# Patient Record
Sex: FEMALE | Race: WHITE | NOT HISPANIC OR LATINO | Employment: OTHER | URBAN - METROPOLITAN AREA
[De-identification: names, ages, dates, MRNs, and addresses within clinical notes are randomized per-mention and may not be internally consistent; named-entity substitution may affect disease eponyms.]

---

## 2017-01-02 DIAGNOSIS — N18.30 CKD (CHRONIC KIDNEY DISEASE) STAGE 3, GFR 30-59 ML/MIN (H): ICD-10-CM

## 2017-01-02 LAB
ANION GAP SERPL CALCULATED.3IONS-SCNC: 6 MMOL/L (ref 3–14)
BUN SERPL-MCNC: 28 MG/DL (ref 7–30)
CALCIUM SERPL-MCNC: 9 MG/DL (ref 8.5–10.1)
CHLORIDE SERPL-SCNC: 103 MMOL/L (ref 94–109)
CO2 SERPL-SCNC: 31 MMOL/L (ref 20–32)
CREAT SERPL-MCNC: 1.74 MG/DL (ref 0.52–1.04)
GFR SERPL CREATININE-BSD FRML MDRD: 29 ML/MIN/1.7M2
GLUCOSE SERPL-MCNC: 112 MG/DL (ref 70–99)
POTASSIUM SERPL-SCNC: 3.7 MMOL/L (ref 3.4–5.3)
SODIUM SERPL-SCNC: 140 MMOL/L (ref 133–144)

## 2017-01-02 PROCEDURE — 36415 COLL VENOUS BLD VENIPUNCTURE: CPT | Performed by: PREVENTIVE MEDICINE

## 2017-01-02 PROCEDURE — 80048 BASIC METABOLIC PNL TOTAL CA: CPT | Performed by: PREVENTIVE MEDICINE

## 2017-01-05 ENCOUNTER — TELEPHONE (OUTPATIENT)
Dept: FAMILY MEDICINE | Facility: CLINIC | Age: 67
End: 2017-01-05

## 2017-01-05 ENCOUNTER — OFFICE VISIT (OUTPATIENT)
Dept: FAMILY MEDICINE | Facility: CLINIC | Age: 67
End: 2017-01-05
Payer: COMMERCIAL

## 2017-01-05 VITALS
DIASTOLIC BLOOD PRESSURE: 67 MMHG | HEART RATE: 98 BPM | OXYGEN SATURATION: 99 % | WEIGHT: 170 LBS | SYSTOLIC BLOOD PRESSURE: 108 MMHG | HEIGHT: 65 IN | TEMPERATURE: 98.5 F | BODY MASS INDEX: 28.32 KG/M2

## 2017-01-05 DIAGNOSIS — I70.1 RENAL ARTERY STENOSIS (H): ICD-10-CM

## 2017-01-05 DIAGNOSIS — G56.03 BILATERAL CARPAL TUNNEL SYNDROME: ICD-10-CM

## 2017-01-05 DIAGNOSIS — I10 BENIGN ESSENTIAL HYPERTENSION: Primary | ICD-10-CM

## 2017-01-05 DIAGNOSIS — N18.30 CKD (CHRONIC KIDNEY DISEASE) STAGE 3, GFR 30-59 ML/MIN (H): Primary | ICD-10-CM

## 2017-01-05 DIAGNOSIS — I10 ESSENTIAL HYPERTENSION: ICD-10-CM

## 2017-01-05 DIAGNOSIS — I10 ESSENTIAL HYPERTENSION: Primary | ICD-10-CM

## 2017-01-05 PROBLEM — H35.30 MACULAR DEGENERATION, BILATERAL: Status: ACTIVE | Noted: 2017-01-05

## 2017-01-05 LAB
ALBUMIN UR-MCNC: NEGATIVE MG/DL
APPEARANCE UR: CLEAR
BILIRUB UR QL STRIP: NEGATIVE
COLOR UR AUTO: YELLOW
GLUCOSE UR STRIP-MCNC: NEGATIVE MG/DL
HGB UR QL STRIP: NEGATIVE
KETONES UR STRIP-MCNC: NEGATIVE MG/DL
LEUKOCYTE ESTERASE UR QL STRIP: NEGATIVE
NITRATE UR QL: NEGATIVE
PH UR STRIP: 7 PH (ref 5–7)
SP GR UR STRIP: 1.01 (ref 1–1.03)
URN SPEC COLLECT METH UR: NORMAL
UROBILINOGEN UR STRIP-ACNC: 0.2 EU/DL (ref 0.2–1)

## 2017-01-05 PROCEDURE — 80048 BASIC METABOLIC PNL TOTAL CA: CPT | Performed by: PREVENTIVE MEDICINE

## 2017-01-05 PROCEDURE — 81003 URINALYSIS AUTO W/O SCOPE: CPT | Performed by: PREVENTIVE MEDICINE

## 2017-01-05 PROCEDURE — 36415 COLL VENOUS BLD VENIPUNCTURE: CPT | Performed by: PREVENTIVE MEDICINE

## 2017-01-05 PROCEDURE — 82043 UR ALBUMIN QUANTITATIVE: CPT | Performed by: PREVENTIVE MEDICINE

## 2017-01-05 PROCEDURE — 99214 OFFICE O/P EST MOD 30 MIN: CPT | Performed by: PREVENTIVE MEDICINE

## 2017-01-05 NOTE — NURSING NOTE
"Chief Complaint   Patient presents with     RECHECK     lab results       Initial /67 mmHg  Pulse 98  Temp(Src) 98.5  F (36.9  C) (Tympanic)  Ht 5' 4.5\" (1.638 m)  Wt 170 lb (77.111 kg)  BMI 28.74 kg/m2  SpO2 99%  Breastfeeding? No Estimated body mass index is 28.74 kg/(m^2) as calculated from the following:    Height as of this encounter: 5' 4.5\" (1.638 m).    Weight as of this encounter: 170 lb (77.111 kg).  BP completed using cuff size: regular right arm  Tori Stokes- CMA      "

## 2017-01-05 NOTE — TELEPHONE ENCOUNTER
Name of caller: Rosa  Relationship to Patient: Self    Reason for Call: Patient was referred to do a 24 hour BP Monitor some time this month. Patient states she called to make the appointment and there wasn't anything open at all. Patient states she will be leaving town for 5 weeks and would like to know if she should complete this when she is back in town or if there was somewhere else she could be referred to. Please advise.     Best phone number to reach patient at is: [phone]  Ok to leave a message with medical info: Yes    Pharmacy preferred (if calling for a refill): NA

## 2017-01-05 NOTE — TELEPHONE ENCOUNTER
Please call [facility] to see if another location - [place] or [place] could do this test for her

## 2017-01-05 NOTE — PROGRESS NOTES
"SUBJECTIVE:  Rosa Sotomayor, a 66 year old female scheduled an appointment to discuss the following issues:     CKD (chronic kidney disease) stage 3, GFR 30-59 ml/min  Essential hypertension  Bilateral carpal tunnel syndrome  B hand numbness at night  Also bp high at home 140s systolic  Creatinine bumped to 1.7 when losartan increased to 100 mg daily    Medical, social, surgical, and family histories reviewed.    ROS:  C: NEGATIVE for fever, chills  E: NEGATIVE for vision changes   R: NEGATIVE for significant cough or SOB  CV: NEGATIVE for chest pain, palpitations   GI: NEGATIVE for nausea, abdominal pain, heartburn, or change in bowel habits  : NEGATIVE for frequency, dysuria, or hematuria  M: NEGATIVE for significant arthralgias or myalgia  N: NEGATIVE for weakness, dizziness or paresthesias or headache    OBJECTIVE:  /67 mmHg  Pulse 98  Temp(Src) 98.5  F (36.9  C) (Tympanic)  Ht 5' 4.5\" (1.638 m)  Wt 170 lb (77.111 kg)  BMI 28.74 kg/m2  SpO2 99%  Breastfeeding? No  EXAM:  GENERAL APPEARANCE: healthy, alert and no distress  EYES: EOMI,  PERRL  HENT: ear canals and TM's normal and nose and mouth without ulcers or lesions  RESP: lungs clear to auscultation - no rales, rhonchi or wheezes  CV: regular rates and rhythm, normal S1 S2, no S3 or S4 and no murmur, click or rub -  ABDOMEN:  soft, nontender, no HSM or masses and bowel sounds normal  HANDS - +tinels and plalens bilaterally    ASSESSMENT/PLAN:  (N18.3) CKD (chronic kidney disease) stage 3, GFR 30-59 ml/min  (primary encounter diagnosis)  Plan: Basic metabolic panel, UA reflex to Microscopic        and Culture, Albumin Random Urine Quantitative  Recheck    (I10) Essential hypertension  Plan: CARDIOLOGY PROCEDURE ADULT REFERRAL  Will do 24 hour bp monitor  ? If well controlled  Also advise pt calibrate bp monitor in clinic    (G56.03) Bilateral carpal tunnel syndrome  Plan: order for DME  Will try wrist braces  Contact me if not improving    25 " minutes spent with patient, over 50% time counseling, coordinating care and explaining about nature of the patient's conditions.    All risks, benefits of treatment and further evaluation was reviewed with patient.  Pt expressed understanding.  Pt was in agreement with this plan.  Angel Benjamin MD

## 2017-01-05 NOTE — TELEPHONE ENCOUNTER
Called over to both [facility] and [facility] to assess availability for 24 hour BP check.      [facility] has open availability all day tomorrow and pretty wide open availability for the remainder of the week.   [facility] does not perform 24 hour BP monitor tests.     Called Pt and left detailed message advising she call [facility] [place] and schedule test.     [facility] [place]: [phone].     Citlaly Mehta RN

## 2017-01-05 NOTE — MR AVS SNAPSHOT
"              After Visit Summary   1/5/2017    Rosa Sotomayor    MRN: [id number]           Patient Information     Date Of Birth          1950        Visit Information        Provider Department      1/5/2017 9:00 AM Angel Benjamin MD [facility]         Follow-ups after your visit        Who to contact     If you have questions or need follow up information about today's clinic visit or your schedule please contact [facility] directly at [phone].  Normal or non-critical lab and imaging results will be communicated to you by MyChart, letter or phone within 4 business days after the clinic has received the results. If you do not hear from us within 7 days, please contact the clinic through [place] or phone. If you have a critical or abnormal lab result, we will notify you by phone as soon as possible.  Submit refill requests through [facility] or call your pharmacy and they will forward the refill request to us. Please allow 3 business days for your refill to be completed.          Additional Information About Your Visit        MyChart Information     [facility] gives you secure access to your electronic health record. If you see a primary care provider, you can also send messages to your care team and make appointments. If you have questions, please call your primary care clinic.  If you do not have a primary care provider, please call [phone] and they will assist you.        Care EveryWhere ID     This is your Care EveryWhere ID. This could be used by other organizations to access your [facility] medical records  [id number]        Your Vitals Were     Pulse Temperature Height BMI (Body Mass Index) Pulse Oximetry Breastfeeding?    98 98.5  F (36.9  C) (Tympanic) 5' 4.5\" (1.638 m) 28.74 kg/m2 99% No       Blood Pressure from Last 3 Encounters:   01/05/17 108/67   12/27/16 105/65   12/19/16 135/84    Weight from Last 3 Encounters:   01/05/17 170 lb (77.111 kg) "   12/19/16 170 lb (77.111 kg)   10/03/16 168 lb 6.4 oz (76.386 kg)              Today, you had the following     No orders found for display       Primary Care Provider Office Phone # Fax #    Angel Nick Benjamin [phone]       [facility] [address]  [place] [address]        Thank you!     Thank you for choosing [facility]  for your care. Our goal is always to provide you with excellent care. Hearing back from our patients is one way we can continue to improve our services. Please take a few minutes to complete the written survey that you may receive in the mail after your visit with us. Thank you!             Your Updated Medication List - Protect others around you: Learn how to safely use, store and throw away your medicines at www.disposemymeds.org.          This list is accurate as of: 1/5/17  9:21 AM.  Always use your most recent med list.                   Brand Name Dispense Instructions for use    amitriptyline 25 MG tablet    ELAVIL     Take 25 mg by mouth At Bedtime       atorvastatin 20 MG tablet    LIPITOR    90 tablet    Take 1 tablet (20 mg) by mouth daily       calcium acetate (Phos Binder) 667 MG Tabs     180 tablet    TAKE 1 TABLET BY MOUTH THREE TIMES DAILY       carvedilol 6.25 MG tablet    COREG     Take 1 tablet (6.25 mg) by mouth 2 times daily (with meals)       diazepam 5 MG tablet    VALIUM     prn       EPINEPHrine 0.3 MG/0.3ML injection    EPIPEN 2-FRAN    0.6 mL    Inject 0.3 mLs (0.3 mg) into the muscle once as needed for anaphylaxis       estradiol 0.025 MG/24HR BIW patch    VIVELLE-DOT     Place 1 patch onto the skin twice a week       hydrOXYzine 25 MG tablet    ATARAX     Take 25 mg by mouth 3 times daily as needed for itching       losartan 100 MG tablet    COZAAR    90 tablet    Take 1 tablet (100 mg) by mouth At Bedtime       magnesium 250 MG tablet      None Entered       PROMETRIUM 100 MG capsule   Generic  drug:  progesterone      Take 100 mg by mouth daily       torsemide 10 MG tablet    DEMADEX     Take 10 mg by mouth daily       vitamin D 2000 UNITS tablet      Take 1 tablet by mouth daily       ZOFRAN 4 MG tablet   Generic drug:  ondansetron      Take by mouth every 8 hours as needed for nausea       ZOMIG 5 MG Soln   Generic drug:  ZOLMitriptan          ZYRTEC ALLERGY 10 MG Caps   Generic drug:  cetirizine HCl

## 2017-01-06 ENCOUNTER — HOSPITAL ENCOUNTER (EMERGENCY)
Facility: CLINIC | Age: 67
Discharge: HOME | End: 2017-01-06
Attending: EMERGENCY MEDICINE | Admitting: EMERGENCY MEDICINE
Payer: COMMERCIAL

## 2017-01-06 ENCOUNTER — APPOINTMENT (OUTPATIENT)
Dept: CT IMAGING | Facility: CLINIC | Age: 67
End: 2017-01-06
Attending: EMERGENCY MEDICINE
Payer: COMMERCIAL

## 2017-01-06 VITALS
TEMPERATURE: 97.7 F | BODY MASS INDEX: 29.02 KG/M2 | RESPIRATION RATE: 16 BRPM | OXYGEN SATURATION: 97 % | WEIGHT: 170 LBS | HEIGHT: 64 IN | SYSTOLIC BLOOD PRESSURE: 147 MMHG | HEART RATE: 85 BPM | DIASTOLIC BLOOD PRESSURE: 70 MMHG

## 2017-01-06 DIAGNOSIS — M54.6 ACUTE MIDLINE THORACIC BACK PAIN: ICD-10-CM

## 2017-01-06 LAB
ALBUMIN SERPL-MCNC: 4 G/DL (ref 3.4–5)
ALBUMIN UR-MCNC: NEGATIVE MG/DL
ALP SERPL-CCNC: 130 U/L (ref 40–150)
ALT SERPL W P-5'-P-CCNC: 32 U/L (ref 0–50)
ANION GAP SERPL CALCULATED.3IONS-SCNC: 10 MMOL/L (ref 3–14)
ANION GAP SERPL CALCULATED.3IONS-SCNC: 7 MMOL/L (ref 3–14)
APPEARANCE UR: CLEAR
AST SERPL W P-5'-P-CCNC: 26 U/L (ref 0–45)
BACTERIA #/AREA URNS HPF: ABNORMAL /HPF
BASOPHILS # BLD AUTO: 0 10E9/L (ref 0–0.2)
BASOPHILS NFR BLD AUTO: 0.4 %
BILIRUB SERPL-MCNC: 0.5 MG/DL (ref 0.2–1.3)
BILIRUB UR QL STRIP: NEGATIVE
BUN SERPL-MCNC: 25 MG/DL (ref 7–30)
BUN SERPL-MCNC: 27 MG/DL (ref 7–30)
CALCIUM SERPL-MCNC: 9.1 MG/DL (ref 8.5–10.1)
CALCIUM SERPL-MCNC: 9.3 MG/DL (ref 8.5–10.1)
CHLORIDE SERPL-SCNC: 102 MMOL/L (ref 94–109)
CHLORIDE SERPL-SCNC: 103 MMOL/L (ref 94–109)
CO2 SERPL-SCNC: 28 MMOL/L (ref 20–32)
CO2 SERPL-SCNC: 29 MMOL/L (ref 20–32)
COLOR UR AUTO: ABNORMAL
CREAT SERPL-MCNC: 1.41 MG/DL (ref 0.52–1.04)
CREAT SERPL-MCNC: 1.54 MG/DL (ref 0.52–1.04)
CREAT UR-MCNC: 46 MG/DL
DIFFERENTIAL METHOD BLD: NORMAL
EOSINOPHIL # BLD AUTO: 0.3 10E9/L (ref 0–0.7)
EOSINOPHIL NFR BLD AUTO: 5.7 %
ERYTHROCYTE [DISTWIDTH] IN BLOOD BY AUTOMATED COUNT: 14.8 % (ref 10–15)
GFR SERPL CREATININE-BSD FRML MDRD: 34 ML/MIN/1.7M2
GFR SERPL CREATININE-BSD FRML MDRD: 38 ML/MIN/1.7M2
GLUCOSE SERPL-MCNC: 103 MG/DL (ref 70–99)
GLUCOSE SERPL-MCNC: 116 MG/DL (ref 70–99)
GLUCOSE UR STRIP-MCNC: NEGATIVE MG/DL
HCT VFR BLD AUTO: 37.2 % (ref 35–47)
HGB BLD-MCNC: 12.5 G/DL (ref 11.7–15.7)
HGB UR QL STRIP: NEGATIVE
IMM GRANULOCYTES # BLD: 0 10E9/L (ref 0–0.4)
IMM GRANULOCYTES NFR BLD: 0 %
KETONES UR STRIP-MCNC: NEGATIVE MG/DL
LEUKOCYTE ESTERASE UR QL STRIP: ABNORMAL
LIPASE SERPL-CCNC: 303 U/L (ref 73–393)
LYMPHOCYTES # BLD AUTO: 1.8 10E9/L (ref 0.8–5.3)
LYMPHOCYTES NFR BLD AUTO: 33.1 %
MCH RBC QN AUTO: 30.6 PG (ref 26.5–33)
MCHC RBC AUTO-ENTMCNC: 33.6 G/DL (ref 31.5–36.5)
MCV RBC AUTO: 91 FL (ref 78–100)
MICROALBUMIN UR-MCNC: 30 MG/L
MICROALBUMIN/CREAT UR: 66.01 MG/G CR (ref 0–25)
MONOCYTES # BLD AUTO: 0.6 10E9/L (ref 0–1.3)
MONOCYTES NFR BLD AUTO: 10.9 %
NEUTROPHILS # BLD AUTO: 2.7 10E9/L (ref 1.6–8.3)
NEUTROPHILS NFR BLD AUTO: 49.9 %
NITRATE UR QL: NEGATIVE
NRBC # BLD AUTO: 0 10*3/UL
NRBC BLD AUTO-RTO: 0 /100
PH UR STRIP: 6 PH (ref 5–7)
PLATELET # BLD AUTO: 263 10E9/L (ref 150–450)
POTASSIUM SERPL-SCNC: 3.6 MMOL/L (ref 3.4–5.3)
POTASSIUM SERPL-SCNC: 4.5 MMOL/L (ref 3.4–5.3)
PROT SERPL-MCNC: 7.9 G/DL (ref 6.8–8.8)
RBC # BLD AUTO: 4.08 10E12/L (ref 3.8–5.2)
RBC #/AREA URNS AUTO: <1 /HPF (ref 0–2)
SODIUM SERPL-SCNC: 139 MMOL/L (ref 133–144)
SODIUM SERPL-SCNC: 140 MMOL/L (ref 133–144)
SP GR UR STRIP: 1.01 (ref 1–1.03)
SQUAMOUS #/AREA URNS AUTO: 1 /HPF (ref 0–1)
URN SPEC COLLECT METH UR: ABNORMAL
UROBILINOGEN UR STRIP-MCNC: NORMAL MG/DL (ref 0–2)
WBC # BLD AUTO: 5.4 10E9/L (ref 4–11)
WBC #/AREA URNS AUTO: 7 /HPF (ref 0–2)

## 2017-01-06 PROCEDURE — 96376 TX/PRO/DX INJ SAME DRUG ADON: CPT

## 2017-01-06 PROCEDURE — 74176 CT ABD & PELVIS W/O CONTRAST: CPT

## 2017-01-06 PROCEDURE — 80053 COMPREHEN METABOLIC PANEL: CPT | Performed by: EMERGENCY MEDICINE

## 2017-01-06 PROCEDURE — 25000125 ZZHC RX 250: Performed by: EMERGENCY MEDICINE

## 2017-01-06 PROCEDURE — 96374 THER/PROPH/DIAG INJ IV PUSH: CPT

## 2017-01-06 PROCEDURE — 83690 ASSAY OF LIPASE: CPT | Performed by: EMERGENCY MEDICINE

## 2017-01-06 PROCEDURE — 25000128 H RX IP 250 OP 636: Performed by: EMERGENCY MEDICINE

## 2017-01-06 PROCEDURE — 81001 URINALYSIS AUTO W/SCOPE: CPT | Performed by: EMERGENCY MEDICINE

## 2017-01-06 PROCEDURE — 99285 EMERGENCY DEPT VISIT HI MDM: CPT | Mod: 25

## 2017-01-06 PROCEDURE — 96361 HYDRATE IV INFUSION ADD-ON: CPT

## 2017-01-06 PROCEDURE — 85025 COMPLETE CBC W/AUTO DIFF WBC: CPT | Performed by: EMERGENCY MEDICINE

## 2017-01-06 PROCEDURE — 87086 URINE CULTURE/COLONY COUNT: CPT | Performed by: EMERGENCY MEDICINE

## 2017-01-06 RX ORDER — CYCLOBENZAPRINE HCL 5 MG
5 TABLET ORAL 3 TIMES DAILY PRN
Qty: 15 TABLET | Refills: 0 | Status: SHIPPED | OUTPATIENT
Start: 2017-01-06 | End: 2017-12-22

## 2017-01-06 RX ORDER — MORPHINE SULFATE 2 MG/ML
1-2 INJECTION, SOLUTION INTRAMUSCULAR; INTRAVENOUS
Status: COMPLETED | OUTPATIENT
Start: 2017-01-06 | End: 2017-01-06

## 2017-01-06 RX ORDER — MORPHINE SULFATE 2 MG/ML
2 INJECTION, SOLUTION INTRAMUSCULAR; INTRAVENOUS EVERY 10 MIN PRN
Status: DISCONTINUED | OUTPATIENT
Start: 2017-01-06 | End: 2017-01-06 | Stop reason: HOSPADM

## 2017-01-06 RX ORDER — MORPHINE SULFATE 10 MG/5ML
2.5 SOLUTION ORAL EVERY 4 HOURS PRN
Qty: 45 ML | Refills: 0 | Status: SHIPPED | OUTPATIENT
Start: 2017-01-06 | End: 2017-03-02

## 2017-01-06 RX ORDER — METHYLPREDNISOLONE 4 MG
TABLET, DOSE PACK ORAL
Qty: 21 TABLET | Refills: 0 | Status: SHIPPED | OUTPATIENT
Start: 2017-01-06 | End: 2017-03-02

## 2017-01-06 RX ORDER — SODIUM CHLORIDE 9 MG/ML
1000 INJECTION, SOLUTION INTRAVENOUS CONTINUOUS
Status: DISCONTINUED | OUTPATIENT
Start: 2017-01-06 | End: 2017-01-06 | Stop reason: HOSPADM

## 2017-01-06 RX ADMIN — MORPHINE SULFATE 2 MG: 2 INJECTION, SOLUTION INTRAMUSCULAR; INTRAVENOUS at 05:10

## 2017-01-06 RX ADMIN — SODIUM CHLORIDE 1000 ML: 9 INJECTION, SOLUTION INTRAVENOUS at 03:53

## 2017-01-06 RX ADMIN — MORPHINE SULFATE 1 MG: 2 INJECTION, SOLUTION INTRAMUSCULAR; INTRAVENOUS at 03:58

## 2017-01-06 ASSESSMENT — ENCOUNTER SYMPTOMS: BACK PAIN: 1

## 2017-01-06 NOTE — ED PROVIDER NOTES
"  History   Chief Complaint:  Back Pain      HPI   Rosa Sotomayor is a 66 year old female with history of stage three chronic kidney disease who presents to the ED for evaluation of back pain. The patient indicates that she started experiencing mid back pain yesterday when she leaned forward to put on her socks. Patient went to a chiropractor and had manipulations. When she came home from the chiropractor she noticed her pain was worsening and radiating to her right upper quadrant. She reports that her pain is exacerbated by movement but no changes with eating. She denies any incontinence, trauma to the back or other associated symptoms.     Allergies:  Fentanyl    Midazolam   Propofol   Codeine   Erythromycin  Gabapentin   Hydromorphone   Lorazepam   Penicillins   Nitrofuran  Sulfa drugs      Medications:    Calcium acetate   Coreg   Losartan   Epipen   Prometrium   Demadex   Vitamin D  Atarax   Elavil   Zomig  Zofran  Cetirizine   Estradiol   Lipitor   Magnesium   Diazepam      Past Medical History:    Chronic kidney disease   Hypertension  Hyperlipidemia     Past Surgical History:    Dental surgery   Gyn surgery   Orthopedic surgery      Family History:    Diabetes   Hypertension   Heart disease   CAD    Social History:  Negative for tobacco use.  Negative for alcohol use.  Marital Status:      Review of Systems   Musculoskeletal: Positive for back pain.   All other systems reviewed and are negative.    Physical Exam   First Vitals:  BP: 148/62 mmHg  Pulse: 87  Temp: 97.7  F (36.5  C)  Resp: 17  Height: 162.6 cm (5' 4\")  Weight: 77.111 kg (170 lb)  SpO2: 100 %    Physical Exam  General: No distress.   Head: No signs of trauma.   Mouth/Throat: Oropharynx moist.   Eyes: Conjunctivae are normal. Pupils are equal..   Neck: Normal range of motion.    Resp:No respiratory distress.   MSK: Normal range of motion. No obvious deformity.  Neuro: The patient is alert and interactive. YANEZ. Speech normal. GCS 15  Skin: " No lesion or sign of trauma noted.   Psych: normal mood and affect. behavior is normal.     Emergency Department Course   Imaging:  Radiographic findings were communicated with the patient who voiced understanding of the findings.  Abd/Pel CT no contrast   1. No urinary stone or hydronephrosis. No acute abnormality.  2. Sigmoid diverticula without acute diverticulitis. As per radiology.     Laboratory:  UA with micro: small Leukocyte Esterase, WBC/HPF 7(H), few bacteria o/w negative  CBC: WBC: 5.4, HGB: 12.5, PLT: 263  CMP:Creatinine: 1.41(H), Glucose 116(H), GFR 38(L), GFR 45(L), o/w WNL     Lipase:303    Interventions:  0353 0.9% sodium chloride bolus IV  0358 Morphine 1mg IV  0510 Morphine 2mg IV    Emergency Department Course:  Nursing notes and vitals reviewed. I performed an exam of the patient as documented above.    Blood drawn. This was sent to the lab for further testing, results above.    The patient provided a urine sample here in the emergency department. This was sent for laboratory testing, findings above.     The patient was sent for a CT while in the emergency department, findings above.     0518 I reevaluated the patient and provided an update in regards to her ED course.      I personally reviewed the laboratory and imaging results with the Patient and answered all related questions prior to discharge.     Findings and plan explained to the Patient. Patient discharged home with instructions regarding supportive care, medications, and reasons to return. The importance of close follow-up was reviewed. The patient was prescribed Medrol Dosepak, Flexeril and Morphine.     Impression & Plan    Medical Decision Making:  Rosa Sotomayor is a 66 year old female who presents for evaluation of back pain and radicular symptoms. Her pain has improved with interventions in the emergency department. She did not sustain any trauma, therefore x-rays are not necessary due to the low likelihood of fracture or  subluxation. CT of patient's back was obtained and was negative.  No indication for consultation with neurosurgery or orthopedic spinal surgeon. There is no clinical evidence of cauda equina syndrome, discitis, spinal/epidural space hematoma or epidural abscess or other emergently worrisome etiology. Patient had 7 white cells in her urine but no dysuria or other urinary symptoms. Urine culture was sent and if it comes back positive patient will be called with antibiotics. The neurological exam is normal. The patient was advised that radiculopathy often takes significant time to resolve, and that follow up with primary care, neurology and/or neurosurgery will be indicated if symptoms do not improve. She will be discharged with pain medications to use as directed.  No heavy lifting, bending or twisting. Return if increasing pain, muscular weakness, or bowel or bladder dysfunction.     Diagnosis:    ICD-10-CM    1. Acute midline thoracic back pain M54.6      Discharge Medications:  New Prescriptions    CYCLOBENZAPRINE (FLEXERIL) 5 MG TABLET    Take 1 tablet (5 mg) by mouth 3 times daily as needed for muscle spasms    METHYLPREDNISOLONE (MEDROL DOSEPAK) 4 MG TABLET    Follow package instructions    MORPHINE 10 MG/5ML SOLUTION    Take 1.25 mLs (2.5 mg) by mouth every 4 hours as needed for moderate to severe pain maximum 8 mL per day     Payam Said  1/6/2017    EMERGENCY DEPARTMENT    Payam MANCERA Said, am serving as a scribe on 1/6/2017 at 3:33 AM to personally document services performed by Pablo Lucas MD based on my observations and the provider's statements to me.       Pablo Lucas MD  01/06/17 5542

## 2017-01-06 NOTE — ED AVS SNAPSHOT
Emergency Department    [address] [place] [address]    Phone:  [phone]    Fax:  [phone]                                       Rosa Sotomayor   MRN: [id number]    Department:   Emergency Department   Date of Visit:  1/6/2017           After Visit Summary Signature Page     I have received my discharge instructions, and my questions have been answered. I have discussed any challenges I see with this plan with the nurse or doctor.    ..........................................................................................................................................  Patient/Patient Representative Signature      ..........................................................................................................................................  Patient Representative Print Name and Relationship to Patient    ..................................................               ................................................  Date                                            Time    ..........................................................................................................................................  Reviewed by Signature/Title    ...................................................              ..............................................  Date                                                            Time

## 2017-01-06 NOTE — ED AVS SNAPSHOT
Emergency Department    [address] [facility] [address]    Phone:  [phone]    Fax:  [phone]                                       Rosa Sotomayor   MRN: [id number]    Department:   Emergency Department   Date of Visit:  1/6/2017           Patient Information     Date Of Birth          1950        Your diagnoses for this visit were:     Acute midline thoracic back pain        You were seen by Pablo Lucas MD.      Follow-up Information     Follow up with Humza Boles MD. Call today.    Specialty:  Physical Medicine and Rehab    Contact information:    HUMZA BOLES  [address] [facility] [address]  [facility] [address]  [phone]          Discharge Instructions         Neck/Back Pain: General    Both neck and back pain are usually caused by injury to the muscles or ligaments of the spine. Sometimes the disks that separate each bone of the spine may cause pain by pressing on a nearby nerve. Back and neck pain may appear after a sudden twisting/bending force (such as in a car accident), or sometimes after a simple awkward movement. In either case, muscle spasm is often present and adds to the pain.  Acute neck and back pain usually gets better in 1 to 2 weeks. Pain related to disk disease, arthritis in the spinal joints or spinal stenosis (narrowing of the spinal canal) can become chronic and last for months or years.  Back and neck pain are common problems. Most people feel better in 1 or 2 weeks, and most of the rest in 1 to 2 months. Most people can remain active.  Symptoms  People experience and describe pain differently.    Pain can be sharp, stabbing, shooting, aching, cramping, or burning    Movement, standing, bending, lifting, sitting, or walking may worsen the pain    Pain can be localized to one spot or area, or it can be more generalized    Pain can spread or radiate upwards, downwards, to the front, or go down your arms    Muscle spasm may occur.  Cause  Most of the time  "\"mechanical problems\" with the muscles or spine cause the pain. it is usually caused by an injury, whether known or not, to the muscles or ligaments. While illnesses can cause back pain, it is usually not caused by a serious illness. Pain is usually related to physical activity, whether sports, exercise, work, or normal activity. Sometimes it can occur without an identifiable cause. This can happen simply by stretching or moving wrong, without noting pain at the time. Other causes include:    Overexertion, lifting, pushing, pulling incorrectly or too aggressively.    Sudden twisting, bending or stretching from an accident (car or fall), or accidental movement.    Poor posture    Poor conditioning, lack of regular exercise    Spinal disc disease or arthritis    Stress    Pregnancy, or illness like appendicitis, bladder or kidney infection, pelvic infections   Home care    For neck pain: Use a comfortable pillow that supports the head and keeps the spine in a neutral position. The position of the head should not be tilted forward or backward.    When in bed, try to find a position of comfort. A firm mattress is best. Try lying flat on your back with pillows under your knees. You can also try lying on your side with your knees bent up towards your chest and a pillow between your knees.    At first, do not try to stretch out the sore spots. If there is a strain, it is not like the good soreness you get after exercising without an injury. In this case, stretching may make it worse.    Avoid prolonged sitting, long car rides or travel. This puts more stress on the lower back than standing or walking.    During the first 24 to 72 hours after an injury, apply an ice pack to the painful area for 20 minutes and then remove it for 20 minutes over a period of 60 to 90 minutes or several times a day. As a safety precaution, do not use a heating pad at bedtime. Sleeping with a heating pad can lead to skin burns or tissue " damage.    Ice and heat therapies can be alternated. Talk with your health care provider about the best treatment for your back or neck pain.    Therapeutic massage can help relax the back and neck muscles without stretching them.    Be aware of safe lifting methods and do not lift anything over 15 pounds until all the pain is gone.  Medications  Talk to your health care provider before using medications, especially if you have other medical problems or are taking other medicines.    You may use acetaminophen (such as Tylenol) or ibuprofen (such as Advil or Motrin) to control pain, unless another pain medicine was prescribed. If you have chronic conditions like diabetes, liver or kidney disease, stomach ulcers,  gastrointestinal bleeding, or are taking blood thinner medications.    Be careful if you are given pain medicines, narcotics, or medication for muscle spasm. They can cause drowsiness, and can affect your coordination, reflexes, and judgment. Do not drive or operate heavy machinery.  Follow-up care  Follow up with your health care provider if your symptoms do not start to improve after one week. Physical therapy or further tests may be needed.  If X-rays were taken, they will be reviewed by a radiologist. You will be notified of any new findings that may affect your care.  Call 911  Seek emergency medical care if any of the following occur:    Trouble breathing    Confusion    Very drowsy or trouble awakening    Fainting or loss of consciousness    Rapid or very slow heart rate    Loss of bowel or bladder control  When to seek medical care  Get prompt medical attention if any of the following occur:    Pain becomes worse or spreads into your arms or legs    Weakness, numbness or pain in one or both arms or legs    Numbness in the groin area    Difficulty walking    Fever of 100.4 F (38 C) or higher, or as directed by your healthcare provider    6057-1222 [employer]. [address] [facility],  KAR[place] Velazquez [address]. All rights reserved. This information is not intended as a substitute for professional medical care. Always follow your healthcare professional's instructions.          Future Appointments        Provider Department Dept Phone Center    1/6/2017 9:30 AM [facility] EKG MONITORING ROOM U ELECTROCARDIOLOGY  [facility]    1/6/2017 1:15 PM [facility] Nurse [facility] [phone]       24 Hour Appointment Hotline       To make an appointment at any [facility], call [phone] ([phone]). If you don't have a family doctor or clinic, we will help you find one. [facility] are conveniently located to serve the needs of you and your family.             Review of your medicines      START taking        Dose / Directions Last dose taken    cyclobenzaprine 5 MG tablet   Commonly known as:  FLEXERIL   Dose:  5 mg   Quantity:  15 tablet        Take 1 tablet (5 mg) by mouth 3 times daily as needed for muscle spasms   Refills:  0        methylPREDNISolone 4 MG tablet   Commonly known as:  MEDROL DOSEPAK   Quantity:  21 tablet        Follow package instructions   Refills:  0        morphine 10 MG/5ML solution   Dose:  2.5 mg   Quantity:  45 mL        Take 1.25 mLs (2.5 mg) by mouth every 4 hours as needed for moderate to severe pain maximum 8 mL per day   Refills:  0          Our records show that you are taking the medicines listed below. If these are incorrect, please call your family doctor or clinic.        Dose / Directions Last dose taken    amitriptyline 25 MG tablet   Commonly known as:  ELAVIL   Dose:  25 mg        Take 25 mg by mouth At Bedtime   Refills:  0        atorvastatin 20 MG tablet   Commonly known as:  LIPITOR   Dose:  20 mg   Quantity:  90 tablet        Take 1 tablet (20 mg) by mouth daily   Refills:  1        calcium acetate (Phos Binder) 667 MG Tabs   Quantity:  180 tablet        TAKE 1 TABLET BY MOUTH THREE TIMES DAILY   Refills:   0        carvedilol 6.25 MG tablet   Commonly known as:  COREG   Dose:  6.25 mg        Take 1 tablet (6.25 mg) by mouth 2 times daily (with meals)   Refills:  0        diazepam 5 MG tablet   Commonly known as:  VALIUM        prn   Refills:  0        EPINEPHrine 0.3 MG/0.3ML injection   Commonly known as:  EPIPEN 2-FRAN   Dose:  0.3 mg   Quantity:  0.6 mL        Inject 0.3 mLs (0.3 mg) into the muscle once as needed for anaphylaxis   Refills:  1        estradiol 0.025 MG/24HR BIW patch   Commonly known as:  VIVELLE-DOT   Dose:  1 patch        Place 1 patch onto the skin twice a week   Refills:  0        hydrOXYzine 25 MG tablet   Commonly known as:  ATARAX   Dose:  25 mg        Take 25 mg by mouth 3 times daily as needed for itching   Refills:  0        losartan 100 MG tablet   Commonly known as:  COZAAR   Dose:  100 mg   Quantity:  90 tablet        Take 1 tablet (100 mg) by mouth At Bedtime   Refills:  0        magnesium 250 MG tablet        None Entered   Refills:  0        order for DME   Quantity:  2 Device        Equipment being ordered: b carpal tunnel wrist braces   Refills:  0        PROMETRIUM 100 MG capsule   Dose:  100 mg   Generic drug:  progesterone        Take 100 mg by mouth daily   Refills:  0        torsemide 10 MG tablet   Commonly known as:  DEMADEX   Dose:  10 mg        Take 10 mg by mouth daily   Refills:  0        vitamin D 2000 UNITS tablet   Dose:  1 tablet        Take 1 tablet by mouth daily   Refills:  0        ZOFRAN 4 MG tablet   Generic drug:  ondansetron        Take by mouth every 8 hours as needed for nausea   Refills:  0        ZOMIG 5 MG Soln   Generic drug:  ZOLMitriptan        Refills:  0        ZYRTEC ALLERGY 10 MG Caps   Generic drug:  cetirizine HCl        Refills:  0                Prescriptions were sent or printed at these locations (3 Prescriptions)                   Other Prescriptions                Printed at Department/Unit printer (3 of 3)         morphine 10 MG/5ML  solution               cyclobenzaprine (FLEXERIL) 5 MG tablet               methylPREDNISolone (MEDROL DOSEPAK) 4 MG tablet                Procedures and tests performed during your visit     Abd/pelvis CT no contrast - Stone Protocol    CBC with platelets differential    Comprehensive metabolic panel    Lipase    UA with Microscopic    Urine Culture Aerobic Bacterial      Orders Needing Specimen Collection     None      Pending Results     Date and Time Order Name Status Description    1/6/2017 0524 Urine Culture Aerobic Bacterial In process     1/6/2017 0429 Abd/pelvis CT no contrast - Stone Protocol Preliminary     1/5/2017 0936 ALBUMIN RANDOM URINE QUANTITATIVE In process     1/5/2017 0936 BASIC METABOLIC PANEL In process             Pending Culture Results     Date and Time Order Name Status Description    1/6/2017 0524 Urine Culture Aerobic Bacterial In process     1/5/2017 0936 ALBUMIN RANDOM URINE QUANTITATIVE In process        Test Results from your hospital stay           1/6/2017  3:51 AM - Interface, Flexilab Results      Component Results     Component Value Ref Range & Units Status    Color Urine Light Yellow  Final    Appearance Urine Clear  Final    Glucose Urine Negative NEG mg/dL Final    Bilirubin Urine Negative NEG Final    Ketones Urine Negative NEG mg/dL Final    Specific Gravity Urine 1.010 1.003 - 1.035 Final    Blood Urine Negative NEG Final    pH Urine 6.0 5.0 - 7.0 pH Final    Protein Albumin Urine Negative NEG mg/dL Final    Urobilinogen mg/dL Normal 0.0 - 2.0 mg/dL Final    Nitrite Urine Negative NEG Final    Leukocyte Esterase Urine Small (A) NEG Final    Source Midstream Urine  Final    WBC Urine 7 (H) 0 - 2 /HPF Final    RBC Urine <1 0 - 2 /HPF Final    Bacteria Urine Few (A) NEG /HPF Final    Squamous Epithelial /HPF Urine 1 0 - 1 /HPF Final         1/6/2017  4:08 AM - Interface, Flexilab Results      Component Results     Component Value Ref Range & Units Status    WBC 5.4 4.0 -  11.0 10e9/L Final    RBC Count 4.08 3.8 - 5.2 10e12/L Final    Hemoglobin 12.5 11.7 - 15.7 g/dL Final    Hematocrit 37.2 35.0 - 47.0 % Final    MCV 91 78 - 100 fl Final    MCH 30.6 26.5 - 33.0 pg Final    MCHC 33.6 31.5 - 36.5 g/dL Final    RDW 14.8 10.0 - 15.0 % Final    Platelet Count 263 150 - 450 10e9/L Final    Diff Method Automated Method  Final    % Neutrophils 49.9 % Final    % Lymphocytes 33.1 % Final    % Monocytes 10.9 % Final    % Eosinophils 5.7 % Final    % Basophils 0.4 % Final    % Immature Granulocytes 0.0 % Final    Nucleated RBCs 0 0 /100 Final    Absolute Neutrophil 2.7 1.6 - 8.3 10e9/L Final    Absolute Lymphocytes 1.8 0.8 - 5.3 10e9/L Final    Absolute Monocytes 0.6 0.0 - 1.3 10e9/L Final    Absolute Eosinophils 0.3 0.0 - 0.7 10e9/L Final    Absolute Basophils 0.0 0.0 - 0.2 10e9/L Final    Abs Immature Granulocytes 0.0 0 - 0.4 10e9/L Final    Absolute Nucleated RBC 0.0  Final         1/6/2017  4:24 AM - Interface, Flexilab Results      Component Results     Component Value Ref Range & Units Status    Sodium 139 133 - 144 mmol/L Final    Potassium 3.6 3.4 - 5.3 mmol/L Final    Chloride 103 94 - 109 mmol/L Final    Carbon Dioxide 29 20 - 32 mmol/L Final    Anion Gap 7 3 - 14 mmol/L Final    Glucose 116 (H) 70 - 99 mg/dL Final    Urea Nitrogen 27 7 - 30 mg/dL Final    Creatinine 1.41 (H) 0.52 - 1.04 mg/dL Final    GFR Estimate 38 (L) >60 mL/min/1.7m2 Final    Non  GFR Calc    GFR Estimate If Black 45 (L) >60 mL/min/1.7m2 Final    African American GFR Calc    Calcium 9.1 8.5 - 10.1 mg/dL Final    Bilirubin Total 0.5 0.2 - 1.3 mg/dL Final    Albumin 4.0 3.4 - 5.0 g/dL Final    Protein Total 7.9 6.8 - 8.8 g/dL Final    Alkaline Phosphatase 130 40 - 150 U/L Final    ALT 32 0 - 50 U/L Final    AST 26 0 - 45 U/L Final         1/6/2017  4:22 AM - Interface, Flexilab Results      Component Results     Component Value Ref Range & Units Status    Lipase 303 73 - 393 U/L Final          1/6/2017  5:18 AM - Interface, Radiant Ib      Narrative     CT ABDOMEN PELVIS W/O CONTRAST  1/6/2017 4:54 AM      HISTORY: Flank pain.    TECHNIQUE: Imaging performed from the diaphragm to the base of the  bladder using the renal stone protocol.  No oral or intravenous  contrast. Radiation dose for this scan was reduced using automated  exposure control, adjustment of the mA and/or kV according to patient  size, or iterative reconstruction technique.     COMPARISON: None.    FINDINGS:    Abdomen: There is no renal or ureteral stone on either side. No  hydronephrosis. The kidneys appear normal on this noncontrast exam.    There is mild atelectasis and scarring at the lung bases. The heart  size is normal. Evaluation of the solid abdominal organs is limited by  the lack of intravenous contrast. The liver, spleen, gallbladder,  pancreas, and adrenal glands are normal in appearance. There is no  abdominal or pelvic lymph node enlargement.    Pelvis: A left hip arthroplasty causes streak artifact through the  pelvis. The uterus and adnexa appear grossly normal. There are sigmoid  diverticula without acute diverticulitis. No bowel obstruction or  inflammation. No free intraperitoneal gas or fluid. A few pelvic  phleboliths.        Impression     IMPRESSION:  1. No urinary stone or hydronephrosis. No acute abnormality.  2. Sigmoid diverticula without acute diverticulitis.         1/6/2017  5:30 AM - Abdiel Flexilab Results                Clinical Quality Measure: Blood Pressure Screening     Your blood pressure was checked while you were in the emergency department today. The last reading we obtained was  BP: 152/69 mmHg . Please read the guidelines below about what these numbers mean and what you should do about them.  If your systolic blood pressure (the top number) is less than 120 and your diastolic blood pressure (the bottom number) is less than 80, then your blood pressure is normal. There is nothing more that  you need to do about it.  If your systolic blood pressure (the top number) is 120-139 or your diastolic blood pressure (the bottom number) is 80-89, your blood pressure may be higher than it should be. You should have your blood pressure rechecked within a year by a primary care provider.  If your systolic blood pressure (the top number) is 140 or greater or your diastolic blood pressure (the bottom number) is 90 or greater, you may have high blood pressure. High blood pressure is treatable, but if left untreated over time it can put you at risk for heart attack, stroke, or kidney failure. You should have your blood pressure rechecked by a primary care provider within the next 4 weeks.  If your provider in the emergency department today gave you specific instructions to follow-up with your doctor or provider even sooner than that, you should follow that instruction and not wait for up to 4 weeks for your follow-up visit.        Thank you for choosing [facility]       Thank you for choosing [facility] for your care. Our goal is always to provide you with excellent care. Hearing back from our patients is one way we can continue to improve our services. Please take a few minutes to complete the written survey that you may receive in the mail after you visit with us. Thank you!        [employer] Information     [employer] gives you secure access to your electronic health record. If you see a primary care provider, you can also send messages to your care team and make appointments. If you have questions, please call your primary care clinic.  If you do not have a primary care provider, please call [phone] and they will assist you.        Care EveryWhere ID     This is your Care EveryWhere ID. This could be used by other organizations to access your [facility] medical records  [id number]        After Visit Summary       This is your record. Keep this with you and show to your community pharmacist(s) and doctor(s) at your next  visit.

## 2017-01-06 NOTE — DISCHARGE INSTRUCTIONS
"  Neck/Back Pain: General    Both neck and back pain are usually caused by injury to the muscles or ligaments of the spine. Sometimes the disks that separate each bone of the spine may cause pain by pressing on a nearby nerve. Back and neck pain may appear after a sudden twisting/bending force (such as in a car accident), or sometimes after a simple awkward movement. In either case, muscle spasm is often present and adds to the pain.  Acute neck and back pain usually gets better in 1 to 2 weeks. Pain related to disk disease, arthritis in the spinal joints or spinal stenosis (narrowing of the spinal canal) can become chronic and last for months or years.  Back and neck pain are common problems. Most people feel better in 1 or 2 weeks, and most of the rest in 1 to 2 months. Most people can remain active.  Symptoms  People experience and describe pain differently.    Pain can be sharp, stabbing, shooting, aching, cramping, or burning    Movement, standing, bending, lifting, sitting, or walking may worsen the pain    Pain can be localized to one spot or area, or it can be more generalized    Pain can spread or radiate upwards, downwards, to the front, or go down your arms    Muscle spasm may occur.  Cause  Most of the time \"mechanical problems\" with the muscles or spine cause the pain. it is usually caused by an injury, whether known or not, to the muscles or ligaments. While illnesses can cause back pain, it is usually not caused by a serious illness. Pain is usually related to physical activity, whether sports, exercise, work, or normal activity. Sometimes it can occur without an identifiable cause. This can happen simply by stretching or moving wrong, without noting pain at the time. Other causes include:    Overexertion, lifting, pushing, pulling incorrectly or too aggressively.    Sudden twisting, bending or stretching from an accident (car or fall), or accidental movement.    Poor posture    Poor conditioning, " lack of regular exercise    Spinal disc disease or arthritis    Stress    Pregnancy, or illness like appendicitis, bladder or kidney infection, pelvic infections   Home care    For neck pain: Use a comfortable pillow that supports the head and keeps the spine in a neutral position. The position of the head should not be tilted forward or backward.    When in bed, try to find a position of comfort. A firm mattress is best. Try lying flat on your back with pillows under your knees. You can also try lying on your side with your knees bent up towards your chest and a pillow between your knees.    At first, do not try to stretch out the sore spots. If there is a strain, it is not like the good soreness you get after exercising without an injury. In this case, stretching may make it worse.    Avoid prolonged sitting, long car rides or travel. This puts more stress on the lower back than standing or walking.    During the first 24 to 72 hours after an injury, apply an ice pack to the painful area for 20 minutes and then remove it for 20 minutes over a period of 60 to 90 minutes or several times a day. As a safety precaution, do not use a heating pad at bedtime. Sleeping with a heating pad can lead to skin burns or tissue damage.    Ice and heat therapies can be alternated. Talk with your health care provider about the best treatment for your back or neck pain.    Therapeutic massage can help relax the back and neck muscles without stretching them.    Be aware of safe lifting methods and do not lift anything over 15 pounds until all the pain is gone.  Medications  Talk to your health care provider before using medications, especially if you have other medical problems or are taking other medicines.    You may use acetaminophen (such as Tylenol) or ibuprofen (such as Advil or Motrin) to control pain, unless another pain medicine was prescribed. If you have chronic conditions like diabetes, liver or kidney disease, stomach  ulcers,  gastrointestinal bleeding, or are taking blood thinner medications.    Be careful if you are given pain medicines, narcotics, or medication for muscle spasm. They can cause drowsiness, and can affect your coordination, reflexes, and judgment. Do not drive or operate heavy machinery.  Follow-up care  Follow up with your health care provider if your symptoms do not start to improve after one week. Physical therapy or further tests may be needed.  If X-rays were taken, they will be reviewed by a radiologist. You will be notified of any new findings that may affect your care.  Call 911  Seek emergency medical care if any of the following occur:    Trouble breathing    Confusion    Very drowsy or trouble awakening    Fainting or loss of consciousness    Rapid or very slow heart rate    Loss of bowel or bladder control  When to seek medical care  Get prompt medical attention if any of the following occur:    Pain becomes worse or spreads into your arms or legs    Weakness, numbness or pain in one or both arms or legs    Numbness in the groin area    Difficulty walking    Fever of 100.4 F (38 C) or higher, or as directed by your healthcare provider    1567-0997 [employer]. [address] [place] [address]. All rights reserved. This information is not intended as a substitute for professional medical care. Always follow your healthcare professional's instructions.

## 2017-01-07 LAB
BACTERIA SPEC CULT: NORMAL
MICRO REPORT STATUS: NORMAL
SPECIMEN SOURCE: NORMAL

## 2017-01-10 DIAGNOSIS — E78.1 HYPERTRIGLYCERIDEMIA: Primary | ICD-10-CM

## 2017-01-10 NOTE — TELEPHONE ENCOUNTER
atorvastatin (LIPITOR) 20 MG tablet 90 tablet 1 7/22/2016            Last Written Prescription Date: 07/22/16  Last Fill Quantity: 90, # refills: 1    Last Office Visit with [facility], [facility] or [facility] prescribing provider:  01/05/17   Future Office Visit:    Next 5 appointments (look out 90 days)     Jan 13, 2017  2:30 PM   Nurse Only with  NURSE   [facility]    [address]  [place] [address]   [phone]                  BP Readings from Last 3 Encounters:   01/06/17 147/70   01/05/17 108/67   12/27/16 105/65     ALT       32   1/6/2017  CHOL      189   7/22/2016  HDL       44   7/22/2016  LDL       76   7/22/2016  TRIG      345   7/22/2016  CHOLHDLRATIO      3.8   3/1/2006

## 2017-01-11 RX ORDER — ATORVASTATIN CALCIUM 20 MG/1
TABLET, FILM COATED ORAL
Qty: 90 TABLET | Refills: 1 | Status: SHIPPED | OUTPATIENT
Start: 2017-01-11 | End: 2017-07-10

## 2017-01-11 NOTE — TELEPHONE ENCOUNTER
Prescription approved per [facility] Refill Protocol.  Madia Epstein RN

## 2017-01-12 ENCOUNTER — HOSPITAL ENCOUNTER (OUTPATIENT)
Dept: CARDIOLOGY | Facility: CLINIC | Age: 67
Discharge: HOME | End: 2017-01-12
Attending: PREVENTIVE MEDICINE | Admitting: PREVENTIVE MEDICINE
Payer: COMMERCIAL

## 2017-01-12 ENCOUNTER — TRANSFERRED RECORDS (OUTPATIENT)
Dept: HEALTH INFORMATION MANAGEMENT | Facility: CLINIC | Age: 67
End: 2017-01-12

## 2017-01-12 DIAGNOSIS — I10 BENIGN ESSENTIAL HYPERTENSION: ICD-10-CM

## 2017-01-12 DIAGNOSIS — I10 ESSENTIAL HYPERTENSION: ICD-10-CM

## 2017-01-12 PROCEDURE — 93788 AMBL BP MNTR W/SW A/R: CPT | Performed by: PREVENTIVE MEDICINE

## 2017-01-12 PROCEDURE — 93790 AMBL BP MNTR W/SW I&R: CPT | Performed by: INTERNAL MEDICINE

## 2017-01-13 ENCOUNTER — ALLIED HEALTH/NURSE VISIT (OUTPATIENT)
Dept: NURSING | Facility: CLINIC | Age: 67
End: 2017-01-13
Payer: COMMERCIAL

## 2017-01-13 VITALS
OXYGEN SATURATION: 100 % | BODY MASS INDEX: 29.02 KG/M2 | SYSTOLIC BLOOD PRESSURE: 141 MMHG | WEIGHT: 170 LBS | HEIGHT: 64 IN | DIASTOLIC BLOOD PRESSURE: 78 MMHG | HEART RATE: 82 BPM

## 2017-01-13 DIAGNOSIS — I10 BENIGN ESSENTIAL HYPERTENSION: Primary | ICD-10-CM

## 2017-01-13 PROCEDURE — 99207 ZZC NO CHARGE NURSE ONLY: CPT

## 2017-01-13 NOTE — NURSING NOTE
"Chief Complaint   Patient presents with     Hypertension       Initial /78 mmHg  Pulse 82  Ht 5' 4\" (1.626 m)  Wt 170 lb (77.111 kg)  BMI 29.17 kg/m2  SpO2 100%  Breastfeeding? No Estimated body mass index is 29.17 kg/(m^2) as calculated from the following:    Height as of this encounter: 5' 4\" (1.626 m).    Weight as of this encounter: 170 lb (77.111 kg).  BP completed using cuff size: regular, L arm    MARIO George      "

## 2017-01-13 NOTE — PROGRESS NOTES
Rosa Sotomayor is a 66 year old female who comes in today for a Blood Pressure check because of check blood pressure and calibration of machines.    *Document pulse and BP  *Use new set of vitals button for multiple readings.  *Use extended vitals for orthostatic    Vitals as recorded, a regular cuff was used.    Patient is taking medication as prescribed  Patient is tolerating medications well.  Patient is monitoring Blood Pressure at home.  Average readings if yes are 150'/80's    Current complaints: none    Disposition: MD/AP notified while patient in the clinic will contact patient if any changes made       Patient advised that had Steroid Injection today and she has a herniated sick between T8-T9 and would like  to be aware

## 2017-01-29 DIAGNOSIS — K21.9 GASTROESOPHAGEAL REFLUX DISEASE WITHOUT ESOPHAGITIS: Primary | ICD-10-CM

## 2017-01-30 NOTE — TELEPHONE ENCOUNTER
ranitidine (ZANTAC) 300 MG tablet      Last Written Prescription Date:  ?  Last Fill Quantity: ?,   # refills: ?  Last Office Visit with [facility], [facility] or [facility] prescribing provider: 1/5/2017  Future Office visit:       Routing refill request to provider for review/approval because:  Drug not active on patient's medication list

## 2017-01-31 NOTE — TELEPHONE ENCOUNTER
Please let pt know I have decreased her zantac dose to 150 mg daily as this is the appropriate dose for her kidney function.

## 2017-02-02 ENCOUNTER — TELEPHONE (OUTPATIENT)
Dept: FAMILY MEDICINE | Facility: CLINIC | Age: 67
End: 2017-02-02

## 2017-02-02 NOTE — TELEPHONE ENCOUNTER
Reason for call: Other   Patient called regarding (reason for call):Medication question  Additional comments: patient has herniated disc, Orthopedic surgeon Dr Mina wants to know if its ok for patient to take Tramadol with her Kidney condition, stage 3 kidney disease     Phone Number Pt can be reached at: Cell number on file:    Telephone Information:   Mobile [phone]     Best Time: any  Can we leave a detailed message on this number? YES

## 2017-03-02 ENCOUNTER — OFFICE VISIT (OUTPATIENT)
Dept: FAMILY MEDICINE | Facility: CLINIC | Age: 67
End: 2017-03-02
Payer: COMMERCIAL

## 2017-03-02 ENCOUNTER — RADIANT APPOINTMENT (OUTPATIENT)
Dept: GENERAL RADIOLOGY | Facility: CLINIC | Age: 67
End: 2017-03-02
Attending: PREVENTIVE MEDICINE
Payer: COMMERCIAL

## 2017-03-02 VITALS
DIASTOLIC BLOOD PRESSURE: 73 MMHG | SYSTOLIC BLOOD PRESSURE: 118 MMHG | HEART RATE: 95 BPM | TEMPERATURE: 98.5 F | HEIGHT: 64 IN | OXYGEN SATURATION: 99 % | WEIGHT: 171 LBS | BODY MASS INDEX: 29.19 KG/M2

## 2017-03-02 DIAGNOSIS — G57.62 MORTON'S NEUROMA OF LEFT FOOT: ICD-10-CM

## 2017-03-02 DIAGNOSIS — N18.30 CKD (CHRONIC KIDNEY DISEASE) STAGE 3, GFR 30-59 ML/MIN (H): Primary | ICD-10-CM

## 2017-03-02 DIAGNOSIS — I10 BENIGN ESSENTIAL HYPERTENSION: ICD-10-CM

## 2017-03-02 PROCEDURE — 36415 COLL VENOUS BLD VENIPUNCTURE: CPT | Performed by: PREVENTIVE MEDICINE

## 2017-03-02 PROCEDURE — 82043 UR ALBUMIN QUANTITATIVE: CPT | Performed by: PREVENTIVE MEDICINE

## 2017-03-02 PROCEDURE — 99213 OFFICE O/P EST LOW 20 MIN: CPT | Mod: 25 | Performed by: PREVENTIVE MEDICINE

## 2017-03-02 PROCEDURE — 81003 URINALYSIS AUTO W/O SCOPE: CPT | Performed by: PREVENTIVE MEDICINE

## 2017-03-02 PROCEDURE — 80048 BASIC METABOLIC PNL TOTAL CA: CPT | Performed by: PREVENTIVE MEDICINE

## 2017-03-02 PROCEDURE — 20605 DRAIN/INJ JOINT/BURSA W/O US: CPT | Performed by: PREVENTIVE MEDICINE

## 2017-03-02 PROCEDURE — 73630 X-RAY EXAM OF FOOT: CPT | Mod: LT

## 2017-03-02 RX ORDER — LOSARTAN POTASSIUM 100 MG/1
100 TABLET ORAL AT BEDTIME
Qty: 90 TABLET | Refills: 1 | Status: SHIPPED | OUTPATIENT
Start: 2017-03-02 | End: 2017-08-31

## 2017-03-02 RX ORDER — CARVEDILOL 6.25 MG/1
6.25 TABLET ORAL 2 TIMES DAILY WITH MEALS
Qty: 180 TABLET | Refills: 3 | Status: SHIPPED | OUTPATIENT
Start: 2017-03-02 | End: 2017-08-10

## 2017-03-02 NOTE — MR AVS SNAPSHOT
"              After Visit Summary   3/2/2017    Rosa Sotomayor    MRN: [id number]           Patient Information     Date Of Birth          1950        Visit Information        Provider Department      3/2/2017 12:30 PM Angel Benjamin MD [facility]        Today's Diagnoses     Benign essential hypertension           Follow-ups after your visit        Who to contact     If you have questions or need follow up information about today's clinic visit or your schedule please contact [facility] directly at [phone].  Normal or non-critical lab and imaging results will be communicated to you by MyChart, letter or phone within 4 business days after the clinic has received the results. If you do not hear from us within 7 days, please contact the clinic through [employer] or phone. If you have a critical or abnormal lab result, we will notify you by phone as soon as possible.  Submit refill requests through [employer] or call your pharmacy and they will forward the refill request to us. Please allow 3 business days for your refill to be completed.          Additional Information About Your Visit        MyChart Information     [employer] gives you secure access to your electronic health record. If you see a primary care provider, you can also send messages to your care team and make appointments. If you have questions, please call your primary care clinic.  If you do not have a primary care provider, please call [phone] and they will assist you.        Care EveryWhere ID     This is your Care EveryWhere ID. This could be used by other organizations to access your [facility] medical records  [id number]        Your Vitals Were     Pulse Temperature Height Pulse Oximetry Breastfeeding? BMI (Body Mass Index)    95 98.5  F (36.9  C) (Tympanic) 5' 4\" (1.626 m) 99% No 29.35 kg/m2       Blood Pressure from Last 3 Encounters:   03/02/17 118/73   01/13/17 141/78   01/06/17 147/70    Weight " from Last 3 Encounters:   03/02/17 171 lb (77.6 kg)   01/13/17 170 lb (77.1 kg)   01/06/17 170 lb (77.1 kg)              Today, you had the following     No orders found for display       Primary Care Provider Office Phone # Fax #    Angel Rmoero Bakari Benjamin [phone]       [facility] [address] [facility] [address]  [facility] [address]        Thank you!     Thank you for choosing [facility]  for your care. Our goal is always to provide you with excellent care. Hearing back from our patients is one way we can continue to improve our services. Please take a few minutes to complete the written survey that you may receive in the mail after your visit with us. Thank you!             Your Updated Medication List - Protect others around you: Learn how to safely use, store and throw away your medicines at www.disposemymeds.org.          This list is accurate as of: 3/2/17 12:34 PM.  Always use your most recent med list.                   Brand Name Dispense Instructions for use    amitriptyline 25 MG tablet    ELAVIL     Take 25 mg by mouth At Bedtime       atorvastatin 20 MG tablet    LIPITOR    90 tablet    TAKE 1 TABLET(20 MG) BY MOUTH DAILY       calcium acetate (Phos Binder) 667 MG Tabs     180 tablet    TAKE 1 TABLET BY MOUTH THREE TIMES DAILY       carvedilol 6.25 MG tablet    COREG     Take 1 tablet (6.25 mg) by mouth 2 times daily (with meals)       cyclobenzaprine 5 MG tablet    FLEXERIL    15 tablet    Take 1 tablet (5 mg) by mouth 3 times daily as needed for muscle spasms       diazepam 5 MG tablet    VALIUM     prn       EPINEPHrine 0.3 MG/0.3ML injection    EPIPEN 2-FRAN    0.6 mL    Inject 0.3 mLs (0.3 mg) into the muscle once as needed for anaphylaxis       estradiol 0.025 MG/24HR BIW patch    VIVELLE-DOT     Place 1 patch onto the skin twice a week       hydrOXYzine 25 MG tablet    ATARAX     Take 25 mg by mouth 3 times daily as needed for itching       losartan  100 MG tablet    COZAAR    90 tablet    Take 1 tablet (100 mg) by mouth At Bedtime       magnesium 250 MG tablet      None Entered       PROMETRIUM 100 MG capsule   Generic drug:  progesterone      Take 100 mg by mouth daily       torsemide 10 MG tablet    DEMADEX     Take 10 mg by mouth daily       vitamin D 2000 UNITS tablet      Take 1 tablet by mouth daily       ZOFRAN 4 MG tablet   Generic drug:  ondansetron      Take by mouth every 8 hours as needed for nausea       ZOMIG 5 MG Soln   Generic drug:  ZOLMitriptan          ZYRTEC ALLERGY 10 MG Caps   Generic drug:  cetirizine HCl

## 2017-03-02 NOTE — NURSING NOTE
"Chief Complaint   Patient presents with     RECHECK       Initial /73 (BP Location: Left arm, Patient Position: Chair, Cuff Size: Adult Large)  Pulse 95  Temp 98.5  F (36.9  C) (Tympanic)  Ht 5' 4\" (1.626 m)  Wt 171 lb (77.6 kg)  SpO2 99%  Breastfeeding? No  BMI 29.35 kg/m2 Estimated body mass index is 29.35 kg/(m^2) as calculated from the following:    Height as of this encounter: 5' 4\" (1.626 m).    Weight as of this encounter: 171 lb (77.6 kg).  Medication Reconciliation: complete   Tori Ricketts- CMA      "

## 2017-03-02 NOTE — PROGRESS NOTES
"SUBJECTIVE:  Rosa Sotomayor, a 66 year old female scheduled an appointment to discuss the following issues:     Benign essential hypertension  CKD (chronic kidney disease) stage 3, GFR 30-59 ml/min  Dorsey's neuroma of left foot  Pt here for routine follow up  Has pain in distal lateral left foot  No trauma  Medical, social, surgical, and family histories reviewed.    ROS:  C: NEGATIVE for fever, chills  E: NEGATIVE for vision changes   R: NEGATIVE for significant cough or SOB  CV: NEGATIVE for chest pain, palpitations   GI: NEGATIVE for nausea, abdominal pain, heartburn, or change in bowel habits  : NEGATIVE for frequency, dysuria, or hematuria  M: NEGATIVE for significant arthralgias or myalgia  N: NEGATIVE for weakness, dizziness or paresthesias or headache    OBJECTIVE:  /73 (BP Location: Left arm, Patient Position: Chair, Cuff Size: Adult Large)  Pulse 95  Temp 98.5  F (36.9  C) (Tympanic)  Ht 5' 4\" (1.626 m)  Wt 171 lb (77.6 kg)  SpO2 99%  Breastfeeding? No  BMI 29.35 kg/m2  EXAM:  GENERAL APPEARANCE: healthy, alert and no distress  EYES: EOMI,  PERRL  HENT: ear canals and TM's normal and nose and mouth without ulcers or lesions  RESP: lungs clear to auscultation - no rales, rhonchi or wheezes  CV: regular rates and rhythm, normal S1 S2, no S3 or S4 and no murmur, click or rub -  ABDOMEN:  soft, nontender, no HSM or masses and bowel sounds normal  FEET - normal dp and pt pulses, normal sensation on monofilament testing, no sores noted  L FOOT with ttp between 4th and 5th metatarsals distally    ASSESSMENT/PLAN:  (N18.3) CKD (chronic kidney disease) stage 3, GFR 30-59 ml/min  (primary encounter diagnosis)  Plan: Basic metabolic panel, Albumin Random Urine         Quantitative, UA reflex to Microscopic and         Culture  Repeat labs    (I10) Benign essential hypertension  Plan: losartan (COZAAR) 100 MG tablet, carvedilol         (COREG) 6.25 MG tablet  bp stable, continue current meds    (G57.62) " Adler's neuroma of left foot  Plan: XR Foot Left G/E 3 Views, DRAIN/INJECT MEDIUM         JOINT/BURSA, CANCELED: HC DRAIN/INJECT MEDIUM         JOINT/BURSA W/O US ADDTL  Injected adler's neuroma with 20 mg kenalog after consenting pt and using sterile technique, tolerated procedure well.    25 minutes spent with patient, over 50% time counseling, coordinating care and explaining about nature of the patient's conditions.    All risks, benefits of treatment and further evaluation was reviewed with patient.  Pt expressed understanding.  Pt was in agreement with this plan.  Angel Benjamin MD

## 2017-03-03 LAB
ANION GAP SERPL CALCULATED.3IONS-SCNC: 8 MMOL/L (ref 3–14)
BUN SERPL-MCNC: 24 MG/DL (ref 7–30)
CALCIUM SERPL-MCNC: 9.5 MG/DL (ref 8.5–10.1)
CHLORIDE SERPL-SCNC: 101 MMOL/L (ref 94–109)
CO2 SERPL-SCNC: 29 MMOL/L (ref 20–32)
CREAT SERPL-MCNC: 1.46 MG/DL (ref 0.52–1.04)
CREAT UR-MCNC: 28 MG/DL
GFR SERPL CREATININE-BSD FRML MDRD: 36 ML/MIN/1.7M2
GLUCOSE SERPL-MCNC: 97 MG/DL (ref 70–99)
MICROALBUMIN UR-MCNC: 16 MG/L
MICROALBUMIN/CREAT UR: 59.06 MG/G CR (ref 0–25)
POTASSIUM SERPL-SCNC: 4.2 MMOL/L (ref 3.4–5.3)
SODIUM SERPL-SCNC: 138 MMOL/L (ref 133–144)

## 2017-03-05 DIAGNOSIS — N25.81 SECONDARY RENAL HYPERPARATHYROIDISM (H): ICD-10-CM

## 2017-03-06 RX ORDER — CALCIUM ACETATE 667 MG/1
TABLET ORAL
Qty: 180 TABLET | Refills: 3 | Status: SHIPPED | OUTPATIENT
Start: 2017-03-06 | End: 2018-07-18

## 2017-03-06 NOTE — TELEPHONE ENCOUNTER
calcium acetate, Phos Binder, 667 MG TABS 180 tablet 0 1/2/2017          Last Written Prescription Date: 01/02/2017  Last Fill Quantity: 180,  # refills: 0   Last Office Visit with [facility], [facility] or [facility] prescribing provider: 03/02/2017

## 2017-03-08 ENCOUNTER — TELEPHONE (OUTPATIENT)
Dept: FAMILY MEDICINE | Facility: CLINIC | Age: 67
End: 2017-03-08

## 2017-03-08 NOTE — TELEPHONE ENCOUNTER
I called patient and gave Dr. Benjamin's ok for Pt should be taking 100 mg daily losartan, patient should be on Losartan 100 mg Evelin IZQUIERDO CMA

## 2017-03-08 NOTE — TELEPHONE ENCOUNTER
Reason for Call:  Other prescription    Detailed comments: pt picked up lorsartin 100 mg and there was also one for 50 mg pt is confused on what she should be taking.please call    Phone Number Patient can be reached at: Cell number on file:    Telephone Information:   Mobile [phone]       Best Time: any    Can we leave a detailed message on this number? YES    Call taken on 3/8/2017 at 9:25 AM by Chloe Mahajan

## 2017-03-08 NOTE — TELEPHONE ENCOUNTER
See message below-Just wanting to verify after I rechecked patient's last OV note it states 100mg losartan to be prescribed. Please review and confirm so I can relay to patient. Thanks  Tori Ricketts- CMA

## 2017-03-13 ENCOUNTER — TRANSFERRED RECORDS (OUTPATIENT)
Dept: HEALTH INFORMATION MANAGEMENT | Facility: CLINIC | Age: 67
End: 2017-03-13

## 2017-03-30 ENCOUNTER — TELEPHONE (OUTPATIENT)
Dept: FAMILY MEDICINE | Facility: CLINIC | Age: 67
End: 2017-03-30

## 2017-03-30 DIAGNOSIS — N18.30 CKD (CHRONIC KIDNEY DISEASE) STAGE 3, GFR 30-59 ML/MIN (H): Primary | ICD-10-CM

## 2017-03-30 RX ORDER — ASPIRIN 81 MG/1
81 TABLET, CHEWABLE ORAL DAILY
Qty: 108 TABLET | Refills: 3
Start: 2017-03-30 | End: 2017-05-30

## 2017-05-03 ENCOUNTER — OFFICE VISIT (OUTPATIENT)
Dept: FAMILY MEDICINE | Facility: CLINIC | Age: 67
End: 2017-05-03
Payer: COMMERCIAL

## 2017-05-03 VITALS
HEART RATE: 89 BPM | WEIGHT: 177 LBS | TEMPERATURE: 97.4 F | BODY MASS INDEX: 30.22 KG/M2 | OXYGEN SATURATION: 100 % | SYSTOLIC BLOOD PRESSURE: 139 MMHG | HEIGHT: 64 IN | DIASTOLIC BLOOD PRESSURE: 76 MMHG

## 2017-05-03 DIAGNOSIS — R94.31: ICD-10-CM

## 2017-05-03 DIAGNOSIS — L21.9 SEBORRHEIC DERMATITIS: ICD-10-CM

## 2017-05-03 DIAGNOSIS — M17.10 PRIMARY OSTEOARTHRITIS OF ONE KNEE, UNSPECIFIED LATERALITY: ICD-10-CM

## 2017-05-03 DIAGNOSIS — Z01.818 PREOP GENERAL PHYSICAL EXAM: Primary | ICD-10-CM

## 2017-05-03 LAB
BASOPHILS # BLD AUTO: 0 10E9/L (ref 0–0.2)
BASOPHILS NFR BLD AUTO: 0.5 %
DIFFERENTIAL METHOD BLD: ABNORMAL
EOSINOPHIL # BLD AUTO: 0.2 10E9/L (ref 0–0.7)
EOSINOPHIL NFR BLD AUTO: 4.1 %
ERYTHROCYTE [DISTWIDTH] IN BLOOD BY AUTOMATED COUNT: 14.3 % (ref 10–15)
HCT VFR BLD AUTO: 35.3 % (ref 35–47)
HGB BLD-MCNC: 11.6 G/DL (ref 11.7–15.7)
LYMPHOCYTES # BLD AUTO: 1.5 10E9/L (ref 0.8–5.3)
LYMPHOCYTES NFR BLD AUTO: 27.7 %
MCH RBC QN AUTO: 30.2 PG (ref 26.5–33)
MCHC RBC AUTO-ENTMCNC: 32.9 G/DL (ref 31.5–36.5)
MCV RBC AUTO: 92 FL (ref 78–100)
MONOCYTES # BLD AUTO: 0.6 10E9/L (ref 0–1.3)
MONOCYTES NFR BLD AUTO: 10.6 %
NEUTROPHILS # BLD AUTO: 3.2 10E9/L (ref 1.6–8.3)
NEUTROPHILS NFR BLD AUTO: 57.1 %
PLATELET # BLD AUTO: 275 10E9/L (ref 150–450)
RBC # BLD AUTO: 3.84 10E12/L (ref 3.8–5.2)
WBC # BLD AUTO: 5.6 10E9/L (ref 4–11)

## 2017-05-03 PROCEDURE — 85025 COMPLETE CBC W/AUTO DIFF WBC: CPT | Performed by: PREVENTIVE MEDICINE

## 2017-05-03 PROCEDURE — 99215 OFFICE O/P EST HI 40 MIN: CPT | Performed by: PREVENTIVE MEDICINE

## 2017-05-03 PROCEDURE — 93000 ELECTROCARDIOGRAM COMPLETE: CPT | Performed by: PREVENTIVE MEDICINE

## 2017-05-03 PROCEDURE — 36415 COLL VENOUS BLD VENIPUNCTURE: CPT | Performed by: PREVENTIVE MEDICINE

## 2017-05-03 PROCEDURE — 80053 COMPREHEN METABOLIC PANEL: CPT | Performed by: PREVENTIVE MEDICINE

## 2017-05-03 RX ORDER — KETOCONAZOLE 20 MG/ML
SHAMPOO TOPICAL
Qty: 120 ML | Refills: 1 | Status: SHIPPED | OUTPATIENT
Start: 2017-05-03 | End: 2019-02-26

## 2017-05-03 ASSESSMENT — ANXIETY QUESTIONNAIRES
2. NOT BEING ABLE TO STOP OR CONTROL WORRYING: NOT AT ALL
7. FEELING AFRAID AS IF SOMETHING AWFUL MIGHT HAPPEN: NOT AT ALL
1. FEELING NERVOUS, ANXIOUS, OR ON EDGE: NOT AT ALL
IF YOU CHECKED OFF ANY PROBLEMS ON THIS QUESTIONNAIRE, HOW DIFFICULT HAVE THESE PROBLEMS MADE IT FOR YOU TO DO YOUR WORK, TAKE CARE OF THINGS AT HOME, OR GET ALONG WITH OTHER PEOPLE: NOT DIFFICULT AT ALL
5. BEING SO RESTLESS THAT IT IS HARD TO SIT STILL: NOT AT ALL
6. BECOMING EASILY ANNOYED OR IRRITABLE: NOT AT ALL
3. WORRYING TOO MUCH ABOUT DIFFERENT THINGS: NOT AT ALL
GAD7 TOTAL SCORE: 0

## 2017-05-03 ASSESSMENT — PATIENT HEALTH QUESTIONNAIRE - PHQ9: 5. POOR APPETITE OR OVEREATING: NOT AT ALL

## 2017-05-03 NOTE — PROGRESS NOTES
[facility]  [address] [facility] [address]  [phone]  Dept: [phone]    PRE-OP EVALUATION:  Today's date: 5/3/2017    Rosa Sotomayor (: 1950) presents for pre-operative evaluation assessment as requested by Dr. Llanos.  She requires evaluation and anesthesia risk assessment prior to undergoing surgery/procedure for treatment of left knee .  Proposed procedure: Total Knee Replacement Left    Date of Surgery/ Procedure: 17  Time of Surgery/ Procedure: 7:00 a.m.  Hospital/Surgical Facility: [facility]  Fax number for surgical facility:   Primary Physician: Angel Benjamin  Type of Anesthesia Anticipated: General    Patient has a Health Care Directive or Living Will:  NO    1. NO - Do you have a history of heart attack, stroke, stent, bypass or surgery on an artery in the head, neck, heart or legs?  2. NO - Do you ever have any pain or discomfort in your chest?  3. NO - Do you have a history of  Heart Failure?  4. NO - Are you troubled by shortness of breath when: walking on the level, up a slight hill or at night?  5. NO - Do you currently have a cold, bronchitis or other respiratory infection?  6. NO - Do you have a cough, shortness of breath or wheezing?  7. NO - Do you sometimes get pains in the calves of your legs when you walk?  8. NO - Do you or anyone in your family have previous history of blood clots?  9. NO - Do you or does anyone in your family have a serious bleeding problem such as prolonged bleeding following surgeries or cuts?  10. YES - HAVE YOU EVER HAD PROBLEMS WITH ANEMIA OR BEEN TOLD TO TAKE IRON PILLS?   11. NO - Have you had any abnormal blood loss such as black, tarry or bloody stools, or abnormal vaginal bleeding?  12. NO - Have you ever had a blood transfusion?  13. YES - HAVE YOU OR ANY OF YOUR RELATIVES EVER HAD PROBLEMS WITH ANESTHESIA?   14. NO - Do you have sleep apnea, excessive snoring or daytime  drowsiness?  15. NO - Do you have any prosthetic heart valves?  16. YES - DO YOU HAVE PROSTHETIC JOINTS?   17. NO - Is there any chance that you may be pregnant?      HPI:                                                      Brief HPI related to upcoming procedure: knee OA      See problem list for active medical problems.  Problems all longstanding and stable, except as noted/documented.  See ROS for pertinent symptoms related to these conditions.                                                                                                  .    MEDICAL HISTORY:                                                      Patient Active Problem List    Diagnosis Date Noted     Renal artery stenosis (H) 01/05/2017     Priority: Medium     Macular degeneration, bilateral 01/05/2017     Priority: Medium     Benign essential hypertension 12/28/2016     Priority: Medium     Lumbago 11/29/2016     Priority: Medium     Chronic bilateral low back pain without sciatica 11/29/2016     Priority: Medium     Secondary renal hyperparathyroidism (H) 07/31/2016     Priority: Medium     Arnold-Chiari malformation (H) 07/23/2016     Priority: Medium     Hyperlipidemia LDL goal <130 07/23/2016     Priority: Medium     Periodic headache syndrome, not intractable 07/23/2016     Priority: Medium     Anxiety 07/23/2016     Priority: Medium     CKD (chronic kidney disease) stage 3, GFR 30-59 ml/min 07/22/2016     Priority: Medium     Hypertriglyceridemia 07/22/2016     Priority: Medium     Personal history of allergy to anesthetic agent 03/10/2007     Priority: Medium     Postmenopausal bleeding 02/28/2007     Priority: Medium      Past Medical History:   Diagnosis Date     Dizziness and giddiness      Past Surgical History:   Procedure Laterality Date     C NONSPECIFIC PROCEDURE      wisdom teeth     C NONSPECIFIC PROCEDURE  2005    Varicose Veins     C TOTAL HIP ARTHROPLASTY      left     C TOTAL KNEE ARTHROPLASTY      right     TUBAL  LIGATION 1987     Current Outpatient Prescriptions   Medication Sig Dispense Refill     ketoconazole (NIZORAL) 2 % shampoo Apply to the affected area and wash off after 5 minutes. 120 mL 1     aspirin 81 MG chewable tablet Take 1 tablet (81 mg) by mouth daily 108 tablet 3     calcium acetate, Phos Binder, 667 MG TABS TAKE 1 TABLET BY MOUTH THREE TIMES DAILY 180 tablet 3     losartan (COZAAR) 100 MG tablet Take 1 tablet (100 mg) by mouth At Bedtime 90 tablet 1     carvedilol (COREG) 6.25 MG tablet Take 1 tablet (6.25 mg) by mouth 2 times daily (with meals) 180 tablet 3     atorvastatin (LIPITOR) 20 MG tablet TAKE 1 TABLET(20 MG) BY MOUTH DAILY 90 tablet 1     cyclobenzaprine (FLEXERIL) 5 MG tablet Take 1 tablet (5 mg) by mouth 3 times daily as needed for muscle spasms 15 tablet 0     EPINEPHrine (EPIPEN 2-FRAN) 0.3 MG/0.3ML injection 2-pack Inject 0.3 mLs (0.3 mg) into the muscle once as needed for anaphylaxis 0.6 mL 1     progesterone (PROMETRIUM) 100 MG capsule Take 100 mg by mouth daily       torsemide (DEMADEX) 10 MG tablet Take 10 mg by mouth daily       Cholecalciferol (VITAMIN D) 2000 UNITS tablet Take 1 tablet by mouth daily       hydrOXYzine (ATARAX) 25 MG tablet Take 25 mg by mouth 3 times daily as needed for itching       amitriptyline (ELAVIL) 25 MG tablet Take 25 mg by mouth At Bedtime       ZOLMitriptan (ZOMIG) 5 MG SOLN        ondansetron (ZOFRAN) 4 MG tablet Take by mouth every 8 hours as needed for nausea       cetirizine HCl (ZYRTEC ALLERGY) 10 MG CAPS        estradiol (VIVELLE-DOT) 0.025 MG/24HR Place 1 patch onto the skin twice a week       MAGNESIUM 250 MG OR TABS None Entered       DIAZEPAM 5 MG OR TABS prn       OTC products: None, except as noted above    Allergies   Allergen Reactions     Fentanyl Anaphylaxis     Midazolam Anaphylaxis     Propofol Anaphylaxis     Anesthetic [Amides]      Anaphalactic shock     Codeine      Erythromycin      ointment     Gabapentin      chest heaviness with  "breathing     Hydromorphone Itching     Tolerates morphine     Lorazepam Itching     Other [Seasonal Allergies] Anaphylaxis     GENERAL ANETHESIA       Penicillins      rash     Eucalyptus Oil Rash     hives     Nitrofuran Derivatives Rash     Sulfa Drugs Itching and Rash     rash      Latex Allergy: NO    Social History   Substance Use Topics     Smoking status: Never Smoker     Smokeless tobacco: Never Used     Alcohol use No     History   Drug Use No       REVIEW OF SYSTEMS:                                                    C: NEGATIVE for fever, chills, change in weight  I: NEGATIVE for worrisome rashes, moles or lesions  E: NEGATIVE for vision changes or irritation  E/M: NEGATIVE for ear, mouth and throat problems  R: NEGATIVE for significant cough or SOB  B: NEGATIVE for masses, tenderness or discharge  CV: NEGATIVE for chest pain, palpitations or peripheral edema  GI: NEGATIVE for nausea, abdominal pain, heartburn, or change in bowel habits  : NEGATIVE for frequency, dysuria, or hematuria  M: NEGATIVE for significant arthralgias or myalgia  N: NEGATIVE for weakness, dizziness or paresthesias  E: NEGATIVE for temperature intolerance, skin/hair changes  H: NEGATIVE for bleeding problems  P: NEGATIVE for changes in mood or affect    EXAM:                                                    /76 (BP Location: Left arm, Cuff Size: Adult Large)  Pulse 89  Temp 97.4  F (36.3  C) (Tympanic)  Ht 5' 4\" (1.626 m)  Wt 177 lb (80.3 kg)  SpO2 100%  BMI 30.38 kg/m2    GENERAL APPEARANCE: healthy, alert and no distress     EYES: EOMI, PERRL     HENT: ear canals and TM's normal and nose and mouth without ulcers or lesions     NECK: no adenopathy, no asymmetry, masses, or scars and thyroid normal to palpation     RESP: lungs clear to auscultation - no rales, rhonchi or wheezes     CV: regular rates and rhythm, normal S1 S2, no S3 or S4 and no murmur, click or rub     ABDOMEN:  soft, nontender, no HSM or masses and " bowel sounds normal     MS: extremities normal- no gross deformities noted, no evidence of inflammation in joints, FROM in all extremities.     SKIN: no suspicious lesions or rashes     NEURO: Normal strength and tone, sensory exam grossly normal, mentation intact and speech normal     PSYCH: mentation appears normal. and affect normal/bright     LYMPHATICS: No axillary, cervical, or supraclavicular nodes    DIAGNOSTICS:                                                    CBC, BMP, EKG pending    Recent Labs   Lab Test  03/02/17   1335  01/06/17   0350   10/03/16   1044  07/12/16   HGB   --   12.5   --   12.1   < >   --    PLT   --   263   --   299   < >   --    NA  138  139   < >  137   < >  138   POTASSIUM  4.2  3.6   < >  3.9   < >  4.2   CR  1.46*  1.41*   < >  1.28*   < >  1.51   A1C   --    --    --    --    --   5.4    < > = values in this interval not displayed.        IMPRESSION:                                                    Diagnosis/reason for consult: Medical Preop    The proposed surgical procedure is considered INTERMEDIATE risk.    REVISED CARDIAC RISK INDEX  The patient has the following serious cardiovascular risks for perioperative complications such as (MI, PE, VFib and 3  AV Block):  No serious cardiac risks  INTERPRETATION: 0 risks: Class I (very low risk - 0.4% complication rate)    The patient has the following additional risks for perioperative complications:  No identified additional risks      ICD-10-CM    1. Preop general physical exam Z01.818 CBC with platelets and differential     Comprehensive metabolic panel     EKG 12-lead complete w/read - Clinics   2. Primary osteoarthritis of one knee, unspecified laterality M17.10 CBC with platelets and differential     Comprehensive metabolic panel     EKG 12-lead complete w/read - Clinics   3. Seborrheic dermatitis L21.9 ketoconazole (NIZORAL) 2 % shampoo       RECOMMENDATIONS:                                                           --Patient is to take all scheduled medications on the day of surgery EXCEPT for modifications listed below.    Anticoagulant or Antiplatelet Medication Use  ASPIRIN: Discontinue ASA 7-10 days prior to procedure to reduce bleeding risk.  It should be resumed post-operatively.        ACE Inhibitor or Angiotensin Receptor Blocker (ARB) Use  Ace inhibitor or Angiotensin Receptor Blocker (ARB) and should HOLD this medication for the 24 hours prior to surgery.    Pt also advised to hold torsemide day before procedure    Also, pt has ckd and is advised to avoid nsaids including celebrex.    APPROVAL GIVEN to proceed with proposed procedure, without further diagnostic evaluation       Signed Electronically by: Angel Benjamin MD, MD    Copy of this evaluation report is provided to requesting physician.    [facility] Preop Guidelines

## 2017-05-03 NOTE — MR AVS SNAPSHOT
After Visit Summary   5/3/2017    Rosa Sotomayor    MRN: [id number]           Patient Information     Date Of Birth          1950        Visit Information        Provider Department      5/3/2017 1:30 PM Angel Benjamin MD [facility]        Today's Diagnoses     Preop general physical exam    -  1      Care Instructions      Before Your Surgery      Call your surgeon if there is any change in your health. This includes signs of a cold or flu (such as a sore throat, runny nose, cough, rash or fever).    Do not smoke, drink alcohol or take over the counter medicine (unless your surgeon or primary care doctor tells you to) for the 24 hours before and after surgery.    If you take prescribed drugs: Follow your doctor s orders about which medicines to take and which to stop until after surgery.    Eating and drinking prior to surgery: follow the instructions from your surgeon    Take a shower or bath the night before surgery. Use the soap your surgeon gave you to gently clean your skin. If you do not have soap from your surgeon, use your regular soap. Do not shave or scrub the surgery site.  Wear clean pajamas and have clean sheets on your bed.         Follow-ups after your visit        Your next 10 appointments already scheduled     Jun 28, 2017 12:00 PM CDT   Office Visit with Angel Benjamin MD   [facility]    [address] [id number] [phone]           Bring a current list of meds and any records pertaining to this visit.  For Physicals, please bring immunization records and any forms needing to be filled out.  Please arrive 10 minutes early to complete paperwork.              Who to contact     If you have questions or need follow up information about today's clinic visit or your schedule please contact [facility] directly at [phone].  Normal or non-critical lab and imaging  "results will be communicated to you by MyChart, letter or phone within 4 business days after the clinic has received the results. If you do not hear from us within 7 days, please contact the clinic through [employer] or phone. If you have a critical or abnormal lab result, we will notify you by phone as soon as possible.  Submit refill requests through [employer] or call your pharmacy and they will forward the refill request to us. Please allow 3 business days for your refill to be completed.          Additional Information About Your Visit        CYPH[employer]Med[employer] Information     Space[employer] gives you secure access to your electronic health record. If you see a primary care provider, you can also send messages to your care team and make appointments. If you have questions, please call your primary care clinic.  If you do not have a primary care provider, please call [phone] and they will assist you.        Care EveryWhere ID     This is your Care EveryWhere ID. This could be used by other organizations to access your [place] medical records  [id number]        Your Vitals Were     Pulse Temperature Height Pulse Oximetry BMI (Body Mass Index)       89 97.4  F (36.3  C) (Tympanic) 5' 4\" (1.626 m) 100% 30.38 kg/m2        Blood Pressure from Last 3 Encounters:   05/03/17 149/76   03/02/17 118/73   01/13/17 141/78    Weight from Last 3 Encounters:   05/03/17 177 lb (80.3 kg)   03/02/17 171 lb (77.6 kg)   01/13/17 170 lb (77.1 kg)              Today, you had the following     No orders found for display       Primary Care Provider Office Phone # Fax #    Ortiz Nick Benjamin [phone]       [facility] [address]  [place] [address]        Thank you!     Thank you for choosing [facility]  for your care. Our goal is always to provide you with excellent care. Hearing back from our patients is one way we can continue to improve our services. Please take a few minutes to " complete the written survey that you may receive in the mail after your visit with us. Thank you!             Your Updated Medication List - Protect others around you: Learn how to safely use, store and throw away your medicines at www.disposemymeds.org.          This list is accurate as of: 5/3/17  1:32 PM.  Always use your most recent med list.                   Brand Name Dispense Instructions for use    amitriptyline 25 MG tablet    ELAVIL     Take 25 mg by mouth At Bedtime       aspirin 81 MG chewable tablet     108 tablet    Take 1 tablet (81 mg) by mouth daily       atorvastatin 20 MG tablet    LIPITOR    90 tablet    TAKE 1 TABLET(20 MG) BY MOUTH DAILY       calcium acetate (Phos Binder) 667 MG Tabs     180 tablet    TAKE 1 TABLET BY MOUTH THREE TIMES DAILY       carvedilol 6.25 MG tablet    COREG    180 tablet    Take 1 tablet (6.25 mg) by mouth 2 times daily (with meals)       cyclobenzaprine 5 MG tablet    FLEXERIL    15 tablet    Take 1 tablet (5 mg) by mouth 3 times daily as needed for muscle spasms       diazepam 5 MG tablet    VALIUM     prn       EPINEPHrine 0.3 MG/0.3ML injection    EPIPEN 2-FRAN    0.6 mL    Inject 0.3 mLs (0.3 mg) into the muscle once as needed for anaphylaxis       estradiol 0.025 MG/24HR BIW patch    VIVELLE-DOT     Place 1 patch onto the skin twice a week       hydrOXYzine 25 MG tablet    ATARAX     Take 25 mg by mouth 3 times daily as needed for itching       losartan 100 MG tablet    COZAAR    90 tablet    Take 1 tablet (100 mg) by mouth At Bedtime       magnesium 250 MG tablet      None Entered       PROMETRIUM 100 MG capsule   Generic drug:  progesterone      Take 100 mg by mouth daily       torsemide 10 MG tablet    DEMADEX     Take 10 mg by mouth daily       vitamin D 2000 UNITS tablet      Take 1 tablet by mouth daily       ZOFRAN 4 MG tablet   Generic drug:  ondansetron      Take by mouth every 8 hours as needed for nausea       ZOMIG 5 MG Soln   Generic drug:   ZOLMitriptan          ZYRTEC ALLERGY 10 MG Caps   Generic drug:  cetirizine HCl

## 2017-05-03 NOTE — NURSING NOTE
"Chief Complaint   Patient presents with     Pre-Op Exam       Initial /76 (BP Location: Left arm, Cuff Size: Adult Large)  Pulse 89  Temp 97.4  F (36.3  C) (Tympanic)  Ht 5' 4\" (1.626 m)  Wt 177 lb (80.3 kg)  SpO2 100%  BMI 30.38 kg/m2 Estimated body mass index is 30.38 kg/(m^2) as calculated from the following:    Height as of this encounter: 5' 4\" (1.626 m).    Weight as of this encounter: 177 lb (80.3 kg).  Medication Reconciliation:   Padmini Parham MA    "

## 2017-05-03 NOTE — LETTER
[place] [address] [facility]  [address]  [place]  [address]  Tel: [phone]    May 5, 2017    Rosa Sotomayor  [address] [facility] [address]        Dear Ms. Sotomayor,    Most of your labs look fine.     Your hemoglobin is slightly lower than previous.  I advise rechecking this in the next week or so.     It was a pleasure seeing you in clinic recently.  Please call with any questions you may have.     Angel Benjamin MD /sander              Enclosure: Lab Results

## 2017-05-04 LAB
ALBUMIN SERPL-MCNC: 4.2 G/DL (ref 3.4–5)
ALP SERPL-CCNC: 119 U/L (ref 40–150)
ALT SERPL W P-5'-P-CCNC: 27 U/L (ref 0–50)
ANION GAP SERPL CALCULATED.3IONS-SCNC: 10 MMOL/L (ref 3–14)
AST SERPL W P-5'-P-CCNC: 22 U/L (ref 0–45)
BILIRUB SERPL-MCNC: 0.7 MG/DL (ref 0.2–1.3)
BUN SERPL-MCNC: 19 MG/DL (ref 7–30)
CALCIUM SERPL-MCNC: 9.1 MG/DL (ref 8.5–10.1)
CHLORIDE SERPL-SCNC: 96 MMOL/L (ref 94–109)
CO2 SERPL-SCNC: 26 MMOL/L (ref 20–32)
CREAT SERPL-MCNC: 1.35 MG/DL (ref 0.52–1.04)
GFR SERPL CREATININE-BSD FRML MDRD: 39 ML/MIN/1.7M2
GLUCOSE SERPL-MCNC: 98 MG/DL (ref 70–99)
POTASSIUM SERPL-SCNC: 3.9 MMOL/L (ref 3.4–5.3)
PROT SERPL-MCNC: 7.6 G/DL (ref 6.8–8.8)
SODIUM SERPL-SCNC: 132 MMOL/L (ref 133–144)

## 2017-05-04 ASSESSMENT — PATIENT HEALTH QUESTIONNAIRE - PHQ9: SUM OF ALL RESPONSES TO PHQ QUESTIONS 1-9: 0

## 2017-05-04 ASSESSMENT — ANXIETY QUESTIONNAIRES: GAD7 TOTAL SCORE: 0

## 2017-05-08 ENCOUNTER — DOCUMENTATION ONLY (OUTPATIENT)
Dept: LAB | Facility: CLINIC | Age: 67
End: 2017-05-08

## 2017-05-08 ENCOUNTER — HOSPITAL ENCOUNTER (OUTPATIENT)
Dept: CARDIOLOGY | Facility: CLINIC | Age: 67
Discharge: HOME | End: 2017-05-08
Attending: PREVENTIVE MEDICINE | Admitting: PREVENTIVE MEDICINE
Payer: COMMERCIAL

## 2017-05-08 DIAGNOSIS — D64.9 ANEMIA, UNSPECIFIED TYPE: Primary | ICD-10-CM

## 2017-05-08 DIAGNOSIS — R94.31: ICD-10-CM

## 2017-05-08 PROCEDURE — 93306 TTE W/DOPPLER COMPLETE: CPT | Mod: 26 | Performed by: INTERNAL MEDICINE

## 2017-05-08 PROCEDURE — 93306 TTE W/DOPPLER COMPLETE: CPT

## 2017-05-08 NOTE — PROGRESS NOTES
Patient will be coming to lab on 5/9/17 for lab order. Please review and future order if needed.    Thanks, Lab

## 2017-05-09 DIAGNOSIS — D64.9 ANEMIA, UNSPECIFIED TYPE: ICD-10-CM

## 2017-05-09 LAB
BASOPHILS # BLD AUTO: 0 10E9/L (ref 0–0.2)
BASOPHILS NFR BLD AUTO: 0.5 %
DIFFERENTIAL METHOD BLD: ABNORMAL
EOSINOPHIL # BLD AUTO: 0.2 10E9/L (ref 0–0.7)
EOSINOPHIL NFR BLD AUTO: 4.7 %
ERYTHROCYTE [DISTWIDTH] IN BLOOD BY AUTOMATED COUNT: 14.4 % (ref 10–15)
HCT VFR BLD AUTO: 33.1 % (ref 35–47)
HGB BLD-MCNC: 10.8 G/DL (ref 11.7–15.7)
LYMPHOCYTES # BLD AUTO: 1.7 10E9/L (ref 0.8–5.3)
LYMPHOCYTES NFR BLD AUTO: 40.2 %
MCH RBC QN AUTO: 30.5 PG (ref 26.5–33)
MCHC RBC AUTO-ENTMCNC: 32.6 G/DL (ref 31.5–36.5)
MCV RBC AUTO: 94 FL (ref 78–100)
MONOCYTES # BLD AUTO: 0.4 10E9/L (ref 0–1.3)
MONOCYTES NFR BLD AUTO: 9.9 %
NEUTROPHILS # BLD AUTO: 1.9 10E9/L (ref 1.6–8.3)
NEUTROPHILS NFR BLD AUTO: 44.7 %
PLATELET # BLD AUTO: 252 10E9/L (ref 150–450)
RBC # BLD AUTO: 3.54 10E12/L (ref 3.8–5.2)
WBC # BLD AUTO: 4.2 10E9/L (ref 4–11)

## 2017-05-09 PROCEDURE — 82784 ASSAY IGA/IGD/IGG/IGM EACH: CPT | Mod: 91 | Performed by: PREVENTIVE MEDICINE

## 2017-05-09 PROCEDURE — 85025 COMPLETE CBC W/AUTO DIFF WBC: CPT | Performed by: PREVENTIVE MEDICINE

## 2017-05-09 PROCEDURE — 36415 COLL VENOUS BLD VENIPUNCTURE: CPT | Performed by: PREVENTIVE MEDICINE

## 2017-05-09 PROCEDURE — 83540 ASSAY OF IRON: CPT | Performed by: PREVENTIVE MEDICINE

## 2017-05-09 PROCEDURE — 00000402 ZZHCL STATISTIC TOTAL PROTEIN: Performed by: PREVENTIVE MEDICINE

## 2017-05-09 PROCEDURE — 82784 ASSAY IGA/IGD/IGG/IGM EACH: CPT | Performed by: PREVENTIVE MEDICINE

## 2017-05-09 PROCEDURE — 83010 ASSAY OF HAPTOGLOBIN QUANT: CPT | Performed by: PREVENTIVE MEDICINE

## 2017-05-09 PROCEDURE — 84238 ASSAY NONENDOCRINE RECEPTOR: CPT | Mod: 90 | Performed by: PREVENTIVE MEDICINE

## 2017-05-09 PROCEDURE — 82746 ASSAY OF FOLIC ACID SERUM: CPT | Performed by: PREVENTIVE MEDICINE

## 2017-05-09 PROCEDURE — 82728 ASSAY OF FERRITIN: CPT | Performed by: PREVENTIVE MEDICINE

## 2017-05-09 PROCEDURE — 82668 ASSAY OF ERYTHROPOIETIN: CPT | Mod: 90 | Performed by: PREVENTIVE MEDICINE

## 2017-05-09 PROCEDURE — 84165 PROTEIN E-PHORESIS SERUM: CPT | Performed by: PREVENTIVE MEDICINE

## 2017-05-09 PROCEDURE — 83921 ORGANIC ACID SINGLE QUANT: CPT | Mod: 90 | Performed by: PREVENTIVE MEDICINE

## 2017-05-09 PROCEDURE — 83550 IRON BINDING TEST: CPT | Performed by: PREVENTIVE MEDICINE

## 2017-05-09 PROCEDURE — 85045 AUTOMATED RETICULOCYTE COUNT: CPT | Performed by: PREVENTIVE MEDICINE

## 2017-05-09 PROCEDURE — 86334 IMMUNOFIX E-PHORESIS SERUM: CPT | Performed by: PREVENTIVE MEDICINE

## 2017-05-09 PROCEDURE — 99000 SPECIMEN HANDLING OFFICE-LAB: CPT | Performed by: PREVENTIVE MEDICINE

## 2017-05-11 LAB
FERRITIN SERPL-MCNC: 47 NG/ML (ref 8–252)
FOLATE SERPL-MCNC: 29 NG/ML
IRON SATN MFR SERPL: 20 % (ref 15–46)
IRON SERPL-MCNC: 64 UG/DL (ref 35–180)
RETICS # AUTO: 93.7 10E9/L (ref 25–95)
RETICS/RBC NFR AUTO: 2.5 % (ref 0.5–2)
TIBC SERPL-MCNC: 320 UG/DL (ref 240–430)
VIT B12 SERPL-MCNC: 312 PG/ML (ref 193–986)

## 2017-05-11 PROCEDURE — 36415 COLL VENOUS BLD VENIPUNCTURE: CPT | Performed by: PREVENTIVE MEDICINE

## 2017-05-11 PROCEDURE — 82607 VITAMIN B-12: CPT | Performed by: PREVENTIVE MEDICINE

## 2017-05-12 LAB
EPO SERPL-ACNC: 72
HAPTOGLOB SERPL-MCNC: 119 MG/DL (ref 35–175)
STFR SERPL-SCNC: 4.7 NMOL/L

## 2017-05-13 LAB — METHYLMALONATE SERPL-SCNC: 0.46

## 2017-05-15 ENCOUNTER — TELEPHONE (OUTPATIENT)
Dept: FAMILY MEDICINE | Facility: CLINIC | Age: 67
End: 2017-05-15

## 2017-05-15 DIAGNOSIS — E53.8 VITAMIN B12 DEFICIENCY (NON ANEMIC): ICD-10-CM

## 2017-05-15 DIAGNOSIS — E61.1 IRON DEFICIENCY: Primary | ICD-10-CM

## 2017-05-15 LAB
ALBUMIN SERPL ELPH-MCNC: 4.2 G/DL (ref 3.7–5.1)
ALPHA1 GLOB SERPL ELPH-MCNC: 0.3 G/DL (ref 0.2–0.4)
ALPHA2 GLOB SERPL ELPH-MCNC: 0.8 G/DL (ref 0.5–0.9)
B-GLOBULIN SERPL ELPH-MCNC: 0.8 G/DL (ref 0.6–1)
GAMMA GLOB SERPL ELPH-MCNC: 1 G/DL (ref 0.7–1.6)
IGA SERPL-MCNC: 155 MG/DL (ref 70–380)
IGG SERPL-MCNC: 969 MG/DL (ref 695–1620)
IGM SERPL-MCNC: 89 MG/DL (ref 60–265)
M PROTEIN SERPL ELPH-MCNC: 0 G/DL
PROT PATTERN SERPL ELPH-IMP: NORMAL
PROT PATTERN SERPL IFE-IMP: NORMAL

## 2017-05-15 NOTE — TELEPHONE ENCOUNTER
Reason for Call:  Request for results:    Name of test or procedure: lab and Echo    Date of test of procedure: lab work done on 5/9  Echo done on 5/8    Location of the test or procedure: [facility] and [facility]    OK to leave the result message on voice mail or with a family member? YES    Phone number Patient can be reached at:  Home number on file [phone] (home)    Additional comments: anytime    Call taken on 5/15/2017 at 10:09 AM by Padmini Parish

## 2017-05-16 RX ORDER — FERROUS SULFATE 325(65) MG
325 TABLET ORAL
Qty: 270 TABLET | Refills: 2 | Status: SHIPPED | OUTPATIENT
Start: 2017-05-16 | End: 2017-08-10

## 2017-05-17 ENCOUNTER — TELEPHONE (OUTPATIENT)
Dept: FAMILY MEDICINE | Facility: CLINIC | Age: 67
End: 2017-05-17

## 2017-05-17 DIAGNOSIS — Z12.11 COLON CANCER SCREENING: Primary | ICD-10-CM

## 2017-05-17 DIAGNOSIS — Z01.818 PRE-OP EXAM: Primary | ICD-10-CM

## 2017-05-17 PROCEDURE — 82274 ASSAY TEST FOR BLOOD FECAL: CPT | Performed by: PREVENTIVE MEDICINE

## 2017-05-17 NOTE — TELEPHONE ENCOUNTER
Reason for Call:  Orders    Detailed comments: Patient needs another Hemoglobin done and a UA  Done before her Surgery next week, can  please order these  And can we please call her ASAP to schedule a Lab Only  Thank You    Phone Number Patient can be reached at: Home number on file [phone] (home)    Best Time: anytime    Can we leave a detailed message on this number? YES    Call taken on 5/17/2017 at 11:42 AM by Miller Hernandez

## 2017-05-18 DIAGNOSIS — Z01.818 PRE-OP EXAM: ICD-10-CM

## 2017-05-18 LAB
ALBUMIN UR-MCNC: ABNORMAL MG/DL
APPEARANCE UR: CLEAR
BACTERIA #/AREA URNS HPF: ABNORMAL /HPF
BILIRUB UR QL STRIP: NEGATIVE
COLOR UR AUTO: YELLOW
GLUCOSE UR STRIP-MCNC: NEGATIVE MG/DL
HGB BLD-MCNC: 11.1 G/DL (ref 11.7–15.7)
HGB UR QL STRIP: NEGATIVE
HYALINE CASTS #/AREA URNS LPF: ABNORMAL /LPF (ref 0–2)
KETONES UR STRIP-MCNC: NEGATIVE MG/DL
LEUKOCYTE ESTERASE UR QL STRIP: ABNORMAL
NITRATE UR QL: NEGATIVE
NON-SQ EPI CELLS #/AREA URNS LPF: ABNORMAL /LPF
PH UR STRIP: 6.5 PH (ref 5–7)
RBC #/AREA URNS AUTO: ABNORMAL /HPF (ref 0–2)
SP GR UR STRIP: 1.01 (ref 1–1.03)
URN SPEC COLLECT METH UR: ABNORMAL
UROBILINOGEN UR STRIP-ACNC: 0.2 EU/DL (ref 0.2–1)
WBC #/AREA URNS AUTO: ABNORMAL /HPF (ref 0–2)

## 2017-05-18 PROCEDURE — 36415 COLL VENOUS BLD VENIPUNCTURE: CPT | Performed by: PREVENTIVE MEDICINE

## 2017-05-18 PROCEDURE — 85018 HEMOGLOBIN: CPT | Performed by: PREVENTIVE MEDICINE

## 2017-05-18 PROCEDURE — 81001 URINALYSIS AUTO W/SCOPE: CPT | Performed by: PREVENTIVE MEDICINE

## 2017-05-19 DIAGNOSIS — Z12.11 COLON CANCER SCREENING: ICD-10-CM

## 2017-05-19 LAB — HEMOCCULT STL QL IA: NEGATIVE

## 2017-05-19 ASSESSMENT — MIFFLIN-ST. JEOR: SCORE: 1272.51

## 2017-05-22 ENCOUNTER — ANESTHESIA - HEALTHEAST (OUTPATIENT)
Dept: SURGERY | Facility: CLINIC | Age: 67
End: 2017-05-22

## 2017-05-23 ENCOUNTER — SURGERY - HEALTHEAST (OUTPATIENT)
Dept: SURGERY | Facility: CLINIC | Age: 67
End: 2017-05-23

## 2017-05-23 ENCOUNTER — HOME CARE/HOSPICE - HEALTHEAST (OUTPATIENT)
Dept: HOME HEALTH SERVICES | Facility: HOME HEALTH | Age: 67
End: 2017-05-23

## 2017-05-30 ENCOUNTER — OFFICE VISIT (OUTPATIENT)
Dept: FAMILY MEDICINE | Facility: CLINIC | Age: 67
End: 2017-05-30
Payer: COMMERCIAL

## 2017-05-30 ENCOUNTER — TELEPHONE (OUTPATIENT)
Dept: FAMILY MEDICINE | Facility: CLINIC | Age: 67
End: 2017-05-30

## 2017-05-30 VITALS
BODY MASS INDEX: 28.85 KG/M2 | HEIGHT: 64 IN | OXYGEN SATURATION: 100 % | DIASTOLIC BLOOD PRESSURE: 65 MMHG | SYSTOLIC BLOOD PRESSURE: 120 MMHG | HEART RATE: 93 BPM | WEIGHT: 169 LBS | TEMPERATURE: 97.4 F

## 2017-05-30 DIAGNOSIS — Z96.652 STATUS POST TOTAL LEFT KNEE REPLACEMENT: Primary | ICD-10-CM

## 2017-05-30 DIAGNOSIS — R39.9 URINARY SYMPTOM OR SIGN: Primary | ICD-10-CM

## 2017-05-30 DIAGNOSIS — Z96.652 STATUS POST TOTAL LEFT KNEE REPLACEMENT: ICD-10-CM

## 2017-05-30 LAB
ALBUMIN UR-MCNC: NEGATIVE MG/DL
APPEARANCE UR: CLEAR
BILIRUB UR QL STRIP: NEGATIVE
COLOR UR AUTO: YELLOW
GLUCOSE UR STRIP-MCNC: NEGATIVE MG/DL
HGB UR QL STRIP: NEGATIVE
INR PPP: 1.99 (ref 0.86–1.14)
KETONES UR STRIP-MCNC: NEGATIVE MG/DL
LEUKOCYTE ESTERASE UR QL STRIP: NEGATIVE
NITRATE UR QL: NEGATIVE
PH UR STRIP: 7 PH (ref 5–7)
SP GR UR STRIP: 1.01 (ref 1–1.03)
URN SPEC COLLECT METH UR: NORMAL
UROBILINOGEN UR STRIP-ACNC: 0.2 EU/DL (ref 0.2–1)

## 2017-05-30 PROCEDURE — 36415 COLL VENOUS BLD VENIPUNCTURE: CPT | Performed by: PREVENTIVE MEDICINE

## 2017-05-30 PROCEDURE — 99213 OFFICE O/P EST LOW 20 MIN: CPT | Performed by: NURSE PRACTITIONER

## 2017-05-30 PROCEDURE — 85610 PROTHROMBIN TIME: CPT | Performed by: PREVENTIVE MEDICINE

## 2017-05-30 PROCEDURE — 81003 URINALYSIS AUTO W/O SCOPE: CPT | Performed by: NURSE PRACTITIONER

## 2017-05-30 RX ORDER — MORPHINE SULFATE 10 MG/5ML
SOLUTION ORAL EVERY 4 HOURS PRN
COMMUNITY
End: 2017-08-10

## 2017-05-30 NOTE — MR AVS SNAPSHOT
After Visit Summary   5/30/2017    Rosa Sotomayor    MRN: [id number]           Patient Information     Date Of Birth          1950        Visit Information        Provider Department      5/30/2017 1:00 PM Long Martinez APRN CNP [facility]        Today's Diagnoses     Urinary symptom or sign    -  1       Follow-ups after your visit        Your next 10 appointments already scheduled     Jun 14, 2017  1:00 PM CDT   Office Visit with Angel Benjamin MD   [facility] ([facility])    [address] [facility] [address] [phone]           Bring a current list of meds and any records pertaining to this visit.  For Physicals, please bring immunization records and any forms needing to be filled out.  Please arrive 10 minutes early to complete paperwork.              Future tests that were ordered for you today     Open Standing Orders        Priority Remaining Interval Expires Ordered    INR Routine 5/6 5/30/2018 5/30/2017            Who to contact     If you have questions or need follow up information about today's clinic visit or your schedule please contact [facility] directly at [phone].  Normal or non-critical lab and imaging results will be communicated to you by MyChart, letter or phone within 4 business days after the clinic has received the results. If you do not hear from us within 7 days, please contact the clinic through DigitalTown[employer] or phone. If you have a critical or abnormal lab result, we will notify you by phone as soon as possible.  Submit refill requests through [employer] or call your pharmacy and they will forward the refill request to us. Please allow 3 business days for your refill to be completed.          Additional Information About Your Visit        MyChart Information     [employer] gives you secure access to your electronic health record. If you see a primary care provider, you can also send  "messages to your care team and make appointments. If you have questions, please call your primary care clinic.  If you do not have a primary care provider, please call [phone] and they will assist you.        Care EveryWhere ID     This is your Care EveryWhere ID. This could be used by other organizations to access your [place] medical records  [id number]        Your Vitals Were     Pulse Temperature Height Pulse Oximetry Breastfeeding? BMI (Body Mass Index)    93 97.4  F (36.3  C) (Oral) 5' 4\" (1.626 m) 100% No 29.01 kg/m2       Blood Pressure from Last 3 Encounters:   05/30/17 120/65   05/03/17 139/76   03/02/17 118/73    Weight from Last 3 Encounters:   05/30/17 169 lb (76.7 kg)   05/03/17 177 lb (80.3 kg)   03/02/17 171 lb (77.6 kg)              We Performed the Following     UA reflex to Microscopic and Culture          Today's Medication Changes          These changes are accurate as of: 5/30/17  2:03 PM.  If you have any questions, ask your nurse or doctor.               Stop taking these medicines if you haven't already. Please contact your care team if you have questions.     aspirin 81 MG chewable tablet   Stopped by:  Long Martinez APRN CNP                    Primary Care Provider Office Phone # Fax #    Iwbeboed Nick Benjamin [phone]       [facility] [address]  [facility] [address]        Thank you!     Thank you for choosing [facility]  for your care. Our goal is always to provide you with excellent care. Hearing back from our patients is one way we can continue to improve our services. Please take a few minutes to complete the written survey that you may receive in the mail after your visit with us. Thank you!             Your Updated Medication List - Protect others around you: Learn how to safely use, store and throw away your medicines at www.disposemymeds.org.          This list is accurate as of: 5/30/17  2:03 PM.  " Always use your most recent med list.                   Brand Name Dispense Instructions for use    amitriptyline 25 MG tablet    ELAVIL     Take 25 mg by mouth At Bedtime       ASPIRIN NOT PRESCRIBED    INTENTIONAL     Please choose reason not prescribed, below       atorvastatin 20 MG tablet    LIPITOR    90 tablet    TAKE 1 TABLET(20 MG) BY MOUTH DAILY       B-12 1000 MCG Tbcr     100 tablet    Take 1,000 mcg by mouth daily       calcium acetate (Phos Binder) 667 MG Tabs     180 tablet    TAKE 1 TABLET BY MOUTH THREE TIMES DAILY       carvedilol 6.25 MG tablet    COREG    180 tablet    Take 1 tablet (6.25 mg) by mouth 2 times daily (with meals)       cyclobenzaprine 5 MG tablet    FLEXERIL    15 tablet    Take 1 tablet (5 mg) by mouth 3 times daily as needed for muscle spasms       diazepam 5 MG tablet    VALIUM     prn       EPINEPHrine 0.3 MG/0.3ML injection    EPIPEN 2-FRAN    0.6 mL    Inject 0.3 mLs (0.3 mg) into the muscle once as needed for anaphylaxis       estradiol 0.025 MG/24HR BIW patch    VIVELLE-DOT     Place 1 patch onto the skin twice a week       ferrous sulfate 325 (65 FE) MG tablet    IRON    270 tablet    Take 1 tablet (325 mg) by mouth 3 times daily (with meals)       hydrOXYzine 25 MG tablet    ATARAX     Take 25 mg by mouth 3 times daily as needed for itching       ketoconazole 2 % shampoo    NIZORAL    120 mL    Apply to the affected area and wash off after 5 minutes.       losartan 100 MG tablet    COZAAR    90 tablet    Take 1 tablet (100 mg) by mouth At Bedtime       magnesium 250 MG tablet      None Entered       morphine 10 MG/5ML solution      Take by mouth every 4 hours as needed for moderate to severe pain       PROMETRIUM 100 MG capsule   Generic drug:  progesterone      Take 100 mg by mouth daily       torsemide 10 MG tablet    DEMADEX     Take 10 mg by mouth daily       vitamin D 2000 UNITS tablet      Take 1 tablet by mouth daily       ZOFRAN 4 MG tablet   Generic drug:   ondansetron      Take by mouth every 8 hours as needed for nausea       ZOMIG 5 MG Soln   Generic drug:  ZOLMitriptan          ZYRTEC ALLERGY 10 MG Caps   Generic drug:  cetirizine HCl

## 2017-05-30 NOTE — TELEPHONE ENCOUNTER
Patient was on INR schedule today.   After looking further into her chart, she is not manage by INR clinic.   Patient reports that she is currently on coumadin following s/p knee replacement.   Patient said that she is to have INR drawn per Ortho.   INR Clinic does not manage coumadin for knee replacements.   Patient was in and saw Long today for other symptoms.     Patient is very confused by who is suppose to be managing her INR at this point.   Patient was under impression that Dr. Benjamin will be managing.   I placed lab order today so that patient could have INR drawn    Dr. Benjamin, will you be managing INR? Please advise so that patient can be called and advised. INR was completed in lab today.     Porsche Nichols RN

## 2017-05-30 NOTE — PROGRESS NOTES
"HPI      SUBJECTIVE:                                                    Rosa Sotomayor is a 66 year old female who presents to clinic today for the following health issues:      URINARY TRACT SYMPTOMS      Duration: couple days    Description  frequency    Intensity:  mild    Accompanying signs and symptoms:  Fever/chills: no   Flank pain no   Nausea and vomiting: no   Vaginal symptoms: none, feeling off  Abdominal/Pelvic Pain: no     History  History of frequent UTI's: YES- in the past  History of kidney stones: no   Sexually Active: YES  Possibility of pregnancy: No    Precipitating or alleviating factors: hip replacement 1 week ago with straight cath    Therapies tried and outcome: increased fluid intake       feeling like she still has to go after urinating   Something \"just doesn't feel right\" in the periarea  No vaginal discharge      Past Medical History:   Diagnosis Date     Dizziness and giddiness      Past Surgical History:   Procedure Laterality Date     C NONSPECIFIC PROCEDURE      wisdom teeth     C NONSPECIFIC PROCEDURE  2005    Varicose Veins     C TOTAL HIP ARTHROPLASTY      left     C TOTAL KNEE ARTHROPLASTY      right     TUBAL LIGATION  1987     Social History   Substance Use Topics     Smoking status: Never Smoker     Smokeless tobacco: Never Used     Alcohol use No     Current Outpatient Prescriptions   Medication Sig Dispense Refill     morphine 10 MG/5ML solution Take by mouth every 4 hours as needed for moderate to severe pain       ASPIRIN NOT PRESCRIBED (INTENTIONAL) Please choose reason not prescribed, below       Cyanocobalamin (B-12) 1000 MCG TBCR Take 1,000 mcg by mouth daily 100 tablet 1     ferrous sulfate (IRON) 325 (65 FE) MG tablet Take 1 tablet (325 mg) by mouth 3 times daily (with meals) 270 tablet 2     ketoconazole (NIZORAL) 2 % shampoo Apply to the affected area and wash off after 5 minutes. 120 mL 1     calcium acetate, Phos Binder, 667 MG TABS TAKE 1 TABLET BY MOUTH THREE " TIMES DAILY 180 tablet 3     losartan (COZAAR) 100 MG tablet Take 1 tablet (100 mg) by mouth At Bedtime 90 tablet 1     carvedilol (COREG) 6.25 MG tablet Take 1 tablet (6.25 mg) by mouth 2 times daily (with meals) 180 tablet 3     atorvastatin (LIPITOR) 20 MG tablet TAKE 1 TABLET(20 MG) BY MOUTH DAILY 90 tablet 1     cyclobenzaprine (FLEXERIL) 5 MG tablet Take 1 tablet (5 mg) by mouth 3 times daily as needed for muscle spasms 15 tablet 0     EPINEPHrine (EPIPEN 2-FRAN) 0.3 MG/0.3ML injection 2-pack Inject 0.3 mLs (0.3 mg) into the muscle once as needed for anaphylaxis 0.6 mL 1     progesterone (PROMETRIUM) 100 MG capsule Take 100 mg by mouth daily       torsemide (DEMADEX) 10 MG tablet Take 10 mg by mouth daily       Cholecalciferol (VITAMIN D) 2000 UNITS tablet Take 1 tablet by mouth daily       hydrOXYzine (ATARAX) 25 MG tablet Take 25 mg by mouth 3 times daily as needed for itching       amitriptyline (ELAVIL) 25 MG tablet Take 25 mg by mouth At Bedtime       ZOLMitriptan (ZOMIG) 5 MG SOLN        ondansetron (ZOFRAN) 4 MG tablet Take by mouth every 8 hours as needed for nausea       cetirizine HCl (ZYRTEC ALLERGY) 10 MG CAPS        estradiol (VIVELLE-DOT) 0.025 MG/24HR Place 1 patch onto the skin twice a week       MAGNESIUM 250 MG OR TABS None Entered       DIAZEPAM 5 MG OR TABS prn       Allergies   Allergen Reactions     Fentanyl Anaphylaxis     Midazolam Anaphylaxis     Propofol Anaphylaxis     Anesthetic [Amides]      Anaphalactic shock     Codeine      Erythromycin      ointment     Gabapentin      chest heaviness with breathing     Hydromorphone Itching     Tolerates morphine     Lorazepam Itching     Other [Seasonal Allergies] Anaphylaxis     GENERAL ANETHESIA       Penicillins      rash     Eucalyptus Oil Rash     hives     Nitrofuran Derivatives Rash     Sulfa Drugs Itching and Rash     rash       Reviewed PMH, med list and allergies.      ROS  Detailed as above       /65 (BP Location: Right arm,  "Patient Position: Chair, Cuff Size: Adult Large)  Pulse 93  Temp 97.4  F (36.3  C) (Oral)  Ht 5' 4\" (1.626 m)  Wt 169 lb (76.7 kg)  SpO2 100%  Breastfeeding? No  BMI 29.01 kg/m2      Physical Exam   Constitutional: She is well-developed, well-nourished, and in no distress.   HENT:   Head: Normocephalic.   Eyes: Conjunctivae are normal.   Pulmonary/Chest: Effort normal.   Neurological: She is alert.   Psychiatric: Mood and affect normal.   Vitals reviewed.      Assessment and Plan:       ICD-10-CM    1. Urinary symptom or sign R39.9 UA reflex to Microscopic and Culture       Neg UA   Suspect this is irritation from the straight cath and recent hip replacement   Will continue to watch and wait   Call or rtc prn       Long Martinez APRN, CNP  [facility]    "

## 2017-05-30 NOTE — NURSING NOTE
"Chief Complaint   Patient presents with     Urinary Problem     pt wonders if bladder and or yeast inf        Initial /65 (BP Location: Right arm, Patient Position: Chair, Cuff Size: Adult Large)  Pulse 93  Temp 97.4  F (36.3  C) (Oral)  Ht 5' 4\" (1.626 m)  Wt 169 lb (76.7 kg)  SpO2 100%  Breastfeeding? No  BMI 29.01 kg/m2 Estimated body mass index is 29.01 kg/(m^2) as calculated from the following:    Height as of this encounter: 5' 4\" (1.626 m).    Weight as of this encounter: 169 lb (76.7 kg).  Medication Reconciliation: complete  "

## 2017-05-30 NOTE — TELEPHONE ENCOUNTER
Left detailed VM for patient explaining that INR should be managed by Ortho for post-op knee replacement.   Advised that she call orthopedic surgeon and ask for Lab order for INR to be drawn here at [facility] if she desires to have INR drawn at [facility]  Told her to call back with questions.     Porsche Nichols RN

## 2017-06-01 ENCOUNTER — DOCUMENTATION ONLY (OUTPATIENT)
Dept: LAB | Facility: CLINIC | Age: 67
End: 2017-06-01

## 2017-06-01 ENCOUNTER — TELEPHONE (OUTPATIENT)
Dept: FAMILY MEDICINE | Facility: CLINIC | Age: 67
End: 2017-06-01

## 2017-06-01 DIAGNOSIS — Z47.1 ORTHOPEDIC AFTERCARE FOR JOINT REPLACEMENT: Primary | ICD-10-CM

## 2017-06-01 NOTE — TELEPHONE ENCOUNTER
Reason for Call:  Other INR    Detailed comments: Per patient, [facility] wants [facility] to monitor INRs and medication.  And that [facility] faxed all information to [facility] today, she talked to Rena there around 1130 today.    Patient needs to know who is to monitor INR and meds.     Phone Number Patient can be reached at: Home number on file [phone] (home)    Best Time: asap    Can we leave a detailed message on this number? YES    Call taken on 6/1/2017 at 3:34 PM by Johanna Dent

## 2017-06-01 NOTE — TELEPHONE ENCOUNTER
Dr. Benjamin,     There is a future lab ordered by Herman Llanos MD [facility] With the below notes attached to lab order:    Fax results to:  [facility]  Herman Llanos MD  [phone]  Also call [facility] at     I spoke with patient and relayed this to her. I explained that she certainly can come here to our clinic to have this drawn but results will go back to [facility] and that she will be called by [facility] with dosing instruction and when she should have INR rechecked.     Patient wanted to have INR drawn tomorrow so I scheduled patient for INR Lab Only Appointment.     FYI PCP    Porsche Nichols RN

## 2017-06-02 DIAGNOSIS — Z47.1 ORTHOPEDIC AFTERCARE FOR JOINT REPLACEMENT: ICD-10-CM

## 2017-06-02 LAB — INR PPP: 1.8 (ref 0.86–1.14)

## 2017-06-02 PROCEDURE — 36416 COLLJ CAPILLARY BLOOD SPEC: CPT | Performed by: ORTHOPAEDIC SURGERY

## 2017-06-02 PROCEDURE — 85610 PROTHROMBIN TIME: CPT | Performed by: ORTHOPAEDIC SURGERY

## 2017-06-06 DIAGNOSIS — Z47.1 AFTERCARE FOLLOWING JOINT REPLACEMENT: Primary | ICD-10-CM

## 2017-06-06 LAB — INR PPP: 1.3 (ref 0.86–1.14)

## 2017-06-06 PROCEDURE — 36416 COLLJ CAPILLARY BLOOD SPEC: CPT

## 2017-06-06 PROCEDURE — 85610 PROTHROMBIN TIME: CPT

## 2017-06-09 DIAGNOSIS — Z47.1 AFTERCARE FOLLOWING JOINT REPLACEMENT: ICD-10-CM

## 2017-06-09 LAB — INR PPP: 1.8 (ref 0.86–1.14)

## 2017-06-09 PROCEDURE — 85610 PROTHROMBIN TIME: CPT

## 2017-06-09 PROCEDURE — 36416 COLLJ CAPILLARY BLOOD SPEC: CPT

## 2017-06-12 DIAGNOSIS — Z47.1 AFTERCARE FOLLOWING JOINT REPLACEMENT: ICD-10-CM

## 2017-06-12 LAB — INR PPP: 1.6 (ref 0.86–1.14)

## 2017-06-12 PROCEDURE — 36416 COLLJ CAPILLARY BLOOD SPEC: CPT

## 2017-06-12 PROCEDURE — 85610 PROTHROMBIN TIME: CPT

## 2017-06-14 ENCOUNTER — OFFICE VISIT (OUTPATIENT)
Dept: FAMILY MEDICINE | Facility: CLINIC | Age: 67
End: 2017-06-14
Payer: COMMERCIAL

## 2017-06-14 VITALS
HEART RATE: 87 BPM | TEMPERATURE: 97.9 F | HEIGHT: 64 IN | OXYGEN SATURATION: 96 % | BODY MASS INDEX: 30.22 KG/M2 | WEIGHT: 177 LBS | SYSTOLIC BLOOD PRESSURE: 106 MMHG | DIASTOLIC BLOOD PRESSURE: 66 MMHG

## 2017-06-14 DIAGNOSIS — K59.09 OTHER CONSTIPATION: ICD-10-CM

## 2017-06-14 DIAGNOSIS — E53.8 VITAMIN B12 DEFICIENCY (NON ANEMIC): Primary | ICD-10-CM

## 2017-06-14 DIAGNOSIS — I10 BENIGN ESSENTIAL HYPERTENSION: ICD-10-CM

## 2017-06-14 DIAGNOSIS — N18.30 CKD (CHRONIC KIDNEY DISEASE) STAGE 3, GFR 30-59 ML/MIN (H): ICD-10-CM

## 2017-06-14 DIAGNOSIS — D50.8 OTHER IRON DEFICIENCY ANEMIA: ICD-10-CM

## 2017-06-14 LAB
BASOPHILS # BLD AUTO: 0 10E9/L (ref 0–0.2)
BASOPHILS NFR BLD AUTO: 0.5 %
DIFFERENTIAL METHOD BLD: ABNORMAL
EOSINOPHIL # BLD AUTO: 0.9 10E9/L (ref 0–0.7)
EOSINOPHIL NFR BLD AUTO: 15.8 %
ERYTHROCYTE [DISTWIDTH] IN BLOOD BY AUTOMATED COUNT: 14.6 % (ref 10–15)
HCT VFR BLD AUTO: 31.9 % (ref 35–47)
HGB BLD-MCNC: 10.3 G/DL (ref 11.7–15.7)
LYMPHOCYTES # BLD AUTO: 1.7 10E9/L (ref 0.8–5.3)
LYMPHOCYTES NFR BLD AUTO: 29.5 %
MCH RBC QN AUTO: 29.8 PG (ref 26.5–33)
MCHC RBC AUTO-ENTMCNC: 32.3 G/DL (ref 31.5–36.5)
MCV RBC AUTO: 92 FL (ref 78–100)
MONOCYTES # BLD AUTO: 0.7 10E9/L (ref 0–1.3)
MONOCYTES NFR BLD AUTO: 11.8 %
NEUTROPHILS # BLD AUTO: 2.4 10E9/L (ref 1.6–8.3)
NEUTROPHILS NFR BLD AUTO: 42.4 %
PLATELET # BLD AUTO: 405 10E9/L (ref 150–450)
RBC # BLD AUTO: 3.46 10E12/L (ref 3.8–5.2)
VIT B12 SERPL-MCNC: 647 PG/ML (ref 193–986)
WBC # BLD AUTO: 5.7 10E9/L (ref 4–11)

## 2017-06-14 PROCEDURE — 84238 ASSAY NONENDOCRINE RECEPTOR: CPT | Mod: 90 | Performed by: PREVENTIVE MEDICINE

## 2017-06-14 PROCEDURE — 83540 ASSAY OF IRON: CPT | Performed by: PREVENTIVE MEDICINE

## 2017-06-14 PROCEDURE — 83550 IRON BINDING TEST: CPT | Performed by: PREVENTIVE MEDICINE

## 2017-06-14 PROCEDURE — 99000 SPECIMEN HANDLING OFFICE-LAB: CPT | Performed by: PREVENTIVE MEDICINE

## 2017-06-14 PROCEDURE — 80048 BASIC METABOLIC PNL TOTAL CA: CPT | Performed by: PREVENTIVE MEDICINE

## 2017-06-14 PROCEDURE — 82728 ASSAY OF FERRITIN: CPT | Performed by: PREVENTIVE MEDICINE

## 2017-06-14 PROCEDURE — 85025 COMPLETE CBC W/AUTO DIFF WBC: CPT | Performed by: PREVENTIVE MEDICINE

## 2017-06-14 PROCEDURE — 99215 OFFICE O/P EST HI 40 MIN: CPT | Performed by: PREVENTIVE MEDICINE

## 2017-06-14 PROCEDURE — 36415 COLL VENOUS BLD VENIPUNCTURE: CPT | Performed by: PREVENTIVE MEDICINE

## 2017-06-14 PROCEDURE — 82607 VITAMIN B-12: CPT | Performed by: PREVENTIVE MEDICINE

## 2017-06-14 RX ORDER — CARVEDILOL 6.25 MG/1
TABLET ORAL
Qty: 270 TABLET | Refills: 0 | Status: SHIPPED | OUTPATIENT
Start: 2017-06-14 | End: 2018-12-18

## 2017-06-14 RX ORDER — POLYETHYLENE GLYCOL 3350 17 G/17G
1 POWDER, FOR SOLUTION ORAL DAILY
Qty: 510 G | Refills: 1 | Status: SHIPPED | OUTPATIENT
Start: 2017-06-14 | End: 2017-08-10

## 2017-06-14 NOTE — PROGRESS NOTES
"  SUBJECTIVE:  Rosa Sotomayor, a 66 year old female scheduled an appointment to discuss the following issues:     Vitamin B12 deficiency (non anemic)  Other iron deficiency anemia  CKD (chronic kidney disease) stage 3, GFR 30-59 ml/min  Benign essential hypertension  Other constipation  Pt doing well after hip surgery  Gets stomach ache with oral iron  So not taking  Also some constipation  Takes docusate - senna  Currently weaning off morphine  BP still high at night    Medical, social, surgical, and family histories reviewed.    ROS:  C: NEGATIVE for fever, chills  E: NEGATIVE for vision changes   R: NEGATIVE for significant cough or SOB  CV: NEGATIVE for chest pain, palpitations   GI: NEGATIVE for nausea, abdominal pain, heartburn, or change in bowel habits  : NEGATIVE for frequency, dysuria, or hematuria  M: NEGATIVE for significant arthralgias or myalgia  N: NEGATIVE for weakness, dizziness or paresthesias or headache    OBJECTIVE:  /66 (BP Location: Right arm, Cuff Size: Adult Large)  Pulse 87  Temp 97.9  F (36.6  C) (Oral)  Ht 5' 4\" (1.626 m)  Wt 177 lb (80.3 kg)  SpO2 96%  BMI 30.38 kg/m2  EXAM:  GENERAL APPEARANCE: healthy, alert and no distress  EYES: EOMI,  PERRL  HENT: ear canals and TM's normal and nose and mouth without ulcers or lesions  RESP: lungs clear to auscultation - no rales, rhonchi or wheezes  CV: regular rates and rhythm, normal S1 S2, no S3 or S4 and no murmur, click or rub -  ABDOMEN:  soft, nontender, no HSM or masses and bowel sounds normal    ASSESSMENT/PLAN:  (E53.8) Vitamin B12 deficiency (non anemic)  (primary encounter diagnosis)  Plan: Vitamin B12    (D50.8) Other iron deficiency anemia  Plan: Iron and iron binding capacity, Ferritin,         Soluble transferrin receptor, CBC with         platelets and differential, GASTROENTEROLOGY         ADULT REF PROCEDURE ONLY  Needs egd  Will plan to do iron infusion as pt intolerant of oral iron    (N18.3) CKD (chronic kidney " disease) stage 3, GFR 30-59 ml/min  Plan: Basic metabolic panel    (I10) Benign essential hypertension  Plan: carvedilol (COREG) 6.25 MG tablet  Pm bp still not optimized  Increase coreg to 12.5 mg in am and 6.25 mg in pm    (K59.09) Other constipation  Plan: polyethylene glycol (MIRALAX) powder  Add miralax until off morphine    40 minutes spent with patient, over 50% time counseling, coordinating care and explaining about nature of the patient's conditions.    All risks, benefits of treatment and further evaluation was reviewed with patient.  Pt expressed understanding.  Pt was in agreement with this plan.  Angel Benjamin MD

## 2017-06-14 NOTE — NURSING NOTE
"Chief Complaint   Patient presents with     RECHECK       Initial /66 (BP Location: Right arm, Cuff Size: Adult Large)  Pulse 87  Temp 97.9  F (36.6  C) (Oral)  Ht 5' 4\" (1.626 m)  Wt 177 lb (80.3 kg)  SpO2 96%  BMI 30.38 kg/m2 Estimated body mass index is 30.38 kg/(m^2) as calculated from the following:    Height as of this encounter: 5' 4\" (1.626 m).    Weight as of this encounter: 177 lb (80.3 kg).  Medication Reconciliation: complete     Padmini Parham MA    "

## 2017-06-14 NOTE — MR AVS SNAPSHOT
After Visit Summary   6/14/2017    Rosa Sotomayor    MRN: [id number]           Patient Information     Date Of Birth          1950        Visit Information        Provider Department      6/14/2017 1:00 PM Angel Benjamin MD [facility]         Follow-ups after your visit        Your next 10 appointments already scheduled     Caden 15, 2017  2:00 PM CDT   Anticoagulation Visit with [facility]   [facility]    [address]  [place] [address] [phone]              Who to contact     If you have questions or need follow up information about today's clinic visit or your schedule please contact [facility] directly at [phone].  Normal or non-critical lab and imaging results will be communicated to you by MyChart, letter or phone within 4 business days after the clinic has received the results. If you do not hear from us within 7 days, please contact the clinic through MyChart or phone. If you have a critical or abnormal lab result, we will notify you by phone as soon as possible.  Submit refill requests through Funidelia or call your pharmacy and they will forward the refill request to us. Please allow 3 business days for your refill to be completed.          Additional Information About Your Visit        MyChart Information     Funidelia gives you secure access to your electronic health record. If you see a primary care provider, you can also send messages to your care team and make appointments. If you have questions, please call your primary care clinic.  If you do not have a primary care provider, please call [phone] and they will assist you.        Care EveryWhere ID     This is your Care EveryWhere ID. This could be used by other organizations to access your [place] medical records  [id number]        Your Vitals Were     Pulse Temperature Height Pulse Oximetry BMI (Body Mass Index)        "87 97.9  F (36.6  C) (Oral) 5' 4\" (1.626 m) 96% 30.38 kg/m2        Blood Pressure from Last 3 Encounters:   06/14/17 106/66   05/30/17 120/65   05/03/17 139/76    Weight from Last 3 Encounters:   06/14/17 177 lb (80.3 kg)   05/30/17 169 lb (76.7 kg)   05/03/17 177 lb (80.3 kg)              Today, you had the following     No orders found for display       Primary Care Provider Office Phone # Fax #    Angel Romero Bakari Benjamin [phone]       [facility] [address] [facility] [address]  [facility] [address]        Thank you!     Thank you for choosing [facility]  for your care. Our goal is always to provide you with excellent care. Hearing back from our patients is one way we can continue to improve our services. Please take a few minutes to complete the written survey that you may receive in the mail after your visit with us. Thank you!             Your Updated Medication List - Protect others around you: Learn how to safely use, store and throw away your medicines at www.disposemymeds.org.          This list is accurate as of: 6/14/17  1:13 PM.  Always use your most recent med list.                   Brand Name Dispense Instructions for use    amitriptyline 25 MG tablet    ELAVIL     Take 25 mg by mouth At Bedtime       ASPIRIN NOT PRESCRIBED    INTENTIONAL     Please choose reason not prescribed, below       atorvastatin 20 MG tablet    LIPITOR    90 tablet    TAKE 1 TABLET(20 MG) BY MOUTH DAILY       B-12 1000 MCG Tbcr     100 tablet    Take 1,000 mcg by mouth daily       calcium acetate (Phos Binder) 667 MG Tabs     180 tablet    TAKE 1 TABLET BY MOUTH THREE TIMES DAILY       carvedilol 6.25 MG tablet    COREG    180 tablet    Take 1 tablet (6.25 mg) by mouth 2 times daily (with meals)       cyclobenzaprine 5 MG tablet    FLEXERIL    15 tablet    Take 1 tablet (5 mg) by mouth 3 times daily as needed for muscle spasms       diazepam 5 MG tablet    VALIUM     prn       " EPINEPHrine 0.3 MG/0.3ML injection    EPIPEN 2-FRAN    0.6 mL    Inject 0.3 mLs (0.3 mg) into the muscle once as needed for anaphylaxis       estradiol 0.025 MG/24HR BIW patch    VIVELLE-DOT     Place 1 patch onto the skin twice a week       ferrous sulfate 325 (65 FE) MG tablet    IRON    270 tablet    Take 1 tablet (325 mg) by mouth 3 times daily (with meals)       hydrOXYzine 25 MG tablet    ATARAX     Take 25 mg by mouth 3 times daily as needed for itching       ketoconazole 2 % shampoo    NIZORAL    120 mL    Apply to the affected area and wash off after 5 minutes.       losartan 100 MG tablet    COZAAR    90 tablet    Take 1 tablet (100 mg) by mouth At Bedtime       magnesium 250 MG tablet      None Entered       morphine 10 MG/5ML solution      Take by mouth every 4 hours as needed for moderate to severe pain       PROMETRIUM 100 MG capsule   Generic drug:  progesterone      Take 100 mg by mouth daily       torsemide 10 MG tablet    DEMADEX     Take 10 mg by mouth daily       vitamin D 2000 UNITS tablet      Take 1 tablet by mouth daily       ZOFRAN 4 MG tablet   Generic drug:  ondansetron      Take by mouth every 8 hours as needed for nausea       ZOMIG 5 MG Soln   Generic drug:  ZOLMitriptan          ZYRTEC ALLERGY 10 MG Caps   Generic drug:  cetirizine HCl

## 2017-06-15 DIAGNOSIS — Z47.1 AFTERCARE FOLLOWING JOINT REPLACEMENT: ICD-10-CM

## 2017-06-15 LAB
ANION GAP SERPL CALCULATED.3IONS-SCNC: 12 MMOL/L (ref 3–14)
BUN SERPL-MCNC: 28 MG/DL (ref 7–30)
CALCIUM SERPL-MCNC: 9.1 MG/DL (ref 8.5–10.1)
CHLORIDE SERPL-SCNC: 100 MMOL/L (ref 94–109)
CO2 SERPL-SCNC: 23 MMOL/L (ref 20–32)
CREAT SERPL-MCNC: 1.49 MG/DL (ref 0.52–1.04)
FERRITIN SERPL-MCNC: 103 NG/ML (ref 8–252)
GFR SERPL CREATININE-BSD FRML MDRD: 35 ML/MIN/1.7M2
GLUCOSE SERPL-MCNC: 102 MG/DL (ref 70–99)
INR PPP: 2.3 (ref 0.86–1.14)
IRON SATN MFR SERPL: 14 % (ref 15–46)
IRON SERPL-MCNC: 42 UG/DL (ref 35–180)
POTASSIUM SERPL-SCNC: 4.3 MMOL/L (ref 3.4–5.3)
SODIUM SERPL-SCNC: 135 MMOL/L (ref 133–144)
STFR SERPL-SCNC: 4.8 NMOL/L
TIBC SERPL-MCNC: 304 UG/DL (ref 240–430)

## 2017-06-15 PROCEDURE — 85610 PROTHROMBIN TIME: CPT

## 2017-06-15 PROCEDURE — 36416 COLLJ CAPILLARY BLOOD SPEC: CPT

## 2017-06-15 NOTE — TELEPHONE ENCOUNTER
"Called patient.  Patient confirms the appointment was supposed to be a \"lab only\" for INR---with results begin sent to [facility].   Cancelled INR appointment and scheduled lab-only appointment.     Radha Santos RN, BSN    "

## 2017-06-15 NOTE — TELEPHONE ENCOUNTER
Dr Benjamin---    In reviewing notes below, is patient on Warfarin temporarily for ortho reasons---or long term?   If long term--please order INR referral.  She is on our INR schedule today as a new INR patient.     Thank you,  Radha Santos RN, BSN

## 2017-06-19 DIAGNOSIS — Z47.1 AFTERCARE FOLLOWING JOINT REPLACEMENT: ICD-10-CM

## 2017-06-19 LAB — INR PPP: 3 (ref 0.86–1.14)

## 2017-06-19 PROCEDURE — 36416 COLLJ CAPILLARY BLOOD SPEC: CPT

## 2017-06-19 PROCEDURE — 85610 PROTHROMBIN TIME: CPT

## 2017-06-23 ENCOUNTER — TELEPHONE (OUTPATIENT)
Dept: FAMILY MEDICINE | Facility: CLINIC | Age: 67
End: 2017-06-23

## 2017-06-23 PROBLEM — D50.9 ANEMIA, IRON DEFICIENCY: Status: ACTIVE | Noted: 2017-06-23

## 2017-06-23 NOTE — NURSING NOTE
"I left voice message for patient to return call \" Your labs suggest that you have iron deficiency. I advise getting iron infusions and have ordered them for you.\" Evelin IZQUIERDO CMA    "

## 2017-06-26 ENCOUNTER — INFUSION THERAPY VISIT (OUTPATIENT)
Dept: INFUSION THERAPY | Facility: CLINIC | Age: 67
End: 2017-06-26
Attending: PREVENTIVE MEDICINE
Payer: COMMERCIAL

## 2017-06-26 DIAGNOSIS — D50.9 IRON DEFICIENCY ANEMIA, UNSPECIFIED IRON DEFICIENCY ANEMIA TYPE: Primary | ICD-10-CM

## 2017-06-26 PROCEDURE — 25000128 H RX IP 250 OP 636: Performed by: PREVENTIVE MEDICINE

## 2017-06-26 PROCEDURE — 96374 THER/PROPH/DIAG INJ IV PUSH: CPT

## 2017-06-26 RX ADMIN — SODIUM CHLORIDE 250 ML: 9 INJECTION, SOLUTION INTRAVENOUS at 14:00

## 2017-06-26 RX ADMIN — FERRIC CARBOXYMALTOSE INJECTION 750 MG: 50 INJECTION, SOLUTION INTRAVENOUS at 14:00

## 2017-06-26 NOTE — PROGRESS NOTES
Infusion Nursing Note:  Rosa Beniteze presents today for Injectafer.    Patient seen by provider today: No   present during visit today: Not Applicable.    Note: na    Intravenous Access:  Peripheral IV placed.    Treatment Conditions:  Not Applicable.      Post Infusion Assessment:  Patient tolerated infusion without incident.  Patient observed for 30 minutes post injectafer per protocol.  Blood return noted pre and post infusion.  Site patent and intact, free from redness, edema or discomfort.  No evidence of extravasations.  Access discontinued per protocol.    Discharge Plan:   Discharge instructions reviewed with: Patient.  Patient and/or family verbalized understanding of discharge instructions and all questions answered.  Copy of AVS reviewed with patient and/or family.  Patient will return in 1 week  for next appointment.  Patient discharged in stable condition accompanied by: self.  Departure Mode: Ambulatory.    Coco Johnson RN

## 2017-06-26 NOTE — MR AVS SNAPSHOT
After Visit Summary   6/26/2017    Rosa Sotomayor    MRN: [id number]           Patient Information     Date Of Birth          1950        Visit Information        Provider Department      6/26/2017 2:00 PM  INFUSION CHAIR 5 [facility]        Today's Diagnoses     Iron deficiency anemia, unspecified iron deficiency anemia type    -  1       Follow-ups after your visit        Your next 10 appointments already scheduled     Jul 03, 2017 10:30 AM CDT   MA SCREENING BILATERAL W/ SELINA with SHBCMA6   [facility] ([facility])    [address]  [facility] [id number] [phone]           Do not use any powder, lotion or deodorant under your arms or on your breast. If you do, we will ask you to remove it before your exam.  Wear comfortable, two-piece clothing.  If you have any allergies, tell your care team.  Bring any previous mammograms from other facilities or have them mailed to the breast center.            Jul 05, 2017  2:00 PM CDT   Level 1 with  INFUSION CHAIR 17   [facility] ([facility])    [facility]  [address]  [facility] [place] [id number] [phone]            Aug 10, 2017 10:30 AM CDT   Office Visit with Josh Hollingsworth MD   [facility]    [address] [id number] [phone]           Bring a current list of meds and any records pertaining to this visit.  For Physicals, please bring immunization records and any forms needing to be filled out.  Please arrive 10 minutes early to complete paperwork.              Who to contact     If you have questions or need follow up information about today's clinic visit or your schedule please contact [facility] directly at [phone].  Normal or non-critical lab and imaging results will be  communicated to you by Courseloadhart, letter or phone within 4 business days after the clinic has received the results. If you do not hear from us within 7 days, please contact the clinic through Panorama9 or phone. If you have a critical or abnormal lab result, we will notify you by phone as soon as possible.  Submit refill requests through Panorama9 or call your pharmacy and they will forward the refill request to us. Please allow 3 business days for your refill to be completed.          Additional Information About Your Visit        CourseloadharClickBus Information     Panorama9 gives you secure access to your electronic health record. If you see a primary care provider, you can also send messages to your care team and make appointments. If you have questions, please call your primary care clinic.  If you do not have a primary care provider, please call [phone] and they will assist you.        Care EveryWhere ID     This is your Care EveryWhere ID. This could be used by other organizations to access your [place] medical records  [id number]         Blood Pressure from Last 3 Encounters:   06/14/17 106/66   05/30/17 120/65   05/03/17 139/76    Weight from Last 3 Encounters:   06/14/17 80.3 kg (177 lb)   05/30/17 76.7 kg (169 lb)   05/03/17 80.3 kg (177 lb)              Today, you had the following     No orders found for display       Primary Care Provider Office Phone # Fax #    Tennille Lisa oWrley [phone]       [facility] [address] [address]        Equal Access to Services     [facility] AH: Hadii aad ku hadasho Soomaali, waaxda luqadaha, qaybta kaalmada adeegyada, marlene luna . So [facility] [phone].    ATENCIÓN: Si habla español, tiene a armendariz disposición servicios gratuitos de asistencia lingüística. Llame al [phone].    We comply with applicable federal civil rights laws and [place] laws. We do not discriminate on the basis of race,  color, national origin, age, disability sex, sexual orientation or gender identity.            Thank you!     Thank you for choosing [facility] AND [facility]  for your care. Our goal is always to provide you with excellent care. Hearing back from our patients is one way we can continue to improve our services. Please take a few minutes to complete the written survey that you may receive in the mail after your visit with us. Thank you!             Your Updated Medication List - Protect others around you: Learn how to safely use, store and throw away your medicines at www.disposemymeds.org.          This list is accurate as of: 6/26/17  3:10 PM.  Always use your most recent med list.                   Brand Name Dispense Instructions for use Diagnosis    amitriptyline 25 MG tablet    ELAVIL     Take 25 mg by mouth At Bedtime        ASPIRIN NOT PRESCRIBED    INTENTIONAL     Please choose reason not prescribed, below        atorvastatin 20 MG tablet    LIPITOR    90 tablet    TAKE 1 TABLET(20 MG) BY MOUTH DAILY    Hypertriglyceridemia       B-12 1000 MCG Tbcr     100 tablet    Take 1,000 mcg by mouth daily    Vitamin B12 deficiency (non anemic)       calcium acetate (Phos Binder) 667 MG Tabs     180 tablet    TAKE 1 TABLET BY MOUTH THREE TIMES DAILY    Secondary renal hyperparathyroidism (H)       * carvedilol 6.25 MG tablet    COREG    180 tablet    Take 1 tablet (6.25 mg) by mouth 2 times daily (with meals)    Benign essential hypertension       * carvedilol 6.25 MG tablet    COREG    270 tablet    12.5 mg in am and 6.25 mg in pm    Benign essential hypertension       cyclobenzaprine 5 MG tablet    FLEXERIL    15 tablet    Take 1 tablet (5 mg) by mouth 3 times daily as needed for muscle spasms        diazepam 5 MG tablet    VALIUM     prn        EPINEPHrine 0.3 MG/0.3ML injection    EPIPEN 2-FRAN    0.6 mL    Inject 0.3 mLs (0.3 mg) into the muscle once as needed for anaphylaxis    Allergic reaction,  subsequent encounter       estradiol 0.025 MG/24HR BIW patch    VIVELLE-DOT     Place 1 patch onto the skin twice a week        ferrous sulfate 325 (65 FE) MG tablet    IRON    270 tablet    Take 1 tablet (325 mg) by mouth 3 times daily (with meals)    Iron deficiency       hydrOXYzine 25 MG tablet    ATARAX     Take 25 mg by mouth 3 times daily as needed for itching        ketoconazole 2 % shampoo    NIZORAL    120 mL    Apply to the affected area and wash off after 5 minutes.    Seborrheic dermatitis       losartan 100 MG tablet    COZAAR    90 tablet    Take 1 tablet (100 mg) by mouth At Bedtime    Benign essential hypertension       magnesium 250 MG tablet      None Entered        morphine 10 MG/5ML solution      Take by mouth every 4 hours as needed for moderate to severe pain        polyethylene glycol powder    MIRALAX    510 g    Take 17 g (1 capful) by mouth daily    Other constipation       PROMETRIUM 100 MG capsule   Generic drug:  progesterone      Take 100 mg by mouth daily        torsemide 10 MG tablet    DEMADEX     Take 10 mg by mouth daily        vitamin D 2000 UNITS tablet      Take 1 tablet by mouth daily        ZOFRAN 4 MG tablet   Generic drug:  ondansetron      Take by mouth every 8 hours as needed for nausea        ZOMIG 5 MG Soln   Generic drug:  ZOLMitriptan           ZYRTEC ALLERGY 10 MG Caps   Generic drug:  cetirizine HCl           * Notice:  This list has 2 medication(s) that are the same as other medications prescribed for you. Read the directions carefully, and ask your doctor or other care provider to review them with you.

## 2017-07-03 ENCOUNTER — HOSPITAL ENCOUNTER (OUTPATIENT)
Dept: MAMMOGRAPHY | Facility: CLINIC | Age: 67
Discharge: HOME | End: 2017-07-03
Attending: OBSTETRICS & GYNECOLOGY | Admitting: OBSTETRICS & GYNECOLOGY
Payer: COMMERCIAL

## 2017-07-03 DIAGNOSIS — Z12.31 VISIT FOR SCREENING MAMMOGRAM: ICD-10-CM

## 2017-07-03 PROCEDURE — G0202 SCR MAMMO BI INCL CAD: HCPCS

## 2017-07-03 PROCEDURE — 77063 BREAST TOMOSYNTHESIS BI: CPT

## 2017-07-05 ENCOUNTER — INFUSION THERAPY VISIT (OUTPATIENT)
Dept: INFUSION THERAPY | Facility: CLINIC | Age: 67
End: 2017-07-05
Attending: PREVENTIVE MEDICINE
Payer: COMMERCIAL

## 2017-07-05 VITALS
OXYGEN SATURATION: 98 % | TEMPERATURE: 98.2 F | DIASTOLIC BLOOD PRESSURE: 60 MMHG | SYSTOLIC BLOOD PRESSURE: 112 MMHG | RESPIRATION RATE: 16 BRPM | HEART RATE: 90 BPM

## 2017-07-05 DIAGNOSIS — D50.9 IRON DEFICIENCY ANEMIA, UNSPECIFIED IRON DEFICIENCY ANEMIA TYPE: Primary | ICD-10-CM

## 2017-07-05 PROCEDURE — 96365 THER/PROPH/DIAG IV INF INIT: CPT

## 2017-07-05 PROCEDURE — 25000128 H RX IP 250 OP 636: Performed by: PREVENTIVE MEDICINE

## 2017-07-05 RX ADMIN — SODIUM CHLORIDE 250 ML: 9 INJECTION, SOLUTION INTRAVENOUS at 14:19

## 2017-07-05 RX ADMIN — FERRIC CARBOXYMALTOSE INJECTION 750 MG: 50 INJECTION, SOLUTION INTRAVENOUS at 14:19

## 2017-07-05 ASSESSMENT — PAIN SCALES - GENERAL: PAINLEVEL: NO PAIN (0)

## 2017-07-05 NOTE — PROGRESS NOTES
Infusion Nursing Note:  Rosa Sotomayor presents today for injectafer.    Patient seen by provider today: No   present during visit today: Not Applicable.    Note: N/A.    Intravenous Access:  Peripheral IV placed.    Treatment Conditions:  Not Applicable.      Post Infusion Assessment:  Patient tolerated infusion without incident.  Patient observed for 30 minutes post injectafer per protocol.  Blood return noted pre and post infusion.  Site patent and intact, free from redness, edema or discomfort.  No evidence of extravasations.  Access discontinued per protocol.    Discharge Plan:   AVS to patient via MYCHART. Patient discharged in stable condition accompanied by: self.  Departure Mode: Ambulatory.    Ling Horner RN

## 2017-07-05 NOTE — MR AVS SNAPSHOT
After Visit Summary   7/5/2017    Rosa Sotomayor    MRN: [id number]           Patient Information     Date Of Birth          1950        Visit Information        Provider Department      7/5/2017 2:00 PM  INFUSION CHAIR [facility]        Today's Diagnoses     Iron deficiency anemia, unspecified iron deficiency anemia type    -  1       Follow-ups after your visit        Your next 10 appointments already scheduled     Aug 10, 2017 10:30 AM CDT   [facility]    [address] [facility] [address] [phone]           Bring a current list of meds and any records pertaining to this visit.  For Physicals, please bring immunization records and any forms needing to be filled out.  Please arrive 10 minutes early to complete paperwork.              Who to contact     If you have questions or need follow up information about today's clinic visit or your schedule please contact [facility] directly at [phone].  Normal or non-critical lab and imaging results will be communicated to you by [employer], letter or phone within 4 business days after the clinic has received the results. If you do not hear from us within 7 days, please contact the clinic through [employer] or phone. If you have a critical or abnormal lab result, we will notify you by phone as soon as possible.  Submit refill requests through [employer] or call your pharmacy and they will forward the refill request to us. Please allow 3 business days for your refill to be completed.          Additional Information About Your Visit        [employer] Information     [employer] gives you secure access to your electronic health record. If you see a primary care provider, you can also send messages to your care team and make appointments. If you have questions, please call your primary care clinic.  If you do  not have a primary care provider, please call [phone] and they will assist you.        Care EveryWhere ID     This is your Care EveryWhere ID. This could be used by other organizations to access your [place] medical records  [id number]        Your Vitals Were     Pulse Temperature Respirations Pulse Oximetry          90 98.2  F (36.8  C) (Oral) 16 98%         Blood Pressure from Last 3 Encounters:   07/05/17 112/60   06/14/17 106/66   05/30/17 120/65    Weight from Last 3 Encounters:   06/14/17 80.3 kg (177 lb)   05/30/17 76.7 kg (169 lb)   05/03/17 80.3 kg (177 lb)              Today, you had the following     No orders found for display       Primary Care Provider Office Phone # Fax #    [place] Lisa Worley [phone]       [facility] [address]  [place] [address]        Equal Access to Services     [facility]: Hadii aad ku hadasho Soomaali, waaxda luqadaha, qaybta kaalmada adeegyada, waxay jenain hayyulianan mee luna . So [facility] [phone].    ATENCIÓN: Si habla español, tiene a armendariz disposición servicios gratuitos de asistencia lingüística. Llame al [phone].    We comply with applicable federal civil rights laws and [place] laws. We do not discriminate on the basis of race, color, national origin, age, disability sex, sexual orientation or gender identity.            Thank you!     Thank you for choosing [facility]  for your care. Our goal is always to provide you with excellent care. Hearing back from our patients is one way we can continue to improve our services. Please take a few minutes to complete the written survey that you may receive in the mail after your visit with us. Thank you!             Your Updated Medication List - Protect others around you: Learn how to safely use, store and throw away your medicines at www.disposemymeds.org.          This list is accurate as of: 7/5/17  3:27 PM.  Always use  your most recent med list.                   Brand Name Dispense Instructions for use Diagnosis    amitriptyline 25 MG tablet    ELAVIL     Take 25 mg by mouth At Bedtime        ASPIRIN NOT PRESCRIBED    INTENTIONAL     Please choose reason not prescribed, below        atorvastatin 20 MG tablet    LIPITOR    90 tablet    TAKE 1 TABLET(20 MG) BY MOUTH DAILY    Hypertriglyceridemia       B-12 1000 MCG Tbcr     100 tablet    Take 1,000 mcg by mouth daily    Vitamin B12 deficiency (non anemic)       calcium acetate (Phos Binder) 667 MG Tabs     180 tablet    TAKE 1 TABLET BY MOUTH THREE TIMES DAILY    Secondary renal hyperparathyroidism (H)       * carvedilol 6.25 MG tablet    COREG    180 tablet    Take 1 tablet (6.25 mg) by mouth 2 times daily (with meals)    Benign essential hypertension       * carvedilol 6.25 MG tablet    COREG    270 tablet    12.5 mg in am and 6.25 mg in pm    Benign essential hypertension       cyclobenzaprine 5 MG tablet    FLEXERIL    15 tablet    Take 1 tablet (5 mg) by mouth 3 times daily as needed for muscle spasms        diazepam 5 MG tablet    VALIUM     prn        EPINEPHrine 0.3 MG/0.3ML injection    EPIPEN 2-FRAN    0.6 mL    Inject 0.3 mLs (0.3 mg) into the muscle once as needed for anaphylaxis    Allergic reaction, subsequent encounter       estradiol 0.025 MG/24HR BIW patch    VIVELLE-DOT     Place 1 patch onto the skin twice a week        ferrous sulfate 325 (65 FE) MG tablet    IRON    270 tablet    Take 1 tablet (325 mg) by mouth 3 times daily (with meals)    Iron deficiency       hydrOXYzine 25 MG tablet    ATARAX     Take 25 mg by mouth 3 times daily as needed for itching        ketoconazole 2 % shampoo    NIZORAL    120 mL    Apply to the affected area and wash off after 5 minutes.    Seborrheic dermatitis       losartan 100 MG tablet    COZAAR    90 tablet    Take 1 tablet (100 mg) by mouth At Bedtime    Benign essential hypertension       magnesium 250 MG tablet      None  Entered        morphine 10 MG/5ML solution      Take by mouth every 4 hours as needed for moderate to severe pain        polyethylene glycol powder    MIRALAX    510 g    Take 17 g (1 capful) by mouth daily    Other constipation       PROMETRIUM 100 MG capsule   Generic drug:  progesterone      Take 100 mg by mouth daily        torsemide 10 MG tablet    DEMADEX     Take 10 mg by mouth daily        vitamin D 2000 UNITS tablet      Take 1 tablet by mouth daily        ZOFRAN 4 MG tablet   Generic drug:  ondansetron      Take by mouth every 8 hours as needed for nausea        ZOMIG 5 MG Soln   Generic drug:  ZOLMitriptan           ZYRTEC ALLERGY 10 MG Caps   Generic drug:  cetirizine HCl           * Notice:  This list has 2 medication(s) that are the same as other medications prescribed for you. Read the directions carefully, and ask your doctor or other care provider to review them with you.

## 2017-07-10 DIAGNOSIS — E78.1 HYPERTRIGLYCERIDEMIA: ICD-10-CM

## 2017-07-10 NOTE — TELEPHONE ENCOUNTER
Pending Prescriptions:                       Disp   Refills    atorvastatin (LIPITOR) 20 MG tablet       90 tab*1            Sig: TAKE 1 TABLET(20 MG) BY MOUTH DAILY         Last Written Prescription Date: 1-11-17  Last Fill Quantity: 90, # refills: 1  Last Office Visit with FMJANNA, YAMILEX or [facility] prescribing provider: 6-14-17 Sourav  Next 5 appointments (look out 90 days)     Aug 10, 2017 10:30 AM CDT   [facility] Holl[facility]   [facility]    [address] [address]   [phone]                   Lab Results   Component Value Date    CHOL 189 07/22/2016     Lab Results   Component Value Date    HDL 44 07/22/2016     Lab Results   Component Value Date    LDL 76 07/22/2016     Lab Results   Component Value Date    TRIG 345 07/22/2016     Lab Results   Component Value Date    CHOLHDLRATIO 3.8 03/01/2006     Geovanna Tao RT (R)

## 2017-07-11 ENCOUNTER — TRANSFERRED RECORDS (OUTPATIENT)
Dept: HEALTH INFORMATION MANAGEMENT | Facility: CLINIC | Age: 67
End: 2017-07-11

## 2017-07-11 RX ORDER — ATORVASTATIN CALCIUM 20 MG/1
TABLET, FILM COATED ORAL
Qty: 30 TABLET | Refills: 0 | Status: SHIPPED | OUTPATIENT
Start: 2017-07-11 | End: 2017-08-10

## 2017-07-31 ENCOUNTER — TRANSFERRED RECORDS (OUTPATIENT)
Dept: HEALTH INFORMATION MANAGEMENT | Facility: CLINIC | Age: 67
End: 2017-07-31

## 2017-08-10 ENCOUNTER — TELEPHONE (OUTPATIENT)
Dept: FAMILY MEDICINE | Facility: CLINIC | Age: 67
End: 2017-08-10

## 2017-08-10 ENCOUNTER — OFFICE VISIT (OUTPATIENT)
Dept: FAMILY MEDICINE | Facility: CLINIC | Age: 67
End: 2017-08-10
Payer: COMMERCIAL

## 2017-08-10 VITALS
HEART RATE: 81 BPM | HEIGHT: 64 IN | DIASTOLIC BLOOD PRESSURE: 71 MMHG | SYSTOLIC BLOOD PRESSURE: 124 MMHG | TEMPERATURE: 98.4 F | RESPIRATION RATE: 18 BRPM | OXYGEN SATURATION: 97 % | WEIGHT: 175 LBS | BODY MASS INDEX: 29.88 KG/M2

## 2017-08-10 DIAGNOSIS — Q07.00 ARNOLD-CHIARI MALFORMATION (H): ICD-10-CM

## 2017-08-10 DIAGNOSIS — E78.1 HYPERTRIGLYCERIDEMIA: ICD-10-CM

## 2017-08-10 DIAGNOSIS — I70.1 RENAL ARTERY STENOSIS (H): ICD-10-CM

## 2017-08-10 DIAGNOSIS — J31.0 CHRONIC RHINITIS, UNSPECIFIED TYPE: ICD-10-CM

## 2017-08-10 DIAGNOSIS — N25.81 SECONDARY RENAL HYPERPARATHYROIDISM (H): ICD-10-CM

## 2017-08-10 DIAGNOSIS — D50.9 IRON DEFICIENCY ANEMIA, UNSPECIFIED IRON DEFICIENCY ANEMIA TYPE: Primary | ICD-10-CM

## 2017-08-10 DIAGNOSIS — I10 BENIGN ESSENTIAL HYPERTENSION: ICD-10-CM

## 2017-08-10 DIAGNOSIS — G47.9 SLEEP DISTURBANCE: ICD-10-CM

## 2017-08-10 DIAGNOSIS — N18.30 CKD (CHRONIC KIDNEY DISEASE) STAGE 3, GFR 30-59 ML/MIN (H): ICD-10-CM

## 2017-08-10 LAB
ERYTHROCYTE [DISTWIDTH] IN BLOOD BY AUTOMATED COUNT: 16.3 % (ref 10–15)
HCT VFR BLD AUTO: 35.1 % (ref 35–47)
HGB BLD-MCNC: 11.5 G/DL (ref 11.7–15.7)
MCH RBC QN AUTO: 31.8 PG (ref 26.5–33)
MCHC RBC AUTO-ENTMCNC: 32.8 G/DL (ref 31.5–36.5)
MCV RBC AUTO: 97 FL (ref 78–100)
PLATELET # BLD AUTO: 250 10E9/L (ref 150–450)
RBC # BLD AUTO: 3.62 10E12/L (ref 3.8–5.2)
WBC # BLD AUTO: 4.9 10E9/L (ref 4–11)

## 2017-08-10 PROCEDURE — 99214 OFFICE O/P EST MOD 30 MIN: CPT | Performed by: INTERNAL MEDICINE

## 2017-08-10 PROCEDURE — 83550 IRON BINDING TEST: CPT | Performed by: INTERNAL MEDICINE

## 2017-08-10 PROCEDURE — 36415 COLL VENOUS BLD VENIPUNCTURE: CPT | Performed by: INTERNAL MEDICINE

## 2017-08-10 PROCEDURE — 83540 ASSAY OF IRON: CPT | Performed by: INTERNAL MEDICINE

## 2017-08-10 PROCEDURE — 85027 COMPLETE CBC AUTOMATED: CPT | Performed by: INTERNAL MEDICINE

## 2017-08-10 RX ORDER — DIAZEPAM 5 MG
TABLET ORAL
Qty: 60 TABLET | Status: CANCELLED | OUTPATIENT
Start: 2017-08-10

## 2017-08-10 RX ORDER — IPRATROPIUM BROMIDE 42 UG/1
2 SPRAY, METERED NASAL 4 TIMES DAILY PRN
Qty: 3 BOX | Refills: 3 | Status: SHIPPED | OUTPATIENT
Start: 2017-08-10 | End: 2018-07-18

## 2017-08-10 RX ORDER — ATORVASTATIN CALCIUM 20 MG/1
TABLET, FILM COATED ORAL
Qty: 90 TABLET | Refills: 3 | Status: SHIPPED | OUTPATIENT
Start: 2017-08-10 | End: 2017-10-05

## 2017-08-10 NOTE — LETTER
[place] [address] [facility]  [address]  [place]  [address]  Tel: [phone]    August 11, 2017    Rosasienna Sotomayor  [address] [facility] [address]        Dear Ms. Sotomayor,    The following letter pertains to your most recent diagnostic tests:    Your iron stores are normal.  Your hemoglobin is still mildly low.  It can take 90 days after iron stores are replete to normalize hemoglobin.      Sincerely,    Josh Hollingsworth MD/NORMA      Enclosure: Lab Results  Results for orders placed or performed in visit on 08/10/17   Iron and iron binding capacity   Result Value Ref Range    Iron 80 35 - 180 ug/dL    Iron Binding Cap 249 240 - 430 ug/dL    Iron Saturation Index 32 15 - 46 %   CBC with platelets   Result Value Ref Range    WBC 4.9 4.0 - 11.0 10e9/L    RBC Count 3.62 (L) 3.8 - 5.2 10e12/L    Hemoglobin 11.5 (L) 11.7 - 15.7 g/dL    Hematocrit 35.1 35.0 - 47.0 %    MCV 97 78 - 100 fl    MCH 31.8 26.5 - 33.0 pg    MCHC 32.8 31.5 - 36.5 g/dL    RDW 16.3 (H) 10.0 - 15.0 %    Platelet Count 250 150 - 450 10e9/L

## 2017-08-10 NOTE — MR AVS SNAPSHOT
After Visit Summary   8/10/2017    Rosa Sotomayor    MRN: [id number]           Patient Information     Date Of Birth          1950        Visit Information        Provider Department      8/10/2017 10:30 AM Josh Hollingsworth MD [facility]        Today's Diagnoses     Iron deficiency anemia, unspecified iron deficiency anemia type    -  1    Renal artery stenosis (H)        Arnold-Chiari malformation (H)        Secondary renal hyperparathyroidism (H)        Benign essential hypertension        CKD (chronic kidney disease) stage 3, GFR 30-59 ml/min        Chronic rhinitis, unspecified type        Sleep disturbance        Hypertriglyceridemia           Follow-ups after your visit        Additional Services     MENTAL HEALTH REFERRAL       Your provider has referred you to: FMG: [facility] - Counseling (Individual/Couples/Family) - [facility] [phone]   [email or web address]/   *Patient will be contacted by [place]'s scheduling partner, [facility]), to schedule an appointment.  Patients may also call [facility] to schedule.        All scheduling is subject to the client's specific insurance plan & benefits, provider/location availability, and provider clinical specialities.  Please arrive 15 minutes early for your first appointment and bring your completed paperwork.    Please be aware that coverage of these services is subject to the terms and limitations of your health insurance plan.  Call member services at your health plan with any benefit or coverage questions.            NUTRITION REFERRAL       Your provider has referred you to: FM[facility]: [facility] [phone]   [email or web address]    Please be aware that coverage of these services is subject to the terms and limitations of your health insurance plan.  Call member services at your health plan with  any benefit or coverage questions.      Please bring the following with you to your appointment:    (1) This referral request  (2) Any documents given to you regarding this referral  (3) Any specific questions you have about diet and/or food choices                  Follow-up notes from your care team     Return if symptoms worsen or fail to improve.      Future tests that were ordered for you today     Open Future Orders        Priority Expected Expires Ordered    XR Upper GI w Small Bowel Follow Through Routine 8/10/2017 8/10/2018 8/10/2017            Who to contact     If you have questions or need follow up information about today's clinic visit or your schedule please contact [facility] directly at [phone].  Normal or non-critical lab and imaging results will be communicated to you by MyChart, letter or phone within 4 business days after the clinic has received the results. If you do not hear from us within 7 days, please contact the clinic through [employer] or phone. If you have a critical or abnormal lab result, we will notify you by phone as soon as possible.  Submit refill requests through [employer] or call your pharmacy and they will forward the refill request to us. Please allow 3 business days for your refill to be completed.          Additional Information About Your Visit        MyChart Information     [employer] gives you secure access to your electronic health record. If you see a primary care provider, you can also send messages to your care team and make appointments. If you have questions, please call your primary care clinic.  If you do not have a primary care provider, please call [phone] and they will assist you.        Care EveryWhere ID     This is your Care EveryWhere ID. This could be used by other organizations to access your [place] medical records  [id number]        Your Vitals Were     Pulse Temperature Respirations Height Pulse Oximetry BMI (Body Mass Index)    81  "98.4  F (36.9  C) (Tympanic) 18 5' 4\" (1.626 m) 97% 30.04 kg/m2       Blood Pressure from Last 3 Encounters:   08/10/17 124/71   07/05/17 112/60   06/14/17 106/66    Weight from Last 3 Encounters:   08/10/17 175 lb (79.4 kg)   06/14/17 177 lb (80.3 kg)   05/30/17 169 lb (76.7 kg)              We Performed the Following     CBC with platelets     Iron and iron binding capacity     MENTAL HEALTH REFERRAL     MIGRAINE ACTION PLAN     NUTRITION REFERRAL          Today's Medication Changes          These changes are accurate as of: 8/10/17 11:00 AM.  If you have any questions, ask your nurse or doctor.               Start taking these medicines.        Dose/Directions    ipratropium 0.06 % spray   Commonly known as:  ATROVENT   Used for:  Chronic rhinitis, unspecified type   Started by:  Josh Hollingsworth MD        Dose:  2 spray   Spray 2 sprays into both nostrils 4 times daily as needed for rhinitis   Quantity:  3 Box   Refills:  3         These medicines have changed or have updated prescriptions.        Dose/Directions    carvedilol 6.25 MG tablet   Commonly known as:  COREG   This may have changed:  Another medication with the same name was removed. Continue taking this medication, and follow the directions you see here.   Used for:  Benign essential hypertension   Changed by:  Angel Benjamin MD        12.5 mg in am and 6.25 mg in pm   Quantity:  270 tablet   Refills:  0         Stop taking these medicines if you haven't already. Please contact your care team if you have questions.     ferrous sulfate 325 (65 FE) MG tablet   Commonly known as:  IRON   Stopped by:  Josh Hollingsworth MD           morphine 10 MG/5ML solution   Stopped by:  Josh Hollingsworth MD           polyethylene glycol powder   Commonly known as:  MIRALAX   Stopped by:  Josh Hollingsworth MD                Where to get your medicines      These [facility] [address]  [facility] [place] [facility] [address] " AT [facility] [address] [facility] [address]  [address], [place] [address]    Hours:  24-hours Phone:  [phone]     atorvastatin 20 MG tablet    ipratropium 0.06 % spray                Primary Care Provider Office Phone # Fax #    Josh Hollingsworth [phone] [address] [place] [address]        Equal Access to Services     [facility]: Hadii aad ku hadasho Soomaali, waaxda luqadaha, qaybta kaalmada adeegyada, waxay idiin hayaan adeeg kharash la'aan . So [facility] [phone].    ATENCIÓN: Si habla español, tiene a armendariz disposición servicios gratuitos de asistencia lingüística. [facility] [phone].    We comply with applicable federal civil rights laws and [place] laws. We do not discriminate on the basis of race, color, national origin, age, disability sex, sexual orientation or gender identity.            Thank you!     Thank you for choosing [facility]  for your care. Our goal is always to provide you with excellent care. Hearing back from our patients is one way we can continue to improve our services. Please take a few minutes to complete the written survey that you may receive in the mail after your visit with us. Thank you!             Your Updated Medication List - Protect others around you: Learn how to safely use, store and throw away your medicines at www.disposemymeds.org.          This list is accurate as of: 8/10/17 11:00 AM.  Always use your most recent med list.                   Brand Name Dispense Instructions for use Diagnosis    amitriptyline 25 MG tablet    ELAVIL     Take 25 mg by mouth At Bedtime        ASPIRIN NOT PRESCRIBED    INTENTIONAL     Please choose reason not prescribed, below        atorvastatin 20 MG tablet    LIPITOR    90 tablet    TAKE 1 TABLET(20 MG) BY MOUTH DAILY    Hypertriglyceridemia       B-12 1000 MCG Tbcr     100 tablet    Take 1,000 mcg by mouth daily    Vitamin B12 deficiency (non anemic)       calcium acetate (Phos Binder)  667 MG Tabs     180 tablet    TAKE 1 TABLET BY MOUTH THREE TIMES DAILY    Secondary renal hyperparathyroidism (H)       carvedilol 6.25 MG tablet    COREG    270 tablet    12.5 mg in am and 6.25 mg in pm    Benign essential hypertension       cyclobenzaprine 5 MG tablet    FLEXERIL    15 tablet    Take 1 tablet (5 mg) by mouth 3 times daily as needed for muscle spasms        diazepam 5 MG tablet    VALIUM     prn        EPINEPHrine 0.3 MG/0.3ML injection 2-pack    EPIPEN 2-FRAN    0.6 mL    Inject 0.3 mLs (0.3 mg) into the muscle once as needed for anaphylaxis    Allergic reaction, subsequent encounter       estradiol 0.025 MG/24HR BIW patch    VIVELLE-DOT     Place 1 patch onto the skin twice a week        hydrOXYzine 25 MG tablet    ATARAX     Take 25 mg by mouth 3 times daily as needed for itching        ipratropium 0.06 % spray    ATROVENT    3 Box    Spray 2 sprays into both nostrils 4 times daily as needed for rhinitis    Chronic rhinitis, unspecified type       ketoconazole 2 % shampoo    NIZORAL    120 mL    Apply to the affected area and wash off after 5 minutes.    Seborrheic dermatitis       losartan 100 MG tablet    COZAAR    90 tablet    Take 1 tablet (100 mg) by mouth At Bedtime    Benign essential hypertension       magnesium 250 MG tablet      None Entered        PROMETRIUM 100 MG capsule   Generic drug:  progesterone      Take 100 mg by mouth daily        torsemide 10 MG tablet    DEMADEX     Take 10 mg by mouth daily        vitamin D 2000 UNITS tablet      Take 1 tablet by mouth daily        ZOFRAN 4 MG tablet   Generic drug:  ondansetron      Take by mouth every 8 hours as needed for nausea        ZOMIG 5 MG Soln   Generic drug:  ZOLMitriptan           ZYRTEC ALLERGY 10 MG Caps   Generic drug:  cetirizine HCl

## 2017-08-10 NOTE — TELEPHONE ENCOUNTER
What is being asked of me?  She needs an upper GI series with small bowel follow through.  This is performed by radiology.  She is not having an EGD.

## 2017-08-10 NOTE — NURSING NOTE
"Chief Complaint   Patient presents with     Establish Care     Kidney Disease       Initial /71 (BP Location: Right arm, Patient Position: Chair, Cuff Size: Adult Regular)  Pulse 81  Temp 98.4  F (36.9  C) (Tympanic)  Resp 18  Ht 5' 4\" (1.626 m)  Wt 175 lb (79.4 kg)  SpO2 97%  BMI 30.04 kg/m2 Estimated body mass index is 30.04 kg/(m^2) as calculated from the following:    Height as of this encounter: 5' 4\" (1.626 m).    Weight as of this encounter: 175 lb (79.4 kg).  Medication Reconciliation: complete   Helen Cao CMA (AAMA)      "

## 2017-08-10 NOTE — TELEPHONE ENCOUNTER
Spoke with [facility] radiology.   Per scheduling rep, nothing further is needed on the order.   The Pt reports that someone who reached out to her to schedule informed her that she was having a EGD.   This is not correct. Per scheduling, Order is complete and Pt can schedule anytime.     Advised Pt to call back in if any issues with scheduling arise.    Citlaly Mehta RN

## 2017-08-10 NOTE — TELEPHONE ENCOUNTER
Reason for Call:  Other call back    Detailed comments: Pt called this afternoon and was told by Dr. Hollingsworth to schedule an endoscopy however, [facility] called her and they said that the referral needs to say with Barium. There was mild confusion on the pt's end and just would like the clarification as to what to do. Please give pt a call back with the referral as well as to answer any questions she has. Thank you.    Phone Number Patient can be reached at: Home number on file [phone] (home)    Best Time:     Can we leave a detailed message on this number? YES    Call taken on 8/10/2017 at 2:21 PM by Harini Gant

## 2017-08-10 NOTE — PROGRESS NOTES
SUBJECTIVE:                                                    Rosa Sotomayor is a 66 year old female who presents to clinic today for the following health issues:      New Patient/Transfer of Care    Former patient of Dr. Benjamin  Multiple complaints  JEAN-PAUL noted prior to TKA  She had colon in 10/2016  She has not scheduled EGD, she is concerned about risks of sedation due to allergies  She has trouble sleeping, moderate present for months; she denies depressed mood or anxiety  She has moderate to severe nasal drainage and phlegm in throat that has been present for weeks without fevers or new cough  She needs help with low sodium diet and renal diet and wants referral to nutritionist       Problem list and histories reviewed & adjusted, as indicated.  Additional history: as documented    Patient Active Problem List   Diagnosis     Postmenopausal bleeding     Personal history of allergy to anesthetic agent     CKD (chronic kidney disease) stage 3, GFR 30-59 ml/min     Hypertriglyceridemia     Arnold-Chiari malformation (H)     Hyperlipidemia LDL goal <130     Periodic headache syndrome, not intractable     Anxiety     Secondary renal hyperparathyroidism (H)     Lumbago     Chronic bilateral low back pain without sciatica     Benign essential hypertension     Renal artery stenosis (H)     Macular degeneration, bilateral     Anemia, iron deficiency     Past Surgical History:   Procedure Laterality Date     C NONSPECIFIC PROCEDURE      wisdom teeth     C NONSPECIFIC PROCEDURE  2005    Varicose Veins     C TOTAL HIP ARTHROPLASTY      left     C TOTAL KNEE ARTHROPLASTY      right     TUBAL LIGATION  1987       Social History   Substance Use Topics     Smoking status: Never Smoker     Smokeless tobacco: Never Used     Alcohol use No     Family History   Problem Relation Age of Onset     DIABETES Mother      INSULIN     Hypertension Mother      Eye Disorder Mother      CATERACTS     HEART DISEASE Mother      CHF- OPEN  HEART SURG X'S 2     Lipids Mother      Obesity Mother      Coronary Artery Disease Mother      DIABETES Father      ORAL     Hypertension Father      Arthritis Father      Eye Disorder Father      MAC DEGENERATION     HEART DISEASE Father      CHF     Lipids Father      Obesity Father      DIABETES Maternal Grandfather      INSULIN     DIABETES Brother      INSULIN     GASTROINTESTINAL DISEASE Brother      CHOLITISI POSSIBLE CHRONES     Obesity Brother      Colon Cancer No family hx of      Breast Cancer No family hx of          Current Outpatient Prescriptions   Medication Sig Dispense Refill     atorvastatin (LIPITOR) 20 MG tablet TAKE 1 TABLET(20 MG) BY MOUTH DAILY 30 tablet 0     carvedilol (COREG) 6.25 MG tablet 12.5 mg in am and 6.25 mg in pm 270 tablet 0     ASPIRIN NOT PRESCRIBED (INTENTIONAL) Please choose reason not prescribed, below       Cyanocobalamin (B-12) 1000 MCG TBCR Take 1,000 mcg by mouth daily 100 tablet 1     ketoconazole (NIZORAL) 2 % shampoo Apply to the affected area and wash off after 5 minutes. 120 mL 1     calcium acetate, Phos Binder, 667 MG TABS TAKE 1 TABLET BY MOUTH THREE TIMES DAILY 180 tablet 3     losartan (COZAAR) 100 MG tablet Take 1 tablet (100 mg) by mouth At Bedtime 90 tablet 1     cyclobenzaprine (FLEXERIL) 5 MG tablet Take 1 tablet (5 mg) by mouth 3 times daily as needed for muscle spasms 15 tablet 0     EPINEPHrine (EPIPEN 2-FRAN) 0.3 MG/0.3ML injection 2-pack Inject 0.3 mLs (0.3 mg) into the muscle once as needed for anaphylaxis 0.6 mL 1     progesterone (PROMETRIUM) 100 MG capsule Take 100 mg by mouth daily       torsemide (DEMADEX) 10 MG tablet Take 10 mg by mouth daily       Cholecalciferol (VITAMIN D) 2000 UNITS tablet Take 1 tablet by mouth daily       hydrOXYzine (ATARAX) 25 MG tablet Take 25 mg by mouth 3 times daily as needed for itching       amitriptyline (ELAVIL) 25 MG tablet Take 25 mg by mouth At Bedtime       ZOLMitriptan (ZOMIG) 5 MG SOLN        ondansetron  "(ZOFRAN) 4 MG tablet Take by mouth every 8 hours as needed for nausea       cetirizine HCl (ZYRTEC ALLERGY) 10 MG CAPS        estradiol (VIVELLE-DOT) 0.025 MG/24HR Place 1 patch onto the skin twice a week       MAGNESIUM 250 MG OR TABS None Entered       [DISCONTINUED] carvedilol (COREG) 6.25 MG tablet Take 1 tablet (6.25 mg) by mouth 2 times daily (with meals) 180 tablet 3     DIAZEPAM 5 MG OR TABS prn (Patient not taking: Reported on 8/10/2017)       Allergies   Allergen Reactions     Fentanyl Anaphylaxis     Midazolam Anaphylaxis     Propofol Anaphylaxis     Anesthetic [Amides]      Anaphalactic shock     Codeine      Erythromycin      ointment     Gabapentin      chest heaviness with breathing     Hydromorphone Itching     Tolerates morphine     Lorazepam Itching     Other [Seasonal Allergies] Anaphylaxis     GENERAL ANETHESIA       Penicillins      rash     Eucalyptus Oil Rash     hives     Nitrofuran Derivatives Rash     Sulfa Drugs Itching and Rash     rash         ROS:  Constitutional, HEENT, cardiovascular, pulmonary, gi and gu systems are negative, except as otherwise noted.      OBJECTIVE:   /71 (BP Location: Right arm, Patient Position: Chair, Cuff Size: Adult Regular)  Pulse 81  Temp 98.4  F (36.9  C) (Tympanic)  Resp 18  Ht 5' 4\" (1.626 m)  Wt 175 lb (79.4 kg)  SpO2 97%  BMI 30.04 kg/m2  Body mass index is 30.04 kg/(m^2).  GENERAL: healthy, alert and no distress  NECK: no adenopathy, no asymmetry, masses, or scars and thyroid normal to palpation  RESP: lungs clear to auscultation - no rales, rhonchi or wheezes  CV: regular rate and rhythm, normal S1 S2, no S3 or S4, no murmur, click or rub, no peripheral edema and peripheral pulses strong  ABDOMEN: soft, nontender, no hepatosplenomegaly, no masses and bowel sounds normal  MS: no gross musculoskeletal defects noted, no edema    Diagnostic Test Results:  Recent calcium at GYN office 10.1  Cr 1.71  Hgb 11.9    ASSESSMENT/PLAN:     1. Iron " deficiency anemia, unspecified iron deficiency anemia type  Need to do something to make sure there is not a dangerous upper GI source for blood loss  Since she cannot have EGD, schedule upper GI; discussed with patient   - XR Upper GI w Small Bowel Follow Through; Future  - Iron and iron binding capacity    2. Renal artery stenosis (H)  Blood pressure is OK today     3. Arnold-Chiari malformation (H)  She has chosen not to have surgery for this     4. Secondary renal hyperparathyroidism (H)  Recent calcium normal   Continue calcium and D supplements     5. Benign essential hypertension  Now well controlled     6. CKD (chronic kidney disease) stage 3, GFR 30-59 ml/min    - CBC with platelets  - NUTRITION REFERRAL    7. Chronic rhinitis, unspecified type  Try this for nasal and phlegm symptoms   - ipratropium (ATROVENT) 0.06 % spray; Spray 2 sprays into both nostrils 4 times daily as needed for rhinitis  Dispense: 3 Box; Refill: 3    8. Sleep disturbance  Cog behav therapy to help with sleep complaints   - MENTAL HEALTH REFERRAL    9. Hypertriglyceridemia  Refill lipitor   - atorvastatin (LIPITOR) 20 MG tablet; TAKE 1 TABLET(20 MG) BY MOUTH DAILY  Dispense: 90 tablet; Refill: 3    FUTURE APPOINTMENTS:       - Follow-up visit pending symptoms and labs     Josh Hollingsworth MD  [facility]

## 2017-08-11 LAB
IRON SATN MFR SERPL: 32 % (ref 15–46)
IRON SERPL-MCNC: 80 UG/DL (ref 35–180)
TIBC SERPL-MCNC: 249 UG/DL (ref 240–430)

## 2017-08-11 NOTE — PROGRESS NOTES
The following letter pertains to your most recent diagnostic tests:    Your iron stores are normal.  Your hemoglobin is still mildly low.  It can take 90 days after iron stores are replete to normalize hemoglobin.        Sincerely,    Dr. Hollingsworth

## 2017-08-15 ENCOUNTER — OFFICE VISIT (OUTPATIENT)
Dept: NUTRITION | Facility: CLINIC | Age: 67
End: 2017-08-15
Payer: COMMERCIAL

## 2017-08-15 DIAGNOSIS — N18.30 CKD (CHRONIC KIDNEY DISEASE) STAGE 3, GFR 30-59 ML/MIN (H): Primary | ICD-10-CM

## 2017-08-15 PROCEDURE — 97802 MEDICAL NUTRITION INDIV IN: CPT

## 2017-08-15 NOTE — PATIENT INSTRUCTIONS
Follow up:  Call ([phone]), e-mail ([email or web address]), or send AtHochart message with questions, concerns or if follow-up is needed.     Bring blood glucose meter and logbook with you to all doctor and follow-up appointments.     [place] [facility] for the [facility]:  For Your Diabetes Education and Nutrition Appointments Call:  [phone]   For Diabetes Education or Nutrition Related Questions:   Phone: [phone]  E-mail: [email or web address]  Fax: [phone]   If you need a medication refill please contact your pharmacy. Please allow 3 business days for your refills to be completed.    Instructions for emailing the Diabetes Educators    If you need to communicate a non-urgent message to a Diabetes Educator via email, please send to [email or web address].    Please follow the following email guidelines:    Subject line: Secure: your clinic name (example: Secure: [facility])  In the email please include: First name, middle initial, last name and date of birth.    We will be in touch with you within one (1) business day.

## 2017-08-15 NOTE — PROGRESS NOTES
"Medical Nutrition Therapy  Visit Type:Initial assessment and intervention    Rosa Sotomayor presents today for MNT and education related to chronic kidney disease.   She is accompanied by self.     ASSESSMENT:   Patient comments/concerns relating to nutrition: \"I have stage 3 kidney disease\". Has been trying to lose weight for about a year and has gained 5# which has been very frustrating for her.  Has had 2 surgeries this year which has, thus, limited her physical activity while she recovers. Has difficulty sleeping so is seeing a sleep specialist soon.     NUTRITION HISTORY:  Does not add salt to foods and tracks food in My Fitness Pal. Aims for 1200 calories per day.   Breakfast: oatmeal (maple brown sugar)  Lunch: natural peanut butter on potato bread with blueberry fruit spread  Dinner: no name steak with 1/2 baked potato and green beans  OR grilled pork chop with 1/2 baked potato (with butter) and green beans OR 2 eggs with morelos and tomato and toast  Snacks: peach, banana, matcha tea, tapioca pudding cup, kind bar  Beverages: water, tea (unsweetened)    Misses meals? none  Eats out:  Couple times per week (culvers - chicken usually, [place] - chicken or burger usually with tator tots where she has 6 of them, pizza sometimes with friends)    Previous diet education:  Yes at a [facility] dialysis center for a free class    Food allergies/intolerances: none    EXERCISE: recently had hip surgery and is still building back her walking (is at about 6k steps/d now).     SOCIO/ECONOMIC:   Lives with: spouse    MEDICATIONS:  Current Outpatient Prescriptions   Medication     ipratropium (ATROVENT) 0.06 % spray     atorvastatin (LIPITOR) 20 MG tablet     carvedilol (COREG) 6.25 MG tablet     ASPIRIN NOT PRESCRIBED (INTENTIONAL)     Cyanocobalamin (B-12) 1000 MCG TBCR     ketoconazole (NIZORAL) 2 % shampoo     calcium acetate, Phos Binder, 667 MG TABS     losartan (COZAAR) 100 MG tablet     cyclobenzaprine (FLEXERIL) 5 MG " tablet     EPINEPHrine (EPIPEN 2-FRAN) 0.3 MG/0.3ML injection 2-pack     progesterone (PROMETRIUM) 100 MG capsule     torsemide (DEMADEX) 10 MG tablet     Cholecalciferol (VITAMIN D) 2000 UNITS tablet     hydrOXYzine (ATARAX) 25 MG tablet     amitriptyline (ELAVIL) 25 MG tablet     ZOLMitriptan (ZOMIG) 5 MG SOLN     ondansetron (ZOFRAN) 4 MG tablet     cetirizine HCl (ZYRTEC ALLERGY) 10 MG CAPS     estradiol (VIVELLE-DOT) 0.025 MG/24HR     MAGNESIUM 250 MG OR TABS     DIAZEPAM 5 MG OR TABS     No current facility-administered medications for this visit.        LABS:  Last Basic Metabolic Panel:  Lab Results   Component Value Date     06/14/2017      Lab Results   Component Value Date    POTASSIUM 4.3 06/14/2017     Lab Results   Component Value Date    CHLORIDE 100 06/14/2017     Lab Results   Component Value Date    OSIEL 9.1 06/14/2017     Lab Results   Component Value Date    CO2 23 06/14/2017     Lab Results   Component Value Date    BUN 28 06/14/2017     Lab Results   Component Value Date    CR 1.49 06/14/2017     Lab Results   Component Value Date     06/14/2017       ANTHROPOMETRICS:  Vitals: There were no vitals taken for this visit.  There is no height or weight on file to calculate BMI.      Wt Readings from Last 5 Encounters:   08/10/17 79.4 kg (175 lb)   06/14/17 80.3 kg (177 lb)   05/30/17 76.7 kg (169 lb)   05/03/17 80.3 kg (177 lb)   03/02/17 77.6 kg (171 lb)       Weight Change: gain of 4# in the past 5 months    ESTIMATED KCAL REQUIREMENTS:  1200 kcal per day recommended for weight loss    NUTRITION DIAGNOSIS: Food- and nutrition-related knowledge deficit related to limited prior education on renal diet as evidenced by pt report and with questions on how to help preserve her kidney function as much as possible with her diet.    NUTRITION INTERVENTION:  Education given to support: weight reduction, exercise, portion control and kidney recommendations for stage 3 kidney disease. Listened to  concerns about weight loss and provided encouragement. Educated that she should not go lower than 1200 kcal/day for weight loss but that 1200 kcal/day should be ideal for her. Encouraged more variety with her diet as she tends to eat the same few things at each meal.  Briefly educated on anti-inflammatory diet to include rich colors with fruits and vegetables, beans, fish, olive oil, nuts, etc and reducing prepared foods. Suggested she try weighing herself less frequently as this seems to add stress to her day and frustrate her which will not aid in her weight loss. Educated her on limiting protein to about 63 grams per day (0.8 gm/kg) for stage 3 kidney disease and to also limit sodium.  Discussed that her K+ and Calcium levels are wnl so she does not need to limit these at this time. No phos to assess. Answered her questions.  Education Materials Provided: Kidney Disease Nutrition Therapy from [facility]  Motivational Interviewing    PATIENT'S BEHAVIOR CHANGE GOALS:   1. Aim for ~63 grams of protein per day.   2. Weight self once/week instead of daily.   3. Continue activity as able.   4. Continue to limit sodium and do not add salt to foods. Consider reducing frequency of eating out.    MONITOR / EVALUATE:  RD will monitor/evaluate:  Progress toward meeting stated nutrition-related goals  Weight change.    FOLLOW-UP:  Follow up with RD as needed.  Call RD with questions/concerns.     SEA Holland CDE    Time spent in minutes: 45  Encounter: Individual

## 2017-08-15 NOTE — MR AVS SNAPSHOT
After Visit Summary   8/15/2017    Rosa Sotomayor    MRN: [id number]           Patient Information     Date Of Birth          1950        Visit Information        Provider Department      8/15/2017 2:30 PM  [facility]        Care Instructions      Follow up:  Call ([phone]), e-mail ([email or web address]), or send MyChart message with questions, concerns or if follow-up is needed.     Bring blood glucose meter and logbook with you to all doctor and follow-up appointments.     [place] [facility] for the [facility] Area:  For Your Diabetes Education and Nutrition Appointments Call:  [phone]   For Diabetes Education or Nutrition Related Questions:   Phone: [phone]  E-mail: [email or web address]  Fax: [phone]   If you need a medication refill please contact your pharmacy. Please allow 3 business days for your refills to be completed.    Instructions for emailing the Diabetes Educators    If you need to communicate a non-urgent message to a Diabetes Educator via email, please send to [email or web address].    Please follow the following email guidelines:    Subject line: Secure: your clinic name (example: Secure: [facility])  In the email please include: First name, middle initial, last name and date of birth.    We will be in touch with you within one (1) business day.             Follow-ups after your visit        Your next 10 appointments already scheduled     Aug 21, 2017  9:30 AM CDT   XR UPPER GI W SMALL BOWEL FOLLOW THROUGH with SHXR5   [facility] ([facility])    [address] [place] [id number] [phone]           Please bring a list of your current medicines to your exam. (Include vitamins, minerals and over-the-counter medicines.) Leave your valuables at home.  Tell the doctor if there is a chance you could be pregnant.  If you had a barium enema  within two days of the exam, you will need to take 3 bisacodyl (Dulcolax) tablets the day before your exam.  Do not eat, chew gum, or smoke for 8 hours before your exam. Keep drinking clear liquids until 2 hours before the exam. Clear liquids include water, clear juice, black coffee or clear tea without milk, Gatorade, or clear soda.  Take your usual medicines unless your doctor tells you not to. Take them with small sips of water.  Please call the Imaging Department at your exam site with any questions.            Aug 31, 2017 11:00 AM CDT   New Visit with Citlaly Hayes   [facility] ([facility])    [address]  [address]  [facility] [id number] [phone]            Nov 20, 2017 10:30 AM CST   Office Visit with Josh Hollingsworth MD   [facility] ([facility])    [address] [facility] [id number] [phone]           Bring a current list of meds and any records pertaining to this visit. For Physicals, please bring immunization records and any forms needing to be filled out. Please arrive 10 minutes early to complete paperwork.              Who to contact     If you have questions or need follow up information about today's clinic visit or your schedule please contact [facility] directly at [phone].  Normal or non-critical lab and imaging results will be communicated to you by PushPagehart, letter or phone within 4 business days after the clinic has received the results. If you do not hear from us within 7 days, please contact the clinic through Magiqt or phone. If you have a critical or abnormal lab result, we will notify you by phone as soon as possible.  Submit refill requests through LifePics or call your pharmacy and they will forward the refill request to us. Please allow 3 business days for your refill to be completed.          Additional Information About Your Visit        LifePics Information     LifePics gives you secure access to your  electronic health record. If you see a primary care provider, you can also send messages to your care team and make appointments. If you have questions, please call your primary care clinic.  If you do not have a primary care provider, please call [phone] and they will assist you.        Care EveryWhere ID     This is your Care EveryWhere ID. This could be used by other organizations to access your [facility] medical records  [id number]         Blood Pressure from Last 3 Encounters:   08/10/17 124/71   07/05/17 112/60   06/14/17 106/66    Weight from Last 3 Encounters:   08/10/17 79.4 kg (175 lb)   06/14/17 80.3 kg (177 lb)   05/30/17 76.7 kg (169 lb)              Today, you had the following     No orders found for display       Primary Care Provider Office Phone # Fax #    Josh Hollingsworth [phone] [address] [place] [address]        Equal Access to Services     [facility] : Hadii aad ku hadasho Soomaali, waaxda luqadaha, qaybta kaalmada adeegyada, waxay idiin hayaan mee luna . So [facility] [phone].    ATENCIÓN: Si habla español, tiene a armendariz disposición servicios gratuitos de asistencia lingüística. Llame al [phone].    We comply with applicable federal civil rights laws and [place] laws. We do not discriminate on the basis of race, color, national origin, age, disability sex, sexual orientation or gender identity.            Thank you!     Thank you for choosing [facility]  for your care. Our goal is always to provide you with excellent care. Hearing back from our patients is one way we can continue to improve our services. Please take a few minutes to complete the written survey that you may receive in the mail after your visit with us. Thank you!             Your Updated Medication List - Protect others around you: Learn how to safely use, store and throw away your medicines at www.disposemymeds.org.          This list is accurate as of:  8/15/17  3:28 PM.  Always use your most recent med list.                   Brand Name Dispense Instructions for use Diagnosis    amitriptyline 25 MG tablet    ELAVIL     Take 25 mg by mouth At Bedtime        ASPIRIN NOT PRESCRIBED    INTENTIONAL     Please choose reason not prescribed, below        atorvastatin 20 MG tablet    LIPITOR    90 tablet    TAKE 1 TABLET(20 MG) BY MOUTH DAILY    Hypertriglyceridemia       B-12 1000 MCG Tbcr     100 tablet    Take 1,000 mcg by mouth daily    Vitamin B12 deficiency (non anemic)       calcium acetate (Phos Binder) 667 MG Tabs     180 tablet    TAKE 1 TABLET BY MOUTH THREE TIMES DAILY    Secondary renal hyperparathyroidism (H)       carvedilol 6.25 MG tablet    COREG    270 tablet    12.5 mg in am and 6.25 mg in pm    Benign essential hypertension       cyclobenzaprine 5 MG tablet    FLEXERIL    15 tablet    Take 1 tablet (5 mg) by mouth 3 times daily as needed for muscle spasms        diazepam 5 MG tablet    VALIUM     prn        EPINEPHrine 0.3 MG/0.3ML injection 2-pack    EPIPEN 2-FRAN    0.6 mL    Inject 0.3 mLs (0.3 mg) into the muscle once as needed for anaphylaxis    Allergic reaction, subsequent encounter       estradiol 0.025 MG/24HR BIW patch    VIVELLE-DOT     Place 1 patch onto the skin twice a week        hydrOXYzine 25 MG tablet    ATARAX     Take 25 mg by mouth 3 times daily as needed for itching        ipratropium 0.06 % spray    ATROVENT    3 Box    Spray 2 sprays into both nostrils 4 times daily as needed for rhinitis    Chronic rhinitis, unspecified type       ketoconazole 2 % shampoo    NIZORAL    120 mL    Apply to the affected area and wash off after 5 minutes.    Seborrheic dermatitis       losartan 100 MG tablet    COZAAR    90 tablet    Take 1 tablet (100 mg) by mouth At Bedtime    Benign essential hypertension       magnesium 250 MG tablet      None Entered        PROMETRIUM 100 MG capsule   Generic drug:  progesterone      Take 100 mg by mouth daily         torsemide 10 MG tablet    DEMADEX     Take 10 mg by mouth daily        vitamin D 2000 UNITS tablet      Take 1 tablet by mouth daily        ZOFRAN 4 MG tablet   Generic drug:  ondansetron      Take by mouth every 8 hours as needed for nausea        ZOMIG 5 MG Soln   Generic drug:  ZOLMitriptan           ZYRTEC ALLERGY 10 MG Caps   Generic drug:  cetirizine HCl

## 2017-08-21 ENCOUNTER — HOSPITAL ENCOUNTER (OUTPATIENT)
Dept: GENERAL RADIOLOGY | Facility: CLINIC | Age: 67
Discharge: HOME | End: 2017-08-21
Attending: INTERNAL MEDICINE | Admitting: INTERNAL MEDICINE
Payer: COMMERCIAL

## 2017-08-21 DIAGNOSIS — D50.9 IRON DEFICIENCY ANEMIA, UNSPECIFIED IRON DEFICIENCY ANEMIA TYPE: ICD-10-CM

## 2017-08-21 PROCEDURE — 25500045 ZZH RX 255: Performed by: INTERNAL MEDICINE

## 2017-08-21 PROCEDURE — 74245 XR UPPER GI W SMALL BOWEL FOLLOW THROUGH: CPT

## 2017-08-21 RX ADMIN — ANTACID/ANTIFLATULENT 2 G: 380; 550; 10; 10 GRANULE, EFFERVESCENT ORAL at 10:05

## 2017-08-21 NOTE — LETTER
[facility]  [address] [facility]  [address]  [place]  [address]  Tel: [phone]    August 22, 2017    Rosa Sotomayor  [address] [address]        Dear Ms. Sotomayor,    The following letter pertains to your most recent diagnostic tests:    Good news! Your upper GI series was normal.  There were no ulcers or masses found in the upper GI tract to explain your anemia.    If you have any further questions or problems, please contact our office.      Sincerely,    Josh Hollingsworth MD/[facility]    Enclosure: Upper GI  Results for orders placed or performed during the hospital encounter of 08/21/17   XR Upper GI w Small Bowel Follow Through    Narrative    UPPER GI W SMALL BOWEL FOLLOW THROUGH August 21, 2017 2:39 PM     HISTORY: Iron deficiency anemia, unspecified.    COMPARISON: None.    TECHNIQUE: Double contrast upper GI and small bowel follow through.    FLUOROSCOPY TIME: 1 minutes.    SPOT FILMS: 15.    FINDINGS: On the  film the bowel gas pattern is unremarkable.  Esophageal motility is intact. The esophagus, stomach, and small bowel  are unremarkable. Transit time to the colon was 3 hours 55 minutes.      Impression    IMPRESSION: Normal upper GI with small bowel follow-through.    MIRIAM CONTI MD

## 2017-08-22 NOTE — PROGRESS NOTES
The following letter pertains to your most recent diagnostic tests:    Good news! Your upper GI series was normal.  There were no ulcers or masses found in the upper GI tract to explain your anemia.          Sincerely,    Dr. Hollingsworth

## 2017-08-31 DIAGNOSIS — I10 BENIGN ESSENTIAL HYPERTENSION: ICD-10-CM

## 2017-08-31 NOTE — TELEPHONE ENCOUNTER
losartan (COZAAR) 100 MG tablet      Last Written Prescription Date: 3/2/17  Last Fill Quantity: 90, # refills: 1  Last Office Visit with G, P or [facility] prescribing provider: 8/10/17  Next 5 appointments (look out 90 days)     Nov 20, 2017 10:30 AM CST   [facility]worth [facility]   [facility]    [address] [facility] [id number] [phone]                   Potassium   Date Value Ref Range Status   06/14/2017 4.3 3.4 - 5.3 mmol/L Final     Creatinine   Date Value Ref Range Status   06/14/2017 1.49 (H) 0.52 - 1.04 mg/dL Final     BP Readings from Last 3 Encounters:   08/10/17 124/71   07/05/17 112/60   06/14/17 106/66           Светлана Mendoza RT(R)

## 2017-09-01 RX ORDER — LOSARTAN POTASSIUM 100 MG/1
100 TABLET ORAL AT BEDTIME
Qty: 90 TABLET | Refills: 1 | Status: SHIPPED | OUTPATIENT
Start: 2017-09-01 | End: 2019-07-08

## 2017-09-01 NOTE — TELEPHONE ENCOUNTER
Routing refill request to provider for review/approval because:  Labs out of range:  CREAT elevated from last check on 6/14/17  Please see pended medication for review.   Patient is scheduled to see you on 11/20/17    Porsche Nichols RN

## 2017-09-11 ENCOUNTER — TELEPHONE (OUTPATIENT)
Dept: FAMILY MEDICINE | Facility: CLINIC | Age: 67
End: 2017-09-11

## 2017-09-11 DIAGNOSIS — D50.9 IRON DEFICIENCY ANEMIA, UNSPECIFIED IRON DEFICIENCY ANEMIA TYPE: Primary | ICD-10-CM

## 2017-09-11 NOTE — TELEPHONE ENCOUNTER
Reason for Call: Request for an order or referral: Hemoglobin     Order or referral being requested: hemoglobin labs    Date needed: as soon as possible    Has the patient been seen by the PCP for this problem? YES    Additional comments: Pt states she is feeling very tried and wants labs to determine her levels, may need another iron infusion     Phone number Patient can be reached at:  Home number on file [phone]    Best Time:  anytime    Can we leave a detailed message on this number?  YES    Call taken on 9/11/2017 at 12:40 PM by Aliza Fink

## 2017-09-19 DIAGNOSIS — D50.9 IRON DEFICIENCY ANEMIA, UNSPECIFIED IRON DEFICIENCY ANEMIA TYPE: ICD-10-CM

## 2017-09-19 LAB — HGB BLD-MCNC: 11.7 G/DL (ref 11.7–15.7)

## 2017-09-19 PROCEDURE — 85018 HEMOGLOBIN: CPT | Performed by: INTERNAL MEDICINE

## 2017-09-19 PROCEDURE — 36415 COLL VENOUS BLD VENIPUNCTURE: CPT | Performed by: INTERNAL MEDICINE

## 2017-09-19 PROCEDURE — 83540 ASSAY OF IRON: CPT | Performed by: INTERNAL MEDICINE

## 2017-09-19 PROCEDURE — 83550 IRON BINDING TEST: CPT | Performed by: INTERNAL MEDICINE

## 2017-09-19 NOTE — LETTER
[place] [address] [facility]  [address]  [place]  [address]  Tel: [phone]    September 20, 2017    Rosa FADY Bran  [address] [facility] [address]        Dear MsAsia Bran,    The following letter pertains to your most recent diagnostic tests:    Your iron studies are normal.    Your hemoglobin is now normal.     If you have any further questions or problems, please contact our office.      Sincerely,    Josh Hollingsworth MD/[place],[facility]        Enclosure: Lab Results    Results for orders placed or performed in visit on 09/19/17   Hemoglobin   Result Value Ref Range    Hemoglobin 11.7 11.7 - 15.7 g/dL   Iron and iron binding capacity   Result Value Ref Range    Iron 81 35 - 180 ug/dL    Iron Binding Cap 253 240 - 430 ug/dL    Iron Saturation Index 32 15 - 46 %

## 2017-09-20 LAB
IRON SATN MFR SERPL: 32 % (ref 15–46)
IRON SERPL-MCNC: 81 UG/DL (ref 35–180)
TIBC SERPL-MCNC: 253 UG/DL (ref 240–430)

## 2017-09-20 NOTE — PROGRESS NOTES
The following letter pertains to your most recent diagnostic tests:    Your iron studies are normal.    Your hemoglobin is now normal.           Sincerely,    Dr. Hollingsworth

## 2017-09-27 ENCOUNTER — OFFICE VISIT (OUTPATIENT)
Dept: FAMILY MEDICINE | Facility: CLINIC | Age: 67
End: 2017-09-27
Payer: COMMERCIAL

## 2017-09-27 VITALS
OXYGEN SATURATION: 97 % | WEIGHT: 177.9 LBS | SYSTOLIC BLOOD PRESSURE: 106 MMHG | TEMPERATURE: 98.7 F | BODY MASS INDEX: 30.37 KG/M2 | HEIGHT: 64 IN | DIASTOLIC BLOOD PRESSURE: 63 MMHG | HEART RATE: 86 BPM

## 2017-09-27 DIAGNOSIS — L60.3 BRITTLE NAILS: ICD-10-CM

## 2017-09-27 DIAGNOSIS — R60.0 EDEMA LEG: ICD-10-CM

## 2017-09-27 DIAGNOSIS — L29.9 PRURITIC DISORDER: ICD-10-CM

## 2017-09-27 DIAGNOSIS — E53.8 B12 DEFICIENCY: ICD-10-CM

## 2017-09-27 DIAGNOSIS — E78.1 HYPERTRIGLYCERIDEMIA: ICD-10-CM

## 2017-09-27 DIAGNOSIS — E78.5 HYPERLIPIDEMIA LDL GOAL <100: Primary | ICD-10-CM

## 2017-09-27 DIAGNOSIS — L67.8 BRITTLE HAIR: ICD-10-CM

## 2017-09-27 PROCEDURE — 99214 OFFICE O/P EST MOD 30 MIN: CPT | Performed by: INTERNAL MEDICINE

## 2017-09-27 RX ORDER — HYDROXYZINE HYDROCHLORIDE 25 MG/1
25 TABLET, FILM COATED ORAL 3 TIMES DAILY PRN
Qty: 120 TABLET | Refills: 11 | Status: SHIPPED | OUTPATIENT
Start: 2017-09-27 | End: 2018-09-18

## 2017-09-27 RX ORDER — TORSEMIDE 10 MG/1
10 TABLET ORAL DAILY
Qty: 90 TABLET | Refills: 3 | Status: SHIPPED | OUTPATIENT
Start: 2017-09-27 | End: 2019-07-08

## 2017-09-27 NOTE — PROGRESS NOTES
Chief Complaint:     Multiple concerns including Derm problem with brittle hair,nails x 2 months    HPI:   Patient Rosa Sotomayor is a very pleasant 66 year old female with history of chronic bilateral leg edema, pruritus of the skin who presents to Internal Medicine clinic today for evaluation of multiple concerns including new Derm problem with brittle hair,nails x 2 months. Regarding the patient's brittle nails and hair, the patient is interested in an evaluation by outpatient Dermatology specialist clinic going forward. Regarding the patient's chronic leg edema, the patient is due for a refill of her diuretic medication with Torsemide at this time. The patient is also due for a refill her Hydroxyzine medication for treatment of chronic pruritus of the skin. The patient is also due for a repeat Lipid panel lab for monitoring of hyperlipidemia control. The patient is also due for a repeat Vitamin B12/Folate level labs at this time for monitoring of Vitamin B12 deficiency control.       Current Medications:     Current Outpatient Prescriptions   Medication Sig Dispense Refill     torsemide (DEMADEX) 10 MG tablet Take 1 tablet (10 mg) by mouth daily 90 tablet 3     hydrOXYzine (ATARAX) 25 MG tablet Take 1 tablet (25 mg) by mouth 3 times daily as needed for itching 120 tablet 11     losartan (COZAAR) 100 MG tablet Take 1 tablet (100 mg) by mouth At Bedtime 90 tablet 1     ipratropium (ATROVENT) 0.06 % spray Spray 2 sprays into both nostrils 4 times daily as needed for rhinitis 3 Box 3     atorvastatin (LIPITOR) 20 MG tablet TAKE 1 TABLET(20 MG) BY MOUTH DAILY 90 tablet 3     carvedilol (COREG) 6.25 MG tablet 12.5 mg in am and 6.25 mg in pm 270 tablet 0     ASPIRIN NOT PRESCRIBED (INTENTIONAL) Please choose reason not prescribed, below       Cyanocobalamin (B-12) 1000 MCG TBCR Take 1,000 mcg by mouth daily 100 tablet 1     ketoconazole (NIZORAL) 2 % shampoo Apply to the affected area and wash off after 5 minutes.  120 mL 1     calcium acetate, Phos Binder, 667 MG TABS TAKE 1 TABLET BY MOUTH THREE TIMES DAILY 180 tablet 3     cyclobenzaprine (FLEXERIL) 5 MG tablet Take 1 tablet (5 mg) by mouth 3 times daily as needed for muscle spasms 15 tablet 0     EPINEPHrine (EPIPEN 2-FRAN) 0.3 MG/0.3ML injection 2-pack Inject 0.3 mLs (0.3 mg) into the muscle once as needed for anaphylaxis 0.6 mL 1     progesterone (PROMETRIUM) 100 MG capsule Take 100 mg by mouth daily       Cholecalciferol (VITAMIN D) 2000 UNITS tablet Take 1 tablet by mouth daily       amitriptyline (ELAVIL) 25 MG tablet Take 25 mg by mouth At Bedtime       ZOLMitriptan (ZOMIG) 5 MG SOLN        ondansetron (ZOFRAN) 4 MG tablet Take by mouth every 8 hours as needed for nausea       estradiol (VIVELLE-DOT) 0.025 MG/24HR Place 1 patch onto the skin twice a week       MAGNESIUM 250 MG OR TABS None Entered       [DISCONTINUED] torsemide (DEMADEX) 10 MG tablet Take 10 mg by mouth daily           Allergies:      Allergies   Allergen Reactions     Fentanyl Anaphylaxis     Midazolam Anaphylaxis     Propofol Anaphylaxis     Anesthetic [Amides]      Anaphalactic shock     Codeine      Erythromycin      ointment     Gabapentin      chest heaviness with breathing     Hydromorphone Itching     Tolerates morphine     Lorazepam Itching     Other [Seasonal Allergies] Anaphylaxis     GENERAL ANETHESIA       Penicillins      rash     Eucalyptus Oil Rash     hives     Nitrofuran Derivatives Rash     Sulfa Drugs Itching and Rash     rash            Past Medical History:     Past Medical History:   Diagnosis Date     Dizziness and giddiness          Past Surgical History:     Past Surgical History:   Procedure Laterality Date     C NONSPECIFIC PROCEDURE      wisdom teeth     C NONSPECIFIC PROCEDURE  2005    Varicose Veins     C TOTAL HIP ARTHROPLASTY      left     C TOTAL KNEE ARTHROPLASTY      right     TUBAL LIGATION  1987         Family Medical History:     Family History   Problem Relation  "Age of Onset     DIABETES Mother      INSULIN     Hypertension Mother      Eye Disorder Mother      CATERACTS     HEART DISEASE Mother      CHF- OPEN HEART SURG X'S 2     Lipids Mother      Obesity Mother      Coronary Artery Disease Mother      DIABETES Father      ORAL     Hypertension Father      Arthritis Father      Eye Disorder Father      MAC DEGENERATION     HEART DISEASE Father      CHF     Lipids Father      Obesity Father      DIABETES Maternal Grandfather      INSULIN     DIABETES Brother      INSULIN     GASTROINTESTINAL DISEASE Brother      CHOLITISI POSSIBLE CHRONES     Obesity Brother      Colon Cancer No family hx of      Breast Cancer No family hx of          Social History:     Social History     Social History     Marital status:      Spouse name: N/A     Number of children: N/A     Years of education: N/A     Occupational History     Not on file.     Social History Main Topics     Smoking status: Never Smoker     Smokeless tobacco: Never Used     Alcohol use No     Drug use: No     Sexual activity: Yes     Partners: Male      Comment: postmeno     Other Topics Concern     Not on file     Social History Narrative           Review of System:     Constitutional: Negative for fever or chills  Skin: Positive for brittle nails, hair.  Ears/Nose/Throat: Negative for nasal congestion, sore throat  Respiratory: No shortness of breath, dyspnea on exertion, cough, or hemoptysis  Cardiovascular: Negative for chest pain  Gastrointestinal: Negative for nausea, vomiting  Genitourinary: Negative for dysuria, hematuria  Musculoskeletal: Negative for myalgias  Neurologic: Negative for headaches  Psychiatric: Negative for depression, anxiety  Hematologic/Lymphatic/Immunologic: Negative  Endocrine: Negative  Behavioral: Negative for tobacco use       Physical Exam:   /63 (BP Location: Left arm, Patient Position: Chair, Cuff Size: Adult Large)  Pulse 86  Temp 98.7  F (37.1  C) (Oral)  Ht 5' 4\" (1.626 " m)  Wt 177 lb 14.4 oz (80.7 kg)  SpO2 97%  BMI 30.54 kg/m2    GENERAL: healthy, alert and no distress  EYES: eyes grossly normal to inspection, and conjunctivae and sclerae normal  HENT: Normocephalic atraumatic. Nose and mouth without ulcers or lesions  NECK: supple  RESP: lungs clear to auscultation   CV: regular rate and rhythm, normal S1 S2  LYMPH: no peripheral edema   ABDOMEN: nondistended  MS: no gross musculoskeletal defects noted  SKIN: no suspicious lesions or rashes, brittle nails present.  NEURO: Alert & Oriented x 3.   PSYCH: mentation appears normal, affect normal        Diagnostic Test Results:     Diagnostic Test Results:  Results for orders placed or performed in visit on 09/19/17   Hemoglobin   Result Value Ref Range    Hemoglobin 11.7 11.7 - 15.7 g/dL   Iron and iron binding capacity   Result Value Ref Range    Iron 81 35 - 180 ug/dL    Iron Binding Cap 253 240 - 430 ug/dL    Iron Saturation Index 32 15 - 46 %       ASSESSMENT/PLAN:       (E78.5) Hyperlipidemia LDL goal <100  (primary encounter diagnosis)  Comment: Patient is due for a repeat Lipid panel lab at this time.  Plan: I have ordered lab for Lipid panel reflex to direct LDL today.      (L29.9) Pruritic disorder  Comment: Chronic pruritus of the skin currently on Hydroxyzine medication.  Plan: I have refilled the patient's hydrOXYzine (ATARAX) 25 MG tablet medication today.            (R60.0) Edema leg  Comment: Patient is due for a refill of chronic Torsemide medication.  Plan: I have refilled torsemide (DEMADEX) 10 MG tablet medication today.      (E53.8) B12 deficiency  Comment: Chronic vitamin b12 deficiency currently on daily b12 supplementation.  Plan: I have ordered labs for Vitamin B12, Folate to evaluate Vitamin B12 deficiency control.      (L67.8) Brittle hair  (L60.3) Brittle nails  Comment: patient is concerned about recent brittle nails and hair symptoms of unclear etiology.  Plan: I have ordered DERMATOLOGY REFERRAL for  further evaluation going forward.          Follow Up Plan:     Patient is instructed to return to Internal Medicine clinic for follow-up visit on an as-needed basis going forward.        Koki Meadows MD  Internal Medicine  [facility]

## 2017-09-27 NOTE — NURSING NOTE
"Chief Complaint   Patient presents with     Derm Problem       Initial /63 (BP Location: Left arm, Patient Position: Chair, Cuff Size: Adult Large)  Pulse 86  Temp 98.7  F (37.1  C) (Oral)  Ht 5' 4\" (1.626 m)  Wt 177 lb 14.4 oz (80.7 kg)  SpO2 97%  BMI 30.54 kg/m2 Estimated body mass index is 30.54 kg/(m^2) as calculated from the following:    Height as of this encounter: 5' 4\" (1.626 m).    Weight as of this encounter: 177 lb 14.4 oz (80.7 kg).  Medication Reconciliation: complete   Fina Wolfe MA  "

## 2017-09-28 DIAGNOSIS — E78.5 HYPERLIPIDEMIA LDL GOAL <100: ICD-10-CM

## 2017-09-28 DIAGNOSIS — E53.8 B12 DEFICIENCY: ICD-10-CM

## 2017-09-28 LAB
CHOLEST SERPL-MCNC: 170 MG/DL
FOLATE SERPL-MCNC: 19.5 NG/ML
HDLC SERPL-MCNC: 44 MG/DL
LDLC SERPL CALC-MCNC: ABNORMAL MG/DL
LDLC SERPL DIRECT ASSAY-MCNC: 86 MG/DL
NONHDLC SERPL-MCNC: 126 MG/DL
TRIGL SERPL-MCNC: 483 MG/DL
VIT B12 SERPL-MCNC: 764 PG/ML (ref 193–986)

## 2017-09-28 PROCEDURE — 82746 ASSAY OF FOLIC ACID SERUM: CPT | Performed by: INTERNAL MEDICINE

## 2017-09-28 PROCEDURE — 36415 COLL VENOUS BLD VENIPUNCTURE: CPT | Performed by: INTERNAL MEDICINE

## 2017-09-28 PROCEDURE — 82607 VITAMIN B-12: CPT | Performed by: INTERNAL MEDICINE

## 2017-09-28 PROCEDURE — 83721 ASSAY OF BLOOD LIPOPROTEIN: CPT | Performed by: INTERNAL MEDICINE

## 2017-09-28 PROCEDURE — 80061 LIPID PANEL: CPT | Performed by: INTERNAL MEDICINE

## 2017-10-05 ENCOUNTER — TELEPHONE (OUTPATIENT)
Dept: FAMILY MEDICINE | Facility: CLINIC | Age: 67
End: 2017-10-05

## 2017-10-05 RX ORDER — ATORVASTATIN CALCIUM 40 MG/1
TABLET, FILM COATED ORAL
Qty: 90 TABLET | Refills: 3 | Status: SHIPPED | OUTPATIENT
Start: 2017-10-05 | End: 2017-10-05

## 2017-10-05 RX ORDER — ATORVASTATIN CALCIUM 40 MG/1
TABLET, FILM COATED ORAL
Qty: 90 TABLET | Refills: 3 | Status: SHIPPED | OUTPATIENT
Start: 2017-10-05 | End: 2019-07-08

## 2017-10-05 NOTE — TELEPHONE ENCOUNTER
Reason for Call:  Request for results:    Name of test or procedure: lab    Date of test of procedure: 9/28    Location of the test or procedure: [facility]    OK to leave the result message on voice mail or with a family member? YES    Phone number Patient can be reached at:  Home number on file [phone] (home)    Additional comments: pt wants to know treatment options after labs. Please advise    Call taken on 10/5/2017 at 12:24 PM by Chad Borges

## 2017-10-05 NOTE — TELEPHONE ENCOUNTER
If she is referring to her most recent labs with ME, her hemoglobin and iron studies are normal    She did have more recent labs with Dr. Meadows, he recommend that she see a dermatologist, her vitamin levels were normal, her triglycerides were high but she may not have been fasting, if she wants to schedule a follow up appointment with me to discuss her triglycerides elevation, would be happy to see her

## 2017-10-05 NOTE — TELEPHONE ENCOUNTER
I called patient and spoke with her.   Reviewed message below with Dr. Hollingsworth  Patient states that she was fasting for the lab.   She would like to be seen to discuss Triglycerides with Dr. Hollingsworth   Patient states she is going out of town and would like to see him as soon as possible to discuss   Patient is very concerned with Triglyceride levels.     PCP: When would you be able to see patient?    Porsche Nichols RN

## 2017-10-06 NOTE — TELEPHONE ENCOUNTER
Spoke with pt - States Dr. Meadows called her yesterday and increased her atorvastatin. She is scheduled for a follow up as per his recommendation from yesterday.     Urmila IZQUIERDO RN

## 2017-11-20 ENCOUNTER — OFFICE VISIT (OUTPATIENT)
Dept: FAMILY MEDICINE | Facility: CLINIC | Age: 67
End: 2017-11-20
Payer: COMMERCIAL

## 2017-11-20 VITALS
SYSTOLIC BLOOD PRESSURE: 131 MMHG | OXYGEN SATURATION: 100 % | BODY MASS INDEX: 29.88 KG/M2 | HEIGHT: 64 IN | HEART RATE: 75 BPM | WEIGHT: 175 LBS | DIASTOLIC BLOOD PRESSURE: 76 MMHG

## 2017-11-20 DIAGNOSIS — N18.30 CKD (CHRONIC KIDNEY DISEASE) STAGE 3, GFR 30-59 ML/MIN (H): ICD-10-CM

## 2017-11-20 DIAGNOSIS — G43.019 INTRACTABLE MIGRAINE WITHOUT AURA AND WITHOUT STATUS MIGRAINOSUS: ICD-10-CM

## 2017-11-20 DIAGNOSIS — E78.1 HYPERTRIGLYCERIDEMIA: Primary | ICD-10-CM

## 2017-11-20 DIAGNOSIS — E78.5 HYPERLIPIDEMIA LDL GOAL <130: ICD-10-CM

## 2017-11-20 DIAGNOSIS — N95.9 POST MENOPAUSAL PROBLEMS: ICD-10-CM

## 2017-11-20 PROCEDURE — 99214 OFFICE O/P EST MOD 30 MIN: CPT | Performed by: INTERNAL MEDICINE

## 2017-11-20 PROCEDURE — 80061 LIPID PANEL: CPT | Performed by: INTERNAL MEDICINE

## 2017-11-20 PROCEDURE — 36415 COLL VENOUS BLD VENIPUNCTURE: CPT | Performed by: INTERNAL MEDICINE

## 2017-11-20 RX ORDER — AMITRIPTYLINE HYDROCHLORIDE 50 MG/1
50 TABLET ORAL AT BEDTIME
Qty: 90 TABLET | Refills: 3 | Status: SHIPPED | OUTPATIENT
Start: 2017-11-20 | End: 2018-04-19

## 2017-11-20 NOTE — PROGRESS NOTES
"  SUBJECTIVE:   Rosa Sotomayor is a 66 year old female who presents to clinic today for the following health issues:      Discuss Elevated Lipids (Triglycerides)   Dr. Meadows increased atorvastatin from 20 g to 40 mg daily for high triglycerides  She had been on gemfibrozil for this in the past, but it was stopped because it was \"hard on the liver\"  She sees a neurologist for chronic migraine, but due to insurance reasons, she may not be able to keep seeing that specialist  She takes zolmitripatan between 3-4 times per month and sometimes up to 9 times per month for abortive therapy  She does take Elavil 25 mg at night to prevent migraines  Her migraines involve nausea, vomiting and photophobia, and relieved by zolmitriptan       Problem list and histories reviewed & adjusted, as indicated.  Additional history: as documented    Patient Active Problem List   Diagnosis     Postmenopausal bleeding     Personal history of allergy to anesthetic agent     CKD (chronic kidney disease) stage 3, GFR 30-59 ml/min     Hypertriglyceridemia     Arnold-Chiari malformation (H)     Hyperlipidemia LDL goal <130     Periodic headache syndrome, not intractable     Anxiety     Secondary renal hyperparathyroidism (H)     Lumbago     Chronic bilateral low back pain without sciatica     Benign essential hypertension     Renal artery stenosis (H)     Macular degeneration, bilateral     Anemia, iron deficiency     Past Surgical History:   Procedure Laterality Date     C NONSPECIFIC PROCEDURE      wisdom teeth     C NONSPECIFIC PROCEDURE  2005    Varicose Veins     C TOTAL HIP ARTHROPLASTY      left     C TOTAL KNEE ARTHROPLASTY      right     TUBAL LIGATION  1987       Social History   Substance Use Topics     Smoking status: Never Smoker     Smokeless tobacco: Never Used     Alcohol use No     Family History   Problem Relation Age of Onset     DIABETES Mother      INSULIN     Hypertension Mother      Eye Disorder Mother      CATERACTS     " HEART DISEASE Mother      CHF- OPEN HEART SURG X'S 2     Lipids Mother      Obesity Mother      Coronary Artery Disease Mother      DIABETES Father      ORAL     Hypertension Father      Arthritis Father      Eye Disorder Father      MAC DEGENERATION     HEART DISEASE Father      CHF     Lipids Father      Obesity Father      DIABETES Maternal Grandfather      INSULIN     DIABETES Brother      INSULIN     GASTROINTESTINAL DISEASE Brother      CHOLITISI POSSIBLE CHRONES     Obesity Brother      Colon Cancer No family hx of      Breast Cancer No family hx of          Current Outpatient Prescriptions   Medication Sig Dispense Refill     atorvastatin (LIPITOR) 40 MG tablet TAKE 1 TABLET(40 MG) BY MOUTH DAILY 90 tablet 3     torsemide (DEMADEX) 10 MG tablet Take 1 tablet (10 mg) by mouth daily 90 tablet 3     hydrOXYzine (ATARAX) 25 MG tablet Take 1 tablet (25 mg) by mouth 3 times daily as needed for itching 120 tablet 11     losartan (COZAAR) 100 MG tablet Take 1 tablet (100 mg) by mouth At Bedtime 90 tablet 1     ipratropium (ATROVENT) 0.06 % spray Spray 2 sprays into both nostrils 4 times daily as needed for rhinitis 3 Box 3     carvedilol (COREG) 6.25 MG tablet 12.5 mg in am and 6.25 mg in pm 270 tablet 0     ASPIRIN NOT PRESCRIBED (INTENTIONAL) Please choose reason not prescribed, below       Cyanocobalamin (B-12) 1000 MCG TBCR Take 1,000 mcg by mouth daily 100 tablet 1     ketoconazole (NIZORAL) 2 % shampoo Apply to the affected area and wash off after 5 minutes. 120 mL 1     calcium acetate, Phos Binder, 667 MG TABS TAKE 1 TABLET BY MOUTH THREE TIMES DAILY 180 tablet 3     progesterone (PROMETRIUM) 100 MG capsule Take 100 mg by mouth daily       Cholecalciferol (VITAMIN D) 2000 UNITS tablet Take 1 tablet by mouth daily       amitriptyline (ELAVIL) 25 MG tablet Take 25 mg by mouth At Bedtime       ZOLMitriptan (ZOMIG) 5 MG SOLN        ondansetron (ZOFRAN) 4 MG tablet Take by mouth every 8 hours as needed for nausea  "      estradiol (VIVELLE-DOT) 0.025 MG/24HR Place 1 patch onto the skin twice a week       MAGNESIUM 250 MG OR TABS None Entered       cyclobenzaprine (FLEXERIL) 5 MG tablet Take 1 tablet (5 mg) by mouth 3 times daily as needed for muscle spasms (Patient not taking: Reported on 11/20/2017) 15 tablet 0     EPINEPHrine (EPIPEN 2-FRAN) 0.3 MG/0.3ML injection 2-pack Inject 0.3 mLs (0.3 mg) into the muscle once as needed for anaphylaxis (Patient not taking: Reported on 11/20/2017) 0.6 mL 1     Allergies   Allergen Reactions     Fentanyl Anaphylaxis     Midazolam Anaphylaxis     Propofol Anaphylaxis     Anesthetic [Amides]      Anaphalactic shock     Codeine      Erythromycin      ointment     Gabapentin      chest heaviness with breathing     Hydromorphone Itching     Tolerates morphine     Lorazepam Itching     Other [Seasonal Allergies] Anaphylaxis     GENERAL ANETHESIA       Penicillins      rash     Eucalyptus Oil Rash     hives     Nitrofuran Derivatives Rash     Sulfa Drugs Itching and Rash     rash         Reviewed and updated as needed this visit by clinical staff       Reviewed and updated as needed this visit by Provider         ROS:  Constitutional, HEENT, cardiovascular, pulmonary, gi and gu systems are negative, except as otherwise noted.      OBJECTIVE:   /76 (BP Location: Right arm, Cuff Size: Adult Large)  Pulse 75  Ht 5' 4\" (1.626 m)  Wt 175 lb (79.4 kg)  SpO2 100%  Breastfeeding? No  BMI 30.04 kg/m2  Body mass index is 30.04 kg/(m^2).  GENERAL: healthy, alert and no distress  MS: no gross musculoskeletal defects noted, no edema  NEURO: Normal strength and tone, mentation intact and speech normal  PSYCH: mentation appears normal, affect normal/bright    Lipid panel pending     ASSESSMENT/PLAN:       1. Hypertriglyceridemia  Recheck lipids after statin dose change  Goal is to at leas have them less close to 500  Diet considerations discussed     2. Hyperlipidemia LDL goal <130  On statin " therapy     3. Intractable migraine without aura and without status migrainosus  She is probably using abortive therapy too frequently   Discussed options of adding topamax or increasing elavil  We decided to increase elavil  Side effects and risks discussed  Return in 4 weeks to assess therapy  - amitriptyline (ELAVIL) 50 MG tablet; Take 1 tablet (50 mg) by mouth At Bedtime  Dispense: 90 tablet; Refill: 3    4. Post menopausal problems  Discussed risks of hormone therapy in presence of migraine disease  She has had trouble tapering vivelle and progesterone in the past due to increased headaches   If her headaches are improved on the higher dose of elavil when she returns in 4 week, then she will coordinate HRT taper with her GYN     5. CKD (chronic kidney disease) stage 3, GFR 30-59 ml/min        FUTURE APPOINTMENTS:       - Follow-up visit in 4 weeks     Josh Hollingsworth MD  [facility]    More than 26 minutes face to face mostly counseling and coordinating care relating to above issues

## 2017-11-20 NOTE — MR AVS SNAPSHOT
After Visit Summary   11/20/2017    Rosa Sotomayor    MRN: [id number]           Patient Information     Date Of Birth          1950        Visit Information        Provider Department      11/20/2017 10:30 AM Josh Hollingsworth MD [facility]        Today's Diagnoses     Hypertriglyceridemia    -  1    Hyperlipidemia LDL goal <130        Intractable migraine without aura and without status migrainosus        Post menopausal problems        CKD (chronic kidney disease) stage 3, GFR 30-59 ml/min           Follow-ups after your visit        Who to contact     If you have questions or need follow up information about today's clinic visit or your schedule please contact [facility] directly at [phone].  Normal or non-critical lab and imaging results will be communicated to you by MyChart, letter or phone within 4 business days after the clinic has received the results. If you do not hear from us within 7 days, please contact the clinic through [email or web address] or phone. If you have a critical or abnormal lab result, we will notify you by phone as soon as possible.  Submit refill requests through [email or web address] or call your pharmacy and they will forward the refill request to us. Please allow 3 business days for your refill to be completed.          Additional Information About Your Visit        MyChart Information     [email or web address] gives you secure access to your electronic health record. If you see a primary care provider, you can also send messages to your care team and make appointments. If you have questions, please call your primary care clinic.  If you do not have a primary care provider, please call [phone] and they will assist you.        Care EveryWhere ID     This is your Care EveryWhere ID. This could be used by other organizations to access your [facility] medical records  [id number]        Your Vitals Were     Pulse Height Pulse Oximetry Breastfeeding? BMI (Body Mass Index)        "75 5' 4\" (1.626 m) 100% No 30.04 kg/m2        Blood Pressure from Last 3 Encounters:   11/20/17 131/76   09/27/17 106/63   08/10/17 124/71    Weight from Last 3 Encounters:   11/20/17 175 lb (79.4 kg)   09/27/17 177 lb 14.4 oz (80.7 kg)   08/10/17 175 lb (79.4 kg)              We Performed the Following     Lipid panel reflex to direct LDL Fasting          Today's Medication Changes          These changes are accurate as of: 11/20/17 11:09 AM.  If you have any questions, ask your nurse or doctor.               These medicines have changed or have updated prescriptions.        Dose/Directions    amitriptyline 50 MG tablet   Commonly known as:  ELAVIL   This may have changed:    - medication strength  - how much to take   Used for:  Intractable migraine without aura and without status migrainosus   Changed by:  Josh Hollingsworth MD        Dose:  50 mg   Take 1 tablet (50 mg) by mouth At Bedtime   Quantity:  90 tablet   Refills:  3            Where to get your medicines      These medications were sent to [id number] - [place], [place] - [address] AT [facility] [place] [facility] [address]  [address], [place] [address]    Hours:  24-hours Phone:  [phone]     amitriptyline 50 MG tablet                Primary Care Provider Office Phone # Fax #    Josh Hollingsworth [phone]       [place] [address] [address]        Equal Access to Services     [facility] AH: Hadii aad ku hadasho Soomaali, waaxda luqadaha, qaybta kaalmada adeegyada, waxay idiin hayaan adeeg kharash la'aan . So [facility] [phone].    ATENCIÓN: Si habla español, tiene a armendariz disposición servicios gratuitos de asistencia lingüística. Llame al [phone].    We comply with applicable federal civil rights laws and [place] laws. We do not discriminate on the basis of race, color, national origin, age, disability, sex, sexual orientation, or gender identity.            Thank " you!     Thank you for choosing [facility]  for your care. Our goal is always to provide you with excellent care. Hearing back from our patients is one way we can continue to improve our services. Please take a few minutes to complete the written survey that you may receive in the mail after your visit with us. Thank you!             Your Updated Medication List - Protect others around you: Learn how to safely use, store and throw away your medicines at www.disposemymeds.org.          This list is accurate as of: 11/20/17 11:09 AM.  Always use your most recent med list.                   Brand Name Dispense Instructions for use Diagnosis    amitriptyline 50 MG tablet    ELAVIL    90 tablet    Take 1 tablet (50 mg) by mouth At Bedtime    Intractable migraine without aura and without status migrainosus       ASPIRIN NOT PRESCRIBED    INTENTIONAL     Please choose reason not prescribed, below        atorvastatin 40 MG tablet    LIPITOR    90 tablet    TAKE 1 TABLET(40 MG) BY MOUTH DAILY    Hypertriglyceridemia       B-12 1000 MCG Tbcr     100 tablet    Take 1,000 mcg by mouth daily    Vitamin B12 deficiency (non anemic)       calcium acetate (Phos Binder) 667 MG Tabs     180 tablet    TAKE 1 TABLET BY MOUTH THREE TIMES DAILY    Secondary renal hyperparathyroidism (H)       carvedilol 6.25 MG tablet    COREG    270 tablet    12.5 mg in am and 6.25 mg in pm    Benign essential hypertension       cyclobenzaprine 5 MG tablet    FLEXERIL    15 tablet    Take 1 tablet (5 mg) by mouth 3 times daily as needed for muscle spasms        EPINEPHrine 0.3 MG/0.3ML injection 2-pack    EPIPEN 2-FRAN    0.6 mL    Inject 0.3 mLs (0.3 mg) into the muscle once as needed for anaphylaxis    Allergic reaction, subsequent encounter       estradiol 0.025 MG/24HR BIW patch    VIVELLE-DOT     Place 1 patch onto the skin twice a week        hydrOXYzine 25 MG tablet    ATARAX    120 tablet    Take 1 tablet (25 mg) by mouth 3 times daily  as needed for itching    Pruritic disorder       ipratropium 0.06 % spray    ATROVENT    3 Box    Spray 2 sprays into both nostrils 4 times daily as needed for rhinitis    Chronic rhinitis, unspecified type       ketoconazole 2 % shampoo    NIZORAL    120 mL    Apply to the affected area and wash off after 5 minutes.    Seborrheic dermatitis       losartan 100 MG tablet    COZAAR    90 tablet    Take 1 tablet (100 mg) by mouth At Bedtime    Benign essential hypertension       magnesium 250 MG tablet      None Entered        PROMETRIUM 100 MG capsule   Generic drug:  progesterone      Take 100 mg by mouth daily        torsemide 10 MG tablet    DEMADEX    90 tablet    Take 1 tablet (10 mg) by mouth daily    Edema leg       vitamin D 2000 UNITS tablet      Take 1 tablet by mouth daily        ZOFRAN 4 MG tablet   Generic drug:  ondansetron      Take by mouth every 8 hours as needed for nausea        ZOMIG 5 MG Soln   Generic drug:  ZOLMitriptan

## 2017-11-20 NOTE — NURSING NOTE
"Chief Complaint   Patient presents with     RECHECK       Initial /76 (BP Location: Right arm, Cuff Size: Adult Large)  Pulse 75  Ht 5' 4\" (1.626 m)  Wt 175 lb (79.4 kg)  SpO2 100%  Breastfeeding? No  BMI 30.04 kg/m2 Estimated body mass index is 30.04 kg/(m^2) as calculated from the following:    Height as of this encounter: 5' 4\" (1.626 m).    Weight as of this encounter: 175 lb (79.4 kg).  Medication Reconciliation: complete  Christal Fang MA      "

## 2017-11-20 NOTE — LETTER
[place] [address] [facility]  [address]  [place]  [address]  Tel: [phone]    November 21, 2017    Rosa Sotomayor  [address] [facility] [address]        Dear Ms. Sotomayor,    The following letter pertains to your most recent diagnostic tests:    Good news! Your triglycerides look much better.    If you have any further questions or problems, please contact our office.      Sincerely,    Josh Hollingsworth MD/[facility]      Enclosure: Lab Results  Results for orders placed or performed in visit on 11/20/17   Lipid panel reflex to direct LDL Fasting   Result Value Ref Range    Cholesterol 183 <200 mg/dL    Triglycerides 352 (H) <150 mg/dL    HDL Cholesterol 52 >49 mg/dL    LDL Cholesterol Calculated 61 <100 mg/dL    Non HDL Cholesterol 131 (H) <130 mg/dL

## 2017-11-21 LAB
CHOLEST SERPL-MCNC: 183 MG/DL
HDLC SERPL-MCNC: 52 MG/DL
LDLC SERPL CALC-MCNC: 61 MG/DL
NONHDLC SERPL-MCNC: 131 MG/DL
TRIGL SERPL-MCNC: 352 MG/DL

## 2017-11-21 NOTE — PROGRESS NOTES
The following letter pertains to your most recent diagnostic tests:    Good news! Your triglycerides look much better.        Sincerely,    Dr. Hollingsworth

## 2017-12-13 DIAGNOSIS — J31.0 CHRONIC RHINITIS, UNSPECIFIED TYPE: ICD-10-CM

## 2017-12-13 NOTE — TELEPHONE ENCOUNTER
ipratropium (ATROVENT) 0.06 % spray 3 Box 3 8/10/2017           Last Written Prescription Date:  08/10/2017  Last Fill Quantity: 3,   # refills: 3  Last Office Visit: 11/20/2017  Future Office visit: 12/22/2017   Next 5 appointments (look out 90 days)     Dec 22, 2017  9:00 AM CST   [facility] with [facility]worth MD   [facility]    [address] [facility] [place] [address]   [phone]                   Routing refill request to provider for review/approval because:  Drug not on the [facility], [facility] or  Health refill protocol or controlled substance

## 2017-12-15 RX ORDER — IPRATROPIUM BROMIDE 42 UG/1
SPRAY, METERED NASAL
Refills: 0 | OUTPATIENT
Start: 2017-12-15

## 2017-12-15 NOTE — TELEPHONE ENCOUNTER
Called patient.  Refills available.   Patient states she did not request the med.   RX refused. Pharmacy notified.     Radha OSPINA RN,BSN

## 2017-12-22 ENCOUNTER — OFFICE VISIT (OUTPATIENT)
Dept: FAMILY MEDICINE | Facility: CLINIC | Age: 67
End: 2017-12-22
Payer: MEDICARE

## 2017-12-22 VITALS
HEART RATE: 90 BPM | TEMPERATURE: 98.5 F | DIASTOLIC BLOOD PRESSURE: 63 MMHG | HEIGHT: 64 IN | WEIGHT: 175.4 LBS | OXYGEN SATURATION: 95 % | BODY MASS INDEX: 29.94 KG/M2 | SYSTOLIC BLOOD PRESSURE: 115 MMHG

## 2017-12-22 DIAGNOSIS — E78.1 HYPERTRIGLYCERIDEMIA: ICD-10-CM

## 2017-12-22 DIAGNOSIS — T78.40XD ALLERGIC REACTION, SUBSEQUENT ENCOUNTER: ICD-10-CM

## 2017-12-22 DIAGNOSIS — R53.83 OTHER FATIGUE: ICD-10-CM

## 2017-12-22 DIAGNOSIS — N18.30 CKD (CHRONIC KIDNEY DISEASE) STAGE 3, GFR 30-59 ML/MIN (H): ICD-10-CM

## 2017-12-22 DIAGNOSIS — D64.9 ANEMIA, UNSPECIFIED TYPE: ICD-10-CM

## 2017-12-22 DIAGNOSIS — G43.019 INTRACTABLE MIGRAINE WITHOUT AURA AND WITHOUT STATUS MIGRAINOSUS: Primary | ICD-10-CM

## 2017-12-22 LAB
ANION GAP SERPL CALCULATED.3IONS-SCNC: 9 MMOL/L (ref 3–14)
BUN SERPL-MCNC: 27 MG/DL (ref 7–30)
CALCIUM SERPL-MCNC: 9.2 MG/DL (ref 8.5–10.1)
CHLORIDE SERPL-SCNC: 101 MMOL/L (ref 94–109)
CO2 SERPL-SCNC: 27 MMOL/L (ref 20–32)
CREAT SERPL-MCNC: 1.35 MG/DL (ref 0.52–1.04)
ERYTHROCYTE [DISTWIDTH] IN BLOOD BY AUTOMATED COUNT: 14 % (ref 10–15)
GFR SERPL CREATININE-BSD FRML MDRD: 39 ML/MIN/1.7M2
GLUCOSE SERPL-MCNC: 104 MG/DL (ref 70–99)
HCT VFR BLD AUTO: 35.7 % (ref 35–47)
HGB BLD-MCNC: 12.1 G/DL (ref 11.7–15.7)
IRON SATN MFR SERPL: 23 % (ref 15–46)
IRON SERPL-MCNC: 62 UG/DL (ref 35–180)
MCH RBC QN AUTO: 32.4 PG (ref 26.5–33)
MCHC RBC AUTO-ENTMCNC: 33.9 G/DL (ref 31.5–36.5)
MCV RBC AUTO: 96 FL (ref 78–100)
PLATELET # BLD AUTO: 249 10E9/L (ref 150–450)
POTASSIUM SERPL-SCNC: 3.9 MMOL/L (ref 3.4–5.3)
RBC # BLD AUTO: 3.73 10E12/L (ref 3.8–5.2)
SODIUM SERPL-SCNC: 137 MMOL/L (ref 133–144)
TIBC SERPL-MCNC: 265 UG/DL (ref 240–430)
TRIGL SERPL-MCNC: 318 MG/DL
TSH SERPL DL<=0.005 MIU/L-ACNC: 1.78 MU/L (ref 0.4–4)
WBC # BLD AUTO: 5.2 10E9/L (ref 4–11)

## 2017-12-22 PROCEDURE — 83550 IRON BINDING TEST: CPT | Performed by: INTERNAL MEDICINE

## 2017-12-22 PROCEDURE — 99213 OFFICE O/P EST LOW 20 MIN: CPT | Performed by: INTERNAL MEDICINE

## 2017-12-22 PROCEDURE — 83540 ASSAY OF IRON: CPT | Performed by: INTERNAL MEDICINE

## 2017-12-22 PROCEDURE — 36415 COLL VENOUS BLD VENIPUNCTURE: CPT | Performed by: INTERNAL MEDICINE

## 2017-12-22 PROCEDURE — 84478 ASSAY OF TRIGLYCERIDES: CPT | Performed by: INTERNAL MEDICINE

## 2017-12-22 PROCEDURE — 80048 BASIC METABOLIC PNL TOTAL CA: CPT | Performed by: INTERNAL MEDICINE

## 2017-12-22 PROCEDURE — 84443 ASSAY THYROID STIM HORMONE: CPT | Performed by: INTERNAL MEDICINE

## 2017-12-22 PROCEDURE — 85027 COMPLETE CBC AUTOMATED: CPT | Performed by: INTERNAL MEDICINE

## 2017-12-22 RX ORDER — EPINEPHRINE 0.3 MG/.3ML
0.3 INJECTION SUBCUTANEOUS
Qty: 0.6 ML | Refills: 1 | Status: SHIPPED | OUTPATIENT
Start: 2017-12-22 | End: 2020-07-29

## 2017-12-22 RX ORDER — SUMATRIPTAN 20 MG/1
1 SPRAY NASAL PRN
Qty: 12 EACH | Refills: 11 | Status: SHIPPED | OUTPATIENT
Start: 2017-12-22 | End: 2024-07-03

## 2017-12-22 NOTE — NURSING NOTE
"No chief complaint on file.      Initial /63 (BP Location: Right arm, Patient Position: Chair, Cuff Size: Adult Large)  Pulse 90  Temp 98.5  F (36.9  C) (Oral)  Ht 5' 4\" (1.626 m)  Wt 175 lb 6.4 oz (79.6 kg)  SpO2 95%  BMI 30.11 kg/m2 Estimated body mass index is 30.11 kg/(m^2) as calculated from the following:    Height as of this encounter: 5' 4\" (1.626 m).    Weight as of this encounter: 175 lb 6.4 oz (79.6 kg).  Medication Reconciliation: complete   Fina Wolfe MA  "

## 2017-12-22 NOTE — PROGRESS NOTES
SUBJECTIVE:   Rosa Sotomayor is a 66 year old female who presents to clinic today for the following health issues:      Medication Followup of Elavil    Taking Medication as prescribed: yes    Side Effects:  Little groggy in am    Medication Helping Symptoms:  Yes, needs to get sleep, if not, triggers migraine         Here to follow-up migraine headaches after dose increase of amitriptyline.  Increased dose of amitriptyline is helping prevent migraine headaches, but causes mild grogginess in the mornings.  She also feels generalized fatigue.  She requests that laboratory tests be performed to evaluate her fatigue symptoms.  She has a history of chronic kidney disease and hypertriglyceridemia.  She is trying to avoid fats and carbohydrates in her diet to address her hypertriglyceridemia.  She denies blood in her stools or bleeding.  She does have a history of anemia and had mild iron deficiency several months ago.  Her most recent hemoglobins were improving.  Finally, she needs a refill of her EpiPen.  Her insurance company will no longer cover zolmitriptan and she needs a scription for nasal sumatriptan.    Problem list and histories reviewed & adjusted, as indicated.  Additional history: as documented    Patient Active Problem List   Diagnosis     Postmenopausal bleeding     Personal history of allergy to anesthetic agent     CKD (chronic kidney disease) stage 3, GFR 30-59 ml/min     Hypertriglyceridemia     Arnold-Chiari malformation (H)     Hyperlipidemia LDL goal <130     Periodic headache syndrome, not intractable     Anxiety     Secondary renal hyperparathyroidism (H)     Lumbago     Chronic bilateral low back pain without sciatica     Benign essential hypertension     Renal artery stenosis (H)     Macular degeneration, bilateral     Anemia, iron deficiency     Intractable migraine without aura and without status migrainosus     Past Surgical History:   Procedure Laterality Date     C NONSPECIFIC PROCEDURE       wisdom teeth     C NONSPECIFIC PROCEDURE  2005    Varicose Veins     C TOTAL HIP ARTHROPLASTY      left     C TOTAL KNEE ARTHROPLASTY      right     TUBAL LIGATION  1987       Social History   Substance Use Topics     Smoking status: Never Smoker     Smokeless tobacco: Never Used     Alcohol use No     Family History   Problem Relation Age of Onset     DIABETES Mother      INSULIN     Hypertension Mother      Eye Disorder Mother      CATERACTS     HEART DISEASE Mother      CHF- OPEN HEART SURG X'S 2     Lipids Mother      Obesity Mother      Coronary Artery Disease Mother      DIABETES Father      ORAL     Hypertension Father      Arthritis Father      Eye Disorder Father      MAC DEGENERATION     HEART DISEASE Father      CHF     Lipids Father      Obesity Father      DIABETES Maternal Grandfather      INSULIN     DIABETES Brother      INSULIN     GASTROINTESTINAL DISEASE Brother      CHOLITISI POSSIBLE CHRONES     Obesity Brother      Colon Cancer No family hx of      Breast Cancer No family hx of          Current Outpatient Prescriptions   Medication Sig Dispense Refill     EPINEPHrine (EPIPEN 2-FRAN) 0.3 MG/0.3ML injection 2-pack Inject 0.3 mLs (0.3 mg) into the muscle once as needed for anaphylaxis 0.6 mL 1     SUMAtriptan (IMITREX) 20 MG/ACT nasal spray Spray 1 spray in nostril as needed for migraine May repeat in 2 hours. Max 2 sprays/24 hours. 12 each 11     amitriptyline (ELAVIL) 50 MG tablet Take 1 tablet (50 mg) by mouth At Bedtime 90 tablet 3     atorvastatin (LIPITOR) 40 MG tablet TAKE 1 TABLET(40 MG) BY MOUTH DAILY 90 tablet 3     torsemide (DEMADEX) 10 MG tablet Take 1 tablet (10 mg) by mouth daily 90 tablet 3     hydrOXYzine (ATARAX) 25 MG tablet Take 1 tablet (25 mg) by mouth 3 times daily as needed for itching 120 tablet 11     losartan (COZAAR) 100 MG tablet Take 1 tablet (100 mg) by mouth At Bedtime (Patient taking differently: Take 50 mg by mouth 2 times daily ) 90 tablet 1     ipratropium  "(ATROVENT) 0.06 % spray Spray 2 sprays into both nostrils 4 times daily as needed for rhinitis 3 Box 3     carvedilol (COREG) 6.25 MG tablet 12.5 mg in am and 6.25 mg in pm 270 tablet 0     ASPIRIN NOT PRESCRIBED (INTENTIONAL) Please choose reason not prescribed, below       Cyanocobalamin (B-12) 1000 MCG TBCR Take 1,000 mcg by mouth daily 100 tablet 1     ketoconazole (NIZORAL) 2 % shampoo Apply to the affected area and wash off after 5 minutes. 120 mL 1     calcium acetate, Phos Binder, 667 MG TABS TAKE 1 TABLET BY MOUTH THREE TIMES DAILY 180 tablet 3     progesterone (PROMETRIUM) 100 MG capsule Take 100 mg by mouth daily       Cholecalciferol (VITAMIN D) 2000 UNITS tablet Take 1 tablet by mouth daily       ondansetron (ZOFRAN) 4 MG tablet Take by mouth every 8 hours as needed for nausea       estradiol (VIVELLE-DOT) 0.025 MG/24HR Place 1 patch onto the skin twice a week       MAGNESIUM 250 MG OR TABS None Entered       Allergies   Allergen Reactions     Fentanyl Anaphylaxis     Midazolam Anaphylaxis     Propofol Anaphylaxis     Anesthetic [Amides]      Anaphalactic shock     Codeine      Erythromycin      ointment     Gabapentin      chest heaviness with breathing     Hydromorphone Itching     Tolerates morphine     Lorazepam Itching     Other [Seasonal Allergies] Anaphylaxis     GENERAL ANETHESIA       Penicillins      rash     Eucalyptus Oil Rash     hives     Nitrofuran Derivatives Rash     Sulfa Drugs Itching and Rash     rash         Reviewed and updated as needed this visit by clinical staff     Reviewed and updated as needed this visit by Provider         ROS:  Constitutional, HEENT, cardiovascular, pulmonary, gi and gu systems are negative, except as otherwise noted.      OBJECTIVE:   /63 (BP Location: Right arm, Patient Position: Chair, Cuff Size: Adult Large)  Pulse 90  Temp 98.5  F (36.9  C) (Oral)  Ht 5' 4\" (1.626 m)  Wt 175 lb 6.4 oz (79.6 kg)  SpO2 95%  BMI 30.11 kg/m2  Body mass index " is 30.11 kg/(m^2).  GENERAL: healthy, alert and no distress    Diagnostic Test Results:  Labs pending    ASSESSMENT/PLAN:       1. Intractable migraine without aura and without status migrainosus  Continue current dose of amitriptyline, she tells me that she might try reducing her dose to 40 mg daily and see if this helps keep the migraines away, if that is the case, she will contact me for a refill of 10 mg tablets  - SUMAtriptan (IMITREX) 20 MG/ACT nasal spray; Spray 1 spray in nostril as needed for migraine May repeat in 2 hours. Max 2 sprays/24 hours.  Dispense: 12 each; Refill: 11    2. CKD (chronic kidney disease) stage 3, GFR 30-59 ml/min    - Basic metabolic panel    3. Hypertriglyceridemia    - Triglycerides    4. Allergic reaction, subsequent encounter    - EPINEPHrine (EPIPEN 2-FRAN) 0.3 MG/0.3ML injection 2-pack; Inject 0.3 mLs (0.3 mg) into the muscle once as needed for anaphylaxis  Dispense: 0.6 mL; Refill: 1    5. Anemia, unspecified type    - Iron and iron binding capacity  - CBC with platelets    6. Other fatigue    - TSH    FUTURE APPOINTMENTS:       -In spring for preop for a left total knee arthroplasty or sooner if needed or pending labs    Josh Hollingsworth MD  [facility]

## 2017-12-22 NOTE — MR AVS SNAPSHOT
After Visit Summary   12/22/2017    Rosa Sotomayor    MRN: [id number]           Patient Information     Date Of Birth          1950        Visit Information        Provider Department      12/22/2017 9:00 AM Josh Hollingsworth MD [facility]        Today's Diagnoses     Intractable migraine without aura and without status migrainosus    -  1    CKD (chronic kidney disease) stage 3, GFR 30-59 ml/min        Hypertriglyceridemia        Allergic reaction, subsequent encounter        Anemia, unspecified type        Other fatigue           Follow-ups after your visit        Who to contact     If you have questions or need follow up information about today's clinic visit or your schedule please contact [facility] directly at [phone].  Normal or non-critical lab and imaging results will be communicated to you by MyChart, letter or phone within 4 business days after the clinic has received the results. If you do not hear from us within 7 days, please contact the clinic through [employer] or phone. If you have a critical or abnormal lab result, we will notify you by phone as soon as possible.  Submit refill requests through [employer] or call your pharmacy and they will forward the refill request to us. Please allow 3 business days for your refill to be completed.          Additional Information About Your Visit        MyChart Information     [employer] gives you secure access to your electronic health record. If you see a primary care provider, you can also send messages to your care team and make appointments. If you have questions, please call your primary care clinic.  If you do not have a primary care provider, please call [phone] and they will assist you.        Care EveryWhere ID     This is your Care EveryWhere ID. This could be used by other organizations to access your [place] medical records  [id number]        Your Vitals Were     Pulse Temperature Height Pulse Oximetry  "BMI (Body Mass Index)       90 98.5  F (36.9  C) (Oral) 5' 4\" (1.626 m) 95% 30.11 kg/m2        Blood Pressure from Last 3 Encounters:   12/22/17 115/63   11/20/17 131/76   09/27/17 106/63    Weight from Last 3 Encounters:   12/22/17 175 lb 6.4 oz (79.6 kg)   11/20/17 175 lb (79.4 kg)   09/27/17 177 lb 14.4 oz (80.7 kg)              We Performed the Following     Basic metabolic panel     CBC with platelets     Iron and iron binding capacity     Triglycerides     TSH          Today's Medication Changes          These changes are accurate as of: 12/22/17 10:24 AM.  If you have any questions, ask your nurse or doctor.               Start taking these medicines.        Dose/Directions    SUMAtriptan 20 MG/ACT nasal spray   Commonly known as:  IMITREX   Used for:  Intractable migraine without aura and without status migrainosus   Started by:  Josh Hollingsworth MD        Dose:  1 spray   Spray 1 spray in nostril as needed for migraine May repeat in 2 hours. Max 2 sprays/24 hours.   Quantity:  12 each   Refills:  11         These medicines have changed or have updated prescriptions.        Dose/Directions    losartan 100 MG tablet   Commonly known as:  COZAAR   This may have changed:    - how much to take  - when to take this   Used for:  Benign essential hypertension        Dose:  100 mg   Take 1 tablet (100 mg) by mouth At Bedtime   Quantity:  90 tablet   Refills:  1         Stop taking these medicines if you haven't already. Please contact your care team if you have questions.     ZOMIG 5 MG Soln   Generic drug:  ZOLMitriptan   Stopped by:  Josh Hollingsworth MD                Where to get your medicines      These medications were sent to [facility] - [place], [place] - [address] AT [facility] OF [place] & [facility] [address]  [address], [place] [address]    Hours:  24-hours Phone:  [phone]     EPINEPHrine 0.3 MG/0.3ML injection 2-pack    SUMAtriptan 20 MG/ACT nasal spray                Primary " Care Provider Office Phone # Fax #    Josh Hollingsworth [phone]       [facility] [address] [facility] [address]  [facility] [address]        Equal Access to Services     L[employer] : Hadii aad ku hadmauricioo Socharisali, waaxda luqadaha, qaybta kaalmada adeegyada, waxay idiin charlin mee garay laZenobiatrent vasquez. So [facility] [phone].    ATENCIÓN: Si habla español, tiene a armendariz disposición servicios gratuitos de asistencia lingüística. Llame al [phone].    We comply with applicable federal civil rights laws and [place] laws. We do not discriminate on the basis of race, color, national origin, age, disability, sex, sexual orientation, or gender identity.            Thank you!     Thank you for choosing [facility]  for your care. Our goal is always to provide you with excellent care. Hearing back from our patients is one way we can continue to improve our services. Please take a few minutes to complete the written survey that you may receive in the mail after your visit with us. Thank you!             Your Updated Medication List - Protect others around you: Learn how to safely use, store and throw away your medicines at www.disposemymeds.org.          This list is accurate as of: 12/22/17 10:24 AM.  Always use your most recent med list.                   Brand Name Dispense Instructions for use Diagnosis    amitriptyline 50 MG tablet    ELAVIL    90 tablet    Take 1 tablet (50 mg) by mouth At Bedtime    Intractable migraine without aura and without status migrainosus       ASPIRIN NOT PRESCRIBED    INTENTIONAL     Please choose reason not prescribed, below        atorvastatin 40 MG tablet    LIPITOR    90 tablet    TAKE 1 TABLET(40 MG) BY MOUTH DAILY    Hypertriglyceridemia       B-12 1000 MCG Tbcr     100 tablet    Take 1,000 mcg by mouth daily    Vitamin B12 deficiency (non anemic)       calcium acetate (Phos Binder) 667 MG Tabs     180 tablet    TAKE 1 TABLET BY MOUTH THREE TIMES DAILY     Secondary renal hyperparathyroidism (H)       carvedilol 6.25 MG tablet    COREG    270 tablet    12.5 mg in am and 6.25 mg in pm    Benign essential hypertension       EPINEPHrine 0.3 MG/0.3ML injection 2-pack    EPIPEN 2-FRAN    0.6 mL    Inject 0.3 mLs (0.3 mg) into the muscle once as needed for anaphylaxis    Allergic reaction, subsequent encounter       estradiol 0.025 MG/24HR BIW patch    VIVELLE-DOT     Place 1 patch onto the skin twice a week        hydrOXYzine 25 MG tablet    ATARAX    120 tablet    Take 1 tablet (25 mg) by mouth 3 times daily as needed for itching    Pruritic disorder       ipratropium 0.06 % spray    ATROVENT    3 Box    Spray 2 sprays into both nostrils 4 times daily as needed for rhinitis    Chronic rhinitis, unspecified type       ketoconazole 2 % shampoo    NIZORAL    120 mL    Apply to the affected area and wash off after 5 minutes.    Seborrheic dermatitis       losartan 100 MG tablet    COZAAR    90 tablet    Take 1 tablet (100 mg) by mouth At Bedtime    Benign essential hypertension       magnesium 250 MG tablet      None Entered        PROMETRIUM 100 MG capsule   Generic drug:  progesterone      Take 100 mg by mouth daily        SUMAtriptan 20 MG/ACT nasal spray    IMITREX    12 each    Spray 1 spray in nostril as needed for migraine May repeat in 2 hours. Max 2 sprays/24 hours.    Intractable migraine without aura and without status migrainosus       torsemide 10 MG tablet    DEMADEX    90 tablet    Take 1 tablet (10 mg) by mouth daily    Edema leg       vitamin D 2000 UNITS tablet      Take 1 tablet by mouth daily        ZOFRAN 4 MG tablet   Generic drug:  ondansetron      Take by mouth every 8 hours as needed for nausea

## 2017-12-25 NOTE — PROGRESS NOTES
The following letter pertains to your most recent diagnostic tests:    -TSH (thyroid stimulating hormone) level is normal which indicates normal circulating thyroid hormone levels.      -Kidney function is STABLE for you (Creatinine, GFR), Sodium is normal for you, Potassium is normal for you, Calcium is normal for you, Glucose (blood sugar) is essentially normal for you.     -Your triglycerides are a little better yet since last check.      -Your iron tests and hemoglobin are normal.        Sincerely,    Dr. Hollingsworth

## 2018-01-04 ENCOUNTER — TELEPHONE (OUTPATIENT)
Dept: FAMILY MEDICINE | Facility: CLINIC | Age: 68
End: 2018-01-04

## 2018-01-04 NOTE — TELEPHONE ENCOUNTER
Reason for Call:  Other bills    Detailed comments: patient said her insurance is not covering her last two office visits and she is asking for help in finding out why, she said they told her it was coding,  I did inform patient they were not preventative visits. She said insurance has covered other appointments this year.     Phone Number Patient can be reached at: Home number on file [phone] (home)    Best Time: any    Can we leave a detailed message on this number? YES    Call taken on 1/4/2018 at 9:57 AM by Johanna Dent

## 2018-03-10 ENCOUNTER — OFFICE VISIT (OUTPATIENT)
Dept: URGENT CARE | Facility: URGENT CARE | Age: 68
End: 2018-03-10
Payer: COMMERCIAL

## 2018-03-10 VITALS
TEMPERATURE: 97.4 F | OXYGEN SATURATION: 100 % | SYSTOLIC BLOOD PRESSURE: 152 MMHG | HEART RATE: 92 BPM | DIASTOLIC BLOOD PRESSURE: 88 MMHG

## 2018-03-10 DIAGNOSIS — R30.0 DYSURIA: Primary | ICD-10-CM

## 2018-03-10 LAB
ALBUMIN UR-MCNC: NEGATIVE MG/DL
APPEARANCE UR: CLEAR
BACTERIA #/AREA URNS HPF: ABNORMAL /HPF
BILIRUB UR QL STRIP: NEGATIVE
COLOR UR AUTO: YELLOW
GLUCOSE UR STRIP-MCNC: NEGATIVE MG/DL
HGB UR QL STRIP: ABNORMAL
KETONES UR STRIP-MCNC: NEGATIVE MG/DL
LEUKOCYTE ESTERASE UR QL STRIP: ABNORMAL
NITRATE UR QL: NEGATIVE
PH UR STRIP: 7 PH (ref 5–7)
RBC #/AREA URNS AUTO: ABNORMAL /HPF
SOURCE: ABNORMAL
SP GR UR STRIP: 1.01 (ref 1–1.03)
UROBILINOGEN UR STRIP-ACNC: 0.2 EU/DL (ref 0.2–1)
WBC #/AREA URNS AUTO: ABNORMAL /HPF

## 2018-03-10 PROCEDURE — 87088 URINE BACTERIA CULTURE: CPT | Performed by: FAMILY MEDICINE

## 2018-03-10 PROCEDURE — 81001 URINALYSIS AUTO W/SCOPE: CPT | Performed by: FAMILY MEDICINE

## 2018-03-10 PROCEDURE — 99213 OFFICE O/P EST LOW 20 MIN: CPT

## 2018-03-10 PROCEDURE — 87086 URINE CULTURE/COLONY COUNT: CPT | Performed by: FAMILY MEDICINE

## 2018-03-10 PROCEDURE — 87186 SC STD MICRODIL/AGAR DIL: CPT | Performed by: FAMILY MEDICINE

## 2018-03-10 RX ORDER — CIPROFLOXACIN 250 MG/1
250 TABLET, FILM COATED ORAL 2 TIMES DAILY
Qty: 14 TABLET | Refills: 0 | Status: SHIPPED | OUTPATIENT
Start: 2018-03-10 | End: 2018-04-19

## 2018-03-10 NOTE — NURSING NOTE
"Chief Complaint   Patient presents with     Urgent Care     UTI     urgency and painful to urinate       Initial /88  Pulse 92  Temp 97.4  F (36.3  C) (Oral)  SpO2 100% Estimated body mass index is 30.11 kg/(m^2) as calculated from the following:    Height as of 12/22/17: 5' 4\" (1.626 m).    Weight as of 12/22/17: 175 lb 6.4 oz (79.6 kg).  Medication Reconciliation: complete   Sarah KAMINSKI MA       "

## 2018-03-10 NOTE — MR AVS SNAPSHOT
After Visit Summary   3/10/2018    Rosa Sotomayor    MRN: [id number]           Patient Information     Date Of Birth          1950        Visit Information        Provider Department      3/10/2018 1:15 PM [facility] URGENT CARE [facility]        Today's Diagnoses     Dysuria    -  1       Follow-ups after your visit        Follow-up notes from your care team     Return if symptoms worsen or fail to improve.      Who to contact     If you have questions or need follow up information about today's clinic visit or your schedule please contact [facility]RG[facility] directly at [phone].  Normal or non-critical lab and imaging results will be communicated to you by [employer], letter or phone within 4 business days after the clinic has received the results. If you do not hear from us within 7 days, please contact the clinic through [employer] or phone. If you have a critical or abnormal lab result, we will notify you by phone as soon as possible.  Submit refill requests through [employer] or call your pharmacy and they will forward the refill request to us. Please allow 3 business days for your refill to be completed.          Additional Information About Your Visit        MyChart Information     [employer] gives you secure access to your electronic health record. If you see a primary care provider, you can also send messages to your care team and make appointments. If you have questions, please call your primary care clinic.  If you do not have a primary care provider, please call [phone] and they will assist you.        Care EveryWhere ID     This is your Care EveryWhere ID. This could be used by other organizations to access your [place] medical records  [id number]        Your Vitals Were     Pulse Temperature Pulse Oximetry             92 97.4  F (36.3  C) (Oral) 100%          Blood Pressure from Last 3 Encounters:   03/10/18 152/88   12/22/17 115/63   11/20/17 131/76     Weight from Last 3 Encounters:   12/22/17 175 lb 6.4 oz (79.6 kg)   11/20/17 175 lb (79.4 kg)   09/27/17 177 lb 14.4 oz (80.7 kg)              We Performed the Following     *UA reflex to Microscopic and Culture ([place] and [facility] (except [place] and [place])     Urine Culture Aerobic Bacterial     Urine Microscopic          Today's Medication Changes          These changes are accurate as of 3/10/18  2:21 PM.  If you have any questions, ask your nurse or doctor.               Start taking these medicines.        Dose/Directions    ciprofloxacin 250 MG tablet   Commonly known as:  CIPRO   Used for:  Dysuria        Dose:  250 mg   Take 1 tablet (250 mg) by mouth 2 times daily   Quantity:  14 tablet   Refills:  0         These medicines have changed or have updated prescriptions.        Dose/Directions    losartan 100 MG tablet   Commonly known as:  COZAAR   This may have changed:    - how much to take  - when to take this   Used for:  Benign essential hypertension        Dose:  100 mg   Take 1 tablet (100 mg) by mouth At Bedtime   Quantity:  90 tablet   Refills:  1            Where to get your medicines      These medications were sent to [facility] - [place], [place] - [address] AT [facility] OF [place] [facility] [address]  [address], [place] [address]     Phone:  [phone]     ciprofloxacin 250 MG tablet                Primary Care Provider Office Phone # Fax #    Josh YANG Hollingsworth [phone]       [facility] [address] [facility] [address]  [facility] [address]        Equal Access to Services     [facility] AH: Hadii aad ku hadasho Soomaali, waaxda luqadaha, qaybta kaalmada adeegyada, waxay jenain hayyulianan mee vasquez. So [facility] [phone].    ATENCIÓN: Si habla español, tiene a armendariz disposición servicios gratuitos de asistencia lingüística. Llame al [phone].    We comply with applicable federal civil rights laws and [place] laws. We do not  discriminate on the basis of race, color, national origin, age, disability, sex, sexual orientation, or gender identity.            Thank you!     Thank you for choosing [facility] URGENT CARE  for your care. Our goal is always to provide you with excellent care. Hearing back from our patients is one way we can continue to improve our services. Please take a few minutes to complete the written survey that you may receive in the mail after your visit with us. Thank you!             Your Updated Medication List - Protect others around you: Learn how to safely use, store and throw away your medicines at www.disposemymeds.org.          This list is accurate as of 3/10/18  2:21 PM.  Always use your most recent med list.                   Brand Name Dispense Instructions for use Diagnosis    amitriptyline 50 MG tablet    ELAVIL    90 tablet    Take 1 tablet (50 mg) by mouth At Bedtime    Intractable migraine without aura and without status migrainosus       ASPIRIN NOT PRESCRIBED    INTENTIONAL     Please choose reason not prescribed, below        atorvastatin 40 MG tablet    LIPITOR    90 tablet    TAKE 1 TABLET(40 MG) BY MOUTH DAILY    Hypertriglyceridemia       B-12 1000 MCG Tbcr     100 tablet    Take 1,000 mcg by mouth daily    Vitamin B12 deficiency (non anemic)       calcium acetate (Phos Binder) 667 MG Tabs     180 tablet    TAKE 1 TABLET BY MOUTH THREE TIMES DAILY    Secondary renal hyperparathyroidism (H)       carvedilol 6.25 MG tablet    COREG    270 tablet    12.5 mg in am and 6.25 mg in pm    Benign essential hypertension       ciprofloxacin 250 MG tablet    CIPRO    14 tablet    Take 1 tablet (250 mg) by mouth 2 times daily    Dysuria       EPINEPHrine 0.3 MG/0.3ML injection 2-pack    EPIPEN 2-FRAN    0.6 mL    Inject 0.3 mLs (0.3 mg) into the muscle once as needed for anaphylaxis    Allergic reaction, subsequent encounter       estradiol 0.025 MG/24HR BIW patch    VIVELLE-DOT     Place 1 patch  onto the skin twice a week        hydrOXYzine 25 MG tablet    ATARAX    120 tablet    Take 1 tablet (25 mg) by mouth 3 times daily as needed for itching    Pruritic disorder       ipratropium 0.06 % spray    ATROVENT    3 Box    Spray 2 sprays into both nostrils 4 times daily as needed for rhinitis    Chronic rhinitis, unspecified type       ketoconazole 2 % shampoo    NIZORAL    120 mL    Apply to the affected area and wash off after 5 minutes.    Seborrheic dermatitis       losartan 100 MG tablet    COZAAR    90 tablet    Take 1 tablet (100 mg) by mouth At Bedtime    Benign essential hypertension       magnesium 250 MG tablet      None Entered        PROMETRIUM 100 MG capsule   Generic drug:  progesterone      Take 100 mg by mouth daily        SUMAtriptan 20 MG/ACT nasal spray    IMITREX    12 each    Spray 1 spray in nostril as needed for migraine May repeat in 2 hours. Max 2 sprays/24 hours.    Intractable migraine without aura and without status migrainosus       torsemide 10 MG tablet    DEMADEX    90 tablet    Take 1 tablet (10 mg) by mouth daily    Edema leg       vitamin D 2000 UNITS tablet      Take 1 tablet by mouth daily        ZOFRAN 4 MG tablet   Generic drug:  ondansetron      Take by mouth every 8 hours as needed for nausea

## 2018-03-10 NOTE — PROGRESS NOTES
SUBJECTIVE:   Rosa Sotomayor is a 67 year old female who presents to clinic today for the following health issues:    HPI     History of simple UTIs- normally is very good about remaining well-hydrated for prevention.   has been in hospital x 8 days for PE and DVT-- just got home last night.  Notes increased dysuria and urgency in past 24 hours.  No flank pain.  No fever.    Problem list and histories reviewed & adjusted, as indicated.  Additional history: as documented        Patient Active Problem List   Diagnosis     Postmenopausal bleeding     Personal history of allergy to anesthetic agent     CKD (chronic kidney disease) stage 3, GFR 30-59 ml/min     Hypertriglyceridemia     Arnold-Chiari malformation (H)     Hyperlipidemia LDL goal <130     Periodic headache syndrome, not intractable     Anxiety     Secondary renal hyperparathyroidism (H)     Lumbago     Chronic bilateral low back pain without sciatica     Benign essential hypertension     Renal artery stenosis (H)     Macular degeneration, bilateral     Anemia, iron deficiency     Intractable migraine without aura and without status migrainosus     Past Surgical History:   Procedure Laterality Date     C NONSPECIFIC PROCEDURE      wisdom teeth     C NONSPECIFIC PROCEDURE  2005    Varicose Veins     C TOTAL HIP ARTHROPLASTY      left     C TOTAL KNEE ARTHROPLASTY      right     TUBAL LIGATION  1987       Social History   Substance Use Topics     Smoking status: Never Smoker     Smokeless tobacco: Never Used     Alcohol use No     Family History   Problem Relation Age of Onset     DIABETES Mother      INSULIN     Hypertension Mother      Eye Disorder Mother      CATERACTS     HEART DISEASE Mother      CHF- OPEN HEART SURG X'S 2     Lipids Mother      Obesity Mother      Coronary Artery Disease Mother      DIABETES Father      ORAL     Hypertension Father      Arthritis Father      Eye Disorder Father      MAC DEGENERATION     HEART DISEASE Father       CHF     Lipids Father      Obesity Father      DIABETES Maternal Grandfather      INSULIN     DIABETES Brother      INSULIN     GASTROINTESTINAL DISEASE Brother      CHOLITISI POSSIBLE CHRONES     Obesity Brother      Colon Cancer No family hx of      Breast Cancer No family hx of          Current Outpatient Prescriptions   Medication Sig Dispense Refill     ciprofloxacin (CIPRO) 250 MG tablet Take 1 tablet (250 mg) by mouth 2 times daily 14 tablet 0     EPINEPHrine (EPIPEN 2-FRAN) 0.3 MG/0.3ML injection 2-pack Inject 0.3 mLs (0.3 mg) into the muscle once as needed for anaphylaxis 0.6 mL 1     SUMAtriptan (IMITREX) 20 MG/ACT nasal spray Spray 1 spray in nostril as needed for migraine May repeat in 2 hours. Max 2 sprays/24 hours. 12 each 11     amitriptyline (ELAVIL) 50 MG tablet Take 1 tablet (50 mg) by mouth At Bedtime 90 tablet 3     atorvastatin (LIPITOR) 40 MG tablet TAKE 1 TABLET(40 MG) BY MOUTH DAILY 90 tablet 3     torsemide (DEMADEX) 10 MG tablet Take 1 tablet (10 mg) by mouth daily 90 tablet 3     hydrOXYzine (ATARAX) 25 MG tablet Take 1 tablet (25 mg) by mouth 3 times daily as needed for itching 120 tablet 11     losartan (COZAAR) 100 MG tablet Take 1 tablet (100 mg) by mouth At Bedtime (Patient taking differently: Take 50 mg by mouth 2 times daily ) 90 tablet 1     ipratropium (ATROVENT) 0.06 % spray Spray 2 sprays into both nostrils 4 times daily as needed for rhinitis 3 Box 3     carvedilol (COREG) 6.25 MG tablet 12.5 mg in am and 6.25 mg in pm 270 tablet 0     ASPIRIN NOT PRESCRIBED (INTENTIONAL) Please choose reason not prescribed, below       Cyanocobalamin (B-12) 1000 MCG TBCR Take 1,000 mcg by mouth daily 100 tablet 1     ketoconazole (NIZORAL) 2 % shampoo Apply to the affected area and wash off after 5 minutes. 120 mL 1     calcium acetate, Phos Binder, 667 MG TABS TAKE 1 TABLET BY MOUTH THREE TIMES DAILY 180 tablet 3     progesterone (PROMETRIUM) 100 MG capsule Take 100 mg by mouth daily        Cholecalciferol (VITAMIN D) 2000 UNITS tablet Take 1 tablet by mouth daily       ondansetron (ZOFRAN) 4 MG tablet Take by mouth every 8 hours as needed for nausea       estradiol (VIVELLE-DOT) 0.025 MG/24HR Place 1 patch onto the skin twice a week       MAGNESIUM 250 MG OR TABS None Entered       Allergies   Allergen Reactions     Fentanyl Anaphylaxis     Midazolam Anaphylaxis     Propofol Anaphylaxis     Anesthetic [Amides]      Anaphalactic shock     Codeine      Erythromycin      ointment     Gabapentin      chest heaviness with breathing     Hydromorphone Itching     Tolerates morphine     Lorazepam Itching     Other [Seasonal Allergies] Anaphylaxis     GENERAL ANETHESIA       Penicillins      rash     Eucalyptus Oil Rash     hives     Nitrofuran Derivatives Rash     Sulfa Drugs Itching and Rash     rash     BP Readings from Last 3 Encounters:   03/10/18 152/88   12/22/17 115/63   11/20/17 131/76    Wt Readings from Last 3 Encounters:   12/22/17 175 lb 6.4 oz (79.6 kg)   11/20/17 175 lb (79.4 kg)   09/27/17 177 lb 14.4 oz (80.7 kg)                    ROS:  Constitutional, HEENT, cardiovascular, pulmonary, gi and gu systems are negative, except as otherwise noted.    OBJECTIVE:     /88  Pulse 92  Temp 97.4  F (36.3  C) (Oral)  SpO2 100%  There is no height or weight on file to calculate BMI.  GENERAL: healthy, alert and no distress  EYES: Eyes grossly normal to inspection, PERRL and conjunctivae and sclerae normal  NECK: no adenopathy, no asymmetry, masses, or scars and thyroid normal to palpation  MS: no gross musculoskeletal defects noted, no edema  SKIN: no suspicious lesions or rashes  PSYCH: mentation appears normal, affect normal/bright    Diagnostic Test Results:  Results for orders placed or performed in visit on 03/10/18 (from the past 24 hour(s))   *UA reflex to Microscopic and Culture ([place] and [facility] (except [place] and [place])   Result Value Ref Range    Color Urine Yellow      Appearance Urine Clear     Glucose Urine Negative NEG^Negative mg/dL    Bilirubin Urine Negative NEG^Negative    Ketones Urine Negative NEG^Negative mg/dL    Specific Gravity Urine 1.010 1.003 - 1.035    Blood Urine Small (A) NEG^Negative    pH Urine 7.0 5.0 - 7.0 pH    Protein Albumin Urine Negative NEG^Negative mg/dL    Urobilinogen Urine 0.2 0.2 - 1.0 EU/dL    Nitrite Urine Negative NEG^Negative    Leukocyte Esterase Urine Small (A) NEG^Negative    Source Midstream Urine    Urine Microscopic   Result Value Ref Range    WBC Urine 5-10 (A) OTO5^0 - 5 /HPF    RBC Urine O - 2 OTO2^O - 2 /HPF    Bacteria Urine Moderate (A) NEG^Negative /HPF       ASSESSMENT/PLAN:       1. Dysuria/UTI    - *UA reflex to Microscopic and Culture ([place] and [facility] (except [place] and [place])  - Urine Microscopic  - Urine Culture Aerobic Bacterial  - ciprofloxacin (CIPRO) 250 MG tablet; Take 1 tablet (250 mg) by mouth 2 times daily  Dispense: 14 tablet; Refill: 0    Continue with increased fluids.  States ciprofloxacin is often most effective for her UTIs- decreased dose given with CKD history.  UC pending.    Tiana Vazquez MD   URGENT CARE  [facility] URGENT [facility]

## 2018-03-12 LAB
BACTERIA SPEC CULT: ABNORMAL
SPECIMEN SOURCE: ABNORMAL

## 2018-04-04 ASSESSMENT — MIFFLIN-ST. JEOR: SCORE: 1263.44

## 2018-04-10 ENCOUNTER — HOME CARE/HOSPICE - HEALTHEAST (OUTPATIENT)
Dept: HOME HEALTH SERVICES | Facility: HOME HEALTH | Age: 68
End: 2018-04-10

## 2018-04-10 ENCOUNTER — ANESTHESIA - HEALTHEAST (OUTPATIENT)
Dept: SURGERY | Facility: CLINIC | Age: 68
End: 2018-04-10

## 2018-04-10 ENCOUNTER — SURGERY - HEALTHEAST (OUTPATIENT)
Dept: SURGERY | Facility: CLINIC | Age: 68
End: 2018-04-10

## 2018-04-10 ASSESSMENT — MIFFLIN-ST. JEOR: SCORE: 1346.9

## 2018-04-16 ENCOUNTER — APPOINTMENT (OUTPATIENT)
Dept: GENERAL RADIOLOGY | Facility: CLINIC | Age: 68
End: 2018-04-16
Attending: EMERGENCY MEDICINE
Payer: MEDICARE

## 2018-04-16 ENCOUNTER — APPOINTMENT (OUTPATIENT)
Dept: ULTRASOUND IMAGING | Facility: CLINIC | Age: 68
End: 2018-04-16
Attending: EMERGENCY MEDICINE
Payer: MEDICARE

## 2018-04-16 ENCOUNTER — HOSPITAL ENCOUNTER (EMERGENCY)
Facility: CLINIC | Age: 68
Discharge: HOME | End: 2018-04-16
Attending: EMERGENCY MEDICINE | Admitting: EMERGENCY MEDICINE
Payer: MEDICARE

## 2018-04-16 VITALS
RESPIRATION RATE: 16 BRPM | SYSTOLIC BLOOD PRESSURE: 140 MMHG | DIASTOLIC BLOOD PRESSURE: 49 MMHG | WEIGHT: 180 LBS | BODY MASS INDEX: 30.73 KG/M2 | HEIGHT: 64 IN | OXYGEN SATURATION: 99 % | TEMPERATURE: 98.8 F

## 2018-04-16 DIAGNOSIS — R60.0 BILATERAL LEG EDEMA: ICD-10-CM

## 2018-04-16 DIAGNOSIS — D64.9 ANEMIA, UNSPECIFIED TYPE: ICD-10-CM

## 2018-04-16 LAB
ANION GAP SERPL CALCULATED.3IONS-SCNC: 6 MMOL/L (ref 3–14)
BUN SERPL-MCNC: 31 MG/DL (ref 7–30)
CALCIUM SERPL-MCNC: 8.9 MG/DL (ref 8.5–10.1)
CHLORIDE SERPL-SCNC: 102 MMOL/L (ref 94–109)
CO2 SERPL-SCNC: 29 MMOL/L (ref 20–32)
CREAT SERPL-MCNC: 1.25 MG/DL (ref 0.52–1.04)
ERYTHROCYTE [DISTWIDTH] IN BLOOD BY AUTOMATED COUNT: 14 % (ref 10–15)
GFR SERPL CREATININE-BSD FRML MDRD: 43 ML/MIN/1.7M2
GLUCOSE SERPL-MCNC: 124 MG/DL (ref 70–99)
HCT VFR BLD AUTO: 27.3 % (ref 35–47)
HGB BLD-MCNC: 9.1 G/DL (ref 11.7–15.7)
INTERPRETATION ECG - MUSE: NORMAL
MCH RBC QN AUTO: 31.3 PG (ref 26.5–33)
MCHC RBC AUTO-ENTMCNC: 33.3 G/DL (ref 31.5–36.5)
MCV RBC AUTO: 94 FL (ref 78–100)
PLATELET # BLD AUTO: 252 10E9/L (ref 150–450)
POTASSIUM SERPL-SCNC: 3.8 MMOL/L (ref 3.4–5.3)
RBC # BLD AUTO: 2.91 10E12/L (ref 3.8–5.2)
SODIUM SERPL-SCNC: 137 MMOL/L (ref 133–144)
TROPONIN I SERPL-MCNC: <0.015 UG/L (ref 0–0.04)
WBC # BLD AUTO: 4.6 10E9/L (ref 4–11)

## 2018-04-16 PROCEDURE — 84484 ASSAY OF TROPONIN QUANT: CPT | Performed by: EMERGENCY MEDICINE

## 2018-04-16 PROCEDURE — 93970 EXTREMITY STUDY: CPT

## 2018-04-16 PROCEDURE — 71046 X-RAY EXAM CHEST 2 VIEWS: CPT

## 2018-04-16 PROCEDURE — 85027 COMPLETE CBC AUTOMATED: CPT | Performed by: EMERGENCY MEDICINE

## 2018-04-16 PROCEDURE — 80048 BASIC METABOLIC PNL TOTAL CA: CPT | Performed by: EMERGENCY MEDICINE

## 2018-04-16 PROCEDURE — 93005 ELECTROCARDIOGRAM TRACING: CPT

## 2018-04-16 PROCEDURE — 99285 EMERGENCY DEPT VISIT HI MDM: CPT | Mod: 25

## 2018-04-16 ASSESSMENT — ENCOUNTER SYMPTOMS
SHORTNESS OF BREATH: 0
WOUND: 0
PALPITATIONS: 0
FEVER: 0
LIGHT-HEADEDNESS: 0
COLOR CHANGE: 0

## 2018-04-16 NOTE — ED PROVIDER NOTES
History     Chief Complaint:  Leg swelling    HPI   Rosa Sotomayor is a 67 year old female with a history of kidney disease who presents with leg swelling.  The patient says that on April 10, 2018 she had a left total knee arthroplasty.  The patient says that she has been having swelling in both legs that is greater than baseline. Has been taking torsemide but thinks she is not urinating enough. Did recently miss a dose of torsemide because she did not want to make frequent trips to the bathroom due to her leg pain from surgery. No shortness of breath, chest pain. No fever, cough.    Allergies:  Fentanyl  Midazolam  Propofol  Anesthetic [Amides]  Codeine  Erythromycin  Gabapentin  Hydromorphone  Lorazepam  Other [Seasonal Allergies]  Penicillins  Eucalyptus Oil  Nitrofuran Derivatives  Sulfa Drugs     Medications:    ciprofloxacin (CIPRO)   EPINEPHrine (EPIPEN 2-FRAN)   SUMAtriptan (IMITREX)   amitriptyline (ELAVIL)   atorvastatin (LIPITOR)   torsemide (DEMADEX)   hydrOXYzine (ATARAX)   losartan (COZAAR)  ipratropium (ATROVENT)   carvedilol (COREG)   ketoconazole (NIZORAL)   progesterone (PROMETRIUM)  ondansetron (ZOFRAN)  estradiol (VIVELLE-DOT)   MAGNESIUM     Past Medical History:    Chronic kidney disease  Dizziness and giddiness  Postmenopausal bleeding  Hypertriglyceridemia   CKD  Anxiety  Hyperlipidemia   Arnold-Chiari malformation  Secondary renal hyperparathyroidism  Chronic bilateral low back pain  Lumbago  Macular degeneration  Anemia, iron deficiency  Renal artery stenosis  Migraine     Past Surgical History:    [place]udson teeth surgery  Varicose veins  Total hip arthroplasty bilateral  Total knee arthroplasty-bilateral  Tubal ligation  Shoulder surgery    Family History:    Diabetes  Hypertension  Eye disorder  Heart disease  Lipids  Obesity  CAD  Arthritis    Social History:  Smoking Status: Never Smoker  Alcohol Use: No  Patient presents with family.  Marital Status:   [2]     Review of Systems  "  Constitutional: Negative for fever.   Respiratory: Negative for shortness of breath.    Cardiovascular: Positive for leg swelling. Negative for palpitations.   Skin: Negative for color change and wound.   Neurological: Negative for light-headedness.   All other systems reviewed and are negative.    Physical Exam     Patient Vitals for the past 24 hrs:   BP Temp Temp src Heart Rate Resp SpO2 Height Weight   04/16/18 1056 140/49 98.8  F (37.1  C) Oral 96 16 99 % 1.626 m (5' 4\") 81.6 kg (180 lb)       Physical Exam  General: Resting comfortably on the gurney  Eyes:  The pupils are equal and round  ENT:    Moist mucous membranes  Neck:  Normal range of motion  CV:  Regular rate and rhythm    Skin warm and well perfused     PT pulses 2+ bilaterally  Resp:  Lungs are clear    Non-labored    No rales    No wheezing   GI:  Abdomen is soft, there is no rigidity    No distension    No rebound tenderness     No abdominal tenderness  MS:  Normal muscular tone    Bilateral lower extremity edema L>R    Decreased ROM of left knee but no pain on movement of left knee  Skin:  Left knee incision clean, dry, and intact. No erythema  Neuro:   Awake, alert.      Speech is normal and fluent.    Face is symmetric.     Moves all extremities  Psych: Normal affect.  Appropriate interactions.    Emergency Department Course   ECG (11:30:29):  Rate 87 bpm. MN interval 164. QRS duration 94. QT/QTc 384/462. P-R-T axes 20 19 32.  Normal sinus rhythm, normal ECG.  Interpreted at 1135 by Vilma Kilpatrick MD.    Imaging:  Radiographic findings were communicated with the patient who voiced understanding of the findings.  US Lower Extremity, Venous Duplex Bilateral:  No DVT demonstrated.   As per radiology.     X-ray Chest, 2 views:  No acute cardiopulmonary abnormality.  Result per radiology.     Laboratory:  BMP: Glucose 124(H), BUN 31(H), Creatinine 1.25(H), GFR estimate 43(L) GFR estimate if black 52(L), o/w WNL    CBC: WBC: 4.6, HGB: " 9.1(L), PLT: 252  1120 Troponin:  <0.015      Emergency Department Course:  Nursing notes and vitals reviewed. 1108 I performed an exam of the patient as documented above.     IV inserted. Blood drawn. This was sent to the lab for further testing, results above.    The patient was sent for a US lower extremity and Chest XR while in the emergency department, findings above.   Patient had EKG, results above  1257 I rechecked the patient and discussed the results of her workup thus far.     Findings and plan explained to the Patient. Patient discharged home with instructions regarding supportive care, medications, and reasons to return. The importance of close follow-up was reviewed.     I personally reviewed the laboratory results with the Patient and answered all related questions prior to discharge.     Impression & Plan      Medical Decision Making:  Patient is 67-year-old female who presents the ED with leg swelling.  Vital signs are normal.  She has bilateral lower extremity edema with left slightly worse than right. There are no signs of infection such as cellulitis, septic arthritis, abscess, necrotizing fascitis. Ultrasound was negative for DVT.  Laboratory values showed stable kidney disease.  She is not having any shortness of breath to suggest pulmonary edema and chest x-ray is clear.  She is already on torsemide and given the lower extremity edema is slightly worse than normal, patient will increase torsemide from 20 mg daily to 30 mg for a few days and follow-up with their primary care provider for repeat labs this week.  Her hemoglobin is also low at 9.1.  There are no signs of acute bleeding. Will have her f/u with PCP about this as well. No indication for transfusion.  I also discussed elevation of lower extremities and compression stockings for the swelling.  Reasons to return to the ED were discussed with the patient.    Diagnosis:    ICD-10-CM    1. Bilateral leg edema R60.0    2. Anemia,  unspecified type D64.9        Disposition:  discharged to home    Marlo Britton  4/16/2018    EMERGENCY DEPARTMENT  I, Marlo Diazmabel, am serving as a scribe at 11:08 AM on 4/16/2018 to document services personally performed by Vilma Kilpatrick MD based on my observations and the provider's statements to me.        Vilma Kilpatrick MD  04/16/18 7513       Vilma Kilpatrick MD  04/16/18 9968

## 2018-04-16 NOTE — DISCHARGE INSTRUCTIONS
Increase the torsemide to 30 mg daily for 3 days  Follow up with primary care provider later this week for repeat Hemoglobin and electrolytes  Elevate the legs as much as you can  Can also use compression stockings for swelling  Leg Swelling in Both Legs    Swelling of the feet, ankles, and legs is called edema. It is caused by excess fluid that has collected in the tissues. Extra fluid in the body settles in the lowest part because of gravity. This is why the legs and feet are most affected.  Some of the causes for edema include:    Disease of the heart like congestive heart failure    Standing or sitting for long periods of time    Infection of the feet or legs    Blood pooling in the veins of your legs (venous insufficiency)    Dilated veins in your lower leg (varicose veins)    Garters or other clothing that is tight on your legs. This will cause blood to pool in your legs because the clothing limits blood flow.    Some medicines such as hormones like birth control pills, some blood pressure medicines like calcium channel blockers (amlodipine) and steroids, some antidepressants like MAO inhibitors and tricyclics    Menstrual periods that cause you to retain fluids    Many types of renal disease    Liver failure or cirrhosis    Pregnancy, some swelling is normal, but a sudden increase in leg swelling or weight gain can be a sign of a dangerous complication of pregnancy    Poor nutrition    Thyroid disease  Medical treatment will depend on what is causing the swelling in your legs. Your healthcare provider may prescribe water pills (diuretics) to get rid of the extra fluid.  Home care  Follow these guidelines when caring for yourself at home:    Don't wear clothing like garters that is tight on your legs.    Keep your legs up while lying or sitting.    If infection, injury, or recent surgery is causing the swelling, stay off your legs as much as possible until symptoms get better.    If your healthcare provider  says that your leg swelling is caused by venous insufficiency or varicose veins, don't sit or  one place for long periods of time. Take breaks and walk about every few hours. Brisk walking is a good exercise. It helps circulate the blood that has collected in your leg. Talk with your provider about using support stockings to stop daytime leg swelling.    If your provider says that heart disease is causing your leg swelling, follow a low-salt diet to stop extra fluid from staying in your body. You may also need medicine.  Follow-up care  Follow up with your healthcare provider, or as advised.  When to seek medical advice  Call your healthcare provider right away if any of these occur:    New shortness of breath or chest pain    Shortness of breath or chest pain that gets worse    Swelling in both legs or ankles that gets worse    Swelling of the abdomen    Redness, warmth, or swelling in one leg    Fever of 100.4 F (38 C) or higher, or as directed by your healthcare provider    Yellow color to your skin or eyes    Rapid, unexplained weight gain    Having to sleep upright or use an increased number of pillows  Date Last Reviewed: 3/31/2016    5162-9652 [employer]. [address] [place] [address], [place] [address]. All rights reserved. This information is not intended as a substitute for professional medical care. Always follow your healthcare professional's instructions.

## 2018-04-16 NOTE — ED AVS SNAPSHOT
Emergency Department    [address] [facility] [address]    Phone:  [phone]    Fax:  [phone]                                       Rosa Sotomayor   MRN: [id number]    Department:   Emergency Department   Date of Visit:  4/16/2018           Patient Information     Date Of Birth          1950        Your diagnoses for this visit were:     Bilateral leg edema     Anemia, unspecified type        You were seen by Vilma Kilpatrick MD.      Follow-up Information     Schedule an appointment as soon as possible for a visit with Josh Hollingsworth MD.    Specialty:  Internal Medicine    Contact information:    [facility]  [address] [facility] [address]  [facility] [id number] [phone]          Follow up with  Emergency Department.    Specialty:  EMERGENCY MEDICINE    Why:  If symptoms worsen    Contact information:    [address] [facility] [id number] [phone]        Discharge Instructions       Increase the torsemide to 30 mg daily for 3 days  Follow up with primary care provider later this week for repeat Hemoglobin and electrolytes  Elevate the legs as much as you can  Can also use compression stockings for swelling  Leg Swelling in Both Legs    Swelling of the feet, ankles, and legs is called edema. It is caused by excess fluid that has collected in the tissues. Extra fluid in the body settles in the lowest part because of gravity. This is why the legs and feet are most affected.  Some of the causes for edema include:    Disease of the heart like congestive heart failure    Standing or sitting for long periods of time    Infection of the feet or legs    Blood pooling in the veins of your legs (venous insufficiency)    Dilated veins in your lower leg (varicose veins)    Garters or other clothing that is tight on your legs. This will cause blood to pool in your legs because the clothing limits blood flow.    Some medicines such as hormones like birth  control pills, some blood pressure medicines like calcium channel blockers (amlodipine) and steroids, some antidepressants like MAO inhibitors and tricyclics    Menstrual periods that cause you to retain fluids    Many types of renal disease    Liver failure or cirrhosis    Pregnancy, some swelling is normal, but a sudden increase in leg swelling or weight gain can be a sign of a dangerous complication of pregnancy    Poor nutrition    Thyroid disease  Medical treatment will depend on what is causing the swelling in your legs. Your healthcare provider may prescribe water pills (diuretics) to get rid of the extra fluid.  Home care  Follow these guidelines when caring for yourself at home:    Don't wear clothing like garters that is tight on your legs.    Keep your legs up while lying or sitting.    If infection, injury, or recent surgery is causing the swelling, stay off your legs as much as possible until symptoms get better.    If your healthcare provider says that your leg swelling is caused by venous insufficiency or varicose veins, don't sit or  one place for long periods of time. Take breaks and walk about every few hours. Brisk walking is a good exercise. It helps circulate the blood that has collected in your leg. Talk with your provider about using support stockings to stop daytime leg swelling.    If your provider says that heart disease is causing your leg swelling, follow a low-salt diet to stop extra fluid from staying in your body. You may also need medicine.  Follow-up care  Follow up with your healthcare provider, or as advised.  When to seek medical advice  Call your healthcare provider right away if any of these occur:    New shortness of breath or chest pain    Shortness of breath or chest pain that gets worse    Swelling in both legs or ankles that gets worse    Swelling of the abdomen    Redness, warmth, or swelling in one leg    Fever of 100.4 F (38 C) or higher, or as directed by your  healthcare provider    Yellow color to your skin or eyes    Rapid, unexplained weight gain    Having to sleep upright or use an increased number of pillows  Date Last Reviewed: 3/31/2016    3076-9782 [employer]. [address] [place] [address], [place] [address]. All rights reserved. This information is not intended as a substitute for professional medical care. Always follow your healthcare professional's instructions.          24 Hour Appointment Hotline       To make an appointment at any [facility], call [phone] ([phone]). If you don't have a family doctor or clinic, we will help you find one. [facility] are conveniently located to serve the needs of you and your family.             Review of your medicines      Our records show that you are taking the medicines listed below. If these are incorrect, please call your family doctor or clinic.        Dose / Directions Last dose taken    amitriptyline 50 MG tablet   Commonly known as:  ELAVIL   Dose:  50 mg   Quantity:  90 tablet        Take 1 tablet (50 mg) by mouth At Bedtime   Refills:  3        ASPIRIN NOT PRESCRIBED   Commonly known as:  INTENTIONAL   Indication:  taking coumadin        Please choose reason not prescribed, below   Refills:  0        atorvastatin 40 MG tablet   Commonly known as:  LIPITOR   Quantity:  90 tablet        TAKE 1 TABLET(40 MG) BY MOUTH DAILY   Refills:  3        B-12 1000 MCG Tbcr   Dose:  1000 mcg   Quantity:  100 tablet        Take 1,000 mcg by mouth daily   Refills:  1        calcium acetate (Phos Binder) 667 MG Tabs   Quantity:  180 tablet        TAKE 1 TABLET BY MOUTH THREE TIMES DAILY   Refills:  3        carvedilol 6.25 MG tablet   Commonly known as:  COREG   Quantity:  270 tablet        12.5 mg in am and 6.25 mg in pm   Refills:  0        ciprofloxacin 250 MG tablet   Commonly known as:  CIPRO   Dose:  250 mg   Quantity:  14 tablet        Take 1 tablet (250 mg) by mouth 2 times daily   Refills:   0        EPINEPHrine 0.3 MG/0.3ML injection 2-pack   Commonly known as:  EPIPEN 2-FRAN   Dose:  0.3 mg   Quantity:  0.6 mL        Inject 0.3 mLs (0.3 mg) into the muscle once as needed for anaphylaxis   Refills:  1        estradiol 0.025 MG/24HR BIW patch   Commonly known as:  VIVELLE-DOT   Dose:  1 patch        Place 1 patch onto the skin twice a week   Refills:  0        hydrOXYzine 25 MG tablet   Commonly known as:  ATARAX   Dose:  25 mg   Quantity:  120 tablet        Take 1 tablet (25 mg) by mouth 3 times daily as needed for itching   Refills:  11        ipratropium 0.06 % spray   Commonly known as:  ATROVENT   Dose:  2 spray   Quantity:  3 Box        Spray 2 sprays into both nostrils 4 times daily as needed for rhinitis   Refills:  3        ketoconazole 2 % shampoo   Commonly known as:  NIZORAL   Quantity:  120 mL        Apply to the affected area and wash off after 5 minutes.   Refills:  1        losartan 100 MG tablet   Commonly known as:  COZAAR   Dose:  100 mg   Quantity:  90 tablet        Take 1 tablet (100 mg) by mouth At Bedtime   Refills:  1        magnesium 250 MG tablet        None Entered   Refills:  0        PROMETRIUM 100 MG capsule   Dose:  100 mg   Generic drug:  progesterone        Take 100 mg by mouth daily   Refills:  0        SUMAtriptan 20 MG/ACT nasal spray   Commonly known as:  IMITREX   Dose:  1 spray   Quantity:  12 each        Spray 1 spray in nostril as needed for migraine May repeat in 2 hours. Max 2 sprays/24 hours.   Refills:  11        torsemide 10 MG tablet   Commonly known as:  DEMADEX   Dose:  10 mg   Quantity:  90 tablet        Take 1 tablet (10 mg) by mouth daily   Refills:  3        vitamin D 2000 units tablet   Dose:  1 tablet        Take 1 tablet by mouth daily   Refills:  0        ZOFRAN 4 MG tablet   Generic drug:  ondansetron        Take by mouth every 8 hours as needed for nausea   Refills:  0                Procedures and tests performed during your visit     Basic  metabolic panel    CBC (+ platelets, no diff)    Chest XR,  PA & LAT    EKG 12 lead    Troponin I (now)    US Lower Extremity Venous Duplex Bilateral      Orders Needing Specimen Collection     None      Pending Results     No orders found from 4/14/2018 to 4/17/2018.            Pending Culture Results     No orders found from 4/14/2018 to 4/17/2018.            Pending Results Instructions     If you had any lab results that were not finalized at the time of your Discharge, you can call the ED Lab Result RN at [phone]. You will be contacted by this team for any positive Lab results or changes in treatment. The nurses are available 7 days a week from 10A to 6:30P.  You can leave a message 24 hours per day and they will return your call.        Test Results From Your Hospital Stay        4/16/2018 11:56 AM      Component Results     Component Value Ref Range & Units Status    Sodium 137 133 - 144 mmol/L Final    Potassium 3.8 3.4 - 5.3 mmol/L Final    Chloride 102 94 - 109 mmol/L Final    Carbon Dioxide 29 20 - 32 mmol/L Final    Anion Gap 6 3 - 14 mmol/L Final    Glucose 124 (H) 70 - 99 mg/dL Final    Urea Nitrogen 31 (H) 7 - 30 mg/dL Final    Creatinine 1.25 (H) 0.52 - 1.04 mg/dL Final    GFR Estimate 43 (L) >60 mL/min/1.7m2 Final    Non  GFR Calc    GFR Estimate If Black 52 (L) >60 mL/min/1.7m2 Final    African American GFR Calc    Calcium 8.9 8.5 - 10.1 mg/dL Final         4/16/2018 11:39 AM      Component Results     Component Value Ref Range & Units Status    WBC 4.6 4.0 - 11.0 10e9/L Final    RBC Count 2.91 (L) 3.8 - 5.2 10e12/L Final    Hemoglobin 9.1 (L) 11.7 - 15.7 g/dL Final    Hematocrit 27.3 (L) 35.0 - 47.0 % Final    MCV 94 78 - 100 fl Final    MCH 31.3 26.5 - 33.0 pg Final    MCHC 33.3 31.5 - 36.5 g/dL Final    RDW 14.0 10.0 - 15.0 % Final    Platelet Count 252 150 - 450 10e9/L Final         4/16/2018 12:32 PM      Narrative     ULTRASOUND VENOUS LOWER EXTREMITY BILATERAL 4/16/2018  12:26 PM     HISTORY: bilateral LE swelling, L>R after left knee surgery, bilateral  LE swelling, L>R after left knee surgery;     COMPARISON: None.    TECHNIQUE: Ultrasound gray scale, Color Doppler flow, and spectral  Doppler waveform analysis performed.    FINDINGS: The bilateral common femoral, superficial femoral, popliteal  and posterior tibial veins are patent and fully compressible and  demonstrate normal venous Doppler flow.        Impression     IMPRESSION: No DVT demonstrated.    MARIE GARCIA MD         4/16/2018 12:00 PM      Component Results     Component Value Ref Range & Units Status    Troponin I ES <0.015 0.000 - 0.045 ug/L Final    The 99th percentile for upper reference range is 0.045 ug/L.  Troponin values   in the range of 0.045 - 0.120 ug/L may be associated with risks of adverse   clinical events.           4/16/2018 12:04 PM      Narrative     XR CHEST 2 VW 4/16/2018 12:01 PM    HISTORY: Chest pain.    COMPARISON: None.    FINDINGS: No airspace consolidation, pleural effusion or pneumothorax.  Normal heart size.        Impression     IMPRESSION: No acute cardiopulmonary abnormality.    MARIE TAMAYO MD                Clinical Quality Measure: Blood Pressure Screening     Your blood pressure was checked while you were in the emergency department today. The last reading we obtained was  BP: 140/49 . Please read the guidelines below about what these numbers mean and what you should do about them.  If your systolic blood pressure (the top number) is less than 120 and your diastolic blood pressure (the bottom number) is less than 80, then your blood pressure is normal. There is nothing more that you need to do about it.  If your systolic blood pressure (the top number) is 120-139 or your diastolic blood pressure (the bottom number) is 80-89, your blood pressure may be higher than it should be. You should have your blood pressure rechecked within a year by a primary care provider.  If your  systolic blood pressure (the top number) is 140 or greater or your diastolic blood pressure (the bottom number) is 90 or greater, you may have high blood pressure. High blood pressure is treatable, but if left untreated over time it can put you at risk for heart attack, stroke, or kidney failure. You should have your blood pressure rechecked by a primary care provider within the next 4 weeks.  If your provider in the emergency department today gave you specific instructions to follow-up with your doctor or provider even sooner than that, you should follow that instruction and not wait for up to 4 weeks for your follow-up visit.        Thank you for choosing [facility]       Thank you for choosing [facility] for your care. Our goal is always to provide you with excellent care. Hearing back from our patients is one way we can continue to improve our services. Please take a few minutes to complete the written survey that you may receive in the mail after you visit with us. Thank you!        [facility] Information     [facility] gives you secure access to your electronic health record. If you see a primary care provider, you can also send messages to your care team and make appointments. If you have questions, please call your primary care clinic.  If you do not have a primary care provider, please call [phone] and they will assist you.        Care EveryWhere ID     This is your Care EveryWhere ID. This could be used by other organizations to access your [facility] medical records  [id number]        Equal Access to Services     LISBET LIPSCOMB : Nicole Alberto, waaxda luqadaha, qaybta kaalkevin qureshi, marlene vasquez. So [facility] [phone].    ATENCIÓN: Si habla español, tiene a armendariz disposición servicios gratuitos de asistencia lingüística. Llame al [phone].    We comply with applicable federal civil rights laws and [place] laws. We do not discriminate on the basis of race,  color, national origin, age, disability, sex, sexual orientation, or gender identity.            After Visit Summary       This is your record. Keep this with you and show to your community pharmacist(s) and doctor(s) at your next visit.

## 2018-04-16 NOTE — ED AVS SNAPSHOT
Emergency Department    [address] [place] [id number]    Phone:  [phone]    Fax:  [phone]                                       Rosa Sotomayor   MRN: [id number]    Department:   Emergency Department   Date of Visit:  4/16/2018           After Visit Summary Signature Page     I have received my discharge instructions, and my questions have been answered. I have discussed any challenges I see with this plan with the nurse or doctor.    ..........................................................................................................................................  Patient/Patient Representative Signature      ..........................................................................................................................................  Patient Representative Print Name and Relationship to Patient    ..................................................               ................................................  Date                                            Time    ..........................................................................................................................................  Reviewed by Signature/Title    ...................................................              ..............................................  Date                                                            Time

## 2018-04-19 ENCOUNTER — OFFICE VISIT (OUTPATIENT)
Dept: FAMILY MEDICINE | Facility: CLINIC | Age: 68
End: 2018-04-19
Payer: COMMERCIAL

## 2018-04-19 VITALS
HEIGHT: 64 IN | SYSTOLIC BLOOD PRESSURE: 125 MMHG | TEMPERATURE: 99.2 F | HEART RATE: 84 BPM | OXYGEN SATURATION: 97 % | DIASTOLIC BLOOD PRESSURE: 61 MMHG | BODY MASS INDEX: 32.61 KG/M2 | WEIGHT: 191 LBS

## 2018-04-19 DIAGNOSIS — M17.12 PRIMARY OSTEOARTHRITIS OF LEFT KNEE: Primary | ICD-10-CM

## 2018-04-19 DIAGNOSIS — R60.9 EDEMA, UNSPECIFIED TYPE: ICD-10-CM

## 2018-04-19 DIAGNOSIS — N18.30 CKD (CHRONIC KIDNEY DISEASE) STAGE 3, GFR 30-59 ML/MIN (H): ICD-10-CM

## 2018-04-19 DIAGNOSIS — I35.1 AORTIC VALVE INSUFFICIENCY, ETIOLOGY OF CARDIAC VALVE DISEASE UNSPECIFIED: ICD-10-CM

## 2018-04-19 DIAGNOSIS — K29.70 GASTRITIS WITHOUT BLEEDING, UNSPECIFIED CHRONICITY, UNSPECIFIED GASTRITIS TYPE: ICD-10-CM

## 2018-04-19 PROCEDURE — 99214 OFFICE O/P EST MOD 30 MIN: CPT | Performed by: INTERNAL MEDICINE

## 2018-04-19 RX ORDER — CLOBETASOL PROPIONATE 0.5 MG/ML
SOLUTION TOPICAL 2 TIMES DAILY PRN
Refills: 5 | COMMUNITY
Start: 2017-12-21 | End: 2024-07-03

## 2018-04-19 RX ORDER — ALLOPURINOL 100 MG/1
100 TABLET ORAL
COMMUNITY
End: 2019-07-08

## 2018-04-19 RX ORDER — ZOLMITRIPTAN 5 MG/1
5 TABLET, ORALLY DISINTEGRATING ORAL
COMMUNITY
End: 2023-10-10

## 2018-04-19 RX ORDER — CETIRIZINE HYDROCHLORIDE 10 MG/1
10 TABLET ORAL
COMMUNITY

## 2018-04-19 RX ORDER — LIDOCAINE 246 MG/1
PATCH TOPICAL
Refills: 0 | COMMUNITY
Start: 2018-04-12 | End: 2018-06-28

## 2018-04-19 RX ORDER — MORPHINE SULFATE 10 MG/5ML
5-10 SOLUTION ORAL
COMMUNITY
Start: 2018-04-11 | End: 2018-06-28

## 2018-04-19 RX ORDER — AMITRIPTYLINE HYDROCHLORIDE 10 MG/1
10 TABLET ORAL
COMMUNITY
End: 2019-01-02

## 2018-04-19 RX ORDER — LIDOCAINE 4 G/G
1 PATCH TOPICAL
COMMUNITY
Start: 2018-04-13 | End: 2018-06-28

## 2018-04-19 RX ORDER — CRANBERRY FRUIT EXTRACT 12:1
1 POWDER (GRAM) MISCELLANEOUS
COMMUNITY

## 2018-04-19 RX ORDER — CYCLOBENZAPRINE HCL 5 MG
TABLET ORAL
Refills: 0 | COMMUNITY
Start: 2017-10-20 | End: 2018-06-28

## 2018-04-19 RX ORDER — ACETAMINOPHEN 500 MG
500 TABLET ORAL DAILY PRN
COMMUNITY

## 2018-04-19 ASSESSMENT — PAIN SCALES - GENERAL: PAINLEVEL: WORST PAIN (10)

## 2018-04-19 NOTE — PROGRESS NOTES
SUBJECTIVE:   Rosa Sotomayor is a 67 year old female who presents to clinic today for the following health issues:      SUBJECTIVE:                                                      Patient presents for Hospital Followup Visit:    Hospital:  [facility]      Date of Admission: 4/10  Date of Discharge: 4/12  Reason(s) for Admission: Left total knee arthroplasty            Problems taking medications regularly:  None       Medication changes since discharge: ER changed torsemide form 20 mg daily to 30 mg daily on 4/16       Problems adhering to non-medication therapy:  Morphine causes nausea, but it is the only opioid she can take     Summary of hospitalization:  [facility] hospital discharge summary reviewed  Diagnostic Tests/Treatments reviewed.  Follow up needed: Orthopedic surgery   Other Healthcare Providers Involved in Patient s Care:         None  Update since discharge: fluctuating course. Seen in [facility] ER on 4/16 with bilateral leg swelling, DVT ultrasound normal    Post Discharge Medication Reconciliation: discharge medications reconciled and changed, per note/orders (see AVS).  Issues to address: Issues identified were:   nausea from morphine.  Plan of care communicated with patient and family      Type of Medical Decision Making Face-to-Face Visit within 7 Days Face-to-Face Visit within 14 days   Moderate Complexity [id number]   High Complexity [id number]       Pleasant 67-year-old female with renal artery stenosis, chronic kidney disease, Arnold-Chiari malformation with chronic headaches.  She is about 8 days status post left total knee arthroplasty.  She had her preop with her nephrologist at the [facility].  She was seen in the emergency department 2 days ago with complaints of severe leg swelling.  Increasing torsemide from 20 mg daily to 30 mg daily did not improve her swelling at all.  She denies associated shortness of breath, orthopnea or PND.  Interestingly, she had an echocardiogram in May  2017 for unclear reasons demonstrating progression of a mildly insufficient aortic valve noted in 2012 to at least moderately insufficient aortic valve in May 2017.      Problem list and histories reviewed & adjusted, as indicated.  Additional history: as documented    Patient Active Problem List   Diagnosis     Postmenopausal bleeding     Personal history of allergy to anesthetic agent     CKD (chronic kidney disease) stage 3, GFR 30-59 ml/min     Hypertriglyceridemia     Arnold-Chiari malformation (H)     Hyperlipidemia LDL goal <130     Periodic headache syndrome, not intractable     Anxiety     Secondary renal hyperparathyroidism (H)     Lumbago     Chronic bilateral low back pain without sciatica     Benign essential hypertension     Renal artery stenosis (H)     Macular degeneration, bilateral     Anemia, iron deficiency     Intractable migraine without aura and without status migrainosus     Past Surgical History:   Procedure Laterality Date     C NONSPECIFIC PROCEDURE      wisdom teeth     C NONSPECIFIC PROCEDURE  2005    Varicose Veins     C TOTAL HIP ARTHROPLASTY      left     C TOTAL KNEE ARTHROPLASTY      right     TUBAL LIGATION  1987       Social History   Substance Use Topics     Smoking status: Never Smoker     Smokeless tobacco: Never Used     Alcohol use No     Family History   Problem Relation Age of Onset     DIABETES Mother      INSULIN     Hypertension Mother      Eye Disorder Mother      CATERACTS     HEART DISEASE Mother      CHF- OPEN HEART SURG X'S 2     Lipids Mother      Obesity Mother      Coronary Artery Disease Mother      DIABETES Father      ORAL     Hypertension Father      Arthritis Father      Eye Disorder Father      MAC DEGENERATION     HEART DISEASE Father      CHF     Lipids Father      Obesity Father      DIABETES Maternal Grandfather      INSULIN     DIABETES Brother      INSULIN     GASTROINTESTINAL DISEASE Brother      CHOLITISI POSSIBLE CHRONES     Obesity Brother       Colon Cancer No family hx of      Breast Cancer No family hx of          Current Outpatient Prescriptions   Medication Sig Dispense Refill     acetaminophen (TYLENOL) 500 MG tablet Take 500 mg by mouth       allopurinol (ZYLOPRIM) 100 MG tablet Take 100 mg by mouth       amitriptyline (ELAVIL) 10 MG tablet Take 10 mg by mouth       amitriptyline (ELAVIL) 25 MG tablet Take 25 mg by mouth       ASPERCREME LIDOCAINE 4 % Patch   0     aspirin 325 MG EC tablet Take 325 mg by mouth       atorvastatin (LIPITOR) 40 MG tablet TAKE 1 TABLET(40 MG) BY MOUTH DAILY 90 tablet 3     carvedilol (COREG) 6.25 MG tablet 12.5 mg in am and 6.25 mg in pm 270 tablet 0     cetirizine (ZYRTEC) 10 MG tablet Take 10 mg by mouth       Cholecalciferol (VITAMIN D) 2000 UNITS tablet Take 1 tablet by mouth daily       clobetasol (TEMOVATE) 0.05 % external solution Apply topically 2 times daily as needed  5     Cranberry POWD 1 capsule       Cyanocobalamin (B-12) 1000 MCG TBCR Take 1,000 mcg by mouth daily 100 tablet 1     EPINEPHrine (EPIPEN 2-FRAN) 0.3 MG/0.3ML injection 2-pack Inject 0.3 mLs (0.3 mg) into the muscle once as needed for anaphylaxis 0.6 mL 1     hydrOXYzine (ATARAX) 25 MG tablet Take 1 tablet (25 mg) by mouth 3 times daily as needed for itching 120 tablet 11     ketoconazole (NIZORAL) 2 % shampoo Apply to the affected area and wash off after 5 minutes. 120 mL 1     Lidocaine (LIDOCARE) 4 % Patch 1 patch       losartan (COZAAR) 100 MG tablet Take 1 tablet (100 mg) by mouth At Bedtime (Patient taking differently: Take 50 mg by mouth 2 times daily ) 90 tablet 1     Magnesium Oxide 250 MG TABS Take 250 mg by mouth       morphine 10 MG/5ML solution Take 5-10 mg by mouth       ondansetron (ZOFRAN) 4 MG tablet Take by mouth every 8 hours as needed for nausea       SUMAtriptan (IMITREX) 20 MG/ACT nasal spray Spray 1 spray in nostril as needed for migraine May repeat in 2 hours. Max 2 sprays/24 hours. 12 each 11     torsemide (DEMADEX) 10 MG  "tablet Take 1 tablet (10 mg) by mouth daily 90 tablet 3     ZOLMitriptan (ZOMIG-ZMT) 5 MG ODT tab Take 5 mg by mouth       amitriptyline (ELAVIL) 25 MG tablet Take 25 mg by mouth At Bedtime  0     calcium acetate, Phos Binder, 667 MG TABS TAKE 1 TABLET BY MOUTH THREE TIMES DAILY (Patient not taking: Reported on 4/19/2018) 180 tablet 3     cyclobenzaprine (FLEXERIL) 5 MG tablet TK 1 T PO TID PRF MUSCLE SPASMS  0     estradiol (VIVELLE-DOT) 0.025 MG/24HR Place 1 patch onto the skin twice a week       ipratropium (ATROVENT) 0.06 % spray Spray 2 sprays into both nostrils 4 times daily as needed for rhinitis (Patient not taking: Reported on 4/19/2018) 3 Box 3     progesterone (PROMETRIUM) 100 MG capsule Take 100 mg by mouth daily       [DISCONTINUED] amitriptyline (ELAVIL) 50 MG tablet Take 1 tablet (50 mg) by mouth At Bedtime 90 tablet 3     Allergies   Allergen Reactions     Fentanyl Anaphylaxis     Midazolam Anaphylaxis     Propofol Anaphylaxis     Anesthetic [Amides]      Anaphalactic shock     Codeine      Erythromycin      ointment     Gabapentin      chest heaviness with breathing     Hydromorphone Itching     Tolerates morphine     Lorazepam Itching     Other [Seasonal Allergies] Anaphylaxis     GENERAL ANETHESIA       Penicillins      rash     Eucalyptus Oil Rash     hives     Nitrofuran Derivatives Rash     Sulfa Drugs Itching and Rash     rash       Reviewed and updated as needed this visit by clinical staff       Reviewed and updated as needed this visit by Provider         ROS:  Constitutional, HEENT, cardiovascular, pulmonary, gi and gu systems are negative, except as otherwise noted.    OBJECTIVE:     /61 (BP Location: Right arm, Patient Position: Sitting, Cuff Size: Adult Regular)  Pulse 84  Temp 99.2  F (37.3  C) (Oral)  Ht 5' 4\" (1.626 m)  Wt 191 lb (86.6 kg)  SpO2 97%  BMI 32.79 kg/m2  Body mass index is 32.79 kg/(m^2).  GENERAL: healthy, alert and no distress  RESP: lungs clear to " auscultation - no rales, rhonchi or wheezes  CV: Heart with regular rate and rhythm, 2/6 systolic murmur noted, I cannot hear a diastolic murmur   ABDOMEN: soft, nontender, no hepatosplenomegaly, no masses and bowel sounds normal  MS: 2+ edema to midshin, both legs, well healing left knee incision site   NEURO: Normal strength and tone, mentation intact and speech normal  PSYCH: mentation appears normal, affect normal/bright    Diagnostic Test Results:  Results for orders placed or performed during the hospital encounter of 04/16/18   US Lower Extremity Venous Duplex Bilateral    Narrative    ULTRASOUND VENOUS LOWER EXTREMITY BILATERAL 4/16/2018 12:26 PM     HISTORY: bilateral LE swelling, L>R after left knee surgery, bilateral  LE swelling, L>R after left knee surgery;     COMPARISON: None.    TECHNIQUE: Ultrasound gray scale, Color Doppler flow, and spectral  Doppler waveform analysis performed.    FINDINGS: The bilateral common femoral, superficial femoral, popliteal  and posterior tibial veins are patent and fully compressible and  demonstrate normal venous Doppler flow.      Impression    IMPRESSION: No DVT demonstrated.    MARIE GARCIA MD   Chest XR,  PA & LAT    Narrative    XR CHEST 2 VW 4/16/2018 12:01 PM    HISTORY: Chest pain.    COMPARISON: None.    FINDINGS: No airspace consolidation, pleural effusion or pneumothorax.  Normal heart size.      Impression    IMPRESSION: No acute cardiopulmonary abnormality.    MARIE TAMAYO MD   Basic metabolic panel   Result Value Ref Range    Sodium 137 133 - 144 mmol/L    Potassium 3.8 3.4 - 5.3 mmol/L    Chloride 102 94 - 109 mmol/L    Carbon Dioxide 29 20 - 32 mmol/L    Anion Gap 6 3 - 14 mmol/L    Glucose 124 (H) 70 - 99 mg/dL    Urea Nitrogen 31 (H) 7 - 30 mg/dL    Creatinine 1.25 (H) 0.52 - 1.04 mg/dL    GFR Estimate 43 (L) >60 mL/min/1.7m2    GFR Estimate If Black 52 (L) >60 mL/min/1.7m2    Calcium 8.9 8.5 - 10.1 mg/dL   CBC (+ platelets, no diff)   Result Value  Ref Range    WBC 4.6 4.0 - 11.0 10e9/L    RBC Count 2.91 (L) 3.8 - 5.2 10e12/L    Hemoglobin 9.1 (L) 11.7 - 15.7 g/dL    Hematocrit 27.3 (L) 35.0 - 47.0 %    MCV 94 78 - 100 fl    MCH 31.3 26.5 - 33.0 pg    MCHC 33.3 31.5 - 36.5 g/dL    RDW 14.0 10.0 - 15.0 %    Platelet Count 252 150 - 450 10e9/L   Troponin I (now)   Result Value Ref Range    Troponin I ES <0.015 0.000 - 0.045 ug/L   EKG 12 lead   Result Value Ref Range    Interpretation ECG Click View Image link to view waveform and result        ASSESSMENT/PLAN:       1. Primary osteoarthritis of left knee  Healing well, moderate acute blood loss anemia as expected following orthopedic surgery, this is stable as her ER hemoglobin was higher than her discharge hemoglobin from [facility].  Follow-up with orthopedic surgery as directed.  I instructed her to have a hemoglobin rechecked in July 2018 as she has had some problems with anemia associated with her chronic kidney disease in the past and actually had some improvement of anemia with an iron infusion in the remote past.  She plans to follow-up with me in July with a hemoglobin and iron studies.    2. Edema, unspecified type  The etiology of her swelling is unclear.  I think it would be reasonable for her to double her dose of torsemide as per patient instructions for a temporary amount of time.  If this does not address her swelling, and a prolonged increased dose of diuretics as needed, a basic metabolic panel will need to be checked next week.  This was explained to the patient and her  in detail.  She will contact me on Monday if her swelling is no better.  Sooner if swelling worsens despite increasing torsemide.    3. Aortic valve insufficiency, etiology of cardiac valve disease unspecified  Since she is now having symptoms that could be attributable to aortic insufficiency AKA leg swelling, I think it would be reasonable for her to have a consultation with a cardiologist to see if a  "transesophageal echocardiogram would be needed to further characterize her aortic valve disease.  She was referred to the [facility].  Her  has a cardiologist at the [facility] and so he may bring her there and instead.  - CARDIOLOGY EVAL ADULT REFERRAL    4. Gastritis without bleeding, unspecified chronicity, unspecified gastritis type  She can try ranitidine as needed for gastrointestinal symptoms associated with using morphine.  - ranitidine (ZANTAC) 300 MG tablet; Take 1 tablet (300 mg) by mouth 2 times daily as needed for heartburn  Dispense: 180 tablet; Refill: 3    5. CKD (chronic kidney disease) stage 3, GFR 30-59 ml/min  This seems stable since [facility].  Patient Instructions   For your swelling, I recommend doubling your usual dose of torsemide from 20mg daily to 40mg daily for Thursday, Friday, Saturday and Sunday.  Then resume your usual dose.  Call on Monday if swelling is no better.  Call sooner if swelling if worsens or new symptoms.    For your nausea, the key is to take as little Morphine as you can get away with.  For your pain, you may safely use over the counter Tylenol (acetaminophen).  You make take 500-1000mg up to three times per day as needed for pain.   If that does not help enough, and you have unbearable pain, then take Morphine as directed.  You may also safely add ranitidine as needed for nausea or stomach upset.      Your swelling may be related to a moderately leaky aortic heart valve.  You should see a cardiologist to understand this better, but it is not an emergency.      You should get the new shingles vaccine \"SHINGRIX\" (not Zostavax) at your pharmacy.      Return here for a pneumonia vaccine (Prevnar 13) when you feel better.            Josh Hollingsworth MD  [facility]    "

## 2018-04-19 NOTE — PATIENT INSTRUCTIONS
"For your swelling, I recommend doubling your usual dose of torsemide from 20mg daily to 40mg daily for Thursday, Friday, Saturday and Sunday.  Then resume your usual dose.  Call on Monday if swelling is no better.  Call sooner if swelling if worsens or new symptoms.    For your nausea, the key is to take as little Morphine as you can get away with.  For your pain, you may safely use over the counter Tylenol (acetaminophen).  You make take 500-1000mg up to three times per day as needed for pain.   If that does not help enough, and you have unbearable pain, then take Morphine as directed.  You may also safely add ranitidine as needed for nausea or stomach upset.      Your swelling may be related to a moderately leaky aortic heart valve.  You should see a cardiologist to understand this better, but it is not an emergency.      You should get the new shingles vaccine \"SHINGRIX\" (not Zostavax) at your pharmacy.      Return here for a pneumonia vaccine (Prevnar 13) when you feel better.  "

## 2018-04-19 NOTE — MR AVS SNAPSHOT
"              After Visit Summary   4/19/2018    Rosa Sotomayor    MRN: [id number]           Patient Information     Date Of Birth          1950        Visit Information        Provider Department      4/19/2018 2:00 PM Josh Hollingsworth MD [facility]        Today's Diagnoses     Primary osteoarthritis of left knee    -  1    Edema, unspecified type        Aortic valve insufficiency, etiology of cardiac valve disease unspecified        Gastritis without bleeding, unspecified chronicity, unspecified gastritis type        CKD (chronic kidney disease) stage 3, GFR 30-59 ml/min          Care Instructions    For your swelling, I recommend doubling your usual dose of torsemide from 20mg daily to 40mg daily for Thursday, Friday, Saturday and Sunday.  Then resume your usual dose.  Call on Monday if swelling is no better.  Call sooner if swelling if worsens or new symptoms.    For your nausea, the key is to take as little Morphine as you can get away with.  For your pain, you may safely use over the counter Tylenol (acetaminophen).  You make take 500-1000mg up to three times per day as needed for pain.   If that does not help enough, and you have unbearable pain, then take Morphine as directed.  You may also safely add ranitidine as needed for nausea or stomach upset.      Your swelling may be related to a moderately leaky aortic heart valve.  You should see a cardiologist to understand this better, but it is not an emergency.      You should get the new shingles vaccine \"SHINGRIX\" (not Zostavax) at your pharmacy.      Return here for a pneumonia vaccine (Prevnar 13) when you feel better.          Follow-ups after your visit        Additional Services     CARDIOLOGY EVAL ADULT REFERRAL       [facility] [phone]   [email or web address]    Petrona Wiley MD    Please be aware that " "coverage of these services is subject to the terms and limitations of your health insurance plan.  Call member services at your health plan with any benefit or coverage questions.      Please bring the following to your appointment:  Any x-rays, CTs or MRIs which have been performed. Contact the facility where they were done to arrange for  prior to your scheduled appointment.    List of current medications  This referral request   Any documents/labs given to you for this referral                  Who to contact     If you have questions or need follow up information about today's clinic visit or your schedule please contact [facility] directly at [phone].  Normal or non-critical lab and imaging results will be communicated to you by MyChart, letter or phone within 4 business days after the clinic has received the results. If you do not hear from us within 7 days, please contact the clinic through Sinequahart or phone. If you have a critical or abnormal lab result, we will notify you by phone as soon as possible.  Submit refill requests through Spoqa or call your pharmacy and they will forward the refill request to us. Please allow 3 business days for your refill to be completed.          Additional Information About Your Visit        MyChart Information     Spoqa gives you secure access to your electronic health record. If you see a primary care provider, you can also send messages to your care team and make appointments. If you have questions, please call your primary care clinic.  If you do not have a primary care provider, please call [phone] and they will assist you.        Care EveryWhere ID     This is your Care EveryWhere ID. This could be used by other organizations to access your [place] medical records  [id number]        Your Vitals Were     Pulse Temperature Height Pulse Oximetry BMI (Body Mass Index)       84 99.2  F (37.3  C) (Oral) 5' 4\" (1.626 m) 97% 32.79 kg/m2        " Blood Pressure from Last 3 Encounters:   04/19/18 125/61   04/16/18 140/49   03/10/18 152/88    Weight from Last 3 Encounters:   04/19/18 191 lb (86.6 kg)   04/16/18 180 lb (81.6 kg)   12/22/17 175 lb 6.4 oz (79.6 kg)              We Performed the Following     CARDIOLOGY EVAL ADULT REFERRAL          Today's Medication Changes          These changes are accurate as of 4/19/18  2:39 PM.  If you have any questions, ask your nurse or doctor.               Start taking these medicines.        Dose/Directions    ranitidine 300 MG tablet   Commonly known as:  ZANTAC   Used for:  Gastritis without bleeding, unspecified chronicity, unspecified gastritis type   Started by:  Josh Hollingsworth MD        Dose:  300 mg   Take 1 tablet (300 mg) by mouth 2 times daily as needed for heartburn   Quantity:  180 tablet   Refills:  3         These medicines have changed or have updated prescriptions.        Dose/Directions    losartan 100 MG tablet   Commonly known as:  COZAAR   This may have changed:    - how much to take  - when to take this   Used for:  Benign essential hypertension        Dose:  100 mg   Take 1 tablet (100 mg) by mouth At Bedtime   Quantity:  90 tablet   Refills:  1            Where to get your medicines      These medications were sent to [id number] - [place], [place] - [address] AT [place] [address]  [address] [facility] [address]     Phone:  [phone]     ranitidine 300 MG tablet               Information about OPIOIDS     PRESCRIPTION OPIOIDS: WHAT YOU NEED TO KNOW   You have a prescription for an opioid (narcotic) pain medicine. Opioids can cause addiction. If you have a history of chemical dependency of any type, you are at a higher risk of becoming addicted to opioids. Only take this medicine after all other options have been tried. Take it for as short a time and as few doses as possible.     Do not:    Drive. If you drive while taking these medicines, you  could be arrested for driving under the influence (DUI).    Operate heavy machinery    Do any other dangerous activities while taking these medicines.     Drink any alcohol while taking these medicines.      Take with any other medicines that contain acetaminophen. Read all labels carefully. Look for the word  acetaminophen  or  Tylenol.  Ask your pharmacist if you have questions or are unsure.    Store your pills in a secure place, locked if possible. We will not replace any lost or stolen medicine. If you don t finish your medicine, please throw away (dispose) as directed by your pharmacist. The [place] Pollution Control Agency has more information about safe disposal: https://www.pca.[email or web address].[place].us/living-[email or web address]/managing-unwanted-medications    All opioids tend to cause constipation. Drink plenty of water and eat foods that have a lot of fiber, such as fruits, vegetables, prune juice, apple juice and high-fiber cereal. Take a laxative (Miralax, milk of magnesia, Colace, Senna) if you don t move your bowels at least every other day.          Primary Care Provider Office Phone # Fax #    Josh Hollingsworth [phone]       [facility] [address] [facility] [address]  [facility] [address]        Equal Access to Services     LISBET LIPSCOMB AH: Hadii clayton aguilaro Sodonny, waaxda luqadaha, qaybta kaalmada adeegyada, marlene vasquez. So [facility] [place] [phone].    ATENCIÓN: Si habla español, tiene a armendariz disposición servicios gratuitos de asistencia lingüística. Antonio al [phone].    We comply with applicable federal civil rights laws and [place] laws. We do not discriminate on the basis of race, color, national origin, age, disability, sex, sexual orientation, or gender identity.            Thank you!     Thank you for choosing [facility]  for your care. Our goal is always to provide you with excellent care. Hearing back from our patients is one way we can continue to  improve our services. Please take a few minutes to complete the written survey that you may receive in the mail after your visit with us. Thank you!             Your Updated Medication List - Protect others around you: Learn how to safely use, store and throw away your medicines at www.disposemymeds.org.          This list is accurate as of 4/19/18  2:39 PM.  Always use your most recent med list.                   Brand Name Dispense Instructions for use Diagnosis    acetaminophen 500 MG tablet    TYLENOL     Take 500 mg by mouth        allopurinol 100 MG tablet    ZYLOPRIM     Take 100 mg by mouth        * amitriptyline 10 MG tablet    ELAVIL     Take 10 mg by mouth        * amitriptyline 25 MG tablet    ELAVIL     Take 25 mg by mouth        * amitriptyline 25 MG tablet    ELAVIL     Take 25 mg by mouth At Bedtime        aspirin 325 MG EC tablet      Take 325 mg by mouth        atorvastatin 40 MG tablet    LIPITOR    90 tablet    TAKE 1 TABLET(40 MG) BY MOUTH DAILY    Hypertriglyceridemia       B-12 1000 MCG Tbcr     100 tablet    Take 1,000 mcg by mouth daily    Vitamin B12 deficiency (non anemic)       calcium acetate (Phos Binder) 667 MG Tabs     180 tablet    TAKE 1 TABLET BY MOUTH THREE TIMES DAILY    Secondary renal hyperparathyroidism (H)       carvedilol 6.25 MG tablet    COREG    270 tablet    12.5 mg in am and 6.25 mg in pm    Benign essential hypertension       cetirizine 10 MG tablet    zyrTEC     Take 10 mg by mouth        clobetasol 0.05 % external solution    TEMOVATE     Apply topically 2 times daily as needed        Cranberry Powd      1 capsule        cyclobenzaprine 5 MG tablet    FLEXERIL     TK 1 T PO TID PRF MUSCLE SPASMS        EPINEPHrine 0.3 MG/0.3ML injection 2-pack    EPIPEN 2-FRAN    0.6 mL    Inject 0.3 mLs (0.3 mg) into the muscle once as needed for anaphylaxis    Allergic reaction, subsequent encounter       estradiol 0.025 MG/24HR BIW patch    VIVELLE-DOT     Place 1 patch onto the  skin twice a week        hydrOXYzine 25 MG tablet    ATARAX    120 tablet    Take 1 tablet (25 mg) by mouth 3 times daily as needed for itching    Pruritic disorder       ipratropium 0.06 % spray    ATROVENT    3 Box    Spray 2 sprays into both nostrils 4 times daily as needed for rhinitis    Chronic rhinitis, unspecified type       ketoconazole 2 % shampoo    NIZORAL    120 mL    Apply to the affected area and wash off after 5 minutes.    Seborrheic dermatitis       * ASPERCREME LIDOCAINE 4 % Patch   Generic drug:  Lidocaine           * Lidocaine 4 % Patch    LIDOCARE     1 patch        losartan 100 MG tablet    COZAAR    90 tablet    Take 1 tablet (100 mg) by mouth At Bedtime    Benign essential hypertension       Magnesium Oxide 250 MG Tabs      Take 250 mg by mouth        morphine 10 MG/5ML solution      Take 5-10 mg by mouth        PROMETRIUM 100 MG capsule   Generic drug:  progesterone      Take 100 mg by mouth daily        ranitidine 300 MG tablet    ZANTAC    180 tablet    Take 1 tablet (300 mg) by mouth 2 times daily as needed for heartburn    Gastritis without bleeding, unspecified chronicity, unspecified gastritis type       SUMAtriptan 20 MG/ACT nasal spray    IMITREX    12 each    Spray 1 spray in nostril as needed for migraine May repeat in 2 hours. Max 2 sprays/24 hours.    Intractable migraine without aura and without status migrainosus       torsemide 10 MG tablet    DEMADEX    90 tablet    Take 1 tablet (10 mg) by mouth daily    Edema leg       vitamin D 2000 units tablet      Take 1 tablet by mouth daily        ZOFRAN 4 MG tablet   Generic drug:  ondansetron      Take by mouth every 8 hours as needed for nausea        ZOLMitriptan 5 MG ODT tab    ZOMIG-ZMT     Take 5 mg by mouth        * Notice:  This list has 5 medication(s) that are the same as other medications prescribed for you. Read the directions carefully, and ask your doctor or other care provider to review them with you.

## 2018-04-19 NOTE — NURSING NOTE
"Chief Complaint   Patient presents with     ER F/U       Initial /61 (BP Location: Right arm, Patient Position: Sitting, Cuff Size: Adult Regular)  Pulse 84  Temp 99.2  F (37.3  C) (Oral)  Ht 5' 4\" (1.626 m)  Wt 191 lb (86.6 kg)  SpO2 97%  BMI 32.79 kg/m2 Estimated body mass index is 32.79 kg/(m^2) as calculated from the following:    Height as of this encounter: 5' 4\" (1.626 m).    Weight as of this encounter: 191 lb (86.6 kg).  Medication Reconciliation: complete    Calvin Rivera CMA on 4/19/2018 at 2:01 PM    "

## 2018-05-03 ENCOUNTER — DOCUMENTATION ONLY (OUTPATIENT)
Dept: LAB | Facility: CLINIC | Age: 68
End: 2018-05-03
Payer: COMMERCIAL

## 2018-05-03 ENCOUNTER — DOCUMENTATION ONLY (OUTPATIENT)
Dept: LAB | Facility: CLINIC | Age: 68
End: 2018-05-03

## 2018-05-03 DIAGNOSIS — R60.9 EDEMA, UNSPECIFIED TYPE: Primary | ICD-10-CM

## 2018-05-03 DIAGNOSIS — R60.9 EDEMA, UNSPECIFIED TYPE: ICD-10-CM

## 2018-05-03 DIAGNOSIS — D50.8 OTHER IRON DEFICIENCY ANEMIA: Primary | ICD-10-CM

## 2018-05-03 LAB
ANION GAP SERPL CALCULATED.3IONS-SCNC: 9 MMOL/L (ref 3–14)
BUN SERPL-MCNC: 28 MG/DL (ref 7–30)
CALCIUM SERPL-MCNC: 9.5 MG/DL (ref 8.5–10.1)
CHLORIDE SERPL-SCNC: 103 MMOL/L (ref 94–109)
CO2 SERPL-SCNC: 26 MMOL/L (ref 20–32)
CREAT SERPL-MCNC: 1.47 MG/DL (ref 0.52–1.04)
GFR SERPL CREATININE-BSD FRML MDRD: 36 ML/MIN/1.7M2
GLUCOSE SERPL-MCNC: 130 MG/DL (ref 70–99)
HGB BLD-MCNC: 10.5 G/DL (ref 11.7–15.7)
POTASSIUM SERPL-SCNC: 4.3 MMOL/L (ref 3.4–5.3)
SODIUM SERPL-SCNC: 138 MMOL/L (ref 133–144)

## 2018-05-03 PROCEDURE — 36415 COLL VENOUS BLD VENIPUNCTURE: CPT | Performed by: INTERNAL MEDICINE

## 2018-05-03 PROCEDURE — 83540 ASSAY OF IRON: CPT | Performed by: INTERNAL MEDICINE

## 2018-05-03 PROCEDURE — 83550 IRON BINDING TEST: CPT | Performed by: INTERNAL MEDICINE

## 2018-05-03 PROCEDURE — 80048 BASIC METABOLIC PNL TOTAL CA: CPT | Performed by: INTERNAL MEDICINE

## 2018-05-03 PROCEDURE — 85018 HEMOGLOBIN: CPT | Performed by: INTERNAL MEDICINE

## 2018-05-03 NOTE — PROGRESS NOTES
Patient was drawn in lab and was wanting an iron level test. Lab chandu a 'just in case' purple and red gel tube.  If needed, please place orders.  Thank you lab,

## 2018-05-03 NOTE — PROGRESS NOTES
Patient has lab appointment this morning at 10:00am, but no orders.  Please place orders for Lab, if needed.  Thank you Lab,

## 2018-05-04 LAB
IRON SATN MFR SERPL: 25 % (ref 15–46)
IRON SERPL-MCNC: 60 UG/DL (ref 35–180)
TIBC SERPL-MCNC: 236 UG/DL (ref 240–430)

## 2018-05-05 NOTE — PROGRESS NOTES
The following letter pertains to your most recent diagnostic tests:    -Kidney function is normal (stable) for you (Creatinine, GFR), Sodium is normal for you, Potassium is normal for you, Calcium is normal for you, non-fasting Glucose (blood sugar) is stable for you.      Sincerely,    Dr. Hollingsworth

## 2018-05-05 NOTE — TELEPHONE ENCOUNTER
The following letter pertains to your most recent diagnostic tests:    Your hemoglobin is improving as expected and your iron stores are replete.  As previously discussed, it may take until July for us to see your hemoglobin normalize.       Sincerely,    Dr. Hollingsworth

## 2018-05-30 ENCOUNTER — TRANSFERRED RECORDS (OUTPATIENT)
Dept: HEALTH INFORMATION MANAGEMENT | Facility: CLINIC | Age: 68
End: 2018-05-30

## 2018-06-28 ENCOUNTER — OFFICE VISIT (OUTPATIENT)
Dept: FAMILY MEDICINE | Facility: CLINIC | Age: 68
End: 2018-06-28
Payer: COMMERCIAL

## 2018-06-28 VITALS
OXYGEN SATURATION: 97 % | WEIGHT: 176 LBS | DIASTOLIC BLOOD PRESSURE: 56 MMHG | SYSTOLIC BLOOD PRESSURE: 108 MMHG | BODY MASS INDEX: 30.05 KG/M2 | TEMPERATURE: 98.1 F | HEART RATE: 91 BPM | HEIGHT: 64 IN

## 2018-06-28 DIAGNOSIS — R30.0 DYSURIA: ICD-10-CM

## 2018-06-28 DIAGNOSIS — N39.0 URINARY TRACT INFECTION WITHOUT HEMATURIA, SITE UNSPECIFIED: Primary | ICD-10-CM

## 2018-06-28 LAB
ALBUMIN UR-MCNC: NEGATIVE MG/DL
APPEARANCE UR: CLEAR
BACTERIA #/AREA URNS HPF: ABNORMAL /HPF
BILIRUB UR QL STRIP: NEGATIVE
COLOR UR AUTO: YELLOW
GLUCOSE UR STRIP-MCNC: NEGATIVE MG/DL
HGB UR QL STRIP: NEGATIVE
KETONES UR STRIP-MCNC: NEGATIVE MG/DL
LEUKOCYTE ESTERASE UR QL STRIP: ABNORMAL
NITRATE UR QL: NEGATIVE
NON-SQ EPI CELLS #/AREA URNS LPF: ABNORMAL /LPF
PH UR STRIP: 7 PH (ref 5–7)
RBC #/AREA URNS AUTO: ABNORMAL /HPF
SOURCE: ABNORMAL
SP GR UR STRIP: 1.01 (ref 1–1.03)
UROBILINOGEN UR STRIP-ACNC: 0.2 EU/DL (ref 0.2–1)
WBC #/AREA URNS AUTO: ABNORMAL /HPF

## 2018-06-28 PROCEDURE — 87186 SC STD MICRODIL/AGAR DIL: CPT | Performed by: NURSE PRACTITIONER

## 2018-06-28 PROCEDURE — 87088 URINE BACTERIA CULTURE: CPT | Performed by: NURSE PRACTITIONER

## 2018-06-28 PROCEDURE — 99213 OFFICE O/P EST LOW 20 MIN: CPT | Performed by: NURSE PRACTITIONER

## 2018-06-28 PROCEDURE — 81001 URINALYSIS AUTO W/SCOPE: CPT | Performed by: NURSE PRACTITIONER

## 2018-06-28 PROCEDURE — 87086 URINE CULTURE/COLONY COUNT: CPT | Performed by: NURSE PRACTITIONER

## 2018-06-28 RX ORDER — CEPHALEXIN 500 MG/1
500 CAPSULE ORAL 2 TIMES DAILY
Qty: 14 CAPSULE | Refills: 0 | Status: SHIPPED | OUTPATIENT
Start: 2018-06-28 | End: 2018-07-05

## 2018-06-28 ASSESSMENT — ANXIETY QUESTIONNAIRES
IF YOU CHECKED OFF ANY PROBLEMS ON THIS QUESTIONNAIRE, HOW DIFFICULT HAVE THESE PROBLEMS MADE IT FOR YOU TO DO YOUR WORK, TAKE CARE OF THINGS AT HOME, OR GET ALONG WITH OTHER PEOPLE: NOT DIFFICULT AT ALL
2. NOT BEING ABLE TO STOP OR CONTROL WORRYING: NOT AT ALL
6. BECOMING EASILY ANNOYED OR IRRITABLE: NOT AT ALL
3. WORRYING TOO MUCH ABOUT DIFFERENT THINGS: NOT AT ALL
GAD7 TOTAL SCORE: 0
7. FEELING AFRAID AS IF SOMETHING AWFUL MIGHT HAPPEN: NOT AT ALL
1. FEELING NERVOUS, ANXIOUS, OR ON EDGE: NOT AT ALL
5. BEING SO RESTLESS THAT IT IS HARD TO SIT STILL: NOT AT ALL

## 2018-06-28 ASSESSMENT — PATIENT HEALTH QUESTIONNAIRE - PHQ9: 5. POOR APPETITE OR OVEREATING: NOT AT ALL

## 2018-06-28 NOTE — MR AVS SNAPSHOT
"              After Visit Summary   6/28/2018    Rosa Sotomayor    MRN: [id number]           Patient Information     Date Of Birth          1950        Visit Information        Provider Department      6/28/2018 11:45 AM Lnog Martinez APRN CNP [facility]        Today's Diagnoses     Urinary tract infection without hematuria, site unspecified    -  1    Dysuria           Follow-ups after your visit        Your next 10 appointments already scheduled     Jul 05, 2018 10:15 AM CDT   MA SCREENING BILATERAL W/ SELINA with [id number]   [facility] ([facility])    [address], [address]  [facility] [address]   [phone]           Three-dimensional (3D) mammograms are available at [place] locations in [facility], [place], [facility], [place], [place], and [place]. [facility] locations include [place] [facility] in [place]. Benefits of 3D mammograms include: - Improved rate of cancer detection - Decreases your chance of having to go back for more tests, which means fewer: - \"False-positive\" results (This means that there is an abnormal area but it isn't cancer.) - Invasive testing procedures, such as a biopsy or surgery - Can provide clearer images of the breast if you have dense breast tissue. 3D mammography is an optional exam that anyone can have with a 2D mammogram. It doesn't replace or take the place of a 2D mammogram. 2D mammograms remain an effective screening test for all women.  Not all insurance companies cover the cost of a 3D mammogram. Check with your insurance.            Jul 18, 2018  1:30 PM CDT   Office Visit with Josh Hollingsworth MD   [facility] ([facility]    [address] [address] [phone]           Bring a current list of meds and any records pertaining to this visit. For Physicals, please bring immunization records and any forms " "needing to be filled out. Please arrive 10 minutes early to complete paperwork.              Who to contact     If you have questions or need follow up information about today's clinic visit or your schedule please contact [facility] directly at [phone].  Normal or non-critical lab and imaging results will be communicated to you by MetaMedhart, letter or phone within 4 business days after the clinic has received the results. If you do not hear from us within 7 days, please contact the clinic through Inktdt or phone. If you have a critical or abnormal lab result, we will notify you by phone as soon as possible.  Submit refill requests through Future Simple or call your pharmacy and they will forward the refill request to us. Please allow 3 business days for your refill to be completed.          Additional Information About Your Visit        MetaMedharIntamac Systems Information     Future Simple gives you secure access to your electronic health record. If you see a primary care provider, you can also send messages to your care team and make appointments. If you have questions, please call your primary care clinic.  If you do not have a primary care provider, please call [phone] and they will assist you.        Care EveryWhere ID     This is your Care EveryWhere ID. This could be used by other organizations to access your [place] medical records  [id number]        Your Vitals Were     Pulse Temperature Height Pulse Oximetry BMI (Body Mass Index)       91 98.1  F (36.7  C) (Oral) 5' 4\" (1.626 m) 97% 30.21 kg/m2        Blood Pressure from Last 3 Encounters:   06/28/18 108/56   04/19/18 125/61   04/16/18 140/49    Weight from Last 3 Encounters:   06/28/18 176 lb (79.8 kg)   04/19/18 191 lb (86.6 kg)   04/16/18 180 lb (81.6 kg)              We Performed the Following     UA reflex to Microscopic and Culture     Urine Culture Aerobic Bacterial     Urine Microscopic          Today's Medication Changes          These changes are " accurate as of 6/28/18 11:54 AM.  If you have any questions, ask your nurse or doctor.               Start taking these medicines.        Dose/Directions    cephALEXin 500 MG capsule   Commonly known as:  KEFLEX   Used for:  Urinary tract infection without hematuria, site unspecified   Started by:  Long Martinez APRN CNP        Dose:  500 mg   Take 1 capsule (500 mg) by mouth 2 times daily for 7 days   Quantity:  14 capsule   Refills:  0         These medicines have changed or have updated prescriptions.        Dose/Directions    * amitriptyline 10 MG tablet   Commonly known as:  ELAVIL   This may have changed:  Another medication with the same name was removed. Continue taking this medication, and follow the directions you see here.   Changed by:  Long Martinez APRN CNP        Dose:  10 mg   Take 10 mg by mouth   Refills:  0       * amitriptyline 25 MG tablet   Commonly known as:  ELAVIL   This may have changed:  Another medication with the same name was removed. Continue taking this medication, and follow the directions you see here.   Changed by:  Long Martinez APRN CNP        Dose:  25 mg   Take 25 mg by mouth At Bedtime   Refills:  0       losartan 100 MG tablet   Commonly known as:  COZAAR   This may have changed:    - how much to take  - when to take this   Used for:  Benign essential hypertension        Dose:  100 mg   Take 1 tablet (100 mg) by mouth At Bedtime   Quantity:  90 tablet   Refills:  1       * Notice:  This list has 2 medication(s) that are the same as other medications prescribed for you. Read the directions carefully, and ask your doctor or other care provider to review them with you.         Where to get your medicines      These medications were sent to [facility] - [place], [place] - [address] AT [facility] [place] [address] [facility]  [address], [place] [address]     Phone:  [phone]     cephALEXin 500 MG capsule                Primary  Care Provider Office Phone # Fax #    Josh Hollingsworth [phone] [address] [address]        Equal Access to Services     DEANDREJUSTIN RUEL : Hadii aad ku hadmauricioo Socharisali, waaxda luqadaha, qaybta kaalmada adejoshuayada, marlene khanshani pedro. So [facility] [phone].    ATENCIÓN: Si habla español, tiene a armendariz disposición servicios gratuitos de asistencia lingüística. Llame al [phone].    We comply with applicable federal civil rights laws and [place] laws. We do not discriminate on the basis of race, color, national origin, age, disability, sex, sexual orientation, or gender identity.            Thank you!     Thank you for choosing [facility]  for your care. Our goal is always to provide you with excellent care. Hearing back from our patients is one way we can continue to improve our services. Please take a few minutes to complete the written survey that you may receive in the mail after your visit with us. Thank you!             Your Updated Medication List - Protect others around you: Learn how to safely use, store and throw away your medicines at www.disposemymeds.org.          This list is accurate as of 6/28/18 11:54 AM.  Always use your most recent med list.                   Brand Name Dispense Instructions for use Diagnosis    acetaminophen 500 MG tablet    TYLENOL     Take 500 mg by mouth        allopurinol 100 MG tablet    ZYLOPRIM     Take 100 mg by mouth        * amitriptyline 10 MG tablet    ELAVIL     Take 10 mg by mouth        * amitriptyline 25 MG tablet    ELAVIL     Take 25 mg by mouth At Bedtime        atorvastatin 40 MG tablet    LIPITOR    90 tablet    TAKE 1 TABLET(40 MG) BY MOUTH DAILY    Hypertriglyceridemia       B-12 1000 MCG Tbcr     100 tablet    Take 1,000 mcg by mouth daily    Vitamin B12 deficiency (non anemic)       calcium acetate (Phos Binder) 667 MG Tabs     180 tablet    TAKE 1 TABLET BY MOUTH THREE TIMES  DAILY    Secondary renal hyperparathyroidism (H)       carvedilol 6.25 MG tablet    COREG    270 tablet    12.5 mg in am and 6.25 mg in pm    Benign essential hypertension       cephALEXin 500 MG capsule    KEFLEX    14 capsule    Take 1 capsule (500 mg) by mouth 2 times daily for 7 days    Urinary tract infection without hematuria, site unspecified       cetirizine 10 MG tablet    zyrTEC     Take 10 mg by mouth        clobetasol 0.05 % external solution    TEMOVATE     Apply topically 2 times daily as needed        Cranberry Powd      1 capsule        EPINEPHrine 0.3 MG/0.3ML injection 2-pack    EPIPEN 2-FRAN    0.6 mL    Inject 0.3 mLs (0.3 mg) into the muscle once as needed for anaphylaxis    Allergic reaction, subsequent encounter       hydrOXYzine 25 MG tablet    ATARAX    120 tablet    Take 1 tablet (25 mg) by mouth 3 times daily as needed for itching    Pruritic disorder       ipratropium 0.06 % spray    ATROVENT    3 Box    Spray 2 sprays into both nostrils 4 times daily as needed for rhinitis    Chronic rhinitis, unspecified type       ketoconazole 2 % shampoo    NIZORAL    120 mL    Apply to the affected area and wash off after 5 minutes.    Seborrheic dermatitis       losartan 100 MG tablet    COZAAR    90 tablet    Take 1 tablet (100 mg) by mouth At Bedtime    Benign essential hypertension       Magnesium Oxide 250 MG Tabs      Take 250 mg by mouth        SUMAtriptan 20 MG/ACT nasal spray    IMITREX    12 each    Spray 1 spray in nostril as needed for migraine May repeat in 2 hours. Max 2 sprays/24 hours.    Intractable migraine without aura and without status migrainosus       torsemide 10 MG tablet    DEMADEX    90 tablet    Take 1 tablet (10 mg) by mouth daily    Edema leg       vitamin D 2000 units tablet      Take 1 tablet by mouth daily        ZOFRAN 4 MG tablet   Generic drug:  ondansetron      Take by mouth every 8 hours as needed for nausea        ZOLMitriptan 5 MG ODT tab    ZOMIG-ZMT     Take 5  mg by mouth        * Notice:  This list has 2 medication(s) that are the same as other medications prescribed for you. Read the directions carefully, and ask your doctor or other care provider to review them with you.

## 2018-06-28 NOTE — PROGRESS NOTES
HPI      SUBJECTIVE:   Rosa Sotomayor is a 67 year old female who presents to clinic today for the following health issues:      URINARY TRACT SYMPTOMS      Duration: 4 days    Description  dysuria, frequency, urgency and burning sensation    Intensity:  moderate    Accompanying signs and symptoms:  Fever/chills: no   Flank pain no   Nausea and vomiting: YES- nausea last night  Vaginal symptoms: none  Abdominal/Pelvic Pain: no     History  History of frequent UTI's: no   History of kidney stones: no   Sexually Active: YES  Possibility of pregnancy: No    Precipitating or alleviating factors: None    Therapies tried and outcome: none   Outcome: NA      Symptoms started 4-5 days ago   Burning, frequency  Had migraine, nausea last night   No abdominal pain, fever, chills   No blood in urine, odor   Used to get UTIs a lot but so frequently anymore   Was treated in March for borderline positive UA      Past Medical History:   Diagnosis Date     Aortic valve insufficiency 4/19/2018     Chronic kidney disease      Dizziness and giddiness      Family History   Problem Relation Age of Onset     Diabetes Mother      INSULIN     Hypertension Mother      Eye Disorder Mother      CATERACTS     HEART DISEASE Mother      CHF- OPEN HEART SURG X'S 2     Lipids Mother      Obesity Mother      Coronary Artery Disease Mother      Diabetes Father      ORAL     Hypertension Father      Arthritis Father      Eye Disorder Father      MAC DEGENERATION     HEART DISEASE Father      CHF     Lipids Father      Obesity Father      Diabetes Maternal Grandfather      INSULIN     Diabetes Brother      INSULIN     GASTROINTESTINAL DISEASE Brother      CHOLITISI POSSIBLE CHRONES     Obesity Brother      Colon Cancer No family hx of      Breast Cancer No family hx of      Past Surgical History:   Procedure Laterality Date     C NONSPECIFIC PROCEDURE      wisdom teeth     C NONSPECIFIC PROCEDURE  2005    Varicose Veins     C TOTAL HIP ARTHROPLASTY       left     C TOTAL KNEE ARTHROPLASTY      right     TUBAL LIGATION  1987     Social History   Substance Use Topics     Smoking status: Never Smoker     Smokeless tobacco: Never Used     Alcohol use No     Current Outpatient Prescriptions   Medication Sig Dispense Refill     acetaminophen (TYLENOL) 500 MG tablet Take 500 mg by mouth       allopurinol (ZYLOPRIM) 100 MG tablet Take 100 mg by mouth       amitriptyline (ELAVIL) 10 MG tablet Take 10 mg by mouth       amitriptyline (ELAVIL) 25 MG tablet Take 25 mg by mouth At Bedtime  0     atorvastatin (LIPITOR) 40 MG tablet TAKE 1 TABLET(40 MG) BY MOUTH DAILY 90 tablet 3     calcium acetate, Phos Binder, 667 MG TABS TAKE 1 TABLET BY MOUTH THREE TIMES DAILY 180 tablet 3     carvedilol (COREG) 6.25 MG tablet 12.5 mg in am and 6.25 mg in pm 270 tablet 0     cetirizine (ZYRTEC) 10 MG tablet Take 10 mg by mouth       Cholecalciferol (VITAMIN D) 2000 UNITS tablet Take 1 tablet by mouth daily       clobetasol (TEMOVATE) 0.05 % external solution Apply topically 2 times daily as needed  5     Cranberry POWD 1 capsule       Cyanocobalamin (B-12) 1000 MCG TBCR Take 1,000 mcg by mouth daily 100 tablet 1     EPINEPHrine (EPIPEN 2-FRAN) 0.3 MG/0.3ML injection 2-pack Inject 0.3 mLs (0.3 mg) into the muscle once as needed for anaphylaxis 0.6 mL 1     hydrOXYzine (ATARAX) 25 MG tablet Take 1 tablet (25 mg) by mouth 3 times daily as needed for itching 120 tablet 11     ipratropium (ATROVENT) 0.06 % spray Spray 2 sprays into both nostrils 4 times daily as needed for rhinitis 3 Box 3     ketoconazole (NIZORAL) 2 % shampoo Apply to the affected area and wash off after 5 minutes. 120 mL 1     losartan (COZAAR) 100 MG tablet Take 1 tablet (100 mg) by mouth At Bedtime (Patient taking differently: Take 50 mg by mouth 2 times daily ) 90 tablet 1     Magnesium Oxide 250 MG TABS Take 250 mg by mouth       ondansetron (ZOFRAN) 4 MG tablet Take by mouth every 8 hours as needed for nausea        "SUMAtriptan (IMITREX) 20 MG/ACT nasal spray Spray 1 spray in nostril as needed for migraine May repeat in 2 hours. Max 2 sprays/24 hours. 12 each 11     torsemide (DEMADEX) 10 MG tablet Take 1 tablet (10 mg) by mouth daily 90 tablet 3     ZOLMitriptan (ZOMIG-ZMT) 5 MG ODT tab Take 5 mg by mouth       [DISCONTINUED] amitriptyline (ELAVIL) 25 MG tablet Take 25 mg by mouth       Allergies   Allergen Reactions     Fentanyl Anaphylaxis     Midazolam Anaphylaxis     Propofol Anaphylaxis     Anesthetic [Amides]      Anaphalactic shock     Codeine      Erythromycin      ointment     Gabapentin      chest heaviness with breathing     Hydromorphone Itching     Tolerates morphine     Lorazepam Itching     Other [Seasonal Allergies] Anaphylaxis     GENERAL ANETHESIA       Penicillins      rash     Eucalyptus Oil Rash     hives     Nitrofuran Derivatives Rash     Sulfa Drugs Itching and Rash     rash       Reviewed and updated as needed this visit by clinical staff and provider     ROS  Detailed as above       /56 (BP Location: Left arm, Cuff Size: Adult Large)  Pulse 91  Temp 98.1  F (36.7  C) (Oral)  Ht 5' 4\" (1.626 m)  Wt 176 lb (79.8 kg)  SpO2 97%  BMI 30.21 kg/m2    Physical Exam   Constitutional: She is well-developed, well-nourished, and in no distress.   Pulmonary/Chest: Effort normal.   Neurological: She is alert.   Psychiatric: Mood and affect normal.   Vitals reviewed.      Assessment and Plan:       ICD-10-CM    1. Urinary tract infection without hematuria, site unspecified N39.0 cephALEXin (KEFLEX) 500 MG capsule   2. Dysuria R30.0 UA reflex to Microscopic and Culture     Urine Microscopic     Urine Culture Aerobic Bacterial       Borderline UA. Will treat considering she had similar case in March. Culture pending. Push fluids. Call or rtc prn       Long Martinez, APRN, CNP  [facility]        "

## 2018-06-29 ASSESSMENT — ANXIETY QUESTIONNAIRES: GAD7 TOTAL SCORE: 0

## 2018-06-29 ASSESSMENT — PATIENT HEALTH QUESTIONNAIRE - PHQ9: SUM OF ALL RESPONSES TO PHQ QUESTIONS 1-9: 0

## 2018-06-30 LAB
BACTERIA SPEC CULT: ABNORMAL
SPECIMEN SOURCE: ABNORMAL

## 2018-07-03 NOTE — PROGRESS NOTES
Rosa   The antibiotics we gave you most likely would have helped, though they did not test it with this bacteria. Let me know if you still have symptoms and I will send a different antibiotic for you.   Thanks  Long

## 2018-07-05 ENCOUNTER — HOSPITAL ENCOUNTER (OUTPATIENT)
Dept: MAMMOGRAPHY | Facility: CLINIC | Age: 68
Discharge: HOME | End: 2018-07-05
Attending: OBSTETRICS & GYNECOLOGY | Admitting: OBSTETRICS & GYNECOLOGY
Payer: MEDICARE

## 2018-07-05 DIAGNOSIS — Z12.39 BREAST CANCER SCREENING: ICD-10-CM

## 2018-07-05 PROCEDURE — 77063 BREAST TOMOSYNTHESIS BI: CPT

## 2018-07-09 ENCOUNTER — OFFICE VISIT (OUTPATIENT)
Dept: FAMILY MEDICINE | Facility: CLINIC | Age: 68
End: 2018-07-09
Payer: COMMERCIAL

## 2018-07-09 VITALS
BODY MASS INDEX: 29.88 KG/M2 | WEIGHT: 175 LBS | HEIGHT: 64 IN | TEMPERATURE: 97.8 F | SYSTOLIC BLOOD PRESSURE: 158 MMHG | OXYGEN SATURATION: 99 % | HEART RATE: 99 BPM | DIASTOLIC BLOOD PRESSURE: 84 MMHG

## 2018-07-09 DIAGNOSIS — D50.8 OTHER IRON DEFICIENCY ANEMIA: Primary | ICD-10-CM

## 2018-07-09 DIAGNOSIS — N39.0 URINARY TRACT INFECTION WITHOUT HEMATURIA, SITE UNSPECIFIED: ICD-10-CM

## 2018-07-09 LAB
ALBUMIN UR-MCNC: NEGATIVE MG/DL
APPEARANCE UR: CLEAR
BACTERIA #/AREA URNS HPF: ABNORMAL /HPF
BILIRUB UR QL STRIP: NEGATIVE
COLOR UR AUTO: YELLOW
GLUCOSE UR STRIP-MCNC: NEGATIVE MG/DL
HGB BLD-MCNC: 11.6 G/DL (ref 11.7–15.7)
HGB UR QL STRIP: ABNORMAL
KETONES UR STRIP-MCNC: NEGATIVE MG/DL
LEUKOCYTE ESTERASE UR QL STRIP: ABNORMAL
NITRATE UR QL: NEGATIVE
PH UR STRIP: 6 PH (ref 5–7)
RBC #/AREA URNS AUTO: ABNORMAL /HPF
SOURCE: ABNORMAL
SP GR UR STRIP: 1.01 (ref 1–1.03)
UROBILINOGEN UR STRIP-ACNC: 0.2 EU/DL (ref 0.2–1)
WBC #/AREA URNS AUTO: >100 /HPF

## 2018-07-09 PROCEDURE — 87086 URINE CULTURE/COLONY COUNT: CPT | Performed by: INTERNAL MEDICINE

## 2018-07-09 PROCEDURE — 36415 COLL VENOUS BLD VENIPUNCTURE: CPT | Performed by: INTERNAL MEDICINE

## 2018-07-09 PROCEDURE — 87186 SC STD MICRODIL/AGAR DIL: CPT | Performed by: INTERNAL MEDICINE

## 2018-07-09 PROCEDURE — 83540 ASSAY OF IRON: CPT | Performed by: INTERNAL MEDICINE

## 2018-07-09 PROCEDURE — 83550 IRON BINDING TEST: CPT | Performed by: INTERNAL MEDICINE

## 2018-07-09 PROCEDURE — 85018 HEMOGLOBIN: CPT | Performed by: INTERNAL MEDICINE

## 2018-07-09 PROCEDURE — 81001 URINALYSIS AUTO W/SCOPE: CPT | Performed by: INTERNAL MEDICINE

## 2018-07-09 PROCEDURE — 99213 OFFICE O/P EST LOW 20 MIN: CPT | Performed by: INTERNAL MEDICINE

## 2018-07-09 PROCEDURE — 87088 URINE BACTERIA CULTURE: CPT | Performed by: INTERNAL MEDICINE

## 2018-07-09 RX ORDER — TRAMADOL HYDROCHLORIDE 50 MG/1
1 TABLET ORAL EVERY 6 HOURS PRN
Refills: 0 | COMMUNITY
Start: 2018-06-11 | End: 2020-10-01

## 2018-07-09 RX ORDER — AMOXICILLIN 500 MG/1
500 TABLET, FILM COATED ORAL 2 TIMES DAILY
Qty: 20 TABLET | Refills: 0 | Status: SHIPPED | OUTPATIENT
Start: 2018-07-09 | End: 2018-07-11

## 2018-07-09 NOTE — PROGRESS NOTES
SUBJECTIVE:   Rosa Sotomayor is a 67 year old female who presents to clinic today for the following health issues:      Follow up recent UTI, recheck hgb    Pleasant 67-year-old female treated with Keflex for a enterococcal urinary tract infection at the end of June.  Her symptoms improved while she was on antibiotics, but re-occurred shortly after stopping antibiotics.  Her symptoms include severe dysuria, urinary frequency and urgency.  She denies fevers, chills, flank pain, nausea, vomiting.  Also, she was noted to have anemia following her total knee arthroplasty.  Her most recent hemoglobin demonstrated improvement with normal iron studies.  She has mild chronic anemia related to her chronic kidney disease.  She wishes to have her hemoglobin rechecked today.  She states that she has an allergy to penicillin although this was a mild skin itching without rash that occurred decades ago.  She had a similar reaction to Macrobid in the past as well.  She had an adverse reaction to vancomycin when it was used as a prophylactic antibiotic.    Problem list and histories reviewed & adjusted, as indicated.  Additional history: as documented    Patient Active Problem List   Diagnosis     Postmenopausal bleeding     Personal history of allergy to anesthetic agent     CKD (chronic kidney disease) stage 3, GFR 30-59 ml/min     Hypertriglyceridemia     Arnold-Chiari malformation (H)     Hyperlipidemia LDL goal <130     Periodic headache syndrome, not intractable     Anxiety     Secondary renal hyperparathyroidism (H)     Lumbago     Chronic bilateral low back pain without sciatica     Benign essential hypertension     Renal artery stenosis (H)     Macular degeneration, bilateral     Anemia, iron deficiency     Intractable migraine without aura and without status migrainosus     Aortic valve insufficiency     Past Surgical History:   Procedure Laterality Date     C NONSPECIFIC PROCEDURE      wisdom teeth     C NONSPECIFIC  PROCEDURE  2005    Varicose Veins     C TOTAL HIP ARTHROPLASTY      left     C TOTAL KNEE ARTHROPLASTY      right     TUBAL LIGATION  1987       Social History   Substance Use Topics     Smoking status: Never Smoker     Smokeless tobacco: Never Used     Alcohol use No     Family History   Problem Relation Age of Onset     Diabetes Mother      INSULIN     Hypertension Mother      Eye Disorder Mother      CATERACTS     HEART DISEASE Mother      CHF- OPEN HEART SURG X'S 2     Lipids Mother      Obesity Mother      Coronary Artery Disease Mother      Diabetes Father      ORAL     Hypertension Father      Arthritis Father      Eye Disorder Father      MAC DEGENERATION     HEART DISEASE Father      CHF     Lipids Father      Obesity Father      Diabetes Maternal Grandfather      INSULIN     Diabetes Brother      INSULIN     GASTROINTESTINAL DISEASE Brother      CHOLITISI POSSIBLE CHRONES     Obesity Brother      Colon Cancer No family hx of      Breast Cancer No family hx of          Current Outpatient Prescriptions   Medication Sig Dispense Refill     acetaminophen (TYLENOL) 500 MG tablet Take 500 mg by mouth       allopurinol (ZYLOPRIM) 100 MG tablet Take 100 mg by mouth       amitriptyline (ELAVIL) 10 MG tablet Take 10 mg by mouth       amitriptyline (ELAVIL) 25 MG tablet Take 25 mg by mouth At Bedtime  0     amoxicillin (AMOXIL) 500 MG tablet Take 1 tablet (500 mg) by mouth 2 times daily 20 tablet 0     atorvastatin (LIPITOR) 40 MG tablet TAKE 1 TABLET(40 MG) BY MOUTH DAILY 90 tablet 3     carvedilol (COREG) 6.25 MG tablet 12.5 mg in am and 6.25 mg in pm 270 tablet 0     cetirizine (ZYRTEC) 10 MG tablet Take 10 mg by mouth       Cholecalciferol (VITAMIN D) 2000 UNITS tablet Take 1 tablet by mouth daily       clobetasol (TEMOVATE) 0.05 % external solution Apply topically 2 times daily as needed  5     Cranberry POWD 1 capsule       Cyanocobalamin (B-12) 1000 MCG TBCR Take 1,000 mcg by mouth daily 100 tablet 1      EPINEPHrine (EPIPEN 2-FRAN) 0.3 MG/0.3ML injection 2-pack Inject 0.3 mLs (0.3 mg) into the muscle once as needed for anaphylaxis 0.6 mL 1     hydrOXYzine (ATARAX) 25 MG tablet Take 1 tablet (25 mg) by mouth 3 times daily as needed for itching 120 tablet 11     ketoconazole (NIZORAL) 2 % shampoo Apply to the affected area and wash off after 5 minutes. 120 mL 1     losartan (COZAAR) 100 MG tablet Take 1 tablet (100 mg) by mouth At Bedtime (Patient taking differently: Take 50 mg by mouth 2 times daily ) 90 tablet 1     Magnesium Oxide 250 MG TABS Take 250 mg by mouth       ondansetron (ZOFRAN) 4 MG tablet Take by mouth every 8 hours as needed for nausea       SUMAtriptan (IMITREX) 20 MG/ACT nasal spray Spray 1 spray in nostril as needed for migraine May repeat in 2 hours. Max 2 sprays/24 hours. 12 each 11     torsemide (DEMADEX) 10 MG tablet Take 1 tablet (10 mg) by mouth daily 90 tablet 3     traMADol (ULTRAM) 50 MG tablet Take 1 tablet by mouth every 6 hours as needed  0     ZOLMitriptan (ZOMIG-ZMT) 5 MG ODT tab Take 5 mg by mouth       calcium acetate, Phos Binder, 667 MG TABS TAKE 1 TABLET BY MOUTH THREE TIMES DAILY (Patient not taking: Reported on 7/9/2018) 180 tablet 3     ipratropium (ATROVENT) 0.06 % spray Spray 2 sprays into both nostrils 4 times daily as needed for rhinitis (Patient not taking: Reported on 7/9/2018) 3 Box 3     Allergies   Allergen Reactions     Fentanyl Anaphylaxis     Midazolam Anaphylaxis     Propofol Anaphylaxis     Anesthetic [Amides]      Anaphalactic shock     Codeine      Erythromycin      ointment     Gabapentin      chest heaviness with breathing     Hydromorphone Itching     Tolerates morphine     Lorazepam Itching     Other [Seasonal Allergies] Anaphylaxis     GENERAL ANETHESIA       Penicillins      rash     Eucalyptus Oil Rash     hives     Nitrofuran Derivatives Rash     Sulfa Drugs Itching and Rash     rash       Reviewed and updated as needed this visit by clinical staff      "  Reviewed and updated as needed this visit by Provider         ROS:      OBJECTIVE:     /84 (BP Location: Right arm, Patient Position: Sitting, Cuff Size: Adult Regular)  Pulse 99  Temp 97.8  F (36.6  C) (Oral)  Ht 5' 4\" (1.626 m)  Wt 175 lb (79.4 kg)  SpO2 99%  BMI 30.04 kg/m2  Body mass index is 30.04 kg/(m^2).  General: This is a well-appearing, nontoxic-appearing female in no acute distress    Diagnostic Test Results:  Results for orders placed or performed in visit on 07/09/18   *UA reflex to Microscopic and Culture ([facility] (except [place] and [place])   Result Value Ref Range    Color Urine Yellow     Appearance Urine Clear     Glucose Urine Negative NEG^Negative mg/dL    Bilirubin Urine Negative NEG^Negative    Ketones Urine Negative NEG^Negative mg/dL    Specific Gravity Urine 1.010 1.003 - 1.035    Blood Urine Small (A) NEG^Negative    pH Urine 6.0 5.0 - 7.0 pH    Protein Albumin Urine Negative NEG^Negative mg/dL    Urobilinogen Urine 0.2 0.2 - 1.0 EU/dL    Nitrite Urine Negative NEG^Negative    Leukocyte Esterase Urine Moderate (A) NEG^Negative    Source Midstream Urine    Urine Microscopic   Result Value Ref Range    WBC Urine >100 (A) OTO5^0 - 5 /HPF    RBC Urine O - 2 OTO2^O - 2 /HPF    Bacteria Urine Many (A) NEG^Negative /HPF   Hemoglobin   Result Value Ref Range    Hemoglobin 11.6 (L) 11.7 - 15.7 g/dL       ASSESSMENT/PLAN:         ICD-10-CM    1. Other iron deficiency anemia D50.8 Hemoglobin     Iron and iron binding capacity   2. Urinary tract infection without hematuria, site unspecified N39.0 *UA reflex to Microscopic and Culture ([facility] (except [place] and [place])     Urine Microscopic     Urine Culture Aerobic Bacterial     amoxicillin (AMOXIL) 500 MG tablet     We discussed options for treating her urinary tract infection.  Her treatment options are limited given her multiple drug allergies.  After long discussion, we decided to try " oral amoxicillin to treat her urinary tract infection.  We decided that we would monitor closely for signs of an allergic reaction and stop the medication immediately and contact me if that is the case.  Overall, I think the benefits using amoxicillin to treat the urinary tract infection outweigh the potential risks.  We decided to recheck her hemoglobin and iron stores today as well.    FUTURE APPOINTMENTS:       - Follow-up visit: She has an appointment to see me next week regarding her anemia.  If her labs are improving today and her symptoms respond to amoxicillin and it is well-tolerated, then that appointment may not be necessary.  However, we decided to keep the appointment just in case the amoxicillin is not well tolerated or further evaluation of her anemia is necessary    Total face to face contact time was greater than 20 minutes of which more than 50% of this time was spent counseling and coordinating care regarding the above topics.      Josh Hollingsworth MD  [facility]

## 2018-07-09 NOTE — MR AVS SNAPSHOT
After Visit Summary   7/9/2018    Rosa Sotomayor    MRN: [id number]           Patient Information     Date Of Birth          1950        Visit Information        Provider Department      7/9/2018 4:45 PM Josh Hollingsworth MD [facility]        Today's Diagnoses     Other iron deficiency anemia    -  1    Urinary tract infection without hematuria, site unspecified           Follow-ups after your visit        Your next 10 appointments already scheduled     Jul 18, 2018  1:30 PM CDT   [facility] Visit with [facility] Hollingsworth MD   [facility]    [address] [facility] [id number] [phone]           Bring a current list of meds and any records pertaining to this visit. For Physicals, please bring immunization records and any forms needing to be filled out. Please arrive 10 minutes early to complete paperwork.              Who to contact     If you have questions or need follow up information about today's clinic visit or your schedule please contact [facility] directly at [phone].  Normal or non-critical lab and imaging results will be communicated to you by Amie Streethart, letter or phone within 4 business days after the clinic has received the results. If you do not hear from us within 7 days, please contact the clinic through [employer] [facility] or phone. If you have a critical or abnormal lab result, we will notify you by phone as soon as possible.  Submit refill requests through [facility] or call your pharmacy and they will forward the refill request to us. Please allow 3 business days for your refill to be completed.          Additional Information About Your Visit        Amie Streethart Information     [facility] gives you secure access to your electronic health record. If you see a primary care provider, you can also send messages to your care team and make appointments. If you have questions, please call your primary care clinic.  If you do not have a  "primary care provider, please call [phone] and they will assist you.        Care EveryWhere ID     This is your Care EveryWhere ID. This could be used by other organizations to access your [place] medical records  [id number]        Your Vitals Were     Pulse Temperature Height Pulse Oximetry BMI (Body Mass Index)       99 97.8  F (36.6  C) (Oral) 5' 4\" (1.626 m) 99% 30.04 kg/m2        Blood Pressure from Last 3 Encounters:   07/09/18 158/84   06/28/18 108/56   04/19/18 125/61    Weight from Last 3 Encounters:   07/09/18 175 lb (79.4 kg)   06/28/18 176 lb (79.8 kg)   04/19/18 191 lb (86.6 kg)              We Performed the Following     *UA reflex to Microscopic and Culture ([place] and [facility] (except [place] and [place])     Hemoglobin     Iron and iron binding capacity     Urine Culture Aerobic Bacterial     Urine Microscopic          Today's Medication Changes          These changes are accurate as of 7/9/18  6:19 PM.  If you have any questions, ask your nurse or doctor.               Start taking these medicines.        Dose/Directions    amoxicillin 500 MG tablet   Commonly known as:  AMOXIL   Used for:  Urinary tract infection without hematuria, site unspecified   Started by:  Josh Hollingsworth MD        Dose:  500 mg   Take 1 tablet (500 mg) by mouth 2 times daily   Quantity:  20 tablet   Refills:  0         These medicines have changed or have updated prescriptions.        Dose/Directions    losartan 100 MG tablet   Commonly known as:  COZAAR   This may have changed:    - how much to take  - when to take this   Used for:  Benign essential hypertension        Dose:  100 mg   Take 1 tablet (100 mg) by mouth At Bedtime   Quantity:  90 tablet   Refills:  1            Where to get your medicines      These medications were sent to [facility] - [place], [place] - [address] AT [facility] [place] [address] [facility] [address]  [address], [place] [address]    Hours:  24-hours Phone: "  [phone]     amoxicillin 500 MG tablet               Information about OPIOIDS     PRESCRIPTION OPIOIDS: WHAT YOU NEED TO KNOW   We gave you an opioid (narcotic) pain medicine. It is important to manage your pain, but opioids are not always the best choice. You should first try all the other options your care team gave you. Take this medicine for as short a time (and as few doses) as possible.     These medicines have risks:    DO NOT drive when on new or higher doses of pain medicine. These medicines can affect your alertness and reaction times, and you could be arrested for driving under the influence (DUI). If you need to use opioids long-term, talk to your care team about driving.    DO NOT operate heave machinery    DO NOT do any other dangerous activities while taking these medicines.     DO NOT drink any alcohol while taking these medicines.      If the opioid prescribed includes acetaminophen, DO NOT take with any other medicines that contain acetaminophen. Read all labels carefully. Look for the word  acetaminophen  or  Tylenol.  Ask your pharmacist if you have questions or are unsure.    You can get addicted to pain medicines, especially if you have a history of addiction (chemical, alcohol or substance dependence). Talk to your care team about ways to reduce this risk.    Store your pills in a secure place, locked if possible. We will not replace any lost or stolen medicine. If you don t finish your medicine, please throw away (dispose) as directed by your pharmacist. The [place] Pollution Control Agency has more information about safe disposal: https://www.pca.[facility].[place].us/living-green/managing-unwanted-medications.     All opioids tend to cause constipation. Drink plenty of water and eat foods that have a lot of fiber, such as fruits, vegetables, prune juice, apple juice and high-fiber cereal. Take a laxative (Miralax, milk of magnesia, Colace, Senna) if you don t move your bowels at least every  other day.          Primary Care Provider Office Phone # Fax #    Josh Hollingsworth [phone] [address] [address]        Equal Access to Services     LISBET LIPSCOMB : Hadii aad ku hadmauricioo Soomaali, waaxda luqadaha, qaybta kaalmada adeegyada, waxcarmen augustinn mee garay laZenobiatrent vasquez. So [facility] [phone].    ATENCIÓN: Si habla español, tiene a armendariz disposición servicios gratuitos de asistencia lingüística. Llame al [phone].    We comply with applicable federal civil rights laws and [place] laws. We do not discriminate on the basis of race, color, national origin, age, disability, sex, sexual orientation, or gender identity.            Thank you!     Thank you for choosing [facility]  for your care. Our goal is always to provide you with excellent care. Hearing back from our patients is one way we can continue to improve our services. Please take a few minutes to complete the written survey that you may receive in the mail after your visit with us. Thank you!             Your Updated Medication List - Protect others around you: Learn how to safely use, store and throw away your medicines at www.disposemymeds.org.          This list is accurate as of 7/9/18  6:19 PM.  Always use your most recent med list.                   Brand Name Dispense Instructions for use Diagnosis    acetaminophen 500 MG tablet    TYLENOL     Take 500 mg by mouth        allopurinol 100 MG tablet    ZYLOPRIM     Take 100 mg by mouth        * amitriptyline 10 MG tablet    ELAVIL     Take 10 mg by mouth        * amitriptyline 25 MG tablet    ELAVIL     Take 25 mg by mouth At Bedtime        amoxicillin 500 MG tablet    AMOXIL    20 tablet    Take 1 tablet (500 mg) by mouth 2 times daily    Urinary tract infection without hematuria, site unspecified       atorvastatin 40 MG tablet    LIPITOR    90 tablet    TAKE 1 TABLET(40 MG) BY MOUTH DAILY    Hypertriglyceridemia       B-12 1000 MCG  Tbcr     100 tablet    Take 1,000 mcg by mouth daily    Vitamin B12 deficiency (non anemic)       calcium acetate (Phos Binder) 667 MG Tabs     180 tablet    TAKE 1 TABLET BY MOUTH THREE TIMES DAILY    Secondary renal hyperparathyroidism (H)       carvedilol 6.25 MG tablet    COREG    270 tablet    12.5 mg in am and 6.25 mg in pm    Benign essential hypertension       cetirizine 10 MG tablet    zyrTEC     Take 10 mg by mouth        clobetasol 0.05 % external solution    TEMOVATE     Apply topically 2 times daily as needed        Cranberry Powd      1 capsule        EPINEPHrine 0.3 MG/0.3ML injection 2-pack    EPIPEN 2-FRAN    0.6 mL    Inject 0.3 mLs (0.3 mg) into the muscle once as needed for anaphylaxis    Allergic reaction, subsequent encounter       hydrOXYzine 25 MG tablet    ATARAX    120 tablet    Take 1 tablet (25 mg) by mouth 3 times daily as needed for itching    Pruritic disorder       ipratropium 0.06 % spray    ATROVENT    3 Box    Spray 2 sprays into both nostrils 4 times daily as needed for rhinitis    Chronic rhinitis, unspecified type       ketoconazole 2 % shampoo    NIZORAL    120 mL    Apply to the affected area and wash off after 5 minutes.    Seborrheic dermatitis       losartan 100 MG tablet    COZAAR    90 tablet    Take 1 tablet (100 mg) by mouth At Bedtime    Benign essential hypertension       Magnesium Oxide 250 MG Tabs      Take 250 mg by mouth        SUMAtriptan 20 MG/ACT nasal spray    IMITREX    12 each    Spray 1 spray in nostril as needed for migraine May repeat in 2 hours. Max 2 sprays/24 hours.    Intractable migraine without aura and without status migrainosus       torsemide 10 MG tablet    DEMADEX    90 tablet    Take 1 tablet (10 mg) by mouth daily    Edema leg       traMADol 50 MG tablet    ULTRAM     Take 1 tablet by mouth every 6 hours as needed        vitamin D 2000 units tablet      Take 1 tablet by mouth daily        ZOFRAN 4 MG tablet   Generic drug:  ondansetron       Take by mouth every 8 hours as needed for nausea        ZOLMitriptan 5 MG ODT tab    ZOMIG-ZMT     Take 5 mg by mouth        * Notice:  This list has 2 medication(s) that are the same as other medications prescribed for you. Read the directions carefully, and ask your doctor or other care provider to review them with you.

## 2018-07-10 ENCOUNTER — TRANSFERRED RECORDS (OUTPATIENT)
Dept: HEALTH INFORMATION MANAGEMENT | Facility: CLINIC | Age: 68
End: 2018-07-10

## 2018-07-10 LAB
IRON SATN MFR SERPL: 24 % (ref 15–46)
IRON SERPL-MCNC: 63 UG/DL (ref 35–180)
TIBC SERPL-MCNC: 260 UG/DL (ref 240–430)

## 2018-07-11 LAB
BACTERIA SPEC CULT: ABNORMAL
SPECIMEN SOURCE: ABNORMAL

## 2018-07-11 RX ORDER — CIPROFLOXACIN 250 MG/1
250 TABLET, FILM COATED ORAL 2 TIMES DAILY
Qty: 20 TABLET | Refills: 0 | Status: SHIPPED | OUTPATIENT
Start: 2018-07-11 | End: 2018-08-02

## 2018-07-11 NOTE — PROGRESS NOTES
The following letter pertains to your most recent diagnostic tests:    As we discussed by phone, you should stop amoxicillin and start Cipro for your urinary tract infection.    Your iron stores are normal and your hemoglobin is improving as expected.      Sincerely,    Dr. Hollingsworth

## 2018-07-13 NOTE — PROGRESS NOTES
[place] [id number] [facility] [address]  [phone]  Dept: [phone]    PRE-OP EVALUATION:  Today's date: 2018    Rosa Sotomayor (: 1950) presents for pre-operative evaluation assessment as requested by Dr. Tovar.  She requires evaluation and anesthesia risk assessment prior to undergoing surgery/procedure for treatment of Herniated Disc .    Proposed Surgery/ Procedure: Herniated Disc repair  Date of Surgery/ Procedure: 18  Time of Surgery/ Procedure: [facility]  Hospital/Surgical Facility: [facility]  Fax number for surgical facility: [phone]  Primary Physician: Josh Hollingsworth  Type of Anesthesia Anticipated: General    Patient has a Health Care Directive or Living Will:  NO    1. NO - Do you have a history of heart attack, stroke, stent, bypass or surgery on an artery in the head, neck, heart or legs?  2. NO - Do you ever have any pain or discomfort in your chest?  3. NO - Do you have a history of  Heart Failure?  4. NO - Are you troubled by shortness of breath when: walking on the level, up a slight hill or at night?  5. NO - Do you currently have a cold, bronchitis or other respiratory infection?  6. NO - Do you have a cough, shortness of breath or wheezing?  7. NO - Do you sometimes get pains in the calves of your legs when you walk?  8. NO - Do you or anyone in your family have previous history of blood clots?  9. YES - Do you or does anyone in your family have a serious bleeding problem such as prolonged bleeding following surgeries or cuts?  10. YES - Have you ever had problems with anemia or been told to take iron pills?  11. NO - Have you had any abnormal blood loss such as black, tarry or bloody stools, or abnormal vaginal bleeding?  12. NO - Have you ever had a blood transfusion?  13. YES - Have you or any of your relatives ever had problems with anesthesia?  ANAPHYLAXIS WITH GENERAL ANESTHESIA 20 YEARS AGO  14. NO - Do you have sleep  apnea, excessive snoring or daytime drowsiness?  15. NO - Do you have any prosthetic heart valves?  16. YES - Do you have prosthetic joints?  17. NO - Is there any chance that you may be pregnant?      HPI:     HPI related to upcoming procedure: This is a really pleasant 67-year-old female with multiple drug allergies including a history of anaphylaxis with general anesthesia in the remote past.  She presents desiring a preoperative evaluation for a possible right lumbar microdiscectomy.  She describes severe right lower back pain with radiation down her right leg preventing her from standing comfortably on her leg for more than a few minutes.  She denies new leg numbness or weakness or new bowel or bladder symptoms.  She did have one epidural steroid injection that did not help her symptoms very much.  She has also tried physical therapy.  She can perform 4 METS of physical activity without chest pain or dyspnea.     MEDICAL HISTORY:     Patient Active Problem List    Diagnosis Date Noted     Aortic valve insufficiency 04/19/2018     Priority: Medium     Intractable migraine without aura and without status migrainosus 11/20/2017     Priority: Medium     Anemia, iron deficiency 06/23/2017     Priority: Medium     Renal artery stenosis (H) 01/05/2017     Priority: Medium     Macular degeneration, bilateral 01/05/2017     Priority: Medium     Benign essential hypertension 12/28/2016     Priority: Medium     Lumbago 11/29/2016     Priority: Medium     Chronic bilateral low back pain without sciatica 11/29/2016     Priority: Medium     Secondary renal hyperparathyroidism (H) 07/31/2016     Priority: Medium     Arnold-Chiari malformation (H) 07/23/2016     Priority: Medium     Hyperlipidemia LDL goal <130 07/23/2016     Priority: Medium     Periodic headache syndrome, not intractable 07/23/2016     Priority: Medium     Anxiety 07/23/2016     Priority: Medium     CKD (chronic kidney disease) stage 3, GFR 30-59 ml/min  07/22/2016     Priority: Medium     Hypertriglyceridemia 07/22/2016     Priority: Medium     Personal history of allergy to anesthetic agent 03/10/2007     Priority: Medium     Postmenopausal bleeding 02/28/2007     Priority: Medium      Past Medical History:   Diagnosis Date     Aortic valve insufficiency 4/19/2018     Chronic kidney disease      Dizziness and giddiness      Past Surgical History:   Procedure Laterality Date     C NONSPECIFIC PROCEDURE      wisdom teeth     C NONSPECIFIC PROCEDURE  2005    Varicose Veins     C TOTAL HIP ARTHROPLASTY      left     C TOTAL KNEE ARTHROPLASTY      right     TUBAL LIGATION  1987     Current Outpatient Prescriptions   Medication Sig Dispense Refill     acetaminophen (TYLENOL) 500 MG tablet Take 500 mg by mouth       allopurinol (ZYLOPRIM) 100 MG tablet Take 100 mg by mouth       amitriptyline (ELAVIL) 10 MG tablet Take 10 mg by mouth       amitriptyline (ELAVIL) 25 MG tablet Take 25 mg by mouth At Bedtime  0     atorvastatin (LIPITOR) 40 MG tablet TAKE 1 TABLET(40 MG) BY MOUTH DAILY 90 tablet 3     carvedilol (COREG) 6.25 MG tablet 12.5 mg in am and 6.25 mg in pm 270 tablet 0     cetirizine (ZYRTEC) 10 MG tablet Take 10 mg by mouth       Cholecalciferol (VITAMIN D) 2000 UNITS tablet Take 1 tablet by mouth daily       ciprofloxacin (CIPRO) 250 MG tablet Take 1 tablet (250 mg) by mouth 2 times daily 20 tablet 0     clobetasol (TEMOVATE) 0.05 % external solution Apply topically 2 times daily as needed  5     Cranberry POWD 1 capsule       Cyanocobalamin (B-12) 1000 MCG TBCR Take 1,000 mcg by mouth daily 100 tablet 1     dexamethasone (DECADRON) 4 MG tablet Take 1 tablet (4 mg) by mouth 2 times daily (with meals) 10 tablet 0     EPINEPHrine (EPIPEN 2-FRAN) 0.3 MG/0.3ML injection 2-pack Inject 0.3 mLs (0.3 mg) into the muscle once as needed for anaphylaxis 0.6 mL 1     hydrOXYzine (ATARAX) 25 MG tablet Take 1 tablet (25 mg) by mouth 3 times daily as needed for itching 120  tablet 11     ketoconazole (NIZORAL) 2 % shampoo Apply to the affected area and wash off after 5 minutes. 120 mL 1     losartan (COZAAR) 100 MG tablet Take 1 tablet (100 mg) by mouth At Bedtime (Patient taking differently: Take 50 mg by mouth 2 times daily ) 90 tablet 1     Magnesium Oxide 250 MG TABS Take 250 mg by mouth       ondansetron (ZOFRAN) 4 MG tablet Take by mouth every 8 hours as needed for nausea       SUMAtriptan (IMITREX) 20 MG/ACT nasal spray Spray 1 spray in nostril as needed for migraine May repeat in 2 hours. Max 2 sprays/24 hours. 12 each 11     torsemide (DEMADEX) 10 MG tablet Take 1 tablet (10 mg) by mouth daily 90 tablet 3     traMADol (ULTRAM) 50 MG tablet Take 1 tablet by mouth every 6 hours as needed  0     ZOLMitriptan (ZOMIG-ZMT) 5 MG ODT tab Take 5 mg by mouth       OTC products: None, except as noted above    Allergies   Allergen Reactions     Fentanyl Anaphylaxis     Midazolam Anaphylaxis     Propofol Anaphylaxis     Anesthetic [Amides]      Anaphalactic shock     Codeine      Erythromycin      ointment     Gabapentin      chest heaviness with breathing     Hydromorphone Itching     Tolerates morphine     Lorazepam Itching     Other [Seasonal Allergies] Anaphylaxis     GENERAL ANETHESIA       Penicillins      rash     Eucalyptus Oil Rash     hives     Nitrofuran Derivatives Rash     Sulfa Drugs Itching and Rash     rash      Latex Allergy: NO    Social History   Substance Use Topics     Smoking status: Never Smoker     Smokeless tobacco: Never Used     Alcohol use No     History   Drug Use No       REVIEW OF SYSTEMS:   She was treated for a urinary tract infection last week.  She currently denies dysuria, hematuria, urinary frequency.    Constitutional, neuro, ENT, endocrine, pulmonary, cardiac, gastrointestinal, genitourinary, musculoskeletal, integument and psychiatric systems are negative, except as otherwise noted.    EXAM:   /65 (BP Location: Left arm, Patient Position:  "Chair, Cuff Size: Adult Regular)  Pulse 92  Temp 99.1  F (37.3  C) (Tympanic)  Ht 5' 4\" (1.626 m)  Wt 176 lb 3.2 oz (79.9 kg)  SpO2 97%  BMI 30.24 kg/m2    GENERAL APPEARANCE: healthy, alert and no distress     EYES: EOMI, PERRL     HENT: ear canals and TM's normal and nose and mouth without ulcers or lesions     NECK: no adenopathy, no asymmetry, masses, or scars and thyroid normal to palpation     RESP: lungs clear to auscultation - no rales, rhonchi or wheezes     CV: regular rates and rhythm, normal S1 S2, no S3 or S4 and no murmur, click or rub     ABDOMEN:  soft, nontender, no HSM or masses and bowel sounds normal     MS: extremities normal- no gross deformities noted, no evidence of inflammation in joints, FROM in all extremities.     SKIN: no suspicious lesions or rashes     NEURO: Normal strength and tone, sensory exam grossly normal, mentation intact and speech normal     PSYCH: mentation appears normal. and affect normal/bright     LYMPHATICS: No cervical adenopathy    DIAGNOSTICS:   EK18 normal sinus rhythm no ST changes     Recent Labs   Lab Test  18   1726  18   2141  18   1013  18   1120  17   0956   17   1353  06/15/17   1330  16   HGB  11.6*  10.5*   --   9.1*  12.1   < >   --    --    < >   --    PLT   --    --    --   252  249   < >   --    --    < >   --    INR   --    --    --    --    --    --   3.00*  2.30*   < >   --    NA   --    --   138  137  137   --    --    --    < >  138   POTASSIUM   --    --   4.3  3.8  3.9   --    --    --    < >  4.2   CR   --    --   1.47*  1.25*  1.35*   --    --    --    < >  1.51   A1C   --    --    --    --    --    --    --    --    --   5.4    < > = values in this interval not displayed.      Repeat urinalysis is normal  IMPRESSION:   Reason for surgery/procedure: Lumbar disc herniation   Diagnosis/reason for consult: Preoperative evaluation     The proposed surgical procedure is considered LOW " risk.    REVISED CARDIAC RISK INDEX  The patient has the following serious cardiovascular risks for perioperative complications such as (MI, PE, VFib and 3  AV Block):  No serious cardiac risks  INTERPRETATION: 0 risks: Class I (very low risk - 0.4% complication rate)    The patient has the following additional risks for perioperative complications:    PREVIOUS HISTORY OF ANAPHYLAXIS WITH GENERAL ANESTHESIA 20 YEARS AGO      ICD-10-CM    1. Preop general physical exam Z01.818    2. Lumbar disc herniation M51.26 dexamethasone (DECADRON) 4 MG tablet   3. Personal history of allergy to anesthetic agent Z88.4    4. Urinary tract infection without hematuria, site unspecified N39.0 *UA reflex to Microscopic and Culture ([place] [facility] (except [place] and [place])     Discussed previous history of anaphylaxis with general anesthesia in the past with Dr. Tovar from spine surgery.  He stated that he could attempt to do the surgery using local anesthesia or regional anesthesia with sedation.  The urinary tract infection seems to be adequately treated.  She is a candidate for microdiscectomy under either regional or local anesthetic.  Because of the history of anaphylaxis with general anesthesia, I am not certain that the risk-benefit ratio favors microdiscectomy versus conservative management if her only option for anesthesia is general anesthesia.  Before I knew that the patient could potentially have the procedure with regional or local anesthesia, I did prescribe Decadron to help with immediate symptom relief until another plan for her disc herniation syndrome symptoms could be delineated with her spine surgeon such as further epidural steroid injections are more physical therapy.  However, since she might be able to have the procedure under local regional anesthesia, there is no need for taking Decadron.  I contacted the patient with this information.  RECOMMENDATIONS:     --Consult hospital rounder / IM  to assist post-op medical management    --Patient is to take all scheduled medications on the day of surgery    APPROVAL GIVEN to proceed with proposed procedure, without further diagnostic evaluation       Signed Electronically by: Josh Hollingsworth MD    Copy of this evaluation report is provided to requesting physician.    [facility] Preop Guidelines    Revised Cardiac Risk Index

## 2018-07-16 ENCOUNTER — TRANSFERRED RECORDS (OUTPATIENT)
Dept: HEALTH INFORMATION MANAGEMENT | Facility: CLINIC | Age: 68
End: 2018-07-16

## 2018-07-16 LAB
ALT SERPL-CCNC: 25 U/L (ref 6–29)
AST SERPL-CCNC: 29 U/L (ref 10–35)
CHOLEST SERPL-MCNC: 183 MG/DL
CREAT SERPL-MCNC: 1.54 MG/DL (ref 0.5–0.99)
GFR SERPL CREATININE-BSD FRML MDRD: 35 ML/MIN/1.73M2
GLUCOSE SERPL-MCNC: 79 MG/DL (ref 65–99)
HBA1C MFR BLD: 5.3 % (ref 0–5.7)
HDLC SERPL-MCNC: 32 MG/DL
NONHDLC SERPL-MCNC: 151 MG/DL
POTASSIUM SERPL-SCNC: 4.4 MMOL/L (ref 3.5–5.3)
TRIGL SERPL-MCNC: 834 MG/DL
TSH SERPL-ACNC: 2.89 MIU/L (ref 0.4–4.5)

## 2018-07-18 ENCOUNTER — OFFICE VISIT (OUTPATIENT)
Dept: FAMILY MEDICINE | Facility: CLINIC | Age: 68
End: 2018-07-18
Payer: COMMERCIAL

## 2018-07-18 VITALS
DIASTOLIC BLOOD PRESSURE: 65 MMHG | SYSTOLIC BLOOD PRESSURE: 116 MMHG | HEIGHT: 64 IN | TEMPERATURE: 99.1 F | WEIGHT: 176.2 LBS | OXYGEN SATURATION: 97 % | HEART RATE: 92 BPM | BODY MASS INDEX: 30.08 KG/M2

## 2018-07-18 DIAGNOSIS — Z01.818 PREOP GENERAL PHYSICAL EXAM: Primary | ICD-10-CM

## 2018-07-18 DIAGNOSIS — M51.26 LUMBAR DISC HERNIATION: ICD-10-CM

## 2018-07-18 DIAGNOSIS — Z88.4: ICD-10-CM

## 2018-07-18 DIAGNOSIS — N39.0 URINARY TRACT INFECTION WITHOUT HEMATURIA, SITE UNSPECIFIED: ICD-10-CM

## 2018-07-18 LAB
ALBUMIN UR-MCNC: NEGATIVE MG/DL
APPEARANCE UR: CLEAR
BILIRUB UR QL STRIP: NEGATIVE
COLOR UR AUTO: YELLOW
GLUCOSE UR STRIP-MCNC: NEGATIVE MG/DL
HGB UR QL STRIP: NEGATIVE
KETONES UR STRIP-MCNC: NEGATIVE MG/DL
LEUKOCYTE ESTERASE UR QL STRIP: NEGATIVE
NITRATE UR QL: NEGATIVE
PH UR STRIP: 6.5 PH (ref 5–7)
SOURCE: NORMAL
SP GR UR STRIP: 1.01 (ref 1–1.03)
UROBILINOGEN UR STRIP-ACNC: 0.2 EU/DL (ref 0.2–1)

## 2018-07-18 PROCEDURE — 99215 OFFICE O/P EST HI 40 MIN: CPT | Performed by: INTERNAL MEDICINE

## 2018-07-18 PROCEDURE — 81003 URINALYSIS AUTO W/O SCOPE: CPT | Performed by: INTERNAL MEDICINE

## 2018-07-18 RX ORDER — DEXAMETHASONE 4 MG/1
4 TABLET ORAL 2 TIMES DAILY WITH MEALS
Qty: 10 TABLET | Refills: 0 | Status: SHIPPED | OUTPATIENT
Start: 2018-07-18 | End: 2018-08-02

## 2018-07-18 NOTE — Clinical Note
Please call Dr. Tovar [facility] and see if I can speak to him or his RN.  I don't think Rosa should have surgery tomorrow.

## 2018-07-18 NOTE — LETTER
[facility]  [address] [facility]  [address]  [place]  [address]  Tel: [phone]    July 19, 2018    Rosasienna Sotomayor  [address] [facility] [address]        Dear MsAsia Bran,    The following letter pertains to your most recent diagnostic tests:    Good news! The follow up urine analysis show that the infection has been adequately treated.    If you have any further questions or problems, please contact our office.      Sincerely,    Josh Hollingsworth MD/ Nadeen Castillo CMA  Results for orders placed or performed in visit on 07/18/18   *UA reflex to Microscopic and Culture ([place] and [facility] (except [place] and [place])   Result Value Ref Range    Color Urine Yellow     Appearance Urine Clear     Glucose Urine Negative NEG^Negative mg/dL    Bilirubin Urine Negative NEG^Negative    Ketones Urine Negative NEG^Negative mg/dL    Specific Gravity Urine 1.015 1.003 - 1.035    Blood Urine Negative NEG^Negative    pH Urine 6.5 5.0 - 7.0 pH    Protein Albumin Urine Negative NEG^Negative mg/dL    Urobilinogen Urine 0.2 0.2 - 1.0 EU/dL    Nitrite Urine Negative NEG^Negative    Leukocyte Esterase Urine Negative NEG^Negative    Source Urine                Enclosure: Lab Results

## 2018-07-18 NOTE — PROGRESS NOTES
The following letter pertains to your most recent diagnostic tests:    Good news! The follow up urine analysis show that the infection has been adequately treated.      Sincerely,    Dr. Hollingsworth

## 2018-07-18 NOTE — NURSING NOTE
"Chief Complaint   Patient presents with     Pre-Op Exam        Initial /65 (BP Location: Left arm, Patient Position: Chair, Cuff Size: Adult Regular)  Pulse 92  Temp 99.1  F (37.3  C) (Tympanic)  Ht 5' 4\" (1.626 m)  Wt 176 lb 3.2 oz (79.9 kg)  SpO2 97%  BMI 30.24 kg/m2 Estimated body mass index is 30.24 kg/(m^2) as calculated from the following:    Height as of this encounter: 5' 4\" (1.626 m).    Weight as of this encounter: 176 lb 3.2 oz (79.9 kg)..    BP completed using cuff size: regular  MEDICATIONS REVIEWED  SOCIAL AND FAMILY HX REVIEWED  Eusebia Loera CMA  "

## 2018-07-18 NOTE — MR AVS SNAPSHOT
After Visit Summary   7/18/2018    Rosa Sotomayor    MRN: [id number]           Patient Information     Date Of Birth          1950        Visit Information        Provider Department      7/18/2018 1:30 PM Josh Hollingsworth MD [facility]        Today's Diagnoses     Preop general physical exam    -  1    Lumbar disc herniation        Personal history of allergy to anesthetic agent        Urinary tract infection without hematuria, site unspecified          Care Instructions      Before Your Surgery      Call your surgeon if there is any change in your health. This includes signs of a cold or flu (such as a sore throat, runny nose, cough, rash or fever).    Do not smoke, drink alcohol or take over the counter medicine (unless your surgeon or primary care doctor tells you to) for the 24 hours before and after surgery.    If you take prescribed drugs: Follow your doctor s orders about which medicines to take and which to stop until after surgery.    Eating and drinking prior to surgery: follow the instructions from your surgeon    Take a shower or bath the night before surgery. Use the soap your surgeon gave you to gently clean your skin. If you do not have soap from your surgeon, use your regular soap. Do not shave or scrub the surgery site.  Wear clean pajamas and have clean sheets on your bed.           Follow-ups after your visit        Who to contact     If you have questions or need follow up information about today's clinic visit or your schedule please contact [facility] directly at [phone].  Normal or non-critical lab and imaging results will be communicated to you by MyChart, letter or phone within 4 business days after the clinic has received the results. If you do not hear from us within 7 days, please contact the clinic through MyChart or phone. If you have a critical or abnormal lab result, we will notify you by phone as soon as possible.  Submit refill  "requests through [employer] or call your pharmacy and they will forward the refill request to us. Please allow 3 business days for your refill to be completed.          Additional Information About Your Visit        LiveTophart Information     [employer] gives you secure access to your electronic health record. If you see a primary care provider, you can also send messages to your care team and make appointments. If you have questions, please call your primary care clinic.  If you do not have a primary care provider, please call [phone] and they will assist you.        Care EveryWhere ID     This is your Care EveryWhere ID. This could be used by other organizations to access your [place] medical records  [id number]        Your Vitals Were     Pulse Temperature Height Pulse Oximetry BMI (Body Mass Index)       92 99.1  F (37.3  C) (Tympanic) 5' 4\" (1.626 m) 97% 30.24 kg/m2        Blood Pressure from Last 3 Encounters:   07/18/18 116/65   07/09/18 158/84   06/28/18 108/56    Weight from Last 3 Encounters:   07/18/18 176 lb 3.2 oz (79.9 kg)   07/09/18 175 lb (79.4 kg)   06/28/18 176 lb (79.8 kg)              We Performed the Following     *UA reflex to Microscopic and Culture ([place] [facility] (except [place] and [place])          Today's Medication Changes          These changes are accurate as of 7/18/18  4:40 PM.  If you have any questions, ask your nurse or doctor.               Start taking these medicines.        Dose/Directions    dexamethasone 4 MG tablet   Commonly known as:  DECADRON   Used for:  Lumbar disc herniation   Started by:  Josh Hollingsworth MD        Dose:  4 mg   Take 1 tablet (4 mg) by mouth 2 times daily (with meals)   Quantity:  10 tablet   Refills:  0         These medicines have changed or have updated prescriptions.        Dose/Directions    losartan 100 MG tablet   Commonly known as:  COZAAR   This may have changed:    - how much to take  - when to take this   Used for:  Benign " essential hypertension        Dose:  100 mg   Take 1 tablet (100 mg) by mouth At Bedtime   Quantity:  90 tablet   Refills:  1            Where to get your medicines      These medications were sent to [facility] - [place], [place] - [address] AT [facility] [place] [address]  [address], [place] [address]    Hours:  24-hours Phone:  [phone]     dexamethasone 4 MG tablet                Primary Care Provider Office Phone # Fax #    Josh Hollingsworth [phone] [address] [facility] [address]  [place] [address]        Equal Access to Services     [facility]: Hadii aad ku hadasho Soomaali, waaxda luqadaha, qaybta kaalmada adeegyada, waxay jenain hayyulianan mee luna . So [facility] [phone].    ATENCIÓN: Si habla español, tiene a armendariz disposición servicios gratuitos de asistencia lingüística. [facility] [phone].    We comply with applicable federal civil rights laws and [place] laws. We do not discriminate on the basis of race, color, national origin, age, disability, sex, sexual orientation, or gender identity.            Thank you!     Thank you for choosing [facility]  for your care. Our goal is always to provide you with excellent care. Hearing back from our patients is one way we can continue to improve our services. Please take a few minutes to complete the written survey that you may receive in the mail after your visit with us. Thank you!             Your Updated Medication List - Protect others around you: Learn how to safely use, store and throw away your medicines at www.disposemymeds.org.          This list is accurate as of 7/18/18  4:40 PM.  Always use your most recent med list.                   Brand Name Dispense Instructions for use Diagnosis    acetaminophen 500 MG tablet    TYLENOL     Take 500 mg by mouth        allopurinol 100 MG tablet    ZYLOPRIM     Take 100 mg by mouth        * amitriptyline 10 MG tablet    ELAVIL      Take 10 mg by mouth        * amitriptyline 25 MG tablet    ELAVIL     Take 25 mg by mouth At Bedtime        atorvastatin 40 MG tablet    LIPITOR    90 tablet    TAKE 1 TABLET(40 MG) BY MOUTH DAILY    Hypertriglyceridemia       B-12 1000 MCG Tbcr     100 tablet    Take 1,000 mcg by mouth daily    Vitamin B12 deficiency (non anemic)       carvedilol 6.25 MG tablet    COREG    270 tablet    12.5 mg in am and 6.25 mg in pm    Benign essential hypertension       cetirizine 10 MG tablet    zyrTEC     Take 10 mg by mouth        ciprofloxacin 250 MG tablet    CIPRO    20 tablet    Take 1 tablet (250 mg) by mouth 2 times daily    Urinary tract infection without hematuria, site unspecified       clobetasol 0.05 % external solution    TEMOVATE     Apply topically 2 times daily as needed        Cranberry Powd      1 capsule        dexamethasone 4 MG tablet    DECADRON    10 tablet    Take 1 tablet (4 mg) by mouth 2 times daily (with meals)    Lumbar disc herniation       EPINEPHrine 0.3 MG/0.3ML injection 2-pack    EPIPEN 2-FRAN    0.6 mL    Inject 0.3 mLs (0.3 mg) into the muscle once as needed for anaphylaxis    Allergic reaction, subsequent encounter       hydrOXYzine 25 MG tablet    ATARAX    120 tablet    Take 1 tablet (25 mg) by mouth 3 times daily as needed for itching    Pruritic disorder       ketoconazole 2 % shampoo    NIZORAL    120 mL    Apply to the affected area and wash off after 5 minutes.    Seborrheic dermatitis       losartan 100 MG tablet    COZAAR    90 tablet    Take 1 tablet (100 mg) by mouth At Bedtime    Benign essential hypertension       Magnesium Oxide 250 MG Tabs      Take 250 mg by mouth        SUMAtriptan 20 MG/ACT nasal spray    IMITREX    12 each    Spray 1 spray in nostril as needed for migraine May repeat in 2 hours. Max 2 sprays/24 hours.    Intractable migraine without aura and without status migrainosus       torsemide 10 MG tablet    DEMADEX    90 tablet    Take 1 tablet (10 mg) by mouth  daily    Edema leg       traMADol 50 MG tablet    ULTRAM     Take 1 tablet by mouth every 6 hours as needed        vitamin D 2000 units tablet      Take 1 tablet by mouth daily        ZOFRAN 4 MG tablet   Generic drug:  ondansetron      Take by mouth every 8 hours as needed for nausea        ZOLMitriptan 5 MG ODT tab    ZOMIG-ZMT     Take 5 mg by mouth        * Notice:  This list has 2 medication(s) that are the same as other medications prescribed for you. Read the directions carefully, and ask your doctor or other care provider to review them with you.

## 2018-07-26 ENCOUNTER — HOSPITAL ENCOUNTER (OUTPATIENT)
Dept: BONE DENSITY | Facility: CLINIC | Age: 68
Discharge: HOME | End: 2018-07-26
Attending: OBSTETRICS & GYNECOLOGY | Admitting: OBSTETRICS & GYNECOLOGY
Payer: MEDICARE

## 2018-07-26 ENCOUNTER — HOSPITAL ENCOUNTER (OUTPATIENT)
Dept: BONE DENSITY | Facility: CLINIC | Age: 68
End: 2018-07-26
Attending: OBSTETRICS & GYNECOLOGY
Payer: MEDICARE

## 2018-07-26 DIAGNOSIS — Z78.0 POST-MENOPAUSAL: ICD-10-CM

## 2018-07-26 PROCEDURE — 77080 DXA BONE DENSITY AXIAL: CPT

## 2018-07-26 PROCEDURE — 77081 DXA BONE DENSITY APPENDICULR: CPT

## 2018-07-27 DIAGNOSIS — E78.1 HYPERTRIGLYCERIDEMIA: ICD-10-CM

## 2018-07-27 LAB
CHOLEST SERPL-MCNC: 157 MG/DL
HDLC SERPL-MCNC: 46 MG/DL
LDLC SERPL CALC-MCNC: ABNORMAL MG/DL
LDLC SERPL DIRECT ASSAY-MCNC: 72 MG/DL
NONHDLC SERPL-MCNC: 111 MG/DL
TRIGL SERPL-MCNC: 640 MG/DL

## 2018-07-27 PROCEDURE — 36415 COLL VENOUS BLD VENIPUNCTURE: CPT | Performed by: INTERNAL MEDICINE

## 2018-07-27 PROCEDURE — 83721 ASSAY OF BLOOD LIPOPROTEIN: CPT | Performed by: INTERNAL MEDICINE

## 2018-07-27 PROCEDURE — 80061 LIPID PANEL: CPT | Performed by: INTERNAL MEDICINE

## 2018-07-28 NOTE — PROGRESS NOTES
The following letter pertains to your most recent diagnostic tests:    Good news! The bone density in your wrist is normal.      Sincerely,    Dr. Hollingsworth

## 2018-07-28 NOTE — PROGRESS NOTES
The following letter pertains to your most recent diagnostic tests:    Your triglycerides have gotten too high again. At this point, I think you have two options.      1.  See a dietician and learn how you can change your diet to get better control of this and recheck in 1-2 months.    2.  Start a medication called fenofibrate (Tricor) to get the triglycerides down.  This would be taken in addition to your other cholesterol medication (atorvastatin/Lipitor)    Please reply by MyChart with what you would like to do.        Sincerely,    Dr. Hollingsworth

## 2018-07-28 NOTE — PROGRESS NOTES
The following letter pertains to your most recent diagnostic tests:    Good news! The bone density in your spine is normal.      Sincerely,    Dr. Hollingsworth

## 2018-08-01 ENCOUNTER — TRANSFERRED RECORDS (OUTPATIENT)
Dept: HEALTH INFORMATION MANAGEMENT | Facility: CLINIC | Age: 68
End: 2018-08-01

## 2018-08-02 ENCOUNTER — OFFICE VISIT (OUTPATIENT)
Dept: FAMILY MEDICINE | Facility: CLINIC | Age: 68
End: 2018-08-02
Payer: COMMERCIAL

## 2018-08-02 VITALS
HEIGHT: 64 IN | TEMPERATURE: 98.1 F | DIASTOLIC BLOOD PRESSURE: 72 MMHG | OXYGEN SATURATION: 99 % | HEART RATE: 86 BPM | SYSTOLIC BLOOD PRESSURE: 134 MMHG | WEIGHT: 178 LBS | BODY MASS INDEX: 30.39 KG/M2

## 2018-08-02 DIAGNOSIS — M51.26 LUMBAR DISC HERNIATION: ICD-10-CM

## 2018-08-02 DIAGNOSIS — E78.1 HYPERTRIGLYCERIDEMIA: Primary | ICD-10-CM

## 2018-08-02 PROCEDURE — 99214 OFFICE O/P EST MOD 30 MIN: CPT | Performed by: INTERNAL MEDICINE

## 2018-08-02 RX ORDER — DEXAMETHASONE 4 MG/1
4 TABLET ORAL 2 TIMES DAILY WITH MEALS
Qty: 20 TABLET | Refills: 0 | Status: SHIPPED | OUTPATIENT
Start: 2018-08-02 | End: 2018-10-04

## 2018-08-02 NOTE — MR AVS SNAPSHOT
After Visit Summary   8/2/2018    Rosa Sotomayor    MRN: [id number]           Patient Information     Date Of Birth          1950        Visit Information        Provider Department      8/2/2018 3:30 PM Josh Hollingsworth MD [facility]        Today's Diagnoses     Hypertriglyceridemia    -  1    Lumbar disc herniation           Follow-ups after your visit        Additional Services     NUTRITION REFERRAL       Your provider has referred you to: [facility] [place] [place]: [facility] [phone]   [email or web address]    Please be aware that coverage of these services is subject to the terms and limitations of your health insurance plan.  Call member services at your health plan with any benefit or coverage questions.      Please bring the following with you to your appointment:    (1) This referral request  (2) Any documents given to you regarding this referral  (3) Any specific questions you have about diet and/or food choices                  Who to contact     If you have questions or need follow up information about today's clinic visit or your schedule please contact [facility] directly at [phone].  Normal or non-critical lab and imaging results will be communicated to you by [employer], letter or phone within 4 business days after the clinic has received the results. If you do not hear from us within 7 days, please contact the clinic through [employer] or phone. If you have a critical or abnormal lab result, we will notify you by phone as soon as possible.  Submit refill requests through [employer] or call your pharmacy and they will forward the refill request to us. Please allow 3 business days for your refill to be completed.          Additional Information About Your Visit        MyChart Information     [employer] gives you secure access to your electronic health record. If you see a primary care provider, you can also send messages to your care team and  "make appointments. If you have questions, please call your primary care clinic.  If you do not have a primary care provider, please call [phone] and they will assist you.        Care EveryWhere ID     This is your Care EveryWhere ID. This could be used by other organizations to access your [place] medical records  [id number]        Your Vitals Were     Pulse Temperature Height Pulse Oximetry BMI (Body Mass Index)       86 98.1  F (36.7  C) (Oral) 5' 4\" (1.626 m) 99% 30.55 kg/m2        Blood Pressure from Last 3 Encounters:   08/02/18 134/72   07/18/18 116/65   07/09/18 158/84    Weight from Last 3 Encounters:   08/02/18 178 lb (80.7 kg)   07/18/18 176 lb 3.2 oz (79.9 kg)   07/09/18 175 lb (79.4 kg)              We Performed the Following     NUTRITION REFERRAL          Today's Medication Changes          These changes are accurate as of 8/2/18  4:03 PM.  If you have any questions, ask your nurse or doctor.               These medicines have changed or have updated prescriptions.        Dose/Directions    losartan 100 MG tablet   Commonly known as:  COZAAR   This may have changed:    - how much to take  - when to take this   Used for:  Benign essential hypertension        Dose:  100 mg   Take 1 tablet (100 mg) by mouth At Bedtime   Quantity:  90 tablet   Refills:  1            Where to get your medicines      These medications were sent to [facility] - [place], [place] - [address] AT [facility] [place] [facility] [address]  [address], [place] [address]    Hours:  24-hours Phone:  [phone]     dexamethasone 4 MG tablet                Primary Care Provider Office Phone # Fax #    Josh Hollingsworth [phone] [address]  [place] [address]        Equal Access to Services     JUSTIN LIPSCOMB AH: Nicole back Sodonny, waaxda luqadaha, qaybta kaalmatomasa qureshi, marlene vasquez. [facility] [phone].    ATENCIÓN: Si rocky " español, tiene a armendariz disposición servicios gratuitos de asistencia lingüística. [facility] [phone].    We comply with applicable federal civil rights laws and [place] laws. We do not discriminate on the basis of race, color, national origin, age, disability, sex, sexual orientation, or gender identity.            Thank you!     Thank you for choosing [facility]  for your care. Our goal is always to provide you with excellent care. Hearing back from our patients is one way we can continue to improve our services. Please take a few minutes to complete the written survey that you may receive in the mail after your visit with us. Thank you!             Your Updated Medication List - Protect others around you: Learn how to safely use, store and throw away your medicines at www.disposemymeds.org.          This list is accurate as of 8/2/18  4:03 PM.  Always use your most recent med list.                   Brand Name Dispense Instructions for use Diagnosis    acetaminophen 500 MG tablet    TYLENOL     Take 500 mg by mouth        allopurinol 100 MG tablet    ZYLOPRIM     Take 100 mg by mouth        * amitriptyline 10 MG tablet    ELAVIL     Take 10 mg by mouth        * amitriptyline 25 MG tablet    ELAVIL     Take 25 mg by mouth At Bedtime        atorvastatin 40 MG tablet    LIPITOR    90 tablet    TAKE 1 TABLET(40 MG) BY MOUTH DAILY    Hypertriglyceridemia       B-12 1000 MCG Tbcr     100 tablet    Take 1,000 mcg by mouth daily    Vitamin B12 deficiency (non anemic)       carvedilol 6.25 MG tablet    COREG    270 tablet    12.5 mg in am and 6.25 mg in pm    Benign essential hypertension       cetirizine 10 MG tablet    zyrTEC     Take 10 mg by mouth        clobetasol 0.05 % external solution    TEMOVATE     Apply topically 2 times daily as needed        Cranberry Powd      1 capsule        dexamethasone 4 MG tablet    DECADRON    20 tablet    Take 1 tablet (4 mg) by mouth 2 times daily (with meals)     Lumbar disc herniation       EPINEPHrine 0.3 MG/0.3ML injection 2-pack    EPIPEN 2-FRAN    0.6 mL    Inject 0.3 mLs (0.3 mg) into the muscle once as needed for anaphylaxis    Allergic reaction, subsequent encounter       hydrOXYzine 25 MG tablet    ATARAX    120 tablet    Take 1 tablet (25 mg) by mouth 3 times daily as needed for itching    Pruritic disorder       ketoconazole 2 % shampoo    NIZORAL    120 mL    Apply to the affected area and wash off after 5 minutes.    Seborrheic dermatitis       losartan 100 MG tablet    COZAAR    90 tablet    Take 1 tablet (100 mg) by mouth At Bedtime    Benign essential hypertension       Magnesium Oxide 250 MG Tabs      Take 250 mg by mouth        SUMAtriptan 20 MG/ACT nasal spray    IMITREX    12 each    Spray 1 spray in nostril as needed for migraine May repeat in 2 hours. Max 2 sprays/24 hours.    Intractable migraine without aura and without status migrainosus       torsemide 10 MG tablet    DEMADEX    90 tablet    Take 1 tablet (10 mg) by mouth daily    Edema leg       traMADol 50 MG tablet    ULTRAM     Take 1 tablet by mouth every 6 hours as needed        vitamin D 2000 units tablet      Take 1 tablet by mouth daily        ZOFRAN 4 MG tablet   Generic drug:  ondansetron      Take by mouth every 8 hours as needed for nausea        ZOLMitriptan 5 MG ODT tab    ZOMIG-ZMT     Take 5 mg by mouth        * Notice:  This list has 2 medication(s) that are the same as other medications prescribed for you. Read the directions carefully, and ask your doctor or other care provider to review them with you.

## 2018-08-02 NOTE — Clinical Note
Can we call [facility] and leave a message for either Dr. Tovar or his nurse to see if this patient might be able to have her previously planned microdiscectomy procedure done under regional or local anesthesia with sedation rather than general anesthesia due to her previous history of anaphylaxis with general anesthesia?

## 2018-08-02 NOTE — PROGRESS NOTES
SUBJECTIVE:   Rosa Sotomayor is a 67 year old female who presents to clinic today for the following health issues:      Discussion about lab results and herniated disc    Pleasant 67-year-old female with a lumbar disc herniation syndrome who saw me several weeks ago for a preop evaluation in anticipation of microdiscectomy.  We put the brakes on surgery because she has a history of anaphylaxis associated with general anesthesia in the past.  We contacted her spine  surgeon to see if this surgery could be done under regional anesthesia with sedation.  The patient has not heard back as to whether this is feasible.  However, the patient did follow-up with her spine clinic and was referred for a second epidural steroid injection.  Her first epidural steroid injection did not help very much.  However, dexamethasone that was prescribed by myself at the time of her preop seem to address her symptoms significantly.  This was taken orally.  When she stopped taking dexamethasone, her symptoms in her right leg reoccurred shortly after surgery her last dose.  Also, she saw her gynecologist in the interim for a routine exam and it was noted that her triglycerides were over 800.  She repeated a triglyceride test in this clinic and it was noted to be in the 600s.  She cannot think of any new diet changes that were made.    Problem list and histories reviewed & adjusted, as indicated.  Additional history: as documented    Patient Active Problem List   Diagnosis     Postmenopausal bleeding     Personal history of allergy to anesthetic agent     CKD (chronic kidney disease) stage 3, GFR 30-59 ml/min     Hypertriglyceridemia     Arnold-Chiari malformation (H)     Hyperlipidemia LDL goal <130     Periodic headache syndrome, not intractable     Anxiety     Secondary renal hyperparathyroidism (H)     Lumbago     Chronic bilateral low back pain without sciatica     Benign essential hypertension     Renal artery stenosis (H)     Macular  degeneration, bilateral     Anemia, iron deficiency     Intractable migraine without aura and without status migrainosus     Aortic valve insufficiency     Past Surgical History:   Procedure Laterality Date     C NONSPECIFIC PROCEDURE      wisdom teeth     C NONSPECIFIC PROCEDURE  2005    Varicose Veins     C TOTAL HIP ARTHROPLASTY      left     C TOTAL KNEE ARTHROPLASTY      right     TUBAL LIGATION  1987       Social History   Substance Use Topics     Smoking status: Never Smoker     Smokeless tobacco: Never Used     Alcohol use No     Family History   Problem Relation Age of Onset     Diabetes Mother      INSULIN     Hypertension Mother      Eye Disorder Mother      CATERACTS     HEART DISEASE Mother      CHF- OPEN HEART SURG X'S 2     Lipids Mother      Obesity Mother      Coronary Artery Disease Mother      Diabetes Father      ORAL     Hypertension Father      Arthritis Father      Eye Disorder Father      MAC DEGENERATION     HEART DISEASE Father      CHF     Lipids Father      Obesity Father      Diabetes Maternal Grandfather      INSULIN     Diabetes Brother      INSULIN     GASTROINTESTINAL DISEASE Brother      CHOLITISI POSSIBLE CHRONES     Obesity Brother      Colon Cancer No family hx of      Breast Cancer No family hx of          Current Outpatient Prescriptions   Medication Sig Dispense Refill     acetaminophen (TYLENOL) 500 MG tablet Take 500 mg by mouth       allopurinol (ZYLOPRIM) 100 MG tablet Take 100 mg by mouth       amitriptyline (ELAVIL) 10 MG tablet Take 10 mg by mouth       amitriptyline (ELAVIL) 25 MG tablet Take 25 mg by mouth At Bedtime  0     atorvastatin (LIPITOR) 40 MG tablet TAKE 1 TABLET(40 MG) BY MOUTH DAILY 90 tablet 3     carvedilol (COREG) 6.25 MG tablet 12.5 mg in am and 6.25 mg in pm 270 tablet 0     cetirizine (ZYRTEC) 10 MG tablet Take 10 mg by mouth       Cholecalciferol (VITAMIN D) 2000 UNITS tablet Take 1 tablet by mouth daily       clobetasol (TEMOVATE) 0.05 % external  solution Apply topically 2 times daily as needed  5     Cranberry POWD 1 capsule       Cyanocobalamin (B-12) 1000 MCG TBCR Take 1,000 mcg by mouth daily 100 tablet 1     dexamethasone (DECADRON) 4 MG tablet Take 1 tablet (4 mg) by mouth 2 times daily (with meals) 20 tablet 0     EPINEPHrine (EPIPEN 2-FRAN) 0.3 MG/0.3ML injection 2-pack Inject 0.3 mLs (0.3 mg) into the muscle once as needed for anaphylaxis 0.6 mL 1     hydrOXYzine (ATARAX) 25 MG tablet Take 1 tablet (25 mg) by mouth 3 times daily as needed for itching 120 tablet 11     ketoconazole (NIZORAL) 2 % shampoo Apply to the affected area and wash off after 5 minutes. 120 mL 1     losartan (COZAAR) 100 MG tablet Take 1 tablet (100 mg) by mouth At Bedtime (Patient taking differently: Take 50 mg by mouth 2 times daily ) 90 tablet 1     Magnesium Oxide 250 MG TABS Take 250 mg by mouth       ondansetron (ZOFRAN) 4 MG tablet Take by mouth every 8 hours as needed for nausea       SUMAtriptan (IMITREX) 20 MG/ACT nasal spray Spray 1 spray in nostril as needed for migraine May repeat in 2 hours. Max 2 sprays/24 hours. 12 each 11     torsemide (DEMADEX) 10 MG tablet Take 1 tablet (10 mg) by mouth daily 90 tablet 3     traMADol (ULTRAM) 50 MG tablet Take 1 tablet by mouth every 6 hours as needed  0     ZOLMitriptan (ZOMIG-ZMT) 5 MG ODT tab Take 5 mg by mouth       [DISCONTINUED] dexamethasone (DECADRON) 4 MG tablet Take 1 tablet (4 mg) by mouth 2 times daily (with meals) (Patient not taking: Reported on 8/2/2018) 10 tablet 0     Allergies   Allergen Reactions     Fentanyl Anaphylaxis     Midazolam Anaphylaxis     Propofol Anaphylaxis     Anesthetic [Amides]      Anaphalactic shock     Codeine      Erythromycin      ointment     Gabapentin      chest heaviness with breathing     Hydromorphone Itching     Tolerates morphine     Lorazepam Itching     Other [Seasonal Allergies] Anaphylaxis     GENERAL ANETHESIA       Penicillins      rash     Eucalyptus Oil Rash     hives  "    Nitrofuran Derivatives Rash     Sulfa Drugs Itching and Rash     rash       Reviewed and updated as needed this visit by clinical staff  Tobacco  Allergies  Meds       Reviewed and updated as needed this visit by Provider         ROS:  Constitutional, HEENT, cardiovascular, pulmonary, gi and gu systems are negative, except as otherwise noted.    OBJECTIVE:     /72 (BP Location: Right arm, Patient Position: Sitting, Cuff Size: Adult Large)  Pulse 86  Temp 98.1  F (36.7  C) (Oral)  Ht 5' 4\" (1.626 m)  Wt 178 lb (80.7 kg)  SpO2 99%  BMI 30.55 kg/m2  Body mass index is 30.55 kg/(m^2).  GENERAL: healthy, alert and no distress  MS: BACK:  No tenderness to palpation over the spinous processes of the thoracic and lumbar spine, deep tendon reflexes are 2+ at the bilateral patella and Achilles tendons, there is 5 out of 5 muscle strength in all lower extremity muscle groups, there is normal sensation to light touch in all lower extremity dermatomes, straight leg raise does elicit radicular symptoms on the right. Gait is normal.   NEURO: Normal strength and tone, mentation intact and speech normal  PSYCH: mentation appears normal, affect normal/bright    Diagnostic Test Results:  none     ASSESSMENT/PLAN:         ICD-10-CM    1. Hypertriglyceridemia E78.1 NUTRITION REFERRAL     Lipid panel reflex to direct LDL Fasting   2. Lumbar disc herniation M51.26 dexamethasone (DECADRON) 4 MG tablet     Long discussion about options for management of hypertriglyceridemia and lumbar disc herniations syndrome symptoms.  At the end of the discussion, she would like to pursue diet interventions and take over-the-counter fish oil to address her triglycerides.  Return in 2 months for follow-up fasting lipid panel.  If triglycerides greater than 500 at that time, add TriCor as previously stated.  Side effects and risks were discussed with patient.  With respect to the pain in her right leg associated with her lumbar disc " herniation, she will restart oral dexamethasone because she is desperate for symptom relief.  She will also report for a lumbar epidural steroid injection next Tuesday as previously scheduled by her spine surgery clinic.  She and I will do our best to get in touch with Dr. Tovar or his nurse to discuss whether microdiscectomy could be performed under regional or local anesthesia.  If so, then she would be a candidate to pursue that procedure if the epidural steroid injection does not address her symptoms adequately.      Total face to face contact time was greater than 27 minutes of which more than 50% of this time was spent counseling and coordinating care regarding the above topics.        Josh Hollingsworth MD  [facility]

## 2018-08-09 ENCOUNTER — APPOINTMENT (OUTPATIENT)
Dept: CT IMAGING | Facility: CLINIC | Age: 68
End: 2018-08-09
Attending: PHYSICIAN ASSISTANT
Payer: MEDICARE

## 2018-08-09 ENCOUNTER — APPOINTMENT (OUTPATIENT)
Dept: GENERAL RADIOLOGY | Facility: CLINIC | Age: 68
End: 2018-08-09
Attending: PHYSICIAN ASSISTANT
Payer: MEDICARE

## 2018-08-09 ENCOUNTER — TELEPHONE (OUTPATIENT)
Dept: FAMILY MEDICINE | Facility: CLINIC | Age: 68
End: 2018-08-09

## 2018-08-09 ENCOUNTER — HOSPITAL ENCOUNTER (EMERGENCY)
Facility: CLINIC | Age: 68
Discharge: HOME | End: 2018-08-09
Attending: EMERGENCY MEDICINE | Admitting: EMERGENCY MEDICINE
Payer: MEDICARE

## 2018-08-09 VITALS
HEIGHT: 64 IN | BODY MASS INDEX: 29.02 KG/M2 | HEART RATE: 70 BPM | TEMPERATURE: 97.4 F | OXYGEN SATURATION: 98 % | SYSTOLIC BLOOD PRESSURE: 163 MMHG | WEIGHT: 170 LBS | DIASTOLIC BLOOD PRESSURE: 64 MMHG | RESPIRATION RATE: 18 BRPM
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
## 2018-08-09 DIAGNOSIS — R42 DIZZINESS: ICD-10-CM

## 2018-08-09 LAB
ANION GAP SERPL CALCULATED.3IONS-SCNC: 11 MMOL/L (ref 3–14)
BASOPHILS # BLD AUTO: 0 10E9/L (ref 0–0.2)
BASOPHILS NFR BLD AUTO: 0 %
BUN SERPL-MCNC: 37 MG/DL (ref 7–30)
CALCIUM SERPL-MCNC: 8.3 MG/DL (ref 8.5–10.1)
CHLORIDE SERPL-SCNC: 96 MMOL/L (ref 94–109)
CO2 SERPL-SCNC: 26 MMOL/L (ref 20–32)
CREAT SERPL-MCNC: 1.4 MG/DL (ref 0.52–1.04)
DIFFERENTIAL METHOD BLD: ABNORMAL
EOSINOPHIL # BLD AUTO: 0.1 10E9/L (ref 0–0.7)
EOSINOPHIL NFR BLD AUTO: 0.9 %
ERYTHROCYTE [DISTWIDTH] IN BLOOD BY AUTOMATED COUNT: 14.9 % (ref 10–15)
GFR SERPL CREATININE-BSD FRML MDRD: 38 ML/MIN/1.7M2
GLUCOSE SERPL-MCNC: 71 MG/DL (ref 70–99)
HCT VFR BLD AUTO: 37.3 % (ref 35–47)
HGB BLD-MCNC: 12.3 G/DL (ref 11.7–15.7)
IMM GRANULOCYTES # BLD: 0.1 10E9/L (ref 0–0.4)
IMM GRANULOCYTES NFR BLD: 0.9 %
INTERPRETATION ECG - MUSE: NORMAL
LYMPHOCYTES # BLD AUTO: 2.5 10E9/L (ref 0.8–5.3)
LYMPHOCYTES NFR BLD AUTO: 21.5 %
MCH RBC QN AUTO: 30.8 PG (ref 26.5–33)
MCHC RBC AUTO-ENTMCNC: 33 G/DL (ref 31.5–36.5)
MCV RBC AUTO: 94 FL (ref 78–100)
MONOCYTES # BLD AUTO: 1.2 10E9/L (ref 0–1.3)
MONOCYTES NFR BLD AUTO: 10.2 %
NEUTROPHILS # BLD AUTO: 7.7 10E9/L (ref 1.6–8.3)
NEUTROPHILS NFR BLD AUTO: 66.5 %
NRBC # BLD AUTO: 0 10*3/UL
NRBC BLD AUTO-RTO: 0 /100
PLATELET # BLD AUTO: 263 10E9/L (ref 150–450)
POTASSIUM SERPL-SCNC: 4 MMOL/L (ref 3.4–5.3)
RBC # BLD AUTO: 3.99 10E12/L (ref 3.8–5.2)
SODIUM SERPL-SCNC: 133 MMOL/L (ref 133–144)
WBC # BLD AUTO: 11.6 10E9/L (ref 4–11)

## 2018-08-09 PROCEDURE — 25000128 H RX IP 250 OP 636: Performed by: PHYSICIAN ASSISTANT

## 2018-08-09 PROCEDURE — 96360 HYDRATION IV INFUSION INIT: CPT

## 2018-08-09 PROCEDURE — 70450 CT HEAD/BRAIN W/O DYE: CPT

## 2018-08-09 PROCEDURE — 99285 EMERGENCY DEPT VISIT HI MDM: CPT | Mod: 25

## 2018-08-09 PROCEDURE — 85025 COMPLETE CBC W/AUTO DIFF WBC: CPT | Performed by: PHYSICIAN ASSISTANT

## 2018-08-09 PROCEDURE — 72100 X-RAY EXAM L-S SPINE 2/3 VWS: CPT

## 2018-08-09 PROCEDURE — 93005 ELECTROCARDIOGRAM TRACING: CPT

## 2018-08-09 PROCEDURE — 80048 BASIC METABOLIC PNL TOTAL CA: CPT | Performed by: PHYSICIAN ASSISTANT

## 2018-08-09 RX ADMIN — SODIUM CHLORIDE 1000 ML: 9 INJECTION, SOLUTION INTRAVENOUS at 16:04

## 2018-08-09 ASSESSMENT — ENCOUNTER SYMPTOMS
FEVER: 0
NUMBNESS: 0
SEIZURES: 0
DIZZINESS: 1
SHORTNESS OF BREATH: 0
ARTHRALGIAS: 1
HEADACHES: 1
BACK PAIN: 1
WEAKNESS: 0
SPEECH DIFFICULTY: 0

## 2018-08-09 NOTE — ED AVS SNAPSHOT
Emergency Department    [address] [facility] [address]    Phone:  [phone]    Fax:  [phone]                                       Rosa Sotomayor   MRN: [id number]    Department:   Emergency Department   Date of Visit:  8/9/2018           Patient Information     Date Of Birth          1950        Your diagnoses for this visit were:     Dizziness     Fall, initial encounter        You were seen by Marylin Galindo MD.      Follow-up Information     Follow up with  Emergency Department.    Specialty:  EMERGENCY MEDICINE    Why:  As needed, If symptoms worsen    Contact information:    [address] [facility] [address] [phone]        Follow up with Josh Hollingsworth MD In 3 days.    Specialty:  Internal Medicine    Why:  As needed    Contact information:    [address] [address] [phone]          Discharge Instructions       Discharge Instructions  Dizziness (Lightheaded)  Today you were seen for dizziness.  Dizziness can be caused by many things and it can be very difficult to determine the cause of dizziness.  At this time, your provider has found no signs that your dizziness is due to a serious or life-threatening condition. However, sometimes there is a serious problem that does not show up right away, and it is important for you to follow up with your regular provider as instructed.  Generally, every Emergency Department visit should have a follow-up clinic visit with either a primary or a specialty clinic/provider. Please follow-up as instructed by your emergency provider today.      Return to the Emergency Department if:      You pass out (fainting or falling out), especially during exercise.      You develop chest pain, chest pressure or difficulty breathing.    Your feel an irregular heartbeat.    You have excessive vaginal bleeding, or blood in your stool or vomit (throw up).    You have a high fever.    Your symptoms get  worse or more frequent.    If when you begin to feel dizzy or lightheaded, it is important to sit down or lay down immediately to prevent injury from falling.  If you were given a prescription for medicine here today, be sure to read all of the information (including the package insert) that comes with your prescription.  This will include important information about the medicine, its side effects, and any warnings that you need to know about.  The pharmacist who fills the prescription can provide more information and answer questions you may have about the medicine.  If you have questions or concerns that the pharmacist cannot address, please call or return to the Emergency Department.   Remember that you can always come back to the Emergency Department if you are not able to see your regular provider in the amount of time listed above, if you get any new symptoms, or if there is anything that worries you.      Your next 10 appointments already scheduled     Aug 21, 2018  2:30 PM CDT   Consult HOD with  [facility]   [facility]    [address] [place] [address] [phone]              24 Hour Appointment Hotline       To make an appointment at any [facility] call [phone] ([phone]). If you don't have a family doctor or clinic, we will help you find one. [facility] are conveniently located to serve the needs of you and your family.             Review of your medicines      Our records show that you are taking the medicines listed below. If these are incorrect, please call your family doctor or clinic.        Dose / Directions Last dose taken    acetaminophen 500 MG tablet   Commonly known as:  TYLENOL   Dose:  500 mg        Take 500 mg by mouth   Refills:  0        allopurinol 100 MG tablet   Commonly known as:  ZYLOPRIM   Dose:  100 mg        Take 100 mg by mouth   Refills:  0        * amitriptyline 10 MG tablet    Commonly known as:  ELAVIL   Dose:  10 mg        Take 10 mg by mouth   Refills:  0        * amitriptyline 25 MG tablet   Commonly known as:  ELAVIL   Dose:  25 mg        Take 25 mg by mouth At Bedtime   Refills:  0        atorvastatin 40 MG tablet   Commonly known as:  LIPITOR   Quantity:  90 tablet        TAKE 1 TABLET(40 MG) BY MOUTH DAILY   Refills:  3        B-12 1000 MCG Tbcr   Dose:  1000 mcg   Quantity:  100 tablet        Take 1,000 mcg by mouth daily   Refills:  1        carvedilol 6.25 MG tablet   Commonly known as:  COREG   Quantity:  270 tablet        12.5 mg in am and 6.25 mg in pm   Refills:  0        cetirizine 10 MG tablet   Commonly known as:  zyrTEC   Dose:  10 mg        Take 10 mg by mouth   Refills:  0        clobetasol 0.05 % external solution   Commonly known as:  TEMOVATE        Apply topically 2 times daily as needed   Refills:  5        Cranberry Powd   Dose:  1 capsule        1 capsule   Refills:  0        dexamethasone 4 MG tablet   Commonly known as:  DECADRON   Dose:  4 mg   Quantity:  20 tablet        Take 1 tablet (4 mg) by mouth 2 times daily (with meals)   Refills:  0        EPINEPHrine 0.3 MG/0.3ML injection 2-pack   Commonly known as:  EPIPEN 2-FRAN   Dose:  0.3 mg   Quantity:  0.6 mL        Inject 0.3 mLs (0.3 mg) into the muscle once as needed for anaphylaxis   Refills:  1        hydrOXYzine 25 MG tablet   Commonly known as:  ATARAX   Dose:  25 mg   Quantity:  120 tablet        Take 1 tablet (25 mg) by mouth 3 times daily as needed for itching   Refills:  11        ketoconazole 2 % shampoo   Commonly known as:  NIZORAL   Quantity:  120 mL        Apply to the affected area and wash off after 5 minutes.   Refills:  1        losartan 100 MG tablet   Commonly known as:  COZAAR   Dose:  100 mg   Quantity:  90 tablet        Take 1 tablet (100 mg) by mouth At Bedtime   Refills:  1        Magnesium Oxide 250 MG Tabs   Dose:  250 mg        Take 250 mg by mouth   Refills:  0         SUMAtriptan 20 MG/ACT nasal spray   Commonly known as:  IMITREX   Dose:  1 spray   Quantity:  12 each        Spray 1 spray in nostril as needed for migraine May repeat in 2 hours. Max 2 sprays/24 hours.   Refills:  11        torsemide 10 MG tablet   Commonly known as:  DEMADEX   Dose:  10 mg   Quantity:  90 tablet        Take 1 tablet (10 mg) by mouth daily   Refills:  3        traMADol 50 MG tablet   Commonly known as:  ULTRAM   Dose:  1 tablet        Take 1 tablet by mouth every 6 hours as needed   Refills:  0        vitamin D 2000 units tablet   Dose:  1 tablet        Take 1 tablet by mouth daily   Refills:  0        ZOFRAN 4 MG tablet   Generic drug:  ondansetron        Take by mouth every 8 hours as needed for nausea   Refills:  0        ZOLMitriptan 5 MG ODT tab   Commonly known as:  ZOMIG-ZMT   Dose:  5 mg        Take 5 mg by mouth   Refills:  0        * Notice:  This list has 2 medication(s) that are the same as other medications prescribed for you. Read the directions carefully, and ask your doctor or other care provider to review them with you.            Procedures and tests performed during your visit     Basic metabolic panel    CBC with platelets differential    CT Head w/o Contrast    EKG 12 lead    Lumbar spine XR, 2-3 views      Orders Needing Specimen Collection     None      Pending Results     Date and Time Order Name Status Description    8/9/2018 1555 Lumbar spine XR, 2-3 views Preliminary     8/9/2018 1552 CT Head w/o Contrast Preliminary             Pending Culture Results     No orders found from 8/7/2018 to 8/10/2018.            Pending Results Instructions     If you had any lab results that were not finalized at the time of your Discharge, you can call the ED Lab Result RN at [phone]. You will be contacted by this team for any positive Lab results or changes in treatment. The nurses are available 7 days a week from 10A to 6:30P.  You can leave a message 24 hours per day and they will  return your call.        Test Results From Your Hospital Stay        8/9/2018  4:15 PM      Component Results     Component Value Ref Range & Units Status    WBC 11.6 (H) 4.0 - 11.0 10e9/L Final    RBC Count 3.99 3.8 - 5.2 10e12/L Final    Hemoglobin 12.3 11.7 - 15.7 g/dL Final    Hematocrit 37.3 35.0 - 47.0 % Final    MCV 94 78 - 100 fl Final    MCH 30.8 26.5 - 33.0 pg Final    MCHC 33.0 31.5 - 36.5 g/dL Final    RDW 14.9 10.0 - 15.0 % Final    Platelet Count 263 150 - 450 10e9/L Final    Diff Method Automated Method  Final    % Neutrophils 66.5 % Final    % Lymphocytes 21.5 % Final    % Monocytes 10.2 % Final    % Eosinophils 0.9 % Final    % Basophils 0.0 % Final    % Immature Granulocytes 0.9 % Final    Nucleated RBCs 0 0 /100 Final    Absolute Neutrophil 7.7 1.6 - 8.3 10e9/L Final    Absolute Lymphocytes 2.5 0.8 - 5.3 10e9/L Final    Absolute Monocytes 1.2 0.0 - 1.3 10e9/L Final    Absolute Eosinophils 0.1 0.0 - 0.7 10e9/L Final    Absolute Basophils 0.0 0.0 - 0.2 10e9/L Final    Abs Immature Granulocytes 0.1 0 - 0.4 10e9/L Final    Absolute Nucleated RBC 0.0  Final         8/9/2018  4:35 PM      Component Results     Component Value Ref Range & Units Status    Sodium 133 133 - 144 mmol/L Final    Potassium 4.0 3.4 - 5.3 mmol/L Final    Chloride 96 94 - 109 mmol/L Final    Carbon Dioxide 26 20 - 32 mmol/L Final    Anion Gap 11 3 - 14 mmol/L Final    Glucose 71 70 - 99 mg/dL Final    Urea Nitrogen 37 (H) 7 - 30 mg/dL Final    Creatinine 1.40 (H) 0.52 - 1.04 mg/dL Final    GFR Estimate 38 (L) >60 mL/min/1.7m2 Final    Non  GFR Calc    GFR Estimate If Black 46 (L) >60 mL/min/1.7m2 Final    African American GFR Calc    Calcium 8.3 (L) 8.5 - 10.1 mg/dL Final         8/9/2018  4:41 PM      Narrative     CT OF THE HEAD WITHOUT CONTRAST 8/9/2018 4:30 PM     COMPARISON: None.    HISTORY: Dizziness, fall onto back of head.     TECHNIQUE: Axial CT images of the head from the skull base to the  vertex were  acquired without IV contrast.    FINDINGS: The ventricles and basal cisterns are within normal limits  in configuration. There is no midline shift. There are no extra-axial  fluid collections. Gray-white differentiation is well maintained.    No intracranial hemorrhage, mass or recent infarct.    The visualized paranasal sinuses are well-aerated. There is no  mastoiditis. There are no fractures of the visualized bones.        Impression     IMPRESSION: Normal head CT.      Radiation dose for this scan was reduced using automated exposure  control, adjustment of the mA and/or kV according to patient size, or  iterative reconstruction technique         8/9/2018  4:59 PM      Narrative     LUMBAR SPINE TWO TO THREE VIEWS 8/9/2018 4:47 PM     COMPARISON: None    HISTORY: Fall onto butt.         Impression     IMPRESSION: There is normal alignment of the lumbar vertebrae;  however, there is mild left convex curvature of the lumbar spine  centered at the L2 level. Vertebral body heights of the lumbar spine  appear normal. There is no evidence for fracture of the lumbar spine.  There is facet arthropathy bilaterally at the L3-L4, L4-L5 and L5-S1  levels.                Clinical Quality Measure: Blood Pressure Screening     Your blood pressure was checked while you were in the emergency department today. The last reading we obtained was  BP: 169/65 . Please read the guidelines below about what these numbers mean and what you should do about them.  If your systolic blood pressure (the top number) is less than 120 and your diastolic blood pressure (the bottom number) is less than 80, then your blood pressure is normal. There is nothing more that you need to do about it.  If your systolic blood pressure (the top number) is 120-139 or your diastolic blood pressure (the bottom number) is 80-89, your blood pressure may be higher than it should be. You should have your blood pressure rechecked within a year by a primary care  provider.  If your systolic blood pressure (the top number) is 140 or greater or your diastolic blood pressure (the bottom number) is 90 or greater, you may have high blood pressure. High blood pressure is treatable, but if left untreated over time it can put you at risk for heart attack, stroke, or kidney failure. You should have your blood pressure rechecked by a primary care provider within the next 4 weeks.  If your provider in the emergency department today gave you specific instructions to follow-up with your doctor or provider even sooner than that, you should follow that instruction and not wait for up to 4 weeks for your follow-up visit.        Thank you for choosing [facility]       Thank you for choosing [facility] for your care. Our goal is always to provide you with excellent care. Hearing back from our patients is one way we can continue to improve our services. Please take a few minutes to complete the written survey that you may receive in the mail after you visit with us. Thank you!        [employer] Information     [employer] gives you secure access to your electronic health record. If you see a primary care provider, you can also send messages to your care team and make appointments. If you have questions, please call your primary care clinic.  If you do not have a primary care provider, please call [phone] and they will assist you.        Care EveryWhere ID     This is your Care EveryWhere ID. This could be used by other organizations to access your [facility] medical records  [id number]        Equal Access to Services     LISBET LIPSCOMB : Nicole Alberto, wamary janeda violeta, qaybta kristenalmarlene witt. So [facility] [phone].    ATENCIÓN: Si habla español, tiene a armendariz disposición servicios gratuitos de asistencia lingüística. Llame al [phone].    We comply with applicable federal civil rights laws and [place] laws. We do not discriminate on the  basis of race, color, national origin, age, disability, sex, sexual orientation, or gender identity.            After Visit Summary       This is your record. Keep this with you and show to your community pharmacist(s) and doctor(s) at your next visit.

## 2018-08-09 NOTE — ED NOTES
Ambulated approximately 10 yards in hallway. Patient reports due to her back issues this is typical distance for her before she cannot ambulate further secondary to pain. Reports dizziness significantly improved but still mildly lightheaded.

## 2018-08-09 NOTE — TELEPHONE ENCOUNTER
Reason for call:  Patient reporting a symptom    Symptom or request: Patient fell at Ortho office today and hit her head very bad she now has a HA with pain rating 4-5   She did not break the skin  She got up to walk out of the office and she went to put her Sunglasses on and passed out   She had just got a Lidocaine injection in her knee this was her first injection    Duration (how long have symptoms been present):  Just about an hour ago    Have you been treated for this before? No    Additional comments: Wants to speak with a nurse to find out what to do     Phone Number patient can be reached at:  Home number on file [phone] (home)    Best Time:  anytime    Can we leave a detailed message on this number:  YES    Call taken on 8/9/2018 at 12:42 PM by Rosetta Mosqueda

## 2018-08-09 NOTE — ED PROVIDER NOTES
Emergency Department Attending Supervision Note  8/9/2018  4:08 PM      I evaluated this patient in conjunction with Xu Truong PA-C      Briefly, the patient presented with dizziness and fall. Per report, the patient was walking out of a clinic, where she receives scheduled injections for chronic right back pain, when she began to feel dizzy, falling backwards. The patient denies loosing consciousness at this time, but states that she did hit her head. While in the ED, she endorses headache and worsening back pain, although she denies neck pain. The patient further denies vomiting, nausea or vision changes. She is not on anticoagulants and further denies chest pain, or feeling short of breath.       On my exam,     General: Resting on the gurney, appears comfortable  Head:  Slight posterior scalp tenderness to palpation, she has no laceration.  Neck:              No midline tenderness to palpation.  Mouth/Throat: Mucus membranes are moist  CV:  Regular rate    Normal S1 and S2  No pathological murmur   Resp:  Breath sounds clear and equal bilaterally    Non-labored, no retractions or accessory muscle use    No coarseness    No wheezing   GI:  Abdomen is soft, no rigidity    No tenderness to palpation  MS:  Normal motor assessment of all extremities.    Good capillary refill noted.    Skin:   No rash or lesions noted.  Neuro:  Cranial nerve 2-12 intact sensationally x 4. Strength intact x 4. Speech is normal and fluent. No apparent deficit.  Psych: Awake. Alert.  Normal affect.      Appropriate interactions.    Results:    CBC: WNL (WBC 11.6 (H), HGB 12.3, )  BMP: Creatinine 1.40 (H), BUN (H), Calcium 8.3 (L), GFR 38 (L) o/w WNL    CT Head w/o Contrast: IMPRESSION: Normal head CT.  Radiation dose for this scan was reduced using automated exposure  control, adjustment of the mA and/or kV according to patient size, or  iterative reconstruction technique    Lumbar Spine, 2/3 Views: IMPRESSION: There is  normal alignment of the lumbar vertebrae;  however, there is mild left convex curvature of the lumbar spine  centered at the L2 level. Vertebral body heights of the lumbar spine  appear normal. There is no evidence for fracture of the lumbar spine.  There is facet arthropathy bilaterally at the L3-L4, L4-L5 and L5-S1  levels.    ECG (15:07:25):  Rate 72 bpm.   QT/QTc 396/433.   P-R-T axes -4 8 35.   Sinus rhythm. Minimal voltage criteria for LVH, may be normal variant. Borderline ECG when compared to 04/16/2018. Interpreted by Marylin Galindo MD.     ED course:    My impression is: Rosa Sotomayor is a 67 year old female who presents for evaluation following a fall.  Fall was likely due to knee injection directly prior and may have had a vasovagal component.  Full examination revealed posterior scalp tenderness, no neck or extremity tenderness and normal neuro status .  X-Ray imaging showed no fracture or injury to lumbar spine.  In terms of head injury, the patient had no direct injury to the head, is not on any blood thinners, she does have a headache. Advanced imaging was undertaken. CT of the head showed no acute injury. In terms of neck injury, the patient is able to be cleared by NEXUS criteria.    Advanced imaging demonstrates: No injury.     Tetanus up to date.    The patient was given return precautions and follow up instructions, they state understanding of these and ability to comply.    With reasonable clinical certainty, I believe the patient is safe for discharge and can be safely managed as an outpatient.    Diagnosis    ICD-10-CM    1. Dizziness R42    2. Fall, initial encounter W19.XXXA        Marylin Galindo MD

## 2018-08-09 NOTE — ED PROVIDER NOTES
History     Chief Complaint:  Dizziness, fall     HPI   Rosa Sotomayor is a 67 year old female  with history of chronic kidney disease, moderate aortic regurgitation, and L3 lumbar disc herniation, who presents after becoming dizzy and falling backwards onto her head in the lobby of a clinic approximately 4 hours prior.  Patient states that she was at clinic to receive an injection in her knee for chronic knee pain, and when she was walking out of the lobby she suddenly became dizzy and fell backwards onto her head and low back.  She denies losing consciousness.  At present she is complaining of a posterior headache which she rates as 4-5 out of 10, and worsening of her low back pain.  The patient denies nausea vomiting, amnesia, vision changes, or neck pain.  She states that initially she was taken out of the wheelchair, but since then has been able to walk some though she has been busy doing this.  Patient does not take blood thinners mining, though does have a history of a TBI approximately 7-8 years ago.  Patient states that she took tylenol and her Zomig migraine medication prior to coming with some improvement in headache.    Allergies:  Fentanyl  Midazolam  Propofol  Anesthetic [Amides]  Codeine  Erythromycin  Gabapentin  Hydromorphone  Lorazepam  Other [Seasonal Allergies]  Penicillins  Eucalyptus Oil  Nitrofuran Derivatives  Sulfa Drugs   Medications:      acetaminophen (TYLENOL) 500 MG tablet   allopurinol (ZYLOPRIM) 100 MG tablet   amitriptyline (ELAVIL) 10 MG tablet   amitriptyline (ELAVIL) 25 MG tablet   atorvastatin (LIPITOR) 40 MG tablet   carvedilol (COREG) 6.25 MG tablet   cetirizine (ZYRTEC) 10 MG tablet   Cholecalciferol (VITAMIN D) 2000 UNITS tablet   clobetasol (TEMOVATE) 0.05 % external solution   Cranberry POWD   Cyanocobalamin (B-12) 1000 MCG TBCR   dexamethasone (DECADRON) 4 MG tablet   EPINEPHrine (EPIPEN 2-FRAN) 0.3 MG/0.3ML injection 2-pack   hydrOXYzine (ATARAX) 25 MG tablet    ketoconazole (NIZORAL) 2 % shampoo   losartan (COZAAR) 100 MG tablet   Magnesium Oxide 250 MG TABS   ondansetron (ZOFRAN) 4 MG tablet   SUMAtriptan (IMITREX) 20 MG/ACT nasal spray   torsemide (DEMADEX) 10 MG tablet   traMADol (ULTRAM) 50 MG tablet   ZOLMitriptan (ZOMIG-ZMT) 5 MG ODT tab     Past Medical History:    Past Medical History:   Diagnosis Date     Aortic valve insufficiency 4/19/2018     Chronic kidney disease      Dizziness and giddiness        Patient Active Problem List    Diagnosis Date Noted     Aortic valve insufficiency 04/19/2018     Priority: Medium     Intractable migraine without aura and without status migrainosus 11/20/2017     Priority: Medium     Anemia, iron deficiency 06/23/2017     Priority: Medium     Renal artery stenosis (H) 01/05/2017     Priority: Medium     Macular degeneration, bilateral 01/05/2017     Priority: Medium     Benign essential hypertension 12/28/2016     Priority: Medium     Lumbago 11/29/2016     Priority: Medium     Chronic bilateral low back pain without sciatica 11/29/2016     Priority: Medium     Secondary renal hyperparathyroidism (H) 07/31/2016     Priority: Medium     Arnold-Chiari malformation (H) 07/23/2016     Priority: Medium     Hyperlipidemia LDL goal <130 07/23/2016     Priority: Medium     Periodic headache syndrome, not intractable 07/23/2016     Priority: Medium     Anxiety 07/23/2016     Priority: Medium     CKD (chronic kidney disease) stage 3, GFR 30-59 ml/min 07/22/2016     Priority: Medium     Hypertriglyceridemia 07/22/2016     Priority: Medium     Personal history of allergy to anesthetic agent 03/10/2007     Priority: Medium     Postmenopausal bleeding 02/28/2007     Priority: Medium      Past Surgical History:    Past Surgical History:   Procedure Laterality Date     C NONSPECIFIC PROCEDURE      wisdom teeth     C NONSPECIFIC PROCEDURE  2005    Varicose Veins     C TOTAL HIP ARTHROPLASTY      left     C TOTAL KNEE ARTHROPLASTY      right  "    TUBAL LIGATION  1987      Family History:    family history includes Arthritis in her father; Coronary Artery Disease in her mother; Diabetes in her brother, father, maternal grandfather, and mother; Eye Disorder in her father and mother; GASTROINTESTINAL DISEASE in her brother; HEART DISEASE in her father and mother; Hypertension in her father and mother; Lipids in her father and mother; Obesity in her brother, father, and mother. There is no history of Colon Cancer or Breast Cancer.  Social History:   reports that she has never smoked. She has never used smokeless tobacco. She reports that she does not drink alcohol or use illicit drugs.    PCP: Josh Hollingsworth     Review of Systems   Constitutional: Negative for fever.   Respiratory: Negative for shortness of breath.    Cardiovascular: Negative for chest pain.   Musculoskeletal: Positive for arthralgias and back pain.   Neurological: Positive for dizziness and headaches. Negative for seizures, syncope, speech difficulty, weakness and numbness.   All other systems reviewed and are negative.    Physical Exam     Patient Vitals for the past 24 hrs:   BP Temp Temp src Pulse Resp SpO2 Height Weight   08/09/18 1654 169/65 - - - - - - -   08/09/18 1452 139/53 97.4  F (36.3  C) Oral 70 18 98 % 1.626 m (5' 4\") 77.1 kg (170 lb)        Physical Exam   General: Alert and cooperative with exam. Patient in no acute distress. Normal mentation.  Head:  Scalp is NC/AT  Eyes:  No scleral icterus, PERRL, EOMI   ENT:  The external nose and ears are normal.  Neck:  Normal range of motion without rigidity or pain.  CV:  Regular rate and rhythm    Soft diastolic murmur heard.  Resp:  Breath sounds are clear bilaterally.  No crackles, wheezes, rhonchi.    Non-labored, no retractions or accessory muscle use  GI:  Abdomen is soft, no distension, no tenderness. No peritoneal signs.  :  No suprapubic or flank tenderness  MS:  No lower extremity edema     Midline lumbar tenderness at " the level of L3-L4.  No midline cervical, or thoracic tenderness.  Skin:  Warm and dry, No rash or lesions noted.  Neuro: Oriented x 3. No gross motor deficits.    Strength and sensation grossly intact in all 4 extremities.      Cranial nerves 2-12 intact.    GCS: 15    Normal finger to nose and heel to shin testing    Gait normal    Negative Romberg test.    Emergency Department Course     ECG (15:07:25):  Rate 72 bpm.   QT/QTc 396/433.   P-R-T axes -4 8 35.   Sinus rhythm. Minimal voltage criteria for LVH, may be normal variant. Borderline ECG when compared to 04/16/2018. Interpreted by Marylin Galindo MD.     Imaging:  Lumbar spine XR, 2-3 views   Preliminary Result   IMPRESSION: There is normal alignment of the lumbar vertebrae;   however, there is mild left convex curvature of the lumbar spine   centered at the L2 level. Vertebral body heights of the lumbar spine   appear normal. There is no evidence for fracture of the lumbar spine.   There is facet arthropathy bilaterally at the L3-L4, L4-L5 and L5-S1   levels.      CT Head w/o Contrast   Preliminary Result   IMPRESSION: Normal head CT.         Radiation dose for this scan was reduced using automated exposure   control, adjustment of the mA and/or kV according to patient size, or   iterative reconstruction technique           Laboratory:  Labs Ordered and Resulted from Time of ED Arrival Up to the Time of Departure from the ED   CBC WITH PLATELETS DIFFERENTIAL - Abnormal; Notable for the following:        Result Value    WBC 11.6 (*)     All other components within normal limits   BASIC METABOLIC PANEL - Abnormal; Notable for the following:     Urea Nitrogen 37 (*)     Creatinine 1.40 (*)     GFR Estimate 38 (*)     GFR Estimate If Black 46 (*)     Calcium 8.3 (*)     All other components within normal limits   Lab results reviewed with patient.  Results otherwise within normal limits.  All other questions answered.    Interventions:  Medications   0.9%  sodium chloride BOLUS (0 mLs Intravenous Stopped 8/9/18 1720)        Emergency Department Course:  Past medical records, nursing notes, and vitals reviewed.    I performed an exam of the patient and obtained history, as documented above  I rechecked the patient. Findings and plan explained to the Patient and patient's . Patient was discharged home.    Impression & Plan      Medical Decision Making:  Rosa Sotomayor is a 67 year old female who presents with dizziness and fall.  Patient history and records reviewed.  On exam the patient is well-appearing with normal vitals.  EKG was obtained as above and unremarkable.  Lab work unremarkable except for stable chronic kidney disease.  The patient's neurologic exam is normal and she has no evidence of skull fractures.  After discussion of risks versus benefits for CT of head, the patient and her  state that they would like to make sure that there is no bleeding or fracture, and this was obtained which was negative.  No evidence of neck injury, as patient has full range of motion without pain, and no midline C-spine tenderness.  Additionally, x-ray of the lumbar spine was obtained because of increased tenderness which was also negative for acute pathology.      The patient was given fluids as above, and reported some improvement in symptoms.  She was not interested in additional treatment for her headache.  Uncertain the cause of the patient's dizziness, though given that it was worse on standing and she did not lose consciousness and has a benign EKG suspect cardiac cause less likely.  She has no chest pain, shortness of breath, or neurologic symptoms to suggest more serious cause.  I suspect this was most likely secondary to some dehydration given the patient's improved symptoms with fluids.  The patient was able to ambulate in the ED without difficulty or significant dizziness following fluids.  Discussed indications to return to the ED if new or worsening  symptoms.  Follow-up with primary care provider as needed, and with orthopedic surgeon regarding ongoing chronic disc herniation back pain.    Diagnosis:    ICD-10-CM    1. Dizziness R42    2. Fall, initial encounter W19.XXXA         Discharge Medications:  New Prescriptions    No medications on file        8/9/2018   Thaddeus Jeffers PA-C, PA-C  08/09/18 1817

## 2018-08-09 NOTE — TELEPHONE ENCOUNTER
SAMUEL to PCP - patient going to ER  Spoke with patient with  in background   Fell this morning and hit her head - back of head/bottom center   Fell at 12pm in [facility] of orthopedic office (had injection of lidocaine)   Orthopedic surgeon advised she call PCP   After fall she lost vision in left eye - per patient this is what she gets with a migraine - took migraine medication (Zomeg nasal spray) and vision came back   4-5/10 headache, getting more intense  Not 100% Clear feeling, not feeling stable walking   Dizziness/lightheadedness   No neck pain     Advised  bring patient directly to ER right away - pt should not drive with these symptoms. He agreed and states he will bring patient to [facility] right away    Urmila IZQUIERDO RN

## 2018-08-15 ENCOUNTER — OFFICE VISIT (OUTPATIENT)
Dept: FAMILY MEDICINE | Facility: CLINIC | Age: 68
End: 2018-08-15
Payer: COMMERCIAL

## 2018-08-15 VITALS
TEMPERATURE: 99.8 F | DIASTOLIC BLOOD PRESSURE: 78 MMHG | OXYGEN SATURATION: 98 % | HEIGHT: 64 IN | HEART RATE: 89 BPM | SYSTOLIC BLOOD PRESSURE: 134 MMHG | WEIGHT: 169 LBS | BODY MASS INDEX: 28.85 KG/M2

## 2018-08-15 DIAGNOSIS — I10 BENIGN ESSENTIAL HYPERTENSION: ICD-10-CM

## 2018-08-15 DIAGNOSIS — R55 VASOVAGAL SYNCOPE: ICD-10-CM

## 2018-08-15 DIAGNOSIS — M51.26 LUMBAR DISC HERNIATION: ICD-10-CM

## 2018-08-15 DIAGNOSIS — Z01.818 PREOP GENERAL PHYSICAL EXAM: Primary | ICD-10-CM

## 2018-08-15 PROCEDURE — 99214 OFFICE O/P EST MOD 30 MIN: CPT | Performed by: INTERNAL MEDICINE

## 2018-08-15 NOTE — PROGRESS NOTES
"  SUBJECTIVE:   Rosa Sotomayor is a 67 year old female who presents to clinic today for the following health issues:      ED/UC Followup:    Facility:  [facility]  Date of visit: 8-9-18  Reason for visit: Dizziness, fall  Current Status: ***         {additional problems for provider to add:714063}    Problem list and histories reviewed & adjusted, as indicated.  Additional history: {NONE - AS DOCUMENTED:252319::\"as documented\"}    {HIST REVIEW/ LINKS 2:657571}    Reviewed and updated as needed this visit by clinical staff       Reviewed and updated as needed this visit by Provider         {PROVIDER CHARTING PREFERENCE:365964}    "

## 2018-08-15 NOTE — MR AVS SNAPSHOT
After Visit Summary   8/15/2018    Rosa Sotomayor    MRN: [id number]           Patient Information     Date Of Birth          1950        Visit Information        Provider Department      8/15/2018 4:30 PM Josh Hollingsworth MD [facility]        Today's Diagnoses     Preop general physical exam    -  1    Vasovagal syncope        Lumbar disc herniation        Benign essential hypertension          Care Instructions      Before Your Surgery      Call your surgeon if there is any change in your health. This includes signs of a cold or flu (such as a sore throat, runny nose, cough, rash or fever).    Do not smoke, drink alcohol or take over the counter medicine (unless your surgeon or primary care doctor tells you to) for the 24 hours before and after surgery.    If you take prescribed drugs: Follow your doctor s orders about which medicines to take and which to stop until after surgery.    Eating and drinking prior to surgery: follow the instructions from your surgeon    Take a shower or bath the night before surgery. Use the soap your surgeon gave you to gently clean your skin. If you do not have soap from your surgeon, use your regular soap. Do not shave or scrub the surgery site.  Wear clean pajamas and have clean sheets on your bed.           Follow-ups after your visit        Your next 10 appointments already scheduled     Aug 28, 2018  2:30 PM CDT   Consult HOD with [facility]   [facility]    [address] [place] [id number] [phone]              Who to contact     If you have questions or need follow up information about today's clinic visit or your schedule please contact [facility] directly at [phone].  Normal or non-critical lab and imaging results will be communicated to you by MyChart, letter or phone within 4 business days after the clinic has received the results. If you do not hear  "from us within 7 days, please contact the clinic through [employer] or phone. If you have a critical or abnormal lab result, we will notify you by phone as soon as possible.  Submit refill requests through [employer] or call your pharmacy and they will forward the refill request to us. Please allow 3 business days for your refill to be completed.          Additional Information About Your Visit        [employer] Information     [employer] gives you secure access to your electronic health record. If you see a primary care provider, you can also send messages to your care team and make appointments. If you have questions, please call your primary care clinic.  If you do not have a primary care provider, please call [phone] and they will assist you.        Care EveryWhere ID     This is your Care EveryWhere ID. This could be used by other organizations to access your [place] medical records  [id number]        Your Vitals Were     Pulse Temperature Height Pulse Oximetry BMI (Body Mass Index)       89 99.8  F (37.7  C) (Oral) 5' 4\" (1.626 m) 98% 29.01 kg/m2        Blood Pressure from Last 3 Encounters:   08/15/18 134/78   08/09/18 163/64   08/02/18 134/72    Weight from Last 3 Encounters:   08/15/18 169 lb (76.7 kg)   08/09/18 170 lb (77.1 kg)   08/02/18 178 lb (80.7 kg)              Today, you had the following     No orders found for display         Today's Medication Changes          These changes are accurate as of 8/15/18  5:08 PM.  If you have any questions, ask your nurse or doctor.               These medicines have changed or have updated prescriptions.        Dose/Directions    losartan 100 MG tablet   Commonly known as:  COZAAR   This may have changed:    - how much to take  - when to take this   Used for:  Benign essential hypertension        Dose:  100 mg   Take 1 tablet (100 mg) by mouth At Bedtime   Quantity:  90 tablet   Refills:  1                Primary Care Provider Office Phone # Fax #    Josh Hollingsworth, " [phone]       [address] [facility] [address]  [facility] [address]        Equal Access to Services     LISBET LIPSCOMB : Hadii aad ku hadmauriciokenna Alberto, innatomasa flanagankrystianha, francois kristencristy qureshi, marlene jenain hayaashani amayajoshua garay laZenobiatrent vasquez. So [facility] [phone].    ATENCIÓN: Si habla español, tiene a armendariz disposición servicios gratuitos de asistencia lingüística. Llame al [phone].    We comply with applicable federal civil rights laws and [place] laws. We do not discriminate on the basis of race, color, national origin, age, disability, sex, sexual orientation, or gender identity.            Thank you!     Thank you for choosing [facility]  for your care. Our goal is always to provide you with excellent care. Hearing back from our patients is one way we can continue to improve our services. Please take a few minutes to complete the written survey that you may receive in the mail after your visit with us. Thank you!             Your Updated Medication List - Protect others around you: Learn how to safely use, store and throw away your medicines at www.disposemymeds.org.          This list is accurate as of 8/15/18  5:08 PM.  Always use your most recent med list.                   Brand Name Dispense Instructions for use Diagnosis    acetaminophen 500 MG tablet    TYLENOL     Take 500 mg by mouth        allopurinol 100 MG tablet    ZYLOPRIM     Take 100 mg by mouth        * amitriptyline 10 MG tablet    ELAVIL     Take 10 mg by mouth        * amitriptyline 25 MG tablet    ELAVIL     Take 25 mg by mouth At Bedtime        atorvastatin 40 MG tablet    LIPITOR    90 tablet    TAKE 1 TABLET(40 MG) BY MOUTH DAILY    Hypertriglyceridemia       B-12 1000 MCG Tbcr     100 tablet    Take 1,000 mcg by mouth daily    Vitamin B12 deficiency (non anemic)       carvedilol 6.25 MG tablet    COREG    270 tablet    12.5 mg in am and 6.25 mg in pm    Benign essential hypertension       cetirizine 10 MG tablet     zyrTEC     Take 10 mg by mouth        clobetasol 0.05 % external solution    TEMOVATE     Apply topically 2 times daily as needed        Cranberry Powd      1 capsule        dexamethasone 4 MG tablet    DECADRON    20 tablet    Take 1 tablet (4 mg) by mouth 2 times daily (with meals)    Lumbar disc herniation       EPINEPHrine 0.3 MG/0.3ML injection 2-pack    EPIPEN 2-FRAN    0.6 mL    Inject 0.3 mLs (0.3 mg) into the muscle once as needed for anaphylaxis    Allergic reaction, subsequent encounter       hydrOXYzine 25 MG tablet    ATARAX    120 tablet    Take 1 tablet (25 mg) by mouth 3 times daily as needed for itching    Pruritic disorder       ketoconazole 2 % shampoo    NIZORAL    120 mL    Apply to the affected area and wash off after 5 minutes.    Seborrheic dermatitis       losartan 100 MG tablet    COZAAR    90 tablet    Take 1 tablet (100 mg) by mouth At Bedtime    Benign essential hypertension       Magnesium Oxide 250 MG Tabs      Take 250 mg by mouth        SUMAtriptan 20 MG/ACT nasal spray    IMITREX    12 each    Spray 1 spray in nostril as needed for migraine May repeat in 2 hours. Max 2 sprays/24 hours.    Intractable migraine without aura and without status migrainosus       torsemide 10 MG tablet    DEMADEX    90 tablet    Take 1 tablet (10 mg) by mouth daily    Edema leg       traMADol 50 MG tablet    ULTRAM     Take 1 tablet by mouth every 6 hours as needed        vitamin D 2000 units tablet      Take 1 tablet by mouth daily        ZOFRAN 4 MG tablet   Generic drug:  ondansetron      Take by mouth every 8 hours as needed for nausea        ZOLMitriptan 5 MG ODT tab    ZOMIG-ZMT     Take 5 mg by mouth        * Notice:  This list has 2 medication(s) that are the same as other medications prescribed for you. Read the directions carefully, and ask your doctor or other care provider to review them with you.

## 2018-08-15 NOTE — PROGRESS NOTES
[place] [address] [facility] [address]  [phone]  Dept: [phone]    PRE-OP EVALUATION:  Today's date: 8/15/2018    Rosa Sotomayor (: 1950) presents for pre-operative evaluation assessment as requested by Dr. Tovar.  She requires evaluation and anesthesia risk assessment prior to undergoing surgery/procedure for treatment of herniated disc .    Proposed Surgery/ Procedure: Herniated lumbar disc surgery  Date of Surgery/ Procedure: 18  Time of Surgery/ Procedure: 1100  Hospital/Surgical Facility: [facility]  Fax number for surgical facility: [phone]  Primary Physician: Josh Hollingsworth  Type of Anesthesia Anticipated: Regional block    Patient has a Health Care Directive or Living Will:  YES     1. NO - Do you have a history of heart attack, stroke, stent, bypass or surgery on an artery in the head, neck, heart or legs?  2. NO - Do you ever have any pain or discomfort in your chest?  3. NO - Do you have a history of  Heart Failure?  4. NO - Are you troubled by shortness of breath when: walking on the level, up a slight hill or at night?  5. NO - Do you currently have a cold, bronchitis or other respiratory infection?  6. NO - Do you have a cough, shortness of breath or wheezing?  7. NO - Do you sometimes get pains in the calves of your legs when you walk?  8. NO - Do you or anyone in your family have previous history of blood clots?  9. NO - Do you or does anyone in your family have a serious bleeding problem such as prolonged bleeding following surgeries or cuts?  10. YES - HAVE YOU EVER HAD PROBLEMS WITH ANEMIA OR BEEN TOLD TO TAKE IRON PILLS? 8/9 hemoglobin was normal  11. NO - Have you had any abnormal blood loss such as black, tarry or bloody stools, or abnormal vaginal bleeding?  12. NO - Have you ever had a blood transfusion?  13. YES - HAVE YOU OR ANY OF YOUR RELATIVES EVER HAD PROBLEMS WITH ANESTHESIA?   14. NO - Do you have sleep apnea,  excessive snoring or daytime drowsiness?  15. NO - Do you have any prosthetic heart valves?  16. YES - DO YOU HAVE PROSTHETIC JOINTS?   17. NO - Is there any chance that you may be pregnant?      HPI:     HPI related to upcoming procedure: Plans to do diskectomy under renal anesthesia because dexamethasone and epidural steroid injection did not help at all.  She was seen in ER on 8/9 after a syncope episode with fall after receiving injection in her orthopedic surgeon's office.  Evaluation in the ER showed no anemia.  Normal EKG.  Normal head CT and x-ray of lumbar spine.  No recurrent syncope or near syncope since that event.  She can perform  4 METS of physical activity without chest pain or dyspnea.    MEDICAL HISTORY:     Patient Active Problem List    Diagnosis Date Noted     Aortic valve insufficiency 04/19/2018     Priority: Medium     Intractable migraine without aura and without status migrainosus 11/20/2017     Priority: Medium     Anemia, iron deficiency 06/23/2017     Priority: Medium     Renal artery stenosis (H) 01/05/2017     Priority: Medium     Macular degeneration, bilateral 01/05/2017     Priority: Medium     Benign essential hypertension 12/28/2016     Priority: Medium     Lumbago 11/29/2016     Priority: Medium     Chronic bilateral low back pain without sciatica 11/29/2016     Priority: Medium     Secondary renal hyperparathyroidism (H) 07/31/2016     Priority: Medium     Arnold-Chiari malformation (H) 07/23/2016     Priority: Medium     Hyperlipidemia LDL goal <130 07/23/2016     Priority: Medium     Periodic headache syndrome, not intractable 07/23/2016     Priority: Medium     Anxiety 07/23/2016     Priority: Medium     CKD (chronic kidney disease) stage 3, GFR 30-59 ml/min 07/22/2016     Priority: Medium     Hypertriglyceridemia 07/22/2016     Priority: Medium     Personal history of allergy to anesthetic agent 03/10/2007     Priority: Medium     Postmenopausal bleeding 02/28/2007      Priority: Medium      Past Medical History:   Diagnosis Date     Aortic valve insufficiency 4/19/2018     Chronic kidney disease      Dizziness and giddiness      Past Surgical History:   Procedure Laterality Date     C NONSPECIFIC PROCEDURE      wisdom teeth     C NONSPECIFIC PROCEDURE  2005    Varicose Veins     C TOTAL HIP ARTHROPLASTY      left     C TOTAL KNEE ARTHROPLASTY      right     TUBAL LIGATION  1987     Current Outpatient Prescriptions   Medication Sig Dispense Refill     acetaminophen (TYLENOL) 500 MG tablet Take 500 mg by mouth       allopurinol (ZYLOPRIM) 100 MG tablet Take 100 mg by mouth       amitriptyline (ELAVIL) 10 MG tablet Take 10 mg by mouth       amitriptyline (ELAVIL) 25 MG tablet Take 25 mg by mouth At Bedtime  0     atorvastatin (LIPITOR) 40 MG tablet TAKE 1 TABLET(40 MG) BY MOUTH DAILY 90 tablet 3     carvedilol (COREG) 6.25 MG tablet 12.5 mg in am and 6.25 mg in pm 270 tablet 0     cetirizine (ZYRTEC) 10 MG tablet Take 10 mg by mouth       Cholecalciferol (VITAMIN D) 2000 UNITS tablet Take 1 tablet by mouth daily       clobetasol (TEMOVATE) 0.05 % external solution Apply topically 2 times daily as needed  5     Cranberry POWD 1 capsule       Cyanocobalamin (B-12) 1000 MCG TBCR Take 1,000 mcg by mouth daily 100 tablet 1     EPINEPHrine (EPIPEN 2-FRAN) 0.3 MG/0.3ML injection 2-pack Inject 0.3 mLs (0.3 mg) into the muscle once as needed for anaphylaxis 0.6 mL 1     hydrOXYzine (ATARAX) 25 MG tablet Take 1 tablet (25 mg) by mouth 3 times daily as needed for itching 120 tablet 11     ketoconazole (NIZORAL) 2 % shampoo Apply to the affected area and wash off after 5 minutes. 120 mL 1     losartan (COZAAR) 100 MG tablet Take 1 tablet (100 mg) by mouth At Bedtime (Patient taking differently: Take 50 mg by mouth 2 times daily ) 90 tablet 1     Magnesium Oxide 250 MG TABS Take 250 mg by mouth       ondansetron (ZOFRAN) 4 MG tablet Take by mouth every 8 hours as needed for nausea        "SUMAtriptan (IMITREX) 20 MG/ACT nasal spray Spray 1 spray in nostril as needed for migraine May repeat in 2 hours. Max 2 sprays/24 hours. 12 each 11     torsemide (DEMADEX) 10 MG tablet Take 1 tablet (10 mg) by mouth daily 90 tablet 3     traMADol (ULTRAM) 50 MG tablet Take 1 tablet by mouth every 6 hours as needed  0     ZOLMitriptan (ZOMIG-ZMT) 5 MG ODT tab Take 5 mg by mouth       dexamethasone (DECADRON) 4 MG tablet Take 1 tablet (4 mg) by mouth 2 times daily (with meals) (Patient not taking: Reported on 8/15/2018) 20 tablet 0       Allergies   Allergen Reactions     Fentanyl Anaphylaxis     Midazolam Anaphylaxis     Propofol Anaphylaxis     Anesthetic [Amides]      Anaphalactic shock     Codeine      Erythromycin      ointment     Gabapentin      chest heaviness with breathing     Hydromorphone Itching     Tolerates morphine     Lorazepam Itching     Other [Seasonal Allergies] Anaphylaxis     GENERAL ANETHESIA       Penicillins      rash     Eucalyptus Oil Rash     hives     Nitrofuran Derivatives Rash     Sulfa Drugs Itching and Rash     rash        Social History   Substance Use Topics     Smoking status: Never Smoker     Smokeless tobacco: Never Used     Alcohol use No     History   Drug Use No       REVIEW OF SYSTEMS:   A 10 organ systems ROS is negative.     EXAM:   /78 (BP Location: Right arm, Patient Position: Sitting, Cuff Size: Adult Regular)  Pulse 89  Temp 99.8  F (37.7  C) (Oral)  Ht 5' 4\" (1.626 m)  Wt 169 lb (76.7 kg)  SpO2 98%  BMI 29.01 kg/m2    GENERAL APPEARANCE: healthy, alert and no distress     EYES: EOMI, PERRL     HENT: ear canals and TM's normal and nose and mouth without ulcers or lesions     NECK: no adenopathy, no asymmetry, masses, or scars and thyroid normal to palpation     RESP: lungs clear to auscultation - no rales, rhonchi or wheezes     CV: regular rates and rhythm, normal S1 S2, no S3 or S4 and no murmur, click or rub     ABDOMEN:  soft, nontender, no HSM or " masses and bowel sounds normal     MS: extremities normal- no gross deformities noted.     SKIN: no suspicious lesions or rashes     NEURO: Normal strength and tone, sensory exam grossly normal, mentation intact and speech normal     PSYCH: mentation appears normal. and affect normal/bright     LYMPHATICS: No cervical adenopathy    DIAGNOSTICS:   EKG: sinus rhythm rate 72 no acute ST changes 8/9/18    Recent Labs   Lab Test  08/09/18   1602 07/16/18 07/09/18   1726   05/03/18   1013  04/16/18   1120   06/19/17   1353  06/15/17   1330  07/12/16   HGB  12.3   --   11.6*   < >   --   9.1*   < >   --    --    < >   --    PLT  263   --    --    --    --   252   < >   --    --    < >   --    INR   --    --    --    --    --    --    --   3.00*  2.30*   < >   --    NA  133   --    --    --   138  137   < >   --    --    < >  138   POTASSIUM  4.0  4.4   --    --   4.3  3.8   < >   --    --    < >  4.2   CR  1.40*  1.54*   --    --   1.47*  1.25*   < >   --    --    < >  1.51   A1C   --   5.3   --    --    --    --    --    --    --    --   5.4    < > = values in this interval not displayed.        IMPRESSION:   Reason for surgery/procedure: Lumbar disc herniation syndrome   Diagnosis/reason for consult: Preoperative evaluation    The proposed surgical procedure is considered LOW risk.    REVISED CARDIAC RISK INDEX  The patient has the following serious cardiovascular risks for perioperative complications such as (MI, PE, VFib and 3  AV Block):  No serious cardiac risks  INTERPRETATION: 0 risks: Class I (very low risk - 0.4% complication rate)    The patient has the following additional risks for perioperative complications:  No identified additional risks      ICD-10-CM    1. Preop general physical exam Z01.818    2. Vasovagal syncope R55    3. Lumbar disc herniation M51.26    4. Benign essential hypertension I10      Syncope event sounds most consistent with vasovagal syncope  Blood pressure under good control  OK to  proceed with microdiskectomy     RECOMMENDATIONS:     --Consult hospital rounder / IM to assist post-op medical management    --Patient is to take all scheduled medications on the day of surgery    APPROVAL GIVEN to proceed with proposed procedure, without further diagnostic evaluation       Signed Electronically by: Josh Hollingsworth MD    Copy of this evaluation report is provided to requesting physician.    [facility] Preop Guidelines    Revised Cardiac Risk Index

## 2018-09-18 DIAGNOSIS — L29.9 PRURITIC DISORDER: ICD-10-CM

## 2018-09-18 NOTE — TELEPHONE ENCOUNTER
Reason for Call:  Medication or medication refill:    Do you use a [facility]?  Name of the pharmacy and phone number for the current request: [facility] [id number] [place], [place] [address], [place] - [address] [address]      Name of the medication requested: hydrOXYzine (ATARAX) 25 MG tablet    Other request: none    Can we leave a detailed message on this number? YES    Phone number patient can be reached at: Cell number on file:    Telephone Information:   Mobile [phone]       Best Time: anytime    Call taken on 9/18/2018 at 1:17 PM by Sarah Duran

## 2018-09-19 ENCOUNTER — OFFICE VISIT (OUTPATIENT)
Dept: NUTRITION | Facility: CLINIC | Age: 68
End: 2018-09-19
Payer: COMMERCIAL

## 2018-09-19 DIAGNOSIS — E78.1 HYPERTRIGLYCERIDEMIA: Primary | ICD-10-CM

## 2018-09-19 PROCEDURE — 97802 MEDICAL NUTRITION INDIV IN: CPT

## 2018-09-19 PROCEDURE — 99207 ZZC NO CHARGE LOS: CPT

## 2018-09-19 NOTE — TELEPHONE ENCOUNTER
"Hydroxyzine 25 mg    Last Written Prescription Date:  09/27/17  Last Fill Quantity: 120 tablets,  # refills: 11   Last office visit: 8/15/2018 with prescribing provider:  Darrick   Future Office Visit:      Requested Prescriptions   Pending Prescriptions Disp Refills     hydrOXYzine (ATARAX) 25 MG tablet 120 tablet 11     Sig: Take 1 tablet (25 mg) by mouth 3 times daily as needed for itching    Antihistamines Protocol Passed    9/18/2018  1:17 PM       Passed - Recent (12 mo) or future (30 days) visit within the authorizing provider's specialty    Patient had office visit in the last 12 months or has a visit in the next 30 days with authorizing provider or within the authorizing provider's specialty.  See \"Patient Info\" tab in inbasket, or \"Choose Columns\" in Meds & Orders section of the refill encounter.           Passed - Patient is age 3 or older    Apply age and/or weight-based dosing for peds patients age 3 and older.    Forward request to provider for patients under the age of 3.            "

## 2018-09-19 NOTE — PROGRESS NOTES
Medical Nutrition Therapy  Visit Type:Initial assessment and intervention    Rosa Sotomayor presents today for MNT and education related to hyperlipidemia and chronic kidney disease.   She is accompanied by self.     ASSESSMENT:   Patient comments/concerns relating to nutrition: concerned for higher triglyceride labs. Has family history of high triglycerides as well as recent back & knee surgery that has hugely limited her ability to exercise. She hasn't been able to do any activity and feels that this is likely why she is seeing changes to her labs.     NUTRITION HISTORY:  Maintains kcal intake <1500 kcals/day and closely monitors sodium intake to <1500 mg/day. Uses MyFitnessPal to track intake most days. She also doesn't eat a lot of protein, with history of CKD. She does not limit potassium or phosphorous in her diet.     Breakfast: maple & brown sugar oatmeal, 1/2 banana  Lunch: chocolate protein shake OR peanut butter & jelly sandwich  Dinner: 1/2 pork chop OR steak, sweet potato  Snacks: breakfast biscuits, chocolate ice cream cone, banana  Beverages: water, green tea     Misses meals? rarely  Eats out:  On weekends      Previous diet education:  No     Food allergies/intolerances:  does not eat beef so she eats more turkey, chicken & pork     Diet is low in: protein and vegetables    EXERCISE: no regular exercise program    SOCIO/ECONOMIC:   Lives with: spouse    MEDICATIONS:  Current Outpatient Prescriptions   Medication     acetaminophen (TYLENOL) 500 MG tablet     allopurinol (ZYLOPRIM) 100 MG tablet     amitriptyline (ELAVIL) 10 MG tablet     amitriptyline (ELAVIL) 25 MG tablet     atorvastatin (LIPITOR) 40 MG tablet     carvedilol (COREG) 6.25 MG tablet     cetirizine (ZYRTEC) 10 MG tablet     Cholecalciferol (VITAMIN D) 2000 UNITS tablet     clobetasol (TEMOVATE) 0.05 % external solution     Cranberry POWD     Cyanocobalamin (B-12) 1000 MCG TBCR     dexamethasone (DECADRON) 4 MG tablet      EPINEPHrine (EPIPEN 2-FRAN) 0.3 MG/0.3ML injection 2-pack     hydrOXYzine (ATARAX) 25 MG tablet     ketoconazole (NIZORAL) 2 % shampoo     losartan (COZAAR) 100 MG tablet     Magnesium Oxide 250 MG TABS     ondansetron (ZOFRAN) 4 MG tablet     SUMAtriptan (IMITREX) 20 MG/ACT nasal spray     torsemide (DEMADEX) 10 MG tablet     traMADol (ULTRAM) 50 MG tablet     ZOLMitriptan (ZOMIG-ZMT) 5 MG ODT tab     No current facility-administered medications for this visit.        LABS:  Last Basic Metabolic Panel:  Lab Results   Component Value Date     08/09/2018      Lab Results   Component Value Date    POTASSIUM 4.0 08/09/2018     Lab Results   Component Value Date    CHLORIDE 96 08/09/2018     Lab Results   Component Value Date    OSIEL 8.3 08/09/2018     Lab Results   Component Value Date    CO2 26 08/09/2018     Lab Results   Component Value Date    BUN 37 08/09/2018     Lab Results   Component Value Date    CR 1.40 08/09/2018     Lab Results   Component Value Date    GLC 71 08/09/2018       ANTHROPOMETRICS:  Vitals: There were no vitals taken for this visit.  There is no height or weight on file to calculate BMI.      Wt Readings from Last 5 Encounters:   08/15/18 76.7 kg (169 lb)   08/09/18 77.1 kg (170 lb)   08/02/18 80.7 kg (178 lb)   07/18/18 79.9 kg (176 lb 3.2 oz)   07/09/18 79.4 kg (175 lb)       Weight Change: no new weight today    ESTIMATED KCAL REQUIREMENTS:  1300 kcal per day, based on REE     NUTRITION DIAGNOSIS: Food- and nutrition-related knowledge deficit related to no previous diet education, multiple comorbidities that require adjustments to intake as evidenced by patient report, EMR    NUTRITION INTERVENTION:  Nutrition Prescription: Sodium Intake: <2400 mg/day  Recommended low sodium intake, no more than 3 servings of dairy/day, low protein intake & increased vegetable intake   Education given to support: general nutrition guidelines, fat modification, exercise, fiber and portion control.  Reviewed TLC diet guidelines in detail and provided recommendations. Encouraged return to exercise was medically able.   Education Materials Provided: Heart Health Guidelines and AND CKD Stage 1-4 Nutrition Guidelines   Motivational Interviewing    PATIENT'S BEHAVIOR CHANGE GOALS:   -Return to exercise through physical therapy & as medically able  -Continue to track intake, monitoring protein, calorie & sodium intake closely for heart health    MONITOR / EVALUATE:  RD will monitor/evaluate:  Food and nutrition knowledge / skills  Pertinent Labs  Weight change    FOLLOW-UP:  Follow up with RD as needed.  Call RD with questions/concerns.     Taylor Zapien RD, LD   Time spent in minutes: 45  Encounter: Individual

## 2018-09-19 NOTE — MR AVS SNAPSHOT
After Visit Summary   9/19/2018    Rosa Sotomayor    MRN: [id number]           Patient Information     Date Of Birth          1950        Visit Information        Provider Department      9/19/2018 2:30 PM CS NUTRITION RESOURCE [facility]        Today's Diagnoses     Hypertriglyceridemia    -  1       Follow-ups after your visit        Who to contact     If you have questions or need follow up information about today's clinic visit or your schedule please contact [facility] directly at [phone].  Normal or non-critical lab and imaging results will be communicated to you by NanoRackshart, letter or phone within 4 business days after the clinic has received the results. If you do not hear from us within 7 days, please contact the clinic through GigaPant or phone. If you have a critical or abnormal lab result, we will notify you by phone as soon as possible.  Submit refill requests through Nextnav or call your pharmacy and they will forward the refill request to us. Please allow 3 business days for your refill to be completed.          Additional Information About Your Visit        MyChart Information     Nextnav gives you secure access to your electronic health record. If you see a primary care provider, you can also send messages to your care team and make appointments. If you have questions, please call your primary care clinic.  If you do not have a primary care provider, please call [phone] and they will assist you.        Care EveryWhere ID     This is your Care EveryWhere ID. This could be used by other organizations to access your [facility] medical records  [id number]         Blood Pressure from Last 3 Encounters:   08/15/18 134/78   08/09/18 163/64   08/02/18 134/72    Weight from Last 3 Encounters:   08/15/18 76.7 kg (169 lb)   08/09/18 77.1 kg (170 lb)   08/02/18 80.7 kg (178 lb)              We Performed the Following     MNT INDIVIDUAL INITIAL EA 15 MIN           Today's Medication Changes          These changes are accurate as of 9/19/18  3:31 PM.  If you have any questions, ask your nurse or doctor.               These medicines have changed or have updated prescriptions.        Dose/Directions    losartan 100 MG tablet   Commonly known as:  COZAAR   This may have changed:    - how much to take  - when to take this   Used for:  Benign essential hypertension        Dose:  100 mg   Take 1 tablet (100 mg) by mouth At Bedtime   Quantity:  90 tablet   Refills:  1                Primary Care Provider Office Phone # Fax #    Josh Hollingsworth [phone] [address] [facility] [address]  [facility] [address]        Equal Access to Services     [facility]: Hadii clayton sanchez hadalcides Alberto, waaxda lunoemi, qaybta kaalmada titus, marlene luna . So [facility] [phone].    ATENCIÓN: Si habla español, tiene a armendariz disposición servicios gratuitos de asistencia lingüística. [facility] [phone].    We comply with applicable federal civil rights laws and [place] laws. We do not discriminate on the basis of race, color, national origin, age, disability, sex, sexual orientation, or gender identity.            Thank you!     Thank you for choosing [facility]  for your care. Our goal is always to provide you with excellent care. Hearing back from our patients is one way we can continue to improve our services. Please take a few minutes to complete the written survey that you may receive in the mail after your visit with us. Thank you!             Your Updated Medication List - Protect others around you: Learn how to safely use, store and throw away your medicines at www.disposemymeds.org.          This list is accurate as of 9/19/18  3:31 PM.  Always use your most recent med list.                   Brand Name Dispense Instructions for use Diagnosis    acetaminophen 500 MG tablet    TYLENOL     Take 500 mg by mouth        allopurinol 100 MG  tablet    ZYLOPRIM     Take 100 mg by mouth        * amitriptyline 10 MG tablet    ELAVIL     Take 10 mg by mouth        * amitriptyline 25 MG tablet    ELAVIL     Take 25 mg by mouth At Bedtime        atorvastatin 40 MG tablet    LIPITOR    90 tablet    TAKE 1 TABLET(40 MG) BY MOUTH DAILY    Hypertriglyceridemia       B-12 1000 MCG Tbcr     100 tablet    Take 1,000 mcg by mouth daily    Vitamin B12 deficiency (non anemic)       carvedilol 6.25 MG tablet    COREG    270 tablet    12.5 mg in am and 6.25 mg in pm    Benign essential hypertension       cetirizine 10 MG tablet    zyrTEC     Take 10 mg by mouth        clobetasol 0.05 % external solution    TEMOVATE     Apply topically 2 times daily as needed        Cranberry Powd      1 capsule        dexamethasone 4 MG tablet    DECADRON    20 tablet    Take 1 tablet (4 mg) by mouth 2 times daily (with meals)    Lumbar disc herniation       EPINEPHrine 0.3 MG/0.3ML injection 2-pack    EPIPEN 2-FRAN    0.6 mL    Inject 0.3 mLs (0.3 mg) into the muscle once as needed for anaphylaxis    Allergic reaction, subsequent encounter       hydrOXYzine 25 MG tablet    ATARAX    120 tablet    Take 1 tablet (25 mg) by mouth 3 times daily as needed for itching    Pruritic disorder       ketoconazole 2 % shampoo    NIZORAL    120 mL    Apply to the affected area and wash off after 5 minutes.    Seborrheic dermatitis       losartan 100 MG tablet    COZAAR    90 tablet    Take 1 tablet (100 mg) by mouth At Bedtime    Benign essential hypertension       Magnesium Oxide 250 MG Tabs      Take 250 mg by mouth        SUMAtriptan 20 MG/ACT nasal spray    IMITREX    12 each    Spray 1 spray in nostril as needed for migraine May repeat in 2 hours. Max 2 sprays/24 hours.    Intractable migraine without aura and without status migrainosus       torsemide 10 MG tablet    DEMADEX    90 tablet    Take 1 tablet (10 mg) by mouth daily    Edema leg       traMADol 50 MG tablet    ULTRAM     Take 1 tablet  by mouth every 6 hours as needed        vitamin D 2000 units tablet      Take 1 tablet by mouth daily        ZOFRAN 4 MG tablet   Generic drug:  ondansetron      Take by mouth every 8 hours as needed for nausea        ZOLMitriptan 5 MG ODT tab    ZOMIG-ZMT     Take 5 mg by mouth        * Notice:  This list has 2 medication(s) that are the same as other medications prescribed for you. Read the directions carefully, and ask your doctor or other care provider to review them with you.

## 2018-09-28 RX ORDER — HYDROXYZINE HYDROCHLORIDE 25 MG/1
25 TABLET, FILM COATED ORAL 3 TIMES DAILY PRN
Qty: 120 TABLET | Refills: 1 | Status: SHIPPED | OUTPATIENT
Start: 2018-09-28 | End: 2023-06-14

## 2018-09-28 NOTE — TELEPHONE ENCOUNTER
Prescription approved per [facility] Refill Protocol.  Radha OSPINA RN,BSN

## 2018-10-01 ENCOUNTER — TELEPHONE (OUTPATIENT)
Dept: FAMILY MEDICINE | Facility: CLINIC | Age: 68
End: 2018-10-01

## 2018-10-01 NOTE — TELEPHONE ENCOUNTER
CENTRAL PRIOR AUTHORIZATION  [phone]    PA Initiation    Medication: Hydroxyzine HCl 25 mg tablets  Insurance Company: [employer] - Phone [phone] Fax [phone]  Pharmacy Filling the Rx: [facility] [address] [facility] - [place], [place] - [address] AT [facility] [place] [address]  Filling Pharmacy Phone: [phone]  Filling Pharmacy Fax:    Start Date: 10/1/2018

## 2018-10-01 NOTE — TELEPHONE ENCOUNTER
Prior Authorization Retail Medication Request    Medication/Dose: Hydroxyzine HCl 25 mg tablets  ICD code (if different than what is on RX):    Previously Tried and Failed:    Rationale:  Patient has been stable on Hydroxyzine over the past year    Insurance Name:  [facility]  Insurance ID:  [id number]      Pharmacy Information (if different than what is on RX)  Name:    Phone:

## 2018-10-02 NOTE — TELEPHONE ENCOUNTER
Prior Authorization Approval    Authorization Effective Date: 12/30/2017  Authorization Expiration Date: 12/31/2019  Medication: Hydroxyzine HCl 25 mg tablets-APPROVED   Approved Dose/Quantity:   Reference #: REFERRAL NUMBER [id number])/ REFERRAL NUMBER [id number])   Insurance Company: [employer] - Phone [phone] Fax [phone]  Expected CoPay:       CoPay Card Available:      Foundation Assistance Needed:    Which Pharmacy is filling the prescription (Not needed for infusion/clinic administered): [facility] [address] [place] [address] [id number] AT [facility] [address]  Pharmacy Notified: Yes  Patient Notified: Yes    The first letter is in regards to formulary year 5999-1471 (per [employer] rep).  This one is a formulary exception approval          The second letter is in regards to the formulary for next year.  Per the [employer] rep, they formulary information was updated and it looks like the PA will be good until 12/31/20119

## 2018-10-04 ENCOUNTER — OFFICE VISIT (OUTPATIENT)
Dept: FAMILY MEDICINE | Facility: CLINIC | Age: 68
End: 2018-10-04
Payer: COMMERCIAL

## 2018-10-04 VITALS
OXYGEN SATURATION: 99 % | WEIGHT: 184 LBS | HEART RATE: 97 BPM | SYSTOLIC BLOOD PRESSURE: 121 MMHG | DIASTOLIC BLOOD PRESSURE: 74 MMHG | BODY MASS INDEX: 31.41 KG/M2 | TEMPERATURE: 97.5 F | HEIGHT: 64 IN

## 2018-10-04 DIAGNOSIS — N30.01 ACUTE CYSTITIS WITH HEMATURIA: Primary | ICD-10-CM

## 2018-10-04 DIAGNOSIS — R82.90 NONSPECIFIC FINDING ON EXAMINATION OF URINE: ICD-10-CM

## 2018-10-04 DIAGNOSIS — N18.30 CHRONIC KIDNEY DISEASE, STAGE 3 (MODERATE): ICD-10-CM

## 2018-10-04 LAB
ALBUMIN UR-MCNC: ABNORMAL MG/DL
APPEARANCE UR: CLEAR
BACTERIA #/AREA URNS HPF: ABNORMAL /HPF
BILIRUB UR QL STRIP: NEGATIVE
COLOR UR AUTO: YELLOW
GLUCOSE UR STRIP-MCNC: NEGATIVE MG/DL
HGB UR QL STRIP: ABNORMAL
KETONES UR STRIP-MCNC: NEGATIVE MG/DL
LEUKOCYTE ESTERASE UR QL STRIP: ABNORMAL
NITRATE UR QL: NEGATIVE
NON-SQ EPI CELLS #/AREA URNS LPF: ABNORMAL /LPF
PH UR STRIP: 7 PH (ref 5–7)
RBC #/AREA URNS AUTO: ABNORMAL /HPF
SOURCE: ABNORMAL
SP GR UR STRIP: 1.02 (ref 1–1.03)
UROBILINOGEN UR STRIP-ACNC: 0.2 EU/DL (ref 0.2–1)
WBC #/AREA URNS AUTO: >100 /HPF

## 2018-10-04 PROCEDURE — 81001 URINALYSIS AUTO W/SCOPE: CPT | Performed by: NURSE PRACTITIONER

## 2018-10-04 PROCEDURE — 99213 OFFICE O/P EST LOW 20 MIN: CPT | Performed by: NURSE PRACTITIONER

## 2018-10-04 PROCEDURE — 87086 URINE CULTURE/COLONY COUNT: CPT | Performed by: NURSE PRACTITIONER

## 2018-10-04 RX ORDER — CIPROFLOXACIN 250 MG/1
250 TABLET, FILM COATED ORAL 2 TIMES DAILY
Qty: 6 TABLET | Refills: 0 | Status: SHIPPED | OUTPATIENT
Start: 2018-10-04 | End: 2018-11-15

## 2018-10-04 NOTE — PATIENT INSTRUCTIONS
Push fluids   Unsweetened cranberry juice is good  Recheck the urine for clearance of infection Monday

## 2018-10-04 NOTE — MR AVS SNAPSHOT
After Visit Summary   10/4/2018    Rosa Sotomayor    MRN: [id number]           Patient Information     Date Of Birth          1950        Visit Information        Provider Department      10/4/2018 12:30 PM Roula Apodaca APRN CNP [facility]        Today's Diagnoses     Acute cystitis with hematuria    -  1      Care Instructions    Push fluids   Unsweetened cranberry juice is good  Recheck the urine for clearance of infection Monday          Follow-ups after your visit        Future tests that were ordered for you today     Open Future Orders        Priority Expected Expires Ordered    *UA reflex to Microscopic and Culture ([facility] (except [place] and [place]) Routine 10/8/2018 10/4/2019 10/4/2018            Who to contact     If you have questions or need follow up information about today's clinic visit or your schedule please contact [facility] directly at [phone].  Normal or non-critical lab and imaging results will be communicated to you by MyChart, letter or phone within 4 business days after the clinic has received the results. If you do not hear from us within 7 days, please contact the clinic through [facility] or phone. If you have a critical or abnormal lab result, we will notify you by phone as soon as possible.  Submit refill requests through [employer] or call your pharmacy and they will forward the refill request to us. Please allow 3 business days for your refill to be completed.          Additional Information About Your Visit        MyChart Information     [employer] gives you secure access to your electronic health record. If you see a primary care provider, you can also send messages to your care team and make appointments. If you have questions, please call your primary care clinic.  If you do not have a primary care provider, please call [phone] and they will assist you.        Care EveryWhere ID     This is your Care  "EveryWhere ID. This could be used by other organizations to access your [facility] medical records  [id number]        Your Vitals Were     Pulse Temperature Height Pulse Oximetry BMI (Body Mass Index)       97 97.5  F (36.4  C) (Tympanic) 5' 4\" (1.626 m) 99% 31.58 kg/m2        Blood Pressure from Last 3 Encounters:   10/04/18 121/74   08/15/18 134/78   08/09/18 163/64    Weight from Last 3 Encounters:   10/04/18 184 lb (83.5 kg)   08/15/18 169 lb (76.7 kg)   08/09/18 170 lb (77.1 kg)              We Performed the Following     *UA reflex to Microscopic and Culture ([place] [facility] (except [place] and [place])     Urine Microscopic          Today's Medication Changes          These changes are accurate as of 10/4/18  1:06 PM.  If you have any questions, ask your nurse or doctor.               Start taking these medicines.        Dose/Directions    ciprofloxacin 250 MG tablet   Commonly known as:  CIPRO   Used for:  Acute cystitis with hematuria   Started by:  Roula Apodaca APRN CNP        Dose:  250 mg   Take 1 tablet (250 mg) by mouth 2 times daily   Quantity:  6 tablet   Refills:  0         These medicines have changed or have updated prescriptions.        Dose/Directions    losartan 100 MG tablet   Commonly known as:  COZAAR   This may have changed:    - how much to take  - when to take this   Used for:  Benign essential hypertension        Dose:  100 mg   Take 1 tablet (100 mg) by mouth At Bedtime   Quantity:  90 tablet   Refills:  1         Stop taking these medicines if you haven't already. Please contact your care team if you have questions.     dexamethasone 4 MG tablet   Commonly known as:  DECADRON   Stopped by:  Roula Apodaca APRN CNP                Where to get your medicines      These medications were sent to [facility] [place] - [address], [address]  [address], [address], [place] [address]     Phone:  [phone]     ciprofloxacin 250 " MG tablet                Primary Care Provider Office Phone # Fax #    Josh Hollingsworth [phone] [address]  [facility] [address]        Equal Access to Services     LISBET LIPSCOMB : Hadii clayton ku hadmauricioo Soomaali, waaxda luqadaha, qaybta kaalmada adeegyada, marlene pradotrent vasquez. So [facility] [phone].    ATENCIÓN: Si habla español, tiene a armendariz disposición servicios gratuitos de asistencia lingüística. Llame al [phone].    We comply with applicable federal civil rights laws and [place] laws. We do not discriminate on the basis of race, color, national origin, age, disability, sex, sexual orientation, or gender identity.            Thank you!     Thank you for choosing [facility]  for your care. Our goal is always to provide you with excellent care. Hearing back from our patients is one way we can continue to improve our services. Please take a few minutes to complete the written survey that you may receive in the mail after your visit with us. Thank you!             Your Updated Medication List - Protect others around you: Learn how to safely use, store and throw away your medicines at www.disposemymeds.org.          This list is accurate as of 10/4/18  1:06 PM.  Always use your most recent med list.                   Brand Name Dispense Instructions for use Diagnosis    acetaminophen 500 MG tablet    TYLENOL     Take 500 mg by mouth        allopurinol 100 MG tablet    ZYLOPRIM     Take 100 mg by mouth        * amitriptyline 10 MG tablet    ELAVIL     Take 10 mg by mouth        * amitriptyline 25 MG tablet    ELAVIL     Take 25 mg by mouth At Bedtime        atorvastatin 40 MG tablet    LIPITOR    90 tablet    TAKE 1 TABLET(40 MG) BY MOUTH DAILY    Hypertriglyceridemia       B-12 1000 MCG Tbcr     100 tablet    Take 1,000 mcg by mouth daily    Vitamin B12 deficiency (non anemic)       carvedilol 6.25 MG tablet    COREG    270 tablet    12.5 mg in am  and 6.25 mg in pm    Benign essential hypertension       cetirizine 10 MG tablet    zyrTEC     Take 10 mg by mouth        ciprofloxacin 250 MG tablet    CIPRO    6 tablet    Take 1 tablet (250 mg) by mouth 2 times daily    Acute cystitis with hematuria       clobetasol 0.05 % external solution    TEMOVATE     Apply topically 2 times daily as needed        Cranberry Powd      1 capsule        EPINEPHrine 0.3 MG/0.3ML injection 2-pack    EPIPEN 2-FRAN    0.6 mL    Inject 0.3 mLs (0.3 mg) into the muscle once as needed for anaphylaxis    Allergic reaction, subsequent encounter       hydrOXYzine 25 MG tablet    ATARAX    120 tablet    Take 1 tablet (25 mg) by mouth 3 times daily as needed for itching    Pruritic disorder       ketoconazole 2 % shampoo    NIZORAL    120 mL    Apply to the affected area and wash off after 5 minutes.    Seborrheic dermatitis       losartan 100 MG tablet    COZAAR    90 tablet    Take 1 tablet (100 mg) by mouth At Bedtime    Benign essential hypertension       Magnesium Oxide 250 MG Tabs      Take 250 mg by mouth        SUMAtriptan 20 MG/ACT nasal spray    IMITREX    12 each    Spray 1 spray in nostril as needed for migraine May repeat in 2 hours. Max 2 sprays/24 hours.    Intractable migraine without aura and without status migrainosus       torsemide 10 MG tablet    DEMADEX    90 tablet    Take 1 tablet (10 mg) by mouth daily    Edema leg       traMADol 50 MG tablet    ULTRAM     Take 1 tablet by mouth every 6 hours as needed        vitamin D 2000 units tablet      Take 1 tablet by mouth daily        ZOFRAN 4 MG tablet   Generic drug:  ondansetron      Take by mouth every 8 hours as needed for nausea        ZOLMitriptan 5 MG ODT tab    ZOMIG-ZMT     Take 5 mg by mouth        * Notice:  This list has 2 medication(s) that are the same as other medications prescribed for you. Read the directions carefully, and ask your doctor or other care provider to review them with you.

## 2018-10-04 NOTE — LETTER
[facility]  [address] [facility]  [address]  [place]  [address]  Tel: [phone]    October 8, 2018    Rosasienna Sotomayor  [address]  [place] [address]        Dear Ms. Sotomayor,    The urinary tract infection will hopefully have responded to the Cipro that you were on . If not please contact me to let me know how things are going.   Roula Apodaca APRN    If you have any further questions or problems, please contact our office.      Sincerely,    Roula Apodaca, ELIAS/ Nadeen Castillo, CMA  Results for orders placed or performed in visit on 10/04/18   *UA reflex to Microscopic and Culture ([facility] (except [place] and [place])   Result Value Ref Range    Color Urine Yellow     Appearance Urine Clear     Glucose Urine Negative NEG^Negative mg/dL    Bilirubin Urine Negative NEG^Negative    Ketones Urine Negative NEG^Negative mg/dL    Specific Gravity Urine 1.020 1.003 - 1.035    Blood Urine Trace (A) NEG^Negative    pH Urine 7.0 5.0 - 7.0 pH    Protein Albumin Urine Trace (A) NEG^Negative mg/dL    Urobilinogen Urine 0.2 0.2 - 1.0 EU/dL    Nitrite Urine Negative NEG^Negative    Leukocyte Esterase Urine Moderate (A) NEG^Negative    Source Midstream Urine    Urine Microscopic   Result Value Ref Range    WBC Urine >100 (A) OTO5^0 - 5 /HPF    RBC Urine 10-25 (A) OTO2^O - 2 /HPF    Squamous Epithelial /LPF Urine Moderate (A) FEW^Few /LPF    Bacteria Urine Many (A) NEG^Negative /HPF   Urine Culture Aerobic Bacterial   Result Value Ref Range    Specimen Description Midstream Urine     Culture Micro       <10,000 colonies/mL  mixed urogenital merrick  Susceptibility testing not routinely done                 Enclosure: Lab Results

## 2018-10-04 NOTE — PROGRESS NOTES
SUBJECTIVE:   Rosa Sotomayor is a 67 year old female who presents to clinic today for the following health issues:      URINARY TRACT SYMPTOMS      Duration: 1 week    Description    Burning with urination and sense of urgency with a small amt of urine, no frequency    Intensity:  Moderate to severe    Accompanying signs and symptoms:  Fever/chills: no   Flank pain no   Nausea and vomiting: no   Vaginal symptoms: none  Abdominal/Pelvic Pain: no     History  History of frequent UTI's: Had UTI in early July, treated with amox and did not have allergic reaction after 4 tablets , also tx with keflex but resistant to cephalosporins and beta lactam so Cipro given ( she declares allergy to sulfa and macrobid).  She does however have h/o CKD 3 with GFR of 38   History of kidney stones: no   Sexually Active: yes  Possibility of pregnancy: No    Precipitating or alleviating factors: None    Therapies tried and outcome: increase fluid intake and cranberry pills   Outcome: not much    Problem list and histories reviewed & adjusted, as indicated.  Additional history: as documented    Patient Active Problem List   Diagnosis     Postmenopausal bleeding     Personal history of allergy to anesthetic agent     CKD (chronic kidney disease) stage 3, GFR 30-59 ml/min (H)     Hypertriglyceridemia     Arnold-Chiari malformation (H)     Hyperlipidemia LDL goal <130     Periodic headache syndrome, not intractable     Anxiety     Secondary renal hyperparathyroidism (H)     Lumbago     Chronic bilateral low back pain without sciatica     Benign essential hypertension     Renal artery stenosis (H)     Macular degeneration, bilateral     Anemia, iron deficiency     Intractable migraine without aura and without status migrainosus     Aortic valve insufficiency     Past Surgical History:   Procedure Laterality Date     C NONSPECIFIC PROCEDURE      wisdom teeth     C NONSPECIFIC PROCEDURE  2005    Varicose Veins     C TOTAL HIP ARTHROPLASTY       left     C TOTAL KNEE ARTHROPLASTY      right     TUBAL LIGATION  1987       Social History   Substance Use Topics     Smoking status: Never Smoker     Smokeless tobacco: Never Used     Alcohol use No     Family History   Problem Relation Age of Onset     Diabetes Mother      INSULIN     Hypertension Mother      Eye Disorder Mother      CATERACTS     HEART DISEASE Mother      CHF- OPEN HEART SURG X'S 2     Lipids Mother      Obesity Mother      Coronary Artery Disease Mother      Diabetes Father      ORAL     Hypertension Father      Arthritis Father      Eye Disorder Father      MAC DEGENERATION     HEART DISEASE Father      CHF     Lipids Father      Obesity Father      Diabetes Maternal Grandfather      INSULIN     Diabetes Brother      INSULIN     GASTROINTESTINAL DISEASE Brother      CHOLITISI POSSIBLE CHRONES     Obesity Brother      Colon Cancer No family hx of      Breast Cancer No family hx of          Current Outpatient Prescriptions   Medication Sig Dispense Refill     acetaminophen (TYLENOL) 500 MG tablet Take 500 mg by mouth       allopurinol (ZYLOPRIM) 100 MG tablet Take 100 mg by mouth       amitriptyline (ELAVIL) 10 MG tablet Take 10 mg by mouth       amitriptyline (ELAVIL) 25 MG tablet Take 25 mg by mouth At Bedtime  0     atorvastatin (LIPITOR) 40 MG tablet TAKE 1 TABLET(40 MG) BY MOUTH DAILY 90 tablet 3     carvedilol (COREG) 6.25 MG tablet 12.5 mg in am and 6.25 mg in pm 270 tablet 0     cetirizine (ZYRTEC) 10 MG tablet Take 10 mg by mouth       Cholecalciferol (VITAMIN D) 2000 UNITS tablet Take 1 tablet by mouth daily       ciprofloxacin (CIPRO) 250 MG tablet Take 1 tablet (250 mg) by mouth 2 times daily 6 tablet 0     clobetasol (TEMOVATE) 0.05 % external solution Apply topically 2 times daily as needed  5     Cranberry POWD 1 capsule       Cyanocobalamin (B-12) 1000 MCG TBCR Take 1,000 mcg by mouth daily 100 tablet 1     EPINEPHrine (EPIPEN 2-FRAN) 0.3 MG/0.3ML injection 2-pack Inject 0.3 mLs  "(0.3 mg) into the muscle once as needed for anaphylaxis 0.6 mL 1     hydrOXYzine (ATARAX) 25 MG tablet Take 1 tablet (25 mg) by mouth 3 times daily as needed for itching 120 tablet 1     ketoconazole (NIZORAL) 2 % shampoo Apply to the affected area and wash off after 5 minutes. 120 mL 1     losartan (COZAAR) 100 MG tablet Take 1 tablet (100 mg) by mouth At Bedtime (Patient taking differently: Take 50 mg by mouth 2 times daily ) 90 tablet 1     Magnesium Oxide 250 MG TABS Take 250 mg by mouth       ondansetron (ZOFRAN) 4 MG tablet Take by mouth every 8 hours as needed for nausea       SUMAtriptan (IMITREX) 20 MG/ACT nasal spray Spray 1 spray in nostril as needed for migraine May repeat in 2 hours. Max 2 sprays/24 hours. 12 each 11     torsemide (DEMADEX) 10 MG tablet Take 1 tablet (10 mg) by mouth daily 90 tablet 3     traMADol (ULTRAM) 50 MG tablet Take 1 tablet by mouth every 6 hours as needed  0     ZOLMitriptan (ZOMIG-ZMT) 5 MG ODT tab Take 5 mg by mouth       Allergies   Allergen Reactions     Fentanyl Anaphylaxis     Midazolam Anaphylaxis     Propofol Anaphylaxis     Anesthetic [Amides]      Anaphalactic shock     Codeine      Erythromycin      ointment     Gabapentin      chest heaviness with breathing     Hydromorphone Itching     Tolerates morphine     Lorazepam Itching     Other [Seasonal Allergies] Anaphylaxis     GENERAL ANETHESIA       Penicillins      rash     Eucalyptus Oil Rash     hives     Nitrofuran Derivatives Rash     Sulfa Drugs Itching and Rash     rash       Reviewed and updated as needed this visit by clinical staff  Tobacco  Allergies  Meds  Problems       Reviewed and updated as needed this visit by Provider         ROS:  Constitutional, HEENT, cardiovascular, pulmonary, gi and gu systems are negative, except as otherwise noted.    OBJECTIVE:     /74 (BP Location: Right arm, Cuff Size: Adult Large)  Pulse 97  Temp 97.5  F (36.4  C) (Tympanic)  Ht 5' 4\" (1.626 m)  Wt 184 lb " (83.5 kg)  SpO2 99%  BMI 31.58 kg/m2  Body mass index is 31.58 kg/(m^2).  GENERAL: healthy, alert and no distress  ABDOMEN: soft, nontender,   BACK: no CVA tenderness     Diagnostic Test Results:  none     ASSESSMENT/PLAN:         ICD-10-CM    1. Acute cystitis with hematuria N30.01 ciprofloxacin (CIPRO) 250 MG tablet     *UA reflex to Microscopic and Culture ([facility] (except [place] and [place])   2. Nonspecific finding on examination of urine R82.90 Urine Culture Aerobic Bacterial   3. Chronic kidney disease, stage 3 (moderate) (H) N18.3    she will have a 3 day course of cipro 250 and recheck urine to determine whether she needs additional abx .  Also awaiting culture report    Patient Instructions   Push fluids   Unsweetened cranberry juice is good  Recheck the urine for clearance of infection Monday      GREGG Rivera [facility]

## 2018-10-05 LAB
BACTERIA SPEC CULT: NORMAL
SPECIMEN SOURCE: NORMAL

## 2018-10-08 DIAGNOSIS — N30.01 ACUTE CYSTITIS WITH HEMATURIA: ICD-10-CM

## 2018-10-08 LAB
ALBUMIN UR-MCNC: NEGATIVE MG/DL
APPEARANCE UR: CLEAR
BILIRUB UR QL STRIP: NEGATIVE
COLOR UR AUTO: YELLOW
GLUCOSE UR STRIP-MCNC: NEGATIVE MG/DL
HGB UR QL STRIP: NEGATIVE
KETONES UR STRIP-MCNC: NEGATIVE MG/DL
LEUKOCYTE ESTERASE UR QL STRIP: NEGATIVE
NITRATE UR QL: NEGATIVE
PH UR STRIP: 6 PH (ref 5–7)
SOURCE: NORMAL
SP GR UR STRIP: 1.02 (ref 1–1.03)
UROBILINOGEN UR STRIP-ACNC: 0.2 EU/DL (ref 0.2–1)

## 2018-10-08 PROCEDURE — 81003 URINALYSIS AUTO W/O SCOPE: CPT | Performed by: NURSE PRACTITIONER

## 2018-10-08 NOTE — PROGRESS NOTES
Terrific. The infection is gone.  We don't need to use any more cipro so that gives your kidneys a break!  Keep up with fluids to prevent recurrence

## 2018-10-08 NOTE — PROGRESS NOTES
The urinary tract infection will hopefully have responded to the Cipro that you were on . If not please contact me to let me know how things are going.   Roula ESCOBEDO

## 2018-10-08 NOTE — LETTER
[facility]  [address] [facility]  [address]  [place]  [address]  Tel: [phone]    October 8, 2018    Rosa Sotomayor  [address]  [place] [address]        Dear Ms. Sotomayor,    Neisha. The infection is gone.  We don't need to use any more cipro so that gives your kidneys a break!  Keep up with fluids to prevent recurrence     If you have any further questions or problems, please contact our office.      Sincerely,    Roula Apodaca, ELIAS/ Nadeen Castillo, CMA  Results for orders placed or performed in visit on 10/08/18   *UA reflex to Microscopic and Culture ([place] [facility] (except [place] and [place])   Result Value Ref Range    Color Urine Yellow     Appearance Urine Clear     Glucose Urine Negative NEG^Negative mg/dL    Bilirubin Urine Negative NEG^Negative    Ketones Urine Negative NEG^Negative mg/dL    Specific Gravity Urine 1.020 1.003 - 1.035    Blood Urine Negative NEG^Negative    pH Urine 6.0 5.0 - 7.0 pH    Protein Albumin Urine Negative NEG^Negative mg/dL    Urobilinogen Urine 0.2 0.2 - 1.0 EU/dL    Nitrite Urine Negative NEG^Negative    Leukocyte Esterase Urine Negative NEG^Negative    Source Urine                Enclosure: Lab Results

## 2018-10-25 ENCOUNTER — TRANSFERRED RECORDS (OUTPATIENT)
Dept: HEALTH INFORMATION MANAGEMENT | Facility: CLINIC | Age: 68
End: 2018-10-25

## 2018-11-13 ENCOUNTER — TRANSFERRED RECORDS (OUTPATIENT)
Dept: HEALTH INFORMATION MANAGEMENT | Facility: CLINIC | Age: 68
End: 2018-11-13

## 2018-11-15 ENCOUNTER — OFFICE VISIT (OUTPATIENT)
Dept: FAMILY MEDICINE | Facility: CLINIC | Age: 68
End: 2018-11-15
Payer: COMMERCIAL

## 2018-11-15 VITALS
SYSTOLIC BLOOD PRESSURE: 118 MMHG | HEART RATE: 94 BPM | OXYGEN SATURATION: 100 % | BODY MASS INDEX: 31.77 KG/M2 | DIASTOLIC BLOOD PRESSURE: 70 MMHG | HEIGHT: 64 IN

## 2018-11-15 VITALS
SYSTOLIC BLOOD PRESSURE: 144 MMHG | BODY MASS INDEX: 31.6 KG/M2 | WEIGHT: 185.1 LBS | DIASTOLIC BLOOD PRESSURE: 75 MMHG | HEIGHT: 64 IN | HEART RATE: 104 BPM | TEMPERATURE: 99 F | OXYGEN SATURATION: 98 %

## 2018-11-15 DIAGNOSIS — T78.40XA ALLERGIC REACTION, INITIAL ENCOUNTER: ICD-10-CM

## 2018-11-15 DIAGNOSIS — T50.Z95A ADVERSE EFFECT OF VACCINE, INITIAL ENCOUNTER: ICD-10-CM

## 2018-11-15 DIAGNOSIS — R21 RASH: Primary | ICD-10-CM

## 2018-11-15 DIAGNOSIS — L30.9 DERMATITIS: Primary | ICD-10-CM

## 2018-11-15 PROCEDURE — 99214 OFFICE O/P EST MOD 30 MIN: CPT | Performed by: FAMILY MEDICINE

## 2018-11-15 PROCEDURE — 99214 OFFICE O/P EST MOD 30 MIN: CPT | Performed by: INTERNAL MEDICINE

## 2018-11-15 RX ORDER — PREDNISONE 20 MG/1
TABLET ORAL
Qty: 20 TABLET | Refills: 0 | Status: SHIPPED | OUTPATIENT
Start: 2018-11-15 | End: 2018-12-06

## 2018-11-15 RX ORDER — CLOBETASOL PROPIONATE 0.5 MG/G
CREAM TOPICAL
Qty: 15 G | Refills: 2 | Status: SHIPPED | OUTPATIENT
Start: 2018-11-15 | End: 2020-10-01

## 2018-11-15 RX ORDER — TRIAMCINOLONE ACETONIDE 1 MG/G
CREAM TOPICAL 2 TIMES DAILY
Qty: 454 G | Refills: 0 | Status: SHIPPED | OUTPATIENT
Start: 2018-11-15 | End: 2020-10-01

## 2018-11-15 RX ORDER — CLOBETASOL PROPIONATE 0.5 MG/G
CREAM TOPICAL
Qty: 15 G | Refills: 2 | Status: SHIPPED | OUTPATIENT
Start: 2018-11-15 | End: 2018-11-15

## 2018-11-15 RX ORDER — PREDNISONE 20 MG/1
TABLET ORAL
Qty: 20 TABLET | Refills: 0 | Status: SHIPPED | OUTPATIENT
Start: 2018-11-15 | End: 2018-11-15

## 2018-11-15 NOTE — PROGRESS NOTES
[facility] - PRIMARY CARE SKIN    CC : Rash   SUBJECTIVE:   Rosa Sotomayor is a 67 year old female who presents to clinic today because of a rash and puritis beginning 5 days ago. Rosa reports that she received the shingles vaccine 8 days ago. Ever since then she has had a constant headache and associated fatigue. 5 days ago, she began to experience severe itchiness over her entire body, without any visible rash. She tried hydroxyzine that night with no relief.     Approximately 3 days ago, a fine, red rash developed on her back, spreading onto her lower abdomen and upper thighs. She also noticed new sores in the mouth and some itchiness of her ears the same day. She was seen by Dr. Meadows this morning who prescribed her a prednisone taper and clobetasol propionate 0.05% cream. She has not yet started the clobetasol cream. Otherwise she has been applying Sarna to try to reduce the itchiness.     She also reports increased joint pain since the injection. She does have arthritis, but her pain and decreased range of motion has never been this bad in the past.     Rosa had no cold prior to the vaccine. She has had no fever or illness since the shot, and no sick contacts.     Pruritic : extremely itchy.  Symptoms appear to be : worsening.  Previous similar history : No.  Recent exposure history : recent immunization   Any other family members with similar symptoms : No.    Therapies tried : Started prednisone today for rash.  Products used : Sarna lotion    Personal Medical History  Skin Cancer : NO  Eczema Psoriasis Rosacea Autoimmune   NO NO NO NO     Family Medical History  Skin Cancer : YES- non-melanoma in father and brother  Eczema Psoriasis Rosacea Autoimmune   NO NO NO YES- rheumatoid arthritis in brother      Patient Active Problem List   Diagnosis     Postmenopausal bleeding     Personal history of allergy to anesthetic agent     CKD (chronic kidney disease) stage 3, GFR 30-59 ml/min (H)      Hypertriglyceridemia     Arnold-Chiari malformation (H)     Hyperlipidemia LDL goal <130     Periodic headache syndrome, not intractable     Anxiety     Secondary renal hyperparathyroidism (H)     Lumbago     Chronic bilateral low back pain without sciatica     Benign essential hypertension     Renal artery stenosis (H)     Macular degeneration, bilateral     Anemia, iron deficiency     Intractable migraine without aura and without status migrainosus     Aortic valve insufficiency       Past Medical History:   Diagnosis Date     Aortic valve insufficiency 4/19/2018     Chronic kidney disease      Dizziness and giddiness     Past Surgical History:   Procedure Laterality Date     C NONSPECIFIC PROCEDURE      wisdom teeth     C NONSPECIFIC PROCEDURE  2005    Varicose Veins     C TOTAL HIP ARTHROPLASTY      left     C TOTAL KNEE ARTHROPLASTY      right     TUBAL LIGATION  1987      Social History   Substance Use Topics     Smoking status: Never Smoker     Smokeless tobacco: Never Used     Alcohol use No    Family History     Problem (# of Occurrences) Relation (Name,Age of Onset)    Arthritis (1) Father    Coronary Artery Disease (1) Mother    Diabetes (4) Mother: INSULIN, Father: ORAL, Maternal Grandfather: INSULIN, Brother: INSULIN    Eye Disorder (2) Mother: CATERACTS, Father: MAC DEGENERATION    GASTROINTESTINAL DISEASE (1) Brother: CHOLITISI POSSIBLE CHRONES    HEART DISEASE (2) Mother: CHF- OPEN HEART SURG X'S 2, Father: CHF    Hypertension (2) Mother, Father    Lipids (2) Mother, Father    Obesity (3) Mother, Father, Brother       Negative family history of: Colon Cancer, Breast Cancer           Prescription Medications as of 11/15/2018             clobetasol (TEMOVATE) 0.05 % cream Apply sparingly to affected area twice daily for 14 days.  Do not apply to face.    predniSONE (DELTASONE) 20 MG tablet Take 3 tabs (60 mg) by mouth daily x 3 days, 2 tabs (40 mg) daily x 3 days, 1 tab (20 mg) daily x 3 days, then 1/2  tab (10 mg) x 3 days.    acetaminophen (TYLENOL) 500 MG tablet Take 500 mg by mouth    allopurinol (ZYLOPRIM) 100 MG tablet Take 100 mg by mouth    amitriptyline (ELAVIL) 10 MG tablet Take 10 mg by mouth    amitriptyline (ELAVIL) 25 MG tablet Take 25 mg by mouth At Bedtime    atorvastatin (LIPITOR) 40 MG tablet TAKE 1 TABLET(40 MG) BY MOUTH DAILY    carvedilol (COREG) 6.25 MG tablet 12.5 mg in am and 6.25 mg in pm    cetirizine (ZYRTEC) 10 MG tablet Take 10 mg by mouth    Cholecalciferol (VITAMIN D) 2000 UNITS tablet Take 1 tablet by mouth daily    clobetasol (TEMOVATE) 0.05 % external solution Apply topically 2 times daily as needed    Cranberry POWD 1 capsule    Cyanocobalamin (B-12) 1000 MCG TBCR Take 1,000 mcg by mouth daily    EPINEPHrine (EPIPEN 2-FRAN) 0.3 MG/0.3ML injection 2-pack Inject 0.3 mLs (0.3 mg) into the muscle once as needed for anaphylaxis    hydrOXYzine (ATARAX) 25 MG tablet Take 1 tablet (25 mg) by mouth 3 times daily as needed for itching    ketoconazole (NIZORAL) 2 % shampoo Apply to the affected area and wash off after 5 minutes.    losartan (COZAAR) 100 MG tablet Take 1 tablet (100 mg) by mouth At Bedtime    Magnesium Oxide 250 MG TABS Take 250 mg by mouth    ondansetron (ZOFRAN) 4 MG tablet Take by mouth every 8 hours as needed for nausea    SUMAtriptan (IMITREX) 20 MG/ACT nasal spray Spray 1 spray in nostril as needed for migraine May repeat in 2 hours. Max 2 sprays/24 hours.    torsemide (DEMADEX) 10 MG tablet Take 1 tablet (10 mg) by mouth daily    traMADol (ULTRAM) 50 MG tablet Take 1 tablet by mouth every 6 hours as needed    triamcinolone (KENALOG) 0.1 % cream Apply topically 2 times daily Apply to AA on trunk, arms or legs bid for 10-14 days    ZOLMitriptan (ZOMIG-ZMT) 5 MG ODT tab Take 5 mg by mouth            Allergies   Allergen Reactions     Fentanyl Anaphylaxis     Midazolam Anaphylaxis     Propofol Anaphylaxis     Anesthetic [Amides]      Anaphalactic shock     Codeine       Erythromycin      ointment     Gabapentin      chest heaviness with breathing     Hydromorphone Itching     Tolerates morphine     Lorazepam Itching     Other [Seasonal Allergies] Anaphylaxis     GENERAL ANETHESIA       Penicillins      rash     Eucalyptus Oil Rash     hives     Nitrofuran Derivatives Rash     Sulfa Drugs Itching and Rash     rash        INTEGUMENTARY/SKIN: POSITIVE for pruritis and rash  ROS : 14 point review of systems was negative except the symptoms listed above in the HPI.    This document serves as a record of the services and decisions personally performed and made by Cinthia Segundo MD. It was created on her behalf by Clau Yepez, a trained medical scribe.  The creation of this document is based on the scribe's personal observations and the provider's statements to the medical scribe.  Clau Yepez, November 15, 2018 1:57 PM    OBJECTIVE:   GENERAL: healthy, alert and no distress  SKIN: [place] Skin Type - I-II  Scalp, Face, Trunk, Arms and Legs were examined. The dermatoscope was used to help evaluate pigmented lesions.  Skin Pertinent Findings:  Erythematous macular papular eruption, starting in mid back and extending onto waistband area and anterior thighs    Right medial proimal thigh : non scaly 2 cm x 1 cm violaceous macules     Under breasts, axillary area : few scattered papules     Mouth : Few small canker sores under the tongue    Diagnostic Test Results:  none     MDM : Unable at this time to entirely rule out viral exanthem, but based on clinical systems and history, suspect reaction to recent shingles immunization    ASSESSMENT:     Encounter Diagnosis   Name Primary?     Dermatitis Yes     PLAN:   Patient Instructions   FUTURE APPOINTMENTS  Follow up as needed.    Finish the course of the prednisone.     ORAL ANTIHISTAMINE  Take by mouth, two tablet(s) of OTC cetirizine (Zyrtec) 10 mg daily, one in the morning and one at night.     If this does not give you enough relief  of itchiness at night, then begin use of one hydroxyzine at night in addition to the two zyrtec.      TOPICAL STEROID INSTRUCTIONS  Triamcinolone 0.1% cream.  1. Wash hands before applying topical steroid. Use the adult fingertip unit (FTU) as a guide.    2. Apply sparingly (just enough to rub in) onto affected areas of trunk, arms, or legs (not to exceed 1 FTU), two times per day for 10-14 days.  3. Wash off any excess, unused topical steroid.    This higher strength steroid should never be used on face nor groin.    After the initial treatment, topical steroid may be used as needed for flare-ups but only for short-term treatment.    If you are using this for prolonged periods of time to control flare-ups, return to clinic for re-evaluation of treatment.    Keep in mind to also regularly use moisturizer, as this preventative measure can help maintain your skin's natural protective moisture barrier.      PROCEDURES:   None.    TT : 25 minutes.  CT : 20 minutes.      The information in this document, created by the medical scribe for me, accurately reflects the services I personally performed and the decisions made by me. I have reviewed and approved this document for accuracy prior to leaving the patient care area.  November 15, 2018 1:57 PM  Johanna Segundo MD  [facility] [place]

## 2018-11-15 NOTE — MR AVS SNAPSHOT
After Visit Summary   11/15/2018    Rosa Sotomayor    MRN: [id number]           Patient Information     Date Of Birth          1950        Visit Information        Provider Department      11/15/2018 10:00 AM Koki Meadows MD [facility]        Today's Diagnoses     Rash    -  1    Allergic reaction, initial encounter        Adverse effect of vaccine, initial encounter           Follow-ups after your visit        Additional Services     SKIN CARE REFERRAL       Your provider has referred you to: [facility]: [facility] [phone] (Dr. Dixno Gilliam Jr.)   [email or web address]  [facility]: [facility] [phone]  [email or web address]     Please be aware that coverage of these services is subject to the terms and limitations of your health insurance plan.  Please check with your insurance prior to the appointment to ensure appropriate coverage for any services considered cosmetic in nature or not medically necessary.    Please bring the following with you to your appointment:    (1) Any X-Rays, CTs or MRIs which have been performed.  Contact the facility where they were done to arrange for  prior to your scheduled appointment.  Any new CT, MRI or other procedures ordered by your specialist must be performed at a [facility] facility or coordinated by your clinic's referral office.  (2) List of current medications  (3) This referral request   (4) Any documents/labs given to you for this referral                  Follow-up notes from your care team     Return in about 1 day (around 11/16/2018) for Rash FU.      Your next 10 appointments already scheduled     Nov 15, 2018  2:00 PM CST   New Visit with Johanna Segundo MD   [facility] ([facility])    [address] [place] [id number] [phone]             Nov 16, 2018 11:40 AM CST   Office Visit with Koki Meadows MD   [facility] ([facility])    [address] [facility] [id number] [phone]           Bring a current list of meds and any records pertaining to this visit. For Physicals, please bring immunization records and any forms needing to be filled out. Please arrive 10 minutes early to complete paperwork.            Nov 27, 2018  2:00 PM CST   New Sleep Patient with Bennett Ezra Goltz, PA-C   [facility] ([facility])    [address] [id number] [phone]            Dec 06, 2018  4:00 PM CST   Office Visit with Josh Hollingsworth MD   [facility] ([facility])    [address] [facility] [address] [id number] [phone]           Bring a current list of meds and any records pertaining to this visit. For Physicals, please bring immunization records and any forms needing to be filled out. Please arrive 10 minutes early to complete paperwork.              Who to contact     If you have questions or need follow up information about today's clinic visit or your schedule please contact [facility] directly at [phone].  Normal or non-critical lab and imaging results will be communicated to you by MyChart, letter or phone within 4 business days after the clinic has received the results. If you do not hear from us within 7 days, please contact the clinic through MyChart or phone. If you have a critical or abnormal lab result, we will notify you by phone as soon as possible.  Submit refill requests through [employer] or call your pharmacy and they will forward the refill request to us. Please allow 3 business days for your refill to be completed.          Additional Information About Your Visit        [employer] Information     [employer] gives you secure access to your electronic health record. If you see a primary care provider,  "you can also send messages to your care team and make appointments. If you have questions, please call your primary care clinic.  If you do not have a primary care provider, please call [phone] and they will assist you.        Care EveryWhere ID     This is your Care EveryWhere ID. This could be used by other organizations to access your [facility] medical records  [id number]        Your Vitals Were     Pulse Temperature Height Pulse Oximetry Breastfeeding? BMI (Body Mass Index)    104 99  F (37.2  C) (Tympanic) 5' 4\" (1.626 m) 98% No 31.77 kg/m2       Blood Pressure from Last 3 Encounters:   11/15/18 144/75   10/04/18 121/74   08/15/18 134/78    Weight from Last 3 Encounters:   11/15/18 185 lb 1.6 oz (84 kg)   10/04/18 184 lb (83.5 kg)   08/15/18 169 lb (76.7 kg)              We Performed the Following     SKIN CARE REFERRAL          Today's Medication Changes          These changes are accurate as of 11/15/18 10:38 AM.  If you have any questions, ask your nurse or doctor.               Start taking these medicines.        Dose/Directions    predniSONE 20 MG tablet   Commonly known as:  DELTASONE   Used for:  Allergic reaction, initial encounter, Rash, Adverse effect of vaccine, initial encounter   Started by:  Koki Meadows MD        Take 3 tabs (60 mg) by mouth daily x 3 days, 2 tabs (40 mg) daily x 3 days, 1 tab (20 mg) daily x 3 days, then 1/2 tab (10 mg) x 3 days.   Quantity:  20 tablet   Refills:  0         These medicines have changed or have updated prescriptions.        Dose/Directions    * clobetasol 0.05 % external solution   Commonly known as:  TEMOVATE   This may have changed:  Another medication with the same name was added. Make sure you understand how and when to take each.   Changed by:  Koki Meadows MD        Apply topically 2 times daily as needed   Refills:  5       * clobetasol 0.05 % cream   Commonly known as:  TEMOVATE   This may have changed:  You were already taking a " medication with the same name, and this prescription was added. Make sure you understand how and when to take each.   Used for:  Rash, Adverse effect of vaccine, initial encounter, Allergic reaction, initial encounter   Changed by:  Koki Meadows MD        Apply sparingly to affected area twice daily for 14 days.  Do not apply to face.   Quantity:  15 g   Refills:  2       losartan 100 MG tablet   Commonly known as:  COZAAR   This may have changed:    - how much to take  - when to take this   Used for:  Benign essential hypertension        Dose:  100 mg   Take 1 tablet (100 mg) by mouth At Bedtime   Quantity:  90 tablet   Refills:  1       * Notice:  This list has 2 medication(s) that are the same as other medications prescribed for you. Read the directions carefully, and ask your doctor or other care provider to review them with you.         Where to get your medicines      Some of these will need a paper prescription and others can be bought over the counter.  Ask your nurse if you have questions.     Bring a paper prescription for each of these medications     clobetasol 0.05 % cream    predniSONE 20 MG tablet                Primary Care Provider Office Phone # Fax #    Josh Hollingsworth [phone] [address] [address]        Equal Access to Services     JUSTIN [employer] AH: Hadpatricio Alberto, wajulián mcdaniel, qaverenata kaalmada titus, marlene vasquez. So [facility] [phone].    ATENCIÓN: Si habla español, tiene a armendariz disposición servicios gratuitos de asistencia lingüística. Antonio al [phone].    We comply with applicable federal civil rights laws and [place] laws. We do not discriminate on the basis of race, color, national origin, age, disability, sex, sexual orientation, or gender identity.            Thank you!     Thank you for choosing [facility]  for your care. Our goal is always to provide you with excellent  care. Hearing back from our patients is one way we can continue to improve our services. Please take a few minutes to complete the written survey that you may receive in the mail after your visit with us. Thank you!             Your Updated Medication List - Protect others around you: Learn how to safely use, store and throw away your medicines at www.disposemymeds.org.          This list is accurate as of 11/15/18 10:38 AM.  Always use your most recent med list.                   Brand Name Dispense Instructions for use Diagnosis    acetaminophen 500 MG tablet    TYLENOL     Take 500 mg by mouth        allopurinol 100 MG tablet    ZYLOPRIM     Take 100 mg by mouth        * amitriptyline 10 MG tablet    ELAVIL     Take 10 mg by mouth        * amitriptyline 25 MG tablet    ELAVIL     Take 25 mg by mouth At Bedtime        atorvastatin 40 MG tablet    LIPITOR    90 tablet    TAKE 1 TABLET(40 MG) BY MOUTH DAILY    Hypertriglyceridemia       B-12 1000 MCG Tbcr     100 tablet    Take 1,000 mcg by mouth daily    Vitamin B12 deficiency (non anemic)       carvedilol 6.25 MG tablet    COREG    270 tablet    12.5 mg in am and 6.25 mg in pm    Benign essential hypertension       cetirizine 10 MG tablet    zyrTEC     Take 10 mg by mouth        * clobetasol 0.05 % external solution    TEMOVATE     Apply topically 2 times daily as needed        * clobetasol 0.05 % cream    TEMOVATE    15 g    Apply sparingly to affected area twice daily for 14 days.  Do not apply to face.    Rash, Adverse effect of vaccine, initial encounter, Allergic reaction, initial encounter       Cranberry Powd      1 capsule        EPINEPHrine 0.3 MG/0.3ML injection 2-pack    EPIPEN 2-FRAN    0.6 mL    Inject 0.3 mLs (0.3 mg) into the muscle once as needed for anaphylaxis    Allergic reaction, subsequent encounter       hydrOXYzine 25 MG tablet    ATARAX    120 tablet    Take 1 tablet (25 mg) by mouth 3 times daily as needed for itching    Pruritic disorder        ketoconazole 2 % shampoo    NIZORAL    120 mL    Apply to the affected area and wash off after 5 minutes.    Seborrheic dermatitis       losartan 100 MG tablet    COZAAR    90 tablet    Take 1 tablet (100 mg) by mouth At Bedtime    Benign essential hypertension       Magnesium Oxide 250 MG Tabs      Take 250 mg by mouth        predniSONE 20 MG tablet    DELTASONE    20 tablet    Take 3 tabs (60 mg) by mouth daily x 3 days, 2 tabs (40 mg) daily x 3 days, 1 tab (20 mg) daily x 3 days, then 1/2 tab (10 mg) x 3 days.    Allergic reaction, initial encounter, Rash, Adverse effect of vaccine, initial encounter       SUMAtriptan 20 MG/ACT nasal spray    IMITREX    12 each    Spray 1 spray in nostril as needed for migraine May repeat in 2 hours. Max 2 sprays/24 hours.    Intractable migraine without aura and without status migrainosus       torsemide 10 MG tablet    DEMADEX    90 tablet    Take 1 tablet (10 mg) by mouth daily    Edema leg       traMADol 50 MG tablet    ULTRAM     Take 1 tablet by mouth every 6 hours as needed        vitamin D 2000 units tablet      Take 1 tablet by mouth daily        ZOFRAN 4 MG tablet   Generic drug:  ondansetron      Take by mouth every 8 hours as needed for nausea        ZOLMitriptan 5 MG ODT tab    ZOMIG-ZMT     Take 5 mg by mouth        * Notice:  This list has 4 medication(s) that are the same as other medications prescribed for you. Read the directions carefully, and ask your doctor or other care provider to review them with you.

## 2018-11-15 NOTE — MR AVS SNAPSHOT
After Visit Summary   11/15/2018    Rosa Sotomayor    MRN: [id number]           Patient Information     Date Of Birth          1950        Visit Information        Provider Department      11/15/2018 2:00 PM Johanna Segundo MD [facility] ([facility] [place]        Today's Diagnoses     Dermatitis    -  1      Care Instructions    FUTURE APPOINTMENTS  Follow up as needed.    Finish the course of the prednisone.     ORAL ANTIHISTAMINE  Take by mouth, two tablet(s) of OTC cetirizine (Zyrtec) 10 mg daily, one in the morning and one at night.     If this does not give you enough relief of itchiness at night, then begin use of one hydroxyzine at night in addition to the two zyrtec.      TOPICAL STEROID INSTRUCTIONS  Triamcinolone 0.1% cream.  1. Wash hands before applying topical steroid. Use the adult fingertip unit (FTU) as a guide.    2. Apply sparingly (just enough to rub in) onto affected areas of trunk, arms, or legs (not to exceed 1 FTU), two times per day for 10-14 days.  3. Wash off any excess, unused topical steroid.    This higher strength steroid should never be used on face nor groin.    After the initial treatment, topical steroid may be used as needed for flare-ups but only for short-term treatment.    If you are using this for prolonged periods of time to control flare-ups, return to clinic for re-evaluation of treatment.    Keep in mind to also regularly use moisturizer, as this preventative measure can help maintain your skin's natural protective moisture barrier.    In the baby teething section, you can find topical OTC benzocaine for the mouth. If this does not work, contact Dr. Segundo for a prescription.           Follow-ups after your visit        Your next 10 appointments already scheduled     Nov 16, 2018 11:40 AM CST   [facility]    [address] [facility] [address]    [phone]           Bring a current list of meds and any records pertaining to this visit. For Physicals, please bring immunization records and any forms needing to be filled out. Please arrive 10 minutes early to complete paperwork.            Nov 27, 2018  2:00 PM CST   New Sleep Patient with Bennett Ezra Goltz, PA-C   [facility] ([facility])    [address] [address] [phone]            Dec 06, 2018  4:00 PM CST   Office Visit with Josh Hollingsworth MD   [facility]    [address] [facility] [address] [phone]           Bring a current list of meds and any records pertaining to this visit. For Physicals, please bring immunization records and any forms needing to be filled out. Please arrive 10 minutes early to complete paperwork.              Who to contact     If you have questions or need follow up information about today's clinic visit or your schedule please contact [facility] [place] directly at [phone].  Normal or non-critical lab and imaging results will be communicated to you by MyChart, letter or phone within 4 business days after the clinic has received the results. If you do not hear from us within 7 days, please contact the clinic through [employer] or phone. If you have a critical or abnormal lab result, we will notify you by phone as soon as possible.  Submit refill requests through [email or web address] or call your pharmacy and they will forward the refill request to us. Please allow 3 business days for your refill to be completed.          Additional Information About Your Visit        [employer] Information     [email or web address] gives you secure access to your electronic health record. If you see a primary care provider, you can also send messages to your care team and make appointments. If you have questions, please call your primary care clinic.  If you do not have a primary  "care provider, please call [phone] and they will assist you.        Care EveryWhere ID     This is your Care EveryWhere ID. This could be used by other organizations to access your [place] medical records  [id number]        Your Vitals Were     Pulse Height Pulse Oximetry Breastfeeding? BMI (Body Mass Index)       94 1.626 m (5' 4\") 100% No 31.77 kg/m2        Blood Pressure from Last 3 Encounters:   11/15/18 118/70   11/15/18 144/75   10/04/18 121/74    Weight from Last 3 Encounters:   11/15/18 84 kg (185 lb 1.6 oz)   10/04/18 83.5 kg (184 lb)   08/15/18 76.7 kg (169 lb)              Today, you had the following     No orders found for display         Today's Medication Changes          These changes are accurate as of 11/15/18  2:16 PM.  If you have any questions, ask your nurse or doctor.               Start taking these medicines.        Dose/Directions    predniSONE 20 MG tablet   Commonly known as:  DELTASONE   Used for:  Allergic reaction, initial encounter, Rash, Adverse effect of vaccine, initial encounter   Started by:  Koki Meadows MD        Take 3 tabs (60 mg) by mouth daily x 3 days, 2 tabs (40 mg) daily x 3 days, 1 tab (20 mg) daily x 3 days, then 1/2 tab (10 mg) x 3 days.   Quantity:  20 tablet   Refills:  0       triamcinolone 0.1 % cream   Commonly known as:  KENALOG   Used for:  Dermatitis   Started by:  Johanna Segundo MD        Apply topically 2 times daily Apply to AA on trunk, arms or legs bid for 10-14 days   Quantity:  454 g   Refills:  0         These medicines have changed or have updated prescriptions.        Dose/Directions    * clobetasol 0.05 % external solution   Commonly known as:  TEMOVATE   This may have changed:  Another medication with the same name was added. Make sure you understand how and when to take each.   Changed by:  Koki Meadows MD        Apply topically 2 times daily as needed   Refills:  5       * clobetasol 0.05 % cream   Commonly known " as:  TEMOVATE   This may have changed:  You were already taking a medication with the same name, and this prescription was added. Make sure you understand how and when to take each.   Used for:  Rash, Adverse effect of vaccine, initial encounter, Allergic reaction, initial encounter   Changed by:  Koki Meadows MD        Apply sparingly to affected area twice daily for 14 days.  Do not apply to face.   Quantity:  15 g   Refills:  2       losartan 100 MG tablet   Commonly known as:  COZAAR   This may have changed:    - how much to take  - when to take this   Used for:  Benign essential hypertension        Dose:  100 mg   Take 1 tablet (100 mg) by mouth At Bedtime   Quantity:  90 tablet   Refills:  1       * Notice:  This list has 2 medication(s) that are the same as other medications prescribed for you. Read the directions carefully, and ask your doctor or other care provider to review them with you.         Where to get your medicines      These medications were sent to [id number] - [place], [place] - [address] AT [facility] [place] [address]  [address], [place] [address]     Phone:  [phone]     triamcinolone 0.1 % cream         Some of these will need a paper prescription and others can be bought over the counter.  Ask your nurse if you have questions.     Bring a paper prescription for each of these medications     clobetasol 0.05 % cream    predniSONE 20 MG tablet                Primary Care Provider Office Phone # Fax #    Josh Hollingsworth [phone] [address] [address]        Equal Access to Services     JUSTIN LIPSCOMB AH: Hadii clayton aguilaro Sodonny, waaxda luqadaha, qaybta kaalmada titus, marlene vasquez. So [facility] [phone].    ATENCIÓN: Si habla español, tiene a armendariz disposición servicios gratuitos de asistencia lingüística. Llame al [phone].    We comply with applicable federal  civil rights laws and [place] laws. We do not discriminate on the basis of race, color, national origin, age, disability, sex, sexual orientation, or gender identity.            Thank you!     Thank you for choosing [facility] [place]  for your care. Our goal is always to provide you with excellent care. Hearing back from our patients is one way we can continue to improve our services. Please take a few minutes to complete the written survey that you may receive in the mail after your visit with us. Thank you!             Your Updated Medication List - Protect others around you: Learn how to safely use, store and throw away your medicines at www.disposemymeds.org.          This list is accurate as of 11/15/18  2:16 PM.  Always use your most recent med list.                   Brand Name Dispense Instructions for use Diagnosis    acetaminophen 500 MG tablet    TYLENOL     Take 500 mg by mouth        allopurinol 100 MG tablet    ZYLOPRIM     Take 100 mg by mouth        * amitriptyline 10 MG tablet    ELAVIL     Take 10 mg by mouth        * amitriptyline 25 MG tablet    ELAVIL     Take 25 mg by mouth At Bedtime        atorvastatin 40 MG tablet    LIPITOR    90 tablet    TAKE 1 TABLET(40 MG) BY MOUTH DAILY    Hypertriglyceridemia       B-12 1000 MCG Tbcr     100 tablet    Take 1,000 mcg by mouth daily    Vitamin B12 deficiency (non anemic)       carvedilol 6.25 MG tablet    COREG    270 tablet    12.5 mg in am and 6.25 mg in pm    Benign essential hypertension       cetirizine 10 MG tablet    zyrTEC     Take 10 mg by mouth        * clobetasol 0.05 % external solution    TEMOVATE     Apply topically 2 times daily as needed        * clobetasol 0.05 % cream    TEMOVATE    15 g    Apply sparingly to affected area twice daily for 14 days.  Do not apply to face.    Rash, Adverse effect of vaccine, initial encounter, Allergic reaction, initial encounter       Cranberry Powd      1 capsule        EPINEPHrine 0.3  MG/0.3ML injection 2-pack    EPIPEN 2-FRAN    0.6 mL    Inject 0.3 mLs (0.3 mg) into the muscle once as needed for anaphylaxis    Allergic reaction, subsequent encounter       hydrOXYzine 25 MG tablet    ATARAX    120 tablet    Take 1 tablet (25 mg) by mouth 3 times daily as needed for itching    Pruritic disorder       ketoconazole 2 % shampoo    NIZORAL    120 mL    Apply to the affected area and wash off after 5 minutes.    Seborrheic dermatitis       losartan 100 MG tablet    COZAAR    90 tablet    Take 1 tablet (100 mg) by mouth At Bedtime    Benign essential hypertension       Magnesium Oxide 250 MG Tabs      Take 250 mg by mouth        predniSONE 20 MG tablet    DELTASONE    20 tablet    Take 3 tabs (60 mg) by mouth daily x 3 days, 2 tabs (40 mg) daily x 3 days, 1 tab (20 mg) daily x 3 days, then 1/2 tab (10 mg) x 3 days.    Allergic reaction, initial encounter, Rash, Adverse effect of vaccine, initial encounter       SUMAtriptan 20 MG/ACT nasal spray    IMITREX    12 each    Spray 1 spray in nostril as needed for migraine May repeat in 2 hours. Max 2 sprays/24 hours.    Intractable migraine without aura and without status migrainosus       torsemide 10 MG tablet    DEMADEX    90 tablet    Take 1 tablet (10 mg) by mouth daily    Edema leg       traMADol 50 MG tablet    ULTRAM     Take 1 tablet by mouth every 6 hours as needed        triamcinolone 0.1 % cream    KENALOG    454 g    Apply topically 2 times daily Apply to AA on trunk, arms or legs bid for 10-14 days    Dermatitis       vitamin D 2000 units tablet      Take 1 tablet by mouth daily        ZOFRAN 4 MG tablet   Generic drug:  ondansetron      Take by mouth every 8 hours as needed for nausea        ZOLMitriptan 5 MG ODT tab    ZOMIG-ZMT     Take 5 mg by mouth        * Notice:  This list has 4 medication(s) that are the same as other medications prescribed for you. Read the directions carefully, and ask your doctor or other care provider to review them  with you.

## 2018-11-15 NOTE — LETTER
11/15/2018         RE: Rosa Sotomayor  [address]  [place] [address]        Dear Colleague,    Thank you for referring your patient, Rosa Sotomayor, to the [facility] [place]. Please see a copy of my visit note below.    [facility] - PRIMARY CARE SKIN    CC : Rash   SUBJECTIVE:   Rosa Sotomayor is a 67 year old female who presents to clinic today because of a rash and puritis beginning 5 days ago. Rosa reports that she received the shingles vaccine 8 days ago. Ever since then she has had a constant headache and associated fatigue. 5 days ago, she began to experience severe itchiness over her entire body, without any visible rash. She tried hydroxyzine that night with no relief.     Approximately 3 days ago, a fine, red rash developed on her back, spreading onto her lower abdomen and upper thighs. She also noticed new sores in the mouth and some itchiness of her ears the same day. She was seen by Dr. Meadows this morning who prescribed her a prednisone taper and clobetasol propionate 0.05% cream. She has not yet started the clobetasol cream. Otherwise she has been applying Sarna to try to reduce the itchiness.     She also reports increased joint pain since the injection. She does have arthritis, but her pain and decreased range of motion has never been this bad in the past.     Rosa had no cold prior to the vaccine. She has had no fever or illness since the shot, and no sick contacts.     Pruritic : extremely itchy.  Symptoms appear to be : worsening.  Previous similar history : No.  Recent exposure history : recent immunization   Any other family members with similar symptoms : No.    Therapies tried : Started prednisone today for rash.  Products used : Sarna lotion    Personal Medical History  Skin Cancer : NO  Eczema Psoriasis Rosacea Autoimmune   NO NO NO NO     Family Medical History  Skin Cancer : YES- non-melanoma in father and brother  Eczema Psoriasis Rosacea  Autoimmune   NO NO NO YES- rheumatoid arthritis in brother      Patient Active Problem List   Diagnosis     Postmenopausal bleeding     Personal history of allergy to anesthetic agent     CKD (chronic kidney disease) stage 3, GFR 30-59 ml/min (H)     Hypertriglyceridemia     Arnold-Chiari malformation (H)     Hyperlipidemia LDL goal <130     Periodic headache syndrome, not intractable     Anxiety     Secondary renal hyperparathyroidism (H)     Lumbago     Chronic bilateral low back pain without sciatica     Benign essential hypertension     Renal artery stenosis (H)     Macular degeneration, bilateral     Anemia, iron deficiency     Intractable migraine without aura and without status migrainosus     Aortic valve insufficiency       Past Medical History:   Diagnosis Date     Aortic valve insufficiency 4/19/2018     Chronic kidney disease      Dizziness and giddiness     Past Surgical History:   Procedure Laterality Date     C NONSPECIFIC PROCEDURE      wisdom teeth     C NONSPECIFIC PROCEDURE  2005    Varicose Veins     C TOTAL HIP ARTHROPLASTY      left     C TOTAL KNEE ARTHROPLASTY      right     TUBAL LIGATION  1987      Social History   Substance Use Topics     Smoking status: Never Smoker     Smokeless tobacco: Never Used     Alcohol use No    Family History     Problem (# of Occurrences) Relation (Name,Age of Onset)    Arthritis (1) Father    Coronary Artery Disease (1) Mother    Diabetes (4) Mother: INSULIN, Father: ORAL, Maternal Grandfather: INSULIN, Brother: INSULIN    Eye Disorder (2) Mother: CATERACTS, Father: MAC DEGENERATION    GASTROINTESTINAL DISEASE (1) Brother: CHOLITISI POSSIBLE CHRONES    HEART DISEASE (2) Mother: CHF- OPEN HEART SURG X'S 2, Father: CHF    Hypertension (2) Mother, Father    Lipids (2) Mother, Father    Obesity (3) Mother, Father, Brother       Negative family history of: Colon Cancer, Breast Cancer           Prescription Medications as of 11/15/2018             clobetasol  (TEMOVATE) 0.05 % cream Apply sparingly to affected area twice daily for 14 days.  Do not apply to face.    predniSONE (DELTASONE) 20 MG tablet Take 3 tabs (60 mg) by mouth daily x 3 days, 2 tabs (40 mg) daily x 3 days, 1 tab (20 mg) daily x 3 days, then 1/2 tab (10 mg) x 3 days.    acetaminophen (TYLENOL) 500 MG tablet Take 500 mg by mouth    allopurinol (ZYLOPRIM) 100 MG tablet Take 100 mg by mouth    amitriptyline (ELAVIL) 10 MG tablet Take 10 mg by mouth    amitriptyline (ELAVIL) 25 MG tablet Take 25 mg by mouth At Bedtime    atorvastatin (LIPITOR) 40 MG tablet TAKE 1 TABLET(40 MG) BY MOUTH DAILY    carvedilol (COREG) 6.25 MG tablet 12.5 mg in am and 6.25 mg in pm    cetirizine (ZYRTEC) 10 MG tablet Take 10 mg by mouth    Cholecalciferol (VITAMIN D) 2000 UNITS tablet Take 1 tablet by mouth daily    clobetasol (TEMOVATE) 0.05 % external solution Apply topically 2 times daily as needed    Cranberry POWD 1 capsule    Cyanocobalamin (B-12) 1000 MCG TBCR Take 1,000 mcg by mouth daily    EPINEPHrine (EPIPEN 2-FRAN) 0.3 MG/0.3ML injection 2-pack Inject 0.3 mLs (0.3 mg) into the muscle once as needed for anaphylaxis    hydrOXYzine (ATARAX) 25 MG tablet Take 1 tablet (25 mg) by mouth 3 times daily as needed for itching    ketoconazole (NIZORAL) 2 % shampoo Apply to the affected area and wash off after 5 minutes.    losartan (COZAAR) 100 MG tablet Take 1 tablet (100 mg) by mouth At Bedtime    Magnesium Oxide 250 MG TABS Take 250 mg by mouth    ondansetron (ZOFRAN) 4 MG tablet Take by mouth every 8 hours as needed for nausea    SUMAtriptan (IMITREX) 20 MG/ACT nasal spray Spray 1 spray in nostril as needed for migraine May repeat in 2 hours. Max 2 sprays/24 hours.    torsemide (DEMADEX) 10 MG tablet Take 1 tablet (10 mg) by mouth daily    traMADol (ULTRAM) 50 MG tablet Take 1 tablet by mouth every 6 hours as needed    triamcinolone (KENALOG) 0.1 % cream Apply topically 2 times daily Apply to AA on trunk, arms or legs bid for  10-14 days    ZOLMitriptan (ZOMIG-ZMT) 5 MG ODT tab Take 5 mg by mouth            Allergies   Allergen Reactions     Fentanyl Anaphylaxis     Midazolam Anaphylaxis     Propofol Anaphylaxis     Anesthetic [Amides]      Anaphalactic shock     Codeine      Erythromycin      ointment     Gabapentin      chest heaviness with breathing     Hydromorphone Itching     Tolerates morphine     Lorazepam Itching     Other [Seasonal Allergies] Anaphylaxis     GENERAL ANETHESIA       Penicillins      rash     Eucalyptus Oil Rash     hives     Nitrofuran Derivatives Rash     Sulfa Drugs Itching and Rash     rash        INTEGUMENTARY/SKIN: POSITIVE for pruritis and rash  ROS : 14 point review of systems was negative except the symptoms listed above in the HPI.    This document serves as a record of the services and decisions personally performed and made by Cinthia Segundo MD. It was created on her behalf by Clau Yepez, a trained medical scribe.  The creation of this document is based on the scribe's personal observations and the provider's statements to the medical scribe.  Clau Yepez, November 15, 2018 1:57 PM    OBJECTIVE:   GENERAL: healthy, alert and no distress  SKIN: [place] Skin Type - I-II  Scalp, Face, Trunk, Arms and Legs were examined. The dermatoscope was used to help evaluate pigmented lesions.  Skin Pertinent Findings:  Erythematous macular papular eruption, starting in mid back and extending onto waistband area and anterior thighs    Right medial proimal thigh : non scaly 2 cm x 1 cm violaceous macules     Under breasts, axillary area : few scattered papules     Mouth : Few small canker sores under the tongue    Diagnostic Test Results:  none     MDM : Unable at this time to entirely rule out viral exanthem, but based on clinical systems and history, suspect reaction to recent shingles immunization    ASSESSMENT:     Encounter Diagnosis   Name Primary?     Dermatitis Yes     PLAN:   Patient Instructions    FUTURE APPOINTMENTS  Follow up as needed.    Finish the course of the prednisone.     ORAL ANTIHISTAMINE  Take by mouth, two tablet(s) of OTC cetirizine (Zyrtec) 10 mg daily, one in the morning and one at night.     If this does not give you enough relief of itchiness at night, then begin use of one hydroxyzine at night in addition to the two zyrtec.      TOPICAL STEROID INSTRUCTIONS  Triamcinolone 0.1% cream.  1. Wash hands before applying topical steroid. Use the adult fingertip unit (FTU) as a guide.    2. Apply sparingly (just enough to rub in) onto affected areas of trunk, arms, or legs (not to exceed 1 FTU), two times per day for 10-14 days.  3. Wash off any excess, unused topical steroid.    This higher strength steroid should never be used on face nor groin.    After the initial treatment, topical steroid may be used as needed for flare-ups but only for short-term treatment.    If you are using this for prolonged periods of time to control flare-ups, return to clinic for re-evaluation of treatment.    Keep in mind to also regularly use moisturizer, as this preventative measure can help maintain your skin's natural protective moisture barrier.      PROCEDURES:   None.    TT : 25 minutes.  CT : 20 minutes.      The information in this document, created by the medical scribe for me, accurately reflects the services I personally performed and the decisions made by me. I have reviewed and approved this document for accuracy prior to leaving the patient care area.  November 15, 2018 1:57 PM  Johanna Segundo MD  [facility]    Again, thank you for allowing me to participate in the care of your patient.        Sincerely,        Johanna Segundo MD

## 2018-11-15 NOTE — PROGRESS NOTES
Chief Complaint:     Rash      Duration:     Since: Monday this week           Specific cause: reaction to shingles vaccine Wednesday last week    Description:      Location of rash: diffuse, torso, back, both legs     Character of rash: erythematous, maculopapular    Intensity: moderate    History:      Pain interferes with job: N/A     History of similar pain problems: none     Any previous MRI or X-rays: none     Therapies tried without relief: none    Alleviating factors:      Improved by: none    Precipitating factors:    Worsened by: none    Accompanying Signs & Symptoms: diffuse skin itching symptoms            Current Medications:     Current Outpatient Prescriptions   Medication Sig Dispense Refill     clobetasol (TEMOVATE) 0.05 % cream Apply sparingly to affected area twice daily for 14 days.  Do not apply to face. 15 g 2     predniSONE (DELTASONE) 20 MG tablet Take 3 tabs (60 mg) by mouth daily x 3 days, 2 tabs (40 mg) daily x 3 days, 1 tab (20 mg) daily x 3 days, then 1/2 tab (10 mg) x 3 days. 20 tablet 0     acetaminophen (TYLENOL) 500 MG tablet Take 500 mg by mouth       allopurinol (ZYLOPRIM) 100 MG tablet Take 100 mg by mouth       amitriptyline (ELAVIL) 10 MG tablet Take 10 mg by mouth       amitriptyline (ELAVIL) 25 MG tablet Take 25 mg by mouth At Bedtime  0     atorvastatin (LIPITOR) 40 MG tablet TAKE 1 TABLET(40 MG) BY MOUTH DAILY 90 tablet 3     carvedilol (COREG) 6.25 MG tablet 12.5 mg in am and 6.25 mg in pm 270 tablet 0     cetirizine (ZYRTEC) 10 MG tablet Take 10 mg by mouth       Cholecalciferol (VITAMIN D) 2000 UNITS tablet Take 1 tablet by mouth daily       clobetasol (TEMOVATE) 0.05 % external solution Apply topically 2 times daily as needed  5     Cranberry POWD 1 capsule       Cyanocobalamin (B-12) 1000 MCG TBCR Take 1,000 mcg by mouth daily 100 tablet 1     EPINEPHrine (EPIPEN 2-FRAN) 0.3 MG/0.3ML injection 2-pack Inject 0.3 mLs (0.3 mg) into the muscle once as needed for anaphylaxis  0.6 mL 1     hydrOXYzine (ATARAX) 25 MG tablet Take 1 tablet (25 mg) by mouth 3 times daily as needed for itching 120 tablet 1     ketoconazole (NIZORAL) 2 % shampoo Apply to the affected area and wash off after 5 minutes. 120 mL 1     losartan (COZAAR) 100 MG tablet Take 1 tablet (100 mg) by mouth At Bedtime (Patient taking differently: Take 50 mg by mouth 2 times daily ) 90 tablet 1     Magnesium Oxide 250 MG TABS Take 250 mg by mouth       ondansetron (ZOFRAN) 4 MG tablet Take by mouth every 8 hours as needed for nausea       SUMAtriptan (IMITREX) 20 MG/ACT nasal spray Spray 1 spray in nostril as needed for migraine May repeat in 2 hours. Max 2 sprays/24 hours. 12 each 11     torsemide (DEMADEX) 10 MG tablet Take 1 tablet (10 mg) by mouth daily 90 tablet 3     traMADol (ULTRAM) 50 MG tablet Take 1 tablet by mouth every 6 hours as needed  0     ZOLMitriptan (ZOMIG-ZMT) 5 MG ODT tab Take 5 mg by mouth           Allergies:      Allergies   Allergen Reactions     Fentanyl Anaphylaxis     Midazolam Anaphylaxis     Propofol Anaphylaxis     Anesthetic [Amides]      Anaphalactic shock     Codeine      Erythromycin      ointment     Gabapentin      chest heaviness with breathing     Hydromorphone Itching     Tolerates morphine     Lorazepam Itching     Other [Seasonal Allergies] Anaphylaxis     GENERAL ANETHESIA       Penicillins      rash     Eucalyptus Oil Rash     hives     Nitrofuran Derivatives Rash     Sulfa Drugs Itching and Rash     rash            Past Medical History:     Past Medical History:   Diagnosis Date     Aortic valve insufficiency 4/19/2018     Chronic kidney disease      Dizziness and giddiness          Past Surgical History:     Past Surgical History:   Procedure Laterality Date     C NONSPECIFIC PROCEDURE      wisdom teeth     C NONSPECIFIC PROCEDURE  2005    Varicose Veins     C TOTAL HIP ARTHROPLASTY      left     C TOTAL KNEE ARTHROPLASTY      right     TUBAL LIGATION  1987         [facility]  "History:     Family History   Problem Relation Age of Onset     Diabetes Mother      INSULIN     Hypertension Mother      Eye Disorder Mother      CATERACTS     HEART DISEASE Mother      CHF- OPEN HEART SURG X'S 2     Lipids Mother      Obesity Mother      Coronary Artery Disease Mother      Diabetes Father      ORAL     Hypertension Father      Arthritis Father      Eye Disorder Father      MAC DEGENERATION     HEART DISEASE Father      CHF     Lipids Father      Obesity Father      Diabetes Maternal Grandfather      INSULIN     Diabetes Brother      INSULIN     GASTROINTESTINAL DISEASE Brother      CHOLITISI POSSIBLE CHRONES     Obesity Brother      Colon Cancer No family hx of      Breast Cancer No family hx of          Social History:     Social History     Social History     Marital status:      Spouse name: N/A     Number of children: N/A     Years of education: N/A     Occupational History     Not on file.     Social History Main Topics     Smoking status: Never Smoker     Smokeless tobacco: Never Used     Alcohol use No     Drug use: No     Sexual activity: Yes     Partners: Male      Comment: postmeno     Other Topics Concern     Not on file     Social History Narrative           Review of System:     Constitutional: Negative for fever or chills  Skin: Positive for rashes  Ears/Nose/Throat: Negative for nasal congestion, sore throat  Respiratory: No shortness of breath, dyspnea on exertion, cough, or hemoptysis  Cardiovascular: Negative for chest pain  Gastrointestinal: Negative for nausea, vomiting  Genitourinary: Negative for dysuria, hematuria  Musculoskeletal: Negative for myalgias  Neurologic: Negative for headaches  Psychiatric: Negative for depression, anxiety  Hematologic/Lymphatic/Immunologic: Negative  Endocrine: Negative  Behavioral: Negative for tobacco use       Physical Exam:   /75  Pulse 104  Temp 99  F (37.2  C) (Tympanic)  Ht 5' 4\" (1.626 m)  Wt 185 lb 1.6 oz (84 kg)  SpO2 " 98%  Breastfeeding? No  BMI 31.77 kg/m2    GENERAL: alert and no distress  EYES: eyes grossly normal to inspection, and conjunctivae and sclerae normal  HENT: Normocephalic atraumatic. Nose and mouth without ulcers or lesions  NECK: supple  RESP: lungs clear to auscultation   CV: regular rate and rhythm, normal S1 S2  LYMPH: no peripheral edema   ABDOMEN: nondistended  MS: no gross musculoskeletal defects noted  SKIN: diffuse erythematous, maculopapular rashes present on torso, back, and both legs  NEURO: Alert & Oriented x 3.   PSYCH: mentation appears normal, affect normal        Diagnostic Test Results:     Diagnostic Test Results:  Results for orders placed or performed in visit on 10/08/18   *UA reflex to Microscopic and Culture ([facility] (except [place] and [place])   Result Value Ref Range    Color Urine Yellow     Appearance Urine Clear     Glucose Urine Negative NEG^Negative mg/dL    Bilirubin Urine Negative NEG^Negative    Ketones Urine Negative NEG^Negative mg/dL    Specific Gravity Urine 1.020 1.003 - 1.035    Blood Urine Negative NEG^Negative    pH Urine 6.0 5.0 - 7.0 pH    Protein Albumin Urine Negative NEG^Negative mg/dL    Urobilinogen Urine 0.2 0.2 - 1.0 EU/dL    Nitrite Urine Negative NEG^Negative    Leukocyte Esterase Urine Negative NEG^Negative    Source Urine        ASSESSMENT/PLAN:   (T50.Z95A) Adverse effect of vaccine, initial encounter  (T78.40XA) Allergic reaction, initial encounter  (R21) Rash  (primary encounter diagnosis)  Comment: recent new allergic reaction with diffuse erythematous, itchy skin rash, likely due to allergic reaction to the recent Shingles vaccine.  Plan: SKIN CARE REFERRAL, predniSONE (DELTASONE) 20 MG tablet, clobetasol (TEMOVATE) 0.05 % cream      Follow Up Plan:     Patient is instructed to return to Internal Medicine clinic for follow-up visit in 1 day.        Koki Meadows MD  Internal Medicine  [facility]

## 2018-11-15 NOTE — PATIENT INSTRUCTIONS
FUTURE APPOINTMENTS  Follow up as needed.    Finish the course of the prednisone.     ORAL ANTIHISTAMINE  Take by mouth, two tablet(s) of OTC cetirizine (Zyrtec) 10 mg daily, one in the morning and one at night.     If this does not give you enough relief of itchiness at night, then begin use of one hydroxyzine at night in addition to the two zyrtec.      TOPICAL STEROID INSTRUCTIONS  Triamcinolone 0.1% cream.  1. Wash hands before applying topical steroid. Use the adult fingertip unit (FTU) as a guide.    2. Apply sparingly (just enough to rub in) onto affected areas of trunk, arms, or legs (not to exceed 1 FTU), two times per day for 10-14 days.  3. Wash off any excess, unused topical steroid.    This higher strength steroid should never be used on face nor groin.    After the initial treatment, topical steroid may be used as needed for flare-ups but only for short-term treatment.    If you are using this for prolonged periods of time to control flare-ups, return to clinic for re-evaluation of treatment.    Keep in mind to also regularly use moisturizer, as this preventative measure can help maintain your skin's natural protective moisture barrier.    In the baby teething section, you can find topical OTC benzocaine for the mouth. If this does not work, contact Dr. Segundo for a prescription.

## 2018-11-16 ENCOUNTER — OFFICE VISIT (OUTPATIENT)
Dept: FAMILY MEDICINE | Facility: CLINIC | Age: 68
End: 2018-11-16
Payer: COMMERCIAL

## 2018-11-16 ENCOUNTER — TELEPHONE (OUTPATIENT)
Dept: FAMILY MEDICINE | Facility: CLINIC | Age: 68
End: 2018-11-16

## 2018-11-16 VITALS
DIASTOLIC BLOOD PRESSURE: 68 MMHG | TEMPERATURE: 97.8 F | BODY MASS INDEX: 31.58 KG/M2 | HEART RATE: 86 BPM | HEIGHT: 64 IN | OXYGEN SATURATION: 96 % | WEIGHT: 185 LBS | SYSTOLIC BLOOD PRESSURE: 116 MMHG

## 2018-11-16 DIAGNOSIS — K12.0 CANKER SORES ORAL: Primary | ICD-10-CM

## 2018-11-16 PROCEDURE — 99213 OFFICE O/P EST LOW 20 MIN: CPT | Performed by: INTERNAL MEDICINE

## 2018-11-16 RX ORDER — DIPHENHYDRAMINE HYDROCHLORIDE AND LIDOCAINE HYDROCHLORIDE AND ALUMINUM HYDROXIDE AND MAGNESIUM HYDRO
5-10 KIT EVERY 6 HOURS PRN
Qty: 237 ML | Refills: 3 | Status: SHIPPED | OUTPATIENT
Start: 2018-11-16 | End: 2019-01-02

## 2018-11-16 NOTE — TELEPHONE ENCOUNTER
Reason for Call:  Medication Question    Other request: [facility] asking about the Recipe for the First Ruby Mouthwash  Call [facility] back    Can we leave a detailed message on this number? YES        Call taken on 11/16/2018 at 11:59 AM by Miller Hernandez

## 2018-11-16 NOTE — MR AVS SNAPSHOT
After Visit Summary   11/16/2018    Rosa Sotomayor    MRN: [id number]           Patient Information     Date Of Birth          1950        Visit Information        Provider Department      11/16/2018 11:40 AM Koki Meadows MD [facility]        Today's Diagnoses     Canker sores oral    -  1       Follow-ups after your visit        Follow-up notes from your care team     Return in about 1 week (around 11/23/2018).      Your next 10 appointments already scheduled     Nov 21, 2018  9:40 AM CST   [facility] Koki Meadows MD   [facility]    [address] [address] [phone]           Bring a current list of meds and any records pertaining to this visit. For Physicals, please bring immunization records and any forms needing to be filled out. Please arrive 10 minutes early to complete paperwork.            Nov 27, 2018  2:00 PM CST   New Sleep Patient with Bennett Ezra Goltz, PA-C   [facility] ([facility])    [address] [address] [phone]            Dec 06, 2018  4:00 PM CST   Office Visit with Josh Hollingsworth MD   [facility]    [address] [id number] [phone]           Bring a current list of meds and any records pertaining to this visit. For Physicals, please bring immunization records and any forms needing to be filled out. Please arrive 10 minutes early to complete paperwork.              Who to contact     If you have questions or need follow up information about today's clinic visit or your schedule please contact [facility] directly at [phone].  Normal or non-critical lab and imaging results will be communicated to you by MyChart, letter or phone within 4 business days after the clinic has received the results. If you do not hear from us within 7  "days, please contact the clinic through [employer] or phone. If you have a critical or abnormal lab result, we will notify you by phone as soon as possible.  Submit refill requests through [employer] or call your pharmacy and they will forward the refill request to us. Please allow 3 business days for your refill to be completed.          Additional Information About Your Visit        [employer] Information     [employer] gives you secure access to your electronic health record. If you see a primary care provider, you can also send messages to your care team and make appointments. If you have questions, please call your primary care clinic.  If you do not have a primary care provider, please call [phone] and they will assist you.        Care EveryWhere ID     This is your Care EveryWhere ID. This could be used by other organizations to access your [place] medical records  [id number]        Your Vitals Were     Pulse Temperature Height Pulse Oximetry BMI (Body Mass Index)       86 97.8  F (36.6  C) (Oral) 5' 4\" (1.626 m) 96% 31.76 kg/m2        Blood Pressure from Last 3 Encounters:   11/16/18 116/68   11/15/18 118/70   11/15/18 144/75    Weight from Last 3 Encounters:   11/16/18 185 lb (83.9 kg)   11/15/18 185 lb 1.6 oz (84 kg)   10/04/18 184 lb (83.5 kg)              Today, you had the following     No orders found for display         Today's Medication Changes          These changes are accurate as of 11/16/18  1:13 PM.  If you have any questions, ask your nurse or doctor.               Start taking these medicines.        Dose/Directions    FIRST-ROSSY MOUTHWASH Susp   Used for:  Canker sores oral   Started by:  Koki Meadows MD        Dose:  5-10 mL   Swish and swallow 5-10 mLs in mouth every 6 hours as needed (canker sores)   Quantity:  237 mL   Refills:  3         These medicines have changed or have updated prescriptions.        Dose/Directions    losartan 100 MG tablet   Commonly known as:  COZAAR   This " may have changed:    - how much to take  - when to take this   Used for:  Benign essential hypertension        Dose:  100 mg   Take 1 tablet (100 mg) by mouth At Bedtime   Quantity:  90 tablet   Refills:  1            Where to get your medicines      Some of these will need a paper prescription and others can be bought over the counter.  Ask your nurse if you have questions.     Bring a paper prescription for each of these medications     FIRST-ROSSY MOUTHWASH [facility]                Primary Care Provider Office Phone # Fax #    Josh Hollingsworth [phone] [address] [address]        Equal Access to Services     [facility]: Hadii clayton ku hadasho Soomaali, waaxda luqadaha, qaybta kaalmada adejoshuayada, marlene luna . So [facility] [phone].    ATENCIÓN: Si habla español, tiene a armendariz disposición servicios gratuitos de asistencia lingüística. [facility] [phone].    We comply with applicable federal civil rights laws and [place] laws. We do not discriminate on the basis of race, color, national origin, age, disability, sex, sexual orientation, or gender identity.            Thank you!     Thank you for choosing [facility]  for your care. Our goal is always to provide you with excellent care. Hearing back from our patients is one way we can continue to improve our services. Please take a few minutes to complete the written survey that you may receive in the mail after your visit with us. Thank you!             Your Updated Medication List - Protect others around you: Learn how to safely use, store and throw away your medicines at www.disposemymeds.org.          This list is accurate as of 11/16/18  1:13 PM.  Always use your most recent med list.                   Brand Name Dispense Instructions for use Diagnosis    acetaminophen 500 MG tablet    TYLENOL     Take 500 mg by mouth        allopurinol 100 MG tablet    ZYLOPRIM     Take 100 mg by  mouth        * amitriptyline 10 MG tablet    ELAVIL     Take 10 mg by mouth        * amitriptyline 25 MG tablet    ELAVIL     Take 25 mg by mouth At Bedtime        atorvastatin 40 MG tablet    LIPITOR    90 tablet    TAKE 1 TABLET(40 MG) BY MOUTH DAILY    Hypertriglyceridemia       B-12 1000 MCG Tbcr     100 tablet    Take 1,000 mcg by mouth daily    Vitamin B12 deficiency (non anemic)       carvedilol 6.25 MG tablet    COREG    270 tablet    12.5 mg in am and 6.25 mg in pm    Benign essential hypertension       cetirizine 10 MG tablet    zyrTEC     Take 10 mg by mouth        * clobetasol 0.05 % external solution    TEMOVATE     Apply topically 2 times daily as needed        * clobetasol 0.05 % cream    TEMOVATE    15 g    Apply sparingly to affected area twice daily for 14 days.  Do not apply to face.    Rash, Adverse effect of vaccine, initial encounter, Allergic reaction, initial encounter       Cranberry Powd      1 capsule        EPINEPHrine 0.3 MG/0.3ML injection 2-pack    EPIPEN 2-FRAN    0.6 mL    Inject 0.3 mLs (0.3 mg) into the muscle once as needed for anaphylaxis    Allergic reaction, subsequent encounter       FIRST-ROSSY MOUTHWASH Susp     237 mL    Swish and swallow 5-10 mLs in mouth every 6 hours as needed (canker sores)    Canker sores oral       hydrOXYzine 25 MG tablet    ATARAX    120 tablet    Take 1 tablet (25 mg) by mouth 3 times daily as needed for itching    Pruritic disorder       ketoconazole 2 % shampoo    NIZORAL    120 mL    Apply to the affected area and wash off after 5 minutes.    Seborrheic dermatitis       losartan 100 MG tablet    COZAAR    90 tablet    Take 1 tablet (100 mg) by mouth At Bedtime    Benign essential hypertension       Magnesium Oxide 250 MG Tabs      Take 250 mg by mouth        predniSONE 20 MG tablet    DELTASONE    20 tablet    Take 3 tabs (60 mg) by mouth daily x 3 days, 2 tabs (40 mg) daily x 3 days, 1 tab (20 mg) daily x 3 days, then 1/2 tab (10 mg) x 3 days.     Allergic reaction, initial encounter, Rash, Adverse effect of vaccine, initial encounter       SUMAtriptan 20 MG/ACT nasal spray    IMITREX    12 each    Spray 1 spray in nostril as needed for migraine May repeat in 2 hours. Max 2 sprays/24 hours.    Intractable migraine without aura and without status migrainosus       torsemide 10 MG tablet    DEMADEX    90 tablet    Take 1 tablet (10 mg) by mouth daily    Edema leg       traMADol 50 MG tablet    ULTRAM     Take 1 tablet by mouth every 6 hours as needed        triamcinolone 0.1 % cream    KENALOG    454 g    Apply topically 2 times daily Apply to AA on trunk, arms or legs bid for 10-14 days    Dermatitis       vitamin D 2000 units tablet      Take 1 tablet by mouth daily        ZOFRAN 4 MG tablet   Generic drug:  ondansetron      Take by mouth every 8 hours as needed for nausea        ZOLMitriptan 5 MG ODT tab    ZOMIG-ZMT     Take 5 mg by mouth        * Notice:  This list has 4 medication(s) that are the same as other medications prescribed for you. Read the directions carefully, and ask your doctor or other care provider to review them with you.

## 2018-11-16 NOTE — TELEPHONE ENCOUNTER
Sorry, I don't understand what this message means, can you please clarify? Please refrain from using acronyms in chart messages if possible to prevent confusion - thank you!

## 2018-11-16 NOTE — PROGRESS NOTES
SUBJECTIVE:   Rosa Sotomayor is a 67 year old female who presents to clinic today for the following health issues:      Follow up on canker sores      HPI:   Patient Rosa Sotomayor is a very pleasant 67 year old female with history of canker sores who presents to Internal Medicine clinic today for evaluation of recent new painful canker sores symptoms of the mouth.    Current Medications:     Current Outpatient Prescriptions   Medication Sig Dispense Refill     acetaminophen (TYLENOL) 500 MG tablet Take 500 mg by mouth       allopurinol (ZYLOPRIM) 100 MG tablet Take 100 mg by mouth       amitriptyline (ELAVIL) 10 MG tablet Take 10 mg by mouth       amitriptyline (ELAVIL) 25 MG tablet Take 25 mg by mouth At Bedtime  0     atorvastatin (LIPITOR) 40 MG tablet TAKE 1 TABLET(40 MG) BY MOUTH DAILY 90 tablet 3     carvedilol (COREG) 6.25 MG tablet 12.5 mg in am and 6.25 mg in pm 270 tablet 0     cetirizine (ZYRTEC) 10 MG tablet Take 10 mg by mouth       Cholecalciferol (VITAMIN D) 2000 UNITS tablet Take 1 tablet by mouth daily       clobetasol (TEMOVATE) 0.05 % cream Apply sparingly to affected area twice daily for 14 days.  Do not apply to face. 15 g 2     clobetasol (TEMOVATE) 0.05 % external solution Apply topically 2 times daily as needed  5     Cranberry POWD 1 capsule       Cyanocobalamin (B-12) 1000 MCG TBCR Take 1,000 mcg by mouth daily 100 tablet 1     Diphenhyd-HC-Nystatin-Tetracyc (FIRST-ROSSY MOUTHWASH) SUSP Swish and swallow 5-10 mLs in mouth every 6 hours as needed (canker sores) 237 mL 3     EPINEPHrine (EPIPEN 2-FRAN) 0.3 MG/0.3ML injection 2-pack Inject 0.3 mLs (0.3 mg) into the muscle once as needed for anaphylaxis 0.6 mL 1     hydrOXYzine (ATARAX) 25 MG tablet Take 1 tablet (25 mg) by mouth 3 times daily as needed for itching 120 tablet 1     ketoconazole (NIZORAL) 2 % shampoo Apply to the affected area and wash off after 5 minutes. 120 mL 1     losartan (COZAAR) 100 MG tablet Take 1 tablet (100 mg) by  mouth At Bedtime (Patient taking differently: Take 50 mg by mouth 2 times daily ) 90 tablet 1     Magnesium Oxide 250 MG TABS Take 250 mg by mouth       ondansetron (ZOFRAN) 4 MG tablet Take by mouth every 8 hours as needed for nausea       predniSONE (DELTASONE) 20 MG tablet Take 3 tabs (60 mg) by mouth daily x 3 days, 2 tabs (40 mg) daily x 3 days, 1 tab (20 mg) daily x 3 days, then 1/2 tab (10 mg) x 3 days. 20 tablet 0     SUMAtriptan (IMITREX) 20 MG/ACT nasal spray Spray 1 spray in nostril as needed for migraine May repeat in 2 hours. Max 2 sprays/24 hours. 12 each 11     torsemide (DEMADEX) 10 MG tablet Take 1 tablet (10 mg) by mouth daily 90 tablet 3     traMADol (ULTRAM) 50 MG tablet Take 1 tablet by mouth every 6 hours as needed  0     triamcinolone (KENALOG) 0.1 % cream Apply topically 2 times daily Apply to AA on trunk, arms or legs bid for 10-14 days 454 g 0     ZOLMitriptan (ZOMIG-ZMT) 5 MG ODT tab Take 5 mg by mouth           Allergies:      Allergies   Allergen Reactions     Fentanyl Anaphylaxis     Midazolam Anaphylaxis     Propofol Anaphylaxis     Shingrix [Zoster Vac Recomb Adjuvanted] Itching and Rash     Codeine      Erythromycin      ointment     Gabapentin      chest heaviness with breathing     Hydromorphone Itching     Tolerates morphine     Lorazepam Itching     Other [Seasonal Allergies] Anaphylaxis     GENERAL ANETHESIA       Penicillins      rash     Eucalyptus Oil Rash     hives     Nitrofuran Derivatives Rash     Sulfa Drugs Itching and Rash     rash            Past Medical History:     Past Medical History:   Diagnosis Date     Aortic valve insufficiency 4/19/2018     Chronic kidney disease      Dizziness and giddiness          Past Surgical History:     Past Surgical History:   Procedure Laterality Date     C NONSPECIFIC PROCEDURE      wisdom teeth     C NONSPECIFIC PROCEDURE  2005    Varicose Veins     C TOTAL HIP ARTHROPLASTY      left     C TOTAL KNEE ARTHROPLASTY      right      TUBAL LIGATION  1987         Family Medical History:     Family History   Problem Relation Age of Onset     Diabetes Mother      INSULIN     Hypertension Mother      Eye Disorder Mother      CATERACTS     HEART DISEASE Mother      CHF- OPEN HEART SURG X'S 2     Lipids Mother      Obesity Mother      Coronary Artery Disease Mother      Diabetes Father      ORAL     Hypertension Father      Arthritis Father      Eye Disorder Father      MAC DEGENERATION     HEART DISEASE Father      CHF     Lipids Father      Obesity Father      Diabetes Maternal Grandfather      INSULIN     Diabetes Brother      INSULIN     GASTROINTESTINAL DISEASE Brother      CHOLITISI POSSIBLE CHRONES     Obesity Brother      Colon Cancer No family hx of      Breast Cancer No family hx of          Social History:     Social History     Social History     Marital status:      Spouse name: N/A     Number of children: N/A     Years of education: N/A     Occupational History     Not on file.     Social History Main Topics     Smoking status: Never Smoker     Smokeless tobacco: Never Used     Alcohol use No     Drug use: No     Sexual activity: Yes     Partners: Male      Comment: postmeno     Other Topics Concern     Not on file     Social History Narrative           Review of System:     Constitutional: Negative for fever or chills  HEENT: Positive for recurrent canker sores of the mouth  Respiratory: No shortness of breath, dyspnea on exertion, cough, or hemoptysis  Cardiovascular: Negative for chest pain  Gastrointestinal: Negative for nausea, vomiting  Genitourinary: Negative for dysuria, hematuria  Musculoskeletal: Negative for myalgias  Neurologic: Negative for headaches  Psychiatric: Negative for depression, anxiety  Hematologic/Lymphatic/Immunologic: Negative  Endocrine: Negative  Behavioral: Negative for tobacco use       Physical Exam:   /68 (BP Location: Left arm, Patient Position: Sitting, Cuff Size: Adult Regular)  Pulse 86  " Temp 97.8  F (36.6  C) (Oral)  Ht 5' 4\" (1.626 m)  Wt 185 lb (83.9 kg)  SpO2 96%  BMI 31.76 kg/m2    GENERAL: healthy, alert and no distress  EYES: eyes grossly normal to inspection, and conjunctivae and sclerae normal  HENT: Normocephalic atraumatic. Multiple canker sores present of the mouth  NECK: supple  RESP: lungs clear to auscultation   CV: regular rate and rhythm, normal S1 S2  LYMPH: no peripheral edema   ABDOMEN: nondistended  MS: no gross musculoskeletal defects noted  NEURO: Alert & Oriented x 3.   PSYCH: mentation appears normal, affect normal        Diagnostic Test Results:     Diagnostic Test Results:  Results for orders placed or performed in visit on 10/08/18   *UA reflex to Microscopic and Culture ([place] and [facility] (except [place] and [place])   Result Value Ref Range    Color Urine Yellow     Appearance Urine Clear     Glucose Urine Negative NEG^Negative mg/dL    Bilirubin Urine Negative NEG^Negative    Ketones Urine Negative NEG^Negative mg/dL    Specific Gravity Urine 1.020 1.003 - 1.035    Blood Urine Negative NEG^Negative    pH Urine 6.0 5.0 - 7.0 pH    Protein Albumin Urine Negative NEG^Negative mg/dL    Urobilinogen Urine 0.2 0.2 - 1.0 EU/dL    Nitrite Urine Negative NEG^Negative    Leukocyte Esterase Urine Negative NEG^Negative    Source Urine        ASSESSMENT/PLAN:       (K12.0) Canker sores oral  (primary encounter diagnosis)  Comment: recent new painful canker sores  Plan: Diphenhyd-HC-Nystatin-Tetracyc (FIRST-ROSSY         MOUTHWASH) SUSP      Follow Up Plan:     Patient is instructed to return to Internal Medicine clinic for follow-up visit in 1 week.        Koki Meadows MD  Internal Medicine  [facility]    "

## 2018-11-16 NOTE — TELEPHONE ENCOUNTER
Pt calling back   She states the pharmacy needs a different Rx for the Miracle Mouthwash   The Rx that was written is not available and its not covered by insurance       The way the Rx is written is for First raine's mouthwash-   If we do not put that name on there and just list the ingredients it can be filled    Nystatin   Benadryl   Hydrocortisone     And the amounts will need to be listed

## 2018-11-21 ENCOUNTER — OFFICE VISIT (OUTPATIENT)
Dept: FAMILY MEDICINE | Facility: CLINIC | Age: 68
End: 2018-11-21
Payer: COMMERCIAL

## 2018-11-21 VITALS
TEMPERATURE: 97.6 F | OXYGEN SATURATION: 97 % | SYSTOLIC BLOOD PRESSURE: 121 MMHG | DIASTOLIC BLOOD PRESSURE: 69 MMHG | HEIGHT: 64 IN | BODY MASS INDEX: 31.41 KG/M2 | WEIGHT: 184 LBS | HEART RATE: 84 BPM

## 2018-11-21 DIAGNOSIS — K12.0 CANKER SORE: ICD-10-CM

## 2018-11-21 DIAGNOSIS — T50.Z95S ADVERSE EFFECT OF VACCINE, SEQUELA: ICD-10-CM

## 2018-11-21 DIAGNOSIS — R21 RASH: Primary | ICD-10-CM

## 2018-11-21 PROCEDURE — 99213 OFFICE O/P EST LOW 20 MIN: CPT | Performed by: INTERNAL MEDICINE

## 2018-11-21 NOTE — MR AVS SNAPSHOT
After Visit Summary   11/21/2018    Rosa Sotomayor    MRN: [id number]           Patient Information     Date Of Birth          1950        Visit Information        Provider Department      11/21/2018 9:40 AM Koki Meadows MD [facility]        Today's Diagnoses     Rash    -  1    Canker sore        Adverse effect of vaccine, sequela           Follow-ups after your visit        Follow-up notes from your care team     Return in about 1 week (around 11/28/2018).      Your next 10 appointments already scheduled     Nov 27, 2018  2:00 PM CST   New Sleep Patient with Bennett Ezra Goltz, PA-C   [facility] ([facility])    [address] [address] [phone]            Dec 06, 2018  4:00 PM CST   Office Visit with Josh Hollingsworth MD   [facility] ([facility])    [address] [address] [phone]           Bring a current list of meds and any records pertaining to this visit. For Physicals, please bring immunization records and any forms needing to be filled out. Please arrive 10 minutes early to complete paperwork.              Who to contact     If you have questions or need follow up information about today's clinic visit or your schedule please contact [facility] directly at [phone].  Normal or non-critical lab and imaging results will be communicated to you by MyChart, letter or phone within 4 business days after the clinic has received the results. If you do not hear from us within 7 days, please contact the clinic through MyChart or phone. If you have a critical or abnormal lab result, we will notify you by phone as soon as possible.  Submit refill requests through [facility] or call your pharmacy and they will forward the refill request to us. Please allow 3 business days for your refill to be completed.          Additional Information About Your  "Visit        MyChart Information     [facility] gives you secure access to your electronic health record. If you see a primary care provider, you can also send messages to your care team and make appointments. If you have questions, please call your primary care clinic.  If you do not have a primary care provider, please call [phone] and they will assist you.        Care EveryWhere ID     This is your Care EveryWhere ID. This could be used by other organizations to access your [facility] medical records  [id number]        Your Vitals Were     Pulse Temperature Height Pulse Oximetry BMI (Body Mass Index)       84 97.6  F (36.4  C) (Oral) 5' 4\" (1.626 m) 97% 31.58 kg/m2        Blood Pressure from Last 3 Encounters:   11/21/18 121/69   11/16/18 116/68   11/15/18 118/70    Weight from Last 3 Encounters:   11/21/18 184 lb (83.5 kg)   11/16/18 185 lb (83.9 kg)   11/15/18 185 lb 1.6 oz (84 kg)              Today, you had the following     No orders found for display         Today's Medication Changes          These changes are accurate as of 11/21/18 11:02 AM.  If you have any questions, ask your nurse or doctor.               These medicines have changed or have updated prescriptions.        Dose/Directions    losartan 100 MG tablet   Commonly known as:  COZAAR   This may have changed:    - how much to take  - when to take this   Used for:  Benign essential hypertension        Dose:  100 mg   Take 1 tablet (100 mg) by mouth At Bedtime   Quantity:  90 tablet   Refills:  1                Primary Care Provider Office Phone # Fax #    Josh Hollingsworth [phone] [address] [facility] [address]  [place] [address]        Equal Access to Services     [facility] Center[facility] : Hadii clayton Alberto, grecia mcdaniel, qaybta kristenalmamarlene haas. So [facility] [phone].    ATENCIÓN: Si habla español, tiene a armendariz disposición servicios gratuitos de asistencia lingüística. Llame al " [phone].    We comply with applicable federal civil rights laws and [place] laws. We do not discriminate on the basis of race, color, national origin, age, disability, sex, sexual orientation, or gender identity.            Thank you!     Thank you for choosing [facility]  for your care. Our goal is always to provide you with excellent care. Hearing back from our patients is one way we can continue to improve our services. Please take a few minutes to complete the written survey that you may receive in the mail after your visit with us. Thank you!             Your Updated Medication List - Protect others around you: Learn how to safely use, store and throw away your medicines at www.disposemymeds.org.          This list is accurate as of 11/21/18 11:02 AM.  Always use your most recent med list.                   Brand Name Dispense Instructions for use Diagnosis    acetaminophen 500 MG tablet    TYLENOL     Take 500 mg by mouth        allopurinol 100 MG tablet    ZYLOPRIM     Take 100 mg by mouth        * amitriptyline 10 MG tablet    ELAVIL     Take 10 mg by mouth        * amitriptyline 25 MG tablet    ELAVIL     Take 25 mg by mouth At Bedtime        atorvastatin 40 MG tablet    LIPITOR    90 tablet    TAKE 1 TABLET(40 MG) BY MOUTH DAILY    Hypertriglyceridemia       B-12 1000 MCG Tbcr     100 tablet    Take 1,000 mcg by mouth daily    Vitamin B12 deficiency (non anemic)       carvedilol 6.25 MG tablet    COREG    270 tablet    12.5 mg in am and 6.25 mg in pm    Benign essential hypertension       cetirizine 10 MG tablet    zyrTEC     Take 10 mg by mouth        * clobetasol 0.05 % external solution    TEMOVATE     Apply topically 2 times daily as needed        * clobetasol 0.05 % cream    TEMOVATE    15 g    Apply sparingly to affected area twice daily for 14 days.  Do not apply to face.    Rash, Adverse effect of vaccine, initial encounter, Allergic reaction, initial encounter       Cranberry  Powd      1 capsule        EPINEPHrine 0.3 MG/0.3ML injection 2-pack    EPIPEN 2-FRAN    0.6 mL    Inject 0.3 mLs (0.3 mg) into the muscle once as needed for anaphylaxis    Allergic reaction, subsequent encounter       hydrOXYzine 25 MG tablet    ATARAX    120 tablet    Take 1 tablet (25 mg) by mouth 3 times daily as needed for itching    Pruritic disorder       ketoconazole 2 % shampoo    NIZORAL    120 mL    Apply to the affected area and wash off after 5 minutes.    Seborrheic dermatitis       losartan 100 MG tablet    COZAAR    90 tablet    Take 1 tablet (100 mg) by mouth At Bedtime    Benign essential hypertension       magic mouthwash suspension (diphenhydrAMINE, lidocaine, aluminum-magnesium & simethicone) Susp compounding kit     237 mL    Swish and swallow 5-10 mLs in mouth every 6 hours as needed for mouth sores    Canker sores oral       Magnesium Oxide 250 MG Tabs      Take 250 mg by mouth        predniSONE 20 MG tablet    DELTASONE    20 tablet    Take 3 tabs (60 mg) by mouth daily x 3 days, 2 tabs (40 mg) daily x 3 days, 1 tab (20 mg) daily x 3 days, then 1/2 tab (10 mg) x 3 days.    Allergic reaction, initial encounter, Rash, Adverse effect of vaccine, initial encounter       SUMAtriptan 20 MG/ACT nasal spray    IMITREX    12 each    Spray 1 spray in nostril as needed for migraine May repeat in 2 hours. Max 2 sprays/24 hours.    Intractable migraine without aura and without status migrainosus       torsemide 10 MG tablet    DEMADEX    90 tablet    Take 1 tablet (10 mg) by mouth daily    Edema leg       traMADol 50 MG tablet    ULTRAM     Take 1 tablet by mouth every 6 hours as needed        triamcinolone 0.1 % cream    KENALOG    454 g    Apply topically 2 times daily Apply to AA on trunk, arms or legs bid for 10-14 days    Dermatitis       vitamin D3 2000 units tablet    CHOLECALCIFEROL     Take 1 tablet by mouth daily        ZOFRAN 4 MG tablet   Generic drug:  ondansetron      Take by mouth every 8  hours as needed for nausea        ZOLMitriptan 5 MG ODT tab    ZOMIG-ZMT     Take 5 mg by mouth        * Notice:  This list has 4 medication(s) that are the same as other medications prescribed for you. Read the directions carefully, and ask your doctor or other care provider to review them with you.

## 2018-11-21 NOTE — PROGRESS NOTES
SUBJECTIVE:   Rosa Sotomayor is a 67 year old female who presents to clinic today for the following health issues:      Chief Complaint   Patient presents with     Follow Up For     Dermatitis,  Canker sores oral, Headache      patient presents for follow up of recent new allergic reaction with diffuse erythematous, itchy skin rash, likely due to allergic reaction to the recent Shingles vaccine. The rash symptoms have significantly improved with prednisone and clobetasol medications. Patient's recent canker sore symptoms also have resolved.      Current Medications:     Current Outpatient Prescriptions   Medication Sig Dispense Refill     acetaminophen (TYLENOL) 500 MG tablet Take 500 mg by mouth       allopurinol (ZYLOPRIM) 100 MG tablet Take 100 mg by mouth       amitriptyline (ELAVIL) 10 MG tablet Take 10 mg by mouth       amitriptyline (ELAVIL) 25 MG tablet Take 25 mg by mouth At Bedtime  0     atorvastatin (LIPITOR) 40 MG tablet TAKE 1 TABLET(40 MG) BY MOUTH DAILY 90 tablet 3     carvedilol (COREG) 6.25 MG tablet 12.5 mg in am and 6.25 mg in pm 270 tablet 0     cetirizine (ZYRTEC) 10 MG tablet Take 10 mg by mouth       Cholecalciferol (VITAMIN D) 2000 UNITS tablet Take 1 tablet by mouth daily       clobetasol (TEMOVATE) 0.05 % cream Apply sparingly to affected area twice daily for 14 days.  Do not apply to face. 15 g 2     clobetasol (TEMOVATE) 0.05 % external solution Apply topically 2 times daily as needed  5     Cranberry POWD 1 capsule       Cyanocobalamin (B-12) 1000 MCG TBCR Take 1,000 mcg by mouth daily 100 tablet 1     EPINEPHrine (EPIPEN 2-FRAN) 0.3 MG/0.3ML injection 2-pack Inject 0.3 mLs (0.3 mg) into the muscle once as needed for anaphylaxis 0.6 mL 1     hydrOXYzine (ATARAX) 25 MG tablet Take 1 tablet (25 mg) by mouth 3 times daily as needed for itching 120 tablet 1     ketoconazole (NIZORAL) 2 % shampoo Apply to the affected area and wash off after 5 minutes. 120 mL 1     losartan (COZAAR) 100 MG  tablet Take 1 tablet (100 mg) by mouth At Bedtime (Patient taking differently: Take 50 mg by mouth 2 times daily ) 90 tablet 1     magic mouthwash (FIRST-MOUTHWASH BLM) suspension Swish and swallow 5-10 mLs in mouth every 6 hours as needed for mouth sores 237 mL 3     Magnesium Oxide 250 MG TABS Take 250 mg by mouth       ondansetron (ZOFRAN) 4 MG tablet Take by mouth every 8 hours as needed for nausea       predniSONE (DELTASONE) 20 MG tablet Take 3 tabs (60 mg) by mouth daily x 3 days, 2 tabs (40 mg) daily x 3 days, 1 tab (20 mg) daily x 3 days, then 1/2 tab (10 mg) x 3 days. 20 tablet 0     SUMAtriptan (IMITREX) 20 MG/ACT nasal spray Spray 1 spray in nostril as needed for migraine May repeat in 2 hours. Max 2 sprays/24 hours. 12 each 11     torsemide (DEMADEX) 10 MG tablet Take 1 tablet (10 mg) by mouth daily 90 tablet 3     traMADol (ULTRAM) 50 MG tablet Take 1 tablet by mouth every 6 hours as needed  0     triamcinolone (KENALOG) 0.1 % cream Apply topically 2 times daily Apply to AA on trunk, arms or legs bid for 10-14 days 454 g 0     ZOLMitriptan (ZOMIG-ZMT) 5 MG ODT tab Take 5 mg by mouth           Allergies:      Allergies   Allergen Reactions     Fentanyl Anaphylaxis     Midazolam Anaphylaxis     Propofol Anaphylaxis     Shingrix [Zoster Vac Recomb Adjuvanted] Itching and Rash     Codeine      Erythromycin      ointment     Gabapentin      chest heaviness with breathing     Hydromorphone Itching     Tolerates morphine     Lorazepam Itching     Other [Seasonal Allergies] Anaphylaxis     GENERAL ANETHESIA       Penicillins      rash     Eucalyptus Oil Rash     hives     Nitrofuran Derivatives Rash     Sulfa Drugs Itching and Rash     rash            Past Medical History:     Past Medical History:   Diagnosis Date     Aortic valve insufficiency 4/19/2018     Chronic kidney disease      Dizziness and giddiness          Past Surgical History:     Past Surgical History:   Procedure Laterality Date     C  NONSPECIFIC PROCEDURE      wisdom teeth     C NONSPECIFIC PROCEDURE  2005    Varicose Veins     C TOTAL HIP ARTHROPLASTY      left     C TOTAL KNEE ARTHROPLASTY      right     TUBAL LIGATION  1987         Family Medical History:     Family History   Problem Relation Age of Onset     Diabetes Mother      INSULIN     Hypertension Mother      Eye Disorder Mother      CATERACTS     HEART DISEASE Mother      CHF- OPEN HEART SURG X'S 2     Lipids Mother      Obesity Mother      Coronary Artery Disease Mother      Diabetes Father      ORAL     Hypertension Father      Arthritis Father      Eye Disorder Father      MAC DEGENERATION     HEART DISEASE Father      CHF     Lipids Father      Obesity Father      Diabetes Maternal Grandfather      INSULIN     Diabetes Brother      INSULIN     GASTROINTESTINAL DISEASE Brother      CHOLITISI POSSIBLE CHRONES     Obesity Brother      Colon Cancer No family hx of      Breast Cancer No family hx of          Social History:     Social History     Social History     Marital status:      Spouse name: N/A     Number of children: N/A     Years of education: N/A     Occupational History     Not on file.     Social History Main Topics     Smoking status: Never Smoker     Smokeless tobacco: Never Used     Alcohol use No     Drug use: No     Sexual activity: Yes     Partners: Male      Comment: postmeno     Other Topics Concern     Not on file     Social History Narrative           Review of System:     Constitutional: Negative for fever or chills  Skin: Positive for significant improvement in recent rashes  Ears/Nose/Throat: Negative for nasal congestion, sore throat  Respiratory: No shortness of breath, dyspnea on exertion, cough, or hemoptysis  Cardiovascular: Negative for chest pain  Gastrointestinal: Negative for nausea, vomiting  Genitourinary: Negative for dysuria, hematuria  Musculoskeletal: Negative for myalgias  Neurologic: Negative for headaches  Psychiatric: Negative for  "depression, anxiety  Hematologic/Lymphatic/Immunologic: Negative  Endocrine: Negative  Behavioral: Negative for tobacco use       Physical Exam:   /69 (BP Location: Right arm, Cuff Size: Adult Large)  Pulse 84  Temp 97.6  F (36.4  C) (Oral)  Ht 5' 4\" (1.626 m)  Wt 184 lb (83.5 kg)  SpO2 97%  BMI 31.58 kg/m2    GENERAL: alert and no distress  EYES: eyes grossly normal to inspection, and conjunctivae and sclerae normal  HEENT: Normocephalic atraumatic. Canker sores resolved since last clinic visit.  NECK: supple  RESP: lungs clear to auscultation   CV: regular rate and rhythm, normal S1 S2  LYMPH: no peripheral edema   ABDOMEN: nondistended  MS: no gross musculoskeletal defects noted  SKIN: significant improvement noted in recent rash symptoms   NEURO: Alert & Oriented x 3.   PSYCH: mentation appears normal, affect normal        Diagnostic Test Results:     Diagnostic Test Results:  Results for orders placed or performed in visit on 10/08/18   *UA reflex to Microscopic and Culture ([place] and [facility] (except [place] and [place])   Result Value Ref Range    Color Urine Yellow     Appearance Urine Clear     Glucose Urine Negative NEG^Negative mg/dL    Bilirubin Urine Negative NEG^Negative    Ketones Urine Negative NEG^Negative mg/dL    Specific Gravity Urine 1.020 1.003 - 1.035    Blood Urine Negative NEG^Negative    pH Urine 6.0 5.0 - 7.0 pH    Protein Albumin Urine Negative NEG^Negative mg/dL    Urobilinogen Urine 0.2 0.2 - 1.0 EU/dL    Nitrite Urine Negative NEG^Negative    Leukocyte Esterase Urine Negative NEG^Negative    Source Urine        ASSESSMENT/PLAN:   (R21) Rash  (primary encounter diagnosis)  (K12.0) Canker sore  (T50.Z95S) Adverse effect of vaccine, sequela  Comment: patient presents for follow up of recent new allergic reaction with diffuse erythematous, itchy skin rash, likely due to allergic reaction to the recent Shingles vaccine. The rash symptoms have significantly improved " with prednisone and clobetasol medications. Patient's recent canker sore symptoms also have resolved.  Plan: continue predniSONE (DELTASONE) 20 MG tablet, clobetasol (TEMOVATE) 0.05 % cream medications for rash treatment going forward.      Follow Up Plan:     Patient is instructed to return to Internal Medicine clinic for follow-up visit in 1 month.        Koki Meadows MD  Internal Medicine  [facility]

## 2018-11-27 ENCOUNTER — OFFICE VISIT (OUTPATIENT)
Dept: SLEEP MEDICINE | Facility: CLINIC | Age: 68
End: 2018-11-27
Payer: COMMERCIAL

## 2018-11-27 VITALS
HEIGHT: 64 IN | SYSTOLIC BLOOD PRESSURE: 142 MMHG | TEMPERATURE: 97.7 F | WEIGHT: 188.4 LBS | DIASTOLIC BLOOD PRESSURE: 78 MMHG | RESPIRATION RATE: 16 BRPM | OXYGEN SATURATION: 99 % | HEART RATE: 85 BPM | BODY MASS INDEX: 32.17 KG/M2

## 2018-11-27 DIAGNOSIS — I10 BENIGN ESSENTIAL HYPERTENSION: ICD-10-CM

## 2018-11-27 DIAGNOSIS — Q07.00 ARNOLD-CHIARI MALFORMATION (H): ICD-10-CM

## 2018-11-27 DIAGNOSIS — G47.00 INSOMNIA, UNSPECIFIED TYPE: ICD-10-CM

## 2018-11-27 DIAGNOSIS — G47.8 NON-RESTORATIVE SLEEP: ICD-10-CM

## 2018-11-27 DIAGNOSIS — R06.83 SNORING: Primary | ICD-10-CM

## 2018-11-27 PROCEDURE — 99204 OFFICE O/P NEW MOD 45 MIN: CPT | Performed by: PHYSICIAN ASSISTANT

## 2018-11-27 NOTE — PATIENT INSTRUCTIONS
"MY TREATMENT INFORMATION FOR SLEEP APNEA-  Rosa Sotomayor    DOCTOR : Bennett Ezra Goltz, PA-C  SLEEP CENTER : [facility]     MY CONTACT NUMBER: [phone]    Am I having a sleep study at a sleep center?  Make sure you have an appointment for the study before you leave!    Am I having a home sleep study?  Watch this video:  https://www.[employer].com/watch?v=CteI_GhyP9g&list=[email or web address]  Please verify your insurance coverage with your insurance carrier    Frequently asked questions:  1. What is Obstructive Sleep Apnea (ESTEFANIA)? ESTEFANIA is the most common type of sleep apnea. Apnea means, \"without breath.\"  Apnea is most often caused by narrowing or collapse of the upper airway as muscles relax during sleep.   Almost everyone has occasional apneas. Most people with sleep apnea have had brief interruptions at night frequently for many years.  The severity of sleep apnea is related to how frequent and severe the events are.   2. What are the consequences of ESTEFANIA? Symptoms include: feeling sleepy during the day, snoring loudly, gasping or stopping of breathing, trouble sleeping, and occasionally morning headaches or heartburn at night.  Sleepiness can be serious and even increase the risk of falling asleep while driving. Other health consequences may include development of high blood pressure and other cardiovascular disease in persons who are susceptible. Untreated ESTEFANIA can contribute to heart disease, stroke and diabetes.   3. What are the treatment options? In most situations, sleep apnea is a lifelong disease that must be managed with daily therapy. Medications are not effective for sleep apnea and surgery is generally not considered until other therapies have been tried. Your treatment is your choice . Continuous Positive Airway (CPAP) works right away and is the therapy that is effective in nearly everyone. An oral device to hold your jaw forward is usually the next most reliable option. Other options " include postioning devices (to keep you off your back), weight loss, and surgery including a tongue pacing device. There is more detail about some of these options below.    Important tips for using CPAP and similar devices   Know your equipment:  CPAP is continuous positive airway pressure that prevents obstructive sleep apnea by keeping the throat from collapsing while you are sleeping. In most cases, the device is  smart  and can slowly self-adjusts if your throat collapses and keeps a record every day of how well you are treated-this information is available to you and your care team.  BPAP is bilevel positive airway pressure that keeps your throat open and also assists each breath with a pressure boost to maintain adequate breathing.  Special kinds of BPAP are used in patients who have inadequate breathing from lung or heart disease. In most cases, the device is  smart  and can slowly self-adjusts to assist breathing. Like CPAP, the device keeps a record of how well you are treated.  Your mask is your connection to the device. You get to choose what feels most comfortable and the staff will help to make sure if fits. Here: are some examples of the different masks that are available:       Key points to remember on your journey with sleep apnea:  1. Sleep study.  PAP devices often need to be adjusted during a sleep study to show that they are effective and adjusted right.  2. Good tips to remember: Try wearing just the mask during a quiet time during the day so your body adapts to wearing it. A humidifier is recommended for comfort in most cases to prevent drying of your nose and throat. Allergy medication from your provider may help you if you are having nasal congestion.  3. Getting settled-in. It takes more than one night for most of us to get used to wearing a mask. Try wearing just the mask during a quiet time during the day so your body adapts to wearing it. A humidifier is recommended for comfort in most  cases. Our team will work with you carefully on the first day and will be in contact within 4 days and again at 2 and 4 weeks for advice and remote device adjustments. Your therapy is evaluated by the device each day.   4. Use it every night. The more you are able to sleep naturally for 7-8 hours, the more likely you will have good sleep and to prevent health risks or symptoms from sleep apnea. Even if you use it 4 hours it helps. Occasionally all of us are unable to use a medical therapy, in sleep apnea, it is not dangerous to miss one night.   5. Communicate. Call our skilled team on the number provided on the first day if your visit for problems that make it difficult to wear the device. Over 2 out of 3 patients can learn to wear the device long-term with help from our team. Remember to call our team or your sleep providers if you are unable to wear the device as we may have other solutions for those who cannot adapt to mask CPAP therapy. It is recommended that you sleep your sleep provider within the first 3 months and yearly after that if you are not having problems.   Take care of your equipment. Make sure you clean your mask and tubing using directions every day and that your filter and mask are replaced as recommended or if they are not working.     BESIDES CPAP, WHAT OTHER THERAPIES ARE THERE?    Positioning Device  Positioning devices are generally used when sleep apnea is mild and only occurs on your back.This example shows a pillow that straps around the waist. It may be appropriate for those whose sleep study shows milder sleep apnea that occurs primarily when lying flat on one's back. Preliminary studies have shown benefit but effectiveness at home may need to be verified by a home sleep test. These devices are generally not covered by medical insurance.  Examples of devices that maintain sleeping on the back to prevent snoring and mild sleep apnea.    Belt type body  positioner  Http://[employer]Gastrofy/    Electronic reminder  Http://nightshifttherapy.com/  Http://[email or web address].au/    Oral Appliance  What is oral appliance therapy?  An oral appliance device fits on your teeth at night like a retainer used after having braces. The device is made by a specialized dentist and requires several visits over 1-2 months before a manufactured device is made to fit your teeth and is adjusted to prevent your sleep apnea. Once an oral device is working properly, snoring should be improved. A home sleep test may be recommended at that time if to determine whether the sleep apnea is adequately treated.       Some things to remember:  -Oral devices are often, but not always, covered by your medical insurance. Be sure to check with your insurance provider.   -If you are referred for oral therapy, you will be given a list of specialized dentists to consider or you may choose to visit the Web site of the American Academy of Dental Sleep Medicine  -Oral devices are less likely to work if you have severe sleep apnea or are extremely overweight.     More detailed information  An oral appliance is a small acrylic device that fits over the upper and lower teeth  (similar to a retainer or a mouth guard). This device slightly moves jaw forward, which moves the base of the tongue forward, opens the airway, improves breathing for effective treat snoring and obstructive sleep apnea in perhaps 7 out of 10 people .  The best working devices are custom-made by a dental device  after a mold is made of the teeth 1, 2, 3.  When is an oral appliance indicated?  Oral appliance therapy is recommended as a first-line treatment for patients with primary snoring, mild sleep apnea, and for patients with moderate sleep apnea who prefer appliance therapy to use of CPAP4, 5. Severity of sleep apnea is determined by sleep testing and is based on the number of respiratory events per hour of sleep.   How successful  is oral appliance therapy?  The success rate of oral appliance therapy in patients with mild sleep apnea is 75-80% while in patients with moderate sleep apnea it is 50-70%. The chance of success in patients with severe sleep apnea is 40-50%. The research also shows that oral appliances have a beneficial effect on the cardiovascular health of ESTEFANIA patients at the same magnitude as CPAP therapy7.  Oral appliances should be a second-line treatment in cases of severe sleep apnea, but if not completely successful then a combination therapy utilizing CPAP plus oral appliance therapy may be effective. Oral appliances tend to be effective in a broad range of patients although studies show that the patients who have the highest success are females, younger patients, those with milder disease, and less severe obesity. 3, 6.   Finding a dentist that practices dental sleep medicine  Specific training is available through the American Academy of Dental Sleep Medicine for dentists interested in working in the field of sleep. To find a dentist who is educated in the field of sleep and the use of oral appliances, near you, visit the Web site of the American Academy of Dental Sleep Medicine.    References  1. Felicia et al. Objectively measured vs self-reported compliance during oral appliance therapy for sleep-disordered breathing. Chest 2013; 144(5): 6133-5092.  2. Yen et al. Objective measurement of compliance during oral appliance therapy for sleep-disordered breathing. Thorax 2013; 68(1): 91-96.  3. Estrella, et al. Mandibular advancement devices in 620 men and women with ESTEFANIA and snoring: tolerability and predictors of treatment success. Chest 2004; 125: 3350-8151.  4. Jacky, et al. Oral appliances for snoring and ESTEFANIA: a review. Sleep 2006; 29: 244-262.  5. Devante, et al. Oral appliance treatment for ESTEFANIA: an update. J Clin Sleep Med 2014; 10(2): 215-227.  6. Dewayne et al. Predictors of OSAH treatment  outcome. J [place] Res 2007; 86: 4929-3259.    Weight Loss:    Weight loss is a long-term strategy that may improve sleep apnea in some patients.    Weight management is a personal decision and the decision should be based on your interest and the potential benefits.  If you are interested in exploring weight loss strategies, the following discussion covers the impact on weight loss on sleep apnea and the approaches that may be successful.    Being overweight does not necessarily mean you will have health consequences.  Those who have BMI over 35 or over 27 with existing medical conditions carries greater risk.   Weight loss decreases severity of sleep apnea in most people with obesity. For those with mild obesity who have developed snoring with weight gain, even 15-30 pound weight loss can improve and occasionally eliminate sleep apnea.  Structured and life-long dietary and health habits are necessary to lose weight and keep healthier weight levels.     Though there may be significant health benefits from weight loss, long-term weight loss is very difficult to achieve- studies show success with dietary management in less than 10% of people. In addition, substantial weight loss may require years of dietary control and may be difficult if patients have severe obesity. In these cases, surgical management may be considered.  Finally, older individuals who have tolerated obesity without health complications may be less likely to benefit from weight loss strategies.      Your BMI is Body mass index is 32.34 kg/(m^2).  Weight management is a personal decision.  If you are interested in exploring weight loss strategies, the following discussion covers the approaches that may be successful. Body mass index (BMI) is one way to tell whether you are at a healthy weight, overweight, or obese. It measures your weight in relation to your height.  A BMI of 18.5 to 24.9 is in the healthy range. A person with a BMI of 25 to 29.9 is  considered overweight, and someone with a BMI of 30 or greater is considered obese. More than two-thirds of American adults are considered overweight or obese.  Being overweight or obese increases the risk for further weight gain. Excess weight may lead to heart disease and diabetes.  Creating and following plans for healthy eating and physical activity may help you improve your health.  Weight control is part of healthy lifestyle and includes exercise, emotional health, and healthy eating habits. Careful eating habits lifelong are the mainstay of weight control. Though there are significant health benefits from weight loss, long-term weight loss with diet alone may be very difficult to achieve- studies show long-term success with dietary management in less than 10% of people. Attaining a healthy weight may be especially difficult to achieve in those with severe obesity. In some cases, medications, devices and surgical management might be considered.  What can you do?  If you are overweight or obese and are interested in methods for weight loss, you should discuss this with your provider.     Consider reducing daily calorie intake by 500 calories.     Keep a food journal.     Avoiding skipping meals, consider cutting portions instead.    Diet combined with exercise helps maintain muscle while optimizing fat loss. Strength training is particularly important for building and maintaining muscle mass. Exercise helps reduce stress, increase energy, and improves fitness. Increasing exercise without diet control, however, may not burn enough calories to loose weight.       Start walking three days a week 10-20 minutes at a time    Work towards walking thirty minutes five days a week     Eventually, increase the speed of your walking for 1-2 minutes at time    In addition, we recommend that you review healthy lifestyles and methods for weight loss available through the National Institutes of Health patient information  sites:  http://win.niddk.nih.gov/publications/index.htm    And look into health and wellness programs that may be available through your health insurance provider, employer, local community center, or elaine club.    Weight management plan: Patient was referred to their PCP to discuss a diet and exercise plan.    Surgery:  Surgery for obstructive sleep apnea is considered generally only when other therapies fail to work. Surgery may be discussed with you if you are having a difficult time tolerating CPAP and or when there is an abnormal structure that requires surgical correction.  Nose and throat surgeries often enlarge the airway to prevent collapse.  Most of these surgeries create pain for 1-2 weeks and up to half of the most common surgeries are not effective throughout life.  You should carefully discuss the benefits and drawbacks to surgery with your sleep provider and surgeon to determine if it is the best solution for you.   More information  Surgery for ESTEFANIA is directed at areas that are responsible for narrowing or complete obstruction of the airway during sleep.  There are a wide range of procedures available to enlarge and/or stabilize the airway to prevent blockage of breathing in the three major areas where it can occur: the palate, tongue, and nasal regions.  Successful surgical treatment depends on the accurate identification of the factors responsible for obstructive sleep apnea in each person.  A personalized approach is required because there is no single treatment that works well for everyone.  Because of anatomic variation, consultation with an examination by a sleep surgeon is a critical first step in determining what surgical options are best for each patient.  In some cases, examination during sedation may be recommended in order to guide the selection of procedures.  Patients will be counseled about risks and benefits as well as the typical recovery course after surgery. Surgery is typically  not a cure for a person s ESTEFANIA.  However, surgery will often significantly improve one s ESTEFANIA severity (termed  success rate ).  Even in the absence of a cure, surgery will decrease the cardiovascular risk associated with OSA7; improve overall quality of life8 (sleepiness, functionality, sleep quality, etc).  Palate Procedures:  Patients with ESTEFANIA often have narrowing of their airway in the region of their tonsils and uvula.  The goals of palate procedures are to widen the airway in this region as well as to help the tissues resist collapse.  Modern palate procedure techniques focus on tissue conservation and soft tissue rearrangement, rather than tissue removal.  Often the uvula is preserved in this procedure. Residual sleep apnea is common in patient after pharyngoplasty with an average reduction in sleep apnea events of 33%2.    Tongue Procedures:  ExamWhile patients are awake, the muscles that surround the throat are active and keep this region open for breathing. These muscles relax during sleep, allowing the tongue and other structures to collapse and block breathing.  There are several different tongue procedures available.  Selection of a tongue base procedure depends on characteristics seen on physical exam.  Generally, procedures are aimed at removing bulky tissues in this area or preventing the back of the tongue from falling back during sleep.  Success rates for tongue surgery range from 50-62%3.  Hypoglossal Nerve Stimulation:  Hypoglossal nerve stimulation has recently received approval from the [place] Food and Drug Administration for the treatment of obstructive sleep apnea.  This is based on research showing that the system was safe and effective in treating sleep apnea6.  Results showed that the median AHI score decreased 68%, from 29.3 to 9.0. This therapy uses an implant system that senses breathing patterns and delivers mild stimulation to airway muscles, which keeps the airway open during  sleep.  The system consists of three fully implanted components: a small generator (similar in size to a pacemaker), a breathing sensor, and a stimulation lead.  Using a small handheld remote, a patient turns the therapy on before bed and off upon awakening.    Candidates for this device must be greater than 22 years of age, have moderate to severe ESTEFANIA (AHI between 20-65), BMI less than 32, have tried CPAP/oral appliance without success, and have appropriate upper airway anatomy (determined by a sleep endoscopy performed by Dr. Bobby).  Hypoglossal Nerve Stimulation Pathway:    The sleep surgeon s office will work with the patient through the insurance prior-authorization process (including communications and appeals).    Nasal Procedures:  Nasal obstruction can interfere with nasal breathing during the day and night.  Studies have shown that relief of nasal obstruction can improve the ability of some patients to tolerate positive airway pressure therapy for obstructive sleep apnea1.  Treatment options include medications such as nasal saline, topical corticosteroid and antihistamine sprays, and oral medications such as antihistamines or decongestants. Non-surgical treatments can include external nasal dilators for selected patients. If these are not successful by themselves, surgery can improve the nasal airway either alone or in combination with these other options.  Combination Procedures:  Combination of surgical procedures and other treatments may be recommended, particularly if patients have more than one area of narrowing or persistent positional disease.  The success rate of combination surgery ranges from 66-80%2,3.    References  1. Omkar SORIA. The Role of the Nose in Snoring and Obstructive Sleep Apnoea: An Update.  Eur Arch Otorhinolaryngol. 2011; 268: 1365-73.  2.  Landry SM; Jacy JA; Gume JR; Pallanch JF; Sarah ODEN; Mayur MURILLO; Antoine REYES. Surgical modifications of the upper airway for obstructive  sleep apnea in adults: a systematic review and meta-analysis. SLEEP 2010;33(10):1657-8314. Nikki CARDENAS. Hypopharyngeal surgery in obstructive sleep apnea: an evidence-based medicine review.  Arch Otolaryngol Head Neck Surg. 2006 Feb;132(2):206-13.  3. Sonny YH1, Jamel Y, Kirby ADRY. The efficacy of anatomically based multilevel surgery for obstructive sleep apnea. Otolaryngol Head Neck Surg. 2003 Oct;129(4):327-35.  4. Kezirian E, Goldberg A. Hypopharyngeal Surgery in Obstructive Sleep Apnea: An Evidence-Based Medicine Review. Arch Otolaryngol Head Neck Surg. 2006 Feb;132(2):206-13.  5. Sudhir MANCERA et al. Upper-Airway Stimulation for Obstructive Sleep Apnea.  N Engl J Med. 2014 Jan 9;370(2):139-49.  6. Thelma Y et al. Increased Incidence of Cardiovascular Disease in Middle-aged Men with Obstructive Sleep Apnea. Am J Respir Crit Care Med; 2002 166: 159-165  7. Gabriel GEORGE et al. Studying Life Effects and Effectiveness of Palatopharyngoplasty (SLEEP) study: Subjective Outcomes of Isolated Uvulopalatopharyngoplasty. Otolaryngol Head Neck Surg. 2011; 144: 623-631.

## 2018-11-27 NOTE — MR AVS SNAPSHOT
"              After Visit Summary   11/27/2018    Rosa Sotomayor    MRN: [id number]           Patient Information     Date Of Birth          1950        Visit Information        Provider Department      11/27/2018 2:00 PM Goltz, Bennett Ezra, PA-C [facility]        Today's Diagnoses     Snoring    -  1    Non-restorative sleep        Insomnia, unspecified type        Arnold-Chiari malformation (H)        Benign essential hypertension          Care Instructions    MY TREATMENT INFORMATION FOR SLEEP APNEA-  Rosa Sotomayor    DOCTOR : Bennett Ezra Goltz, PA-C  SLEEP CENTER : [facility]     MY CONTACT NUMBER: [phone]    Am I having a sleep study at a sleep center?  Make sure you have an appointment for the study before you leave!    Am I having a home sleep study?  Watch this video:  [email or web address]/watch?[email or web address]  Please verify your insurance coverage with your insurance carrier    Frequently asked questions:  1. What is Obstructive Sleep Apnea (ESTEFANIA)? ESTEFANIA is the most common type of sleep apnea. Apnea means, \"without breath.\"  Apnea is most often caused by narrowing or collapse of the upper airway as muscles relax during sleep.   Almost everyone has occasional apneas. Most people with sleep apnea have had brief interruptions at night frequently for many years.  The severity of sleep apnea is related to how frequent and severe the events are.   2. What are the consequences of ESTEFANIA? Symptoms include: feeling sleepy during the day, snoring loudly, gasping or stopping of breathing, trouble sleeping, and occasionally morning headaches or heartburn at night.  Sleepiness can be serious and even increase the risk of falling asleep while driving. Other health consequences may include development of high blood pressure and other cardiovascular disease in persons who are susceptible. Untreated ESTEFANIA can contribute to heart disease, stroke and diabetes.   3. " What are the treatment options? In most situations, sleep apnea is a lifelong disease that must be managed with daily therapy. Medications are not effective for sleep apnea and surgery is generally not considered until other therapies have been tried. Your treatment is your choice . Continuous Positive Airway (CPAP) works right away and is the therapy that is effective in nearly everyone. An oral device to hold your jaw forward is usually the next most reliable option. Other options include postioning devices (to keep you off your back), weight loss, and surgery including a tongue pacing device. There is more detail about some of these options below.    Important tips for using CPAP and similar devices   Know your equipment:  CPAP is continuous positive airway pressure that prevents obstructive sleep apnea by keeping the throat from collapsing while you are sleeping. In most cases, the device is  smart  and can slowly self-adjusts if your throat collapses and keeps a record every day of how well you are treated-this information is available to you and your care team.  BPAP is bilevel positive airway pressure that keeps your throat open and also assists each breath with a pressure boost to maintain adequate breathing.  Special kinds of BPAP are used in patients who have inadequate breathing from lung or heart disease. In most cases, the device is  smart  and can slowly self-adjusts to assist breathing. Like CPAP, the device keeps a record of how well you are treated.  Your mask is your connection to the device. You get to choose what feels most comfortable and the staff will help to make sure if fits. Here: are some examples of the different masks that are available:       Key points to remember on your journey with sleep apnea:  1. Sleep study.  PAP devices often need to be adjusted during a sleep study to show that they are effective and adjusted right.  2. Good tips to remember: Try wearing just the mask during a  quiet time during the day so your body adapts to wearing it. A humidifier is recommended for comfort in most cases to prevent drying of your nose and throat. Allergy medication from your provider may help you if you are having nasal congestion.  3. Getting settled-in. It takes more than one night for most of us to get used to wearing a mask. Try wearing just the mask during a quiet time during the day so your body adapts to wearing it. A humidifier is recommended for comfort in most cases. Our team will work with you carefully on the first day and will be in contact within 4 days and again at 2 and 4 weeks for advice and remote device adjustments. Your therapy is evaluated by the device each day.   4. Use it every night. The more you are able to sleep naturally for 7-8 hours, the more likely you will have good sleep and to prevent health risks or symptoms from sleep apnea. Even if you use it 4 hours it helps. Occasionally all of us are unable to use a medical therapy, in sleep apnea, it is not dangerous to miss one night.   5. Communicate. Call our skilled team on the number provided on the first day if your visit for problems that make it difficult to wear the device. Over 2 out of 3 patients can learn to wear the device long-term with help from our team. Remember to call our team or your sleep providers if you are unable to wear the device as we may have other solutions for those who cannot adapt to mask CPAP therapy. It is recommended that you sleep your sleep provider within the first 3 months and yearly after that if you are not having problems.   Take care of your equipment. Make sure you clean your mask and tubing using directions every day and that your filter and mask are replaced as recommended or if they are not working.     BESIDES CPAP, WHAT OTHER THERAPIES ARE THERE?    Positioning Device  Positioning devices are generally used when sleep apnea is mild and only occurs on your back.This example shows a  pillow that straps around the waist. It may be appropriate for those whose sleep study shows milder sleep apnea that occurs primarily when lying flat on one's back. Preliminary studies have shown benefit but effectiveness at home may need to be verified by a home sleep test. These devices are generally not covered by medical insurance.  Examples of devices that maintain sleeping on the back to prevent snoring and mild sleep apnea.    Belt type body positioner  Http://Vaurum[email or web address]    Electronic reminder  Http://nightshifttherapy.com/  [email or web address]    Oral Appliance  What is oral appliance therapy?  An oral appliance device fits on your teeth at night like a retainer used after having braces. The device is made by a specialized dentist and requires several visits over 1-2 months before a manufactured device is made to fit your teeth and is adjusted to prevent your sleep apnea. Once an oral device is working properly, snoring should be improved. A home sleep test may be recommended at that time if to determine whether the sleep apnea is adequately treated.       Some things to remember:  -Oral devices are often, but not always, covered by your medical insurance. Be sure to check with your insurance provider.   -If you are referred for oral therapy, you will be given a list of specialized dentists to consider or you may choose to visit the Web site of the American Academy of Dental Sleep Medicine  -Oral devices are less likely to work if you have severe sleep apnea or are extremely overweight.     More detailed information  An oral appliance is a small acrylic device that fits over the upper and lower teeth  (similar to a retainer or a mouth guard). This device slightly moves jaw forward, which moves the base of the tongue forward, opens the airway, improves breathing for effective treat snoring and obstructive sleep apnea in perhaps 7 out of 10 people .  The best working devices are custom-made by a  dental device  after a mold is made of the teeth 1, 2, 3.  When is an oral appliance indicated?  Oral appliance therapy is recommended as a first-line treatment for patients with primary snoring, mild sleep apnea, and for patients with moderate sleep apnea who prefer appliance therapy to use of CPAP4, 5. Severity of sleep apnea is determined by sleep testing and is based on the number of respiratory events per hour of sleep.   How successful is oral appliance therapy?  The success rate of oral appliance therapy in patients with mild sleep apnea is 75-80% while in patients with moderate sleep apnea it is 50-70%. The chance of success in patients with severe sleep apnea is 40-50%. The research also shows that oral appliances have a beneficial effect on the cardiovascular health of ESTEFANIA patients at the same magnitude as CPAP therapy7.  Oral appliances should be a second-line treatment in cases of severe sleep apnea, but if not completely successful then a combination therapy utilizing CPAP plus oral appliance therapy may be effective. Oral appliances tend to be effective in a broad range of patients although studies show that the patients who have the highest success are females, younger patients, those with milder disease, and less severe obesity. 3, 6.   Finding a dentist that practices dental sleep medicine  Specific training is available through the American Academy of Dental Sleep Medicine for dentists interested in working in the field of sleep. To find a dentist who is educated in the field of sleep and the use of oral appliances, near you, visit the Web site of the American Academy of Dental Sleep Medicine.    References  1. Felicia et al. Objectively measured vs self-reported compliance during oral appliance therapy for sleep-disordered breathing. Chest 2013; 144(5): 8925-5873.  2. Yen et al. Objective measurement of compliance during oral appliance therapy for sleep-disordered  breathing. Thorax 2013; 68(1): 91-96.  3. Estrella et al. Mandibular advancement devices in 620 men and women with ESTEFANIA and snoring: tolerability and predictors of treatment success. Chest 2004; 125: 9181-6058.  4. Jacky et al. Oral appliances for snoring and ESTEFANIA: a review. Sleep 2006; 29: 244-262.  5. Devante et al. Oral appliance treatment for ESTEFANIA: an update. J Clin Sleep Med 2014; 10(2): 215-227.  6. Dewayne et al. Predictors of OSAH treatment outcome. J Dent Res 2007; 86: 8891-9095.    Weight Loss:    Weight loss is a long-term strategy that may improve sleep apnea in some patients.    Weight management is a personal decision and the decision should be based on your interest and the potential benefits.  If you are interested in exploring weight loss strategies, the following discussion covers the impact on weight loss on sleep apnea and the approaches that may be successful.    Being overweight does not necessarily mean you will have health consequences.  Those who have BMI over 35 or over 27 with existing medical conditions carries greater risk.   Weight loss decreases severity of sleep apnea in most people with obesity. For those with mild obesity who have developed snoring with weight gain, even 15-30 pound weight loss can improve and occasionally eliminate sleep apnea.  Structured and life-long dietary and health habits are necessary to lose weight and keep healthier weight levels.     Though there may be significant health benefits from weight loss, long-term weight loss is very difficult to achieve- studies show success with dietary management in less than 10% of people. In addition, substantial weight loss may require years of dietary control and may be difficult if patients have severe obesity. In these cases, surgical management may be considered.  Finally, older individuals who have tolerated obesity without health complications may be less likely to benefit from weight loss strategies.       Your BMI is Body mass index is 32.34 kg/(m^2).  Weight management is a personal decision.  If you are interested in exploring weight loss strategies, the following discussion covers the approaches that may be successful. Body mass index (BMI) is one way to tell whether you are at a healthy weight, overweight, or obese. It measures your weight in relation to your height.  A BMI of 18.5 to 24.9 is in the healthy range. A person with a BMI of 25 to 29.9 is considered overweight, and someone with a BMI of 30 or greater is considered obese. More than two-thirds of American adults are considered overweight or obese.  Being overweight or obese increases the risk for further weight gain. Excess weight may lead to heart disease and diabetes.  Creating and following plans for healthy eating and physical activity may help you improve your health.  Weight control is part of healthy lifestyle and includes exercise, emotional health, and healthy eating habits. Careful eating habits lifelong are the mainstay of weight control. Though there are significant health benefits from weight loss, long-term weight loss with diet alone may be very difficult to achieve- studies show long-term success with dietary management in less than 10% of people. Attaining a healthy weight may be especially difficult to achieve in those with severe obesity. In some cases, medications, devices and surgical management might be considered.  What can you do?  If you are overweight or obese and are interested in methods for weight loss, you should discuss this with your provider.     Consider reducing daily calorie intake by 500 calories.     Keep a food journal.     Avoiding skipping meals, consider cutting portions instead.    Diet combined with exercise helps maintain muscle while optimizing fat loss. Strength training is particularly important for building and maintaining muscle mass. Exercise helps reduce stress, increase energy, and improves  fitness. Increasing exercise without diet control, however, may not burn enough calories to loose weight.       Start walking three days a week 10-20 minutes at a time    Work towards walking thirty minutes five days a week     Eventually, increase the speed of your walking for 1-2 minutes at time    In addition, we recommend that you review healthy lifestyles and methods for weight loss available through the National Institutes of Health patient information sites:  http://win.niddk.nih.gov/publications/index.htm    And look into health and wellness programs that may be available through your health insurance provider, employer, local community center, or elaine club.    Weight management plan: Patient was referred to their PCP to discuss a diet and exercise plan.    Surgery:  Surgery for obstructive sleep apnea is considered generally only when other therapies fail to work. Surgery may be discussed with you if you are having a difficult time tolerating CPAP and or when there is an abnormal structure that requires surgical correction.  Nose and throat surgeries often enlarge the airway to prevent collapse.  Most of these surgeries create pain for 1-2 weeks and up to half of the most common surgeries are not effective throughout life.  You should carefully discuss the benefits and drawbacks to surgery with your sleep provider and surgeon to determine if it is the best solution for you.   More information  Surgery for ESTEFANIA is directed at areas that are responsible for narrowing or complete obstruction of the airway during sleep.  There are a wide range of procedures available to enlarge and/or stabilize the airway to prevent blockage of breathing in the three major areas where it can occur: the palate, tongue, and nasal regions.  Successful surgical treatment depends on the accurate identification of the factors responsible for obstructive sleep apnea in each person.  A personalized approach is required because there is  no single treatment that works well for everyone.  Because of anatomic variation, consultation with an examination by a sleep surgeon is a critical first step in determining what surgical options are best for each patient.  In some cases, examination during sedation may be recommended in order to guide the selection of procedures.  Patients will be counseled about risks and benefits as well as the typical recovery course after surgery. Surgery is typically not a cure for a person s ESTEFANIA.  However, surgery will often significantly improve one s ESTEFANIA severity (termed  success rate ).  Even in the absence of a cure, surgery will decrease the cardiovascular risk associated with OSA7; improve overall quality of life8 (sleepiness, functionality, sleep quality, etc).  Palate Procedures:  Patients with ESTEFANIA often have narrowing of their airway in the region of their tonsils and uvula.  The goals of palate procedures are to widen the airway in this region as well as to help the tissues resist collapse.  Modern palate procedure techniques focus on tissue conservation and soft tissue rearrangement, rather than tissue removal.  Often the uvula is preserved in this procedure. Residual sleep apnea is common in patient after pharyngoplasty with an average reduction in sleep apnea events of 33%2.    Tongue Procedures:  ExamWhile patients are awake, the muscles that surround the throat are active and keep this region open for breathing. These muscles relax during sleep, allowing the tongue and other structures to collapse and block breathing.  There are several different tongue procedures available.  Selection of a tongue base procedure depends on characteristics seen on physical exam.  Generally, procedures are aimed at removing bulky tissues in this area or preventing the back of the tongue from falling back during sleep.  Success rates for tongue surgery range from 50-62%3.  Hypoglossal Nerve Stimulation:  Hypoglossal nerve  stimulation has recently received approval from the [place] Food and Drug Administration for the treatment of obstructive sleep apnea.  This is based on research showing that the system was safe and effective in treating sleep apnea6.  Results showed that the median AHI score decreased 68%, from 29.3 to 9.0. This therapy uses an implant system that senses breathing patterns and delivers mild stimulation to airway muscles, which keeps the airway open during sleep.  The system consists of three fully implanted components: a small generator (similar in size to a pacemaker), a breathing sensor, and a stimulation lead.  Using a small handheld remote, a patient turns the therapy on before bed and off upon awakening.    Candidates for this device must be greater than 22 years of age, have moderate to severe ESTEFANIA (AHI between 20-65), BMI less than 32, have tried CPAP/oral appliance without success, and have appropriate upper airway anatomy (determined by a sleep endoscopy performed by Dr. Bobby).  Hypoglossal Nerve Stimulation Pathway:    The sleep surgeon s office will work with the patient through the insurance prior-authorization process (including communications and appeals).    Nasal Procedures:  Nasal obstruction can interfere with nasal breathing during the day and night.  Studies have shown that relief of nasal obstruction can improve the ability of some patients to tolerate positive airway pressure therapy for obstructive sleep apnea1.  Treatment options include medications such as nasal saline, topical corticosteroid and antihistamine sprays, and oral medications such as antihistamines or decongestants. Non-surgical treatments can include external nasal dilators for selected patients. If these are not successful by themselves, surgery can improve the nasal airway either alone or in combination with these other options.  Combination Procedures:  Combination of surgical procedures and other treatments may be  recommended, particularly if patients have more than one area of narrowing or persistent positional disease.  The success rate of combination surgery ranges from 66-80%2,3.    References  1. Omkar SORIA. The Role of the Nose in Snoring and Obstructive Sleep Apnoea: An Update.  Eur Arch Otorhinolaryngol. 2011; 268: 1365-73.  2.  Landry SM; Jacy JA; Gume JR; Pallanch JF; Sarah MB; Mayur SG; Antoine REYES. Surgical modifications of the upper airway for obstructive sleep apnea in adults: a systematic review and meta-analysis. SLEEP 2010;33(10):0316-7161. Nikki CARDENAS. Hypopharyngeal surgery in obstructive sleep apnea: an evidence-based medicine review.  Arch Otolaryngol Head Neck Surg. 2006 Feb;132(2):206-13.  3. Sonny YH1, Jamel Y, Kirby ADRY. The efficacy of anatomically based multilevel surgery for obstructive sleep apnea. Otolaryngol Head Neck Surg. 2003 Oct;129(4):327-35.  4. Nikki CARDENAS, Goldberg A. Hypopharyngeal Surgery in Obstructive Sleep Apnea: An Evidence-Based Medicine Review. Arch Otolaryngol Head Neck Surg. 2006 Feb;132(2):206-13.  5. Sudhir PJ et al. Upper-Airway Stimulation for Obstructive Sleep Apnea.  N Engl J Med. 2014 Jan 9;370(2):139-49.  6. Thelma Y et al. Increased Incidence of Cardiovascular Disease in Middle-aged Men with Obstructive Sleep Apnea. Am J Respir Crit Care Med; 2002 166: 159-165  7. Rios EM et al. Studying Life Effects and Effectiveness of Palatopharyngoplasty (SLEEP) study: Subjective Outcomes of Isolated Uvulopalatopharyngoplasty. Otolaryngol Head Neck Surg. 2011; 144: 623-631.            Follow-ups after your visit        Follow-up notes from your care team     Return in about 2 weeks (around 12/11/2018) for Sleep Study Review.      Your next 10 appointments already scheduled     Dec 06, 2018  4:00 PM CST   Office Visit with Josh Hollingsworth MD   [facility]    [address] [id number] [phone]           Bring a current  list of meds and any records pertaining to this visit. For Physicals, please bring immunization records and any forms needing to be filled out. Please arrive 10 minutes early to complete paperwork.            Dec 13, 2018  8:00 PM CST   PSG Split with BK BED 3   [facility] [place]    [address]  [place] [address]   [phone]            Dec 26, 2018  4:00 PM CST   Return Sleep Patient with Bennett Ezra Goltz, PA-C   [facility] ([facility]    [address]  [facility] [place] [facility] [id number] [phone]              Future tests that were ordered for you today     Open Future Orders        Priority Expected Expires Ordered    Comprehensive Sleep Study Routine  5/26/2019 11/27/2018            Who to contact     If you have questions or need follow up information about today's clinic visit or your schedule please contact [facility] directly at [phone].  Normal or non-critical lab and imaging results will be communicated to you by InExchangehart, letter or phone within 4 business days after the clinic has received the results. If you do not hear from us within 7 days, please contact the clinic through Coullt or phone. If you have a critical or abnormal lab result, we will notify you by phone as soon as possible.  Submit refill requests through Wriggle or call your pharmacy and they will forward the refill request to us. Please allow 3 business days for your refill to be completed.          Additional Information About Your Visit        Wriggle Information     Wriggle gives you secure access to your electronic health record. If you see a primary care provider, you can also send messages to your care team and make appointments. If you have questions, please call your primary care clinic.  If you do not have a primary care provider, please call [phone] and they will  "assist you.        Care EveryWhere ID     This is your Care EveryWhere ID. This could be used by other organizations to access your [facility] medical records  [id number]        Your Vitals Were     Pulse Temperature Respirations Height Pulse Oximetry BMI (Body Mass Index)    85 97.7  F (36.5  C) (Oral) 16 1.626 m (5' 4\") 99% 32.34 kg/m2       Blood Pressure from Last 3 Encounters:   11/27/18 142/78   11/21/18 121/69   11/16/18 116/68    Weight from Last 3 Encounters:   11/27/18 85.5 kg (188 lb 6.4 oz)   11/21/18 83.5 kg (184 lb)   11/16/18 83.9 kg (185 lb)                 Today's Medication Changes          These changes are accurate as of 11/27/18  3:18 PM.  If you have any questions, ask your nurse or doctor.               These medicines have changed or have updated prescriptions.        Dose/Directions    losartan 100 MG tablet   Commonly known as:  COZAAR   This may have changed:    - how much to take  - when to take this   Used for:  Benign essential hypertension        Dose:  100 mg   Take 1 tablet (100 mg) by mouth At Bedtime   Quantity:  90 tablet   Refills:  1                Primary Care Provider Office Phone # Fax #    Josh Hollingsworth [phone] [address] [address]        Equal Access to Services     LISBET LIPSCOMB AH: Hadii clayton sanchez hadasho Sodonny, waaxda luqadaha, qaybta kaalmada titus, marlene vasquez. So [facility] [phone].    ATENCIÓN: Si habla español, tiene a armendariz disposición servicios gratuitos de asistencia lingüística. Antonio al [phone].    We comply with applicable federal civil rights laws and [place] laws. We do not discriminate on the basis of race, color, national origin, age, disability, sex, sexual orientation, or gender identity.            Thank you!     Thank you for choosing [facility]  for your care. Our goal is always to provide you with excellent care. Hearing back from our patients is one " way we can continue to improve our services. Please take a few minutes to complete the written survey that you may receive in the mail after your visit with us. Thank you!             Your Updated Medication List - Protect others around you: Learn how to safely use, store and throw away your medicines at www.disposemymeds.org.          This list is accurate as of 11/27/18  3:18 PM.  Always use your most recent med list.                   Brand Name Dispense Instructions for use Diagnosis    acetaminophen 500 MG tablet    TYLENOL     Take 500 mg by mouth        allopurinol 100 MG tablet    ZYLOPRIM     Take 100 mg by mouth        * amitriptyline 10 MG tablet    ELAVIL     Take 10 mg by mouth        * amitriptyline 25 MG tablet    ELAVIL     Take 25 mg by mouth At Bedtime        atorvastatin 40 MG tablet    LIPITOR    90 tablet    TAKE 1 TABLET(40 MG) BY MOUTH DAILY    Hypertriglyceridemia       B-12 1000 MCG Tbcr     100 tablet    Take 1,000 mcg by mouth daily    Vitamin B12 deficiency (non anemic)       carvedilol 6.25 MG tablet    COREG    270 tablet    12.5 mg in am and 6.25 mg in pm    Benign essential hypertension       cetirizine 10 MG tablet    zyrTEC     Take 10 mg by mouth        * clobetasol 0.05 % external solution    TEMOVATE     Apply topically 2 times daily as needed        * clobetasol 0.05 % external cream    TEMOVATE    15 g    Apply sparingly to affected area twice daily for 14 days.  Do not apply to face.    Rash, Adverse effect of vaccine, initial encounter, Allergic reaction, initial encounter       Cranberry Powd      1 capsule        EPINEPHrine 0.3 MG/0.3ML injection 2-pack    EPIPEN 2-FRAN    0.6 mL    Inject 0.3 mLs (0.3 mg) into the muscle once as needed for anaphylaxis    Allergic reaction, subsequent encounter       hydrOXYzine 25 MG tablet    ATARAX    120 tablet    Take 1 tablet (25 mg) by mouth 3 times daily as needed for itching    Pruritic disorder       ketoconazole 2 % external  shampoo    NIZORAL    120 mL    Apply to the affected area and wash off after 5 minutes.    Seborrheic dermatitis       losartan 100 MG tablet    COZAAR    90 tablet    Take 1 tablet (100 mg) by mouth At Bedtime    Benign essential hypertension       magic mouthwash suspension (diphenhydrAMINE, lidocaine, aluminum-magnesium & simethicone) compounding kit     237 mL    Swish and swallow 5-10 mLs in mouth every 6 hours as needed for mouth sores    Canker sores oral       Magnesium Oxide 250 MG Tabs      Take 250 mg by mouth        predniSONE 20 MG tablet    DELTASONE    20 tablet    Take 3 tabs (60 mg) by mouth daily x 3 days, 2 tabs (40 mg) daily x 3 days, 1 tab (20 mg) daily x 3 days, then 1/2 tab (10 mg) x 3 days.    Allergic reaction, initial encounter, Rash, Adverse effect of vaccine, initial encounter       SUMAtriptan 20 MG/ACT nasal spray    IMITREX    12 each    Spray 1 spray in nostril as needed for migraine May repeat in 2 hours. Max 2 sprays/24 hours.    Intractable migraine without aura and without status migrainosus       torsemide 10 MG tablet    DEMADEX    90 tablet    Take 1 tablet (10 mg) by mouth daily    Edema leg       traMADol 50 MG tablet    ULTRAM     Take 1 tablet by mouth every 6 hours as needed        triamcinolone 0.1 % external cream    KENALOG    454 g    Apply topically 2 times daily Apply to AA on trunk, arms or legs bid for 10-14 days    Dermatitis       vitamin D3 2000 units tablet    CHOLECALCIFEROL     Take 1 tablet by mouth daily        ZOFRAN 4 MG tablet   Generic drug:  ondansetron      Take by mouth every 8 hours as needed for nausea        ZOLMitriptan 5 MG ODT    ZOMIG-ZMT     Take 5 mg by mouth        * Notice:  This list has 4 medication(s) that are the same as other medications prescribed for you. Read the directions carefully, and ask your doctor or other care provider to review them with you.

## 2018-11-27 NOTE — PROGRESS NOTES
Sleep Consultation:    Date on this visit: 11/27/2018    Rosa Sotomayor is sent by No ref. provider found for a sleep consultation regarding non-restorative sleep.    Primary Physician: Josh Hollingsworth     Rosa Sotomayor reports snoring, waking a lot at night, and not feeling refreshed by sleep for 1.5 years. Her medical history is significant for Arnold-Chiari malformation (unknown grade, no surgery), chronic back pain, migraine, hyperlipidemia, aortic insufficiency, fibromuscular dysplasia, possible renovascular HTN, renal artery stenosis, CKD st 3, anxiety, macular degeneration, osteoarthritis.    Rosa goes to sleep at 9:30 PM during the week. She watches TV and reads until 10:30-11 PM. She wakes up at 7:00-8:00 AM without an alarm. She falls asleep in 30-45 minutes.  Rosa denies difficulty falling asleep but she does have difficulty getting back to sleep if she wakes. She takes 35 mg amitriptyline at bedtime. She takes it for migraines. She wakes up 2-4 times a night for 5-30 minutes before falling back to sleep.  Rosa wakes up to go to the bathroom and uncertain reasons.  On weekends, her sleep schedule is about the same.  Patient gets an average of 6-7 hours of sleep per night. She has had a hip, knee and back surgery in the last year and a half.     Patient does watch TV in bed and read in bed and does not use electronics in bed. She may sometimes get a racing mind in the middle of the night (solving the worlds problems). She has tried writing things down.    Rosa is retired. 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 She did used to work day and evening shifts. She lives with her .      Rosa does snore every night and snoring is loud. She does not snore too much if on her sides, but will snore loudly if supine. She gets discomfort in various joints if on one side for too long. Patient does have a regular bed partner. There is not report of  gasping, snorting or witnessed apneas. She infrequently wakes herself hearing her own snoring. They occasionally sleep separately due to her snoring.  Patient sleeps on her back and side. She wakes with headaches 3-5 times per week. She gets migraines if she does not sleep well. She gets a lot of migraines around 1:30-2 AM. She has occasional morning dry mouth, denies morning confusion and restless legs (although she did shortly after the shingles vaccine). Rosa denies any bruxism, sleep walking, sleep talking, dream enactment, sleep paralysis, cataplexy and hypnogogic/hypnopompic hallucinations.    Rosa has claustrophobia, denies difficulty breathing through her nose, reflux at night, heartburn and depression.      oRsa has gained 20 pounds in the last 2 years.  Patient describes themself as a morning person.  She would prefer to go to sleep at 10:00-11:00 PM and wake up at 6:00-7:00 AM.  Patient's [place] Sleepiness score 7/24 inconsistent with daytime sleepiness.      Rosa naps 2-3 times per week for 10-20 minutes, feels refreshed after naps. She naps when exhausted. If she doesn't sleep, she gets a migraine. Her FitBit shows she sometimes sleeps for a couple of hours in the afternoon, about weekly. She takes no inadvertant naps.  She denies dozing while driving. Patient was counseled on the importance of driving while alert, to pull over if drowsy, or nap before getting into the vehicle if sleepy.  She uses 2 cups/day of green tea. Last caffeine intake is usually before 3 PM.    Allergies:    Allergies   Allergen Reactions     Fentanyl Anaphylaxis     Midazolam Anaphylaxis     Propofol Anaphylaxis     Shingrix [Zoster Vac Recomb Adjuvanted] Itching and Rash     Codeine      Erythromycin      ointment     Gabapentin      chest heaviness with breathing     Hydromorphone Itching     Tolerates morphine     Lorazepam Itching     Other [Seasonal Allergies] Anaphylaxis     GENERAL ANETHESIA        Penicillins      rash     Eucalyptus Oil Rash     hives     Nitrofuran Derivatives Rash     Sulfa Drugs Itching and Rash     rash       Medications:    Current Outpatient Prescriptions   Medication Sig Dispense Refill     acetaminophen (TYLENOL) 500 MG tablet Take 500 mg by mouth       allopurinol (ZYLOPRIM) 100 MG tablet Take 100 mg by mouth       amitriptyline (ELAVIL) 10 MG tablet Take 10 mg by mouth       amitriptyline (ELAVIL) 25 MG tablet Take 25 mg by mouth At Bedtime  0     atorvastatin (LIPITOR) 40 MG tablet TAKE 1 TABLET(40 MG) BY MOUTH DAILY 90 tablet 3     carvedilol (COREG) 6.25 MG tablet 12.5 mg in am and 6.25 mg in pm 270 tablet 0     cetirizine (ZYRTEC) 10 MG tablet Take 10 mg by mouth       Cholecalciferol (VITAMIN D) 2000 UNITS tablet Take 1 tablet by mouth daily       clobetasol (TEMOVATE) 0.05 % cream Apply sparingly to affected area twice daily for 14 days.  Do not apply to face. 15 g 2     clobetasol (TEMOVATE) 0.05 % external solution Apply topically 2 times daily as needed  5     Cranberry POWD 1 capsule       Cyanocobalamin (B-12) 1000 MCG TBCR Take 1,000 mcg by mouth daily 100 tablet 1     EPINEPHrine (EPIPEN 2-FRAN) 0.3 MG/0.3ML injection 2-pack Inject 0.3 mLs (0.3 mg) into the muscle once as needed for anaphylaxis 0.6 mL 1     hydrOXYzine (ATARAX) 25 MG tablet Take 1 tablet (25 mg) by mouth 3 times daily as needed for itching 120 tablet 1     ketoconazole (NIZORAL) 2 % shampoo Apply to the affected area and wash off after 5 minutes. 120 mL 1     losartan (COZAAR) 100 MG tablet Take 1 tablet (100 mg) by mouth At Bedtime (Patient taking differently: Take 50 mg by mouth 2 times daily ) 90 tablet 1     magic mouthwash (FIRST-MOUTHWASH BLM) suspension Swish and swallow 5-10 mLs in mouth every 6 hours as needed for mouth sores 237 mL 3     Magnesium Oxide 250 MG TABS Take 250 mg by mouth       ondansetron (ZOFRAN) 4 MG tablet Take by mouth every 8 hours as needed for nausea       predniSONE  (DELTASONE) 20 MG tablet Take 3 tabs (60 mg) by mouth daily x 3 days, 2 tabs (40 mg) daily x 3 days, 1 tab (20 mg) daily x 3 days, then 1/2 tab (10 mg) x 3 days. 20 tablet 0     SUMAtriptan (IMITREX) 20 MG/ACT nasal spray Spray 1 spray in nostril as needed for migraine May repeat in 2 hours. Max 2 sprays/24 hours. 12 each 11     torsemide (DEMADEX) 10 MG tablet Take 1 tablet (10 mg) by mouth daily 90 tablet 3     traMADol (ULTRAM) 50 MG tablet Take 1 tablet by mouth every 6 hours as needed  0     triamcinolone (KENALOG) 0.1 % cream Apply topically 2 times daily Apply to AA on trunk, arms or legs bid for 10-14 days 454 g 0     ZOLMitriptan (ZOMIG-ZMT) 5 MG ODT tab Take 5 mg by mouth         Problem List:  Patient Active Problem List    Diagnosis Date Noted     Aortic valve insufficiency 04/19/2018     Priority: Medium     Intractable migraine without aura and without status migrainosus 11/20/2017     Priority: Medium     Anemia, iron deficiency 06/23/2017     Priority: Medium     Renal artery stenosis (H) 01/05/2017     Priority: Medium     Macular degeneration, bilateral 01/05/2017     Priority: Medium     Benign essential hypertension 12/28/2016     Priority: Medium     Lumbago 11/29/2016     Priority: Medium     Chronic bilateral low back pain without sciatica 11/29/2016     Priority: Medium     Secondary renal hyperparathyroidism (H) 07/31/2016     Priority: Medium     Arnold-Chiari malformation (H) 07/23/2016     Priority: Medium     Hyperlipidemia LDL goal <130 07/23/2016     Priority: Medium     Periodic headache syndrome, not intractable 07/23/2016     Priority: Medium     Anxiety 07/23/2016     Priority: Medium     CKD (chronic kidney disease) stage 3, GFR 30-59 ml/min (H) 07/22/2016     Priority: Medium     Hypertriglyceridemia 07/22/2016     Priority: Medium     Personal history of allergy to anesthetic agent 03/10/2007     Priority: Medium     Postmenopausal bleeding 02/28/2007     Priority: Medium         Past Medical/Surgical History:  Past Medical History:   Diagnosis Date     Aortic valve insufficiency 4/19/2018     Chronic kidney disease      Dizziness and giddiness      Past Surgical History:   Procedure Laterality Date     C NONSPECIFIC PROCEDURE      wisdom teeth     C NONSPECIFIC PROCEDURE  2005    Varicose Veins     C TOTAL HIP ARTHROPLASTY      left     C TOTAL KNEE ARTHROPLASTY      right     TUBAL LIGATION  1987       Social History:  Social History     Social History     Marital status:      Spouse name: N/A     Number of children: N/A     Years of education: N/A     Occupational History     Not on file.     Social History Main Topics     Smoking status: Never Smoker     Smokeless tobacco: Never Used     Alcohol use No     Drug use: No     Sexual activity: Yes     Partners: Male      Comment: postmeno     Other Topics Concern     Not on file     Social History Narrative       Family History:  Family History   Problem Relation Age of Onset     Diabetes Mother      INSULIN     Hypertension Mother      Eye Disorder Mother      CATERACTS     HEART DISEASE Mother      CHF- OPEN HEART SURG X'S 2     Lipids Mother      Obesity Mother      Coronary Artery Disease Mother      Diabetes Father      ORAL     Hypertension Father      Arthritis Father      Eye Disorder Father      MAC DEGENERATION     HEART DISEASE Father      CHF     Lipids Father      Obesity Father      Diabetes Maternal Grandfather      INSULIN     Diabetes Brother      INSULIN     GASTROINTESTINAL DISEASE Brother      CHOLITISI POSSIBLE CHRONES     Obesity Brother      Colon Cancer No family hx of      Breast Cancer No family hx of        Review of Systems:  A complete review of systems reviewed by me is negative with the exeption of what has been mentioned in the history of present illness.  CONSTITUTIONAL: NEGATIVE for weight gain/loss, fever, chills, sweats or night sweats.  CONSTITUTIONAL:  POSITIVE for  drug allergies   EYES:  "NEGATIVE for changes in vision, blind spots, double vision.  ENT: NEGATIVE for ear pain, sore throat, sinus pain, post-nasal drip, runny nose, bloody nose  CARDIAC: NEGATIVE for fast heartbeats or fluttering in chest, chest pain or pressure, breathlessness when lying flat.  CARDIAC:  POSITIVE for  swollen legs, swollen feet and HTN  NEUROLOGIC: NEGATIVE weakness or numbness in the arms or legs.  NEUROLOGIC:  POSITIVE for  headaches  DERMATOLOGIC: NEGATIVE for new moles or change in mole(s)  DERMATOLOGIC:  POSITIVE for  rashes  PULMONARY: NEGATIVE SOB at rest, SOB with activity, productive cough, coughing up blood, wheezing or whistling when breathing.    PULMONARY:  POSITIVE for  dry cough  GASTROINTESTINAL: NEGATIVE for nausea or vomitting, loose or watery stools, fat or grease in stools, constipation, abdominal pain, bowel movements black in color or blood noted.  GENITOURINARY: NEGATIVE for pain during urination, blood in urine, urinating more frequently than usual, irregular menstrual periods.  MUSCULOSKELETAL: NEGATIVE for muscle pain.  MUSCULOSKELETAL:  POSITIVE for  bone or joint pain and swollen joints  ENDOCRINE: NEGATIVE for increased thirst or urination, diabetes.  LYMPHATIC: NEGATIVE for swollen lymph nodes, lumps or bumps in the breasts or nipple discharge.    Physical Examination:  Vitals: /76  Pulse 85  Temp 97.7  F (36.5  C) (Oral)  Resp 16  Ht 1.626 m (5' 4\")  Wt 85.5 kg (188 lb 6.4 oz)  SpO2 99%  BMI 32.34 kg/m2    Neck Cir (cm): 32 cm    [place] Total Score 11/27/2018   Total score - [place] 7       [place] Total Score: 13 (11/27/18 1403)    GENERAL APPEARANCE: healthy, alert, no distress and cooperative  EYES: Eyes grossly normal to inspection, PERRL, conjunctivae and sclerae normal and lids and lashes normal  HENT: nose and mouth without ulcers or lesions, oropharynx crowded and tongue base enlarged  NECK: no adenopathy, no asymmetry, masses, or scars, thyroid normal to palpation and " trachea midline and normal to palpation  RESP: lungs clear to auscultation - no rales, rhonchi or wheezes  CV: regular rates and rhythm, normal S1 S2, no S3 or S4, no murmur, click or rub and no irregular beats  LYMPHATICS: no cervical adenopathy  MS: extremities normal- no gross deformities noted and no pitting edema  NEURO: Normal strength and tone, mentation intact, speech normal and cranial nerves 2-12 intact  Mallampati Class: IV.  Tonsillar Stage: 1  hidden by pillars.    Impression/Plan:    (R06.83) Snoring  (primary encounter diagnosis), (G47.8) Non-restorative sleep, (Q07.00) Arnold-Chiari malformation (H), (I10) Benign essential hypertension  Comment: Rosa presents with concerns of frequent awakenings and non-restorative sleep, worse in the last 1.5 years. She snores loudly if on her back. She wonders if she has apnea. Her  has been sleeping better since getting a CPAP. She has HTN but it is possibly renovascular. Her other positive risk factor for ESTEFANIA is age >50 (67). She wakes with migraines in the middle of the night. Negative risk factors include; no significant sleepiness (ESS 7/24), BMI <35 (32), neck <40 cm (32 cm), female gender. There is potential for central apnea given her history of Arnold-Chiari malformation. I don't not have further information regarding the grade.  She says she was advised to have surgery at one point, but subsequent neurologist said it is good that she did not have surgery.  Plan: Comprehensive Sleep Study        Split night PSG with CPAP/BiPAP titration if indicated.  She is a candidate for ASV if she does have central apnea.  She was told she can take her amitriptyline to help her sleep while here.    (G47.00) Insomnia, unspecified type  Comment: Rosa has been having difficulty with sleep maintenance in the last 1-1/2 years.  She thinks this may correlate with having multiple surgeries over that time.  She has been less active and gained 20 pounds as a  consequence.  Her sleep was fragmented by pain during recovery.  She has expanded her sleep opportunity to up to about 10 hours a night.  She spends 1-1.5 hours in bed reading or watching TV before falling asleep.  She does sometimes get a racing mind when she wakes.  She takes 35 mg amitriptyline at bedtime for migraines.  She is not sure how much that helps her sleep or migraines.  She naps for 30 minutes to 2 hours about once a week.  She feels she is at increased risk for migraines when she feels exhausted in the daytime and does not nap.  Plan: She was encouraged to reduce her time in bed to 8 hours per night.  She was encouraged to keep consistent bedtime and wake time.  She was encouraged to minimize napping as much as possible.        Literature provided regarding sleep apnea.      She will follow up with me in approximately two weeks after her sleep study has been competed to review the results and discuss plan of care.       Polysomnography reviewed.  Obstructive sleep apnea reviewed.  Complications of untreated sleep apnea were reviewed.  45 minutes was spent during this visit, over 50% in counseling and coordination of care.   Bennett Goltz, PA-C    CC: No ref. provider found

## 2018-11-27 NOTE — NURSING NOTE
"Chief Complaint   Patient presents with     Sleep Problem     Snoring, Not feeling rested when I wake up, Wake a lot at night.       Initial /76  Pulse 85  Temp 97.7  F (36.5  C) (Oral)  Resp 16  Ht 1.626 m (5' 4\")  Wt 85.5 kg (188 lb 6.4 oz)  SpO2 99%  BMI 32.34 kg/m2 Estimated body mass index is 32.34 kg/(m^2) as calculated from the following:    Height as of this encounter: 1.626 m (5' 4\").    Weight as of this encounter: 85.5 kg (188 lb 6.4 oz).    Medication Reconciliation: complete     ESS 7  Neck 32cm  Brooklyn Valle      "

## 2018-12-04 ENCOUNTER — APPOINTMENT (OUTPATIENT)
Dept: CT IMAGING | Facility: CLINIC | Age: 68
End: 2018-12-04
Attending: EMERGENCY MEDICINE
Payer: MEDICARE

## 2018-12-04 ENCOUNTER — HOSPITAL ENCOUNTER (EMERGENCY)
Facility: CLINIC | Age: 68
Discharge: HOME | End: 2018-12-04
Attending: EMERGENCY MEDICINE | Admitting: EMERGENCY MEDICINE
Payer: MEDICARE

## 2018-12-04 VITALS
OXYGEN SATURATION: 99 % | HEIGHT: 64 IN | SYSTOLIC BLOOD PRESSURE: 137 MMHG | DIASTOLIC BLOOD PRESSURE: 81 MMHG | RESPIRATION RATE: 18 BRPM | TEMPERATURE: 99.2 F | BODY MASS INDEX: 32.34 KG/M2

## 2018-12-04 DIAGNOSIS — R51.9 ACUTE INTRACTABLE HEADACHE, UNSPECIFIED HEADACHE TYPE: ICD-10-CM

## 2018-12-04 LAB
ANION GAP SERPL CALCULATED.3IONS-SCNC: 10 MMOL/L (ref 3–14)
BASOPHILS # BLD AUTO: 0 10E9/L (ref 0–0.2)
BASOPHILS NFR BLD AUTO: 0.5 %
BUN SERPL-MCNC: 22 MG/DL (ref 7–30)
CALCIUM SERPL-MCNC: 9.1 MG/DL (ref 8.5–10.1)
CHLORIDE SERPL-SCNC: 101 MMOL/L (ref 94–109)
CO2 SERPL-SCNC: 25 MMOL/L (ref 20–32)
CREAT SERPL-MCNC: 1.38 MG/DL (ref 0.52–1.04)
DIFFERENTIAL METHOD BLD: ABNORMAL
DIFFERENTIAL METHOD BLD: NORMAL
EOSINOPHIL # BLD AUTO: 0.5 10E9/L (ref 0–0.7)
EOSINOPHIL NFR BLD AUTO: 8.2 %
ERYTHROCYTE [DISTWIDTH] IN BLOOD BY AUTOMATED COUNT: 15.1 % (ref 10–15)
ERYTHROCYTE [DISTWIDTH] IN BLOOD BY AUTOMATED COUNT: NORMAL % (ref 10–15)
GFR SERPL CREATININE-BSD FRML MDRD: 38 ML/MIN/1.7M2
GLUCOSE SERPL-MCNC: 127 MG/DL (ref 70–99)
HCT VFR BLD AUTO: 31.4 % (ref 35–47)
HCT VFR BLD AUTO: NORMAL % (ref 35–47)
HGB BLD-MCNC: 10.4 G/DL (ref 11.7–15.7)
HGB BLD-MCNC: NORMAL G/DL (ref 11.7–15.7)
IMM GRANULOCYTES # BLD: 0 10E9/L (ref 0–0.4)
IMM GRANULOCYTES NFR BLD: 0.2 %
LYMPHOCYTES # BLD AUTO: 1.5 10E9/L (ref 0.8–5.3)
LYMPHOCYTES NFR BLD AUTO: 24.5 %
MCH RBC QN AUTO: 30.9 PG (ref 26.5–33)
MCH RBC QN AUTO: NORMAL PG (ref 26.5–33)
MCHC RBC AUTO-ENTMCNC: 33.1 G/DL (ref 31.5–36.5)
MCHC RBC AUTO-ENTMCNC: NORMAL G/DL (ref 31.5–36.5)
MCV RBC AUTO: 93 FL (ref 78–100)
MCV RBC AUTO: NORMAL FL (ref 78–100)
MONOCYTES # BLD AUTO: 0.8 10E9/L (ref 0–1.3)
MONOCYTES NFR BLD AUTO: 13.4 %
NEUTROPHILS # BLD AUTO: 3.2 10E9/L (ref 1.6–8.3)
NEUTROPHILS NFR BLD AUTO: 53.2 %
NRBC # BLD AUTO: 0 10*3/UL
NRBC BLD AUTO-RTO: 0 /100
PLATELET # BLD AUTO: 197 10E9/L (ref 150–450)
PLATELET # BLD AUTO: NORMAL 10E9/L (ref 150–450)
POTASSIUM SERPL-SCNC: 4.1 MMOL/L (ref 3.4–5.3)
RBC # BLD AUTO: 3.37 10E12/L (ref 3.8–5.2)
RBC # BLD AUTO: NORMAL 10E12/L (ref 3.8–5.2)
SODIUM SERPL-SCNC: 136 MMOL/L (ref 133–144)
WBC # BLD AUTO: 6 10E9/L (ref 4–11)
WBC # BLD AUTO: NORMAL 10E9/L (ref 4–11)

## 2018-12-04 PROCEDURE — 96361 HYDRATE IV INFUSION ADD-ON: CPT

## 2018-12-04 PROCEDURE — 99285 EMERGENCY DEPT VISIT HI MDM: CPT | Mod: 25

## 2018-12-04 PROCEDURE — 25000128 H RX IP 250 OP 636: Performed by: EMERGENCY MEDICINE

## 2018-12-04 PROCEDURE — 80048 BASIC METABOLIC PNL TOTAL CA: CPT | Performed by: EMERGENCY MEDICINE

## 2018-12-04 PROCEDURE — 96375 TX/PRO/DX INJ NEW DRUG ADDON: CPT

## 2018-12-04 PROCEDURE — 96376 TX/PRO/DX INJ SAME DRUG ADON: CPT

## 2018-12-04 PROCEDURE — 96374 THER/PROPH/DIAG INJ IV PUSH: CPT

## 2018-12-04 PROCEDURE — 85025 COMPLETE CBC W/AUTO DIFF WBC: CPT | Performed by: EMERGENCY MEDICINE

## 2018-12-04 PROCEDURE — 70450 CT HEAD/BRAIN W/O DYE: CPT

## 2018-12-04 RX ORDER — DIPHENHYDRAMINE HYDROCHLORIDE 50 MG/ML
25 INJECTION INTRAMUSCULAR; INTRAVENOUS ONCE
Status: COMPLETED | OUTPATIENT
Start: 2018-12-04 | End: 2018-12-04

## 2018-12-04 RX ORDER — DEXAMETHASONE SODIUM PHOSPHATE 10 MG/ML
10 INJECTION, SOLUTION INTRAMUSCULAR; INTRAVENOUS ONCE
Status: COMPLETED | OUTPATIENT
Start: 2018-12-04 | End: 2018-12-04

## 2018-12-04 RX ORDER — DIPHENHYDRAMINE HCL 25 MG
50 TABLET ORAL EVERY 6 HOURS PRN
Qty: 60 TABLET | Refills: 1 | Status: SHIPPED | OUTPATIENT
Start: 2018-12-04 | End: 2019-07-31

## 2018-12-04 RX ORDER — METOCLOPRAMIDE HYDROCHLORIDE 5 MG/ML
10 INJECTION INTRAMUSCULAR; INTRAVENOUS ONCE
Status: COMPLETED | OUTPATIENT
Start: 2018-12-04 | End: 2018-12-04

## 2018-12-04 RX ORDER — MORPHINE SULFATE 4 MG/ML
8 INJECTION, SOLUTION INTRAMUSCULAR; INTRAVENOUS
Status: DISCONTINUED | OUTPATIENT
Start: 2018-12-04 | End: 2018-12-05 | Stop reason: HOSPADM

## 2018-12-04 RX ORDER — MORPHINE SULFATE 4 MG/ML
6 INJECTION, SOLUTION INTRAMUSCULAR; INTRAVENOUS ONCE
Status: COMPLETED | OUTPATIENT
Start: 2018-12-04 | End: 2018-12-04

## 2018-12-04 RX ORDER — MORPHINE SULFATE 10 MG/5ML
5 SOLUTION ORAL EVERY 4 HOURS PRN
Qty: 40 ML | Refills: 0 | Status: SHIPPED | OUTPATIENT
Start: 2018-12-04 | End: 2018-12-06

## 2018-12-04 RX ORDER — DIPHENHYDRAMINE HYDROCHLORIDE 50 MG/ML
50 INJECTION INTRAMUSCULAR; INTRAVENOUS ONCE
Status: COMPLETED | OUTPATIENT
Start: 2018-12-04 | End: 2018-12-04

## 2018-12-04 RX ADMIN — DEXAMETHASONE SODIUM PHOSPHATE 10 MG: 10 INJECTION, SOLUTION INTRAMUSCULAR; INTRAVENOUS at 21:21

## 2018-12-04 RX ADMIN — SODIUM CHLORIDE 1000 ML: 9 INJECTION, SOLUTION INTRAVENOUS at 21:19

## 2018-12-04 RX ADMIN — DIPHENHYDRAMINE HYDROCHLORIDE 25 MG: 50 INJECTION, SOLUTION INTRAMUSCULAR; INTRAVENOUS at 21:20

## 2018-12-04 RX ADMIN — DIPHENHYDRAMINE HYDROCHLORIDE 50 MG: 50 INJECTION, SOLUTION INTRAMUSCULAR; INTRAVENOUS at 23:39

## 2018-12-04 RX ADMIN — MORPHINE SULFATE 8 MG: 4 INJECTION INTRAVENOUS at 22:25

## 2018-12-04 RX ADMIN — MORPHINE SULFATE 4 MG: 4 INJECTION INTRAVENOUS at 23:07

## 2018-12-04 RX ADMIN — METOCLOPRAMIDE 10 MG: 5 INJECTION, SOLUTION INTRAMUSCULAR; INTRAVENOUS at 21:23

## 2018-12-04 ASSESSMENT — ENCOUNTER SYMPTOMS
SHORTNESS OF BREATH: 0
WEAKNESS: 0
NUMBNESS: 0
HEADACHES: 1

## 2018-12-04 NOTE — ED AVS SNAPSHOT
Emergency Department    [address] [facility] [address]    Phone:  [phone]    Fax:  [phone]                                       Rosa Sotomayor   MRN: [id number]    Department:   Emergency Department   Date of Visit:  12/4/2018           Patient Information     Date Of Birth          1950        Your diagnoses for this visit were:     Acute intractable headache, unspecified headache type        You were seen by Rhoda Adams MD.      Follow-up Information     Follow up with Josh Hollingsworth MD On 12/6/2018.    Specialty:  Internal Medicine    Contact information:    [address] [facility] [place]  [facility] [id number] [phone]          Follow up with  Emergency Department.    Specialty:  EMERGENCY MEDICINE    Why:  As needed    Contact information:    [address] [facility] [address] [phone]        Discharge Instructions          * Headache (Unspecified)    The cause of your headache today is not clear, but it does not appear to be the sign of any serious illness.  Under stress, some people tense the muscles of their shoulder, neck and scalp without knowing it. If this condition lasts long enough, a TENSION HEADACHE can occur.  A MIGRAINE HEADACHE is caused by changes in blood flow to the brain. It can be mild or severe. A migraine attack may be triggered by emotional stress, hormone changes during the menstrual cycle, oral contraceptives, alcohol use, certain foods containing tyramine, eye strain, weather changes, missing meals, lack of sleep or oversleeping.  Other causes of headache include a viral illness, sinus, ear or throat infection, dental pain and TMJ (jaw joint) pain.  Home Care:    If you were given pain medicine for this headache, do not drive yourself home. Arrange for a ride, instead. When you get home, try to sleep. You should feel much better when you wake up.    If you are having nausea or vomiting, follow a light diet until  your headache is relieved.    If you have a migraine type headache, use sunglasses when in the daylight or around bright indoor lighting until symptoms improve. Bright glaring light can worsen this kind of headache.  Follow Up  with your doctor if the headache is not better within the next 24 hours. If you have frequent headaches you should discuss a treatment plan with your primary care doctor. By being aware of the earliest signs of headache, and starting treatment right away, you may be able to stop the pain yourself.  Get Prompt Medical Attention  if any of the following occur:    Worsening of your head pain or no improvement within 24 hours    Repeated vomiting (unable to keep liquids down)    Fever over 101 F (38.3 C)    Stiff neck    Extreme drowsiness, confusion or fainting    Weakness of an arm or leg or one side of the face    Difficulty with speech or vision    5022-5895 [employer]. [address] [place] [address]. All rights reserved. This information is not intended as a substitute for professional medical care. Always follow your healthcare professional's instructions.  This information has been modified by your health care provider with permission from the publisher.  Modifications clinically reviewed by Dr. Zaire Vanegas on 7/20/18.      Your next 10 appointments already scheduled     Dec 06, 2018  4:00 PM CST   [facility]    [address] [place] [address] [phone]           Bring a current list of meds and any records pertaining to this visit. For Physicals, please bring immunization records and any forms needing to be filled out. Please arrive 10 minutes early to complete paperwork.            Dec 13, 2018  8:00 PM CST   PSG Split with BK BED 3   [facility] ([facility])    [address] [address] [address]    [phone]            Dec 26, 2018  4:00 PM CST   Return Sleep Patient with Bennett Ezra Goltz, PA-C   [facility] ([facility])    [address] [address] [phone]              24 Hour Appointment Hotline       To make an appointment at any [facility], call [phone] ([phone]). If you don't have a family doctor or clinic, we will help you find one. [facility] are conveniently located to serve the needs of you and your family.             Review of your medicines      START taking        Dose / Directions Last dose taken    diphenhydrAMINE 25 MG tablet   Commonly known as:  BENADRYL ALLERGY   Dose:  50 mg   Quantity:  60 tablet        Take 2 tablets (50 mg) by mouth every 6 hours as needed for itching or allergies   Refills:  1        morphine 10 MG/5ML solution   Dose:  5 mg   Quantity:  40 mL        Take 2.5 mLs (5 mg) by mouth every 4 hours as needed for pain   Refills:  0          Our records show that you are taking the medicines listed below. If these are incorrect, please call your family doctor or clinic.        Dose / Directions Last dose taken    acetaminophen 500 MG tablet   Commonly known as:  TYLENOL   Dose:  500 mg        Take 500 mg by mouth   Refills:  0        allopurinol 100 MG tablet   Commonly known as:  ZYLOPRIM   Dose:  100 mg        Take 100 mg by mouth   Refills:  0        * amitriptyline 10 MG tablet   Commonly known as:  ELAVIL   Dose:  10 mg        Take 10 mg by mouth   Refills:  0        * amitriptyline 25 MG tablet   Commonly known as:  ELAVIL   Dose:  25 mg        Take 25 mg by mouth At Bedtime   Refills:  0        atorvastatin 40 MG tablet   Commonly known as:  LIPITOR   Quantity:  90 tablet        TAKE 1 TABLET(40 MG) BY MOUTH DAILY   Refills:  3        B-12 1000 MCG Tbcr   Dose:  1000 mcg   Quantity:  100 tablet        Take 1,000 mcg by mouth daily   Refills:  1        carvedilol  6.25 MG tablet   Commonly known as:  COREG   Quantity:  270 tablet        12.5 mg in am and 6.25 mg in pm   Refills:  0        cetirizine 10 MG tablet   Commonly known as:  zyrTEC   Dose:  10 mg        Take 10 mg by mouth   Refills:  0        * clobetasol 0.05 % external solution   Commonly known as:  TEMOVATE        Apply topically 2 times daily as needed   Refills:  5        * clobetasol 0.05 % external cream   Commonly known as:  TEMOVATE   Quantity:  15 g        Apply sparingly to affected area twice daily for 14 days.  Do not apply to face.   Refills:  2        Cranberry Powd   Dose:  1 capsule        1 capsule   Refills:  0        EPINEPHrine 0.3 MG/0.3ML injection 2-pack   Commonly known as:  EPIPEN 2-FRAN   Dose:  0.3 mg   Quantity:  0.6 mL        Inject 0.3 mLs (0.3 mg) into the muscle once as needed for anaphylaxis   Refills:  1        hydrOXYzine 25 MG tablet   Commonly known as:  ATARAX   Dose:  25 mg   Quantity:  120 tablet        Take 1 tablet (25 mg) by mouth 3 times daily as needed for itching   Refills:  1        ketoconazole 2 % external shampoo   Commonly known as:  NIZORAL   Quantity:  120 mL        Apply to the affected area and wash off after 5 minutes.   Refills:  1        losartan 100 MG tablet   Commonly known as:  COZAAR   Dose:  100 mg   Quantity:  90 tablet        Take 1 tablet (100 mg) by mouth At Bedtime   Refills:  1        magic mouthwash suspension (diphenhydrAMINE, lidocaine, aluminum-magnesium & simethicone) compounding kit   Dose:  5-10 mL   Quantity:  237 mL        Swish and swallow 5-10 mLs in mouth every 6 hours as needed for mouth sores   Refills:  3        Magnesium Oxide 250 MG Tabs   Dose:  250 mg        Take 250 mg by mouth   Refills:  0        predniSONE 20 MG tablet   Commonly known as:  DELTASONE   Quantity:  20 tablet        Take 3 tabs (60 mg) by mouth daily x 3 days, 2 tabs (40 mg) daily x 3 days, 1 tab (20 mg) daily x 3 days, then 1/2 tab (10 mg) x 3 days.   Refills:   0        SUMAtriptan 20 MG/ACT nasal spray   Commonly known as:  IMITREX   Dose:  1 spray   Quantity:  12 each        Spray 1 spray in nostril as needed for migraine May repeat in 2 hours. Max 2 sprays/24 hours.   Refills:  11        torsemide 10 MG tablet   Commonly known as:  DEMADEX   Dose:  10 mg   Quantity:  90 tablet        Take 1 tablet (10 mg) by mouth daily   Refills:  3        traMADol 50 MG tablet   Commonly known as:  ULTRAM   Dose:  1 tablet        Take 1 tablet by mouth every 6 hours as needed   Refills:  0        triamcinolone 0.1 % external cream   Commonly known as:  KENALOG   Quantity:  454 g        Apply topically 2 times daily Apply to AA on trunk, arms or legs bid for 10-14 days   Refills:  0        vitamin D3 2000 units tablet   Commonly known as:  CHOLECALCIFEROL   Dose:  1 tablet        Take 1 tablet by mouth daily   Refills:  0        ZOFRAN 4 MG tablet   Generic drug:  ondansetron        Take by mouth every 8 hours as needed for nausea   Refills:  0        ZOLMitriptan 5 MG ODT   Commonly known as:  ZOMIG-ZMT   Dose:  5 mg        Take 5 mg by mouth   Refills:  0        * Notice:  This list has 4 medication(s) that are the same as other medications prescribed for you. Read the directions carefully, and ask your doctor or other care provider to review them with you.            Information about OPIOIDS     PRESCRIPTION OPIOIDS: WHAT YOU NEED TO KNOW   We gave you an opioid (narcotic) pain medicine. It is important to manage your pain, but opioids are not always the best choice. You should first try all the other options your care team gave you. Take this medicine for as short a time (and as few doses) as possible.    Some activities can increase your pain, such as bandage changes or therapy sessions. It may help to take your pain medicine 30 to 60 minutes before these activities. Reduce your stress by getting enough sleep, working on hobbies you enjoy and practicing relaxation or meditation.  Talk to your care team about ways to manage your pain beyond prescription opioids.    These medicines have risks:    DO NOT drive when on new or higher doses of pain medicine. These medicines can affect your alertness and reaction times, and you could be arrested for driving under the influence (DUI). If you need to use opioids long-term, talk to your care team about driving.    DO NOT operate heavy machinery    DO NOT do any other dangerous activities while taking these medicines.    DO NOT drink any alcohol while taking these medicines.     If the opioid prescribed includes acetaminophen, DO NOT take with any other medicines that contain acetaminophen. Read all labels carefully. Look for the word  acetaminophen  or  Tylenol.  Ask your pharmacist if you have questions or are unsure.    You can get addicted to pain medicines, especially if you have a history of addiction (chemical, alcohol or substance dependence). Talk to your care team about ways to reduce this risk.    All opioids tend to cause constipation. Drink plenty of water and eat foods that have a lot of fiber, such as fruits, vegetables, prune juice, apple juice and high-fiber cereal. Take a laxative (Miralax, milk of magnesia, Colace, Senna) if you don t move your bowels at least every other day. Other side effects include upset stomach, sleepiness, dizziness, throwing up, tolerance (needing more of the medicine to have the same effect), physical dependence and slowed breathing.    Store your pills in a secure place, locked if possible. We will not replace any lost or stolen medicine. If you don t finish your medicine, please throw away (dispose) as directed by your pharmacist. The [place] [employer] has more information about safe disposal: https://www.pca.[facility].us/living-green/managing-unwanted-medications        Prescriptions were sent or printed at these locations (2 Prescriptions)                   Other Prescriptions                 Printed at Department/Unit printer (2 of 2)         diphenhydrAMINE (BENADRYL ALLERGY) 25 MG tablet               morphine 10 MG/5ML solution                Procedures and tests performed during your visit     Procedure/Test Number of Times Performed    Basic metabolic panel 1    CBC with platelets differential 2    CT Head w/o Contrast 1      Orders Needing Specimen Collection     None      Pending Results     No orders found from 12/2/2018 to 12/5/2018.            Pending Culture Results     No orders found from 12/2/2018 to 12/5/2018.            Pending Results Instructions     If you had any lab results that were not finalized at the time of your Discharge, you can call the ED Lab Result RN at [phone]. You will be contacted by this team for any positive Lab results or changes in treatment. The nurses are available 7 days a week from 10A to 6:30P.  You can leave a message 24 hours per day and they will return your call.        Test Results From Your Hospital Stay        12/4/2018  9:52 PM      Narrative     CT SCAN OF THE HEAD WITHOUT CONTRAST   12/4/2018 9:44 PM     HISTORY: Increased headache x 1 month.     TECHNIQUE:  Axial images of the head and coronal reformations without  IV contrast material. Radiation dose for this scan was reduced using  automated exposure control, adjustment of the mA and/or kV according  to patient size, or iterative reconstruction technique.    COMPARISON: Head CT 8/9/2018.    FINDINGS: There is no evidence of intracranial hemorrhage, mass, acute  infarct or anomaly. The ventricles are normal in size, shape and  configuration. The brain parenchyma and subarachnoid spaces are  normal.     The visualized portions of the sinuses and mastoids appear normal. The  bony calvarium and bones of the skull base appear intact.         Impression     IMPRESSION: No evidence of acute intracranial hemorrhage, mass, or  herniation.      GRIFFIN FERRERA MD         12/4/2018 10:02 PM       Component Results     Component Value Ref Range & Units Status    WBC  4.0 - 11.0 10e9/L Final    Canceled, Test credited, specimen discarded    Unsatisfactory specimen - clotted  NOTE PUT IN TRACKBOARD @ 2201       RBC Count  3.8 - 5.2 10e12/L Final    Canceled, Test credited, specimen discarded    Unsatisfactory specimen - clotted  NOTE PUT IN TRACKBOARD @ 2201       Hemoglobin  11.7 - 15.7 g/dL Final    Canceled, Test credited, specimen discarded    Unsatisfactory specimen - clotted  NOTE PUT IN TRACKBOARD @ 2201       Hematocrit  35.0 - 47.0 % Final    Canceled, Test credited, specimen discarded    Unsatisfactory specimen - clotted  NOTE PUT IN TRACKBOARD @ 2201       MCV  78 - 100 fl Final    Canceled, Test credited, specimen discarded    Unsatisfactory specimen - clotted  NOTE PUT IN TRACKBOARD @ 2201       MCH  26.5 - 33.0 pg Final    Canceled, Test credited, specimen discarded    Unsatisfactory specimen - clotted  NOTE PUT IN TRACKBOARD @ 2201       MCHC  31.5 - 36.5 g/dL Final    Canceled, Test credited, specimen discarded    Unsatisfactory specimen - clotted  NOTE PUT IN TRACKBOARD @ 2201       RDW  10.0 - 15.0 % Final    Canceled, Test credited, specimen discarded    Unsatisfactory specimen - clotted  NOTE PUT IN TRACKBOARD @ 2201       Platelet Count  150 - 450 10e9/L Final    Canceled, Test credited, specimen discarded    Unsatisfactory specimen - clotted  NOTE PUT IN TRACKBOARD @ 2201       Diff Method   Final    Canceled, Test credited, specimen discarded    Unsatisfactory specimen - clotted  NOTE PUT IN TRACKBOARD @ [address] [place] [address]           12/4/2018 10:04 PM      Component Results     Component Value Ref Range & Units Status    Sodium 136 133 - 144 mmol/L Final    Potassium 4.1 3.4 - 5.3 mmol/L Final    Specimen slightly hemolyzed, potassium may be falsely elevated    Chloride 101 94 - 109 mmol/L Final    Carbon Dioxide 25 20 - 32 mmol/L Final    Anion Gap 10 3 - 14 mmol/L Final     Glucose 127 (H) 70 - 99 mg/dL Final    Urea Nitrogen 22 7 - 30 mg/dL Final    Creatinine 1.38 (H) 0.52 - 1.04 mg/dL Final    GFR Estimate 38 (L) >60 mL/min/1.7m2 Final    Non  GFR Calc    GFR Estimate If Black 46 (L) >60 mL/min/1.7m2 Final    African American GFR Calc    Calcium 9.1 8.5 - 10.1 mg/dL Final         12/4/2018 10:18 PM      Component Results     Component Value Ref Range & Units Status    WBC 6.0 4.0 - 11.0 10e9/L Final    RBC Count 3.37 (L) 3.8 - 5.2 10e12/L Final    Hemoglobin 10.4 (L) 11.7 - 15.7 g/dL Final    Hematocrit 31.4 (L) 35.0 - 47.0 % Final    MCV 93 78 - 100 fl Final    MCH 30.9 26.5 - 33.0 pg Final    MCHC 33.1 31.5 - 36.5 g/dL Final    RDW 15.1 (H) 10.0 - 15.0 % Final    Platelet Count 197 150 - 450 10e9/L Final    Diff Method Automated Method  Final    % Neutrophils 53.2 % Final    % Lymphocytes 24.5 % Final    % Monocytes 13.4 % Final    % Eosinophils 8.2 % Final    % Basophils 0.5 % Final    % Immature Granulocytes 0.2 % Final    Nucleated RBCs 0 0 /100 Final    Absolute Neutrophil 3.2 1.6 - 8.3 10e9/L Final    Absolute Lymphocytes 1.5 0.8 - 5.3 10e9/L Final    Absolute Monocytes 0.8 0.0 - 1.3 10e9/L Final    Absolute Eosinophils 0.5 0.0 - 0.7 10e9/L Final    Absolute Basophils 0.0 0.0 - 0.2 10e9/L Final    Abs Immature Granulocytes 0.0 0 - 0.4 10e9/L Final    Absolute Nucleated RBC 0.0  Final                Clinical Quality Measure: Blood Pressure Screening     Your blood pressure was checked while you were in the emergency department today. The last reading we obtained was  BP: 158/72 . Please read the guidelines below about what these numbers mean and what you should do about them.  If your systolic blood pressure (the top number) is less than 120 and your diastolic blood pressure (the bottom number) is less than 80, then your blood pressure is normal. There is nothing more that you need to do about it.  If your systolic blood pressure (the top number) is 120-139 or  your diastolic blood pressure (the bottom number) is 80-89, your blood pressure may be higher than it should be. You should have your blood pressure rechecked within a year by a primary care provider.  If your systolic blood pressure (the top number) is 140 or greater or your diastolic blood pressure (the bottom number) is 90 or greater, you may have high blood pressure. High blood pressure is treatable, but if left untreated over time it can put you at risk for heart attack, stroke, or kidney failure. You should have your blood pressure rechecked by a primary care provider within the next 4 weeks.  If your provider in the emergency department today gave you specific instructions to follow-up with your doctor or provider even sooner than that, you should follow that instruction and not wait for up to 4 weeks for your follow-up visit.        Thank you for choosing [facility]       Thank you for choosing [facility] for your care. Our goal is always to provide you with excellent care. Hearing back from our patients is one way we can continue to improve our services. Please take a few minutes to complete the written survey that you may receive in the mail after you visit with us. Thank you!        [employer] Information     [facility] gives you secure access to your electronic health record. If you see a primary care provider, you can also send messages to your care team and make appointments. If you have questions, please call your primary care clinic.  If you do not have a primary care provider, please call [phone] and they will assist you.        Care EveryWhere ID     This is your Care EveryWhere ID. This could be used by other organizations to access your [facility] medical records  [id number]        Equal Access to Services     LISBET LIPSCOMB : Hadii clayton aguilaro Sodonny, waaxda luqadaha, qaybta kaalmatomasa qureshi, marlene vasquez. So [facility] [phone].    ATENCIÓN: lisa Esquivel  armendariz disposición servicios gratuitos de asistencia lingüística. Llconnie al [phone].    We comply with applicable federal civil rights laws and [place] laws. We do not discriminate on the basis of race, color, national origin, age, disability, sex, sexual orientation, or gender identity.            After Visit Summary       This is your record. Keep this with you and show to your community pharmacist(s) and doctor(s) at your next visit.

## 2018-12-04 NOTE — ED AVS SNAPSHOT
Emergency Department    [address] [place] [address]    Phone:  [phone]    Fax:  [phone]                                       Rosa Sotomayor   MRN: [id number]    Department:   Emergency Department   Date of Visit:  12/4/2018           After Visit Summary Signature Page     I have received my discharge instructions, and my questions have been answered. I have discussed any challenges I see with this plan with the nurse or doctor.    ..........................................................................................................................................  Patient/Patient Representative Signature      ..........................................................................................................................................  Patient Representative Print Name and Relationship to Patient    ..................................................               ................................................  Date                                   Time    ..........................................................................................................................................  Reviewed by Signature/Title    ...................................................              ..............................................  Date                                               Time          22EPIC Rev 08/18

## 2018-12-05 NOTE — ED PROVIDER NOTES
History     Chief Complaint:  Headache     HPI   Rosa Sotomayor is a 67 year old female with history of migraines, who presents for evaluation of headache for the past 4 weeks. The patient reports getting a shingles vaccine 4 weeks ago and then developed headache, sores in her throat and mouth, and an itchy rash. Her rash and sores have resolved. The patient states her headache has been getting worse each day, especially the past couple of days. She has seen physicians at her PCP's office regarding the HA and they warned her it may last 4-6 weeks. She describes a pounding bilateral temporal and frontal headache. She has a history of migraines, but states that this feels different from those and her usual prescription strength medications are not helping. The patient denies chest pain, shortness of breath, numbness/tingling or weakness.       Allergies:  Codeine  Fentanyl  Gabapentin  Hydromorphone  Midazolam  Prilocaine  Propofol  Penicillins  Shingrix [Zoster Vac Recomb Adjuvanted]  Sulfa Drugs  Clindamycin  Codeine  Diatrizoate  Hydrocodone  Lisinopril  Lorazepam  Nsaids  Oxycodone  Vancomycin  Erythromycin  Nitrofuran Derivatives  Nitrofurantoin  Tramadol      Medications:    allopurinol (ZYLOPRIM) 100 MG tablet  amitriptyline (ELAVIL)  atorvastatin (LIPITOR) 40 MG tablet  carvedilol (COREG) 6.25 MG tablet  cetirizine (ZYRTEC) 10 MG tablet  Cholecalciferol (VITAMIN D) 2000 UNITS tablet  Cyanocobalamin (B-12) 1000 MCG TBCR  hydrOXYzine (ATARAX) 25 MG tablet  losartan (COZAAR) 100 MG tablet  Magnesium Oxide 250 MG TABS  ondansetron (ZOFRAN) 4 MG tablet  predniSONE (DELTASONE) 20 MG tablet  SUMAtriptan (IMITREX) 20 MG/ACT nasal spray  torsemide (DEMADEX) 10 MG tablet  traMADol (ULTRAM) 50 MG tablet  ZOLMitriptan (ZOMIG-ZMT) 5 MG ODT tab    Past Medical History:    Macular degeneration   Anemia   Renal artery stenosis  Hyperlipidemia   Lumbago   Hypertension   Anxiety   Migraines   Aortic valve insufficiency  "  CKD    Past Surgical History:    Discectomy, L3-L4  Valentine teeth   Varicose veins   Bilateral hip replacements (left 2012, right 2017)  Right knee arthroplasty   Left knee arthroplasty   Right rotator cuff repair   Tubal ligation     Family History:    Diabetes, hypertension, cataracts, CHF, lipids, CAD - mother   Diabetes, hypertension, arthritis, macular degeneration, lipids, CHF - father     Social History:  The patient was accompanied to the ED by .  Smoking Status: never  Smokeless Tobacco: never  Alcohol Use: no   Marital Status:   [2]     Review of Systems   Respiratory: Negative for shortness of breath.    Cardiovascular: Negative for chest pain.   Neurological: Positive for headaches. Negative for weakness and numbness.   All other systems reviewed and are negative.        Physical Exam     Patient Vitals for the past 24 hrs:   BP Temp Temp src Heart Rate Resp SpO2 Height   12/04/18 2302 158/72 - - 98 - 92 % -   12/04/18 2212 172/77 - - 102 16 97 % -   12/04/18 2211 164/78 - - - - 97 % -   12/04/18 2210 172/77 - - - - 96 % -   12/04/18 1953 184/87 99.2  F (37.3  C) Temporal 125 18 99 % 1.626 m (5' 4\")        Physical Exam  General/Appearance: appears stated age, well-groomed, appears comfortable  Eyes: EOMI, no scleral injection, no icterus  ENT: MMM  Neck: supple, nl ROM, no stiffness  Cardiovascular: tachy but regular, nl S1S2, no m/r/g, 2+ pulses in all 4 extremities, cap refill <2sec  Respiratory: CTAB, good air movement throughout, no wheezes/rhonchi/rales, no increased WOB, no retractions  Back: no lesions  GI: abd soft, non-distended, nttp,  no HSM, no rebound, no guarding, nl BS  MSK: YANEZ, good tone, no bony abnormality  Skin: warm and well-perfused, no rash, no edema, no ecchymosis, nl turgor  Neuro: GCS 15, alert and oriented, no gross focal neuro deficits  Psych: interacts appropriately  Heme: no petechia, no purpura, no active bleeding      Emergency Department Course "     Imaging:  Radiology findings were communicated with the patient who voiced understanding of the findings.    CT Head w/o Contrast  Final Result  No evidence of acute intracranial hemorrhage, mass, or  herniation.  GRIFFIN FERRERA MD    Laboratory:  Laboratory findings were communicated with the patient who voiced understanding of the findings.    CBC: WBC 6.0, HGB 10.4 (L),   BMP: Creat 1.38 (H), GFR 38 (L), Glucose 127 (H) o/w WNL      Interventions:  2119 NS, 1 L, IV   2120 Benadryl, 25 mg, IV  2121 Decadron, 10 mg, IV    2123 Reglan 10 mg IV  2225 morphine 8 mg IV  2307 morphine 4 mg IV  2339 benadryl 50 mg IV     Emergency Department Course:  Nursing notes and vitals reviewed.  I entered the room.  I performed an exam of the patient as documented above.     IV was inserted and blood was drawn for laboratory testing, results above.    The patient was sent for CT Scan while in the emergency department, results above.     The patient received the above intervention(s).     2221 the patient was rechecked and updated regarding the results of the laboratory and imaging studies. The patient reports her headache is unchanged.     2327 the patient was rechecked and she agreed to discharge with outpatient medications.     I discussed the treatment plan with the patient. They expressed understanding of this plan and consented to discharge. They will be discharged home with instructions for care and follow up. In addition, the patient will return to the emergency department if their symptoms worsen, if new symptoms arise or if there is any concern.  All questions were answered.     Impression & Plan      Medical Decision Making:  This pt presents with a headache consistent with a primary headache, possibly a reaction to the Shingles vaccine.  The patient's neurologic exam in the emergency department is normal. The patient has not had any fever, weakness, numbness, paresthesia, neck stiffness or confusion.  Meningitis/intracranial infection, subarachnoid hemorrhage/intracranial hemorrhage, CNS tumor, increased IOP, temporal arteritis, and stroke/venous thrombosis are considered as part of the differential. CT head is neg. This is not the worst headache of the pt's  life and it started gradually, so I believe subarachnoid hemorrhage is unlikely. There has been no recent trauma, nor is the pt on blood thinners, which lower my concern for intracranial bleed. The patient is afebrile without neck stiffness, so I believe meningitis is unlikely/ The patient has a normal neurologic exam so CNS tumor, stroke, Venous thrombosis are unlikely.   The pain has improved with medication interventions.  The patient should follow-up with their primary physician within 3 days/if the sxs worsen or change. If the headache continues or the frequency increases, consultation with neurology will be indicated.  The pt has been Instructed to return if their pain increases, they develop a fever, neuro sxs, vomiting, or any other new and concerning symptoms.      Diagnosis:    ICD-10-CM    1. Acute intractable headache, unspecified headache type R51      Disposition:   The patient was discharged to home.    Discharge Medications:  New Prescriptions    DIPHENHYDRAMINE (BENADRYL ALLERGY) 25 MG TABLET    Take 2 tablets (50 mg) by mouth every 6 hours as needed for itching or allergies    MORPHINE 10 MG/5ML SOLUTION    Take 2.5 mLs (5 mg) by mouth every 4 hours as needed for pain     Scribe Disclosure:  I, Willie Contreras, am serving as a scribe at 8:40 PM on 12/4/2018 to document services personally performed by Rhoda Adams MD, based on my observations and the provider's statements to me.    EMERGENCY DEPARTMENT       Rhoda Adams MD  12/04/18 [id number]

## 2018-12-05 NOTE — DISCHARGE INSTRUCTIONS
* Headache (Unspecified)    The cause of your headache today is not clear, but it does not appear to be the sign of any serious illness.  Under stress, some people tense the muscles of their shoulder, neck and scalp without knowing it. If this condition lasts long enough, a TENSION HEADACHE can occur.  A MIGRAINE HEADACHE is caused by changes in blood flow to the brain. It can be mild or severe. A migraine attack may be triggered by emotional stress, hormone changes during the menstrual cycle, oral contraceptives, alcohol use, certain foods containing tyramine, eye strain, weather changes, missing meals, lack of sleep or oversleeping.  Other causes of headache include a viral illness, sinus, ear or throat infection, dental pain and TMJ (jaw joint) pain.  Home Care:    If you were given pain medicine for this headache, do not drive yourself home. Arrange for a ride, instead. When you get home, try to sleep. You should feel much better when you wake up.    If you are having nausea or vomiting, follow a light diet until your headache is relieved.    If you have a migraine type headache, use sunglasses when in the daylight or around bright indoor lighting until symptoms improve. Bright glaring light can worsen this kind of headache.  Follow Up  with your doctor if the headache is not better within the next 24 hours. If you have frequent headaches you should discuss a treatment plan with your primary care doctor. By being aware of the earliest signs of headache, and starting treatment right away, you may be able to stop the pain yourself.  Get Prompt Medical Attention  if any of the following occur:    Worsening of your head pain or no improvement within 24 hours    Repeated vomiting (unable to keep liquids down)    Fever over 101 F (38.3 C)    Stiff neck    Extreme drowsiness, confusion or fainting    Weakness of an arm or leg or one side of the face    Difficulty with speech or vision    [id number] [facility]  [employer]. [address] [place] [address], [place] [address]. All rights reserved. This information is not intended as a substitute for professional medical care. Always follow your healthcare professional's instructions.  This information has been modified by your health care provider with permission from the publisher.  Modifications clinically reviewed by Dr. Zaire Vanegas on 7/20/18.

## 2018-12-06 ENCOUNTER — OFFICE VISIT (OUTPATIENT)
Dept: FAMILY MEDICINE | Facility: CLINIC | Age: 68
End: 2018-12-06
Payer: COMMERCIAL

## 2018-12-06 VITALS
OXYGEN SATURATION: 97 % | HEIGHT: 64 IN | SYSTOLIC BLOOD PRESSURE: 126 MMHG | WEIGHT: 189 LBS | BODY MASS INDEX: 32.27 KG/M2 | TEMPERATURE: 98.3 F | HEART RATE: 89 BPM | DIASTOLIC BLOOD PRESSURE: 72 MMHG

## 2018-12-06 DIAGNOSIS — I70.1 RENAL ARTERY STENOSIS (H): ICD-10-CM

## 2018-12-06 DIAGNOSIS — N18.30 CKD (CHRONIC KIDNEY DISEASE) STAGE 3, GFR 30-59 ML/MIN (H): ICD-10-CM

## 2018-12-06 DIAGNOSIS — E78.1 HYPERTRIGLYCERIDEMIA: ICD-10-CM

## 2018-12-06 DIAGNOSIS — I10 BENIGN ESSENTIAL HYPERTENSION: ICD-10-CM

## 2018-12-06 DIAGNOSIS — D64.9 ANEMIA, UNSPECIFIED TYPE: Primary | ICD-10-CM

## 2018-12-06 DIAGNOSIS — G43.C0 PERIODIC HEADACHE SYNDROME, NOT INTRACTABLE: ICD-10-CM

## 2018-12-06 DIAGNOSIS — N25.81 SECONDARY RENAL HYPERPARATHYROIDISM (H): ICD-10-CM

## 2018-12-06 DIAGNOSIS — Q07.00 ARNOLD-CHIARI MALFORMATION (H): ICD-10-CM

## 2018-12-06 PROCEDURE — 99214 OFFICE O/P EST MOD 30 MIN: CPT | Performed by: INTERNAL MEDICINE

## 2018-12-06 RX ORDER — TOPIRAMATE 25 MG/1
TABLET, FILM COATED ORAL
Qty: 70 TABLET | Refills: 0 | Status: SHIPPED | OUTPATIENT
Start: 2018-12-06 | End: 2019-03-27 | Stop reason: DRUGHIGH

## 2018-12-06 NOTE — PROGRESS NOTES
SUBJECTIVE:   Rosa Sotomayor is a 67 year old female who presents to clinic today for the following health issues:      ED/UC Followup:    Facility:   Emergency Department  Date of visit: 12/04/18  Reason for visit: Headache  Current Status: Still having the headache, can't take the morphine prescribed as has developed itching on medication.       Pleasant 67-year-old female with multiple medical problems including an Arnold-Chiari malformation, chronic kidney disease, back pain with a recent successful spinal surgery, chronic headaches for which she is followed by a migraine neurologist, secondary renal hyperparathyroidism, a chart history of renal artery stenosis, chronic anemia, anxiety, adverse reactions to general anesthesia.  She is recently seen with an adverse reaction to her Shingrix vaccine.  She was seen in the emergency department 2 days ago with an intractable headache.  Fortunately, her headache is improving, but her headache remains severe.  She cannot tolerate the oral morphine that was prescribed by the emergency room the physician due to itching side effects.  Her blood pressure is improving.  She cannot take NSAIDs for her headache due to her kidney disease.  Her hemoglobin was noted to be lower than baseline in the emergency department.  She was noted to have markedly elevated triglycerides this summer and has changed her diet and wishes to recheck them.      Problem list and histories reviewed & adjusted, as indicated.  Additional history: as documented    Patient Active Problem List   Diagnosis     Postmenopausal bleeding     Personal history of allergy to anesthetic agent     CKD (chronic kidney disease) stage 3, GFR 30-59 ml/min (H)     Hypertriglyceridemia     Arnold-Chiari malformation (H)     Hyperlipidemia LDL goal <130     Periodic headache syndrome, not intractable     Anxiety     Secondary renal hyperparathyroidism (H)     Lumbago     Chronic bilateral low back pain without  sciatica     Benign essential hypertension     Renal artery stenosis (H)     Macular degeneration, bilateral     Anemia, iron deficiency     Intractable migraine without aura and without status migrainosus     Aortic valve insufficiency     Past Surgical History:   Procedure Laterality Date     BACK SURGERY      discectomy L3-4, 2018     C NONSPECIFIC PROCEDURE      wisdom teeth     C NONSPECIFIC PROCEDURE  2005    Varicose Veins     C TOTAL HIP ARTHROPLASTY      left in 2012, right 2017     C TOTAL KNEE ARTHROPLASTY      right 2014, left 2018     ROTATOR CUFF REPAIR RT/LT Right     2016     TUBAL LIGATION  1987       Social History   Substance Use Topics     Smoking status: Never Smoker     Smokeless tobacco: Never Used     Alcohol use No     Family History   Problem Relation Age of Onset     Diabetes Mother      INSULIN     Hypertension Mother      Eye Disorder Mother      CATERACTS     Heart Disease Mother      CHF- OPEN HEART SURG X'S 2     Lipids Mother      Obesity Mother      Coronary Artery Disease Mother      Diabetes Father      ORAL     Hypertension Father      Arthritis Father      Eye Disorder Father      MAC DEGENERATION     Heart Disease Father      CHF     Lipids Father      Obesity Father      Diabetes Maternal Grandfather      INSULIN     Diabetes Brother      INSULIN     GASTROINTESTINAL DISEASE Brother      CHOLITISI POSSIBLE CHRONES     Obesity Brother      Colon Cancer No family hx of      Breast Cancer No family hx of          Current Outpatient Prescriptions   Medication Sig Dispense Refill     acetaminophen (TYLENOL) 500 MG tablet Take 500 mg by mouth       allopurinol (ZYLOPRIM) 100 MG tablet Take 100 mg by mouth       amitriptyline (ELAVIL) 10 MG tablet Take 10 mg by mouth       amitriptyline (ELAVIL) 25 MG tablet Take 25 mg by mouth At Bedtime  0     atorvastatin (LIPITOR) 40 MG tablet TAKE 1 TABLET(40 MG) BY MOUTH DAILY 90 tablet 3     carvedilol (COREG) 6.25 MG tablet 12.5 mg in am and  6.25 mg in pm 270 tablet 0     cetirizine (ZYRTEC) 10 MG tablet Take 10 mg by mouth       Cholecalciferol (VITAMIN D) 2000 UNITS tablet Take 1 tablet by mouth daily       clobetasol (TEMOVATE) 0.05 % cream Apply sparingly to affected area twice daily for 14 days.  Do not apply to face. 15 g 2     clobetasol (TEMOVATE) 0.05 % external solution Apply topically 2 times daily as needed  5     Cranberry POWD 1 capsule       Cyanocobalamin (B-12) 1000 MCG TBCR Take 1,000 mcg by mouth daily 100 tablet 1     EPINEPHrine (EPIPEN 2-FRAN) 0.3 MG/0.3ML injection 2-pack Inject 0.3 mLs (0.3 mg) into the muscle once as needed for anaphylaxis 0.6 mL 1     hydrOXYzine (ATARAX) 25 MG tablet Take 1 tablet (25 mg) by mouth 3 times daily as needed for itching 120 tablet 1     ketoconazole (NIZORAL) 2 % shampoo Apply to the affected area and wash off after 5 minutes. 120 mL 1     losartan (COZAAR) 100 MG tablet Take 1 tablet (100 mg) by mouth At Bedtime (Patient taking differently: Take 50 mg by mouth 2 times daily ) 90 tablet 1     Magnesium Oxide 250 MG TABS Take 250 mg by mouth       ondansetron (ZOFRAN) 4 MG tablet Take by mouth every 8 hours as needed for nausea       SUMAtriptan (IMITREX) 20 MG/ACT nasal spray Spray 1 spray in nostril as needed for migraine May repeat in 2 hours. Max 2 sprays/24 hours. 12 each 11     topiramate (TOPAMAX) 25 MG tablet Take 1 tablet (25 mg) at bedtime for 1 week, then 1 tablet twice daily for 1 week, then 1 tablet in AM and 2 in PM for 1 week, then 2 tablets twice daily. 70 tablet 0     torsemide (DEMADEX) 10 MG tablet Take 1 tablet (10 mg) by mouth daily 90 tablet 3     traMADol (ULTRAM) 50 MG tablet Take 1 tablet by mouth every 6 hours as needed  0     triamcinolone (KENALOG) 0.1 % cream Apply topically 2 times daily Apply to AA on trunk, arms or legs bid for 10-14 days 454 g 0     ZOLMitriptan (ZOMIG-ZMT) 5 MG ODT tab Take 5 mg by mouth       diphenhydrAMINE (BENADRYL ALLERGY) 25 MG tablet Take 2  tablets (50 mg) by mouth every 6 hours as needed for itching or allergies (Patient not taking: Reported on 12/6/2018) 60 tablet 1     magic mouthwash (FIRST-MOUTHWASH BLM) suspension Swish and swallow 5-10 mLs in mouth every 6 hours as needed for mouth sores (Patient not taking: Reported on 12/6/2018) 237 mL 3     Allergies   Allergen Reactions     Bee Venom Anaphylaxis     Codeine Swelling     Body swelling and severe itching. Tolerates Morphine.      Fentanyl Anaphylaxis and Shortness Of Breath     Gabapentin Other (See Comments)     PN: chest heaviness with breathing  Chest heaviness with breathing  Chest heaviness with breathing  chest heaviness with breathing     Hydromorphone Itching     Tolerates morphine  Tolerates morphine     Midazolam Anaphylaxis, Itching and Shortness Of Breath     Tolerates Diazepam     Prilocaine Anaphylaxis     Anaphalactic shock     Propofol Anaphylaxis     Penicillins Itching and Rash     Tolerates Keflex,  Ancef  rash     Shingrix [Zoster Vac Recomb Adjuvanted] Itching and Rash     Sulfa Drugs Itching and Rash     rash     Clindamycin      Codeine      Diatrizoate Other (See Comments)     CKD 3     Hydrocodone      Lisinopril Cough     Lorazepam Itching     Nsaids Other (See Comments)     Contraindicated with kidney hx (including Celebrex, per pt)     Other [Seasonal Allergies] Anaphylaxis     GENERAL ANETHESIA       Oxycodone      Vancomycin Other (See Comments)     Thrush, yeast infection, bladder infection  Thrush, yeast infection, bladder infection     Erythromycin Rash     ointment  PN: ointment  ointment     Eucalyptus Oil Rash and Hives     hives     Nitrofuran Derivatives Rash     Nitrofurantoin Rash     Tramadol Rash       Reviewed and updated as needed this visit by clinical staff       Reviewed and updated as needed this visit by Provider         ROS:  Constitutional, HEENT, cardiovascular, pulmonary, gi and gu systems are negative, except as otherwise  "noted.    OBJECTIVE:     /72 (BP Location: Right arm, Patient Position: Sitting, Cuff Size: Adult Regular)  Pulse 89  Temp 98.3  F (36.8  C) (Oral)  Ht 5' 4\" (1.626 m)  Wt 189 lb (85.7 kg)  SpO2 97%  BMI 32.44 kg/m2  Body mass index is 32.44 kg/(m^2).  General: This is a well-appearing female in no acute distress.  She does not appear toxic.  She appears comfortable at rest.  She speaks in full sentences.  Neurological: Alert and oriented to person place and time, cranial nerves II to XII appear grossly intact, symmetric strength, normal gait.  Mental status: Anxious affect well-groomed, normal speech  Diagnostic Test Results:  Results for orders placed or performed during the hospital encounter of 12/04/18   CT Head w/o Contrast    Narrative    CT SCAN OF THE HEAD WITHOUT CONTRAST   12/4/2018 9:44 PM     HISTORY: Increased headache x 1 month.     TECHNIQUE:  Axial images of the head and coronal reformations without  IV contrast material. Radiation dose for this scan was reduced using  automated exposure control, adjustment of the mA and/or kV according  to patient size, or iterative reconstruction technique.    COMPARISON: Head CT 8/9/2018.    FINDINGS: There is no evidence of intracranial hemorrhage, mass, acute  infarct or anomaly. The ventricles are normal in size, shape and  configuration. The brain parenchyma and subarachnoid spaces are  normal.     The visualized portions of the sinuses and mastoids appear normal. The  bony calvarium and bones of the skull base appear intact.       Impression    IMPRESSION: No evidence of acute intracranial hemorrhage, mass, or  herniation.      GRIFFIN FERRERA MD   CBC with platelets differential   Result Value Ref Range    WBC Canceled, Test credited, specimen discarded 4.0 - 11.0 10e9/L    RBC Count Canceled, Test credited, specimen discarded 3.8 - 5.2 10e12/L    Hemoglobin Canceled, Test credited, specimen discarded 11.7 - 15.7 g/dL    Hematocrit Canceled, Test " credited, specimen discarded 35.0 - 47.0 %    MCV Canceled, Test credited, specimen discarded 78 - 100 fl    MCH Canceled, Test credited, specimen discarded 26.5 - 33.0 pg    MCHC Canceled, Test credited, specimen discarded 31.5 - 36.5 g/dL    RDW Canceled, Test credited, specimen discarded 10.0 - 15.0 %    Platelet Count Canceled, Test credited, specimen discarded 150 - 450 10e9/L    Diff Method Canceled, Test credited, specimen discarded    Basic metabolic panel   Result Value Ref Range    Sodium 136 133 - 144 mmol/L    Potassium 4.1 3.4 - 5.3 mmol/L    Chloride 101 94 - 109 mmol/L    Carbon Dioxide 25 20 - 32 mmol/L    Anion Gap 10 3 - 14 mmol/L    Glucose 127 (H) 70 - 99 mg/dL    Urea Nitrogen 22 7 - 30 mg/dL    Creatinine 1.38 (H) 0.52 - 1.04 mg/dL    GFR Estimate 38 (L) >60 mL/min/1.7m2    GFR Estimate If Black 46 (L) >60 mL/min/1.7m2    Calcium 9.1 8.5 - 10.1 mg/dL   CBC with platelets differential   Result Value Ref Range    WBC 6.0 4.0 - 11.0 10e9/L    RBC Count 3.37 (L) 3.8 - 5.2 10e12/L    Hemoglobin 10.4 (L) 11.7 - 15.7 g/dL    Hematocrit 31.4 (L) 35.0 - 47.0 %    MCV 93 78 - 100 fl    MCH 30.9 26.5 - 33.0 pg    MCHC 33.1 31.5 - 36.5 g/dL    RDW 15.1 (H) 10.0 - 15.0 %    Platelet Count 197 150 - 450 10e9/L    Diff Method Automated Method     % Neutrophils 53.2 %    % Lymphocytes 24.5 %    % Monocytes 13.4 %    % Eosinophils 8.2 %    % Basophils 0.5 %    % Immature Granulocytes 0.2 %    Nucleated RBCs 0 0 /100    Absolute Neutrophil 3.2 1.6 - 8.3 10e9/L    Absolute Lymphocytes 1.5 0.8 - 5.3 10e9/L    Absolute Monocytes 0.8 0.0 - 1.3 10e9/L    Absolute Eosinophils 0.5 0.0 - 0.7 10e9/L    Absolute Basophils 0.0 0.0 - 0.2 10e9/L    Abs Immature Granulocytes 0.0 0 - 0.4 10e9/L    Absolute Nucleated RBC 0.0        ASSESSMENT/PLAN:         1. Anemia, unspecified type  I suspect that her anemia is from mild iron deficiency and chronic kidney disease, she did have an upper GI series and colonoscopy in 2016.  If  the iron sats are very low, consider small bowel enterography or GI consult to see if we can find some source of her persistent iron deficiency anemia.  She did not respond to oral iron in the past, so if iron sats are low, she may need to be re-referred for IV iron infusions.  This was discussed with her in detail.  If her iron sats are normal, consider a referral to hematology or nephrology for consideration of an Aranesp injection.  Her nephrologist actually is at mail.  - Iron and iron binding capacity; Future  - CBC with platelets; Future    2. CKD (chronic kidney disease) stage 3, GFR 30-59 ml/min (H)  Her renal function was stable in the emergency department yesterday    3. Renal artery stenosis (H)  This may explain the lability of her blood pressure readings, however her blood pressure in clinic today is very well controlled    4. Arnold-Chiari malformation (H)  Stable; continue follow-up with neurology    5. Secondary renal hyperparathyroidism (H)  Calcium level looks healthy in the emergency department yesterday    6. Hypertriglyceridemia  Recheck triglycerides fasting, start TriCor if triglycerides persist greater than 500.  Hopefully, this improved with diet changes  - Lipid panel reflex to direct LDL Fasting; Future    7. Periodic headache syndrome, not intractable  With respect to her persistent headache, she cannot take NSAIDs, she is intolerant to opioids due to itching, she could try Topamax, she thinks she has tried this in the past, she would like to see if she can get in to see her headache specialist before starting a new drug which I think is more than reasonable, however, if there is going to be a delay until she can get into see that provider, she could try starting the Topamax as below, side effects and risks were discussed, 80 mg twice daily is probably the maximum dose for her given her GFR  - topiramate (TOPAMAX) 25 MG tablet; Take 1 tablet (25 mg) at bedtime for 1 week, then 1 tablet  twice daily for 1 week, then 1 tablet in AM and 2 in PM for 1 week, then 2 tablets twice daily.  Dispense: 70 tablet; Refill: 0    8. Benign essential hypertension  Today her blood pressure is under good control in clinic      Follow-up will be based on her laboratory test results and symptoms    Total face to face contact time was greater than 28 minutes of which more than 50% of this time was spent counseling and coordinating care regarding the above topics.      Josh Hollingsworth MD  [facility]

## 2018-12-06 NOTE — MR AVS SNAPSHOT
After Visit Summary   12/6/2018    Rosa Sotomayor    MRN: [id number]           Patient Information     Date Of Birth          1950        Visit Information        Provider Department      12/6/2018 4:00 PM Josh Hollingsworth MD [facility]        Today's Diagnoses     Anemia, unspecified type    -  1    CKD (chronic kidney disease) stage 3, GFR 30-59 ml/min (H)        Renal artery stenosis (H)        Arnold-Chiari malformation (H)        Secondary renal hyperparathyroidism (H)        Hypertriglyceridemia        Periodic headache syndrome, not intractable        Benign essential hypertension           Follow-ups after your visit        Follow-up notes from your care team     Return in about 1 month (around 1/6/2019) for Pain Check.      Your next 10 appointments already scheduled     Dec 07, 2018  9:45 AM CST   LAB with [facility] LAB   [facility]    [address] [id number] [phone]           Please do not eat 10-12 hours before your appointment if you are coming in fasting for labs on lipids, cholesterol, or glucose (sugar). This does not apply to pregnant women. Water, hot tea and black coffee (with nothing added) are okay. Do not drink other fluids, diet soda or chew gum.            Dec 13, 2018  8:00 PM CST   PSG Split with BK BED 3   [facility] ([facility])    [address]  [facility] [id number] [phone]            Dec 26, 2018  4:00 PM CST   Return Sleep Patient with Bennett Ezra Goltz, PA-C   [facility] ([facility])    [address] [id number] [phone]              Future tests that were ordered for you today     Open Future Orders        Priority Expected Expires Ordered    Iron and iron binding capacity Routine  12/6/2019 12/6/2018    CBC with platelets Routine   "12/6/2019 12/6/2018    Lipid panel reflex to direct LDL Fasting Routine  12/6/2019 12/6/2018            Who to contact     If you have questions or need follow up information about today's clinic visit or your schedule please contact [facility] at [phone].  Normal or non-critical lab and imaging results will be communicated to you by MyChart, letter or phone within 4 business days after the clinic has received the results. If you do not hear from us within 7 days, please contact the clinic through [employer] or phone. If you have a critical or abnormal lab result, we will notify you by phone as soon as possible.  Submit refill requests through [employer] or call your pharmacy and they will forward the refill request to us. Please allow 3 business days for your refill to be completed.          Additional Information About Your Visit        MyChart Information     [employer] gives you secure access to your electronic health record. If you see a primary care provider, you can also send messages to your care team and make appointments. If you have questions, please call your primary care clinic.  If you do not have a primary care provider, please call [phone] and they will assist you.        Care EveryWhere ID     This is your Care EveryWhere ID. This could be used by other organizations to access your [facility] medical records  [id number]        Your Vitals Were     Pulse Temperature Height Pulse Oximetry BMI (Body Mass Index)       89 98.3  F (36.8  C) (Oral) 5' 4\" (1.626 m) 97% 32.44 kg/m2        Blood Pressure from Last 3 Encounters:   12/06/18 126/72   12/04/18 137/81   11/27/18 142/78    Weight from Last 3 Encounters:   12/06/18 189 lb (85.7 kg)   11/27/18 188 lb 6.4 oz (85.5 kg)   11/21/18 184 lb (83.5 kg)                 Today's Medication Changes          These changes are accurate as of 12/6/18  4:52 PM.  If you have any questions, ask your nurse or doctor.               Start taking " these medicines.        Dose/Directions    topiramate 25 MG tablet   Commonly known as:  TOPAMAX   Used for:  Periodic headache syndrome, not intractable   Started by:  Josh Hollingsworth MD        Take 1 tablet (25 mg) at bedtime for 1 week, then 1 tablet twice daily for 1 week, then 1 tablet in AM and 2 in PM for 1 week, then 2 tablets twice daily.   Quantity:  70 tablet   Refills:  0         These medicines have changed or have updated prescriptions.        Dose/Directions    losartan 100 MG tablet   Commonly known as:  COZAAR   This may have changed:    - how much to take  - when to take this   Used for:  Benign essential hypertension        Dose:  100 mg   Take 1 tablet (100 mg) by mouth At Bedtime   Quantity:  90 tablet   Refills:  1         Stop taking these medicines if you haven't already. Please contact your care team if you have questions.     morphine 10 MG/5ML solution   Stopped by:  Josh Hollingsworth MD           predniSONE 20 MG tablet   Commonly known as:  DELTASONE   Stopped by:  Josh Hollingsworth MD                Where to get your medicines      Some of these will need a paper prescription and others can be bought over the counter.  Ask your nurse if you have questions.     Bring a paper prescription for each of these medications     topiramate 25 MG tablet                Primary Care Provider Office Phone # Fax #    Josh Hollingsworth [phone] [address] [address]        Equal Access to Services     [facility]: Hadii aad ku hadasho Soomaali, waaxda luqadaha, qaybta kaalmada adeegyada, marlene munoz haytrent luna . So [facility] [phone].    ATENCIÓN: Si habla español, tiene a armendariz disposición servicios gratuitos de asistencia lingüística. Llame al [phone].    We comply with applicable federal civil rights laws and [place] laws. We do not discriminate on the basis of race, color, national origin, age, disability, sex, sexual orientation,  or gender identity.            Thank you!     Thank you for choosing [facility]  for your care. Our goal is always to provide you with excellent care. Hearing back from our patients is one way we can continue to improve our services. Please take a few minutes to complete the written survey that you may receive in the mail after your visit with us. Thank you!             Your Updated Medication List - Protect others around you: Learn how to safely use, store and throw away your medicines at www.disposemymeds.org.          This list is accurate as of 12/6/18  4:52 PM.  Always use your most recent med list.                   Brand Name Dispense Instructions for use Diagnosis    acetaminophen 500 MG tablet    TYLENOL     Take 500 mg by mouth        allopurinol 100 MG tablet    ZYLOPRIM     Take 100 mg by mouth        * amitriptyline 10 MG tablet    ELAVIL     Take 10 mg by mouth        * amitriptyline 25 MG tablet    ELAVIL     Take 25 mg by mouth At Bedtime        atorvastatin 40 MG tablet    LIPITOR    90 tablet    TAKE 1 TABLET(40 MG) BY MOUTH DAILY    Hypertriglyceridemia       B-12 1000 MCG Tbcr     100 tablet    Take 1,000 mcg by mouth daily    Vitamin B12 deficiency (non anemic)       carvedilol 6.25 MG tablet    COREG    270 tablet    12.5 mg in am and 6.25 mg in pm    Benign essential hypertension       cetirizine 10 MG tablet    zyrTEC     Take 10 mg by mouth        * clobetasol 0.05 % external solution    TEMOVATE     Apply topically 2 times daily as needed        * clobetasol 0.05 % external cream    TEMOVATE    15 g    Apply sparingly to affected area twice daily for 14 days.  Do not apply to face.    Rash, Adverse effect of vaccine, initial encounter, Allergic reaction, initial encounter       Cranberry Powd      1 capsule        diphenhydrAMINE 25 MG tablet    BENADRYL ALLERGY    60 tablet    Take 2 tablets (50 mg) by mouth every 6 hours as needed for itching or allergies        EPINEPHrine  0.3 MG/0.3ML injection 2-pack    EPIPEN 2-FRAN    0.6 mL    Inject 0.3 mLs (0.3 mg) into the muscle once as needed for anaphylaxis    Allergic reaction, subsequent encounter       hydrOXYzine 25 MG tablet    ATARAX    120 tablet    Take 1 tablet (25 mg) by mouth 3 times daily as needed for itching    Pruritic disorder       ketoconazole 2 % external shampoo    NIZORAL    120 mL    Apply to the affected area and wash off after 5 minutes.    Seborrheic dermatitis       losartan 100 MG tablet    COZAAR    90 tablet    Take 1 tablet (100 mg) by mouth At Bedtime    Benign essential hypertension       magic mouthwash suspension (diphenhydrAMINE, lidocaine, aluminum-magnesium & simethicone) compounding kit     237 mL    Swish and swallow 5-10 mLs in mouth every 6 hours as needed for mouth sores    Canker sores oral       Magnesium Oxide 250 MG Tabs      Take 250 mg by mouth        SUMAtriptan 20 MG/ACT nasal spray    IMITREX    12 each    Spray 1 spray in nostril as needed for migraine May repeat in 2 hours. Max 2 sprays/24 hours.    Intractable migraine without aura and without status migrainosus       topiramate 25 MG tablet    TOPAMAX    70 tablet    Take 1 tablet (25 mg) at bedtime for 1 week, then 1 tablet twice daily for 1 week, then 1 tablet in AM and 2 in PM for 1 week, then 2 tablets twice daily.    Periodic headache syndrome, not intractable       torsemide 10 MG tablet    DEMADEX    90 tablet    Take 1 tablet (10 mg) by mouth daily    Edema leg       traMADol 50 MG tablet    ULTRAM     Take 1 tablet by mouth every 6 hours as needed        triamcinolone 0.1 % external cream    KENALOG    454 g    Apply topically 2 times daily Apply to AA on trunk, arms or legs bid for 10-14 days    Dermatitis       vitamin D3 2000 units tablet    CHOLECALCIFEROL     Take 1 tablet by mouth daily        ZOFRAN 4 MG tablet   Generic drug:  ondansetron      Take by mouth every 8 hours as needed for nausea        ZOLMitriptan 5 MG ODT     ZOMIG-ZMT     Take 5 mg by mouth        * Notice:  This list has 4 medication(s) that are the same as other medications prescribed for you. Read the directions carefully, and ask your doctor or other care provider to review them with you.

## 2018-12-07 DIAGNOSIS — Q07.00 ARNOLD-CHIARI MALFORMATION (H): ICD-10-CM

## 2018-12-07 DIAGNOSIS — E78.1 HYPERTRIGLYCERIDEMIA: ICD-10-CM

## 2018-12-07 DIAGNOSIS — D64.9 ANEMIA, UNSPECIFIED TYPE: ICD-10-CM

## 2018-12-07 DIAGNOSIS — I10 BENIGN ESSENTIAL HYPERTENSION: ICD-10-CM

## 2018-12-07 DIAGNOSIS — R06.83 SNORING: ICD-10-CM

## 2018-12-07 DIAGNOSIS — G47.8 NON-RESTORATIVE SLEEP: ICD-10-CM

## 2018-12-07 LAB
CHOLEST SERPL-MCNC: 171 MG/DL
ERYTHROCYTE [DISTWIDTH] IN BLOOD BY AUTOMATED COUNT: 15.6 % (ref 10–15)
HCT VFR BLD AUTO: 33.3 % (ref 35–47)
HDLC SERPL-MCNC: 59 MG/DL
HGB BLD-MCNC: 10.4 G/DL (ref 11.7–15.7)
IRON SATN MFR SERPL: 24 % (ref 15–46)
IRON SERPL-MCNC: 66 UG/DL (ref 35–180)
LDLC SERPL CALC-MCNC: 40 MG/DL
MCH RBC QN AUTO: 30.3 PG (ref 26.5–33)
MCHC RBC AUTO-ENTMCNC: 31.2 G/DL (ref 31.5–36.5)
MCV RBC AUTO: 97 FL (ref 78–100)
NONHDLC SERPL-MCNC: 112 MG/DL
PLATELET # BLD AUTO: 258 10E9/L (ref 150–450)
RBC # BLD AUTO: 3.43 10E12/L (ref 3.8–5.2)
TIBC SERPL-MCNC: 279 UG/DL (ref 240–430)
TRIGL SERPL-MCNC: 359 MG/DL
WBC # BLD AUTO: 4.7 10E9/L (ref 4–11)

## 2018-12-07 PROCEDURE — 83550 IRON BINDING TEST: CPT | Performed by: INTERNAL MEDICINE

## 2018-12-07 PROCEDURE — 85027 COMPLETE CBC AUTOMATED: CPT | Performed by: INTERNAL MEDICINE

## 2018-12-07 PROCEDURE — 83540 ASSAY OF IRON: CPT | Performed by: INTERNAL MEDICINE

## 2018-12-07 PROCEDURE — 36415 COLL VENOUS BLD VENIPUNCTURE: CPT | Performed by: INTERNAL MEDICINE

## 2018-12-07 PROCEDURE — 80061 LIPID PANEL: CPT | Performed by: INTERNAL MEDICINE

## 2018-12-07 NOTE — LETTER
[place] [address] [facility]  [address]  [place]  [address]  Tel: [phone]    December 10, 2018    Rosa Sotomayor  [address]  [place] [facility] [place] [place] [address]        Dear Ms. Sotomayor,    The following letter pertains to your most recent diagnostic tests:    -Your iron stores are replete (normal).  Giving you additional iron supplements is unlikely to improve your anemia.   I think your anemia is from your chronic kidney disease.   The anemia is not severe enough to warrant an injection of the hormone healthy kidneys secrete to improve your anemia (Aranesp).   However, if your hemoglobin worsens, this would be an option.  I recommend we recheck at a 3 month interval.  You can schedule a lab appointment for that purpose.       -Your triglycerides are much improved!  Nice work on changing your diet to improve this!  No need for medications based on this result.          Follow up:  Lab appointment in 3 months to recheck hemoglobin.   Return sooner if new symptoms develop.    Sincerely,    Josh Hollingsworth MD/[facility]          Enclosure: Lab Results  Results for orders placed or performed in visit on 12/07/18   Iron and iron binding capacity   Result Value Ref Range    Iron 66 35 - 180 ug/dL    Iron Binding Cap 279 240 - 430 ug/dL    Iron Saturation Index 24 15 - 46 %   CBC with platelets   Result Value Ref Range    WBC 4.7 4.0 - 11.0 10e9/L    RBC Count 3.43 (L) 3.8 - 5.2 10e12/L    Hemoglobin 10.4 (L) 11.7 - 15.7 g/dL    Hematocrit 33.3 (L) 35.0 - 47.0 %    MCV 97 78 - 100 fl    MCH 30.3 26.5 - 33.0 pg    MCHC 31.2 (L) 31.5 - 36.5 g/dL    RDW 15.6 (H) 10.0 - 15.0 %    Platelet Count 258 150 - 450 10e9/L   Lipid panel reflex to direct LDL Fasting   Result Value Ref Range    Cholesterol 171 <200 mg/dL    Triglycerides 359 (H) <150 mg/dL    HDL Cholesterol 59 >49 mg/dL    LDL Cholesterol Calculated 40 <100 mg/dL    Non HDL Cholesterol 112 <130 mg/dL

## 2018-12-09 NOTE — RESULT ENCOUNTER NOTE
The following letter pertains to your most recent diagnostic tests:    -Your iron stores are replete (normal).  Giving you additional iron supplements is unlikely to improve your anemia.   I think your anemia is from your chronic kidney disease.   The anemia is not severe enough to warrant an injection of the hormone healthy kidneys secrete to improve your anemia (Aranesp).   However, if your hemoglobin worsens, this would be an option.  I recommend we recheck at a 3 month interval.  You can schedule a lab appointment for that purpose.      -Your triglycerides are much improved!  Nice work on changing your diet to improve this!  No need for medications based on this result.          Follow up:  Lab appointment in 3 months to recheck hemoglobin.   Return sooner if new symptoms develop.        Sincerely,    Dr. Hollingsworth

## 2018-12-13 ENCOUNTER — THERAPY VISIT (OUTPATIENT)
Dept: SLEEP MEDICINE | Facility: CLINIC | Age: 68
End: 2018-12-13
Payer: COMMERCIAL

## 2018-12-13 DIAGNOSIS — Q07.00 ARNOLD-CHIARI MALFORMATION (H): ICD-10-CM

## 2018-12-13 DIAGNOSIS — G47.8 NON-RESTORATIVE SLEEP: ICD-10-CM

## 2018-12-13 DIAGNOSIS — I10 BENIGN ESSENTIAL HYPERTENSION: ICD-10-CM

## 2018-12-13 DIAGNOSIS — R06.83 SNORING: ICD-10-CM

## 2018-12-13 PROCEDURE — 95810 POLYSOM 6/> YRS 4/> PARAM: CPT | Performed by: INTERNAL MEDICINE

## 2018-12-16 ENCOUNTER — MYC MEDICAL ADVICE (OUTPATIENT)
Dept: FAMILY MEDICINE | Facility: CLINIC | Age: 68
End: 2018-12-16

## 2018-12-16 DIAGNOSIS — I10 BENIGN ESSENTIAL HYPERTENSION: ICD-10-CM

## 2018-12-17 PROBLEM — G47.33 OSA (OBSTRUCTIVE SLEEP APNEA): Chronic | Status: ACTIVE | Noted: 2018-12-17

## 2018-12-17 LAB — SLPCOMP: NORMAL

## 2018-12-17 NOTE — PROCEDURES
" SLEEP STUDY INTERPRETATION  DIAGNOSTIC POLYSOMNOGRAPHY REPORT      Patient: NEO DENT  YOB: 1950  Study Date: 12/13/2018  MRN: [id number]  Referring Provider: none  Ordering Provider: Bennett Goltz PA-C    Indications for Polysomnography: The patient is a 67 y old Female who is 5' 4\" and weighs 188.0 lbs. Her BMI is 32.5, [place] sleepiness scale 7.0 and neck circumference is 32.0 cm. A diagnostic polysomnogram was performed to evaluate for snoring, non--restorative sleep, frequent awakenings, migraines in the middle of the night. History of Arnold-Chiari malformation, essential hypertension      Polysomnogram Data: A full night polysomnogram recorded the standard physiologic parameters including EEG, EOG, EMG, ECG, nasal and oral airflow. Respiratory parameters of chest and abdominal movements were recorded with respiratory inductance plethysmography. Oxygen saturation was recorded by pulse oximetry. Hypopnea scoring rule used: 1B 4%.      Sleep Architecture:   The total recording time of the polysomnogram was 423.0 minutes. The total sleep time was 343.0 minutes. Sleep latency was normal at 11.6 minutes without the use of a sleep aid. REM latency was 191.0 minutes. Arousal index was  31.7 arousals per hour. Sleep efficiency was normal at 81.1%. Wake after sleep onset was 68.0 minutes. The patient spent 17.2% of total sleep time in Stage N1, 35.9% in Stage N2, 37.8% in Stage N3, and 9.2% in REM. Time in REM supine was 17.0 minutes.      Respiration:     Events ? The polysomnogram revealed a presence of 4 obstructive, 0 central, and 0 mixed apneas resulting in an apnea index of 0.7 events per hour. There were 67 obstructive hypopneas and 0 central hypopneas resulting in an obstructive hypopnea index of 11.7 and central hypopnea index of 0 events per hour. The combined apnea/hypopnea index was 12.4 events per hour (central apnea/hypopnea index was 0 events per hour). The REM AHI was 34.3 events " per hour. The supine AHI was 52.0 events per hour. The RERA index was 19.2 events per hour.  The RDI was 31.7 events per hour.    Snoring - was reported as mild to loud.    Respiratory rate and pattern - was notable for normal respiratory rate and pattern.    Sustained Sleep Associated Hypoventilation - Transcutaneous carbon dioxide monitoring was not used, however significant hypoventilation was not suggested by oximetry    Sleep Associated Hypoxemia - (Greater than 5 minutes O2 sat at or below 88%) was not present. Baseline oxygen saturation was 93.6%. Lowest oxygen saturation was 75.1%. Time spent less than or equal to 88% was 1.4 minutes. Time spent less than or equal to 89% was 2.4 minutes.    Movement Activity:     Periodic Limb Activity - There were 41 PLMs during the entire study. The PLM index was 7.2 movements per hour. The PLM Arousal Index was 0.5 per hour.    REM EMG Activity - Excessive transient/sustained muscle activity was not present.    Nocturnal Behavior - Abnormal sleep related behaviors were not noted    Bruxism - None apparent.      Cardiac Summary:   The average pulse rate was 103.8 bpm. The minimum pulse rate was 59.0 bpm while the maximum pulse rate was 123.1 bpm.  Arrhythmias were not noted.      Assessment:     Mild obstructive sleep apnea, supine predominant    Sinus tachycardia    Occasional periodic limb movements of sleep    Recommendations:    Consider repeat polysomnography with full night titration of positive airway pressure therapy for the control of sleep disordered breathing.    Based on the presence of mild/moderate obstructive sleep apnea and hypertension, treatment could be empirically initiated with Auto?titrating PAP therapy with a range of 5 to 18 cmH2O. Recommend clinical follow up with sleep management team.    Patient may be a candidate for dental appliance through referral to Sleep Dentistry for the treatment of obstructive sleep apnea and/or socially disruptive  snoring.    Positional therapy could be employed.     If devices are not acceptable or effective, patient may benefit from evaluation of possible surgical options. If she is interested, would recommend referral to specialized ENT-Sleep provider.    Advice regarding the risks of drowsy driving.    Suggest optimizing sleep schedule and avoiding sleep deprivation.    Weight management (if BMI > 30).    Pharmacologic therapy should be used for management of restless legs syndrome only if present and clinically indicated and not based on the presence of periodic limb movements alone.    Diagnostic Codes:   Obstructive Sleep Apnea G47.33       _____________________________________   Electronically Signed By: Pal Hussein MD 12/17/18

## 2018-12-18 RX ORDER — CARVEDILOL 25 MG/1
25 TABLET ORAL 2 TIMES DAILY
Qty: 180 TABLET | Refills: 3 | Status: SHIPPED | OUTPATIENT
Start: 2018-12-18 | End: 2019-07-08

## 2018-12-18 NOTE — TELEPHONE ENCOUNTER
Pt advised. Pt going to [place] for 7 weeks on 1/6/19. Pt scheduled for follow up on 1/2/19 at 2 pm for follow up regarding medication increase.

## 2018-12-18 NOTE — TELEPHONE ENCOUNTER
PCP see below,   Pt was seen following reaction to Shingrix, follow up specified as 1 month (early Jan)   Pt was seen by neurology for headache   Per below patient reporting blood pressure readings and pulse has been elevated      Please advise - should patient come in for sooner follow up     Urmila IZQUIERDO RN

## 2018-12-18 NOTE — TELEPHONE ENCOUNTER
If the blood pressure and pulse are still high, I recommend increasing carvedilol from current dose to 25mg twice daily, new prescription sent, schedule office visit appointment to follow up on blood pressure and medication change

## 2018-12-26 ENCOUNTER — OFFICE VISIT (OUTPATIENT)
Dept: SLEEP MEDICINE | Facility: CLINIC | Age: 68
End: 2018-12-26
Payer: COMMERCIAL

## 2018-12-26 VITALS
HEART RATE: 88 BPM | RESPIRATION RATE: 16 BRPM | SYSTOLIC BLOOD PRESSURE: 132 MMHG | OXYGEN SATURATION: 98 % | WEIGHT: 190.4 LBS | BODY MASS INDEX: 32.5 KG/M2 | HEIGHT: 64 IN | DIASTOLIC BLOOD PRESSURE: 73 MMHG

## 2018-12-26 DIAGNOSIS — G47.33 OSA (OBSTRUCTIVE SLEEP APNEA): Primary | ICD-10-CM

## 2018-12-26 DIAGNOSIS — R00.0 TACHYCARDIA: ICD-10-CM

## 2018-12-26 DIAGNOSIS — I10 BENIGN ESSENTIAL HYPERTENSION: ICD-10-CM

## 2018-12-26 PROCEDURE — 99214 OFFICE O/P EST MOD 30 MIN: CPT | Performed by: PHYSICIAN ASSISTANT

## 2018-12-26 ASSESSMENT — MIFFLIN-ST. JEOR: SCORE: 1393.65

## 2018-12-26 NOTE — LETTER
12/26/2018        RE: Rosa Sotomayor  [address]  [place] [address]        Sleep Study Follow-Up Visit:    Date on this visit: 12/26/2018    Rosa Stoomayor comes in today for follow-up of her sleep study done on 12/13/2018 at the [facility] for snoring, waking a lot at night, and not feeling refreshed by sleep for 1.5 years. Her medical history is significant for Arnold-Chiari malformation (unknown grade, no surgery), chronic back pain, migraine, hyperlipidemia, aortic insufficiency, fibromuscular dysplasia, possible renovascular HTN, renal artery stenosis, CKD st 3, anxiety, macular degeneration, osteoarthritis.    Sleep latency 11.6 minutes with amitriptyline.  REM achieved.   REM latency 191 minutes.  Sleep efficiency 81.1%. Total sleep time 343 minutes.    Sleep architecture:  Stage 1, 17.2% (5%), stage 2, 35.9% (45-55%), stage 3, 37.8% (15-20%), stage REM, 9.2% (20-25%).  AHI was 12.4/hr, with desaturations down to 83%. S/He spent 2.4 minutes below 90% SpO2. RDI 31.7/hr.  REM RDI 55.2/hr, consistent with severe REM ESTEFANIA.  Supine RDI 78.4/hr, consistent with severe SUPINE ESTEFANIA (75 minutes spent supine). Her non-supine RDI was 18.6/hr.  Periodic Limb Movement Index 7.2/hour, 0.5/hr were associated with arousals.   Her heart rate was over 100 most of the night, up to 120 bpm. She had a headache during the night and felt a pulsing    CPAP titration:  CPAP was not initiated. These findings were reviewed with the patient.  She has been started on a new blood pressure medication and is switching from amitriptyline to topiramate. She has been much more tired lately. Her headaches started after a Shingles vaccine.    Past medical/surgical history, family history, social history, medications and allergies were reviewed.      Problem List:  Patient Active Problem List    Diagnosis Date Noted     ESTEFANIA (obstructive sleep apnea)- mild (AHI 12) 12/17/2018     Priority: Medium     12/13/2018  [facility] (188.0 lbs) - AHI 12.4, RDI 31.7, Supine AHI 52.0, REM AHI 34.3, Low O2 75.1%, Time Spent ?88% 1.4 minutes / Time Spent ?89% 2.4 minutes.       Aortic valve insufficiency 04/19/2018     Priority: Medium     Intractable migraine without aura and without status migrainosus 11/20/2017     Priority: Medium     Anemia, iron deficiency 06/23/2017     Priority: Medium     Renal artery stenosis (H) 01/05/2017     Priority: Medium     Macular degeneration, bilateral 01/05/2017     Priority: Medium     Benign essential hypertension 12/28/2016     Priority: Medium     Chronic bilateral low back pain without sciatica 11/29/2016     Priority: Medium     Secondary renal hyperparathyroidism (H) 07/31/2016     Priority: Medium     Arnold-Chiari malformation (H) 07/23/2016     Priority: Medium     Hyperlipidemia LDL goal <130 07/23/2016     Priority: Medium     Anxiety 07/23/2016     Priority: Medium     CKD (chronic kidney disease) stage 3, GFR 30-59 ml/min (H) 07/22/2016     Priority: Medium     Hypertriglyceridemia 07/22/2016     Priority: Medium     Personal history of allergy to anesthetic agent 03/10/2007     Priority: Medium        Impression/Plan:    (G47.33) ESTEFANIA (obstructive sleep apnea)  (primary encounter diagnosis), (I10) Benign essential hypertension  Comment: mild ESTEFANIA (AHI 12.4/hr), but severe when supine (AHI 52/hr, RDI 78/hr). Also, she had a higher degree of upper airway resistance (RDI 31.7/hr). No significant hypoxemia and normal CO2 levels. No central apnea. She is leaving Jan 6th for [place]. She will be back for a week in February.  Plan: Comprehensive DME       I am prescribing auto CPAP 5-15 cm, heated humidifier and compliance meter. The patient was informed of the mask exchange policy and the compliance goals. They were also informed that they would be followed by the sleep therapy management team during the first month of CPAP use.     (R00.0) Tachycardia  Comment: Her heart  rate increased from a baseline of about 90 bpm while awake at the beginning of the study to over 100 bpm and remained 100-120 bpm for the night.   Plan: Follow up with primary for further evaluation/management      She will follow up with me in about 2 month(s).     Twenty-five minutes spent with patient, all of which were spent face-to-face counseling, consulting, coordinating plan of care.      Bennett Goltz, PA-C    CC:   MD Dae Hurtado Daniel, MD   [facility]   F [phone]          Sincerely,        Bennett Ezra Goltz, PA-C

## 2018-12-26 NOTE — PATIENT INSTRUCTIONS
You will be provided with an auto-titrating CPAP with a pressure range of 5-15 cm with heated humidity to limit nasal congestion. Adjust the heat level on humidifier to find a setting that prevents dry nose but does not cause condensation in the hose or mask. Use distilled water in the humidifier.  The CPAP has a ramp function that starts the pressure lower than your prescribed pressure and gradually increases it over a number of minutes.  This may make it easier to fall asleep.    Try to use the CPAP every-night, all night (minimum of 4 hours). Many insurances require that we prove you are using the CPAP at least 4 hours on at least 70% of nights over a 30 day period. We have 90 days to meet those criteria.            Discussed weight management and the impact of weight gain on sleep apnea.  Let me know if you snore or feel the pressure is too high.    You can get new supplies (mask, hose and filter) for your CPAP every 3-6 months, covered by insurance. You do not need to get supplies that often, but they are available if you would like them.  You may exchange the mask once within the first month if you feel the initial mask does not fit well.  Contact your medical equipment provider for equipment issues.  Please let me know if you have any return of snoring, daytime sleepiness or poor sleep quality. We will want to make sure your CPAP is adequately treating your apnea.  There is a website called CPAP.com that has accessories that may make CPAP use easier. Please visit it at your convenience.  Our phone number is [phone].    Follow-up 2 month.  Bring your CPAP machine with you to the follow up appointment.    Your BMI is Body mass index is 32.68 kg/m .  Weight management is a personal decision.  If you are interested in exploring weight loss strategies, the following discussion covers the approaches that may be successful. Body mass index (BMI) is one way to tell whether you are at a healthy weight,  overweight, or obese. It measures your weight in relation to your height.  A BMI of 18.5 to 24.9 is in the healthy range. A person with a BMI of 25 to 29.9 is considered overweight, and someone with a BMI of 30 or greater is considered obese. More than two-thirds of American adults are considered overweight or obese.  Being overweight or obese increases the risk for further weight gain. Excess weight may lead to heart disease and diabetes.  Creating and following plans for healthy eating and physical activity may help you improve your health.  Weight control is part of healthy lifestyle and includes exercise, emotional health, and healthy eating habits. Careful eating habits lifelong are the mainstay of weight control. Though there are significant health benefits from weight loss, long-term weight loss with diet alone may be very difficult to achieve- studies show long-term success with dietary management in less than 10% of people. Attaining a healthy weight may be especially difficult to achieve in those with severe obesity. In some cases, medications, devices and surgical management might be considered.  What can you do?  If you are overweight or obese and are interested in methods for weight loss, you should discuss this with your provider.     Consider reducing daily calorie intake by 500 calories.     Keep a food journal.     Avoiding skipping meals, consider cutting portions instead.    Diet combined with exercise helps maintain muscle while optimizing fat loss. Strength training is particularly important for building and maintaining muscle mass. Exercise helps reduce stress, increase energy, and improves fitness. Increasing exercise without diet control, however, may not burn enough calories to loose weight.       Start walking three days a week 10-20 minutes at a time    Work towards walking thirty minutes five days a week     Eventually, increase the speed of your walking for 1-2 minutes at time    In  addition, we recommend that you review healthy lifestyles and methods for weight loss available through the National Institutes of Health patient information sites:  http://win.niddk.nih.gov/publications/index.htm    And look into health and wellness programs that may be available through your health insurance provider, employer, local community center, or elaine club.    Weight management plan: Patient was referred to their PCP to discuss a diet and exercise plan.

## 2018-12-26 NOTE — PROGRESS NOTES
Sleep Study Follow-Up Visit:    Date on this visit: 12/26/2018    Rosa Sotomayor comes in today for follow-up of her sleep study done on 12/13/2018 at the [facility] for snoring, waking a lot at night, and not feeling refreshed by sleep for 1.5 years. Her medical history is significant for Arnold-Chiari malformation (unknown grade, no surgery), chronic back pain, migraine, hyperlipidemia, aortic insufficiency, fibromuscular dysplasia, possible renovascular HTN, renal artery stenosis, CKD st 3, anxiety, macular degeneration, osteoarthritis.    Sleep latency 11.6 minutes with amitriptyline.  REM achieved.   REM latency 191 minutes.  Sleep efficiency 81.1%. Total sleep time 343 minutes.    Sleep architecture:  Stage 1, 17.2% (5%), stage 2, 35.9% (45-55%), stage 3, 37.8% (15-20%), stage REM, 9.2% (20-25%).  AHI was 12.4/hr, with desaturations down to 83%. S/He spent 2.4 minutes below 90% SpO2. RDI 31.7/hr.  REM RDI 55.2/hr, consistent with severe REM ESTEFANIA.  Supine RDI 78.4/hr, consistent with severe SUPINE ESTEFANIA (75 minutes spent supine). Her non-supine RDI was 18.6/hr.  Periodic Limb Movement Index 7.2/hour, 0.5/hr were associated with arousals.   Her heart rate was over 100 most of the night, up to 120 bpm. She had a headache during the night and felt a pulsing    CPAP titration:  CPAP was not initiated. These findings were reviewed with the patient.  She has been started on a new blood pressure medication and is switching from amitriptyline to topiramate. She has been much more tired lately. Her headaches started after a Shingles vaccine.    Past medical/surgical history, family history, social history, medications and allergies were reviewed.      Problem List:  Patient Active Problem List    Diagnosis Date Noted     ESTEFANIA (obstructive sleep apnea)- mild (AHI 12) 12/17/2018     Priority: Medium     12/13/2018 [facility] (188.0 lbs) - AHI 12.4, RDI 31.7, Supine AHI 52.0, REM AHI 34.3,  Low O2 75.1%, Time Spent ?88% 1.4 minutes / Time Spent ?89% 2.4 minutes.       Aortic valve insufficiency 04/19/2018     Priority: Medium     Intractable migraine without aura and without status migrainosus 11/20/2017     Priority: Medium     Anemia, iron deficiency 06/23/2017     Priority: Medium     Renal artery stenosis (H) 01/05/2017     Priority: Medium     Macular degeneration, bilateral 01/05/2017     Priority: Medium     Benign essential hypertension 12/28/2016     Priority: Medium     Chronic bilateral low back pain without sciatica 11/29/2016     Priority: Medium     Secondary renal hyperparathyroidism (H) 07/31/2016     Priority: Medium     Arnold-Chiari malformation (H) 07/23/2016     Priority: Medium     Hyperlipidemia LDL goal <130 07/23/2016     Priority: Medium     Anxiety 07/23/2016     Priority: Medium     CKD (chronic kidney disease) stage 3, GFR 30-59 ml/min (H) 07/22/2016     Priority: Medium     Hypertriglyceridemia 07/22/2016     Priority: Medium     Personal history of allergy to anesthetic agent 03/10/2007     Priority: Medium        Impression/Plan:    (G47.33) ESTEFANIA (obstructive sleep apnea)  (primary encounter diagnosis), (I10) Benign essential hypertension  Comment: mild ESTEFANIA (AHI 12.4/hr), but severe when supine (AHI 52/hr, RDI 78/hr). Also, she had a higher degree of upper airway resistance (RDI 31.7/hr). No significant hypoxemia and normal CO2 levels. No central apnea. She is leaving Jan 6th for [place]. She will be back for a week in February.  Plan: Comprehensive DME       I am prescribing auto CPAP 5-15 cm, heated humidifier and compliance meter. The patient was informed of the mask exchange policy and the compliance goals. They were also informed that they would be followed by the sleep therapy management team during the first month of CPAP use.     (R00.0) Tachycardia  Comment: Her heart rate increased from a baseline of about 90 bpm while awake at the beginning of the study to over  100 bpm and remained 100-120 bpm for the night.   Plan: Follow up with primary for further evaluation/management      She will follow up with me in about 2 month(s).     Twenty-five minutes spent with patient, all of which were spent face-to-face counseling, consulting, coordinating plan of care.      Bennett Goltz, PA-C    CC:   MD Dae Hurtado Daniel, MD [facility]   F [phone]

## 2018-12-26 NOTE — NURSING NOTE
"Chief Complaint   Patient presents with     Study Results     PSG f/u       Initial /73   Pulse 88   Resp 16   Ht 1.626 m (5' 4\")   Wt 86.4 kg (190 lb 6.4 oz)   SpO2 98%   BMI 32.68 kg/m   Estimated body mass index is 32.68 kg/m  as calculated from the following:    Height as of this encounter: 1.626 m (5' 4\").    Weight as of this encounter: 86.4 kg (190 lb 6.4 oz).    Medication Reconciliation: complete     ESS 1  Brooklyn Valle        "

## 2018-12-27 ENCOUNTER — TELEPHONE (OUTPATIENT)
Dept: SLEEP MEDICINE | Facility: CLINIC | Age: 68
End: 2018-12-27

## 2019-01-02 ENCOUNTER — OFFICE VISIT (OUTPATIENT)
Dept: FAMILY MEDICINE | Facility: CLINIC | Age: 69
End: 2019-01-02
Payer: MEDICARE

## 2019-01-02 ENCOUNTER — DOCUMENTATION ONLY (OUTPATIENT)
Dept: SLEEP MEDICINE | Facility: CLINIC | Age: 69
End: 2019-01-02
Payer: MEDICARE

## 2019-01-02 VITALS
DIASTOLIC BLOOD PRESSURE: 68 MMHG | SYSTOLIC BLOOD PRESSURE: 124 MMHG | HEIGHT: 64 IN | TEMPERATURE: 97.3 F | WEIGHT: 187 LBS | HEART RATE: 91 BPM | OXYGEN SATURATION: 99 % | BODY MASS INDEX: 31.92 KG/M2

## 2019-01-02 DIAGNOSIS — G47.33 OSA (OBSTRUCTIVE SLEEP APNEA): ICD-10-CM

## 2019-01-02 DIAGNOSIS — G43.019 INTRACTABLE MIGRAINE WITHOUT AURA AND WITHOUT STATUS MIGRAINOSUS: ICD-10-CM

## 2019-01-02 DIAGNOSIS — I10 BENIGN ESSENTIAL HYPERTENSION: ICD-10-CM

## 2019-01-02 DIAGNOSIS — J34.89 RHINORRHEA: Primary | ICD-10-CM

## 2019-01-02 DIAGNOSIS — R00.0 TACHYCARDIA: ICD-10-CM

## 2019-01-02 PROCEDURE — 99214 OFFICE O/P EST MOD 30 MIN: CPT | Performed by: INTERNAL MEDICINE

## 2019-01-02 ASSESSMENT — MIFFLIN-ST. JEOR: SCORE: 1373.23

## 2019-01-02 NOTE — PROGRESS NOTES
SUBJECTIVE:   Rosa Sotomayor is a 68 year old female who presents to clinic today for the following health issues:      Medication Followup    Taking Medication as prescribed: yes    Side Effects:  None    Medication Helping Symptoms:  yes       Hemoglobin was stable with normal iron stores last visit  Since then she saw neurologist who agreed with Topamax and recommended stopping Elavil  Headache frequency has improved since doing so  She has severe nasal drainage and congestion associated with itchy nose and eyes present for 2 months   She recently restarted Flonase  She leaves for [place] on Sunday   She was recently diagnosed with sleep apnea  Her average heart rate during the sleep study was noted to be 103  Her coreg dose was increased via MyChart due higher reported blood pressure readings  Her blood pressure in the neurology clinic was under good control  She denies palpitations or chest pain      Problem list and histories reviewed & adjusted, as indicated.  Additional history: as documented    Patient Active Problem List   Diagnosis     Personal history of allergy to anesthetic agent     CKD (chronic kidney disease) stage 3, GFR 30-59 ml/min (H)     Hypertriglyceridemia     Arnold-Chiari malformation (H)     Hyperlipidemia LDL goal <130     Anxiety     Secondary renal hyperparathyroidism (H)     Chronic bilateral low back pain without sciatica     Benign essential hypertension     Renal artery stenosis (H)     Macular degeneration, bilateral     Anemia, iron deficiency     Intractable migraine without aura and without status migrainosus     Aortic valve insufficiency     ESTEFANIA (obstructive sleep apnea)- mild (AHI 12)     Past Surgical History:   Procedure Laterality Date     BACK SURGERY      discectomy L3-4, 2018     C NONSPECIFIC PROCEDURE      wisdom teeth     C NONSPECIFIC PROCEDURE  2005    Varicose Veins     C TOTAL HIP ARTHROPLASTY      left in 2012, right 2017     C TOTAL KNEE ARTHROPLASTY       right 2014, left 2018     ROTATOR CUFF REPAIR RT/LT Right     2016     TUBAL LIGATION  1987       Social History     Tobacco Use     Smoking status: Never Smoker     Smokeless tobacco: Never Used   Substance Use Topics     Alcohol use: No     Alcohol/week: 0.0 oz     Family History   Problem Relation Age of Onset     Diabetes Mother         INSULIN     Hypertension Mother      Eye Disorder Mother         CATERACTS     Heart Disease Mother         CHF- OPEN HEART SURG X'S 2     Lipids Mother      Obesity Mother      Coronary Artery Disease Mother      Diabetes Father         ORAL     Hypertension Father      Arthritis Father      Eye Disorder Father         MAC DEGENERATION     Heart Disease Father         CHF     Lipids Father      Obesity Father      Diabetes Maternal Grandfather         INSULIN     Diabetes Brother         INSULIN     Gastrointestinal Disease Brother         CHOLITISI POSSIBLE CHRONES     Obesity Brother      Colon Cancer No family hx of      Breast Cancer No family hx of          Current Outpatient Medications   Medication Sig Dispense Refill     acetaminophen (TYLENOL) 500 MG tablet Take 500 mg by mouth       allopurinol (ZYLOPRIM) 100 MG tablet Take 100 mg by mouth       atorvastatin (LIPITOR) 40 MG tablet TAKE 1 TABLET(40 MG) BY MOUTH DAILY 90 tablet 3     carvedilol (COREG) 25 MG tablet Take 1 tablet (25 mg) by mouth 2 times daily 180 tablet 3     cetirizine (ZYRTEC) 10 MG tablet Take 10 mg by mouth       Cholecalciferol (VITAMIN D) 2000 UNITS tablet Take 1 tablet by mouth daily       clobetasol (TEMOVATE) 0.05 % cream Apply sparingly to affected area twice daily for 14 days.  Do not apply to face. 15 g 2     clobetasol (TEMOVATE) 0.05 % external solution Apply topically 2 times daily as needed  5     Cranberry POWD 1 capsule       Cyanocobalamin (B-12) 1000 MCG TBCR Take 1,000 mcg by mouth daily 100 tablet 1     diphenhydrAMINE (BENADRYL ALLERGY) 25 MG tablet Take 2 tablets (50 mg) by mouth  every 6 hours as needed for itching or allergies 60 tablet 1     EPINEPHrine (EPIPEN 2-FRAN) 0.3 MG/0.3ML injection 2-pack Inject 0.3 mLs (0.3 mg) into the muscle once as needed for anaphylaxis 0.6 mL 1     hydrOXYzine (ATARAX) 25 MG tablet Take 1 tablet (25 mg) by mouth 3 times daily as needed for itching 120 tablet 1     ketoconazole (NIZORAL) 2 % shampoo Apply to the affected area and wash off after 5 minutes. 120 mL 1     losartan (COZAAR) 100 MG tablet Take 1 tablet (100 mg) by mouth At Bedtime (Patient taking differently: Take 50 mg by mouth 2 times daily ) 90 tablet 1     Magnesium Oxide 250 MG TABS Take 250 mg by mouth       ondansetron (ZOFRAN) 4 MG tablet Take by mouth every 8 hours as needed for nausea       SUMAtriptan (IMITREX) 20 MG/ACT nasal spray Spray 1 spray in nostril as needed for migraine May repeat in 2 hours. Max 2 sprays/24 hours. 12 each 11     topiramate (TOPAMAX) 25 MG tablet Take 1 tablet (25 mg) at bedtime for 1 week, then 1 tablet twice daily for 1 week, then 1 tablet in AM and 2 in PM for 1 week, then 2 tablets twice daily. 70 tablet 0     torsemide (DEMADEX) 10 MG tablet Take 1 tablet (10 mg) by mouth daily 90 tablet 3     traMADol (ULTRAM) 50 MG tablet Take 1 tablet by mouth every 6 hours as needed  0     triamcinolone (KENALOG) 0.1 % cream Apply topically 2 times daily Apply to AA on trunk, arms or legs bid for 10-14 days 454 g 0     ZOLMitriptan (ZOMIG-ZMT) 5 MG ODT tab Take 5 mg by mouth       Allergies   Allergen Reactions     Bee Venom Anaphylaxis     Codeine Swelling     Body swelling and severe itching. Tolerates Morphine.      Fentanyl Anaphylaxis and Shortness Of Breath     Gabapentin Other (See Comments)     PN: chest heaviness with breathing  Chest heaviness with breathing  Chest heaviness with breathing  chest heaviness with breathing     Hydromorphone Itching     Tolerates morphine  Tolerates morphine     Midazolam Anaphylaxis, Itching and Shortness Of Breath      "Tolerates Diazepam     Prilocaine Anaphylaxis     Anaphalactic shock     Propofol Anaphylaxis     Penicillins Itching and Rash     Tolerates Keflex,  Ancef  rash     Shingrix [Zoster Vac Recomb Adjuvanted] Itching and Rash     Sulfa Drugs Itching and Rash     rash     Clindamycin      Codeine      Diatrizoate Other (See Comments)     CKD 3     Hydrocodone      Lisinopril Cough     Lorazepam Itching     Nsaids Other (See Comments)     Contraindicated with kidney hx (including Celebrex, per pt)     Other [Seasonal Allergies] Anaphylaxis     GENERAL ANETHESIA       Oxycodone      Vancomycin Other (See Comments)     Thrush, yeast infection, bladder infection  Thrush, yeast infection, bladder infection     Erythromycin Rash     ointment  PN: ointment  ointment     Eucalyptus Oil Rash and Hives     hives     Nitrofuran Derivatives Rash     Nitrofurantoin Rash     Tramadol Rash       Reviewed and updated as needed this visit by clinical staff  Tobacco  Allergies  Meds  Med Hx  Surg Hx  Fam Hx  Soc Hx      Reviewed and updated as needed this visit by Provider  Tobacco  Med Hx  Surg Hx  Fam Hx  Soc Hx        ROS:  Constitutional, HEENT, cardiovascular, pulmonary, gi and gu systems are negative, except as otherwise noted.    OBJECTIVE:     /68 (BP Location: Right arm, Patient Position: Chair, Cuff Size: Adult Large)   Pulse 91   Temp 97.3  F (36.3  C) (Tympanic)   Ht 1.626 m (5' 4\")   Wt 84.8 kg (187 lb)   SpO2 99%   BMI 32.10 kg/m    Body mass index is 32.1 kg/m .  GENERAL: healthy, alert and no distress  EYES: Eyes grossly normal to inspection, PERRL and conjunctivae and sclerae normal  HENT: ear canals and TM's normal, nose and mouth without ulcers or lesions  NECK: no adenopathy, no asymmetry, masses, or scars and thyroid normal to palpation  RESP: lungs clear to auscultation - no rales, rhonchi or wheezes  CV:  Heart with regular rate and rhythm.   ABDOMEN: soft, nontender, no hepatosplenomegaly, no " masses and bowel sounds normal  MS: Symmetric lower extremity strength, deep tendon reflexes are symmetric and normal bilaterally at the patella and Achilles tendons, normal sensation in lower extremity dermatomes, normal gait  NEURO: Normal strength and tone, mentation intact and speech normal  PSYCH: Unchanged anxious affect, well-groomed        ASSESSMENT/PLAN:         ICD-10-CM    1. Rhinorrhea J34.89 OTOLARYNGOLOGY REFERRAL   2. ESTEFANIA (obstructive sleep apnea)- mild (AHI 12) G47.33    3. Benign essential hypertension I10    4. Tachycardia R00.0    5. Intractable migraine without aura and without status migrainosus G43.019      Trial of Flonase for nasal symptoms, otolaryngology consult if that does not help enough.  No diagnosis of sleep apnea, treatment of sleep apnea may help with blood pressure control and headaches, discussed with patient and her .  Blood pressure in clinic today is under very good control with heart rate at the upper limit of the normal range.  I would not recommend any additional changes to antihypertensive based on these findings.  It is unclear to me why her pulse was slightly elevated during her sleep study?  Her pulse is at the upper limit of the normal range in clinic today and her heart rhythm is normal.  No arrhythmias were noted during her sleep study.  I suspect that she may have mild compensatory tachycardia due to her moderate anemia which is chronic and probably related to her chronic kidney disease but not severe enough to warrant Aranesp infusions.  Again, we are planning on rechecking her hemoglobin when she returns from [place].  If her anemia worsens, we will refer her for an Aranesp injection to address this.  With respect to her headaches, she seems to be responding clinically to the Topamax started by me and titrated by her neurologist.  Agree with stopping Elavil if we are using Topamax, to avoid an expanding medication list.  I did spend more than 26 minutes  with this patient and her  in face-to-face contact and more than 50% of that time was spent counseling and coordinating care.      Josh Hollingsworth MD  [facility]

## 2019-01-02 NOTE — PROGRESS NOTES
Patient was offered choice of vendor and chose [facility].  Patient Rosa Sotomayor was set up at [facility] on January 2, 2019. Patient received a [employer] DreamStation Auto. Pressures were set at 5-15 cm H2O.   Patient s ramp is 5 cm H2O for Auto and FLEX/EPR is A Flex.  Patient received a Resmed Mask name: AIRFIT P10  Pillow mask size For Her, heated tubing and heated humidifier.  Patient is enrolled in the STM Program and does need to meet compliance. Patient has a follow up on 2/26/2019 with Bennett Goltz, PA.    Anthony Patterosn

## 2019-01-07 ENCOUNTER — DOCUMENTATION ONLY (OUTPATIENT)
Dept: SLEEP MEDICINE | Facility: CLINIC | Age: 69
End: 2019-01-07
Payer: MEDICARE

## 2019-01-07 NOTE — PROGRESS NOTES
3 DAY STM VISIT    Diagnostic AHI:   PSG AHI: 12.4   PSG    Patient contacted for 3 day STM visit  Subjective measures:  Patient doing weil with her CPAP has no questions or concerns.      Device type:   PAP Device: Auto-CPAP     PAP settings from order::  CPAP min 5 cm H20  CPAP Min: 5   cm  H20       CPAP max 15 cm H20  CPAP Max: 15   cm  H20    Mask type:      Mask Interface: Nasal Mask       Device settings from machine      Min CPAP   EPAP Min Auto CPAP: 5 (CPAP Min Auto CPAP              Max CPAP   EPAP Max Auto CPAP: 15 (CPAP Max Auto CPAP        Assessment: Nightly usage, most nights over four hours.  Action plan: Pt to have f/u 14 day STM visit.  Patient has a follow up visit scheduled:   yes within 31-90 days of set up.

## 2019-01-17 ENCOUNTER — DOCUMENTATION ONLY (OUTPATIENT)
Dept: SLEEP MEDICINE | Facility: CLINIC | Age: 69
End: 2019-01-17

## 2019-01-17 DIAGNOSIS — G47.33 OSA (OBSTRUCTIVE SLEEP APNEA): ICD-10-CM

## 2019-01-17 NOTE — PROGRESS NOTES
Patient returned call.     Subjective measures:  Patient failing following subjective benchmarks: congestion, from sinus congestion.     Action plan:pt to have 30 day STM visit.

## 2019-01-17 NOTE — PROGRESS NOTES
14 DAY STM VISIT    Diagnostic AHI: 12.4   PSG      Message left for patient to return call     Assessment: Pt meeting objective benchmarks.       Action plan: waiting for patient to return call.  and pt to have 30 day STM visit.    Device type: Auto-CPAP    PAP settings: CPAP min 5 cm  H20       CPAP max 15 cm  H20         90th % pressure8.4 cm  H20    Mask type:  Nasal Mask    Objective measures: 14 day rolling measures         Compliance  100 %     % of night spent in large leak  3 % last  upload      AHI 1.47   last  upload      Average number of minutes 455          Objective measure goal  Compliance   Goal >70%  Leak   Goal < 10%  AHI  Goal < 5  Usage  Goal >240

## 2019-02-04 ENCOUNTER — DOCUMENTATION ONLY (OUTPATIENT)
Dept: SLEEP MEDICINE | Facility: CLINIC | Age: 69
End: 2019-02-04

## 2019-02-04 DIAGNOSIS — G47.33 OSA (OBSTRUCTIVE SLEEP APNEA): ICD-10-CM

## 2019-02-04 NOTE — PROGRESS NOTES
30 DAY [facility] VISIT    Diagnostic AHI: 12.4   PSG      Subjective measures: Pt is feeling benefit from therapy and she feels that she needs more air to start with.  She would like to start at higher.        Assessment: Pt meeting objective benchmarks.  Patient meeting subjective benchmarks.   Action plan: order placed to provider for pressure change  Patient has scheduled a follow up visit with Bennett Goltz, PA on 2/26/2019  Device type: Auto-CPAP  PAP settings: CPAP min 5 cm  H20     CPAP max 15 cm  H20          90th % pressure9.2 cm  H20    Mask type:  Nasal Mask    Objective measures: 14 day rolling measures         Compliance  100 %     % of night spent in large leak  0 % last  upload      AHI 0.73   last  upload      Average number of minutes 475          Objective measure goal  Compliance   Goal >70%  Leak   Goal < 10%  AHI  Goal < 5  Usage  Goal >240

## 2019-02-04 NOTE — Clinical Note
Hi this patient is doing well for the most part but she is having some discomfort starting at her current pressure.  She would like to start higher.  If agreeable please sign pended order to start at 7.0 cm [id number]

## 2019-02-26 ENCOUNTER — OFFICE VISIT (OUTPATIENT)
Dept: SLEEP MEDICINE | Facility: CLINIC | Age: 69
End: 2019-02-26
Payer: MEDICARE

## 2019-02-26 VITALS
OXYGEN SATURATION: 99 % | SYSTOLIC BLOOD PRESSURE: 116 MMHG | RESPIRATION RATE: 16 BRPM | BODY MASS INDEX: 32.78 KG/M2 | DIASTOLIC BLOOD PRESSURE: 53 MMHG | HEIGHT: 64 IN | WEIGHT: 192 LBS | HEART RATE: 79 BPM

## 2019-02-26 DIAGNOSIS — G47.33 OSA (OBSTRUCTIVE SLEEP APNEA): Primary | Chronic | ICD-10-CM

## 2019-02-26 PROCEDURE — 99214 OFFICE O/P EST MOD 30 MIN: CPT | Performed by: PHYSICIAN ASSISTANT

## 2019-02-26 ASSESSMENT — MIFFLIN-ST. JEOR: SCORE: 1395.91

## 2019-02-26 NOTE — PROGRESS NOTES
CPAP Follow-Up Visit:    Date on this visit: 2/26/2019    Rosa Sotomayor comes in today for follow-up of her CPAP use for mild ESTEFANIA. She was initially seen at the [facility] for snoring, waking a lot at night, and not feeling refreshed by sleep for 1.5 years. Her medical history is significant for Arnold-Chiari malformation (unknown grade, no surgery), chronic back pain, migraine, hyperlipidemia, aortic insufficiency, fibromuscular dysplasia, possible renovascular HTN, renal artery stenosis, CKD st 3, anxiety, macular degeneration, osteoarthritis.     PSG showed: AHI was 12.4/hr, with desaturations down to 83%. S/He spent 2.4 minutes below 90% SpO2. RDI 31.7/hr.  REM RDI 55.2/hr.  Supine RDI 78.4/hr (75 minutes spent supine). Her non-supine RDI was 18.6/hr.  Periodic Limb Movement Index 7.2/hour, 0.5/hr were associated with arousals.   Her heart rate was over 100 most of the night, up to 120 bpm. She had a headache during the night and felt a pulsing.    She is on auto CPAP 7-15 cm. We bumped the starting pressure from 5 cm to 7 cm because she was having air hunger. She sometimes still feels that way when she first puts it on. That lasts about 5 minutes. She does feel better with the CPAP. She uses a Wisp nasal mask. She wonders if the mask is staying in place. When she attaches the hose to the mask, it seems to pull the mask down. She tried a nasal pillows mask again, but it caused a burning sensation in her nostrils within 5 minutes. She has a back issue recently, which limits her sleeping positions. She has been waking more from the pain. She has been sleeping with the head of the bed elevated. She is not snoring with CPAP. Her  has noticed some mouth leak. She wakes with a dry mouth once in a while. She sometimes notes feeling more refreshed by her sleep, as long as there aren't other things disrupting her sleep or wearing her out in the day. She no longer feels the need to nap.     The  compliance data shows that she has used the CPAP for 30/30 nights, 100% of nights for >4 hours.  The 90th% pressure is 8.9 cm.  The average time in large leak is 4 sec.  The average nightly usage is 8:02.  The average AHI is 1/hr.       Past medical/surgical history, family history, social history, medications and allergies were reviewed.      Problem List:  Patient Active Problem List    Diagnosis Date Noted     ESTEFANIA (obstructive sleep apnea)- mild (AHI 12) 12/17/2018     Priority: Medium     12/13/2018 [facility] (188.0 lbs) - AHI 12.4, RDI 31.7, Supine AHI 52.0, REM AHI 34.3, Low O2 75.1%, Time Spent ?88% 1.4 minutes / Time Spent ?89% 2.4 minutes.       Aortic valve insufficiency 04/19/2018     Priority: Medium     Intractable migraine without aura and without status migrainosus 11/20/2017     Priority: Medium     Anemia, iron deficiency 06/23/2017     Priority: Medium     Renal artery stenosis (H) 01/05/2017     Priority: Medium     Macular degeneration, bilateral 01/05/2017     Priority: Medium     Benign essential hypertension 12/28/2016     Priority: Medium     Chronic bilateral low back pain without sciatica 11/29/2016     Priority: Medium     Secondary renal hyperparathyroidism (H) 07/31/2016     Priority: Medium     Arnold-Chiari malformation (H) 07/23/2016     Priority: Medium     Hyperlipidemia LDL goal <130 07/23/2016     Priority: Medium     Anxiety 07/23/2016     Priority: Medium     CKD (chronic kidney disease) stage 3, GFR 30-59 ml/min (H) 07/22/2016     Priority: Medium     Hypertriglyceridemia 07/22/2016     Priority: Medium     Personal history of allergy to anesthetic agent 03/10/2007     Priority: Medium        Impression/Plan:    (G47.33) ESTEFANIA (obstructive sleep apnea)- mild (AHI 12)  (primary encounter diagnosis)  Comment: She seems to be doing well with CPAP. Occasional mouth leak but not very bad on download, and dry mouth is minor.  Plan: Continue auto CPAP 7-15 cm. We  reviewed recommendations for cleaning and replacing supplies.      She will follow up with me in about 1 year(s).     Twenty-five minutes spent with patient, all of which were spent face-to-face counseling, consulting, coordinating plan of care.      Bennett Goltz, PA-C    CC: Josh Hollingsworth MD

## 2019-02-26 NOTE — PATIENT INSTRUCTIONS
Your BMI is Body mass index is 32.96 kg/m .  Weight management is a personal decision.  If you are interested in exploring weight loss strategies, the following discussion covers the approaches that may be successful. Body mass index (BMI) is one way to tell whether you are at a healthy weight, overweight, or obese. It measures your weight in relation to your height.  A BMI of 18.5 to 24.9 is in the healthy range. A person with a BMI of 25 to 29.9 is considered overweight, and someone with a BMI of 30 or greater is considered obese. More than two-thirds of American adults are considered overweight or obese.  Being overweight or obese increases the risk for further weight gain. Excess weight may lead to heart disease and diabetes.  Creating and following plans for healthy eating and physical activity may help you improve your health.  Weight control is part of healthy lifestyle and includes exercise, emotional health, and healthy eating habits. Careful eating habits lifelong are the mainstay of weight control. Though there are significant health benefits from weight loss, long-term weight loss with diet alone may be very difficult to achieve- studies show long-term success with dietary management in less than 10% of people. Attaining a healthy weight may be especially difficult to achieve in those with severe obesity. In some cases, medications, devices and surgical management might be considered.  What can you do?  If you are overweight or obese and are interested in methods for weight loss, you should discuss this with your provider.     Consider reducing daily calorie intake by 500 calories.     Keep a food journal.     Avoiding skipping meals, consider cutting portions instead.    Diet combined with exercise helps maintain muscle while optimizing fat loss. Strength training is particularly important for building and maintaining muscle mass. Exercise helps reduce stress, increase energy, and improves fitness.  Increasing exercise without diet control, however, may not burn enough calories to loose weight.       Start walking three days a week 10-20 minutes at a time    Work towards walking thirty minutes five days a week     Eventually, increase the speed of your walking for 1-2 minutes at time    In addition, we recommend that you review healthy lifestyles and methods for weight loss available through the National Institutes of Health patient information sites:  http://win.niddk.nih.gov/publications/index.htm    And look into health and wellness programs that may be available through your health insurance provider, employer, local community center, or elaine club.    Weight management plan: Patient was referred to their PCP to discuss a diet and exercise plan.

## 2019-02-26 NOTE — NURSING NOTE
"Chief Complaint   Patient presents with     CPAP Follow Up       Initial /53   Pulse 79   Resp 16   Ht 1.626 m (5' 4\")   Wt 87.1 kg (192 lb)   SpO2 99%   BMI 32.96 kg/m   Estimated body mass index is 32.96 kg/m  as calculated from the following:    Height as of this encounter: 1.626 m (5' 4\").    Weight as of this encounter: 87.1 kg (192 lb).    Medication Reconciliation: complete     ESS   Brooklyn Valle        "

## 2019-02-28 ENCOUNTER — TRANSFERRED RECORDS (OUTPATIENT)
Dept: HEALTH INFORMATION MANAGEMENT | Facility: CLINIC | Age: 69
End: 2019-02-28

## 2019-03-05 ENCOUNTER — DOCUMENTATION ONLY (OUTPATIENT)
Dept: LAB | Facility: CLINIC | Age: 69
End: 2019-03-05

## 2019-03-05 ENCOUNTER — TELEPHONE (OUTPATIENT)
Dept: FAMILY MEDICINE | Facility: CLINIC | Age: 69
End: 2019-03-05

## 2019-03-05 DIAGNOSIS — D50.8 OTHER IRON DEFICIENCY ANEMIA: ICD-10-CM

## 2019-03-05 DIAGNOSIS — N18.9 ANEMIA OF CHRONIC RENAL FAILURE, UNSPECIFIED CKD STAGE: Primary | ICD-10-CM

## 2019-03-05 DIAGNOSIS — D63.1 ANEMIA OF CHRONIC RENAL FAILURE, UNSPECIFIED CKD STAGE: Primary | ICD-10-CM

## 2019-03-05 LAB — HGB BLD-MCNC: 9.8 G/DL (ref 11.7–15.7)

## 2019-03-05 PROCEDURE — 36415 COLL VENOUS BLD VENIPUNCTURE: CPT | Performed by: INTERNAL MEDICINE

## 2019-03-05 PROCEDURE — 85018 HEMOGLOBIN: CPT | Performed by: INTERNAL MEDICINE

## 2019-03-05 NOTE — TELEPHONE ENCOUNTER
PCP,    Pt has Hgb recheck at 9:30 today. Lab pended. Please review and authorize if appropriate.    Thank you,   Eduardo SHRESTHA RN

## 2019-03-05 NOTE — RESULT ENCOUNTER NOTE
The following letter pertains to your most recent diagnostic tests:    The anemia has worsened slightly since last check.  I believe you may be a candidate for an injection of the medication Aranesp to improve your anemia.  I think your anemia is from chronic kidney disease.   Our clinic does NOT give those injections, but you could see Dr. Calderon at the [facility] hematology clinic to receive that injection and consult with him to see if any additional testing is needed to confirm the cause of your anemia.  Please call [phone] to schedule an appointment with him.         Sincerely,    Dr. Hollingsworth

## 2019-03-05 NOTE — LETTER
[place] [id number] [facility]  [address]  [place]  [address]  Tel: [phone]    March 5, 2019    Rosa FADY Bran  [address] [place] [address]        Dear Ms. Sotomayor,    The following letter pertains to your most recent diagnostic tests:     The anemia has worsened slightly since last check.  I believe you may be a candidate for an injection of the medication Aranesp to improve your anemia.  I think your anemia is from chronic kidney disease.   Our clinic does NOT give those injections, but you could see Dr. Calderon at the [facility] hematology clinic to receive that injection and consult with him to see if any additional testing is needed to confirm the cause of your anemia.    Please call [phone] to schedule an appointment with him.         Sincerely,     Dr. Hollingsworth     If you have any further questions or problems, please contact our office.            Enclosure: Lab Results

## 2019-03-05 NOTE — TELEPHONE ENCOUNTER
Reason for Call: Request for an order or referral:    Order or referral being requested: lab order    Date needed: as soon as possible    Has the patient been seen by the PCP for this problem? YES    Additional comments: Pt coming in this morning @9:30am for hemoglobin recheck, need lab orders    Phone number Patient can be reached at:  Cell number on file:    Telephone Information:   Mobile [phone]       Best Time:  any    Can we leave a detailed message on this number?  YES    Call taken on 3/5/2019 at 8:40 AM by Patricia Medel

## 2019-03-05 NOTE — PROGRESS NOTES
Patient has lab appointment today, but no orders  Patient requested for a hemoglobin recheck.  Patient was drawn for what was requested.  If needed, please place orders for lab.  Thank you Lab,              ROS      Physical Exam

## 2019-03-06 ENCOUNTER — TELEPHONE (OUTPATIENT)
Dept: ONCOLOGY | Facility: CLINIC | Age: 69
End: 2019-03-06

## 2019-03-06 NOTE — TELEPHONE ENCOUNTER
ONCOLOGY INTAKE: Records Information      APPT INFORMATION: 03/13 at 0315 at  w/Dr. Romo  Referring provider:  Josh Hollingsworth MD  Referring provider s clinic:   [facility]  Reason for visit/diagnosis:  Anemia of chronic renal failure, unspecified CKD stage [N18.9, D63.1    Were the records received with the referral (via [employer])? [employer]    Has patient been seen for any external appt for this diagnosis (enter clinic/location)? No    ADDITIONAL INFORMATION:  Anemia of chronic renal failure, unspecified CKD stage [N18.9, D63.1]: Caller pt: Ref by:Josh Hollingsworth MD: Records In Epic per pt

## 2019-03-08 ENCOUNTER — TRANSFERRED RECORDS (OUTPATIENT)
Dept: HEALTH INFORMATION MANAGEMENT | Facility: CLINIC | Age: 69
End: 2019-03-08

## 2019-03-08 LAB — PHQ9 SCORE: 1

## 2019-03-09 NOTE — TELEPHONE ENCOUNTER
RECORDS STATUS - ALL OTHER DIAGNOSIS      RECORDS RECEIVED FROM: [facility]   DATE RECEIVED: 3/9/19   NOTES STATUS DETAILS   OFFICE NOTE from referring provider  Epic   OFFICE NOTE from medical oncologist NA    DISCHARGE SUMMARY from hospital NA    DISCHARGE REPORT from the ER NA    OPERATIVE REPORT NA    MEDICATION LIST  [facility]   CLINICAL TRIAL TREATMENTS TO DATE     LABS     PATHOLOGY REPORTS NA    ANYTHING RELATED TO DIAGNOSIS  Epic   GENONOMIC TESTING     TYPE: NA    IMAGING (NEED IMAGES & REPORT)     CT SCANS NA    MRI NA    MAMMO NA    ULTRASOUND NA    PET NA

## 2019-03-12 ENCOUNTER — CARE COORDINATION (OUTPATIENT)
Dept: ONCOLOGY | Facility: CLINIC | Age: 69
End: 2019-03-12

## 2019-03-12 NOTE — PROGRESS NOTES
Patient referred by PCP for anemia. All records are in [facility].    I called and spoke with patient introducing myself and my role. I  Answered a few general questions and provided appointment details.    I will continue to follow as needed for care coordination.    Babita Jane

## 2019-03-13 ENCOUNTER — HOSPITAL ENCOUNTER (OUTPATIENT)
Facility: CLINIC | Age: 69
Setting detail: SPECIMEN
Discharge: HOME | End: 2019-03-13
Attending: INTERNAL MEDICINE | Admitting: INTERNAL MEDICINE
Payer: MEDICARE

## 2019-03-13 ENCOUNTER — ONCOLOGY VISIT (OUTPATIENT)
Dept: ONCOLOGY | Facility: CLINIC | Age: 69
End: 2019-03-13
Attending: INTERNAL MEDICINE
Payer: MEDICARE

## 2019-03-13 ENCOUNTER — PRE VISIT (OUTPATIENT)
Dept: ONCOLOGY | Facility: CLINIC | Age: 69
End: 2019-03-13

## 2019-03-13 VITALS
WEIGHT: 193.4 LBS | SYSTOLIC BLOOD PRESSURE: 135 MMHG | HEART RATE: 76 BPM | HEIGHT: 64 IN | BODY MASS INDEX: 33.02 KG/M2 | TEMPERATURE: 97.8 F | OXYGEN SATURATION: 98 % | DIASTOLIC BLOOD PRESSURE: 76 MMHG

## 2019-03-13 DIAGNOSIS — D64.9 NORMOCYTIC ANEMIA: Primary | ICD-10-CM

## 2019-03-13 LAB
ALBUMIN SERPL-MCNC: 4.3 G/DL (ref 3.4–5)
ALP SERPL-CCNC: 146 U/L (ref 40–150)
ALT SERPL W P-5'-P-CCNC: 29 U/L (ref 0–50)
ANION GAP SERPL CALCULATED.3IONS-SCNC: 6 MMOL/L (ref 3–14)
AST SERPL W P-5'-P-CCNC: 23 U/L (ref 0–45)
BASOPHILS # BLD AUTO: 0 10E9/L (ref 0–0.2)
BASOPHILS NFR BLD AUTO: 0.6 %
BILIRUB SERPL-MCNC: 0.4 MG/DL (ref 0.2–1.3)
BUN SERPL-MCNC: 29 MG/DL (ref 7–30)
CALCIUM SERPL-MCNC: 9.1 MG/DL (ref 8.5–10.1)
CHLORIDE SERPL-SCNC: 112 MMOL/L (ref 94–109)
CO2 SERPL-SCNC: 22 MMOL/L (ref 20–32)
CREAT SERPL-MCNC: 1.26 MG/DL (ref 0.52–1.04)
DIFFERENTIAL METHOD BLD: ABNORMAL
EOSINOPHIL # BLD AUTO: 0 10E9/L (ref 0–0.7)
EOSINOPHIL NFR BLD AUTO: 0.6 %
ERYTHROCYTE [DISTWIDTH] IN BLOOD BY AUTOMATED COUNT: 14.9 % (ref 10–15)
FERRITIN SERPL-MCNC: 361 NG/ML (ref 8–252)
FOLATE SERPL-MCNC: 16.3 NG/ML
GFR SERPL CREATININE-BSD FRML MDRD: 44 ML/MIN/{1.73_M2}
GLUCOSE SERPL-MCNC: 219 MG/DL (ref 70–99)
HCT VFR BLD AUTO: 33.2 % (ref 35–47)
HGB BLD-MCNC: 10.8 G/DL (ref 11.7–15.7)
IMM GRANULOCYTES # BLD: 0 10E9/L (ref 0–0.4)
IMM GRANULOCYTES NFR BLD: 0.3 %
IRON SATN MFR SERPL: 23 % (ref 15–46)
IRON SERPL-MCNC: 58 UG/DL (ref 35–180)
LDH SERPL L TO P-CCNC: 208 U/L (ref 81–234)
LYMPHOCYTES # BLD AUTO: 0.6 10E9/L (ref 0.8–5.3)
LYMPHOCYTES NFR BLD AUTO: 17.3 %
MCH RBC QN AUTO: 31.1 PG (ref 26.5–33)
MCHC RBC AUTO-ENTMCNC: 32.5 G/DL (ref 31.5–36.5)
MCV RBC AUTO: 96 FL (ref 78–100)
MONOCYTES # BLD AUTO: 0 10E9/L (ref 0–1.3)
MONOCYTES NFR BLD AUTO: 1.2 %
NEUTROPHILS # BLD AUTO: 2.8 10E9/L (ref 1.6–8.3)
NEUTROPHILS NFR BLD AUTO: 80 %
NRBC # BLD AUTO: 0 10*3/UL
NRBC BLD AUTO-RTO: 0 /100
PLATELET # BLD AUTO: 204 10E9/L (ref 150–450)
POTASSIUM SERPL-SCNC: 4.2 MMOL/L (ref 3.4–5.3)
PROT SERPL-MCNC: 7.7 G/DL (ref 6.8–8.8)
RBC # BLD AUTO: 3.47 10E12/L (ref 3.8–5.2)
RETICS # AUTO: 86.1 10E9/L (ref 25–95)
RETICS/RBC NFR AUTO: 2.5 % (ref 0.5–2)
SODIUM SERPL-SCNC: 140 MMOL/L (ref 133–144)
TIBC SERPL-MCNC: 256 UG/DL (ref 240–430)
TRANSFERRIN SERPL-MCNC: 207 MG/DL (ref 210–360)
VIT B12 SERPL-MCNC: 1503 PG/ML (ref 193–986)
WBC # BLD AUTO: 3.5 10E9/L (ref 4–11)

## 2019-03-13 PROCEDURE — 85025 COMPLETE CBC W/AUTO DIFF WBC: CPT | Performed by: INTERNAL MEDICINE

## 2019-03-13 PROCEDURE — 85060 BLOOD SMEAR INTERPRETATION: CPT | Performed by: INTERNAL MEDICINE

## 2019-03-13 PROCEDURE — 82728 ASSAY OF FERRITIN: CPT | Performed by: INTERNAL MEDICINE

## 2019-03-13 PROCEDURE — 83010 ASSAY OF HAPTOGLOBIN QUANT: CPT | Performed by: INTERNAL MEDICINE

## 2019-03-13 PROCEDURE — 82607 VITAMIN B-12: CPT | Performed by: INTERNAL MEDICINE

## 2019-03-13 PROCEDURE — G0463 HOSPITAL OUTPT CLINIC VISIT: HCPCS

## 2019-03-13 PROCEDURE — 99204 OFFICE O/P NEW MOD 45 MIN: CPT | Performed by: INTERNAL MEDICINE

## 2019-03-13 PROCEDURE — 83615 LACTATE (LD) (LDH) ENZYME: CPT | Performed by: INTERNAL MEDICINE

## 2019-03-13 PROCEDURE — 84238 ASSAY NONENDOCRINE RECEPTOR: CPT | Performed by: INTERNAL MEDICINE

## 2019-03-13 PROCEDURE — 84466 ASSAY OF TRANSFERRIN: CPT | Performed by: INTERNAL MEDICINE

## 2019-03-13 PROCEDURE — 85045 AUTOMATED RETICULOCYTE COUNT: CPT | Performed by: INTERNAL MEDICINE

## 2019-03-13 PROCEDURE — 40000847 ZZHCL STATISTIC MORPHOLOGY W/INTERP HISTOLOGY TC 85060: Performed by: INTERNAL MEDICINE

## 2019-03-13 PROCEDURE — 80053 COMPREHEN METABOLIC PANEL: CPT | Performed by: INTERNAL MEDICINE

## 2019-03-13 PROCEDURE — 83550 IRON BINDING TEST: CPT | Performed by: INTERNAL MEDICINE

## 2019-03-13 PROCEDURE — 82746 ASSAY OF FOLIC ACID SERUM: CPT | Performed by: INTERNAL MEDICINE

## 2019-03-13 PROCEDURE — 83540 ASSAY OF IRON: CPT | Performed by: INTERNAL MEDICINE

## 2019-03-13 PROCEDURE — 82668 ASSAY OF ERYTHROPOIETIN: CPT | Performed by: INTERNAL MEDICINE

## 2019-03-13 ASSESSMENT — MIFFLIN-ST. JEOR: SCORE: 1402.26

## 2019-03-13 ASSESSMENT — PAIN SCALES - GENERAL: PAINLEVEL: NO PAIN (0)

## 2019-03-13 NOTE — LETTER
3/13/2019         RE: Rosa Sotomayor  [address]  [place] [address]        Dear Colleague,    Thank you for referring your patient, Rosa Sotomayor, to the [facility]. Please see a copy of my visit note below.    This clinic visit note has been dictated [id number]          [facility]  HEMATOLOGY ONCOLOGY    Visit Date:   03/13/2019      REASON FOR CONSULTATION:  Normocytic anemia.      REFERRING PROVIDER:  Dr. Josh Hollingsworth.      HISTORY OF PRESENT ILLNESS:  The patient is a 68-year-old lady.  She is anemic since last 4 years.  She states that she was treated for iron deficiency and had IV iron infusion in 2017.  She was given oral iron in the past and did not handle it well.  She had a GI evaluation done and apparently was negative.  Her last colonoscopy was in 2016.  I do not have records available for that.  She states that anemia is making her quite fatigued and it is hard for her to complete her daily activities.      PAST MEDICAL HISTORY:     1.  Anaphylaxis with anesthesia and other episodes of severe allergies with medication.   2.  Arthritis.   3.  Chronic renal insufficiency.      MEDICATIONS:  Reviewed.       ALLERGIES:  REVIEWED.        SOCIAL HISTORY:  She is a retired pharmacy tech.  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  She is  with 2 kids.      FAMILY HISTORY:  No blood disorders or cancer in the family.      REVIEW OF SYSTEMS:  Complete review of systems was performed and found to be negative other than pertinent positives mentioned in history of present illness.      PHYSICAL EXAMINATION:   VITAL SIGNS:  Blood pressure is 135/76, pulse 76, temperature 97.8.   CONSTITUTIONAL: Sitting comfortably.   HEENT: Pupils are equal. Oropharynx is clear.   NECK: No cervical or supraclavicular lymphadenopathy.   RESPIRATORY: Clear bilaterally.   CARD/VASC: S1, S2, regular.   GI: Soft, nontender, nondistended, no hepatosplenomegaly.    MUSKULOSKELETAL: Warm, well perfused.   NEUROLOGIC: Alert, awake.   INTEGUMENT: No rash.   LYMPHATICS: No edema.   PSYCH: Mood and affect was normal.     LABORATORY DATA AND IMAGING REVIEWED DURING THIS VISIT:  Recent Labs   Lab Test 03/13/19  1540 12/04/18  2120  12/19/16  1235  10/03/16  1044    136   < > 139  --  137   POTASSIUM 4.2 4.1   < > 3.6  --  3.9   CHLORIDE 112* 101   < > 103  --  101   CO2 22 25   < > 28  --  26   ANIONGAP 6 10   < > 8  --  10   BUN 29 22   < > 26  --  22   CR 1.26* 1.38*   < > 1.45*   < > 1.28*   * 127*   < > 125*  --  96   OSIEL 9.1 9.1   < > 9.3  --  9.3   PHOS  --   --   --  3.7  --  3.0    < > = values in this interval not displayed.     Recent Labs   Lab Test 03/13/19  1540 03/05/19  1254 12/07/18  0954 12/04/18  2206  08/09/18  1602   WBC 3.5*  --  4.7 6.0   < > 11.6*   HGB 10.8* 9.8* 10.4* 10.4*   < > 12.3     --  258 197   < > 263   MCV 96  --  97 93   < > 94   NEUTROPHIL 80.0  --   --  53.2  --  66.5    < > = values in this interval not displayed.     Recent Labs   Lab Test 03/13/19  1540 07/16/18 05/03/17  1410 01/06/17  0350   BILITOTAL 0.4  --  0.7 0.5   ALKPHOS 146  --  119 130   ALT 29 25 27 32   AST 23 29 22 26   ALBUMIN 4.3  --  4.2 4.0     --   --   --      ASSESSMENT AND RECOMMENDATIONS:  This is a 68-year-old lady, who has normocytic anemia.  This appears to be anemia of renal insufficiency.  Based on recent labs, she does not have iron deficiency, but it can be partially treated iron deficiency as well.  I think we should proceed with a detailed workup including repeat iron studies including the soluble transferrin receptor level, haptoglobin, erythropoietin level, B12, folate, and a peripheral blood morphology.  I will see her after her lab results are available to have further discussion.         UZMA JOYCE MD             D: 03/13/2019   T: 03/13/2019   MT: ADRY      Name:     NEO DENT   MRN:      [id number]        Account:       [id number]   :      1950           Visit Date:   2019      Document: [id number]       cc: Josh Hollingsworth MD       Again, thank you for allowing me to participate in the care of your patient.        Sincerely,        Skylar Romo MD

## 2019-03-14 ENCOUNTER — HOSPITAL ENCOUNTER (OUTPATIENT)
Facility: CLINIC | Age: 69
Setting detail: SPECIMEN
End: 2019-03-14
Attending: INTERNAL MEDICINE
Payer: MEDICARE

## 2019-03-14 ENCOUNTER — ONCOLOGY VISIT (OUTPATIENT)
Dept: ONCOLOGY | Facility: CLINIC | Age: 69
End: 2019-03-14
Attending: INTERNAL MEDICINE
Payer: MEDICARE

## 2019-03-14 VITALS
SYSTOLIC BLOOD PRESSURE: 126 MMHG | TEMPERATURE: 98 F | OXYGEN SATURATION: 99 % | BODY MASS INDEX: 32.61 KG/M2 | DIASTOLIC BLOOD PRESSURE: 71 MMHG | HEIGHT: 64 IN | HEART RATE: 85 BPM | WEIGHT: 191 LBS

## 2019-03-14 DIAGNOSIS — D64.9 NORMOCYTIC ANEMIA: Primary | ICD-10-CM

## 2019-03-14 LAB
COPATH REPORT: NORMAL
HAPTOGLOB SERPL-MCNC: 116 MG/DL (ref 35–175)

## 2019-03-14 PROCEDURE — G0463 HOSPITAL OUTPT CLINIC VISIT: HCPCS

## 2019-03-14 PROCEDURE — 99213 OFFICE O/P EST LOW 20 MIN: CPT | Performed by: INTERNAL MEDICINE

## 2019-03-14 ASSESSMENT — MIFFLIN-ST. JEOR: SCORE: 1391.37

## 2019-03-14 ASSESSMENT — PAIN SCALES - GENERAL: PAINLEVEL: NO PAIN (0)

## 2019-03-14 NOTE — LETTER
3/14/2019         RE: Rosa Sotomayor  [address]  [place] [address]        Dear Colleague,    Thank you for referring your patient, Rosa Sotomayor, to the [facility]. Please see a copy of my visit note below.    This clinic visit note has been dictated [id number]      Again, thank you for allowing me to participate in the care of your patient.        Sincerely,        Skylar Romo MD

## 2019-03-14 NOTE — PROGRESS NOTES
[facility]  HEMATOLOGY ONCOLOGY    Visit Date:   03/13/2019      REASON FOR CONSULTATION:  Normocytic anemia.      REFERRING PROVIDER:  Dr. Josh Hollingsworth.      HISTORY OF PRESENT ILLNESS:  The patient is a 68-year-old lady.  She is anemic since last 4 years.  She states that she was treated for iron deficiency and had IV iron infusion in 2017.  She was given oral iron in the past and did not handle it well.  She had a GI evaluation done and apparently was negative.  Her last colonoscopy was in 2016.  I do not have records available for that.  She states that anemia is making her quite fatigued and it is hard for her to complete her daily activities.      PAST MEDICAL HISTORY:     1.  Anaphylaxis with anesthesia and other episodes of severe allergies with medication.   2.  Arthritis.   3.  Chronic renal insufficiency.      MEDICATIONS:  Reviewed.       ALLERGIES:  REVIEWED.        SOCIAL HISTORY:  She is a retired pharmacy tech.  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  She is  with 2 kids.      FAMILY HISTORY:  No blood disorders or cancer in the family.      REVIEW OF SYSTEMS:  Complete review of systems was performed and found to be negative other than pertinent positives mentioned in history of present illness.      PHYSICAL EXAMINATION:   VITAL SIGNS:  Blood pressure is 135/76, pulse 76, temperature 97.8.   CONSTITUTIONAL: Sitting comfortably.   HEENT: Pupils are equal. Oropharynx is clear.   NECK: No cervical or supraclavicular lymphadenopathy.   RESPIRATORY: Clear bilaterally.   CARD/VASC: S1, S2, regular.   GI: Soft, nontender, nondistended, no hepatosplenomegaly.   MUSKULOSKELETAL: Warm, well perfused.   NEUROLOGIC: Alert, awake.   INTEGUMENT: No rash.   LYMPHATICS: No edema.   PSYCH: Mood and affect was normal.     LABORATORY DATA AND IMAGING REVIEWED DURING THIS VISIT:  Recent Labs   Lab Test 03/13/19  1540 12/04/18  2120  12/19/16  1235  10/03/16  1044    136   < >  139  --  137   POTASSIUM 4.2 4.1   < > 3.6  --  3.9   CHLORIDE 112* 101   < > 103  --  101   CO2 22 25   < > 28  --  26   ANIONGAP 6 10   < > 8  --  10   BUN 29 22   < > 26  --  22   CR 1.26* 1.38*   < > 1.45*   < > 1.28*   * 127*   < > 125*  --  96   OSIEL 9.1 9.1   < > 9.3  --  9.3   PHOS  --   --   --  3.7  --  3.0    < > = values in this interval not displayed.     Recent Labs   Lab Test 19  1540 19  1254 18  0954 18  2206  18  1602   WBC 3.5*  --  4.7 6.0   < > 11.6*   HGB 10.8* 9.8* 10.4* 10.4*   < > 12.3     --  258 197   < > 263   MCV 96  --  97 93   < > 94   NEUTROPHIL 80.0  --   --  53.2  --  66.5    < > = values in this interval not displayed.     Recent Labs   Lab Test 19  1540 18  1410 17  0350   BILITOTAL 0.4  --  0.7 0.5   ALKPHOS 146  --  119 130   ALT 29 25 27 32   AST 23 29 22 26   ALBUMIN 4.3  --  4.2 4.0     --   --   --      ASSESSMENT AND RECOMMENDATIONS:  This is a 68-year-old lady, who has normocytic anemia.  This appears to be anemia of renal insufficiency.  Based on recent labs, she does not have iron deficiency, but it can be partially treated iron deficiency as well.  I think we should proceed with a detailed workup including repeat iron studies including the soluble transferrin receptor level, haptoglobin, erythropoietin level, B12, folate, and a peripheral blood morphology.  I will see her after her lab results are available to have further discussion.         UZMA JOYCE MD             D: 2019   T: 2019   MT: ADRY      Name:     NEO DENT   MRN:      [id number]        Account:      [id number]   :      1950           Visit Date:   2019      Document: [id number]       cc: Josh Hollingsworth MD

## 2019-03-15 LAB
EPO SERPL-ACNC: 38 MU/ML (ref 4–27)
STFR SERPL-SCNC: 5.2 MG/L (ref 1.9–4.4)

## 2019-03-16 NOTE — PROGRESS NOTES
[facility]  HEMATOLOGY ONCOLOGY    Visit Date:   03/14/2019      DIAGNOSIS:  Normocytic anemia which IS very likely related to anemia of renal disease, may have a partially may have a component of iron deficiency, given elevated soluble transferrin receptor level.      TREATMENT: close observation.      SUBJECTIVE:  The patient was seen as a followup today.  We reviewed the results of recently performed lab work and discussed the plan of care.     REVIEW OF SYSTEMS:  A complete review of systems was performed and found to be negative other than pertinent positives mentioned in history of present illness.      Past medical, social histories reviewed.     Meds- Reviewed.     PHYSICAL EXAMINATION:   VITAL SIGNS:  His blood pressure is 126/71, pulse 85, temperature 98.   CONSTITUTIONAL:  Sitting comfortably.   NEUROLOGIC:  Alert, awake.   PSYCHIATRIC:  Mood and affect appear normal.     LABORATORY DATA AND IMAGING REVIEWED DURING THIS VISIT:  Recent Labs   Lab Test 03/13/19  1540 12/04/18  2120  12/19/16  1235  10/03/16  1044    136   < > 139  --  137   POTASSIUM 4.2 4.1   < > 3.6  --  3.9   CHLORIDE 112* 101   < > 103  --  101   CO2 22 25   < > 28  --  26   ANIONGAP 6 10   < > 8  --  10   BUN 29 22   < > 26  --  22   CR 1.26* 1.38*   < > 1.45*   < > 1.28*   * 127*   < > 125*  --  96   OSIEL 9.1 9.1   < > 9.3  --  9.3   PHOS  --   --   --  3.7  --  3.0    < > = values in this interval not displayed.     Recent Labs   Lab Test 03/13/19  1540 03/05/19  1254 12/07/18  0954 12/04/18  2206  08/09/18  1602   WBC 3.5*  --  4.7 6.0   < > 11.6*   HGB 10.8* 9.8* 10.4* 10.4*   < > 12.3     --  258 197   < > 263   MCV 96  --  97 93   < > 94   NEUTROPHIL 80.0  --   --  53.2  --  66.5    < > = values in this interval not displayed.     Recent Labs   Lab Test 03/13/19  1540 07/16/18 05/03/17  1410 01/06/17  0350   BILITOTAL 0.4  --  0.7 0.5   ALKPHOS 146  --  119 130   ALT 29 25 27 32   AST 23  29 22 26   ALBUMIN 4.3  --  4.2 4.0     --   --   --         ASSESSMENT AND PLAN:  This is a 68-year-old lady with normocytic anemia.  She has chronic renal insufficiency, and also has a previously documented previously treated iron deficiency as well.     Creatinine is elevated at 1.24.  Her GFR generally runs in the upper 30s level.  Her erythropoietin level is elevated at 38.  Her ferritin is elevated at 361, folate 16.3, normal iron, iron binding capacity and iron saturation, soluble transferrin receptor was elevated at 5.2.  Elevated B12 level. Serum protein electrophoresis did not show any evidence of monoclonal proteins.  Haptoglobin is normal.      She had previous colonoscopies for iron deficiency.  Her hemoglobin has improved to 10.8.  I think it is fairly well correlating with her elevated creatinine.  I would recommend to observe her hemoglobin at this point and repeat iron studies in future if persistently iron deficient, then we may consider supplementation and additional GI workup.         UZMA JOYCE MD             D: 03/15/2019   T: 03/15/2019   MT: AS      Name:     NEO DENT   MRN:      [id number]        Account:      [id number]   :      1950           Visit Date:   2019      Document: [id number]       cc: Josh Hollingsworth MD

## 2019-03-20 ENCOUNTER — TELEPHONE (OUTPATIENT)
Dept: ONCOLOGY | Facility: CLINIC | Age: 69
End: 2019-03-20

## 2019-03-21 ENCOUNTER — TELEPHONE (OUTPATIENT)
Dept: FAMILY MEDICINE | Facility: CLINIC | Age: 69
End: 2019-03-21

## 2019-03-21 NOTE — TELEPHONE ENCOUNTER
Reason for Call:  Other appointment    Detailed comments: Rosa wants to get in sooner for appt with Dr. Hollingsworth for her anemia. She did not times offered.    Phone Number Patient can be reached at: Cell number on file:    Telephone Information:   Mobile [phone]       Best Time: any    Can we leave a detailed message on this number? YES    Call taken on 3/21/2019 at 1:47 PM by Bob Lujan

## 2019-03-25 DIAGNOSIS — D64.9 NORMOCYTIC ANEMIA: ICD-10-CM

## 2019-03-25 LAB
BASOPHILS # BLD AUTO: 0 10E9/L (ref 0–0.2)
BASOPHILS NFR BLD AUTO: 0.1 %
DIFFERENTIAL METHOD BLD: ABNORMAL
EOSINOPHIL # BLD AUTO: 0.2 10E9/L (ref 0–0.7)
EOSINOPHIL NFR BLD AUTO: 3 %
ERYTHROCYTE [DISTWIDTH] IN BLOOD BY AUTOMATED COUNT: 15 % (ref 10–15)
HCT VFR BLD AUTO: 34 % (ref 35–47)
HGB BLD-MCNC: 10.9 G/DL (ref 11.7–15.7)
LYMPHOCYTES # BLD AUTO: 1.2 10E9/L (ref 0.8–5.3)
LYMPHOCYTES NFR BLD AUTO: 17.2 %
MCH RBC QN AUTO: 31.7 PG (ref 26.5–33)
MCHC RBC AUTO-ENTMCNC: 32.1 G/DL (ref 31.5–36.5)
MCV RBC AUTO: 99 FL (ref 78–100)
MONOCYTES # BLD AUTO: 0.6 10E9/L (ref 0–1.3)
MONOCYTES NFR BLD AUTO: 8.4 %
NEUTROPHILS # BLD AUTO: 5 10E9/L (ref 1.6–8.3)
NEUTROPHILS NFR BLD AUTO: 71.3 %
PLATELET # BLD AUTO: 216 10E9/L (ref 150–450)
RBC # BLD AUTO: 3.44 10E12/L (ref 3.8–5.2)
WBC # BLD AUTO: 7.1 10E9/L (ref 4–11)

## 2019-03-25 PROCEDURE — 85025 COMPLETE CBC W/AUTO DIFF WBC: CPT | Performed by: INTERNAL MEDICINE

## 2019-03-25 PROCEDURE — 36415 COLL VENOUS BLD VENIPUNCTURE: CPT | Performed by: INTERNAL MEDICINE

## 2019-03-26 ENCOUNTER — TELEPHONE (OUTPATIENT)
Dept: ONCOLOGY | Facility: CLINIC | Age: 69
End: 2019-03-26

## 2019-03-26 NOTE — TELEPHONE ENCOUNTER
Patient called requesting her CBC p-d that was drawn on 3/25 to be released to [facility]. This has been done per the request of the patient and stable labs reviewed.    Babita Jane

## 2019-03-27 ENCOUNTER — OFFICE VISIT (OUTPATIENT)
Dept: FAMILY MEDICINE | Facility: CLINIC | Age: 69
End: 2019-03-27
Payer: MEDICARE

## 2019-03-27 VITALS
OXYGEN SATURATION: 96 % | SYSTOLIC BLOOD PRESSURE: 122 MMHG | TEMPERATURE: 100.9 F | HEIGHT: 64 IN | HEART RATE: 87 BPM | WEIGHT: 186 LBS | DIASTOLIC BLOOD PRESSURE: 74 MMHG | BODY MASS INDEX: 31.76 KG/M2

## 2019-03-27 DIAGNOSIS — G43.019 INTRACTABLE MIGRAINE WITHOUT AURA AND WITHOUT STATUS MIGRAINOSUS: ICD-10-CM

## 2019-03-27 DIAGNOSIS — Q07.00 ARNOLD-CHIARI MALFORMATION (H): ICD-10-CM

## 2019-03-27 DIAGNOSIS — I70.1 RENAL ARTERY STENOSIS (H): ICD-10-CM

## 2019-03-27 DIAGNOSIS — R53.83 OTHER FATIGUE: ICD-10-CM

## 2019-03-27 DIAGNOSIS — N25.81 SECONDARY RENAL HYPERPARATHYROIDISM (H): ICD-10-CM

## 2019-03-27 DIAGNOSIS — H65.91 OME (OTITIS MEDIA WITH EFFUSION), RIGHT: Primary | ICD-10-CM

## 2019-03-27 PROCEDURE — 99214 OFFICE O/P EST MOD 30 MIN: CPT | Performed by: INTERNAL MEDICINE

## 2019-03-27 RX ORDER — TOPIRAMATE 100 MG/1
1 TABLET, FILM COATED ORAL DAILY
Refills: 3 | COMMUNITY
Start: 2019-02-27 | End: 2023-06-14

## 2019-03-27 RX ORDER — CEFDINIR 300 MG/1
300 CAPSULE ORAL 2 TIMES DAILY
Qty: 14 CAPSULE | Refills: 0 | Status: SHIPPED | OUTPATIENT
Start: 2019-03-27 | End: 2019-06-14

## 2019-03-27 ASSESSMENT — MIFFLIN-ST. JEOR: SCORE: 1368.69

## 2019-03-27 NOTE — PROGRESS NOTES
SUBJECTIVE:   Rosa Sotomayor is a 68 year old female who presents to clinic today for the following health issues:      Multiple concerns    68-year-old female with history of Arnold-Chiari malformation, chronic headache syndrome, chronic renal insufficiency with secondary renal hyperparathyroidism, history of renal artery stenosis, obstructive sleep apnea, spinal stenosis with chronic back pain, multiple drug allergies, hypertension, anxiety, hyperlipidemia.  She is a non-smoker.  She presents with multiple concerns.  She was treated with a Z-Dieudonne for right otitis media while visiting [place] this winter.  The medication did not seem to address right ear pain, right-sided facial pressure and nasal congestion.  The symptoms have been present for almost 2 months now intermittently and are moderate to severe.  Also, she reports that her headaches have improved since starting Topamax and she is tolerating CPAP.  She also reports a worsening lower back pain with an MRI recently showing more degenerative changes in the spine for which she thinks she may need to schedule surgery with Dr. Tovar.  She has found some symptom relief using over-the-counter CBD oil.  She describes feeling very fatigued and having cold intolerance and the symptoms have been present for a couple of months as well.  Her most recent thyroid-stimulating hormone blood test was in July 2018 and was normal.  She recently saw her hematologist regarding her chronic anemia that is thought to be related to chronic kidney disease.  Her iron saturation was normal and her hemoglobin was up to 10.9.  She denies shortness of breath or chest pain.  She describes intermittent fevers and chills over the last 2 months that she relates to her nasal congestion and right-sided ear pain.    Problem list and histories reviewed & adjusted, as indicated.  Additional history: as documented    Patient Active Problem List   Diagnosis     Personal history of allergy to  anesthetic agent     CKD (chronic kidney disease) stage 3, GFR 30-59 ml/min (H)     Hypertriglyceridemia     Arnold-Chiari malformation (H)     Hyperlipidemia LDL goal <130     Anxiety     Secondary renal hyperparathyroidism (H)     Chronic bilateral low back pain without sciatica     Benign essential hypertension     Renal artery stenosis (H)     Macular degeneration, bilateral     Anemia, iron deficiency     Intractable migraine without aura and without status migrainosus     Aortic valve insufficiency     ESTEFANIA (obstructive sleep apnea)- mild (AHI 12)     Past Surgical History:   Procedure Laterality Date     BACK SURGERY      discectomy L3-4, 2018     C NONSPECIFIC PROCEDURE      wisdom teeth     C NONSPECIFIC PROCEDURE  2005    Varicose Veins     C TOTAL HIP ARTHROPLASTY      left in 2012, right 2017     C TOTAL KNEE ARTHROPLASTY      right 2014, left 2018     ROTATOR CUFF REPAIR RT/LT Right     2016     TUBAL LIGATION  1987       Social History     Tobacco Use     Smoking status: Never Smoker     Smokeless tobacco: Never Used   Substance Use Topics     Alcohol use: No     Alcohol/week: 0.0 oz     Family History   Problem Relation Age of Onset     Diabetes Mother         INSULIN     Hypertension Mother      Eye Disorder Mother         CATERACTS     Heart Disease Mother         CHF- OPEN HEART SURG X'S 2     Lipids Mother      Obesity Mother      Coronary Artery Disease Mother      Diabetes Father         ORAL     Hypertension Father      Arthritis Father      Eye Disorder Father         MAC DEGENERATION     Heart Disease Father         CHF     Lipids Father      Obesity Father      Diabetes Maternal Grandfather         INSULIN     Diabetes Brother         INSULIN     Gastrointestinal Disease Brother         CHOLITISI POSSIBLE CHRONES     Obesity Brother      Colon Cancer No family hx of      Breast Cancer No family hx of          Current Outpatient Medications   Medication Sig Dispense Refill     acetaminophen  (TYLENOL) 500 MG tablet Take 500 mg by mouth       allopurinol (ZYLOPRIM) 100 MG tablet Take 100 mg by mouth       atorvastatin (LIPITOR) 40 MG tablet TAKE 1 TABLET(40 MG) BY MOUTH DAILY 90 tablet 3     carvedilol (COREG) 25 MG tablet Take 1 tablet (25 mg) by mouth 2 times daily 180 tablet 3     cefdinir (OMNICEF) 300 MG capsule Take 1 capsule (300 mg) by mouth 2 times daily 14 capsule 0     cetirizine (ZYRTEC) 10 MG tablet Take 10 mg by mouth       Cholecalciferol (VITAMIN D) 2000 UNITS tablet Take 1 tablet by mouth daily       clobetasol (TEMOVATE) 0.05 % cream Apply sparingly to affected area twice daily for 14 days.  Do not apply to face. 15 g 2     clobetasol (TEMOVATE) 0.05 % external solution Apply topically 2 times daily as needed  5     Cranberry POWD 1 capsule       Cyanocobalamin (B-12) 1000 MCG TBCR Take 1,000 mcg by mouth daily 100 tablet 1     diphenhydrAMINE (BENADRYL ALLERGY) 25 MG tablet Take 2 tablets (50 mg) by mouth every 6 hours as needed for itching or allergies 60 tablet 1     EPINEPHrine (EPIPEN 2-FRAN) 0.3 MG/0.3ML injection 2-pack Inject 0.3 mLs (0.3 mg) into the muscle once as needed for anaphylaxis 0.6 mL 1     hydrOXYzine (ATARAX) 25 MG tablet Take 1 tablet (25 mg) by mouth 3 times daily as needed for itching 120 tablet 1     losartan (COZAAR) 100 MG tablet Take 1 tablet (100 mg) by mouth At Bedtime (Patient taking differently: Take 50 mg by mouth 2 times daily ) 90 tablet 1     Magnesium Oxide 250 MG TABS Take 250 mg by mouth       ondansetron (ZOFRAN) 4 MG tablet Take by mouth every 8 hours as needed for nausea       SUMAtriptan (IMITREX) 20 MG/ACT nasal spray Spray 1 spray in nostril as needed for migraine May repeat in 2 hours. Max 2 sprays/24 hours. 12 each 11     topiramate (TOPAMAX) 100 MG tablet Take 1 tablet by mouth daily  3     torsemide (DEMADEX) 10 MG tablet Take 1 tablet (10 mg) by mouth daily 90 tablet 3     traMADol (ULTRAM) 50 MG tablet Take 1 tablet by mouth every 6  hours as needed  0     triamcinolone (KENALOG) 0.1 % cream Apply topically 2 times daily Apply to AA on trunk, arms or legs bid for 10-14 days 454 g 0     ZOLMitriptan (ZOMIG-ZMT) 5 MG ODT tab Take 5 mg by mouth       Allergies   Allergen Reactions     Bee Venom Anaphylaxis     Codeine Swelling     Body swelling and severe itching. Tolerates Morphine.      Fentanyl Anaphylaxis and Shortness Of Breath     Gabapentin Other (See Comments)     PN: chest heaviness with breathing  Chest heaviness with breathing  Chest heaviness with breathing  chest heaviness with breathing     Hydromorphone Itching     Tolerates morphine  Tolerates morphine     Midazolam Anaphylaxis, Itching and Shortness Of Breath     Tolerates Diazepam     Prilocaine Anaphylaxis     Anaphalactic shock     Propofol Anaphylaxis     Penicillins Itching and Rash     Tolerates Keflex,  Ancef  rash     Shingrix [Zoster Vac Recomb Adjuvanted] Itching and Rash     Sulfa Drugs Itching and Rash     rash     Clindamycin      Codeine      Diatrizoate Other (See Comments)     CKD 3     Hydrocodone      Lisinopril Cough     Lorazepam Itching     Nsaids Other (See Comments)     Contraindicated with kidney hx (including Celebrex, per pt)     Other [Seasonal Allergies] Anaphylaxis     GENERAL ANETHESIA       Oxycodone      Vancomycin Other (See Comments)     Thrush, yeast infection, bladder infection  Thrush, yeast infection, bladder infection     Erythromycin Rash     ointment  PN: ointment  ointment     Eucalyptus Oil Rash and Hives     hives     Nitrofuran Derivatives Rash     Nitrofurantoin Rash     Tramadol Rash       Reviewed and updated as needed this visit by clinical staff       Reviewed and updated as needed this visit by Provider         ROS:  Constitutional, HEENT, cardiovascular, pulmonary, gi and gu systems are negative, except as otherwise noted.    OBJECTIVE:     /74 (BP Location: Right arm, Patient Position: Sitting, Cuff Size: Adult Regular)    "Pulse 87   Temp 100.9  F (38.3  C) (Oral)   Ht 1.626 m (5' 4\")   Wt 84.4 kg (186 lb)   SpO2 96%   BMI 31.93 kg/m    Body mass index is 31.93 kg/m .  GENERAL: healthy, alert and no distress  EYES: Eyes grossly normal to inspection, PERRL and conjunctivae and sclerae normal  HENT: normal cephalic/atraumatic, right ear: mucopurulent effusion, nose and mouth without ulcers or lesions, oropharynx clear, oral mucous membranes moist and sinuses: maxillary tenderness on right  NECK: no adenopathy, no asymmetry, masses, or scars and thyroid normal to palpation  RESP: lungs clear to auscultation - no rales, rhonchi or wheezes  CV: Heart with regular rate and rhythm.  MS: no gross musculoskeletal defects noted, no edema  NEURO: Normal strength and tone, mentation intact and speech normal  PSYCH: Unchanged mildly anxious affect, normal mood, well-groomed        ASSESSMENT/PLAN:         ICD-10-CM    1. OME (otitis media with effusion), right H65.91 cefdinir (OMNICEF) 300 MG capsule     OTOLARYNGOLOGY REFERRAL   2. Other fatigue R53.83 TSH with free T4 reflex   3. Arnold-Chiari malformation (H) Q07.00    4. Renal artery stenosis (H) I70.1    5. Secondary renal hyperparathyroidism (H) N25.81    6. Intractable migraine without aura and without status migrainosus G43.019      Her fatigue, fevers and chills, right ear pain and right-sided facial pressure could be explained by persistent effusion in the right middle ear.  The symptoms did not respond to Z-Dieudonne.  Try Omnicef, if that does not help enough, see Dr. Kay from otolaryngology.  No dose adjustment as needed for her chronic kidney disease since her GFR is greater than 30.  Her anemia appears to be stable.  If her fatigue does not improve after treatment for her ear infection, check TSH, she plans to do this when she has blood work done as previously planned with her hematologist.  Blood pressure is under good control.  She states that her nephrologist at the [facility] " is retiring and recommended that she continue to have her renal function followed with her primary care doctor in the [facility].  If there is worsening of her renal insufficiency, she will need a local nephrologist.  We discussed this.  I am pleased to hear that her headaches have improved since starting Topamax and using CPAP.  It is conceivable that the Topamax may be contributing to her fatigue symptoms as well and we discussed this, but at this point, she would prefer to stay on the Topamax because of the relief she is getting with respect to her headache syndrome.    Total face to face contact time was greater than 26 minutes of which more than 50% of this time was spent counseling and coordinating care regarding the above topics.        Josh Hollingsworth MD  [facility]

## 2019-04-01 ENCOUNTER — TELEPHONE (OUTPATIENT)
Dept: FAMILY MEDICINE | Facility: CLINIC | Age: 69
End: 2019-04-01

## 2019-04-01 NOTE — TELEPHONE ENCOUNTER
Fever at night; Ear pain- could not sleep last night.    Tonight and tomorrow left for Omnicef    Next step was to see Otolaryngology per Dr. Hollingsworth.     Dr. Kay is booked until 4/8 (she has appt).     Called Dr. Kay's office to see if pt can get in sooner or if there is another provider that can see her.  - Dr. Kay is booked until next week, but they have providers at their [place] office who can see her tomorrow.     Suad- just to clarify- would this referral be good for [place] Location?     Thank you,   Adore JUÁREZ RN

## 2019-04-01 NOTE — TELEPHONE ENCOUNTER
Pt called back.  She did receive message from nurse but inquiring what she should do now until appt 4/08 with .  Pt may be reached at [phone].

## 2019-04-01 NOTE — TELEPHONE ENCOUNTER
Left pt detailed message.  OV notes indicate next step is to see Dr. Kay at ENT. Left pt a voicemail with their contact number, and to see him next as 2 abx did not work. [phone] is ENT number    Maida Epstein RN

## 2019-04-01 NOTE — TELEPHONE ENCOUNTER
Reason for call:  Patient reporting a symptom    Symptom or request: antibiotic for ear infection is not working    Duration (how long have symptoms been present): 5 days    Have you been treated for this before? Yes    Additional comments: she is still taking the antibiotic     Phone Number patient can be reached at:  Home number on file [phone] (home)    Best Time:  any    Can we leave a detailed message on this number:  YES    Call taken on 4/1/2019 at 10:27 AM by Jasmina Mario

## 2019-04-02 NOTE — TELEPHONE ENCOUNTER
Adore she can be seen at the [facility] location with the same referral. Same clinic just different location.Fax number is [phone]. Phone   [phone].    Thanks  Suad Bowers  Referral Coordinator

## 2019-04-04 ENCOUNTER — OFFICE VISIT (OUTPATIENT)
Dept: FAMILY MEDICINE | Facility: CLINIC | Age: 69
End: 2019-04-04
Payer: MEDICARE

## 2019-04-04 VITALS
BODY MASS INDEX: 31.41 KG/M2 | HEIGHT: 64 IN | WEIGHT: 184 LBS | OXYGEN SATURATION: 98 % | DIASTOLIC BLOOD PRESSURE: 57 MMHG | HEART RATE: 75 BPM | SYSTOLIC BLOOD PRESSURE: 95 MMHG | TEMPERATURE: 98.5 F

## 2019-04-04 DIAGNOSIS — M62.838 SPASM OF ABDOMINAL MUSCLES: ICD-10-CM

## 2019-04-04 DIAGNOSIS — H93.8X9 SENSATION OF PLUGGED EAR, UNSPECIFIED LATERALITY: Primary | ICD-10-CM

## 2019-04-04 PROCEDURE — 99214 OFFICE O/P EST MOD 30 MIN: CPT | Performed by: NURSE PRACTITIONER

## 2019-04-04 ASSESSMENT — MIFFLIN-ST. JEOR: SCORE: 1359.62

## 2019-04-04 NOTE — TELEPHONE ENCOUNTER
Called patient:     She was able to see an ENT this week who states she doesn't have an ear infection or sinus infection.     She is scheduled with [facility] today for follow-up.     Leg swelling has improved- Ortho states it did not come from her back. Advised to ice her knee, which she has been doing. This has improved and she is able to ambulate now.     She will come to see [facility] d/t continued fevers at night.     Adore JUÁREZ RN

## 2019-04-04 NOTE — PROGRESS NOTES
"HPI      SUBJECTIVE:   Rosa Sotomayor is a 68 year old female who presents to clinic today for the following health issues:      Chief Complaint   Patient presents with     Follow Up     OME (otitis media with effusion). Patient state that she still having fever.     Edema     right leg. Patient state that Ortho advised to ice her knee, which she has been doing.       Saw ENT in [facility] for persistent ear issues.    Got a CT of the sinuses that was negative and also exam was normal.   Still has a little pressure in ear that is not as much as last week  101.4 Tmax for over 1 week but no fever today   Had nasal drainage and eye irritation for a few weeks prior to the fever starting     Also R knee had some swelling and is now ok.     Also Spot of mid back that has been itching for a couple weeks     Also has an occasional \"abdominal hiccup\" that is not painful     Past Medical History:   Diagnosis Date     Postmenopausal bleeding 2/28/2007     Family History   Problem Relation Age of Onset     Diabetes Mother         INSULIN     Hypertension Mother      Eye Disorder Mother         CATERACTS     Heart Disease Mother         CHF- OPEN HEART SURG X'S 2     Lipids Mother      Obesity Mother      Coronary Artery Disease Mother      Diabetes Father         ORAL     Hypertension Father      Arthritis Father      Eye Disorder Father         MAC DEGENERATION     Heart Disease Father         CHF     Lipids Father      Obesity Father      Diabetes Maternal Grandfather         INSULIN     Diabetes Brother         INSULIN     Gastrointestinal Disease Brother         CHOLITISI POSSIBLE CHRONES     Obesity Brother      Colon Cancer No family hx of      Breast Cancer No family hx of      Past Surgical History:   Procedure Laterality Date     BACK SURGERY      discectomy L3-4, 2018     C NONSPECIFIC PROCEDURE      wisdom teeth     C NONSPECIFIC PROCEDURE  2005    Varicose Veins     C TOTAL HIP ARTHROPLASTY      left in 2012, right " 2017     C TOTAL KNEE ARTHROPLASTY      right 2014, left 2018     ROTATOR CUFF REPAIR RT/LT Right     2016     TUBAL LIGATION  1987     Social History     Tobacco Use     Smoking status: Never Smoker     Smokeless tobacco: Never Used   Substance Use Topics     Alcohol use: No     Alcohol/week: 0.0 oz     Current Outpatient Medications   Medication Sig Dispense Refill     acetaminophen (TYLENOL) 500 MG tablet Take 500 mg by mouth       allopurinol (ZYLOPRIM) 100 MG tablet Take 100 mg by mouth       atorvastatin (LIPITOR) 40 MG tablet TAKE 1 TABLET(40 MG) BY MOUTH DAILY 90 tablet 3     carvedilol (COREG) 25 MG tablet Take 1 tablet (25 mg) by mouth 2 times daily 180 tablet 3     cefdinir (OMNICEF) 300 MG capsule Take 1 capsule (300 mg) by mouth 2 times daily 14 capsule 0     cetirizine (ZYRTEC) 10 MG tablet Take 10 mg by mouth       Cholecalciferol (VITAMIN D) 2000 UNITS tablet Take 1 tablet by mouth daily       clobetasol (TEMOVATE) 0.05 % cream Apply sparingly to affected area twice daily for 14 days.  Do not apply to face. 15 g 2     clobetasol (TEMOVATE) 0.05 % external solution Apply topically 2 times daily as needed  5     Cranberry POWD 1 capsule       Cyanocobalamin (B-12) 1000 MCG TBCR Take 1,000 mcg by mouth daily 100 tablet 1     diphenhydrAMINE (BENADRYL ALLERGY) 25 MG tablet Take 2 tablets (50 mg) by mouth every 6 hours as needed for itching or allergies 60 tablet 1     EPINEPHrine (EPIPEN 2-FRAN) 0.3 MG/0.3ML injection 2-pack Inject 0.3 mLs (0.3 mg) into the muscle once as needed for anaphylaxis 0.6 mL 1     hydrOXYzine (ATARAX) 25 MG tablet Take 1 tablet (25 mg) by mouth 3 times daily as needed for itching 120 tablet 1     losartan (COZAAR) 100 MG tablet Take 1 tablet (100 mg) by mouth At Bedtime (Patient taking differently: Take 50 mg by mouth 2 times daily ) 90 tablet 1     Magnesium Oxide 250 MG TABS Take 250 mg by mouth       ondansetron (ZOFRAN) 4 MG tablet Take by mouth every 8 hours as needed for  nausea       SUMAtriptan (IMITREX) 20 MG/ACT nasal spray Spray 1 spray in nostril as needed for migraine May repeat in 2 hours. Max 2 sprays/24 hours. 12 each 11     topiramate (TOPAMAX) 100 MG tablet Take 1 tablet by mouth daily  3     torsemide (DEMADEX) 10 MG tablet Take 1 tablet (10 mg) by mouth daily 90 tablet 3     traMADol (ULTRAM) 50 MG tablet Take 1 tablet by mouth every 6 hours as needed  0     triamcinolone (KENALOG) 0.1 % cream Apply topically 2 times daily Apply to AA on trunk, arms or legs bid for 10-14 days 454 g 0     ZOLMitriptan (ZOMIG-ZMT) 5 MG ODT tab Take 5 mg by mouth       Allergies   Allergen Reactions     Bee Venom Anaphylaxis     Codeine Swelling     Body swelling and severe itching. Tolerates Morphine.      Fentanyl Anaphylaxis and Shortness Of Breath     Gabapentin Other (See Comments)     PN: chest heaviness with breathing  Chest heaviness with breathing  Chest heaviness with breathing  chest heaviness with breathing     Hydromorphone Itching     Tolerates morphine  Tolerates morphine     Midazolam Anaphylaxis, Itching and Shortness Of Breath     Tolerates Diazepam     Prilocaine Anaphylaxis     Anaphalactic shock     Propofol Anaphylaxis     Penicillins Itching and Rash     Tolerates Keflex,  Ancef  rash     Shingrix [Zoster Vac Recomb Adjuvanted] Itching and Rash     Sulfa Drugs Itching and Rash     rash     Clindamycin      Codeine      Diatrizoate Other (See Comments)     CKD 3     Hydrocodone      Lisinopril Cough     Lorazepam Itching     Nsaids Other (See Comments)     Contraindicated with kidney hx (including Celebrex, per pt)     Other [Seasonal Allergies] Anaphylaxis     GENERAL ANETHESIA       Oxycodone      Vancomycin Other (See Comments)     Thrush, yeast infection, bladder infection  Thrush, yeast infection, bladder infection     Erythromycin Rash     ointment  PN: ointment  ointment     Eucalyptus Oil Rash and Hives     hives     Nitrofuran Derivatives Rash      "Nitrofurantoin Rash     Tramadol Rash       Reviewed and updated as needed this visit by clinical staff and provider       ROS  Detailed as above       BP 95/57 (BP Location: Left arm, Patient Position: Sitting, Cuff Size: Adult Large)   Pulse 75   Temp 98.5  F (36.9  C) (Oral)   Ht 1.626 m (5' 4\")   Wt 83.5 kg (184 lb)   SpO2 98%   BMI 31.58 kg/m     Physical Exam   Constitutional: She is oriented to person, place, and time. She appears well-developed.   HENT:   Head: Normocephalic.   Right Ear: Tympanic membrane, external ear and ear canal normal.   Left Ear: Tympanic membrane, external ear and ear canal normal.   Mouth/Throat: Oropharynx is clear and moist. No oropharyngeal exudate.   Eyes: Conjunctivae are normal.   Neck: Normal range of motion.   Cardiovascular: Normal rate.   Pulmonary/Chest: Effort normal.   Abdominal: Soft.   Rare spasm of abdominal wall   Lymphadenopathy:     She has no cervical adenopathy.   Neurological: She is alert and oriented to person, place, and time.   Skin: Skin is warm and dry.   Psychiatric: She has a normal mood and affect. Judgment normal.       Assessment and Plan:       ICD-10-CM    1. Sensation of plugged ear, unspecified laterality H93.8X9    2. Spasm of abdominal muscles M62.838      Persistent plugged feeling in ear and just had neg workup with ENT. It has been improving so this could be viral. Will continue to watch and wait.   Also has multiple other complaints without red flags. Follow-up if any symptoms worsen       GREGG Parks, CNP  [facility]   "

## 2019-04-12 ENCOUNTER — TRANSFERRED RECORDS (OUTPATIENT)
Dept: HEALTH INFORMATION MANAGEMENT | Facility: CLINIC | Age: 69
End: 2019-04-12

## 2019-04-16 DIAGNOSIS — D64.9 NORMOCYTIC ANEMIA: ICD-10-CM

## 2019-04-16 DIAGNOSIS — R53.83 OTHER FATIGUE: ICD-10-CM

## 2019-04-16 LAB
BASOPHILS # BLD AUTO: 0 10E9/L (ref 0–0.2)
BASOPHILS NFR BLD AUTO: 0.5 %
DIFFERENTIAL METHOD BLD: ABNORMAL
EOSINOPHIL # BLD AUTO: 0.2 10E9/L (ref 0–0.7)
EOSINOPHIL NFR BLD AUTO: 3.9 %
ERYTHROCYTE [DISTWIDTH] IN BLOOD BY AUTOMATED COUNT: 14.9 % (ref 10–15)
HCT VFR BLD AUTO: 33.4 % (ref 35–47)
HGB BLD-MCNC: 11 G/DL (ref 11.7–15.7)
LYMPHOCYTES # BLD AUTO: 1.4 10E9/L (ref 0.8–5.3)
LYMPHOCYTES NFR BLD AUTO: 31.6 %
MCH RBC QN AUTO: 32 PG (ref 26.5–33)
MCHC RBC AUTO-ENTMCNC: 32.9 G/DL (ref 31.5–36.5)
MCV RBC AUTO: 97 FL (ref 78–100)
MONOCYTES # BLD AUTO: 0.4 10E9/L (ref 0–1.3)
MONOCYTES NFR BLD AUTO: 9.4 %
NEUTROPHILS # BLD AUTO: 2.4 10E9/L (ref 1.6–8.3)
NEUTROPHILS NFR BLD AUTO: 54.6 %
PLATELET # BLD AUTO: 242 10E9/L (ref 150–450)
RBC # BLD AUTO: 3.44 10E12/L (ref 3.8–5.2)
WBC # BLD AUTO: 4.4 10E9/L (ref 4–11)

## 2019-04-16 PROCEDURE — 36415 COLL VENOUS BLD VENIPUNCTURE: CPT | Performed by: INTERNAL MEDICINE

## 2019-04-16 PROCEDURE — 85025 COMPLETE CBC W/AUTO DIFF WBC: CPT | Performed by: INTERNAL MEDICINE

## 2019-04-16 PROCEDURE — 84443 ASSAY THYROID STIM HORMONE: CPT | Performed by: INTERNAL MEDICINE

## 2019-04-16 NOTE — LETTER
[place] [address] [address]  [address]  [place]  [address]  Tel: [phone]    April 17, 2019    Rosasienna Sotomayor  [address] [address] [facility] [address]        Dear MsAsia Bran,    The following letter pertains to your most recent diagnostic tests:    -TSH (thyroid stimulating hormone) level is normal which indicates normal circulating thyroid hormone levels.      If you have any further questions or problems, please contact our office.      Sincerely,    Josh Hollingsworth MD/ Nadeen Castillo CMA  Results for orders placed or performed in visit on 04/16/19   TSH with free T4 reflex   Result Value Ref Range    TSH 1.67 0.40 - 4.00 mU/L   CBC with platelets differential   Result Value Ref Range    WBC 4.4 4.0 - 11.0 10e9/L    RBC Count 3.44 (L) 3.8 - 5.2 10e12/L    Hemoglobin 11.0 (L) 11.7 - 15.7 g/dL    Hematocrit 33.4 (L) 35.0 - 47.0 %    MCV 97 78 - 100 fl    MCH 32.0 26.5 - 33.0 pg    MCHC 32.9 31.5 - 36.5 g/dL    RDW 14.9 10.0 - 15.0 %    Platelet Count 242 150 - 450 10e9/L    % Neutrophils 54.6 %    % Lymphocytes 31.6 %    % Monocytes 9.4 %    % Eosinophils 3.9 %    % Basophils 0.5 %    Absolute Neutrophil 2.4 1.6 - 8.3 10e9/L    Absolute Lymphocytes 1.4 0.8 - 5.3 10e9/L    Absolute Monocytes 0.4 0.0 - 1.3 10e9/L    Absolute Eosinophils 0.2 0.0 - 0.7 10e9/L    Absolute Basophils 0.0 0.0 - 0.2 10e9/L    Diff Method Automated Method                Enclosure: Lab Results

## 2019-04-17 LAB — TSH SERPL DL<=0.005 MIU/L-ACNC: 1.67 MU/L (ref 0.4–4)

## 2019-04-17 NOTE — RESULT ENCOUNTER NOTE
The following letter pertains to your most recent diagnostic tests:    -TSH (thyroid stimulating hormone) level is normal which indicates normal circulating thyroid hormone levels.              Sincerely,    Dr. Hollingsworth

## 2019-05-01 ENCOUNTER — OFFICE VISIT (OUTPATIENT)
Dept: FAMILY MEDICINE | Facility: CLINIC | Age: 69
End: 2019-05-01
Payer: MEDICARE

## 2019-05-01 VITALS
DIASTOLIC BLOOD PRESSURE: 58 MMHG | TEMPERATURE: 98 F | HEART RATE: 77 BPM | HEIGHT: 64 IN | SYSTOLIC BLOOD PRESSURE: 92 MMHG | BODY MASS INDEX: 30.73 KG/M2 | OXYGEN SATURATION: 98 % | WEIGHT: 180 LBS

## 2019-05-01 DIAGNOSIS — K52.9 GASTROENTERITIS: ICD-10-CM

## 2019-05-01 DIAGNOSIS — R30.0 DYSURIA: Primary | ICD-10-CM

## 2019-05-01 DIAGNOSIS — R82.81 PYURIA: ICD-10-CM

## 2019-05-01 DIAGNOSIS — R82.90 NONSPECIFIC FINDING ON EXAMINATION OF URINE: ICD-10-CM

## 2019-05-01 LAB
ALBUMIN UR-MCNC: NEGATIVE MG/DL
APPEARANCE UR: CLEAR
BACTERIA #/AREA URNS HPF: ABNORMAL /HPF
BILIRUB UR QL STRIP: NEGATIVE
COLOR UR AUTO: YELLOW
GLUCOSE UR STRIP-MCNC: NEGATIVE MG/DL
HGB UR QL STRIP: ABNORMAL
KETONES UR STRIP-MCNC: NEGATIVE MG/DL
LEUKOCYTE ESTERASE UR QL STRIP: ABNORMAL
NITRATE UR QL: POSITIVE
PH UR STRIP: 5.5 PH (ref 5–7)
RBC #/AREA URNS AUTO: ABNORMAL /HPF
SOURCE: ABNORMAL
SP GR UR STRIP: 1.01 (ref 1–1.03)
UROBILINOGEN UR STRIP-ACNC: 0.2 EU/DL (ref 0.2–1)
WBC #/AREA URNS AUTO: >100 /HPF

## 2019-05-01 PROCEDURE — 87186 SC STD MICRODIL/AGAR DIL: CPT | Performed by: INTERNAL MEDICINE

## 2019-05-01 PROCEDURE — 87088 URINE BACTERIA CULTURE: CPT | Performed by: INTERNAL MEDICINE

## 2019-05-01 PROCEDURE — 81001 URINALYSIS AUTO W/SCOPE: CPT | Performed by: INTERNAL MEDICINE

## 2019-05-01 PROCEDURE — 99213 OFFICE O/P EST LOW 20 MIN: CPT | Performed by: INTERNAL MEDICINE

## 2019-05-01 PROCEDURE — 87086 URINE CULTURE/COLONY COUNT: CPT | Performed by: INTERNAL MEDICINE

## 2019-05-01 RX ORDER — CIPROFLOXACIN 250 MG/1
250 TABLET, FILM COATED ORAL 2 TIMES DAILY
Qty: 20 TABLET | Refills: 0 | Status: SHIPPED | OUTPATIENT
Start: 2019-05-01 | End: 2019-06-14

## 2019-05-01 RX ORDER — MOMETASONE FUROATE 1 MG/G
CREAM TOPICAL DAILY PRN
COMMUNITY
End: 2024-07-03

## 2019-05-01 ASSESSMENT — MIFFLIN-ST. JEOR: SCORE: 1341.47

## 2019-05-01 NOTE — LETTER
[place] [address] [address] [facility]  [address]  [place]  [address]  Tel: [phone]    May 2, 2019    Rosa FADY Bran  [address] [facility] [address]        Dear Ms. Bran,    The following letter pertains to your most recent diagnostic tests:    Your repeat urinalysis is highly suggestive of a bacterial infection of the urinary tract.  Take the ciprofloxacin as prescribed.  I will contact you with the urine culture results when they are available.  If you have any further questions or problems, please contact our office.      Sincerely,    Josh Hollingsworth MD/NORMA          Enclosure: Lab Results  Results for orders placed or performed in visit on 05/01/19   UA reflex to Microscopic and Culture   Result Value Ref Range    Color Urine Yellow     Appearance Urine Clear     Glucose Urine Negative NEG^Negative mg/dL    Bilirubin Urine Negative NEG^Negative    Ketones Urine Negative NEG^Negative mg/dL    Specific Gravity Urine 1.010 1.003 - 1.035    Blood Urine Trace (A) NEG^Negative    pH Urine 5.5 5.0 - 7.0 pH    Protein Albumin Urine Negative NEG^Negative mg/dL    Urobilinogen Urine 0.2 0.2 - 1.0 EU/dL    Nitrite Urine Positive (A) NEG^Negative    Leukocyte Esterase Urine Moderate (A) NEG^Negative    Source Midstream Urine    Urine Microscopic   Result Value Ref Range    WBC Urine >100 (A) OTO5^0 - 5 /HPF    RBC Urine O - 2 OTO2^O - 2 /HPF    Bacteria Urine Many (A) NEG^Negative /HPF

## 2019-05-01 NOTE — PROGRESS NOTES
SUBJECTIVE:   Rosa Sotomayor is a 68 year old female who presents to clinic today for the following   health issues:        ED/UC Followup:    Facility:  [facility] - [place], [place]  Date of visit: 4/24/2019  Reason for visit: Nausea, diarrhea  Current Status: Feeling better, but has a loss of appetite. Also thinks she may have a UTI now. Diarrhea has stopped.      Seen in ER in [facility] with severe diarrhea and nausea.  Diagnosed with viral gastroenteritis.  Fortunately, diarrhea has resolved.  Nausea has resolved as well.  She did have 53 white blood cells in her urine at the time.  She was not treated for a bladder infection.  Since then, she has developed dysuria and urinary urgency that is severe.  She denies fevers or chills, flank pain.  She denies hematuria.    Additional history: as documented    Reviewed  and updated as needed this visit by clinical staff         Reviewed and updated as needed this visit by Provider         Patient Active Problem List   Diagnosis     Personal history of allergy to anesthetic agent     CKD (chronic kidney disease) stage 3, GFR 30-59 ml/min (H)     Hypertriglyceridemia     Arnold-Chiari malformation (H)     Hyperlipidemia LDL goal <130     Anxiety     Secondary renal hyperparathyroidism (H)     Chronic bilateral low back pain without sciatica     Benign essential hypertension     Renal artery stenosis (H)     Macular degeneration, bilateral     Anemia, iron deficiency     Intractable migraine without aura and without status migrainosus     Aortic valve insufficiency     ESTEFANIA (obstructive sleep apnea)- mild (AHI 12)     Past Surgical History:   Procedure Laterality Date     BACK SURGERY      discectomy L3-4, 2018     C NONSPECIFIC PROCEDURE      wisdom teeth     C NONSPECIFIC PROCEDURE  2005    Varicose Veins     C TOTAL HIP ARTHROPLASTY      left in 2012, right 2017     C TOTAL KNEE ARTHROPLASTY      right 2014, left 2018     ROTATOR CUFF REPAIR RT/LT  Right     2016     TUBAL LIGATION  1987       Social History     Tobacco Use     Smoking status: Never Smoker     Smokeless tobacco: Never Used   Substance Use Topics     Alcohol use: No     Alcohol/week: 0.0 oz     Family History   Problem Relation Age of Onset     Diabetes Mother         INSULIN     Hypertension Mother      Eye Disorder Mother         CATERACTS     Heart Disease Mother         CHF- OPEN HEART SURG X'S 2     Lipids Mother      Obesity Mother      Coronary Artery Disease Mother      Diabetes Father         ORAL     Hypertension Father      Arthritis Father      Eye Disorder Father         MAC DEGENERATION     Heart Disease Father         CHF     Lipids Father      Obesity Father      Diabetes Maternal Grandfather         INSULIN     Diabetes Brother         INSULIN     Gastrointestinal Disease Brother         CHOLITISI POSSIBLE CHRONES     Obesity Brother      Colon Cancer No family hx of      Breast Cancer No family hx of          Current Outpatient Medications   Medication Sig Dispense Refill     acetaminophen (TYLENOL) 500 MG tablet Take 500 mg by mouth       allopurinol (ZYLOPRIM) 100 MG tablet Take 100 mg by mouth       atorvastatin (LIPITOR) 40 MG tablet TAKE 1 TABLET(40 MG) BY MOUTH DAILY 90 tablet 3     carvedilol (COREG) 25 MG tablet Take 1 tablet (25 mg) by mouth 2 times daily 180 tablet 3     cefdinir (OMNICEF) 300 MG capsule Take 1 capsule (300 mg) by mouth 2 times daily 14 capsule 0     cetirizine (ZYRTEC) 10 MG tablet Take 10 mg by mouth       Cholecalciferol (VITAMIN D) 2000 UNITS tablet Take 1 tablet by mouth daily       ciprofloxacin (CIPRO) 250 MG tablet Take 1 tablet (250 mg) by mouth 2 times daily 20 tablet 0     clobetasol (TEMOVATE) 0.05 % cream Apply sparingly to affected area twice daily for 14 days.  Do not apply to face. 15 g 2     clobetasol (TEMOVATE) 0.05 % external solution Apply topically 2 times daily as needed  5     Cranberry POWD 1 capsule       Cyanocobalamin  (B-12) 1000 MCG TBCR Take 1,000 mcg by mouth daily 100 tablet 1     diphenhydrAMINE (BENADRYL ALLERGY) 25 MG tablet Take 2 tablets (50 mg) by mouth every 6 hours as needed for itching or allergies 60 tablet 1     EPINEPHrine (EPIPEN 2-FRAN) 0.3 MG/0.3ML injection 2-pack Inject 0.3 mLs (0.3 mg) into the muscle once as needed for anaphylaxis 0.6 mL 1     hydrOXYzine (ATARAX) 25 MG tablet Take 1 tablet (25 mg) by mouth 3 times daily as needed for itching 120 tablet 1     losartan (COZAAR) 100 MG tablet Take 1 tablet (100 mg) by mouth At Bedtime (Patient taking differently: Take 50 mg by mouth 2 times daily ) 90 tablet 1     Magnesium Oxide 250 MG TABS Take 250 mg by mouth       mometasone (ELOCON) 0.1 % external cream Apply topically daily       ondansetron (ZOFRAN) 4 MG tablet Take by mouth every 8 hours as needed for nausea       SUMAtriptan (IMITREX) 20 MG/ACT nasal spray Spray 1 spray in nostril as needed for migraine May repeat in 2 hours. Max 2 sprays/24 hours. 12 each 11     topiramate (TOPAMAX) 100 MG tablet Take 1 tablet by mouth daily  3     torsemide (DEMADEX) 10 MG tablet Take 1 tablet (10 mg) by mouth daily 90 tablet 3     traMADol (ULTRAM) 50 MG tablet Take 1 tablet by mouth every 6 hours as needed  0     triamcinolone (KENALOG) 0.1 % cream Apply topically 2 times daily Apply to AA on trunk, arms or legs bid for 10-14 days 454 g 0     ZOLMitriptan (ZOMIG-ZMT) 5 MG ODT tab Take 5 mg by mouth       Allergies   Allergen Reactions     Bee Venom Anaphylaxis     Codeine Swelling     Body swelling and severe itching. Tolerates Morphine.      Fentanyl Anaphylaxis and Shortness Of Breath     Gabapentin Other (See Comments)     PN: chest heaviness with breathing  Chest heaviness with breathing  Chest heaviness with breathing  chest heaviness with breathing     Hydromorphone Itching     Tolerates morphine  Tolerates morphine     Midazolam Anaphylaxis, Itching and Shortness Of Breath     Tolerates Diazepam      "Prilocaine Anaphylaxis     Anaphalactic shock     Propofol Anaphylaxis     Penicillins Itching and Rash     Tolerates Keflex,  Ancef  rash     Shingrix [Zoster Vac Recomb Adjuvanted] Itching and Rash     Sulfa Drugs Itching and Rash     rash     Clindamycin      Codeine      Diatrizoate Other (See Comments)     CKD 3     Hydrocodone      Lisinopril Cough     Lorazepam Itching     Nsaids Other (See Comments)     Contraindicated with kidney hx (including Celebrex, per pt)     Other [Seasonal Allergies] Anaphylaxis     GENERAL ANETHESIA       Oxycodone      Vancomycin Other (See Comments)     Thrush, yeast infection, bladder infection  Thrush, yeast infection, bladder infection     Erythromycin Rash     ointment  PN: ointment  ointment     Eucalyptus Oil Rash and Hives     hives     Nitrofuran Derivatives Rash     Nitrofurantoin Rash     Tramadol Rash       ROS:  Constitutional, HEENT, cardiovascular, pulmonary, gi and gu systems are negative, except as otherwise noted.    OBJECTIVE:     BP 92/58 (BP Location: Right arm, Cuff Size: Adult Large)   Pulse 77   Temp 98  F (36.7  C) (Tympanic)   Ht 1.626 m (5' 4\")   Wt 81.6 kg (180 lb)   SpO2 98%   Breastfeeding? No   BMI 30.90 kg/m    Body mass index is 30.9 kg/m .  GENERAL: healthy, alert and no distress  NECK: no adenopathy, no asymmetry, masses, or scars and thyroid normal to palpation  RESP: lungs clear to auscultation - no rales, rhonchi or wheezes  CV: Heart with regular rate and rhythm.   ABDOMEN: soft, nontender, no hepatosplenomegaly, no masses and bowel sounds normal  : No CVA tenderness  MS: no gross musculoskeletal defects noted, no edema  NEURO: Normal strength and tone, mentation intact and speech normal  PSYCH: mentation appears normal, affect normal/bright    Diagnostic Test Results:  Results for orders placed or performed in visit on 05/01/19   UA reflex to Microscopic and Culture   Result Value Ref Range    Color Urine Yellow     Appearance Urine " Clear     Glucose Urine Negative NEG^Negative mg/dL    Bilirubin Urine Negative NEG^Negative    Ketones Urine Negative NEG^Negative mg/dL    Specific Gravity Urine 1.010 1.003 - 1.035    Blood Urine Trace (A) NEG^Negative    pH Urine 5.5 5.0 - 7.0 pH    Protein Albumin Urine Negative NEG^Negative mg/dL    Urobilinogen Urine 0.2 0.2 - 1.0 EU/dL    Nitrite Urine Positive (A) NEG^Negative    Leukocyte Esterase Urine Moderate (A) NEG^Negative    Source Midstream Urine    Urine Microscopic   Result Value Ref Range    WBC Urine >100 (A) OTO5^0 - 5 /HPF    RBC Urine O - 2 OTO2^O - 2 /HPF    Bacteria Urine Many (A) NEG^Negative /HPF       ASSESSMENT/PLAN:             1. Dysuria    - UA reflex to Microscopic and Culture  - Urine Microscopic    2. Pyuria  Start Cipro for suspected UTI.  She has tolerated Cipro in the past.  - ciprofloxacin (CIPRO) 250 MG tablet; Take 1 tablet (250 mg) by mouth 2 times daily  Dispense: 20 tablet; Refill: 0    3. Gastroenteritis  Resolved, probable viral etiology    4. Nonspecific finding on examination of urine    - Urine Culture Aerobic Bacterial    FUTURE APPOINTMENTS:       - Follow-up visit in she will be having spine surgery later this month and will return in the middle of the month for a preoperative evaluation    Josh Hollingsworth MD  [facility]

## 2019-05-01 NOTE — RESULT ENCOUNTER NOTE
The following letter pertains to your most recent diagnostic tests:    Your repeat urinalysis is highly suggestive of a bacterial infection of the urinary tract.  Take the ciprofloxacin as prescribed.  I will contact you with the urine culture results when they are available.        Sincerely,    Dr. Hollingsworth

## 2019-05-01 NOTE — LETTER
[place] [address] [facility]  [address]  [place]  [address]  Tel: [phone]    May 6, 2019    Rosa Sotomayor  [address] [place] [address]        Dear Ms. Sotomayor,    The following letter pertains to your most recent diagnostic tests:    Urine culture confirms urinary tract infection with pathogen that should be adequately treated with the cipro that was prescribed.      Sincerely,    Josh Hollingsworth MD/[facility]

## 2019-05-03 LAB
BACTERIA SPEC CULT: ABNORMAL
SPECIMEN SOURCE: ABNORMAL

## 2019-05-04 NOTE — RESULT ENCOUNTER NOTE
The following letter pertains to your most recent diagnostic tests:    Urine culture confirms urinary tract infection with pathogen that should be adequately treated with the cipro that was prescribed.        Sincerely,    Dr. Hollingsworth

## 2019-05-13 ENCOUNTER — TELEPHONE (OUTPATIENT)
Dept: FAMILY MEDICINE | Facility: CLINIC | Age: 69
End: 2019-05-13

## 2019-05-13 ENCOUNTER — OFFICE VISIT (OUTPATIENT)
Dept: FAMILY MEDICINE | Facility: CLINIC | Age: 69
End: 2019-05-13
Payer: MEDICARE

## 2019-05-13 VITALS
OXYGEN SATURATION: 100 % | DIASTOLIC BLOOD PRESSURE: 68 MMHG | HEIGHT: 64 IN | HEART RATE: 74 BPM | BODY MASS INDEX: 30.9 KG/M2 | TEMPERATURE: 98.6 F | WEIGHT: 181 LBS | SYSTOLIC BLOOD PRESSURE: 128 MMHG

## 2019-05-13 DIAGNOSIS — N18.30 CKD (CHRONIC KIDNEY DISEASE) STAGE 3, GFR 30-59 ML/MIN (H): ICD-10-CM

## 2019-05-13 DIAGNOSIS — M48.062 SPINAL STENOSIS OF LUMBAR REGION WITH NEUROGENIC CLAUDICATION: ICD-10-CM

## 2019-05-13 DIAGNOSIS — N18.30 ANEMIA DUE TO STAGE 3 CHRONIC KIDNEY DISEASE (H): ICD-10-CM

## 2019-05-13 DIAGNOSIS — I10 BENIGN ESSENTIAL HYPERTENSION: ICD-10-CM

## 2019-05-13 DIAGNOSIS — Z01.818 PREOP GENERAL PHYSICAL EXAM: Primary | ICD-10-CM

## 2019-05-13 DIAGNOSIS — N39.0 URINARY TRACT INFECTION WITHOUT HEMATURIA, SITE UNSPECIFIED: ICD-10-CM

## 2019-05-13 DIAGNOSIS — Q07.00 ARNOLD-CHIARI MALFORMATION (H): ICD-10-CM

## 2019-05-13 DIAGNOSIS — E78.5 HYPERLIPIDEMIA LDL GOAL <130: ICD-10-CM

## 2019-05-13 DIAGNOSIS — D63.1 ANEMIA DUE TO STAGE 3 CHRONIC KIDNEY DISEASE (H): ICD-10-CM

## 2019-05-13 DIAGNOSIS — I35.1 AORTIC VALVE INSUFFICIENCY, ETIOLOGY OF CARDIAC VALVE DISEASE UNSPECIFIED: ICD-10-CM

## 2019-05-13 DIAGNOSIS — Z96.649 STATUS POST TOTAL REPLACEMENT OF HIP, UNSPECIFIED LATERALITY: ICD-10-CM

## 2019-05-13 DIAGNOSIS — G47.33 OSA (OBSTRUCTIVE SLEEP APNEA): ICD-10-CM

## 2019-05-13 LAB
ALBUMIN UR-MCNC: NEGATIVE MG/DL
APPEARANCE UR: CLEAR
BILIRUB UR QL STRIP: NEGATIVE
COLOR UR AUTO: YELLOW
ERYTHROCYTE [DISTWIDTH] IN BLOOD BY AUTOMATED COUNT: 14.7 % (ref 10–15)
GLUCOSE UR STRIP-MCNC: NEGATIVE MG/DL
HCT VFR BLD AUTO: 32.4 % (ref 35–47)
HGB BLD-MCNC: 10.6 G/DL (ref 11.7–15.7)
HGB UR QL STRIP: NEGATIVE
KETONES UR STRIP-MCNC: NEGATIVE MG/DL
LEUKOCYTE ESTERASE UR QL STRIP: NEGATIVE
MCH RBC QN AUTO: 32 PG (ref 26.5–33)
MCHC RBC AUTO-ENTMCNC: 32.7 G/DL (ref 31.5–36.5)
MCV RBC AUTO: 98 FL (ref 78–100)
MRSA DNA SPEC QL NAA+PROBE: NEGATIVE
NITRATE UR QL: NEGATIVE
PH UR STRIP: 5.5 PH (ref 5–7)
PLATELET # BLD AUTO: 216 10E9/L (ref 150–450)
RBC # BLD AUTO: 3.31 10E12/L (ref 3.8–5.2)
SOURCE: NORMAL
SP GR UR STRIP: 1.01 (ref 1–1.03)
SPECIMEN SOURCE: NORMAL
UROBILINOGEN UR STRIP-ACNC: 0.2 EU/DL (ref 0.2–1)
WBC # BLD AUTO: 4.6 10E9/L (ref 4–11)

## 2019-05-13 PROCEDURE — 40000830 ZZHCL STATISTIC STAPH AUREUS METH RESIST SCREEN PCR: Performed by: INTERNAL MEDICINE

## 2019-05-13 PROCEDURE — 93000 ELECTROCARDIOGRAM COMPLETE: CPT | Performed by: INTERNAL MEDICINE

## 2019-05-13 PROCEDURE — 87641 MR-STAPH DNA AMP PROBE: CPT | Performed by: INTERNAL MEDICINE

## 2019-05-13 PROCEDURE — 36415 COLL VENOUS BLD VENIPUNCTURE: CPT | Performed by: INTERNAL MEDICINE

## 2019-05-13 PROCEDURE — 81003 URINALYSIS AUTO W/O SCOPE: CPT | Performed by: INTERNAL MEDICINE

## 2019-05-13 PROCEDURE — 85027 COMPLETE CBC AUTOMATED: CPT | Performed by: INTERNAL MEDICINE

## 2019-05-13 PROCEDURE — 80048 BASIC METABOLIC PNL TOTAL CA: CPT | Performed by: INTERNAL MEDICINE

## 2019-05-13 PROCEDURE — 87640 STAPH A DNA AMP PROBE: CPT | Performed by: INTERNAL MEDICINE

## 2019-05-13 PROCEDURE — 99215 OFFICE O/P EST HI 40 MIN: CPT | Performed by: INTERNAL MEDICINE

## 2019-05-13 RX ORDER — CEPHALEXIN 500 MG/1
2000 CAPSULE ORAL
Qty: 24 CAPSULE | Refills: 3 | Status: SHIPPED | OUTPATIENT
Start: 2019-05-13 | End: 2019-07-31

## 2019-05-13 ASSESSMENT — MIFFLIN-ST. JEOR: SCORE: 1346.01

## 2019-05-13 NOTE — TELEPHONE ENCOUNTER
Reason for Call:  Other     Detailed comments: pt would like to make mahin for echocardiogram (echo)  before Tuesday if possible     Phone Number Patient can be reached at: [phone] (W)    Best Time: anytime     Can we leave a detailed message on this number? YES    Call taken on 5/13/2019 at 4:50 PM by SANCHO SPENCER

## 2019-05-13 NOTE — PROGRESS NOTES
"[facility]  [address] [facility] [address]  [phone]  Dept: [phone]    PRE-OP EVALUATION:  Today's date: 2019    Rosa Sotomayor (: 1950) presents for pre-operative evaluation assessment as requested by Dr. Tovar.  She requires evaluation and anesthesia risk assessment prior to undergoing surgery/procedure for treatment of back pain.    Proposed Surgery/ Procedure: L2-5 bilateral medial facetectomy and depression  Date of Surgery/ Procedure: 2019  Time of Surgery/ Procedure: 7:00 am  Hospital/Surgical Facility: [facility]  Fax number for surgical facility: [phone]  Primary Physician: Josh Hollingsworth  Type of Anesthesia Anticipated: General    Patient has a Health Care Directive or Living Will:  YES at home    1. NO - Do you have a history of heart attack, stroke, stent, bypass or surgery on an artery in the head, neck, heart or legs?  2. NO - Do you ever have any pain or discomfort in your chest?  3. NO - Do you have a history of  Heart Failure?  4. NO - Are you troubled by shortness of breath when: walking on the level, up a slight hill or at night?  5. NO - Do you currently have a cold, bronchitis or other respiratory infection?  6. NO - Do you have a cough, shortness of breath or wheezing?  7. NO - Do you sometimes get pains in the calves of your legs when you walk?  8. NO - Do you or anyone in your family have previous history of blood clots?  10. YES - HAVE YOU EVER HAD PROBLEMS WITH ANEMIA OR BEEN TOLD TO TAKE IRON PILLS? Previously, chronic anemia due to CKD   11. NO - Have you had any abnormal blood loss such as black, tarry or bloody stools, or abnormal vaginal bleeding?  12. NO - Have you ever had a blood transfusion?  13. YES - HAVE YOU OR ANY OF YOUR RELATIVES EVER HAD PROBLEMS WITH ANESTHESIA? Self ( per patient \"analphylaxis\" with general anesthesia in the remote past, but she has tolerated \"sedation\") previous surgery was done using " regional block.    14. YES - DO YOU HAVE SLEEP APNEA, EXCESSIVE SNORING OR DAYTIME DROWSINESS? CPAP  15. NO - Do you have any prosthetic heart valves?  16. YES - DO YOU HAVE PROSTHETIC JOINTS? Bilateral Hips and Knees  17. NO - Is there any chance that you may be pregnant?      HPI:     HPI related to upcoming procedure: This is a pleasant 68-year-old female with multiple problems including chronic kidney disease, mild anemia associated with aortic valve regurgitation, chronic kidney disease, sleep apnea, hyperlipidemia, anxiety, lumbar spinal stenosis with right-sided neurogenic claudication for which she had a discectomy in August 2018 which only temporarily addressed her right leg symptoms.  Currently, she has severe pain originating in her right lower back that radiates down her right leg associated with walking.  The symptoms have been present for several months.  She was recently treated for a urinary tract infection and completed a course of ciprofloxacin.  She asked for a refill of prophylactic Keflex that she takes in anticipation of dental procedures because she has had multiple joints replaced.  She can perform 4 METS of physical activity without chest pain or dyspnea.  She denies orthopnea, PND, edema that is new.    MEDICAL HISTORY:     Patient Active Problem List    Diagnosis Date Noted     Spinal stenosis of lumbar region with neurogenic claudication 05/13/2019     Priority: Medium     ESTEFANIA (obstructive sleep apnea)- mild (AHI 12) 12/17/2018     Priority: Medium     12/13/2018 [facility] Sleep Study (188.0 lbs) - AHI 12.4, RDI 31.7, Supine AHI 52.0, REM AHI 34.3, Low O2 75.1%, Time Spent ?88% 1.4 minutes / Time Spent ?89% 2.4 minutes.       Aortic valve insufficiency 04/19/2018     Priority: Medium     Intractable migraine without aura and without status migrainosus 11/20/2017     Priority: Medium     Anemia, iron deficiency 06/23/2017     Priority: Medium     Renal artery stenosis (H)  01/05/2017     Priority: Medium     Macular degeneration, bilateral 01/05/2017     Priority: Medium     Benign essential hypertension 12/28/2016     Priority: Medium     Chronic bilateral low back pain without sciatica 11/29/2016     Priority: Medium     Secondary renal hyperparathyroidism (H) 07/31/2016     Priority: Medium     Arnold-Chiari malformation (H) 07/23/2016     Priority: Medium     Hyperlipidemia LDL goal <130 07/23/2016     Priority: Medium     Anxiety 07/23/2016     Priority: Medium     Anemia due to stage 3 chronic kidney disease (H) 07/22/2016     Priority: Medium     Hypertriglyceridemia 07/22/2016     Priority: Medium     Personal history of allergy to anesthetic agent 03/10/2007     Priority: Medium      Past Medical History:   Diagnosis Date     Postmenopausal bleeding 2/28/2007     Past Surgical History:   Procedure Laterality Date     BACK SURGERY      discectomy L3-4, 2018     C NONSPECIFIC PROCEDURE      wisdom teeth     C NONSPECIFIC PROCEDURE  2005    Varicose Veins     C TOTAL HIP ARTHROPLASTY      left in 2012, right 2017     C TOTAL KNEE ARTHROPLASTY      right 2014, left 2018     ROTATOR CUFF REPAIR RT/LT Right     2016     TUBAL LIGATION  1987     Current Outpatient Medications   Medication Sig Dispense Refill     acetaminophen (TYLENOL) 500 MG tablet Take 500 mg by mouth       allopurinol (ZYLOPRIM) 100 MG tablet Take 100 mg by mouth       atorvastatin (LIPITOR) 40 MG tablet TAKE 1 TABLET(40 MG) BY MOUTH DAILY 90 tablet 3     carvedilol (COREG) 25 MG tablet Take 1 tablet (25 mg) by mouth 2 times daily 180 tablet 3     cefdinir (OMNICEF) 300 MG capsule Take 1 capsule (300 mg) by mouth 2 times daily 14 capsule 0     cephALEXin (KEFLEX) 500 MG capsule Take 4 capsules (2,000 mg) by mouth once as needed One hour prior to dental procedures 24 capsule 3     cetirizine (ZYRTEC) 10 MG tablet Take 10 mg by mouth       Cholecalciferol (VITAMIN D) 2000 UNITS tablet Take 1 tablet by mouth daily        ciprofloxacin (CIPRO) 250 MG tablet Take 1 tablet (250 mg) by mouth 2 times daily 20 tablet 0     clobetasol (TEMOVATE) 0.05 % cream Apply sparingly to affected area twice daily for 14 days.  Do not apply to face. 15 g 2     clobetasol (TEMOVATE) 0.05 % external solution Apply topically 2 times daily as needed  5     Cranberry POWD 1 capsule       Cyanocobalamin (B-12) 1000 MCG TBCR Take 1,000 mcg by mouth daily 100 tablet 1     diphenhydrAMINE (BENADRYL ALLERGY) 25 MG tablet Take 2 tablets (50 mg) by mouth every 6 hours as needed for itching or allergies 60 tablet 1     EPINEPHrine (EPIPEN 2-FRAN) 0.3 MG/0.3ML injection 2-pack Inject 0.3 mLs (0.3 mg) into the muscle once as needed for anaphylaxis 0.6 mL 1     hydrOXYzine (ATARAX) 25 MG tablet Take 1 tablet (25 mg) by mouth 3 times daily as needed for itching 120 tablet 1     losartan (COZAAR) 100 MG tablet Take 1 tablet (100 mg) by mouth At Bedtime (Patient taking differently: Take 50 mg by mouth 2 times daily ) 90 tablet 1     Magnesium Oxide 250 MG TABS Take 250 mg by mouth       mometasone (ELOCON) 0.1 % external cream Apply topically daily       ondansetron (ZOFRAN) 4 MG tablet Take by mouth every 8 hours as needed for nausea       SUMAtriptan (IMITREX) 20 MG/ACT nasal spray Spray 1 spray in nostril as needed for migraine May repeat in 2 hours. Max 2 sprays/24 hours. 12 each 11     topiramate (TOPAMAX) 100 MG tablet Take 1 tablet by mouth daily  3     torsemide (DEMADEX) 10 MG tablet Take 1 tablet (10 mg) by mouth daily 90 tablet 3     traMADol (ULTRAM) 50 MG tablet Take 1 tablet by mouth every 6 hours as needed  0     triamcinolone (KENALOG) 0.1 % cream Apply topically 2 times daily Apply to AA on trunk, arms or legs bid for 10-14 days 454 g 0     ZOLMitriptan (ZOMIG-ZMT) 5 MG ODT tab Take 5 mg by mouth         Allergies   Allergen Reactions     Bee Venom Anaphylaxis     Codeine Swelling     Body swelling and severe itching. Tolerates Morphine.       "Fentanyl Anaphylaxis and Shortness Of Breath     Gabapentin Other (See Comments)     PN: chest heaviness with breathing  Chest heaviness with breathing  Chest heaviness with breathing  chest heaviness with breathing     Hydromorphone Itching     Tolerates morphine  Tolerates morphine     Midazolam Anaphylaxis, Itching and Shortness Of Breath     Tolerates Diazepam     Prilocaine Anaphylaxis     Anaphalactic shock     Propofol Anaphylaxis     Penicillins Itching and Rash     Tolerates Keflex,  Ancef  rash     Shingrix [Zoster Vac Recomb Adjuvanted] Itching and Rash     Sulfa Drugs Itching and Rash     rash     Clindamycin      Codeine      Diatrizoate Other (See Comments)     CKD 3     Hydrocodone      Lisinopril Cough     Lorazepam Itching     Nsaids Other (See Comments)     Contraindicated with kidney hx (including Celebrex, per pt)     Other [Seasonal Allergies] Anaphylaxis     GENERAL ANETHESIA       Oxycodone      Vancomycin Other (See Comments)     Thrush, yeast infection, bladder infection  Thrush, yeast infection, bladder infection     Erythromycin Rash     ointment  PN: ointment  ointment     Eucalyptus Oil Rash and Hives     hives     Nitrofuran Derivatives Rash     Nitrofurantoin Rash     Tramadol Rash        Social History     Tobacco Use     Smoking status: Never Smoker     Smokeless tobacco: Never Used   Substance Use Topics     Alcohol use: No     Alcohol/week: 0.0 oz     History   Drug Use No       REVIEW OF SYSTEMS:   Constitutional, neuro, ENT, endocrine, pulmonary, cardiac, gastrointestinal, genitourinary, musculoskeletal, integument and psychiatric systems are negative, except as otherwise noted.    EXAM:   /68 (BP Location: Right arm, Cuff Size: Adult Large)   Pulse 74   Temp 98.6  F (37  C) (Tympanic)   Ht 1.626 m (5' 4\")   Wt 82.1 kg (181 lb)   SpO2 100%   Breastfeeding? No   BMI 31.07 kg/m      GENERAL APPEARANCE: healthy, alert and no distress     EYES: EOMI, PERRL     HENT: ear " canals and TM's normal and nose and mouth without ulcers or lesions     NECK: no adenopathy, no asymmetry, masses, or scars and thyroid normal to palpation     RESP: lungs clear to auscultation - no rales, rhonchi or wheezes     CV: regular rates and rhythm, normal S1 S2, no S3 or S4 and I don't hear rusty diastolic murmur or any murmur for that matter, no click or rub     ABDOMEN:  soft, nontender, no HSM or masses and bowel sounds normal     MS: extremities normal- no gross deformities noted, no evidence of inflammation in joints, FROM in all extremities.     SKIN: no suspicious lesions or rashes     NEURO: Normal strength and tone, sensory exam grossly normal, mentation intact and speech normal     PSYCH: mentation appears normal. and affect normal/bright     LYMPHATICS: No cervical adenopathy    DIAGNOSTICS:   EKG: Sinus rhythm rate 71 no ST changes  Labs pending including CBC and BMP    IMPRESSION:   Reason for surgery/procedure: Spinal stenosis   Diagnosis/reason for consult: Preoperative evaluation      The proposed surgical procedure is considered INTERMEDIATE risk.    REVISED CARDIAC RISK INDEX  The patient has the following serious cardiovascular risks for perioperative complications such as (MI, PE, VFib and 3  AV Block):  No serious cardiac risks  INTERPRETATION: 1 risks: Class II (low risk - 0.9% complication rate)    The patient has the following additional risks for perioperative complications:  No identified additional risks      ICD-10-CM    1. Preop general physical exam Z01.818 EKG 12-lead complete w/read - Clinics     Methicillin Resistant Staph Aureus PCR     Basic metabolic panel     CBC with platelets   2. Spinal stenosis of lumbar region with neurogenic claudication M48.062    3. CKD (chronic kidney disease) stage 3, GFR 30-59 ml/min (H) N18.3    4. Anemia due to stage 3 chronic kidney disease (H) N18.3     D63.1    5. Arnold-Chiari malformation (H) Q07.00    6. Aortic valve insufficiency,  etiology of cardiac valve disease unspecified I35.1 Echocardiogram Complete   7. Benign essential hypertension I10    8. Hyperlipidemia LDL goal <130 E78.5    9. ESTEFANIA (obstructive sleep apnea)- mild (AHI 12) G47.33    10. Urinary tract infection without hematuria, site unspecified N39.0 *UA reflex to Microscopic and Culture ([facility] (except [place] and [place])   11. Status post total replacement of hip, unspecified laterality Z96.649 cephALEXin (KEFLEX) 500 MG capsule     She is a suitable candidate for surgery, but she has had severe allergies to anesthesia agents in the past.  She tells me she discussed this with Dr. Tovar who has discussed this with anesthesia.  They are hoping to find a suitable anesthesia strategy to allow her successfully under go surgery.  She has echocardiogram evidence of moderate aortic insufficiency as of May 2017 but there are no signs or symptoms of decompensated congestive heart failure.  We decided to repeat an echocardiogram prior to her surgery.  If the degree of AI is stable from May 2017, she can proceed with surgery as planned.  Her blood pressure is under good control.  Her mild anemia is stable and is thought to be related to her underlying chronic kidney disease.  We will recheck her urine to make sure that her previous infection is adequately treated before surgery.  I did refill her Keflex which she will use for prophylaxis when she has dental procedures.    RECOMMENDATIONS:     --Consult hospital rounder / IM to assist post-op medical management    Obstructive Sleep Apnea (or suspected sleep apnea)  Patient is to bring their home CPAP with them on the day of surgery      Anemia  Anemia and does not require treatment prior to surgery.  Monitor Hemoglobin postoperatively.    --Patient is to take all scheduled medications on the day of surgery        APPROVAL GIVEN to proceed with proposed procedure, without further diagnostic evaluation       Signed  Electronically by: Josh Hollingsworth MD    Copy of this evaluation report is provided to requesting physician.    [facility] Preop Guidelines    Revised Cardiac Risk Index

## 2019-05-13 NOTE — LETTER
[place] [address] [facility]  [address]  [place]  [address]  Tel: [phone]    May 15, 2019    Rosasienna Sotomayor  [address] [facility] [address]        Dear MsAsia Bran,    The following letter pertains to your most recent diagnostic tests:     Lab results show a stable mild anemia and stable kidney function.  EKG is stable.   It is OK to proceed with surgery based on these results.  I will contact you with the echocardiogram results when they are available.   Resulted Orders   Methicillin Resistant Staph Aureus PCR   Result Value Ref Range    Specimen Description Nares     Methicillin Resist/Sens S. aureus PCR Negative NEG^Negative      Comment:      MRSA Negative: SA Negative  MRSA and Staphylococcus aureus target DNA not   detected, presumed negative for MRSA and SA colonization or the number of   bacteria present may be below the limit of detection for the assay. FDA   approved assay performed using N[employer] GeneXpert(R) real-time PCR.     Basic metabolic panel   Result Value Ref Range    Sodium 139 133 - 144 mmol/L    Potassium 3.7 3.4 - 5.3 mmol/L    Chloride 109 94 - 109 mmol/L    Carbon Dioxide 26 20 - 32 mmol/L    Anion Gap 5 3 - 14 mmol/L    Glucose 107 (H) 70 - 99 mg/dL    Urea Nitrogen 34 (H) 7 - 30 mg/dL    Creatinine 1.39 (H) 0.52 - 1.04 mg/dL    GFR Estimate 39 (L) >60 mL/min/[1.73_m2]      Comment:      Non  GFR Calc  Starting 12/18/2018, serum creatinine based estimated GFR (eGFR) will be   calculated using the Chronic Kidney Disease Epidemiology Collaboration   (CKD-EPI) equation.      GFR Estimate If Black 46 (L) >60 mL/min/[1.73_m2]      Comment:       GFR Calc  Starting 12/18/2018, serum creatinine based estimated GFR (eGFR) will be   calculated using the Chronic Kidney Disease Epidemiology Collaboration   (CKD-EPI) equation.      Calcium 9.1 8.5 - 10.1 mg/dL   CBC with platelets   Result Value Ref Range    WBC 4.6 4.0 - 11.0 10e9/L     RBC Count 3.31 (L) 3.8 - 5.2 10e12/L    Hemoglobin 10.6 (L) 11.7 - 15.7 g/dL    Hematocrit 32.4 (L) 35.0 - 47.0 %    MCV 98 78 - 100 fl    MCH 32.0 26.5 - 33.0 pg    MCHC 32.7 31.5 - 36.5 g/dL    RDW 14.7 10.0 - 15.0 %    Platelet Count 216 150 - 450 10e9/L   *UA reflex to Microscopic and Culture ([facility] (except [place] and [place])   Result Value Ref Range    Color Urine Yellow     Appearance Urine Clear     Glucose Urine Negative NEG^Negative mg/dL    Bilirubin Urine Negative NEG^Negative    Ketones Urine Negative NEG^Negative mg/dL    Specific Gravity Urine 1.010 1.003 - 1.035    Blood Urine Negative NEG^Negative    pH Urine 5.5 5.0 - 7.0 pH    Protein Albumin Urine Negative NEG^Negative mg/dL    Urobilinogen Urine 0.2 0.2 - 1.0 EU/dL    Nitrite Urine Negative NEG^Negative    Leukocyte Esterase Urine Negative NEG^Negative    Source Midstream Urine        If you have any further questions or problems, please contact our office.      Sincerely,    Josh Hollingsworth MD / sander

## 2019-05-14 ENCOUNTER — HOSPITAL ENCOUNTER (OUTPATIENT)
Dept: CARDIOLOGY | Facility: CLINIC | Age: 69
Discharge: HOME | End: 2019-05-14
Attending: INTERNAL MEDICINE | Admitting: INTERNAL MEDICINE
Payer: MEDICARE

## 2019-05-14 ENCOUNTER — TRANSFERRED RECORDS (OUTPATIENT)
Dept: HEALTH INFORMATION MANAGEMENT | Facility: CLINIC | Age: 69
End: 2019-05-14

## 2019-05-14 ENCOUNTER — TELEPHONE (OUTPATIENT)
Dept: FAMILY MEDICINE | Facility: CLINIC | Age: 69
End: 2019-05-14

## 2019-05-14 DIAGNOSIS — I35.1 AORTIC VALVE INSUFFICIENCY, ETIOLOGY OF CARDIAC VALVE DISEASE UNSPECIFIED: ICD-10-CM

## 2019-05-14 LAB
ANION GAP SERPL CALCULATED.3IONS-SCNC: 5 MMOL/L (ref 3–14)
BUN SERPL-MCNC: 34 MG/DL (ref 7–30)
CALCIUM SERPL-MCNC: 9.1 MG/DL (ref 8.5–10.1)
CHLORIDE SERPL-SCNC: 109 MMOL/L (ref 94–109)
CO2 SERPL-SCNC: 26 MMOL/L (ref 20–32)
CREAT SERPL-MCNC: 1.39 MG/DL (ref 0.52–1.04)
GFR SERPL CREATININE-BSD FRML MDRD: 39 ML/MIN/{1.73_M2}
GLUCOSE SERPL-MCNC: 107 MG/DL (ref 70–99)
POTASSIUM SERPL-SCNC: 3.7 MMOL/L (ref 3.4–5.3)
SODIUM SERPL-SCNC: 139 MMOL/L (ref 133–144)

## 2019-05-14 PROCEDURE — 93306 TTE W/DOPPLER COMPLETE: CPT | Mod: 26 | Performed by: INTERNAL MEDICINE

## 2019-05-14 PROCEDURE — 93306 TTE W/DOPPLER COMPLETE: CPT

## 2019-05-14 NOTE — LETTER
[facility]  [address]  [address]  [place]  [address]  Tel: [phone]    May 15, 2019    Neo Dent  [address] [address]        Dear Ms. Dent,    The following letter pertains to your most recent diagnostic tests:     Good news! The aortic valve problem looks better on this echocardiogram then it did in 2017.  The condition is mild and probably not clinically significant.  We can recheck in 2 years or sooner if new symptoms develop.  No problem with proceeding with back surgery based on these results.   Resulted Orders   Echocardiogram Complete    Narrative    [id number]  [id number]  [id number]  [id number]^SHARAN^JEFF^YANG        [facility]  [facility]  Echocardiography [facility]  [address]  [place] [address]  Phone [phone]  Fax [phone]        Name: NEO DENT  MRN: [id number]  : 1950  Study Date: 2019 03:12 PM  Age: 68 yrs  Gender: Female  Patient Location: [facility]  Reason For Study: Aortic valve insufficiency, etiology of cardiac valve  disease un  Ordering Physician: JEFF TSANG  Referring Physician: JEFF TSANG  Performed By: La Dumont     BSA: 1.9 m2  Height: 64 in  Weight: 181 lb  HR: 78  BP: 129/68 mmHg  _____________________________________________________________________________  __     Procedure  Complete Echo Adult.     _____________________________________________________________________________  __        Interpretation Summary     Left ventricular systolic function is normal.  The visual ejection fraction is estimated at 55-60%.  There is mild (1+) aortic regurgitation.  The study was technically difficult.  _____________________________________________________________________________  __        Left Ventricle  The left ventricle is normal in size. Grade I or early diastolic dysfunction.  Left ventricular systolic function is normal. The visual ejection  fraction is  estimated at 55-60%. The left ventricular apex is not well visualized.     Right Ventricle  The right ventricle is grossly normal size. The right ventricle is normal in  size and function.     Atria  The left atrium is not well visualized. Right atrium not well visualized.  Right atrial size is normal.     Mitral Valve  The mitral valve is normal in structure and function. There is trace to mild  mitral regurgitation. There is no mitral valve stenosis.     Tricuspid Valve  The tricuspid valve is not well visualized. Right ventricular systolic  pressure could not be approximated due to inadequate tricuspid regurgitation.        Aortic Valve  The aortic valve is trileaflet. There is mild (1+) aortic regurgitation. No  hemodynamically significant valvular aortic stenosis.     Pulmonic Valve  The pulmonic valve is not well visualized. Normal pulmonic valve velocity.     Vessels  Normal size aorta. Dilation of the inferior vena cava is present with normal  respiratory variation in diameter.     Pericardium  Trivial pericardial effusion.     _____________________________________________________________________________  __  MMode/2D Measurements & Calculations  IVSd: 1.2 cm  LVIDd: 4.4 cm  LVIDs: 2.8 cm  LVPWd: 1.1 cm  FS: 35.3 %  LV mass(C)d: 179.1 grams  LV mass(C)dI: 95.5 grams/m2  Ao root diam: 3.2 cm  LA dimension: 3.3 cm  asc Aorta Diam: 3.1 cm  LA/Ao: 1.0  LA Volume (BP): 28.3 ml     LA Volume Index (BP): 15.1 ml/m2  RWT: 0.50        Doppler Measurements & Calculations  MV E max jo: 81.5 cm/sec  MV A max jo: 105.7 cm/sec  MV E/A: 0.77  MV dec time: 0.20 sec  Ao V2 max: 147.7 cm/sec  Ao max P.0 mmHg  AI P1/2t: 495.4 msec  PA acc time: 0.10 sec  E/E' av.6     Lateral E/e': 12.1  Medial E/e': 13.1           _____________________________________________________________________________  __           Report approved by: Alberto Almazan 2019 03:59 PM          If you have any further  questions or problems, please contact our office.      Sincerely,    Josh Hollingsworth MD / sander

## 2019-05-14 NOTE — TELEPHONE ENCOUNTER
Reason for Call:  Other Pre opp Notes for Surgery please    Detailed comments: DOS 5/21/2019 - Spinal Surgery - Dr. Tovar/Jose NW to include Pre opp notes EKG and labs please to Fax [phone]    Phone Number Patient can be reached at: Cell number on file:    Telephone Information:   Mobile [phone]       Best Time: anytime    Can we leave a detailed message on this number? NO    Call taken on 5/14/2019 at 9:48 AM by Sarah Duran

## 2019-05-14 NOTE — RESULT ENCOUNTER NOTE
The following letter pertains to your most recent diagnostic tests:    Good news! The aortic valve problem looks better on this echocardiogram then it did in 2017.  The condition is mild and probably not clinically significant.  We can recheck in 2 years or sooner if new symptoms develop.  No problem with proceeding with back surgery based on these results.       Sincerely,    Dr. Hollingsworth

## 2019-05-14 NOTE — RESULT ENCOUNTER NOTE
The following letter pertains to your most recent diagnostic tests:    Lab results show a stable mild anemia and stable kidney function.  EKG is stable.   It is OK to proceed with surgery based on these results.  I will contact you with the echocardiogram results when they are available.      Sincerely,    Dr. Hollingsworth

## 2019-05-28 ENCOUNTER — TELEPHONE (OUTPATIENT)
Dept: FAMILY MEDICINE | Facility: CLINIC | Age: 69
End: 2019-05-28

## 2019-05-28 NOTE — TELEPHONE ENCOUNTER
Swelling in left leg and ankle  little bit of swelling to right leg and ankle as well    discharged from hospital on Friday 5/24 back surgery - decompression of L2-4  Started swelling Saturday   More noticeable Sunday    Feels like she has to pee but barely any comes out  On diuretic- torsemide, doubled it yesterday and no improvement  denies SOB or CP  Legs elevated when sitting  Pitting edema  Not on a blood thinner  Negative Alvin's sign- tested with     Advised to keep legs elevated at all times if sitting and laying    Scheduled appt tomorrow with PCP- do you agree with plan?    Thank you,  Eduardo SHRESTHA RN

## 2019-05-28 NOTE — TELEPHONE ENCOUNTER
Technically, if there is clinical concern for DVT safest thing/best practice would be urgent care or same day office visit today with ultrasound, she is taking a small risk by waiting until tomorrow

## 2019-05-28 NOTE — TELEPHONE ENCOUNTER
Reason for call:  Patient reporting a symptom    Symptom or request: swollen lt leg/ankle    Duration (how long have symptoms been present):  Since surgery last 5/24    Have you been treated for this before? No    Additional comments: pt has been complaining of swollen leg since she got home from the hospital and is seeking treatment options. Please advise . Pt had back surgery on 5/21      Phone Number patient can be reached at:  Home number on file [phone] (home)    Best Time:  any    Can we leave a detailed message on this number:  YES    Call taken on 5/28/2019 at 10:45 AM by Chad Borges

## 2019-05-29 ENCOUNTER — OFFICE VISIT (OUTPATIENT)
Dept: FAMILY MEDICINE | Facility: CLINIC | Age: 69
End: 2019-05-29
Payer: MEDICARE

## 2019-05-29 ENCOUNTER — MEDICAL CORRESPONDENCE (OUTPATIENT)
Dept: HEALTH INFORMATION MANAGEMENT | Facility: CLINIC | Age: 69
End: 2019-05-29

## 2019-05-29 ENCOUNTER — HOSPITAL ENCOUNTER (OUTPATIENT)
Dept: ULTRASOUND IMAGING | Facility: CLINIC | Age: 69
Discharge: HOME | End: 2019-05-29
Attending: INTERNAL MEDICINE | Admitting: INTERNAL MEDICINE
Payer: MEDICARE

## 2019-05-29 VITALS
BODY MASS INDEX: 31.41 KG/M2 | SYSTOLIC BLOOD PRESSURE: 110 MMHG | HEIGHT: 64 IN | WEIGHT: 184 LBS | TEMPERATURE: 99 F | OXYGEN SATURATION: 99 % | DIASTOLIC BLOOD PRESSURE: 62 MMHG | HEART RATE: 84 BPM

## 2019-05-29 DIAGNOSIS — R82.90 NONSPECIFIC FINDING ON EXAMINATION OF URINE: ICD-10-CM

## 2019-05-29 DIAGNOSIS — R39.11 URINARY HESITANCY: ICD-10-CM

## 2019-05-29 DIAGNOSIS — M79.89 LEG SWELLING: Primary | ICD-10-CM

## 2019-05-29 DIAGNOSIS — M79.89 LEG SWELLING: ICD-10-CM

## 2019-05-29 DIAGNOSIS — N39.0 URINARY TRACT INFECTION WITHOUT HEMATURIA, SITE UNSPECIFIED: ICD-10-CM

## 2019-05-29 LAB
ALBUMIN UR-MCNC: ABNORMAL MG/DL
APPEARANCE UR: ABNORMAL
BACTERIA #/AREA URNS HPF: ABNORMAL /HPF
BILIRUB UR QL STRIP: NEGATIVE
COLOR UR AUTO: YELLOW
GLUCOSE UR STRIP-MCNC: NEGATIVE MG/DL
HGB UR QL STRIP: ABNORMAL
KETONES UR STRIP-MCNC: NEGATIVE MG/DL
LEUKOCYTE ESTERASE UR QL STRIP: ABNORMAL
NITRATE UR QL: NEGATIVE
NON-SQ EPI CELLS #/AREA URNS LPF: ABNORMAL /LPF
PH UR STRIP: 7 PH (ref 5–7)
RBC #/AREA URNS AUTO: ABNORMAL /HPF
SOURCE: ABNORMAL
SP GR UR STRIP: 1.01 (ref 1–1.03)
UROBILINOGEN UR STRIP-ACNC: 0.2 EU/DL (ref 0.2–1)
WBC #/AREA URNS AUTO: >100 /HPF

## 2019-05-29 PROCEDURE — 87086 URINE CULTURE/COLONY COUNT: CPT | Performed by: INTERNAL MEDICINE

## 2019-05-29 PROCEDURE — 87088 URINE BACTERIA CULTURE: CPT | Performed by: INTERNAL MEDICINE

## 2019-05-29 PROCEDURE — 99214 OFFICE O/P EST MOD 30 MIN: CPT | Performed by: INTERNAL MEDICINE

## 2019-05-29 PROCEDURE — 93970 EXTREMITY STUDY: CPT

## 2019-05-29 PROCEDURE — 87186 SC STD MICRODIL/AGAR DIL: CPT | Performed by: INTERNAL MEDICINE

## 2019-05-29 PROCEDURE — 81001 URINALYSIS AUTO W/SCOPE: CPT | Performed by: INTERNAL MEDICINE

## 2019-05-29 RX ORDER — CIPROFLOXACIN 250 MG/1
250 TABLET, FILM COATED ORAL 2 TIMES DAILY
Qty: 20 TABLET | Refills: 0 | Status: SHIPPED | OUTPATIENT
Start: 2019-05-29 | End: 2019-06-14

## 2019-05-29 RX ORDER — MORPHINE SULFATE 10 MG/5ML
1-1.5 SOLUTION ORAL EVERY 4 HOURS PRN
COMMUNITY
Start: 2018-04-11 | End: 2019-07-08

## 2019-05-29 RX ORDER — HYDROXYZINE PAMOATE 50 MG/1
50 CAPSULE ORAL
COMMUNITY
Start: 2019-05-23 | End: 2019-07-08 | Stop reason: DRUGHIGH

## 2019-05-29 ASSESSMENT — MIFFLIN-ST. JEOR: SCORE: 1359.62

## 2019-05-29 NOTE — PROGRESS NOTES
Subjective     Rosa Sotomayor is a 68 year old female who presents to clinic today for the following health issues:    HPI     Swelling in left leg and ankle  little bit of swelling to right leg and ankle as well     discharged from hospital on Friday 5/24 back surgery - decompression of L2-4  Started swelling Saturday   More noticeable Sunday    Feels like she has to pee but barely any comes out  On diuretic- torsemide, doubled it yesterday and no improvement  denies SOB or CP  Legs elevated when sitting  Pitting edema  Not on a blood thinner  Negative Alvin's sign- tested with      Advised to keep legs elevated at all times if sitting and laying        Patient Active Problem List   Diagnosis     Personal history of allergy to anesthetic agent     Anemia due to stage 3 chronic kidney disease (H)     Hypertriglyceridemia     Arnold-Chiari malformation (H)     Hyperlipidemia LDL goal <130     Anxiety     Secondary renal hyperparathyroidism (H)     Chronic bilateral low back pain without sciatica     Benign essential hypertension     Renal artery stenosis (H)     Macular degeneration, bilateral     Anemia, iron deficiency     Intractable migraine without aura and without status migrainosus     Aortic valve insufficiency     ESTEFANIA (obstructive sleep apnea)- mild (AHI 12)     Spinal stenosis of lumbar region with neurogenic claudication     Past Surgical History:   Procedure Laterality Date     BACK SURGERY      discectomy L3-4, 2018     C NONSPECIFIC PROCEDURE      wisdom teeth     C NONSPECIFIC PROCEDURE  2005    Varicose Veins     C TOTAL HIP ARTHROPLASTY      left in 2012, right 2017     C TOTAL KNEE ARTHROPLASTY      right 2014, left 2018     ROTATOR CUFF REPAIR RT/LT Right     2016     TUBAL LIGATION  1987       Social History     Tobacco Use     Smoking status: Never Smoker     Smokeless tobacco: Never Used   Substance Use Topics     Alcohol use: No     Alcohol/week: 0.0 oz     Family History   Problem  Relation Age of Onset     Diabetes Mother         INSULIN     Hypertension Mother      Eye Disorder Mother         CATERACTS     Heart Disease Mother         CHF- OPEN HEART SURG X'S 2     Lipids Mother      Obesity Mother      Coronary Artery Disease Mother      Diabetes Father         ORAL     Hypertension Father      Arthritis Father      Eye Disorder Father         MAC DEGENERATION     Heart Disease Father         CHF     Lipids Father      Obesity Father      Diabetes Maternal Grandfather         INSULIN     Diabetes Brother         INSULIN     Gastrointestinal Disease Brother         CHOLITISI POSSIBLE CHRONES     Obesity Brother      Colon Cancer No family hx of      Breast Cancer No family hx of          Current Outpatient Medications   Medication Sig Dispense Refill     acetaminophen (TYLENOL) 500 MG tablet Take 500 mg by mouth       allopurinol (ZYLOPRIM) 100 MG tablet Take 100 mg by mouth       atorvastatin (LIPITOR) 40 MG tablet TAKE 1 TABLET(40 MG) BY MOUTH DAILY 90 tablet 3     carvedilol (COREG) 25 MG tablet Take 1 tablet (25 mg) by mouth 2 times daily 180 tablet 3     cefdinir (OMNICEF) 300 MG capsule Take 1 capsule (300 mg) by mouth 2 times daily 14 capsule 0     cephALEXin (KEFLEX) 500 MG capsule Take 4 capsules (2,000 mg) by mouth once as needed One hour prior to dental procedures 24 capsule 3     cetirizine (ZYRTEC) 10 MG tablet Take 10 mg by mouth       Cholecalciferol (VITAMIN D) 2000 UNITS tablet Take 1 tablet by mouth daily       ciprofloxacin (CIPRO) 250 MG tablet Take 1 tablet (250 mg) by mouth 2 times daily 20 tablet 0     ciprofloxacin (CIPRO) 250 MG tablet Take 1 tablet (250 mg) by mouth 2 times daily 20 tablet 0     clobetasol (TEMOVATE) 0.05 % cream Apply sparingly to affected area twice daily for 14 days.  Do not apply to face. 15 g 2     clobetasol (TEMOVATE) 0.05 % external solution Apply topically 2 times daily as needed  5     Cranberry POWD 1 capsule       Cyanocobalamin (B-12)  1000 MCG TBCR Take 1,000 mcg by mouth daily 100 tablet 1     diphenhydrAMINE (BENADRYL ALLERGY) 25 MG tablet Take 2 tablets (50 mg) by mouth every 6 hours as needed for itching or allergies 60 tablet 1     EPINEPHrine (EPIPEN 2-FRAN) 0.3 MG/0.3ML injection 2-pack Inject 0.3 mLs (0.3 mg) into the muscle once as needed for anaphylaxis 0.6 mL 1     hydrOXYzine (ATARAX) 25 MG tablet Take 1 tablet (25 mg) by mouth 3 times daily as needed for itching 120 tablet 1     hydrOXYzine (VISTARIL) 50 MG capsule Take 50 mg by mouth       losartan (COZAAR) 100 MG tablet Take 1 tablet (100 mg) by mouth At Bedtime (Patient taking differently: Take 50 mg by mouth 2 times daily ) 90 tablet 1     Magnesium Oxide 250 MG TABS Take 250 mg by mouth       mometasone (ELOCON) 0.1 % external cream Apply topically daily       morphine 10 MG/5ML solution Take 1-1.5 mg by mouth every 4 hours as needed       ondansetron (ZOFRAN) 4 MG tablet Take by mouth every 8 hours as needed for nausea       SUMAtriptan (IMITREX) 20 MG/ACT nasal spray Spray 1 spray in nostril as needed for migraine May repeat in 2 hours. Max 2 sprays/24 hours. 12 each 11     topiramate (TOPAMAX) 100 MG tablet Take 1 tablet by mouth daily  3     torsemide (DEMADEX) 10 MG tablet Take 1 tablet (10 mg) by mouth daily 90 tablet 3     traMADol (ULTRAM) 50 MG tablet Take 1 tablet by mouth every 6 hours as needed  0     triamcinolone (KENALOG) 0.1 % cream Apply topically 2 times daily Apply to AA on trunk, arms or legs bid for 10-14 days 454 g 0     ZOLMitriptan (ZOMIG-ZMT) 5 MG ODT tab Take 5 mg by mouth       Allergies   Allergen Reactions     Bee Venom Anaphylaxis     Codeine Swelling     Body swelling and severe itching. Tolerates Morphine.      Fentanyl Anaphylaxis and Shortness Of Breath     Gabapentin Other (See Comments)     PN: chest heaviness with breathing  Chest heaviness with breathing  Chest heaviness with breathing  chest heaviness with breathing     Hydromorphone Itching  "    Tolerates morphine  Tolerates morphine     Midazolam Anaphylaxis, Itching and Shortness Of Breath     Tolerates Diazepam     Prilocaine Anaphylaxis     Anaphalactic shock     Propofol Anaphylaxis     Penicillins Itching and Rash     Tolerates Keflex,  Ancef  rash     Shingrix [Zoster Vac Recomb Adjuvanted] Itching and Rash     Sulfa Drugs Itching and Rash     rash     Clindamycin      Codeine      Diatrizoate Other (See Comments)     CKD 3     Hydrocodone      Lisinopril Cough     Lorazepam Itching     Nsaids Other (See Comments)     Contraindicated with kidney hx (including Celebrex, per pt)     Other [Seasonal Allergies] Anaphylaxis     GENERAL ANETHESIA       Oxycodone      Vancomycin Other (See Comments)     Thrush, yeast infection, bladder infection  Thrush, yeast infection, bladder infection     Erythromycin Rash     ointment  PN: ointment  ointment     Eucalyptus Oil Rash and Hives     hives     Nitrofuran Derivatives Rash     Nitrofurantoin Rash     Tramadol Rash       Reviewed and updated as needed this visit by Provider         Review of Systems   ROS COMP: Constitutional, HEENT, cardiovascular, pulmonary, gi and gu systems are negative, except as otherwise noted.      Objective    /62 (BP Location: Right arm, Cuff Size: Adult Large)   Pulse 84   Temp 99  F (37.2  C) (Tympanic)   Ht 1.626 m (5' 4\")   Wt 83.5 kg (184 lb)   SpO2 99%   Breastfeeding? No   BMI 31.58 kg/m    Body mass index is 31.58 kg/m .  Physical Exam   GENERAL: healthy, alert and no distress  RESP: lungs clear to auscultation - no rales, rhonchi or wheezes  CV: Heart with regular rate and rhythm.   ABDOMEN: soft, nontender, no hepatosplenomegaly, no masses and bowel sounds normal  MS: Well healing midline spine incision, minimal to trace bilateral lower extremity edema that is symmetric and without Bharathi's signs   SKIN: no suspicious lesions or rashes  NEURO: Alert and oriented to person, place and time.  Cranial nerves " 2-12 appear grossly intact.   Symmetric intact strength in bilateral lower extremity muscle groups.  Symmetric DTRs at patella and [place]'s tendons bilaterally, normal symmetric sensation in lower extremity dermatomes   PSYCH: mentation appears normal, affect normal/bright    Diagnostic Test Results:  Labs reviewed in Epic  Results for orders placed or performed in visit on 05/29/19   *UA reflex to Microscopic and Culture ([facility] (except [place] and [place])   Result Value Ref Range    Color Urine Yellow     Appearance Urine Cloudy     Glucose Urine Negative NEG^Negative mg/dL    Bilirubin Urine Negative NEG^Negative    Ketones Urine Negative NEG^Negative mg/dL    Specific Gravity Urine 1.015 1.003 - 1.035    Blood Urine Trace (A) NEG^Negative    pH Urine 7.0 5.0 - 7.0 pH    Protein Albumin Urine Trace (A) NEG^Negative mg/dL    Urobilinogen Urine 0.2 0.2 - 1.0 EU/dL    Nitrite Urine Negative NEG^Negative    Leukocyte Esterase Urine Large (A) NEG^Negative    Source Midstream Urine    Urine Microscopic   Result Value Ref Range    WBC Urine >100 (A) OTO5^0 - 5 /HPF    RBC Urine O - 2 OTO2^O - 2 /HPF    Squamous Epithelial /LPF Urine Few FEW^Few /LPF    Bacteria Urine Many (A) NEG^Negative /HPF           Assessment & Plan     1. Leg swelling  Exclude DVTs, could be from IVFs or very mild dependent edema from immobility, suggested ambulation and leg elevation if doppler ultrasound negative    - US Lower Extremity Venous Duplex Bilateral; Future  - Urine Microscopic    2. Urinary hesitancy  Could be from opioids, try to minimize as pain allows  Exclude UTI   See urology if persistent symptom after stopping opioids   - *UA reflex to Microscopic and Culture ([facility] (except [place] and [place])  - UROLOGY ADULT REFERRAL  - Urine Culture Aerobic Bacterial    3. Nonspecific finding on examination of urine    - Urine Culture Aerobic Bacterial    4. Urinary tract infection without  "hematuria, site unspecified    - ciprofloxacin (CIPRO) 250 MG tablet; Take 1 tablet (250 mg) by mouth 2 times daily  Dispense: 20 tablet; Refill: 0     BMI:   Estimated body mass index is 31.58 kg/m  as calculated from the following:    Height as of this encounter: 1.626 m (5' 4\").    Weight as of this encounter: 83.5 kg (184 lb).   Weight management plan: Discussed healthy diet and exercise guidelines  Josh Hollingsworth MD  [facility]    "

## 2019-05-29 NOTE — LETTER
[place] [address] [facility]  [address]  [place]  [address]  Tel: [phone]    May 30, 2019    Rosa Sotomayor  [address] [address] [facility] [address] [address]        Dear Ms. Sotomayor,    The following letter pertains to your most recent diagnostic tests:     You have another urinary tract infection.  I sent an prescription for ciprofloxacin to your pharmacy.  Please start and complete that antibiotic course.  This may explain some of your new urinary symptoms.  If symptoms do not improve after completing course of antibiotics, then see urology as previously recommended.         Sincerely,     Dr. Hollingsworth         Enclosure: Lab Results

## 2019-05-30 NOTE — RESULT ENCOUNTER NOTE
The following letter pertains to your most recent diagnostic tests:    As you know, your ultrasound is negative for deep vein thrombosis.          Sincerely,    Dr. Hollingsworth

## 2019-05-30 NOTE — RESULT ENCOUNTER NOTE
The following letter pertains to your most recent diagnostic tests:    You have another urinary tract infection.  I sent an prescription for ciprofloxacin to your pharmacy.  Please start and complete that antibiotic course.  This may explain some of your new urinary symptoms.  If symptoms do not improve after completing course of antibiotics, then see urology as previously recommended.        Sincerely,    Dr. Hollingsworth

## 2019-05-31 ENCOUNTER — MEDICAL CORRESPONDENCE (OUTPATIENT)
Dept: HEALTH INFORMATION MANAGEMENT | Facility: CLINIC | Age: 69
End: 2019-05-31

## 2019-05-31 LAB
BACTERIA SPEC CULT: ABNORMAL
SPECIMEN SOURCE: ABNORMAL

## 2019-06-01 NOTE — RESULT ENCOUNTER NOTE
The following letter pertains to your most recent diagnostic tests:    Your urine culture confirms another urinary tract infection.  The Cirro should take care of the infection, but with additional though, I think you may benefit from seeing one of the urologist that I recommended.  I am not sure why you are having frequent recurrent infections.  I think the urologists may be able to identify a treatable or reversible cause for this.  Please call [facility] [place] [phone] ([email or web address]) to schedule an appointment with urology.           Sincerely,    Dr. Hollingsworth

## 2019-06-06 ENCOUNTER — MEDICAL CORRESPONDENCE (OUTPATIENT)
Dept: HEALTH INFORMATION MANAGEMENT | Facility: CLINIC | Age: 69
End: 2019-06-06

## 2019-06-12 ENCOUNTER — OFFICE VISIT (OUTPATIENT)
Dept: UROLOGY | Facility: CLINIC | Age: 69
End: 2019-06-12
Attending: INTERNAL MEDICINE
Payer: MEDICARE

## 2019-06-12 VITALS
HEIGHT: 64 IN | OXYGEN SATURATION: 97 % | BODY MASS INDEX: 29.88 KG/M2 | WEIGHT: 175 LBS | SYSTOLIC BLOOD PRESSURE: 100 MMHG | HEART RATE: 82 BPM | DIASTOLIC BLOOD PRESSURE: 56 MMHG

## 2019-06-12 DIAGNOSIS — Z87.440 PERSONAL HISTORY OF URINARY TRACT INFECTION: Primary | ICD-10-CM

## 2019-06-12 DIAGNOSIS — N39.0 RECURRENT UTI: ICD-10-CM

## 2019-06-12 LAB
ALBUMIN UR-MCNC: NEGATIVE MG/DL
APPEARANCE UR: CLEAR
BILIRUB UR QL STRIP: NEGATIVE
COLOR UR AUTO: YELLOW
GLUCOSE UR STRIP-MCNC: NEGATIVE MG/DL
HGB UR QL STRIP: ABNORMAL
KETONES UR STRIP-MCNC: NEGATIVE MG/DL
LEUKOCYTE ESTERASE UR QL STRIP: ABNORMAL
NITRATE UR QL: NEGATIVE
PH UR STRIP: 5.5 PH (ref 5–7)
RESIDUAL VOLUME (RV) (EXTERNAL): 51
SOURCE: ABNORMAL
SP GR UR STRIP: 1.01 (ref 1–1.03)
UROBILINOGEN UR STRIP-ACNC: 0.2 EU/DL (ref 0.2–1)

## 2019-06-12 PROCEDURE — 81003 URINALYSIS AUTO W/O SCOPE: CPT | Performed by: UROLOGY

## 2019-06-12 PROCEDURE — 99203 OFFICE O/P NEW LOW 30 MIN: CPT | Mod: 25 | Performed by: UROLOGY

## 2019-06-12 PROCEDURE — 51798 US URINE CAPACITY MEASURE: CPT | Performed by: UROLOGY

## 2019-06-12 ASSESSMENT — ENCOUNTER SYMPTOMS
CHILLS: 1
ALTERED TEMPERATURE REGULATION: 1
INCREASED ENERGY: 1
DECREASED APPETITE: 1
WEIGHT LOSS: 1
FATIGUE: 1

## 2019-06-12 ASSESSMENT — MIFFLIN-ST. JEOR: SCORE: 1318.79

## 2019-06-12 ASSESSMENT — PAIN SCALES - GENERAL: PAINLEVEL: SEVERE PAIN (6)

## 2019-06-12 NOTE — LETTER
6/12/2019       RE: Rosa Sotomayor  [address] [address]  [place] [address]     Dear Colleague,    Thank you for referring your patient, Rosa Sotomayor, to the [facility] [place] at [facility]. Please see a copy of my visit note below.    June 12, 2019    Referring Provider: Josh Hollingsworth MD  [address]  [place] [address]    Primary Care Provider: Josh Hollingsworth    CC: Urinary issues    HPI:  Rosa Sotomayor is a 68 year old female who presents for evaluation of her pelvic floor symptoms.  She had back surgery about 3 weeks ago-decompression of her lumbar spine.  Had a GI virus recently with traveling about one month ago, had bad diarrhea and did have a UTI.  She also got a UTI recently after catheterization with her surgery.    She states that she really has had UTIs all her life, never has seen a urologist.  Gets dysuria, urgency frequency.      Has a little stress incontinence but denies urinary urgency incontinence or gross hematuria.  Denies febrile UTIs, pyelonephritis or hospitalizations for the UTI    Past Medical History:   Diagnosis Date     Complication of anesthesia      Postmenopausal bleeding 2/28/2007     Past Surgical History:   Procedure Laterality Date     BACK SURGERY      discectomy L3-4, 2018     C NONSPECIFIC PROCEDURE      wisdom teeth     C NONSPECIFIC PROCEDURE  2005    Varicose Veins     C TOTAL HIP ARTHROPLASTY      left in 2012, right 2017     C TOTAL KNEE ARTHROPLASTY      right 2014, left 2018     ROTATOR CUFF REPAIR RT/LT Right     2016     TUBAL LIGATION  1987       Social History     Socioeconomic History     Marital status:      Spouse name: Not on file     Number of children: Not on file     Years of education: Not on file     Highest education level: Not on file   Occupational History     Not on file   Social Needs     Financial resource strain: Not on file     Food insecurity:      Worry: Not on file     Inability: Not on file     Transportation needs:     Medical: Not on file     Non-medical: Not on file   Tobacco Use     Smoking status: Never Smoker     Smokeless tobacco: Never Used   Substance and Sexual Activity     Alcohol use: No     Alcohol/week: 0.0 oz     Drug use: No     Sexual activity: Yes     Partners: Male     Comment: postmeno   Lifestyle     Physical activity:     Days per week: Not on file     Minutes per session: Not on file     Stress: Not on file   Relationships     Social connections:     Talks on phone: Not on file     Gets together: Not on file     Attends Voodoo service: Not on file     Active member of club or organization: Not on file     Attends meetings of clubs or organizations: Not on file     Relationship status: Not on file     Intimate partner violence:     Fear of current or ex partner: Not on file     Emotionally abused: Not on file     Physically abused: Not on file     Forced sexual activity: Not on file   Other Topics Concern     Parent/sibling w/ CABG, MI or angioplasty before 65F 55M? Not Asked   Social History Narrative     Not on file       Family History   Problem Relation Age of Onset     Diabetes Mother         INSULIN     Hypertension Mother      Eye Disorder Mother         CATERACTS     Heart Disease Mother         CHF- OPEN HEART SURG X'S 2     Lipids Mother      Obesity Mother      Coronary Artery Disease Mother      Diabetes Father         ORAL     Hypertension Father      Arthritis Father      Eye Disorder Father         MAC DEGENERATION     Heart Disease Father         CHF     Lipids Father      Obesity Father      Diabetes Maternal Grandfather         INSULIN     Diabetes Brother         INSULIN     Gastrointestinal Disease Brother         CHOLITISI POSSIBLE CHRONES     Obesity Brother      Colon Cancer No family hx of      Breast Cancer No family hx of        Review of Systems     Constitutional:  Positive for chills, weight loss,  fatigue, decreased appetite, incisional pain and increased energy.   HENT:  Negative for ear pain, hearing loss, tinnitus, nosebleeds, trouble swallowing, hoarse voice, mouth sores, sore throat, ear discharge, tooth pain, gum tenderness, taste disturbance, smell disturbance, hearing aid, bleeding gums, dry mouth, sinus pain, sinus congestion and neck mass.    Eyes:  Negative for double vision, pain, redness, eye pain, decreased vision, eye watering, eye bulging, eye dryness, flashing lights, spots, floaters, strabismus, tunnel vision, jaundice and eye irritation.   Respiratory:   Negative for cough, hemoptysis, sputum production, shortness of breath, wheezing, sleep disturbances due to breathing, snores loudly, respiratory pain, dyspnea on exertion, cough disturbing sleep and postural dyspnea.    Cardiovascular:  Negative for chest pain, dyspnea on exertion, palpitations, orthopnea, claudication, leg swelling, fingers/toes turn blue, hypertension, hypotension, syncope, history of heart murmur, chest pain on exertion, chest pain at rest, pacemaker, few scattered varicosities, leg pain, sleep disturbances due to breathing, tachycardia, light-headedness, exercise intolerance and edema.   Gastrointestinal:  Negative for heartburn, nausea, vomiting, abdominal pain, diarrhea, constipation, blood in stool, melena, rectal pain, bloating, hemorrhoids, bowel incontinence, jaundice, rectal bleeding, coffee ground emesis and change in stool.   Genitourinary:  Negative for bladder incontinence, dysuria, urgency, hematuria, flank pain, vaginal discharge, difficulty urinating, genital sores, dyspareunia, decreased libido, nocturia, voiding less frequently, arousal difficulty, abnormal vaginal bleeding, excessive menstruation, menstrual changes, hot flashes, vaginal dryness and postmenopausal bleeding.   Musculoskeletal:  Negative for myalgias, back pain, joint swelling, arthralgias, stiffness, muscle cramps, neck pain, bone pain,  muscle weakness and fracture.   Skin:  Negative for nail changes, itching, poor wound healing, rash, hair changes, skin changes, acne, warts, poor wound healing, scarring, flaky skin, Raynaud's phenomenon, sensitivity to sunlight and skin thickening.   Neurological:  Negative for dizziness, tingling, tremors, speech change, seizures, loss of consciousness, weakness, light-headedness, numbness, headaches, disturbances in coordination, extremity numbness, memory loss, difficulty walking and paralysis.   Endo/Heme:  Negative for anemia, swollen glands and bruises/bleeds easily.   Psychiatric/Behavioral:  Negative for depression, memory loss, decreased concentration, mood swings and panic attacks.    Breast:  Negative for breast discharge, breast mass, breast pain and nipple retraction.   Endocrine:  Positive for altered temperature regulation.Negative for unwanted hair growth and change in facial hair.      Allergies   Allergen Reactions     Bee Venom Anaphylaxis     Codeine Swelling     Body swelling and severe itching. Tolerates Morphine.      Fentanyl Anaphylaxis and Shortness Of Breath     Gabapentin Other (See Comments)     PN: chest heaviness with breathing  Chest heaviness with breathing  Chest heaviness with breathing  chest heaviness with breathing     Hydromorphone Itching     Tolerates morphine  Tolerates morphine     Midazolam Anaphylaxis, Itching and Shortness Of Breath     Tolerates Diazepam     Prilocaine Anaphylaxis     Anaphalactic shock     Propofol Anaphylaxis     Penicillins Itching and Rash     Tolerates Keflex,  Ancef  rash     Shingrix [Zoster Vac Recomb Adjuvanted] Itching and Rash     Sulfa Drugs Itching and Rash     rash     Clindamycin      Codeine      Diatrizoate Other (See Comments)     CKD 3     Hydrocodone      Lisinopril Cough     Lorazepam Itching     Nsaids Other (See Comments)     Contraindicated with kidney hx (including Celebrex, per pt)     Other [Seasonal Allergies] Anaphylaxis  "    GENERAL ANETHESIA       Oxycodone      Vancomycin Other (See Comments)     Thrush, yeast infection, bladder infection  Thrush, yeast infection, bladder infection     Erythromycin Rash     ointment  PN: ointment  ointment     Eucalyptus Oil Rash and Hives     hives     Nitrofuran Derivatives Rash     Nitrofurantoin Rash     Tramadol Rash       Current Outpatient Medications   Medication     acetaminophen (TYLENOL) 500 MG tablet     allopurinol (ZYLOPRIM) 100 MG tablet     atorvastatin (LIPITOR) 40 MG tablet     carvedilol (COREG) 25 MG tablet     cetirizine (ZYRTEC) 10 MG tablet     Cholecalciferol (VITAMIN D) 2000 UNITS tablet     clobetasol (TEMOVATE) 0.05 % cream     clobetasol (TEMOVATE) 0.05 % external solution     Cranberry POWD     Cyanocobalamin (B-12) 1000 MCG TBCR     diphenhydrAMINE (BENADRYL ALLERGY) 25 MG tablet     hydrOXYzine (ATARAX) 25 MG tablet     hydrOXYzine (VISTARIL) 50 MG capsule     losartan (COZAAR) 100 MG tablet     Magnesium Oxide 250 MG TABS     mometasone (ELOCON) 0.1 % external cream     ondansetron (ZOFRAN) 4 MG tablet     topiramate (TOPAMAX) 100 MG tablet     torsemide (DEMADEX) 10 MG tablet     traMADol (ULTRAM) 50 MG tablet     triamcinolone (KENALOG) 0.1 % cream     cefdinir (OMNICEF) 300 MG capsule     cephALEXin (KEFLEX) 500 MG capsule     ciprofloxacin (CIPRO) 250 MG tablet     ciprofloxacin (CIPRO) 250 MG tablet     EPINEPHrine (EPIPEN 2-FRAN) 0.3 MG/0.3ML injection 2-pack     morphine 10 MG/5ML solution     SUMAtriptan (IMITREX) 20 MG/ACT nasal spray     ZOLMitriptan (ZOMIG-ZMT) 5 MG ODT tab     No current facility-administered medications for this visit.        /56 (BP Location: Right arm, Patient Position: Sitting, Cuff Size: Adult Regular)   Pulse 82   Ht 1.626 m (5' 4\")   Wt 79.4 kg (175 lb)   SpO2 97%   BMI 30.04 kg/m    No LMP recorded. Patient is postmenopausal. Body mass index is 30.04 kg/m .  She is alert and oriented.  She is well groomed, comfortable " in no acute distress.  Eyes have anicteric sclerae, moist conjunctivae.  Normal mood and affect.   Normocephalic, atraumatic without masses, lesions, obvious abnormalities.  Non-labored breathing.      Urine dip trace blood and leuks on a voided specimen    PVR 51 mL by bladder scan    A/P: Rosa Sotomayor is a 68 year old F with Pedro but just a couple weeks s/p spinal decompression surgery    At this time recommend that she monitor things as she is not symptomatic.  We discussed the evaluation to include CT scan and cystoscopy.  She will need a catheterized urine if there is concern for UTI as she is very limited in her mobility right now    Given the recent surgery will have her recover more from this before we plan to proceed with the cystoscopy    30 minutes were spent with the patient today, > 50% in counseling and coordination of care    Starr Chung MD MPH    Urology    CC  Patient Care Team:  Josh Hollingsworth MD as PCP - General (Internal Medicine)  Taylor Zapien RD as Registered Dietitian (Dietitian, Registered)

## 2019-06-12 NOTE — PATIENT INSTRUCTIONS
Recommend catheterize urine if you think you have an infection    Please do the CT scan, [phone]     Please return for a cystoscopy (procedure to look in the bladder) and pelvic exam.  You will need a urine culture about 7-10 days prior    It was a pleasure meeting with you today.  Thank you for allowing me and my team the privilege of caring for you today.  YOU are the reason we are here, and I truly hope we provided you with the excellent service you deserve.  Please let us know if there is anything else we can do for you so that we can be sure you are leaving completely satisfied with your care experience.    Cystoscopy    Cystoscopy is a procedure that lets your doctor look directly inside your urethra and bladder. It can be used to:    Help diagnose a problem with your urethra, bladder, or kidneys.    Take a sample (biopsy) of bladder or urethral tissue.    Treat certain problems (such as removing kidney stones).    Place a stent to bypass an obstruction.    Take special X-rays of the kidneys.  Based on the findings, your doctor may recommend other tests or treatments.  What is a cystoscope?  A cystoscope is a telescope-like instrument that contains lenses and fiberoptics (small glass wires that make bright light). The cystoscope may be straight and rigid, or flexible to bend around curves in the urethra. The doctor may look directly into the cystoscope, or project the image onto a monitor.  Getting ready    Ask your doctor if you should stop taking any medicines before the procedure.    Follow any other instructions your doctor gives you.  Tell your doctor before the exam if you:    Take any medicines, such as aspirin or blood thinners    Have allergies to any medicines    Are pregnant   The procedure  Cystoscopy is done in the doctor s office, surgery center, or hospital. The doctor and a nurse are present during the procedure. It takes only a few minutes, longer if a biopsy, X-ray, or treatment needs to  be done.  During the procedure:    You lie on an exam table on your back, knees bent and legs apart. You are covered with a drape.    Your urethra and the area around it are washed. Anesthetic jelly may be applied to numb the urethra.    The cystoscope is inserted. A sterile fluid is put into the bladder to expand it. You may feel pressure from this fluid.    When the procedure is done, the cystoscope is removed.  After the procedure   Once you re home:    Drink plenty of fluids.    You may have burning or light bleeding when you urinate--this is normal.    Medicines may be prescribed to ease any discomfort or prevent infection. Take these as directed.    Call your doctor if you have heavy bleeding or blood clots, burning that lasts more than a day, a fever over 100 F  (38  C), or trouble urinating.  Date Last Reviewed: 1/1/2017 2000-2017 [employer]. [address] [place] [address], [place] [address]. All rights reserved. This information is not intended as a substitute for professional medical care. Always follow your healthcare professional's instructions.

## 2019-06-12 NOTE — PROGRESS NOTES
June 12, 2019    Referring Provider: Josh Hollingsworth MD  [id number] [facility] [address]  [place] [address]    Primary Care Provider: Josh Hollingsworth    CC: Urinary issues    HPI:  Rosa Sotomayor is a 68 year old female who presents for evaluation of her pelvic floor symptoms.  She had back surgery about 3 weeks ago-decompression of her lumbar spine.  Had a GI virus recently with traveling about one month ago, had bad diarrhea and did have a UTI.  She also got a UTI recently after catheterization with her surgery.    She states that she really has had UTIs all her life, never has seen a urologist.  Gets dysuria, urgency frequency.      Has a little stress incontinence but denies urinary urgency incontinence or gross hematuria.  Denies febrile UTIs, pyelonephritis or hospitalizations for the UTI    Past Medical History:   Diagnosis Date     Complication of anesthesia      Postmenopausal bleeding 2/28/2007     Past Surgical History:   Procedure Laterality Date     BACK SURGERY      discectomy L3-4, 2018     C NONSPECIFIC PROCEDURE      wisdom teeth     C NONSPECIFIC PROCEDURE  2005    Varicose Veins     C TOTAL HIP ARTHROPLASTY      left in 2012, right 2017     C TOTAL KNEE ARTHROPLASTY      right 2014, left 2018     ROTATOR CUFF REPAIR RT/LT Right     2016     TUBAL LIGATION  1987       Social History     Socioeconomic History     Marital status:      Spouse name: Not on file     Number of children: Not on file     Years of education: Not on file     Highest education level: Not on file   Occupational History     Not on file   Social Needs     Financial resource strain: Not on file     Food insecurity:     Worry: Not on file     Inability: Not on file     Transportation needs:     Medical: Not on file     Non-medical: Not on file   Tobacco Use     Smoking status: Never Smoker     Smokeless tobacco: Never Used   Substance and Sexual Activity     Alcohol use: No     Alcohol/week: 0.0 oz     Drug use: No     Sexual  activity: Yes     Partners: Male     Comment: postmeno   Lifestyle     Physical activity:     Days per week: Not on file     Minutes per session: Not on file     Stress: Not on file   Relationships     Social connections:     Talks on phone: Not on file     Gets together: Not on file     Attends L[employer] service: Not on file     Active member of club or organization: Not on file     Attends meetings of clubs or organizations: Not on file     Relationship status: Not on file     Intimate partner violence:     Fear of current or ex partner: Not on file     Emotionally abused: Not on file     Physically abused: Not on file     Forced sexual activity: Not on file   Other Topics Concern     Parent/sibling w/ CABG, MI or angioplasty before 65F 55M? Not Asked   Social History Narrative     Not on file       Family History   Problem Relation Age of Onset     Diabetes Mother         INSULIN     Hypertension Mother      Eye Disorder Mother         CATERACTS     Heart Disease Mother         CHF- OPEN HEART SURG X'S 2     Lipids Mother      Obesity Mother      Coronary Artery Disease Mother      Diabetes Father         ORAL     Hypertension Father      Arthritis Father      Eye Disorder Father         MAC DEGENERATION     Heart Disease Father         CHF     Lipids Father      Obesity Father      Diabetes Maternal Grandfather         INSULIN     Diabetes Brother         INSULIN     Gastrointestinal Disease Brother         CHOLITISI POSSIBLE CHRONES     Obesity Brother      Colon Cancer No family hx of      Breast Cancer No family hx of        Review of Systems     Constitutional:  Positive for chills, weight loss, fatigue, decreased appetite, incisional pain and increased energy.   HENT:  Negative for ear pain, hearing loss, tinnitus, nosebleeds, trouble swallowing, hoarse voice, mouth sores, sore throat, ear discharge, tooth pain, gum tenderness, taste disturbance, smell disturbance, hearing aid, bleeding gums, dry mouth,  sinus pain, sinus congestion and neck mass.    Eyes:  Negative for double vision, pain, redness, eye pain, decreased vision, eye watering, eye bulging, eye dryness, flashing lights, spots, floaters, strabismus, tunnel vision, jaundice and eye irritation.   Respiratory:   Negative for cough, hemoptysis, sputum production, shortness of breath, wheezing, sleep disturbances due to breathing, snores loudly, respiratory pain, dyspnea on exertion, cough disturbing sleep and postural dyspnea.    Cardiovascular:  Negative for chest pain, dyspnea on exertion, palpitations, orthopnea, claudication, leg swelling, fingers/toes turn blue, hypertension, hypotension, syncope, history of heart murmur, chest pain on exertion, chest pain at rest, pacemaker, few scattered varicosities, leg pain, sleep disturbances due to breathing, tachycardia, light-headedness, exercise intolerance and edema.   Gastrointestinal:  Negative for heartburn, nausea, vomiting, abdominal pain, diarrhea, constipation, blood in stool, melena, rectal pain, bloating, hemorrhoids, bowel incontinence, jaundice, rectal bleeding, coffee ground emesis and change in stool.   Genitourinary:  Negative for bladder incontinence, dysuria, urgency, hematuria, flank pain, vaginal discharge, difficulty urinating, genital sores, dyspareunia, decreased libido, nocturia, voiding less frequently, arousal difficulty, abnormal vaginal bleeding, excessive menstruation, menstrual changes, hot flashes, vaginal dryness and postmenopausal bleeding.   Musculoskeletal:  Negative for myalgias, back pain, joint swelling, arthralgias, stiffness, muscle cramps, neck pain, bone pain, muscle weakness and fracture.   Skin:  Negative for nail changes, itching, poor wound healing, rash, hair changes, skin changes, acne, warts, poor wound healing, scarring, flaky skin, Raynaud's phenomenon, sensitivity to sunlight and skin thickening.   Neurological:  Negative for dizziness, tingling, tremors,  speech change, seizures, loss of consciousness, weakness, light-headedness, numbness, headaches, disturbances in coordination, extremity numbness, memory loss, difficulty walking and paralysis.   Endo/Heme:  Negative for anemia, swollen glands and bruises/bleeds easily.   Psychiatric/Behavioral:  Negative for depression, memory loss, decreased concentration, mood swings and panic attacks.    Breast:  Negative for breast discharge, breast mass, breast pain and nipple retraction.   Endocrine:  Positive for altered temperature regulation.Negative for unwanted hair growth and change in facial hair.      Allergies   Allergen Reactions     Bee Venom Anaphylaxis     Codeine Swelling     Body swelling and severe itching. Tolerates Morphine.      Fentanyl Anaphylaxis and Shortness Of Breath     Gabapentin Other (See Comments)     PN: chest heaviness with breathing  Chest heaviness with breathing  Chest heaviness with breathing  chest heaviness with breathing     Hydromorphone Itching     Tolerates morphine  Tolerates morphine     Midazolam Anaphylaxis, Itching and Shortness Of Breath     Tolerates Diazepam     Prilocaine Anaphylaxis     Anaphalactic shock     Propofol Anaphylaxis     Penicillins Itching and Rash     Tolerates Keflex,  Ancef  rash     Shingrix [Zoster Vac Recomb Adjuvanted] Itching and Rash     Sulfa Drugs Itching and Rash     rash     Clindamycin      Codeine      Diatrizoate Other (See Comments)     CKD 3     Hydrocodone      Lisinopril Cough     Lorazepam Itching     Nsaids Other (See Comments)     Contraindicated with kidney hx (including Celebrex, per pt)     Other [Seasonal Allergies] Anaphylaxis     GENERAL ANETHESIA       Oxycodone      Vancomycin Other (See Comments)     Thrush, yeast infection, bladder infection  Thrush, yeast infection, bladder infection     Erythromycin Rash     ointment  PN: ointment  ointment     Eucalyptus Oil Rash and Hives     hives     Nitrofuran Derivatives Rash      "Nitrofurantoin Rash     Tramadol Rash       Current Outpatient Medications   Medication     acetaminophen (TYLENOL) 500 MG tablet     allopurinol (ZYLOPRIM) 100 MG tablet     atorvastatin (LIPITOR) 40 MG tablet     carvedilol (COREG) 25 MG tablet     cetirizine (ZYRTEC) 10 MG tablet     Cholecalciferol (VITAMIN D) 2000 UNITS tablet     clobetasol (TEMOVATE) 0.05 % cream     clobetasol (TEMOVATE) 0.05 % external solution     Cranberry POWD     Cyanocobalamin (B-12) 1000 MCG TBCR     diphenhydrAMINE (BENADRYL ALLERGY) 25 MG tablet     hydrOXYzine (ATARAX) 25 MG tablet     hydrOXYzine (VISTARIL) 50 MG capsule     losartan (COZAAR) 100 MG tablet     Magnesium Oxide 250 MG TABS     mometasone (ELOCON) 0.1 % external cream     ondansetron (ZOFRAN) 4 MG tablet     topiramate (TOPAMAX) 100 MG tablet     torsemide (DEMADEX) 10 MG tablet     traMADol (ULTRAM) 50 MG tablet     triamcinolone (KENALOG) 0.1 % cream     cefdinir (OMNICEF) 300 MG capsule     cephALEXin (KEFLEX) 500 MG capsule     ciprofloxacin (CIPRO) 250 MG tablet     ciprofloxacin (CIPRO) 250 MG tablet     EPINEPHrine (EPIPEN 2-FRAN) 0.3 MG/0.3ML injection 2-pack     morphine 10 MG/5ML solution     SUMAtriptan (IMITREX) 20 MG/ACT nasal spray     ZOLMitriptan (ZOMIG-ZMT) 5 MG ODT tab     No current facility-administered medications for this visit.        /56 (BP Location: Right arm, Patient Position: Sitting, Cuff Size: Adult Regular)   Pulse 82   Ht 1.626 m (5' 4\")   Wt 79.4 kg (175 lb)   SpO2 97%   BMI 30.04 kg/m   No LMP recorded. Patient is postmenopausal. Body mass index is 30.04 kg/m .  She is alert and oriented.  She is well groomed, comfortable in no acute distress.  Eyes have anicteric sclerae, moist conjunctivae.  Normal mood and affect.   Normocephalic, atraumatic without masses, lesions, obvious abnormalities.  Non-labored breathing.      Urine dip trace blood and leuks on a voided specimen    PVR 51 mL by bladder scan    A/P: Rosa Sotomayor " is a 68 year old F with Pedro but just a couple weeks s/p spinal decompression surgery    At this time recommend that she monitor things as she is not symptomatic.  We discussed the evaluation to include CT scan and cystoscopy.  She will need a catheterized urine if there is concern for UTI as she is very limited in her mobility right now    Given the recent surgery will have her recover more from this before we plan to proceed with the cystoscopy    30 minutes were spent with the patient today, > 50% in counseling and coordination of care    Starr Chung MD MPH    Urology    CC  Patient Care Team:  Jeff Tsang MD as PCP - General (Internal Medicine)  Taylor Zapien RD as Registered Dietitian (Dietitian, Registered)  Jeff Tsang MD as Assigned PCP  JEFF TSANG

## 2019-06-12 NOTE — NURSING NOTE
Chief Complaint   Patient presents with     Clinic Care Coordination - Follow-up     Pt here for recurrent UTI   PVR is 51mL   Antonia Dallas

## 2019-06-14 ENCOUNTER — OFFICE VISIT (OUTPATIENT)
Dept: FAMILY MEDICINE | Facility: CLINIC | Age: 69
End: 2019-06-14
Payer: MEDICARE

## 2019-06-14 VITALS
HEART RATE: 89 BPM | TEMPERATURE: 98.4 F | HEIGHT: 64 IN | OXYGEN SATURATION: 99 % | BODY MASS INDEX: 30.22 KG/M2 | WEIGHT: 177 LBS | DIASTOLIC BLOOD PRESSURE: 68 MMHG | SYSTOLIC BLOOD PRESSURE: 107 MMHG

## 2019-06-14 DIAGNOSIS — H92.01 RIGHT EAR PAIN: Primary | ICD-10-CM

## 2019-06-14 PROCEDURE — 99213 OFFICE O/P EST LOW 20 MIN: CPT | Performed by: NURSE PRACTITIONER

## 2019-06-14 ASSESSMENT — ANXIETY QUESTIONNAIRES
5. BEING SO RESTLESS THAT IT IS HARD TO SIT STILL: NOT AT ALL
7. FEELING AFRAID AS IF SOMETHING AWFUL MIGHT HAPPEN: NOT AT ALL
GAD7 TOTAL SCORE: 0
2. NOT BEING ABLE TO STOP OR CONTROL WORRYING: NOT AT ALL
IF YOU CHECKED OFF ANY PROBLEMS ON THIS QUESTIONNAIRE, HOW DIFFICULT HAVE THESE PROBLEMS MADE IT FOR YOU TO DO YOUR WORK, TAKE CARE OF THINGS AT HOME, OR GET ALONG WITH OTHER PEOPLE: NOT DIFFICULT AT ALL
3. WORRYING TOO MUCH ABOUT DIFFERENT THINGS: NOT AT ALL
6. BECOMING EASILY ANNOYED OR IRRITABLE: NOT AT ALL
1. FEELING NERVOUS, ANXIOUS, OR ON EDGE: NOT AT ALL

## 2019-06-14 ASSESSMENT — PATIENT HEALTH QUESTIONNAIRE - PHQ9: 5. POOR APPETITE OR OVEREATING: NOT AT ALL

## 2019-06-14 ASSESSMENT — MIFFLIN-ST. JEOR: SCORE: 1327.87

## 2019-06-14 NOTE — PROGRESS NOTES
Subjective     Rosa Sotomayor is a 68 year old female who presents to clinic today for the following health issues:    HPI   Right ear pain X 3 dys     3 days of right ear pain and pressure.   Saw ENT < 6 months ago for right right ear pain and pressure and ENT confirmed no infection.  Has had recurrent right ear pain and pressure in the past but pain returned with this most recent episode. Which began 3 days ago.  Slight headache, but she has history migraines does not believe headache is worse since ear pain began.  History of seasonal allergies with runny nose taking Zyrtec.  Sore throat.  Denies drainage from ears.  Last air flight April.  Denies fever, chills, nausea and vomiting.  Denies constipation or diarrhea.    Past Medical History:   Diagnosis Date     Complication of anesthesia      Postmenopausal bleeding 2/28/2007     Family History   Problem Relation Age of Onset     Diabetes Mother         INSULIN     Hypertension Mother      Eye Disorder Mother         CATERACTS     Heart Disease Mother         CHF- OPEN HEART SURG X'S 2     Lipids Mother      Obesity Mother      Coronary Artery Disease Mother      Diabetes Father         ORAL     Hypertension Father      Arthritis Father      Eye Disorder Father         MAC DEGENERATION     Heart Disease Father         CHF     Lipids Father      Obesity Father      Diabetes Maternal Grandfather         INSULIN     Diabetes Brother         INSULIN     Gastrointestinal Disease Brother         CHOLITISI POSSIBLE CHRONES     Obesity Brother      Colon Cancer No family hx of      Breast Cancer No family hx of      Past Surgical History:   Procedure Laterality Date     BACK SURGERY      discectomy L3-4, 2018     C NONSPECIFIC PROCEDURE      wisdom teeth     C NONSPECIFIC PROCEDURE  2005    Varicose Veins     C TOTAL HIP ARTHROPLASTY      left in 2012, right 2017     C TOTAL KNEE ARTHROPLASTY      right 2014, left 2018     ROTATOR CUFF REPAIR RT/LT Right     2016      TUBAL LIGATION  1987     Social History     Tobacco Use     Smoking status: Never Smoker     Smokeless tobacco: Never Used   Substance Use Topics     Alcohol use: No     Alcohol/week: 0.0 oz     Current Outpatient Medications   Medication Sig Dispense Refill     acetaminophen (TYLENOL) 500 MG tablet Take 500 mg by mouth       allopurinol (ZYLOPRIM) 100 MG tablet Take 100 mg by mouth       atorvastatin (LIPITOR) 40 MG tablet TAKE 1 TABLET(40 MG) BY MOUTH DAILY 90 tablet 3     carvedilol (COREG) 25 MG tablet Take 1 tablet (25 mg) by mouth 2 times daily 180 tablet 3     cephALEXin (KEFLEX) 500 MG capsule Take 4 capsules (2,000 mg) by mouth once as needed One hour prior to dental procedures (Patient not taking: Reported on 6/12/2019) 24 capsule 3     cetirizine (ZYRTEC) 10 MG tablet Take 10 mg by mouth       Cholecalciferol (VITAMIN D) 2000 UNITS tablet Take 1 tablet by mouth daily       clobetasol (TEMOVATE) 0.05 % cream Apply sparingly to affected area twice daily for 14 days.  Do not apply to face. 15 g 2     clobetasol (TEMOVATE) 0.05 % external solution Apply topically 2 times daily as needed  5     Cranberry POWD 1 capsule       Cyanocobalamin (B-12) 1000 MCG TBCR Take 1,000 mcg by mouth daily 100 tablet 1     diphenhydrAMINE (BENADRYL ALLERGY) 25 MG tablet Take 2 tablets (50 mg) by mouth every 6 hours as needed for itching or allergies 60 tablet 1     EPINEPHrine (EPIPEN 2-FRAN) 0.3 MG/0.3ML injection 2-pack Inject 0.3 mLs (0.3 mg) into the muscle once as needed for anaphylaxis (Patient not taking: Reported on 6/12/2019) 0.6 mL 1     hydrOXYzine (ATARAX) 25 MG tablet Take 1 tablet (25 mg) by mouth 3 times daily as needed for itching 120 tablet 1     hydrOXYzine (VISTARIL) 50 MG capsule Take 50 mg by mouth       losartan (COZAAR) 100 MG tablet Take 1 tablet (100 mg) by mouth At Bedtime (Patient taking differently: Take 50 mg by mouth 2 times daily ) 90 tablet 1     Magnesium Oxide 250 MG TABS Take 250 mg by mouth        mometasone (ELOCON) 0.1 % external cream Apply topically daily       morphine 10 MG/5ML solution Take 1-1.5 mg by mouth every 4 hours as needed       ondansetron (ZOFRAN) 4 MG tablet Take by mouth every 8 hours as needed for nausea       SUMAtriptan (IMITREX) 20 MG/ACT nasal spray Spray 1 spray in nostril as needed for migraine May repeat in 2 hours. Max 2 sprays/24 hours. (Patient not taking: Reported on 6/12/2019) 12 each 11     topiramate (TOPAMAX) 100 MG tablet Take 1 tablet by mouth daily  3     torsemide (DEMADEX) 10 MG tablet Take 1 tablet (10 mg) by mouth daily 90 tablet 3     traMADol (ULTRAM) 50 MG tablet Take 1 tablet by mouth every 6 hours as needed  0     triamcinolone (KENALOG) 0.1 % cream Apply topically 2 times daily Apply to AA on trunk, arms or legs bid for 10-14 days 454 g 0     ZOLMitriptan (ZOMIG-ZMT) 5 MG ODT tab Take 5 mg by mouth       Allergies   Allergen Reactions     Bee Venom Anaphylaxis     Codeine Swelling     Body swelling and severe itching. Tolerates Morphine.      Fentanyl Anaphylaxis and Shortness Of Breath     Gabapentin Other (See Comments)     PN: chest heaviness with breathing  Chest heaviness with breathing  Chest heaviness with breathing  chest heaviness with breathing     Hydromorphone Itching     Tolerates morphine  Tolerates morphine     Midazolam Anaphylaxis, Itching and Shortness Of Breath     Tolerates Diazepam     Prilocaine Anaphylaxis     Anaphalactic shock     Propofol Anaphylaxis     Penicillins Itching and Rash     Tolerates Keflex,  Ancef  rash     Shingrix [Zoster Vac Recomb Adjuvanted] Itching and Rash     Sulfa Drugs Itching and Rash     rash     Clindamycin      Codeine      Diatrizoate Other (See Comments)     CKD 3     Hydrocodone      Lisinopril Cough     Lorazepam Itching     Nsaids Other (See Comments)     Contraindicated with kidney hx (including Celebrex, per pt)     Other [Seasonal Allergies] Anaphylaxis     GENERAL ANETHESIA       Oxycodone       "Vancomycin Other (See Comments)     Thrush, yeast infection, bladder infection  Thrush, yeast infection, bladder infection     Erythromycin Rash     ointment  PN: ointment  ointment     Eucalyptus Oil Rash and Hives     hives     Nitrofuran Derivatives Rash     Nitrofurantoin Rash     Tramadol Rash       Reviewed and updated as needed this visit by clinical staff and provider     Review of Systems   Detailed as above.      Objective    /68 (BP Location: Right arm, Cuff Size: Adult Large)   Pulse 89   Temp 98.4  F (36.9  C) (Oral)   Ht 1.626 m (5' 4\")   Wt 80.3 kg (177 lb)   SpO2 99%   BMI 30.38 kg/m    Body mass index is 30.38 kg/m .  Physical Exam   Constitutional: She is oriented to person, place, and time. She appears well-developed.   HENT:   Head: Normocephalic.   Right Ear: Tympanic membrane, external ear and ear canal normal.   Left Ear: Tympanic membrane, external ear and ear canal normal.   Nose: No mucosal edema.   Mouth/Throat: Oropharynx is clear and moist. No oropharyngeal exudate.   Neck: Normal range of motion.   Cardiovascular: Normal rate.   Pulmonary/Chest: Effort normal.   Lymphadenopathy:     She has no cervical adenopathy.   Neurological: She is alert and oriented to person, place, and time.   Skin: Skin is warm and dry.   Psychiatric: She has a normal mood and affect.        Assessment and Plan:       ICD-10-CM    1. Right ear pain H92.01        As her right TM is unremarkable on exam, suspect ETD causing her pain. She is currently without any signs of ear or sinus infection to explain her ear pain, though she reports currently having seasonal allergies symptoms with runny nose. Encouraged her to continue with Zyrtec and to increase her use of saline nasal rinses to help with her allergies. Could consider steroid nasal spray.    Did discuss her seeing ENT again about her ear pain. She initially expressed hesitancy but did state she would consider seeing a different ENT " provider.    Pt seen in conjunction with Timothy Cohen NP Student      GREGG Parks, CNP  [facility]

## 2019-06-15 ASSESSMENT — ANXIETY QUESTIONNAIRES: GAD7 TOTAL SCORE: 0

## 2019-06-17 ASSESSMENT — ENCOUNTER SYMPTOMS
HEMATURIA: 0
SPUTUM PRODUCTION: 0
COUGH: 0
PARALYSIS: 0
COUGH DISTURBING SLEEP: 0
EXTREMITY NUMBNESS: 0
FATIGUE: 1
BREAST PAIN: 0
MYALGIAS: 0
TINGLING: 0
VOMITING: 0
DIFFICULTY URINATING: 0
TROUBLE SWALLOWING: 0
EXERCISE INTOLERANCE: 0
HYPERTENSION: 0
POOR WOUND HEALING: 0
BACK PAIN: 0
WHEEZING: 0
HYPOTENSION: 0
STIFFNESS: 0
SPEECH CHANGE: 0
DYSPNEA ON EXERTION: 0
ORTHOPNEA: 0
NUMBNESS: 0
DIZZINESS: 0
BOWEL INCONTINENCE: 0
NECK MASS: 0
RECTAL BLEEDING: 0
CHILLS: 1
HEARTBURN: 0
SYNCOPE: 0
LIGHT-HEADEDNESS: 0
BRUISES/BLEEDS EASILY: 0
DIARRHEA: 0
POSTURAL DYSPNEA: 0
SLEEP DISTURBANCES DUE TO BREATHING: 0
MUSCLE CRAMPS: 0
SINUS PAIN: 0
MEMORY LOSS: 0
NAUSEA: 0
HEMOPTYSIS: 0
HOARSE VOICE: 0
LEG SWELLING: 0
INSOMNIA: 0
INCREASED ENERGY: 1
SEIZURES: 0
SORE THROAT: 0
CONSTIPATION: 0
HEADACHES: 0
JOINT SWELLING: 0
NERVOUS/ANXIOUS: 0
SMELL DISTURBANCE: 0
DISTURBANCES IN COORDINATION: 0
ABDOMINAL PAIN: 0
DEPRESSION: 0
ARTHRALGIAS: 0
DECREASED APPETITE: 1
DOUBLE VISION: 0
EYE PAIN: 0
WEIGHT LOSS: 1
SNORES LOUDLY: 0
LOSS OF CONSCIOUSNESS: 0
FLANK PAIN: 0
RESPIRATORY PAIN: 0
WEAKNESS: 0
JAUNDICE: 0
DECREASED CONCENTRATION: 0
TASTE DISTURBANCE: 0
HOT FLASHES: 0
PALPITATIONS: 0
SHORTNESS OF BREATH: 0
EYE IRRITATION: 0
CLAUDICATION: 0
MUSCLE WEAKNESS: 0
SKIN CHANGES: 0
BLOOD IN STOOL: 0
BREAST MASS: 0
EYE REDNESS: 0
LEG PAIN: 0
DECREASED LIBIDO: 0
SINUS CONGESTION: 0
NECK PAIN: 0
TREMORS: 0
RECTAL PAIN: 0
NAIL CHANGES: 0
BLOATING: 0
EYE WATERING: 0
SWOLLEN GLANDS: 0
PANIC: 0
ALTERED TEMPERATURE REGULATION: 1
TACHYCARDIA: 0
DYSURIA: 0

## 2019-06-26 ENCOUNTER — TRANSFERRED RECORDS (OUTPATIENT)
Dept: HEALTH INFORMATION MANAGEMENT | Facility: CLINIC | Age: 69
End: 2019-06-26

## 2019-07-01 ENCOUNTER — TRANSFERRED RECORDS (OUTPATIENT)
Dept: HEALTH INFORMATION MANAGEMENT | Facility: CLINIC | Age: 69
End: 2019-07-01

## 2019-07-01 ENCOUNTER — TELEPHONE (OUTPATIENT)
Dept: UROLOGY | Facility: CLINIC | Age: 69
End: 2019-07-01

## 2019-07-01 NOTE — TELEPHONE ENCOUNTER
[facility] triage staff,      Patient was incorrectly scheduled here at the [facility] clinic-she does not want to have her cath UA/UC at Dr. Starr Chung urology clinic. She is scheduled with us at 9:40 am for a nurse visit. Can you guys please reschedule this nurse visit. She would like to have it done in [place].    Thanks,  Sridhar Garcia MA

## 2019-07-02 ENCOUNTER — OFFICE VISIT (OUTPATIENT)
Dept: FAMILY MEDICINE | Facility: CLINIC | Age: 69
End: 2019-07-02
Payer: MEDICARE

## 2019-07-02 VITALS
HEART RATE: 88 BPM | OXYGEN SATURATION: 98 % | SYSTOLIC BLOOD PRESSURE: 137 MMHG | HEIGHT: 64 IN | BODY MASS INDEX: 30.39 KG/M2 | WEIGHT: 178 LBS | DIASTOLIC BLOOD PRESSURE: 74 MMHG | TEMPERATURE: 98.8 F

## 2019-07-02 DIAGNOSIS — R30.0 BURNING WITH URINATION: ICD-10-CM

## 2019-07-02 DIAGNOSIS — N30.00 ACUTE CYSTITIS WITHOUT HEMATURIA: Primary | ICD-10-CM

## 2019-07-02 DIAGNOSIS — N95.2 ATROPHIC VAGINITIS: ICD-10-CM

## 2019-07-02 DIAGNOSIS — R82.90 NONSPECIFIC FINDING ON EXAMINATION OF URINE: ICD-10-CM

## 2019-07-02 LAB
ALBUMIN UR-MCNC: 30 MG/DL
APPEARANCE UR: ABNORMAL
BACTERIA #/AREA URNS HPF: ABNORMAL /HPF
BILIRUB UR QL STRIP: NEGATIVE
COLOR UR AUTO: YELLOW
GLUCOSE UR STRIP-MCNC: NEGATIVE MG/DL
HGB UR QL STRIP: ABNORMAL
KETONES UR STRIP-MCNC: NEGATIVE MG/DL
LEUKOCYTE ESTERASE UR QL STRIP: ABNORMAL
NITRATE UR QL: NEGATIVE
PH UR STRIP: 6 PH (ref 5–7)
RBC #/AREA URNS AUTO: ABNORMAL /HPF
SOURCE: ABNORMAL
SP GR UR STRIP: 1.01 (ref 1–1.03)
UROBILINOGEN UR STRIP-ACNC: 0.2 EU/DL (ref 0.2–1)
WBC #/AREA URNS AUTO: >100 /HPF

## 2019-07-02 PROCEDURE — 99214 OFFICE O/P EST MOD 30 MIN: CPT | Performed by: NURSE PRACTITIONER

## 2019-07-02 PROCEDURE — 87086 URINE CULTURE/COLONY COUNT: CPT | Performed by: NURSE PRACTITIONER

## 2019-07-02 PROCEDURE — 81001 URINALYSIS AUTO W/SCOPE: CPT | Performed by: NURSE PRACTITIONER

## 2019-07-02 RX ORDER — CEPHALEXIN 500 MG/1
500 CAPSULE ORAL 2 TIMES DAILY
Qty: 14 CAPSULE | Refills: 0 | Status: SHIPPED | OUTPATIENT
Start: 2019-07-02 | End: 2019-07-31

## 2019-07-02 ASSESSMENT — MIFFLIN-ST. JEOR: SCORE: 1332.4

## 2019-07-02 NOTE — PATIENT INSTRUCTIONS
Try Replens, a vaginal lubricant. You can do this a few times a week     Drink plenty of fluids

## 2019-07-02 NOTE — PROGRESS NOTES
Subjective     Rosa Sotomayor is a 68 year old female who presents to clinic today for the following health issues:    HPI      Chief Complaint   Patient presents with     Urinary Problem     burning , frequency      Vaginal Problem     labia itch & irritatated. skin peeling        URINARY TRACT SYMPTOMS  Has urinary burning and frequency   No fevers   No abd pain     Also has peeling, irritated itchy skin of labia   Stool loose since surgery about 6 weeks ago, 1-2 times a day   Has normal sensation so surgeon was not concerned     Occasional itchy upper eyelid. Today feels fine   Has derm appt in 2 weeks for this   Has had this in the past       Past Medical History:   Diagnosis Date     Complication of anesthesia      Postmenopausal bleeding 2/28/2007     Family History   Problem Relation Age of Onset     Diabetes Mother         INSULIN     Hypertension Mother      Eye Disorder Mother         CATERACTS     Heart Disease Mother         CHF- OPEN HEART SURG X'S 2     Lipids Mother      Obesity Mother      Coronary Artery Disease Mother      Diabetes Father         ORAL     Hypertension Father      Arthritis Father      Eye Disorder Father         MAC DEGENERATION     Heart Disease Father         CHF     Lipids Father      Obesity Father      Diabetes Maternal Grandfather         INSULIN     Diabetes Brother         INSULIN     Gastrointestinal Disease Brother         CHOLITISI POSSIBLE CHRONES     Obesity Brother      Colon Cancer No family hx of      Breast Cancer No family hx of      Past Surgical History:   Procedure Laterality Date     BACK SURGERY      discectomy L3-4, 2018     C NONSPECIFIC PROCEDURE      wisdom teeth     C NONSPECIFIC PROCEDURE  2005    Varicose Veins     C TOTAL HIP ARTHROPLASTY      left in 2012, right 2017     C TOTAL KNEE ARTHROPLASTY      right 2014, left 2018     ROTATOR CUFF REPAIR RT/LT Right     2016     TUBAL LIGATION  1987     Social History     Tobacco Use     Smoking status:  Never Smoker     Smokeless tobacco: Never Used   Substance Use Topics     Alcohol use: No     Alcohol/week: 0.0 oz     Current Outpatient Medications   Medication Sig Dispense Refill     acetaminophen (TYLENOL) 500 MG tablet Take 500 mg by mouth       allopurinol (ZYLOPRIM) 100 MG tablet Take 100 mg by mouth       atorvastatin (LIPITOR) 40 MG tablet TAKE 1 TABLET(40 MG) BY MOUTH DAILY 90 tablet 3     carvedilol (COREG) 25 MG tablet Take 1 tablet (25 mg) by mouth 2 times daily 180 tablet 3     cephALEXin (KEFLEX) 500 MG capsule Take 1 capsule (500 mg) by mouth 2 times daily for 7 days 14 capsule 0     cephALEXin (KEFLEX) 500 MG capsule Take 4 capsules (2,000 mg) by mouth once as needed One hour prior to dental procedures 24 capsule 3     cetirizine (ZYRTEC) 10 MG tablet Take 10 mg by mouth       Cholecalciferol (VITAMIN D) 2000 UNITS tablet Take 1 tablet by mouth daily       clobetasol (TEMOVATE) 0.05 % cream Apply sparingly to affected area twice daily for 14 days.  Do not apply to face. 15 g 2     clobetasol (TEMOVATE) 0.05 % external solution Apply topically 2 times daily as needed  5     Cranberry POWD 1 capsule       Cyanocobalamin (B-12) 1000 MCG TBCR Take 1,000 mcg by mouth daily 100 tablet 1     diphenhydrAMINE (BENADRYL ALLERGY) 25 MG tablet Take 2 tablets (50 mg) by mouth every 6 hours as needed for itching or allergies 60 tablet 1     EPINEPHrine (EPIPEN 2-FRAN) 0.3 MG/0.3ML injection 2-pack Inject 0.3 mLs (0.3 mg) into the muscle once as needed for anaphylaxis 0.6 mL 1     hydrOXYzine (ATARAX) 25 MG tablet Take 1 tablet (25 mg) by mouth 3 times daily as needed for itching 120 tablet 1     hydrOXYzine (VISTARIL) 50 MG capsule Take 50 mg by mouth       losartan (COZAAR) 100 MG tablet Take 1 tablet (100 mg) by mouth At Bedtime (Patient taking differently: Take 50 mg by mouth 2 times daily ) 90 tablet 1     Magnesium Oxide 250 MG TABS Take 250 mg by mouth       mometasone (ELOCON) 0.1 % external cream Apply  topically daily       morphine 10 MG/5ML solution Take 1-1.5 mg by mouth every 4 hours as needed       ondansetron (ZOFRAN) 4 MG tablet Take by mouth every 8 hours as needed for nausea       SUMAtriptan (IMITREX) 20 MG/ACT nasal spray Spray 1 spray in nostril as needed for migraine May repeat in 2 hours. Max 2 sprays/24 hours. 12 each 11     topiramate (TOPAMAX) 100 MG tablet Take 1 tablet by mouth daily  3     torsemide (DEMADEX) 10 MG tablet Take 1 tablet (10 mg) by mouth daily 90 tablet 3     traMADol (ULTRAM) 50 MG tablet Take 1 tablet by mouth every 6 hours as needed  0     triamcinolone (KENALOG) 0.1 % cream Apply topically 2 times daily Apply to AA on trunk, arms or legs bid for 10-14 days 454 g 0     ZOLMitriptan (ZOMIG-ZMT) 5 MG ODT tab Take 5 mg by mouth       Allergies   Allergen Reactions     Bee Venom Anaphylaxis     Codeine Swelling     Body swelling and severe itching. Tolerates Morphine.      Fentanyl Anaphylaxis and Shortness Of Breath     Gabapentin Other (See Comments)     PN: chest heaviness with breathing  Chest heaviness with breathing  Chest heaviness with breathing  chest heaviness with breathing     Hydromorphone Itching     Tolerates morphine  Tolerates morphine     Midazolam Anaphylaxis, Itching and Shortness Of Breath     Tolerates Diazepam     Prilocaine Anaphylaxis     Anaphalactic shock     Propofol Anaphylaxis     Penicillins Itching and Rash     Tolerates Keflex,  Ancef  rash     Shingrix [Zoster Vac Recomb Adjuvanted] Itching and Rash     Sulfa Drugs Itching and Rash     rash     Clindamycin      Codeine      Diatrizoate Other (See Comments)     CKD 3     Hydrocodone      Lisinopril Cough     Lorazepam Itching     Nsaids Other (See Comments)     Contraindicated with kidney hx (including Celebrex, per pt)     Other [Seasonal Allergies] Anaphylaxis     GENERAL ANETHESIA       Oxycodone      Vancomycin Other (See Comments)     Thrush, yeast infection, bladder infection  Thrush, yeast  "infection, bladder infection     Erythromycin Rash     ointment  PN: ointment  ointment     Eucalyptus Oil Rash and Hives     hives     Nitrofuran Derivatives Rash     Nitrofurantoin Rash     Tramadol Rash       Reviewed and updated as needed this visit by clinical staff and provider     Review of Systems   Detailed as above       Objective    /74 (BP Location: Right arm, Patient Position: Chair, Cuff Size: Adult Regular)   Pulse 88   Temp 98.8  F (37.1  C) (Tympanic)   Ht 1.626 m (5' 4\")   Wt 80.7 kg (178 lb)   SpO2 98%   BMI 30.55 kg/m    There is no height or weight on file to calculate BMI.  Physical Exam   Constitutional: She appears well-developed.   Pulmonary/Chest: Effort normal.   Genitourinary:   Genitourinary Comments: Dry atrophic labia and introitus.    Neurological: She is alert.   Psychiatric: She has a normal mood and affect. Judgment normal.          Diagnostic Test Results:  Labs reviewed in Epic  Results for orders placed or performed in visit on 07/02/19 (from the past 24 hour(s))   *UA reflex to Microscopic and Culture ([place] and [facility] (except [place] and [place])   Result Value Ref Range    Color Urine Yellow     Appearance Urine Cloudy     Glucose Urine Negative NEG^Negative mg/dL    Bilirubin Urine Negative NEG^Negative    Ketones Urine Negative NEG^Negative mg/dL    Specific Gravity Urine 1.015 1.003 - 1.035    Blood Urine Moderate (A) NEG^Negative    pH Urine 6.0 5.0 - 7.0 pH    Protein Albumin Urine 30 (A) NEG^Negative mg/dL    Urobilinogen Urine 0.2 0.2 - 1.0 EU/dL    Nitrite Urine Negative NEG^Negative    Leukocyte Esterase Urine Large (A) NEG^Negative    Source Midstream Urine    Urine Microscopic   Result Value Ref Range    WBC Urine >100 (A) OTO5^0 - 5 /HPF    RBC Urine O - 2 OTO2^O - 2 /HPF    Bacteria Urine Moderate (A) NEG^Negative /HPF           Assessment and Plan:       ICD-10-CM    1. Acute cystitis without hematuria N30.00 cephALEXin (KEFLEX) 500 MG " capsule   2. Atrophic vaginitis N95.2    3. Burning with urination R30.0 *UA reflex to Microscopic and Culture ([place] and [facility] (except [place] and [place])     Urine Microscopic   4. Nonspecific finding on examination of urine R82.90 Urine Culture Aerobic Bacterial     Positive UA. Will treat as above   Recommend replens for vaginal irritation   Follow-up with persistent symptoms       GREGG Parks, CNP  [facility]3] EBER

## 2019-07-03 ENCOUNTER — DOCUMENTATION ONLY (OUTPATIENT)
Dept: SLEEP MEDICINE | Facility: CLINIC | Age: 69
End: 2019-07-03
Payer: MEDICARE

## 2019-07-03 DIAGNOSIS — G47.33 OSA (OBSTRUCTIVE SLEEP APNEA): ICD-10-CM

## 2019-07-03 LAB
BACTERIA SPEC CULT: NO GROWTH
SPECIMEN SOURCE: NORMAL

## 2019-07-03 NOTE — PROGRESS NOTES
6 Month [facility] visit    Diagnostic AHI: 12.4  PSG    Data only recheck     Assessment: Pt meeting objective benchmarks.     Action plan:   Pt to follow up per provider request.    Device type: Auto-CPAP  PAP settings: CPAP min 7 cm  H20     CPAP max 15 cm  H20     90th % pressure8.6 cm  H20    Objective measures: 14 day rolling measures         Compliance  100 %      AHI 1.11   last  upload      Average number of minutes 555           Objective measure goal  Compliance   Goal >70%  Leak   Goal < 10%  AHI  Goal < 5  Usage  Goal >240

## 2019-07-03 NOTE — RESULT ENCOUNTER NOTE
Rosa  Your urine culture is actually negative. You do not have an infection. The replens we talked about can help with your symptoms. Urinary symptoms are often a sign of vaginal irritation. Follow up with Dr Hollingsworth if you have no improvement after trying the replens.  Long

## 2019-07-08 ENCOUNTER — OFFICE VISIT (OUTPATIENT)
Dept: FAMILY MEDICINE | Facility: CLINIC | Age: 69
End: 2019-07-08
Payer: MEDICARE

## 2019-07-08 ENCOUNTER — HOSPITAL ENCOUNTER (OUTPATIENT)
Dept: CT IMAGING | Facility: CLINIC | Age: 69
End: 2019-07-08
Attending: UROLOGY
Payer: MEDICARE

## 2019-07-08 ENCOUNTER — HOSPITAL ENCOUNTER (OUTPATIENT)
Dept: MAMMOGRAPHY | Facility: CLINIC | Age: 69
Discharge: HOME | End: 2019-07-08
Attending: OBSTETRICS & GYNECOLOGY | Admitting: OBSTETRICS & GYNECOLOGY
Payer: MEDICARE

## 2019-07-08 VITALS
SYSTOLIC BLOOD PRESSURE: 118 MMHG | DIASTOLIC BLOOD PRESSURE: 67 MMHG | BODY MASS INDEX: 31.05 KG/M2 | TEMPERATURE: 99 F | WEIGHT: 181.9 LBS | OXYGEN SATURATION: 99 % | HEIGHT: 64 IN | HEART RATE: 79 BPM

## 2019-07-08 DIAGNOSIS — Z12.31 VISIT FOR SCREENING MAMMOGRAM: ICD-10-CM

## 2019-07-08 DIAGNOSIS — E78.1 HYPERTRIGLYCERIDEMIA: ICD-10-CM

## 2019-07-08 DIAGNOSIS — N39.0 RECURRENT UTI: ICD-10-CM

## 2019-07-08 DIAGNOSIS — I10 BENIGN ESSENTIAL HYPERTENSION: ICD-10-CM

## 2019-07-08 DIAGNOSIS — Z87.440 PERSONAL HISTORY OF URINARY TRACT INFECTION: ICD-10-CM

## 2019-07-08 DIAGNOSIS — D63.1 ANEMIA DUE TO STAGE 3 CHRONIC KIDNEY DISEASE (H): ICD-10-CM

## 2019-07-08 DIAGNOSIS — N18.30 CKD (CHRONIC KIDNEY DISEASE) STAGE 3, GFR 30-59 ML/MIN (H): Primary | ICD-10-CM

## 2019-07-08 DIAGNOSIS — N18.30 ANEMIA DUE TO STAGE 3 CHRONIC KIDNEY DISEASE (H): ICD-10-CM

## 2019-07-08 DIAGNOSIS — R60.0 EDEMA LEG: ICD-10-CM

## 2019-07-08 DIAGNOSIS — N95.2 VAGINAL ATROPHY: ICD-10-CM

## 2019-07-08 LAB
ERYTHROCYTE [DISTWIDTH] IN BLOOD BY AUTOMATED COUNT: 15.1 % (ref 10–15)
HCT VFR BLD AUTO: 32.2 % (ref 35–47)
HGB BLD-MCNC: 10.6 G/DL (ref 11.7–15.7)
MCH RBC QN AUTO: 32.1 PG (ref 26.5–33)
MCHC RBC AUTO-ENTMCNC: 32.9 G/DL (ref 31.5–36.5)
MCV RBC AUTO: 98 FL (ref 78–100)
PLATELET # BLD AUTO: 231 10E9/L (ref 150–450)
RBC # BLD AUTO: 3.3 10E12/L (ref 3.8–5.2)
WBC # BLD AUTO: 5.6 10E9/L (ref 4–11)

## 2019-07-08 PROCEDURE — 74176 CT ABD & PELVIS W/O CONTRAST: CPT

## 2019-07-08 PROCEDURE — 80048 BASIC METABOLIC PNL TOTAL CA: CPT | Performed by: INTERNAL MEDICINE

## 2019-07-08 PROCEDURE — 83550 IRON BINDING TEST: CPT | Performed by: INTERNAL MEDICINE

## 2019-07-08 PROCEDURE — 85027 COMPLETE CBC AUTOMATED: CPT | Performed by: INTERNAL MEDICINE

## 2019-07-08 PROCEDURE — 77063 BREAST TOMOSYNTHESIS BI: CPT

## 2019-07-08 PROCEDURE — 83540 ASSAY OF IRON: CPT | Performed by: INTERNAL MEDICINE

## 2019-07-08 PROCEDURE — 99214 OFFICE O/P EST MOD 30 MIN: CPT | Performed by: INTERNAL MEDICINE

## 2019-07-08 PROCEDURE — 36415 COLL VENOUS BLD VENIPUNCTURE: CPT | Performed by: INTERNAL MEDICINE

## 2019-07-08 PROCEDURE — 82728 ASSAY OF FERRITIN: CPT | Performed by: INTERNAL MEDICINE

## 2019-07-08 RX ORDER — ALLOPURINOL 100 MG/1
100 TABLET ORAL DAILY
Qty: 90 TABLET | Refills: 3 | Status: SHIPPED | OUTPATIENT
Start: 2019-07-08 | End: 2020-12-04

## 2019-07-08 RX ORDER — LOSARTAN POTASSIUM 100 MG/1
100 TABLET ORAL AT BEDTIME
Qty: 90 TABLET | Refills: 1 | Status: SHIPPED | OUTPATIENT
Start: 2019-07-08 | End: 2020-07-29

## 2019-07-08 RX ORDER — CARVEDILOL 25 MG/1
25 TABLET ORAL 2 TIMES DAILY
Qty: 180 TABLET | Refills: 3 | Status: SHIPPED | OUTPATIENT
Start: 2019-07-08 | End: 2019-12-02

## 2019-07-08 RX ORDER — TORSEMIDE 10 MG/1
10 TABLET ORAL DAILY
Qty: 90 TABLET | Refills: 3 | Status: SHIPPED | OUTPATIENT
Start: 2019-07-08 | End: 2020-07-09

## 2019-07-08 RX ORDER — ATORVASTATIN CALCIUM 40 MG/1
TABLET, FILM COATED ORAL
Qty: 90 TABLET | Refills: 3 | Status: SHIPPED | OUTPATIENT
Start: 2019-07-08 | End: 2020-07-29

## 2019-07-08 ASSESSMENT — MIFFLIN-ST. JEOR: SCORE: 1350.09

## 2019-07-08 NOTE — PROGRESS NOTES
Subjective     Rosa Sotomayor is a 68 year old female who presents to clinic today for the following health issues:    HPI       6 Week Pain Check      Very pleasant lady who is about 6 weeks status post lumbar spine surgery.  She is here for recheck.  She was seen by a nurse practitioner in this clinic with complaints of dysuria, urinary urgency and frequency.  She was noted to have pyuria but her urine culture returned negative.  She completed a course of Keflex and her symptoms resolved.  She denies current fevers or chills.  She is also seeing a urologist who is undergoing a systematic evaluation for potentially reversible causes for recurrent urinary tract infections.  She also has an appointment to see her gynecologist in the next 2 weeks.  She was noted to have vaginal atrophy when examined on July 2.  She has never used vaginal estrogen.  She did take oral estrogen up until several years ago.  She has an appointment to see her hematologist later this month.  She has chronic anemia and it is thought to be related to her mild chronic kidney disease at this point.  She wonders if she should have labs to evaluate that today?  Also, she would like to know what her GFR is currently.  She needs refills on several medications.    Patient Active Problem List   Diagnosis     Personal history of allergy to anesthetic agent     Anemia due to stage 3 chronic kidney disease (H)     Hypertriglyceridemia     Arnold-Chiari malformation (H)     Hyperlipidemia LDL goal <130     Anxiety     Secondary renal hyperparathyroidism (H)     Chronic bilateral low back pain without sciatica     Benign essential hypertension     Renal artery stenosis (H)     Macular degeneration, bilateral     Anemia, iron deficiency     Intractable migraine without aura and without status migrainosus     Aortic valve insufficiency     ESTEFANIA (obstructive sleep apnea)- mild (AHI 12)     Spinal stenosis of lumbar region with neurogenic claudication     Past  Surgical History:   Procedure Laterality Date     BACK SURGERY      discectomy L3-4, 2018     C NONSPECIFIC PROCEDURE      wisdom teeth     C NONSPECIFIC PROCEDURE  2005    Varicose Veins     C TOTAL HIP ARTHROPLASTY      left in 2012, right 2017     C TOTAL KNEE ARTHROPLASTY      right 2014, left 2018     ROTATOR CUFF REPAIR RT/LT Right     2016     TUBAL LIGATION  1987       Social History     Tobacco Use     Smoking status: Never Smoker     Smokeless tobacco: Never Used   Substance Use Topics     Alcohol use: No     Alcohol/week: 0.0 oz     Family History   Problem Relation Age of Onset     Diabetes Mother         INSULIN     Hypertension Mother      Eye Disorder Mother         CATERACTS     Heart Disease Mother         CHF- OPEN HEART SURG X'S 2     Lipids Mother      Obesity Mother      Coronary Artery Disease Mother      Diabetes Father         ORAL     Hypertension Father      Arthritis Father      Eye Disorder Father         MAC DEGENERATION     Heart Disease Father         CHF     Lipids Father      Obesity Father      Diabetes Maternal Grandfather         INSULIN     Diabetes Brother         INSULIN     Gastrointestinal Disease Brother         CHOLITISI POSSIBLE CHRONES     Obesity Brother      Colon Cancer No family hx of      Breast Cancer No family hx of          Current Outpatient Medications   Medication Sig Dispense Refill     acetaminophen (TYLENOL) 500 MG tablet Take 500 mg by mouth       allopurinol (ZYLOPRIM) 100 MG tablet Take 1 tablet (100 mg) by mouth daily 90 tablet 3     atorvastatin (LIPITOR) 40 MG tablet TAKE 1 TABLET(40 MG) BY MOUTH DAILY 90 tablet 3     carvedilol (COREG) 25 MG tablet Take 1 tablet (25 mg) by mouth 2 times daily 180 tablet 3     cephALEXin (KEFLEX) 500 MG capsule Take 1 capsule (500 mg) by mouth 2 times daily for 7 days 14 capsule 0     cephALEXin (KEFLEX) 500 MG capsule Take 4 capsules (2,000 mg) by mouth once as needed One hour prior to dental procedures 24 capsule 3      cetirizine (ZYRTEC) 10 MG tablet Take 10 mg by mouth       Cholecalciferol (VITAMIN D) 2000 UNITS tablet Take 1 tablet by mouth daily       clobetasol (TEMOVATE) 0.05 % cream Apply sparingly to affected area twice daily for 14 days.  Do not apply to face. 15 g 2     clobetasol (TEMOVATE) 0.05 % external solution Apply topically 2 times daily as needed  5     conjugated estrogens (PREMARIN) 0.625 MG/GM vaginal cream Place 0.5 g vaginally twice a week 30 g 0     Cranberry POWD 1 capsule       Cyanocobalamin (B-12) 1000 MCG TBCR Take 1,000 mcg by mouth daily 100 tablet 1     diphenhydrAMINE (BENADRYL ALLERGY) 25 MG tablet Take 2 tablets (50 mg) by mouth every 6 hours as needed for itching or allergies 60 tablet 1     EPINEPHrine (EPIPEN 2-FRAN) 0.3 MG/0.3ML injection 2-pack Inject 0.3 mLs (0.3 mg) into the muscle once as needed for anaphylaxis 0.6 mL 1     hydrOXYzine (ATARAX) 25 MG tablet Take 1 tablet (25 mg) by mouth 3 times daily as needed for itching 120 tablet 1     losartan (COZAAR) 100 MG tablet Take 1 tablet (100 mg) by mouth At Bedtime 90 tablet 1     Magnesium Oxide 250 MG TABS Take 250 mg by mouth       mometasone (ELOCON) 0.1 % external cream Apply topically daily       ondansetron (ZOFRAN) 4 MG tablet Take by mouth every 8 hours as needed for nausea       SUMAtriptan (IMITREX) 20 MG/ACT nasal spray Spray 1 spray in nostril as needed for migraine May repeat in 2 hours. Max 2 sprays/24 hours. 12 each 11     topiramate (TOPAMAX) 100 MG tablet Take 1 tablet by mouth daily  3     torsemide (DEMADEX) 10 MG tablet Take 1 tablet (10 mg) by mouth daily 90 tablet 3     traMADol (ULTRAM) 50 MG tablet Take 1 tablet by mouth every 6 hours as needed  0     triamcinolone (KENALOG) 0.1 % cream Apply topically 2 times daily Apply to AA on trunk, arms or legs bid for 10-14 days 454 g 0     ZOLMitriptan (ZOMIG-ZMT) 5 MG ODT tab Take 5 mg by mouth       Allergies   Allergen Reactions     Bee Venom Anaphylaxis     Codeine  Swelling     Body swelling and severe itching. Tolerates Morphine.      Fentanyl Anaphylaxis and Shortness Of Breath     Gabapentin Other (See Comments)     PN: chest heaviness with breathing  Chest heaviness with breathing  Chest heaviness with breathing  chest heaviness with breathing     Hydromorphone Itching     Tolerates morphine  Tolerates morphine     Midazolam Anaphylaxis, Itching and Shortness Of Breath     Tolerates Diazepam     Prilocaine Anaphylaxis     Anaphalactic shock     Propofol Anaphylaxis     Penicillins Itching and Rash     Tolerates Keflex,  Ancef  rash     Shingrix [Zoster Vac Recomb Adjuvanted] Itching and Rash     Sulfa Drugs Itching and Rash     rash     Clindamycin      Codeine      Diatrizoate Other (See Comments)     CKD 3     Hydrocodone      Lisinopril Cough     Lorazepam Itching     Nsaids Other (See Comments)     Contraindicated with kidney hx (including Celebrex, per pt)     Other [Seasonal Allergies] Anaphylaxis     GENERAL ANETHESIA       Oxycodone      Vancomycin Other (See Comments)     Thrush, yeast infection, bladder infection  Thrush, yeast infection, bladder infection     Erythromycin Rash     ointment  PN: ointment  ointment     Eucalyptus Oil Rash and Hives     hives     Nitrofuran Derivatives Rash     Nitrofurantoin Rash     Tramadol Rash       Reviewed and updated as needed this visit by Provider         Review of Systems   ROS COMP: Constitutional, HEENT, cardiovascular, pulmonary, gi and gu systems are negative, except as otherwise noted.      Objective    There were no vitals taken for this visit.  There is no height or weight on file to calculate BMI.  Physical Exam   General: This is a well-appearing female in no acute distress who appears quite comfortable    Diagnostic Test Results:  Labs reviewed in Epic  Results for orders placed or performed during the hospital encounter of 07/08/19   CT Abdomen pelvis w/o contrast    Narrative    CT ABDOMEN AND PELVIS WITHOUT  CONTRAST   7/8/2019 11:12 AM     HISTORY: Urinary tract infection, uncomplicated; frequent UTI, please  evaluate for stones. Personal history of urinary tract infection.  Recurrent UTI.    TECHNIQUE: Noncontrast CT abdomen and pelvis was performed.  Radiation  dose for this scan was reduced using automated exposure control,  adjustment of the mA and/or kV according to patient size, or iterative  reconstruction technique.    COMPARISON: 1/6/2017    FINDINGS:   Lung bases: Lung bases are clear. No pleural effusion or pericardial  effusion.    Kidneys: Noncontrast images show no renal, ureteral or bladder  calculi. Evaluation of the distal ureters and bladder is somewhat  limited secondary to beam-hardening artifact from bilateral hip  arthroplasty. No hydronephrosis or hydroureter.    Evaluation of solid organ parenchyma is limited without the use of  intravenous contrast. The unenhanced appearance of the spleen,  kidneys, adrenal glands, liver, gallbladder and pancreas show no focal  abnormality. No intrahepatic or extrahepatic biliary dilatation. No  intraperitoneal free air or free fluid.    Small and large bowel are normal in caliber without evidence of  obstruction. Diverticulosis without evidence of diverticulitis. Uterus  and bladder are normal. No abdominal aortic aneurysm. No abdominal or  pelvic lymphadenopathy.    Bones: No suspicious bony lesions.      Impression    IMPRESSION:  1. No renal, ureteral or bladder calculi. No hydronephrosis or  hydroureter.  2. Changes of bilateral hip arthroplasty.  3. Diverticulosis without evidence of diverticulitis.         Labs are pending  Assessment & Plan       ICD-10-CM    1. CKD (chronic kidney disease) stage 3, GFR 30-59 ml/min (H) N18.3 allopurinol (ZYLOPRIM) 100 MG tablet     Basic metabolic panel     CBC with platelets   2. Hypertriglyceridemia E78.1 atorvastatin (LIPITOR) 40 MG tablet   3. Benign essential hypertension I10 carvedilol (COREG) 25 MG tablet      "losartan (COZAAR) 100 MG tablet   4. Edema leg R60.0 torsemide (DEMADEX) 10 MG tablet   5. Vaginal atrophy N95.2 conjugated estrogens (PREMARIN) 0.625 MG/GM vaginal cream   6. Anemia due to stage 3 chronic kidney disease (H) N18.3 Iron and iron binding capacity    D63.1 Ferritin         Total face to face contact time was greater than 25 minutes of which more than 50% of this time was spent counseling and coordinating care regarding the above topics.        Recheck renal function, refill medications, blood pressure under good control, leg edema is being managed by diuretic, I recommended adding compression hosiery, evaluation for deep venous thrombosis was negative last visit, recurrent infections may be related to vaginal atrophy?  Trial of vaginal estrogen, follow-up with GYN as well as well as complete the work-up as per urology, check hemoglobin and iron studies again, follow-up with hematology as directed      BMI:   Estimated body mass index is 31.22 kg/m  as calculated from the following:    Height as of this encounter: 1.626 m (5' 4\").    Weight as of this encounter: 82.5 kg (181 lb 14.4 oz).   Weight management plan: Discussed healthy diet and exercise guidelines            Return in about 6 months (around 1/8/2020) for Blood Pressure Check.  Or sooner pending symptoms/labs    Josh Hollingsworth MD  [facility]    "

## 2019-07-08 NOTE — LETTER
[facility]  [address] [facility]  [address]  [place]  [address]  Tel: [phone]    July 9, 2019    Rosa Sotomayor  [address] [address]  [place] [place] [address]        Dear MsAsia Bran,    The following letter pertains to your most recent diagnostic tests:    Iron stores are adequate.    Hemoglobin is stable.    Kidney function is stable.        Bottom line:  These labs are stable.        Follow up:  See Dr. Romo regarding your persistent anemia.   You can try the vaginal estrogen for the urinary tract infections, but I would see GYN and complete the evaluation with urology as well.  I would like to see you back in 6 months or sooner if new symptoms or problems develop.    If you have any further questions or problems, please contact our office.      Sincerely,    Josh Hollingsworth MD/ Nadeen Castillo CMA  Results for orders placed or performed during the hospital encounter of 07/08/19   MA Screen Bilateral w/Joel    Narrative    SCREENING MAMMOGRAM, BILATERAL, DIGITAL w/CAD and TOMOSYNTHESIS -  7/8/2019 12:17 PM    BREAST SYMPTOMS: No current breast complaints.     COMPARISON:  07/05/2018, 07/03/2017, 07/01/2016, 06/26/2015.    BREAST DENSITY: Scattered fibroglandular densities.    COMMENTS: No findings of suspicion for malignancy.       Impression    IMPRESSION: BI-RADS CATEGORY: 1 -  Negative    RECOMMENDED FOLLOW-UP: Annual Mammography.    Exam results letter mailed to patient.      YASIR KHANNA MD               Enclosure: Lab Results

## 2019-07-09 LAB
ANION GAP SERPL CALCULATED.3IONS-SCNC: 10 MMOL/L (ref 3–14)
BUN SERPL-MCNC: 41 MG/DL (ref 7–30)
CALCIUM SERPL-MCNC: 9 MG/DL (ref 8.5–10.1)
CHLORIDE SERPL-SCNC: 106 MMOL/L (ref 94–109)
CO2 SERPL-SCNC: 24 MMOL/L (ref 20–32)
CREAT SERPL-MCNC: 1.35 MG/DL (ref 0.52–1.04)
FERRITIN SERPL-MCNC: 437 NG/ML (ref 8–252)
GFR SERPL CREATININE-BSD FRML MDRD: 41 ML/MIN/{1.73_M2}
GLUCOSE SERPL-MCNC: 100 MG/DL (ref 70–99)
IRON SATN MFR SERPL: 20 % (ref 15–46)
IRON SERPL-MCNC: 53 UG/DL (ref 35–180)
POTASSIUM SERPL-SCNC: 3.8 MMOL/L (ref 3.4–5.3)
SODIUM SERPL-SCNC: 140 MMOL/L (ref 133–144)
TIBC SERPL-MCNC: 270 UG/DL (ref 240–430)

## 2019-07-09 NOTE — RESULT ENCOUNTER NOTE
The following letter pertains to your most recent diagnostic tests:    Iron stores are adequate.    Hemoglobin is stable.    Kidney function is stable.        Bottom line:  These labs are stable.        Follow up:  See Dr. Romo regarding your persistent anemia.   You can try the vaginal estrogen for the urinary tract infections, but I would see GYN and complete the evaluation with urology as well.  I would like to see you back in 6 months or sooner if new symptoms or problems develop.        Sincerely,    Dr. Hollingsworth

## 2019-07-12 ENCOUNTER — DOCUMENTATION ONLY (OUTPATIENT)
Dept: SLEEP MEDICINE | Facility: CLINIC | Age: 69
End: 2019-07-12

## 2019-07-12 DIAGNOSIS — G47.33 OSA (OBSTRUCTIVE SLEEP APNEA): ICD-10-CM

## 2019-07-12 NOTE — PROGRESS NOTES
STM recheck     Diagnostic AHI: 12.4   PSG      Subjective measures:   Pt called regarding congestion issues.  She is currently struggling with using her nasal mask and allergies.  She does take over the counter allergy medication along with nasal sprays for her allergies.  She has increase humidity     Assessment: Pt meeting objective benchmarks.  Patient failing following subjective benchmarks: congestion    Action plan: pt will try increasing humidity more and turn on her tube heater.  She will increase her use of nasal saline spray during the day.  If she continues to struggling with congestion she will reach out to [facility]  To get a mask fit appointment for the full face mask.     Device type: Auto-CPAP    PAP settings: CPAP min 7 cm  H20       CPAP max 15 cm  H20              90th % pressure8.5 cm  H20    Mask type:  Nasal Mask    Objective measures: 14 day rolling measures         Compliance  100 %     % of night spent in large leak  0 % last  upload      AHI 1.2   last  upload      Average number of minutes 544          Objective measure goal  Compliance   Goal >70%  Leak   Goal < 10%  AHI  Goal < 5  Usage  Goal >240      Total time spent on remote patient monitoring data analysis and patient contact today:   15 minutes

## 2019-07-18 ENCOUNTER — HOSPITAL ENCOUNTER (OUTPATIENT)
Facility: CLINIC | Age: 69
Setting detail: SPECIMEN
Discharge: HOME | End: 2019-07-18
Attending: INTERNAL MEDICINE | Admitting: INTERNAL MEDICINE
Payer: MEDICARE

## 2019-07-18 ENCOUNTER — ONCOLOGY VISIT (OUTPATIENT)
Dept: ONCOLOGY | Facility: CLINIC | Age: 69
End: 2019-07-18
Attending: INTERNAL MEDICINE
Payer: MEDICARE

## 2019-07-18 VITALS
HEIGHT: 64 IN | OXYGEN SATURATION: 98 % | DIASTOLIC BLOOD PRESSURE: 62 MMHG | WEIGHT: 178.6 LBS | BODY MASS INDEX: 30.49 KG/M2 | HEART RATE: 78 BPM | SYSTOLIC BLOOD PRESSURE: 102 MMHG | TEMPERATURE: 97.8 F

## 2019-07-18 DIAGNOSIS — R79.89 ELEVATED FERRITIN: Primary | ICD-10-CM

## 2019-07-18 DIAGNOSIS — E83.19 OTHER DISORDERS OF IRON METABOLISM: ICD-10-CM

## 2019-07-18 PROCEDURE — G0463 HOSPITAL OUTPT CLINIC VISIT: HCPCS

## 2019-07-18 PROCEDURE — 99213 OFFICE O/P EST LOW 20 MIN: CPT | Performed by: INTERNAL MEDICINE

## 2019-07-18 PROCEDURE — 81256 HFE GENE: CPT | Performed by: INTERNAL MEDICINE

## 2019-07-18 ASSESSMENT — PAIN SCALES - GENERAL: PAINLEVEL: NO PAIN (0)

## 2019-07-18 ASSESSMENT — MIFFLIN-ST. JEOR: SCORE: 1335.12

## 2019-07-18 NOTE — LETTER
"    7/18/2019         RE: Rosa Sotomayor  [address] [facility]  [place] [address]        Dear Colleague,    Thank you for referring your patient, Rosa Sotomayor, to the [facility]. Please see a copy of my visit note below.    [facility] PHYSICIANS  HEMATOLOGY ONCOLOGY    DIAGNOSIS:  Normocytic anemia likely related to anemia of renal disease, may have a partially a component of iron deficiency, given elevated soluble transferrin receptor level.      TREATMENT: observation.      SUBJECTIVE:  The patient was seen as a followup today. She continues to be fatigued. Just had back surgery and is recovering now.     REVIEW OF SYSTEMS:  A complete review of systems was performed and found to be negative other than pertinent positives mentioned in history of present illness.      Past medical, social histories reviewed.     Meds- Reviewed.     PHYSICAL EXAMINATION:   VITAL SIGNS: /62   Pulse 78   Temp 97.8  F (36.6  C) (Oral)   Ht 1.626 m (5' 4\")   Wt 81 kg (178 lb 9.6 oz)   SpO2 98%   BMI 30.66 kg/m     CONSTITUTIONAL:  Sitting comfortably.   NEUROLOGIC:  Alert, awake.   PSYCHIATRIC:  Anxious    LABORATORY DATA AND IMAGING REVIEWED DURING THIS VISIT:  Recent Labs   Lab Test 07/08/19  1310 05/13/19  1511  12/19/16  1235  10/03/16  1044    139   < > 139  --  137   POTASSIUM 3.8 3.7   < > 3.6  --  3.9   CHLORIDE 106 109   < > 103  --  101   CO2 24 26   < > 28  --  26   ANIONGAP 10 5   < > 8  --  10   BUN 41* 34*   < > 26  --  22   CR 1.35* 1.39*   < > 1.45*   < > 1.28*   * 107*   < > 125*  --  96   OSIEL 9.0 9.1   < > 9.3  --  9.3   PHOS  --   --   --  3.7  --  3.0    < > = values in this interval not displayed.     Recent Labs   Lab Test 07/08/19  1310 05/13/19  1511 04/16/19  1130 03/25/19  1103 03/13/19  1540   WBC 5.6 4.6 4.4 7.1 3.5*   HGB 10.6* 10.6* 11.0* 10.9* 10.8*    216 242 216 204   MCV 98 98 97 99 96   NEUTROPHIL  --   --  54.6 71.3 80.0     Recent " Labs   Lab Test 03/13/19  1540 07/16/18 05/03/17  1410 01/06/17  0350   BILITOTAL 0.4  --  0.7 0.5   ALKPHOS 146  --  119 130   ALT 29 25 27 32   AST 23 29 22 26   ALBUMIN 4.3  --  4.2 4.0     --   --   --       Results for NEO DENT (MRN [id number]) as of 7/18/2019 13:08   Ref. Range 5/13/2019 15:11 7/8/2019 13:10   Ferritin Latest Ref Range: 8 - 252 ng/mL  437 (H)   Iron Latest Ref Range: 35 - 180 ug/dL  53   Iron Binding Cap Latest Ref Range: 240 - 430 ug/dL  270   Iron Saturation Index Latest Ref Range: 15 - 46 %  20     ASSESSMENT:  This is a 68-year-old lady with normocytic anemia.  She has chronic renal insufficiency, and also has documented previously treated iron deficiency.    She has CRI. Her GFR generally runs in the upper 30s level.  Her erythropoietin level was elevated at 38.  Her ferritin waselevated at 361, folate 16.3, normal iron, iron binding capacity and iron saturation, soluble transferrin receptor was elevated at 5.2.  Elevated B12 level. Serum protein electrophoresis did not show any evidence of monoclonal proteins.  Haptoglobin is normal.      She had previous colonoscopies for iron deficiency.  Her hemoglobin has improved to 10.8.  I think it is fairly well correlating with her elevated creatinine.  I would recommend to observe her hemoglobin at this point and repeat iron studies in future if persistently iron deficient, then we may consider supplementation and additional GI workup.     - Hemoglobin is stable at 10.6  - Persistent fatigue is likely multifactorial- renal disease, polypharmacy may be contributing.   - Elevated ferritin is likely reactive, she is very worried about it. We will check HFE mutation.     PLAN:    Follow up with primary care  Labs today- HFE mutation testing    SKYLAR ROMO MD    7/18/2019     cc: Josh Hollingsworth MD       Again, thank you for allowing me to participate in the care of your patient.        Sincerely,        Skylar Romo MD

## 2019-07-18 NOTE — PROGRESS NOTES
"[facility] PHYSICIANS  HEMATOLOGY ONCOLOGY    DIAGNOSIS:  Normocytic anemia likely related to anemia of renal disease, may have a partially a component of iron deficiency, given elevated soluble transferrin receptor level.      TREATMENT: observation.      SUBJECTIVE:  The patient was seen as a followup today. She continues to be fatigued. Just had back surgery and is recovering now.     REVIEW OF SYSTEMS:  A complete review of systems was performed and found to be negative other than pertinent positives mentioned in history of present illness.      Past medical, social histories reviewed.     Meds- Reviewed.     PHYSICAL EXAMINATION:   VITAL SIGNS: /62   Pulse 78   Temp 97.8  F (36.6  C) (Oral)   Ht 1.626 m (5' 4\")   Wt 81 kg (178 lb 9.6 oz)   SpO2 98%   BMI 30.66 kg/m    CONSTITUTIONAL:  Sitting comfortably.   NEUROLOGIC:  Alert, awake.   PSYCHIATRIC:  Anxious    LABORATORY DATA AND IMAGING REVIEWED DURING THIS VISIT:  Recent Labs   Lab Test 07/08/19  1310 05/13/19  1511  12/19/16  1235  10/03/16  1044    139   < > 139  --  137   POTASSIUM 3.8 3.7   < > 3.6  --  3.9   CHLORIDE 106 109   < > 103  --  101   CO2 24 26   < > 28  --  26   ANIONGAP 10 5   < > 8  --  10   BUN 41* 34*   < > 26  --  22   CR 1.35* 1.39*   < > 1.45*   < > 1.28*   * 107*   < > 125*  --  96   OSIEL 9.0 9.1   < > 9.3  --  9.3   PHOS  --   --   --  3.7  --  3.0    < > = values in this interval not displayed.     Recent Labs   Lab Test 07/08/19  1310 05/13/19  1511 04/16/19  1130 03/25/19  1103 03/13/19  1540   WBC 5.6 4.6 4.4 7.1 3.5*   HGB 10.6* 10.6* 11.0* 10.9* 10.8*    216 242 216 204   MCV 98 98 97 99 96   NEUTROPHIL  --   --  54.6 71.3 80.0     Recent Labs   Lab Test 03/13/19  1540 07/16/18 05/03/17  1410 01/06/17  0350   BILITOTAL 0.4  --  0.7 0.5   ALKPHOS 146  --  119 130   ALT 29 25 27 32   AST 23 29 22 26   ALBUMIN 4.3  --  4.2 4.0     --   --   --       Results for NEO DENT" (MRN [id number]) as of 7/18/2019 13:08   Ref. Range 5/13/2019 15:11 7/8/2019 13:10   Ferritin Latest Ref Range: 8 - 252 ng/mL  437 (H)   Iron Latest Ref Range: 35 - 180 ug/dL  53   Iron Binding Cap Latest Ref Range: 240 - 430 ug/dL  270   Iron Saturation Index Latest Ref Range: 15 - 46 %  20     ASSESSMENT:  This is a 68-year-old lady with normocytic anemia.  She has chronic renal insufficiency, and also has documented previously treated iron deficiency.    She has CRI. Her GFR generally runs in the upper 30s level.  Her erythropoietin level was elevated at 38.  Her ferritin waselevated at 361, folate 16.3, normal iron, iron binding capacity and iron saturation, soluble transferrin receptor was elevated at 5.2.  Elevated B12 level. Serum protein electrophoresis did not show any evidence of monoclonal proteins.  Haptoglobin is normal.      She had previous colonoscopies for iron deficiency.  Her hemoglobin has improved to 10.8.  I think it is fairly well correlating with her elevated creatinine.  I would recommend to observe her hemoglobin at this point and repeat iron studies in future if persistently iron deficient, then we may consider supplementation and additional GI workup.     - Hemoglobin is stable at 10.6  - Persistent fatigue is likely multifactorial- renal disease, polypharmacy may be contributing.   - Elevated ferritin is likely reactive, she is very worried about it. We will check HFE mutation.     PLAN:    Follow up with primary care  Labs today- HFE mutation testing    UZMA JOYCE MD    7/18/2019     cc: Josh Hollingsworth MD

## 2019-07-19 ENCOUNTER — TRANSFERRED RECORDS (OUTPATIENT)
Dept: HEALTH INFORMATION MANAGEMENT | Facility: CLINIC | Age: 69
End: 2019-07-19

## 2019-07-19 LAB
ALT SERPL-CCNC: 17 U/L (ref 6–29)
AST SERPL-CCNC: 19 U/L (ref 10–35)
CHOLEST SERPL-MCNC: 215 MG/DL
CREAT SERPL-MCNC: 1.45 MG/DL (ref 0.5–0.99)
GFR SERPL CREATININE-BSD FRML MDRD: 37 ML/MIN/1.73M2
GLUCOSE SERPL-MCNC: 104 MG/DL (ref 65–99)
HDLC SERPL-MCNC: 37 MG/DL
NONHDLC SERPL-MCNC: 178 MG/DL
POTASSIUM SERPL-SCNC: 4.1 MMOL/L (ref 3.5–5.3)
TRIGL SERPL-MCNC: 749 MG/DL

## 2019-07-22 LAB — COPATH REPORT: NORMAL

## 2019-07-30 ENCOUNTER — DOCUMENTATION ONLY (OUTPATIENT)
Dept: SLEEP MEDICINE | Facility: CLINIC | Age: 69
End: 2019-07-30

## 2019-07-30 DIAGNOSIS — G47.33 OSA (OBSTRUCTIVE SLEEP APNEA): Primary | ICD-10-CM

## 2019-07-30 NOTE — PROGRESS NOTES
7/23/2019  MET WITH PT IN [facility]. SHE HAS BEEN HAVING ISSUES WITH BREATHING THROUGH HER NOSE WITH HER WISP NASAL MASK. SHE WANTED TO GO OVER HER SETTINGS FOR THE MACHINE AND FILTERS.  SHE STATED SHE USES ALLERGY MEDICINE AND FLONASE TWICE DAILY.   SHE TRIED AN AIRFIT P10 BUT THAT BURNED THE INSIDE OF HER NOSE.  I SHOWED HER HOW TO CHANGE THE HUMIDIFIER AND TUBING LEVELS. DISCUSSED HER FILTERS, BOTH LOOKED GOOD.   PT TRIED ON   NUANCE PRO SMALL PILLOW  AIRFIT F20 SMALL FOR HER  AIRFIT F30 SMALL  PT IS BORROWING A NUANCE PRO AND AIRFIT F30 SMALL. SHE IS GOING TO TRY BOTH MASKS WITH AND WITHOUT HUMIDIFITY, TO SEE WHICH MASK WORKS BEST FOR HER ALLERGIES. SHE WILL COME BACK NEXT WEEK AND WE WILL DECIEDE WHICH MASK IS BETTER.    7/30/2019 PEDRO PABLO Dennison PT CAME BACK TO [facility].   PT STATED THE AIRFIT F30 SMALL WAS TOO NOISEY AND SHE WAS CONSTANTLY ADJUSTING IT. THE NUANCE PRO SMALL PILLOW SHE GOT GOOD RESULTS AND LIKED THE MASK. SHE SAID SHE HAS BEEN SNEEZEING LESS SINCE SHE HAS BEEN USING THIS MASK, ALTHOUGH, HER ALLERGIES COULD BE BETTER IN GENERAL. SHE HAS ADJUSTED THE HUMIDIFIER.   PT RECIEVED NEW NUANCE PRO AND REUSABLE FILTER.

## 2019-07-31 ENCOUNTER — OFFICE VISIT (OUTPATIENT)
Dept: FAMILY MEDICINE | Facility: CLINIC | Age: 69
End: 2019-07-31
Payer: MEDICARE

## 2019-07-31 VITALS
TEMPERATURE: 99.3 F | SYSTOLIC BLOOD PRESSURE: 119 MMHG | WEIGHT: 183.4 LBS | DIASTOLIC BLOOD PRESSURE: 64 MMHG | BODY MASS INDEX: 31.31 KG/M2 | HEIGHT: 64 IN | HEART RATE: 79 BPM | OXYGEN SATURATION: 99 %

## 2019-07-31 DIAGNOSIS — I10 BENIGN ESSENTIAL HYPERTENSION: ICD-10-CM

## 2019-07-31 DIAGNOSIS — E78.1 HYPERTRIGLYCERIDEMIA: Primary | ICD-10-CM

## 2019-07-31 DIAGNOSIS — M25.50 ARTHRALGIA, UNSPECIFIED JOINT: ICD-10-CM

## 2019-07-31 PROCEDURE — 99214 OFFICE O/P EST MOD 30 MIN: CPT | Performed by: INTERNAL MEDICINE

## 2019-07-31 RX ORDER — CEPHALEXIN 500 MG/1
500 CAPSULE ORAL
COMMUNITY
End: 2020-12-08

## 2019-07-31 ASSESSMENT — MIFFLIN-ST. JEOR: SCORE: 1356.9

## 2019-07-31 NOTE — PROGRESS NOTES
Subjective     Rosa Sotomayor is a 68 year old female who presents to clinic today for the following health issues:    HPI   Review lab from Ob-Gyn    Pleasant mildly anxious 68-year-old female with Arnold-Chiari malformation, chronic kidney disease, hypertension, sleep apnea, anemia probably due to chronic kidney disease, lumbar spine disease, macular degeneration, hypertriglyceridemia and hyperlipidemia.  She had fasting lab panel performed at her OB/GYN clinic noting a triglyceride level of greater than 700.  The LDL could not be calculated.  She had elevated triglycerides about a year ago and she prefer to work on her diet rather than start TriCor at that time.  She did get her triglycerides down to about 350 about 6 months ago.  She does not think her diet has changed between then and now.  She wonders if fish oil might help?  She also feels stiffness in her bilateral knees when she wakes up she has had bilateral knee replacements.  She also has stiffness at the base of her right thumb that lasts all day.  Her brother had rheumatoid arthritis.  She requests labs to evaluate for rheumatoid arthritis and to see a rheumatologist regarding the possibility of rheumatoid arthritis.  Her hemoglobin has remained stable and her hematologist does not think she needs additional follow-up.    Patient Active Problem List   Diagnosis     Personal history of allergy to anesthetic agent     Anemia due to stage 3 chronic kidney disease (H)     Hypertriglyceridemia     Arnold-Chiari malformation (H)     Hyperlipidemia LDL goal <130     Anxiety     Secondary renal hyperparathyroidism (H)     Chronic bilateral low back pain without sciatica     Benign essential hypertension     Renal artery stenosis (H)     Macular degeneration, bilateral     Anemia, iron deficiency     Intractable migraine without aura and without status migrainosus     Aortic valve insufficiency     ESTEFANIA (obstructive sleep apnea)- mild (AHI 12)     Spinal  stenosis of lumbar region with neurogenic claudication     Past Surgical History:   Procedure Laterality Date     BACK SURGERY      discectomy L3-4, 2018     C NONSPECIFIC PROCEDURE      wisdom teeth     C NONSPECIFIC PROCEDURE  2005    Varicose Veins     C TOTAL HIP ARTHROPLASTY      left in 2012, right 2017     C TOTAL KNEE ARTHROPLASTY      right 2014, left 2018     ROTATOR CUFF REPAIR RT/LT Right     2016     TUBAL LIGATION  1987       Social History     Tobacco Use     Smoking status: Never Smoker     Smokeless tobacco: Never Used   Substance Use Topics     Alcohol use: No     Alcohol/week: 0.0 oz     Family History   Problem Relation Age of Onset     Diabetes Mother         INSULIN     Hypertension Mother      Eye Disorder Mother         CATERACTS     Heart Disease Mother         CHF- OPEN HEART SURG X'S 2     Lipids Mother      Obesity Mother      Coronary Artery Disease Mother      Diabetes Father         ORAL     Hypertension Father      Arthritis Father      Eye Disorder Father         MAC DEGENERATION     Heart Disease Father         CHF     Lipids Father      Obesity Father      Diabetes Maternal Grandfather         INSULIN     Diabetes Brother         INSULIN     Gastrointestinal Disease Brother         CHOLITISI POSSIBLE CHRONES     Obesity Brother      Colon Cancer No family hx of      Breast Cancer No family hx of          Current Outpatient Medications   Medication Sig Dispense Refill     acetaminophen (TYLENOL) 500 MG tablet Take 500 mg by mouth       allopurinol (ZYLOPRIM) 100 MG tablet Take 1 tablet (100 mg) by mouth daily 90 tablet 3     atorvastatin (LIPITOR) 40 MG tablet TAKE 1 TABLET(40 MG) BY MOUTH DAILY 90 tablet 3     carvedilol (COREG) 25 MG tablet Take 1 tablet (25 mg) by mouth 2 times daily 180 tablet 3     cephALEXin (KEFLEX) 500 MG capsule Take 500 mg by mouth Take 4 capsules (2,000 mg) by mouth once as needed One hour prior to dental procedures       cetirizine (ZYRTEC) 10 MG tablet  Take 10 mg by mouth       Cholecalciferol (VITAMIN D) 2000 UNITS tablet Take 1 tablet by mouth daily       clobetasol (TEMOVATE) 0.05 % cream Apply sparingly to affected area twice daily for 14 days.  Do not apply to face. 15 g 2     clobetasol (TEMOVATE) 0.05 % external solution Apply topically 2 times daily as needed  5     conjugated estrogens (PREMARIN) 0.625 MG/GM vaginal cream Place 0.5 g vaginally twice a week 30 g 0     Cranberry POWD 1 capsule       Cyanocobalamin (B-12) 1000 MCG TBCR Take 1,000 mcg by mouth daily 100 tablet 1     EPINEPHrine (EPIPEN 2-FRAN) 0.3 MG/0.3ML injection 2-pack Inject 0.3 mLs (0.3 mg) into the muscle once as needed for anaphylaxis 0.6 mL 1     hydrOXYzine (ATARAX) 25 MG tablet Take 1 tablet (25 mg) by mouth 3 times daily as needed for itching 120 tablet 1     losartan (COZAAR) 100 MG tablet Take 1 tablet (100 mg) by mouth At Bedtime 90 tablet 1     Magnesium Oxide 250 MG TABS Take 250 mg by mouth       mometasone (ELOCON) 0.1 % external cream Apply topically daily       ondansetron (ZOFRAN) 4 MG tablet Take by mouth every 8 hours as needed for nausea       SUMAtriptan (IMITREX) 20 MG/ACT nasal spray Spray 1 spray in nostril as needed for migraine May repeat in 2 hours. Max 2 sprays/24 hours. 12 each 11     topiramate (TOPAMAX) 100 MG tablet Take 1 tablet by mouth daily  3     torsemide (DEMADEX) 10 MG tablet Take 1 tablet (10 mg) by mouth daily 90 tablet 3     traMADol (ULTRAM) 50 MG tablet Take 1 tablet by mouth every 6 hours as needed  0     triamcinolone (KENALOG) 0.1 % cream Apply topically 2 times daily Apply to AA on trunk, arms or legs bid for 10-14 days 454 g 0     ZOLMitriptan (ZOMIG-ZMT) 5 MG ODT tab Take 5 mg by mouth       Allergies   Allergen Reactions     Bee Venom Anaphylaxis     Codeine Swelling     Body swelling and severe itching. Tolerates Morphine.      Fentanyl Anaphylaxis and Shortness Of Breath     Gabapentin Other (See Comments)     PN: chest heaviness with  breathing  Chest heaviness with breathing  Chest heaviness with breathing  chest heaviness with breathing     Hydromorphone Itching     Tolerates morphine  Tolerates morphine     Midazolam Anaphylaxis, Itching and Shortness Of Breath     Tolerates Diazepam     Prilocaine Anaphylaxis     Anaphalactic shock     Propofol Anaphylaxis     Penicillins Itching and Rash     Tolerates Keflex,  Ancef  rash     Shingrix [Zoster Vac Recomb Adjuvanted] Itching and Rash     Sulfa Drugs Itching and Rash     rash     Clindamycin      Codeine      Diatrizoate Other (See Comments)     CKD 3     Hydrocodone      Lisinopril Cough     Lorazepam Itching     Nsaids Other (See Comments)     Contraindicated with kidney hx (including Celebrex, per pt)     Other [Seasonal Allergies] Anaphylaxis     GENERAL ANETHESIA       Oxycodone      Vancomycin Other (See Comments)     Thrush, yeast infection, bladder infection  Thrush, yeast infection, bladder infection     Erythromycin Rash     ointment  PN: ointment  ointment     Eucalyptus Oil Rash and Hives     hives     Nitrofuran Derivatives Rash     Nitrofurantoin Rash     Tramadol Rash         Reviewed and updated as needed this visit by Provider         Review of Systems   ROS COMP: Constitutional, HEENT, cardiovascular, pulmonary, gi and gu systems are negative, except as otherwise noted.      Objective    There were no vitals taken for this visit.  There is no height or weight on file to calculate BMI.  Physical Exam   General: This is a mildly anxious otherwise well-appearing female in no acute distress.  Joints: There is no obvious active synovitis in the joints of the right wrist or base of right thumb there is tenderness over the first carpometacarpal joint on the right.  There are no bilateral knee effusions and there is good range of motion of both knees.            Assessment & Plan     1. Hypertriglyceridemia  Long discussion regarding diet considerations for high triglycerides.  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  We discussed the role of fish oil to lower triglycerides.  My concern is that the fish oil might not get the triglycerides down below 500.  However, she would like to try over-the-counter fish oil tablets for 1 month.  If her triglycerides remain above 501-month when she returns to the lab, then we will start TriCor.  The side effects and risks of TriCor including the risk of adding TriCor to an existing statin medication were discussed with the patient in detail.  If TriCor is started, we would recheck a lipid panel 1 month later.  We discussed the importance of keeping triglycerides less than 500 to mitigate risk for pancreatitis.  Her LDL was well controlled in December 2018.  - Lipid panel reflex to direct LDL Fasting; Future    2. Benign essential hypertension  Blood pressure is under good control    3. Arthralgia, unspecified joint  Symptoms seem most consistent with osteoarthritis rather than rheumatoid arthritis to me.  If her knees are bothering her, returning to her orthopedic surgeon is a good option.  We discussed an injection at the base of the thumb by a hand surgeon, she had a previous unpleasant experience with a hand cortisone injection and wishes to explore the possibility of rheumatoid arthritis before intervening for possible osteoarthritis of the base of the thumb.  Since she will have her labs drawn in 1 month to evaluate her triglycerides, we decided to check labs to risk stratify for possible rheumatoid arthritis at that time.  Also, she was given the contact information for the rheumatology clinic.  It may take several months until she can be seen there and she was made aware of that.  - Cyclic Citrullinated Peptide Antibody IgG; Future  - Rheumatoid factor; Future  - ESR: Erythrocyte sedimentation rate; Future  - CRP, inflammation; Future  - RHEUMATOLOGY REFERRAL         Total face to face contact time was greater than 30 minutes of which  more than 50% of this time was spent counseling and coordinating care regarding the above topics.      Return in about 1 month (around 8/31/2019) for Fasting Lab Only Visit.    Josh Hollingsworth MD  [facility]

## 2019-08-23 ENCOUNTER — TRANSFERRED RECORDS (OUTPATIENT)
Dept: HEALTH INFORMATION MANAGEMENT | Facility: CLINIC | Age: 69
End: 2019-08-23

## 2019-08-27 DIAGNOSIS — E78.1 HYPERTRIGLYCERIDEMIA: ICD-10-CM

## 2019-08-27 DIAGNOSIS — M25.50 ARTHRALGIA, UNSPECIFIED JOINT: ICD-10-CM

## 2019-08-27 LAB
CRP SERPL-MCNC: <2.9 MG/L (ref 0–8)
ERYTHROCYTE [SEDIMENTATION RATE] IN BLOOD BY WESTERGREN METHOD: 17 MM/H (ref 0–30)

## 2019-08-27 PROCEDURE — 86431 RHEUMATOID FACTOR QUANT: CPT | Performed by: INTERNAL MEDICINE

## 2019-08-27 PROCEDURE — 83721 ASSAY OF BLOOD LIPOPROTEIN: CPT | Performed by: INTERNAL MEDICINE

## 2019-08-27 PROCEDURE — 36415 COLL VENOUS BLD VENIPUNCTURE: CPT | Performed by: INTERNAL MEDICINE

## 2019-08-27 PROCEDURE — 85652 RBC SED RATE AUTOMATED: CPT | Performed by: INTERNAL MEDICINE

## 2019-08-27 PROCEDURE — 80061 LIPID PANEL: CPT | Performed by: INTERNAL MEDICINE

## 2019-08-27 PROCEDURE — 86200 CCP ANTIBODY: CPT | Performed by: INTERNAL MEDICINE

## 2019-08-27 PROCEDURE — 86140 C-REACTIVE PROTEIN: CPT | Performed by: INTERNAL MEDICINE

## 2019-08-28 LAB
CCP AB SER IA-ACNC: 1 U/ML
CHOLEST SERPL-MCNC: 164 MG/DL
HDLC SERPL-MCNC: 36 MG/DL
LDLC SERPL CALC-MCNC: ABNORMAL MG/DL
LDLC SERPL DIRECT ASSAY-MCNC: 70 MG/DL
NONHDLC SERPL-MCNC: 128 MG/DL
RHEUMATOID FACT SER NEPH-ACNC: <20 IU/ML (ref 0–20)
TRIGL SERPL-MCNC: 442 MG/DL

## 2019-09-01 NOTE — RESULT ENCOUNTER NOTE
The following letter pertains to your most recent diagnostic tests:    Tests for rheumatoid arthritis are negative.  Triglycerides are improved!  Nice work.  Other cholesterol parameters are OK.  Increase exercise to improve 'good cholesterol' (HDL).   Inflammatory markers are normal.        Sincerely,    Dr. Hollingsworth

## 2019-09-03 ENCOUNTER — TELEPHONE (OUTPATIENT)
Dept: SLEEP MEDICINE | Facility: CLINIC | Age: 69
End: 2019-09-03

## 2019-09-03 NOTE — TELEPHONE ENCOUNTER
Left patient a voice mail letting patient know she needs to reschedule her follow-up with Bennett Goltz that was made on 02/27/20 in [place] at 11:00am. Please reschedule this patient for her annual anytime from 02/27/ on.    Angela Álvarez

## 2019-09-04 ENCOUNTER — TELEPHONE (OUTPATIENT)
Dept: FAMILY MEDICINE | Facility: CLINIC | Age: 69
End: 2019-09-04

## 2019-09-04 DIAGNOSIS — Z87.898 HISTORY OF MOTION SICKNESS: ICD-10-CM

## 2019-09-04 DIAGNOSIS — T75.3XXA MOTION SICKNESS: ICD-10-CM

## 2019-09-04 DIAGNOSIS — E78.1 HYPERTRIGLYCERIDEMIA: Primary | ICD-10-CM

## 2019-09-04 RX ORDER — SCOLOPAMINE TRANSDERMAL SYSTEM 1 MG/1
1 PATCH, EXTENDED RELEASE TRANSDERMAL
Qty: 5 PATCH | Refills: 1 | Status: SHIPPED | OUTPATIENT
Start: 2019-09-04 | End: 2019-10-31

## 2019-09-04 NOTE — TELEPHONE ENCOUNTER
Patient called and discussed lab results. Patient was wondering when she should have her lipids rechecked again as Triglycerides were still high. Instructed patient to have lipids checked again in a month and to continue on fish oil to help with cholesterol. Patient states she is going on a cruise soon and would like to have a prescription for transderm patches to help with motion sickness. Rx pended for signature from PCP in addition to future lipid panel order in 1 month.    Calvin Rivera CMA on 9/4/2019 at 2:56 PM

## 2019-09-04 NOTE — TELEPHONE ENCOUNTER
Reason for Call:  Request for results:    Name of test or procedure: Labs results     Date of test of procedure: 08/27    Location of the test or procedure: lab work [place]    OK to leave the result message on voice mail or with a family member? YES    Phone number Patient can be reached at:  Home number on file [phone] (home)    Additional comments:     Call taken on 9/4/2019 at 2:31 PM by Elvia Sanders

## 2019-09-17 ENCOUNTER — TELEPHONE (OUTPATIENT)
Dept: FAMILY MEDICINE | Facility: CLINIC | Age: 69
End: 2019-09-17

## 2019-09-17 NOTE — TELEPHONE ENCOUNTER
Prior Authorization Approval    Authorization Effective Date: 12/30/2018  Authorization Expiration Date: 12/31/2019  Medication: scopolamine (TRANSDERM) 1 MG/3DAYS 72 hr patch-APPROVED  Approved Dose/Quantity:   Reference #:     Insurance Company: [employer] - Phone [phone] Fax [phone]  Expected CoPay:       CoPay Card Available:      Foundation Assistance Needed:    Which Pharmacy is filling the prescription (Not needed for infusion/clinic administered): [id number] - [place] - [address] AT [facility] [address] [place] [address]  Pharmacy Notified: Yes  Patient Notified: No    Pharmacy will notify patient when medication is ready.

## 2019-09-17 NOTE — TELEPHONE ENCOUNTER
Central Prior Authorization Team   Phone: [phone]      PA Initiation    Medication: scopolamine (TRANSDERM) 1 MG/3DAYS 72 hr patch-Initiated  Insurance Company: [employer] - Phone [phone] Fax [phone]  Pharmacy Filling the Rx: [id number] - [place] - [address] AT [facility] [address] [place] [address]  Filling Pharmacy Phone: [phone]  Filling Pharmacy Fax:    Start Date: 9/17/2019

## 2019-09-17 NOTE — TELEPHONE ENCOUNTER
Prior Authorization Retail Medication Request    Medication/Dose: scopolamine (TRANSDERM) 1 MG/3DAYS 72 hr patch  ICD code (if different than what is on RX):     Previously Tried and Failed:     Rationale:       Insurance Name:   [employer]HA[employer]AY[employer] PART D    [id number]    [facility]  Insurance ID:  [id number]        Pharmacy Information (if different than what is on RX)  Name:  [facility]  Phone:  [phone]

## 2019-09-18 ENCOUNTER — TRANSFERRED RECORDS (OUTPATIENT)
Dept: HEALTH INFORMATION MANAGEMENT | Facility: CLINIC | Age: 69
End: 2019-09-18

## 2019-09-19 ENCOUNTER — TRANSFERRED RECORDS (OUTPATIENT)
Dept: HEALTH INFORMATION MANAGEMENT | Facility: CLINIC | Age: 69
End: 2019-09-19

## 2019-09-30 DIAGNOSIS — E78.1 HYPERTRIGLYCERIDEMIA: ICD-10-CM

## 2019-09-30 LAB
CHOLEST SERPL-MCNC: 173 MG/DL
HDLC SERPL-MCNC: 36 MG/DL
LDLC SERPL CALC-MCNC: ABNORMAL MG/DL
LDLC SERPL DIRECT ASSAY-MCNC: 71 MG/DL
NONHDLC SERPL-MCNC: 137 MG/DL
TRIGL SERPL-MCNC: 522 MG/DL

## 2019-09-30 PROCEDURE — 80061 LIPID PANEL: CPT | Performed by: INTERNAL MEDICINE

## 2019-09-30 PROCEDURE — 36415 COLL VENOUS BLD VENIPUNCTURE: CPT | Performed by: INTERNAL MEDICINE

## 2019-09-30 PROCEDURE — 83721 ASSAY OF BLOOD LIPOPROTEIN: CPT | Mod: 59 | Performed by: INTERNAL MEDICINE

## 2019-09-30 NOTE — LETTER
[place] [address] [facility]  [address]  [place]  [address]  Tel: [phone]    October 1, 2019    Rosa Sotomayor  [address] [facility] [address]  [place] [address]        Dear Ms. Sotomayor,    The following letter pertains to your most recent diagnostic tests:    Your triglycerides look worse.        Bottom line:   If you cannot keep your triglycerides less than 500 with diet interventions you should go on another medication to be taken in addition to atorvastatin (Lipitor) to lower triglycerides.  You have taken gemfibrozil in the past, but I would recommend fenofibrate (Tricor).  I sent an prescription to your pharmacy.  Please start the drug and schedule a follow up lab appointment in 1-2 months to recheck your FASTING triglycerides after you have been taking the fenofibrate for one month.  If you have questions regarding this, I recommend scheduling an office visit appointment to discuss.      If you have any further questions or problems, please contact our office.      Sincerely,    Josh Hollingsworth MD          Enclosure: Lab Results    Results for orders placed or performed in visit on 09/30/19   Lipid panel reflex to direct LDL Fasting   Result Value Ref Range    Cholesterol 173 <200 mg/dL    Triglycerides 522 (H) <150 mg/dL    HDL Cholesterol 36 (L) >49 mg/dL    LDL Cholesterol Calculated  <100 mg/dL     Cannot estimate LDL when triglyceride exceeds 400 mg/dL    Non HDL Cholesterol 137 (H) <130 mg/dL   LDL cholesterol direct   Result Value Ref Range    LDL Cholesterol Direct 71 <100 mg/dL

## 2019-10-01 ENCOUNTER — TRANSFERRED RECORDS (OUTPATIENT)
Dept: HEALTH INFORMATION MANAGEMENT | Facility: CLINIC | Age: 69
End: 2019-10-01

## 2019-10-01 RX ORDER — FENOFIBRATE 145 MG/1
145 TABLET, COATED ORAL DAILY
Qty: 90 TABLET | Refills: 3 | Status: SHIPPED | OUTPATIENT
Start: 2019-10-01 | End: 2019-10-31

## 2019-10-01 NOTE — RESULT ENCOUNTER NOTE
The following letter pertains to your most recent diagnostic tests:    Your triglycerides look worse.        Bottom line:   If you cannot keep your triglycerides less than 500 with diet interventions you should go on another medication to be taken in addition to atorvastatin (Lipitor) to lower triglycerides.  You have taken gemfibrozil in the past, but I would recommend fenofibrate (Tricor).  I sent an prescription to your pharmacy.  Please start the drug and schedule a follow up lab appointment in 1-2 months to recheck your FASTING triglycerides after you have been taking the fenofibrate for one month.  If you have questions regarding this, I recommend scheduling an office visit appointment to discuss.        Sincerely,    Dr. Hollingsworth

## 2019-10-05 ENCOUNTER — HEALTH MAINTENANCE LETTER (OUTPATIENT)
Age: 69
End: 2019-10-05

## 2019-10-10 ENCOUNTER — TRANSFERRED RECORDS (OUTPATIENT)
Dept: HEALTH INFORMATION MANAGEMENT | Facility: CLINIC | Age: 69
End: 2019-10-10

## 2019-10-18 ENCOUNTER — NURSE TRIAGE (OUTPATIENT)
Dept: FAMILY MEDICINE | Facility: CLINIC | Age: 69
End: 2019-10-18
Payer: COMMERCIAL

## 2019-10-18 NOTE — TELEPHONE ENCOUNTER
Reason for call:  Patient reporting a symptom    Symptom or request: itchy rash on midsection    Duration (how long have symptoms been present): since this morning    Have you been treated for this before? No    Additional comments: did start a new medication about a week ago    Phone Number patient can be reached at:  Home number on file [phone] (home)    Best Time:  Before 1:40 or after 2:15    Can we leave a detailed message on this number:  YES    Call taken on 10/18/2019 at 12:23 PM by Keyla Hutchins

## 2019-10-18 NOTE — TELEPHONE ENCOUNTER
To PCP/MTM: Could this be a rx to fenofibrate around 2 weeks after starting?     S: Onset of a rash noted this morning starting on abdomen right hip, now from hip to hip and spreading to buttocks     B: Started on fenofibrate around 10/1    A:   Onset: this morning after shower   Description: abdomen from hip- hip to hip, and spreading towards the back/buttocks     Itching     Red  Splotchy   Raised     Not fluid filled   Denies flaking of skin     Denies breathing or swallowing concerns   Denies any swelling of mouth, lips, face     Denies new lotions, soaps, creams, foods    New Medication: Fenofibrate starting 10/1    R: Recommend to take a zyrtec right away (she is leaving for a PT appointment, so no Benadryl recommended with driving)     Pt has Zyrtec on hand     Advised to HOLD fenofibrate tonight until hearing back     Call with worsening symptoms, especially systemic- breathing, swallowing difficulties       Additional Information    MODERATE-SEVERE hives persist (i.e., hives interfere with normal activities or work) and taking antihistamine (e.g., Benadryl, Claritin) > 24 hours    Widespread hives    Protocols used: HIVES-A-OH

## 2019-10-18 NOTE — TELEPHONE ENCOUNTER
It's certainly possible; unfortunately medication rashes are difficult as they can appear at any time.  Will defer to Dr. Hollingsworth, but she could hold the medication for a few days to see if sx improve.  If so, would still suggest re-trial to confirm it's actually from the drug.  Will await response from Dr. Hollingsworth as I don't know this pt.    Chayito Menendez, PharmD, [facility]  Medication Therapy Management Provider  Pager: [phone]

## 2019-10-21 ENCOUNTER — OFFICE VISIT (OUTPATIENT)
Dept: URGENT CARE | Facility: URGENT CARE | Age: 69
End: 2019-10-21
Payer: MEDICARE

## 2019-10-21 VITALS
TEMPERATURE: 97.7 F | SYSTOLIC BLOOD PRESSURE: 128 MMHG | OXYGEN SATURATION: 100 % | BODY MASS INDEX: 30.73 KG/M2 | WEIGHT: 179 LBS | DIASTOLIC BLOOD PRESSURE: 56 MMHG | HEART RATE: 69 BPM

## 2019-10-21 DIAGNOSIS — R21 RASH AND NONSPECIFIC SKIN ERUPTION: Primary | ICD-10-CM

## 2019-10-21 LAB
DEPRECATED S PYO AG THROAT QL EIA: NORMAL
SPECIMEN SOURCE: NORMAL

## 2019-10-21 PROCEDURE — 87081 CULTURE SCREEN ONLY: CPT | Performed by: PHYSICIAN ASSISTANT

## 2019-10-21 PROCEDURE — 99213 OFFICE O/P EST LOW 20 MIN: CPT | Performed by: PHYSICIAN ASSISTANT

## 2019-10-21 PROCEDURE — 87880 STREP A ASSAY W/OPTIC: CPT | Performed by: PHYSICIAN ASSISTANT

## 2019-10-21 RX ORDER — DIPHENHYDRAMINE HYDROCHLORIDE AND LIDOCAINE HYDROCHLORIDE AND ALUMINUM HYDROXIDE AND MAGNESIUM HYDRO
5-10 KIT EVERY 6 HOURS PRN
Qty: 237 ML | Refills: 1 | Status: SHIPPED | OUTPATIENT
Start: 2019-10-21 | End: 2019-10-31

## 2019-10-21 RX ORDER — DIPHENHYDRAMINE HYDROCHLORIDE AND LIDOCAINE HYDROCHLORIDE AND ALUMINUM HYDROXIDE AND MAGNESIUM HYDRO
5-10 KIT EVERY 6 HOURS PRN
Qty: 158 ML | Refills: 1 | Status: SHIPPED | OUTPATIENT
Start: 2019-10-21 | End: 2019-10-21

## 2019-10-21 NOTE — PROGRESS NOTES
SUBJECTIVE:  Rosa Sotomayor is a 68 year old female with a chief complaint of sore throat.  Onset of symptoms was 2 day(s) ago.    Course of illness: gradual onset and still present.  Severity mild  Current and Associated symptoms: fever, sore throat and sores in mouth and rash on lower abdomen and back and upper and lower extremity.  Treatment measures tried include None tried.  Predisposing factors include None and no known sick contacts for strep throat. No myalgias or arthralgias.    Past Medical History:   Diagnosis Date     Complication of anesthesia      Postmenopausal bleeding 2/28/2007     Current Outpatient Medications   Medication Sig Dispense Refill     acetaminophen (TYLENOL) 500 MG tablet Take 500 mg by mouth       allopurinol (ZYLOPRIM) 100 MG tablet Take 1 tablet (100 mg) by mouth daily 90 tablet 3     atorvastatin (LIPITOR) 40 MG tablet TAKE 1 TABLET(40 MG) BY MOUTH DAILY 90 tablet 3     carvedilol (COREG) 25 MG tablet Take 1 tablet (25 mg) by mouth 2 times daily 180 tablet 3     cephALEXin (KEFLEX) 500 MG capsule Take 500 mg by mouth Take 4 capsules (2,000 mg) by mouth once as needed One hour prior to dental procedures       cetirizine (ZYRTEC) 10 MG tablet Take 10 mg by mouth       Cholecalciferol (VITAMIN D) 2000 UNITS tablet Take 1 tablet by mouth daily       clobetasol (TEMOVATE) 0.05 % cream Apply sparingly to affected area twice daily for 14 days.  Do not apply to face. 15 g 2     clobetasol (TEMOVATE) 0.05 % external solution Apply topically 2 times daily as needed  5     conjugated estrogens (PREMARIN) 0.625 MG/GM vaginal cream Place 0.5 g vaginally twice a week 30 g 0     Cranberry POWD 1 capsule       Cyanocobalamin (B-12) 1000 MCG TBCR Take 1,000 mcg by mouth daily 100 tablet 1     EPINEPHrine (EPIPEN 2-FRAN) 0.3 MG/0.3ML injection 2-pack Inject 0.3 mLs (0.3 mg) into the muscle once as needed for anaphylaxis 0.6 mL 1     fenofibrate (TRICOR) 145 MG tablet Take 1 tablet (145 mg) by mouth  daily 90 tablet 3     hydrOXYzine (ATARAX) 25 MG tablet Take 1 tablet (25 mg) by mouth 3 times daily as needed for itching 120 tablet 1     losartan (COZAAR) 100 MG tablet Take 1 tablet (100 mg) by mouth At Bedtime 90 tablet 1     Magnesium Oxide 250 MG TABS Take 250 mg by mouth       mometasone (ELOCON) 0.1 % external cream Apply topically daily       ondansetron (ZOFRAN) 4 MG tablet Take by mouth every 8 hours as needed for nausea       scopolamine (TRANSDERM) 1 MG/3DAYS 72 hr patch Place 1 patch onto the skin every 72 hours 5 patch 1     SUMAtriptan (IMITREX) 20 MG/ACT nasal spray Spray 1 spray in nostril as needed for migraine May repeat in 2 hours. Max 2 sprays/24 hours. 12 each 11     topiramate (TOPAMAX) 100 MG tablet Take 1 tablet by mouth daily  3     torsemide (DEMADEX) 10 MG tablet Take 1 tablet (10 mg) by mouth daily 90 tablet 3     traMADol (ULTRAM) 50 MG tablet Take 1 tablet by mouth every 6 hours as needed  0     triamcinolone (KENALOG) 0.1 % cream Apply topically 2 times daily Apply to AA on trunk, arms or legs bid for 10-14 days 454 g 0     ZOLMitriptan (ZOMIG-ZMT) 5 MG ODT tab Take 5 mg by mouth       Social History     Tobacco Use     Smoking status: Never Smoker     Smokeless tobacco: Never Used   Substance Use Topics     Alcohol use: No     Alcohol/week: 0.0 standard drinks       ROS:  Review of systems negative except as stated above.    OBJECTIVE:   /56   Pulse 69   Temp 97.7  F (36.5  C) (Oral)   Wt 81.2 kg (179 lb)   SpO2 100%   BMI 30.73 kg/m    GENERAL APPEARANCE: healthy, alert and no distress  EYES: EOMI,  PERRL, conjunctiva clear  HENT: ear canals and TM's normal.  Nose normal.  Pharynx erythematous with some exudate noted. Apthous ulcers noted on buccal mucosa.  NECK: supple, non-tender to palpation, no adenopathy noted  RESP: lungs clear to auscultation - no rales, rhonchi or wheezes  CV: regular rates and rhythm, normal S1 S2, no murmur noted  ABDOMEN:  soft, nontender, no  HSM or masses and bowel sounds normal  SKIN: no suspicious lesions or rashes    Rapid Strep test is negative; await throat culture results.    ASSESSMENT:  1. Rash and nonspecific skin eruption        PLAN:  Orders Placed This Encounter     DISCONTD: magic mouthwash suspension, diphenhydrAMINE, lidocaine, aluminum-magnesium & simethicone, (FIRST-MOUTHWASH BLM) compounding kit     DISCONTD: magic mouthwash suspension, diphenhydrAMINE, lidocaine, aluminum-magnesium & simethicone, (FIRST-MOUTHWASH BLM) compounding kit       Patient Instructions     Sugessted increased rest, increased fluids and bedside humidification for comfort.  OTC tylenol and Ibuprofen for analgesia and antipyretic.  Dosed by packaging directions and weight .  Magic mouthwash as written.   Follow up with Primary Care Provider if any complications or new symptoms present.      Patient Education     Self-Care for Sore Throats    Sore throats happen for many reasons, such as colds, allergies, and infections caused by viruses or bacteria. In any case, your throat becomes red and sore. Your goal for self-care is to reduce your discomfort while giving your throat a chance to heal.  Moisten and soothe your throat  Tips include the following:    Try a sip of water first thing after waking up.    Keep your throat moist by drinking 6 or more glasses of clear liquids every day.    Run a cool-air humidifier in your room overnight.    Avoid cigarette smoke.     Suck on throat lozenges, cough drops, hard candy, ice chips, or frozen fruit-juice bars. Use the sugar-free versions if your diet or medical condition requires them.  Gargle to ease irritation  Gargling every hour or 2 can ease irritation. Try gargling with 1 of these solutions:    1/4 teaspoon of salt in 1/2 cup of warm water    An over-the-counter anesthetic gargle  Use medicine for more relief  Over-the-counter medicine can reduce sore throat symptoms. Ask your pharmacist if you have questions about  which medicine to use:    Ease pain with anesthetic sprays. Aspirin or an aspirin substitute also helps. Remember, never give aspirin to anyone 18 or younger, or if you are already taking blood thinners.     For sore throats caused by allergies, try antihistamines to block the allergic reaction.    Remember: unless a sore throat is caused by a bacterial infection, antibiotics won t help you.  Prevent future sore throats  Prevention tips include the following:    Stop smoking or reduce contact with secondhand smoke. Smoke irritates the tender throat lining.    Limit contact with pets and with allergy-causing substances, such as pollen and mold.    When you re around someone with a sore throat or cold, wash your hands often to keep viruses or bacteria from spreading.    Don t strain your vocal cords.  Call your healthcare provider  Contact your healthcare provider if you have:    A temperature over 101 F (38.3 C)    White spots on the throat    Great difficulty swallowing    Trouble breathing    A skin rash    Recent exposure to someone else with strep bacteria    Severe hoarseness and swollen glands in the neck or jaw   Date Last Reviewed: 8/1/2016 2000-2018 [employer]. [address] [place] [address]. All rights reserved. This information is not intended as a substitute for professional medical care. Always follow your healthcare professional's instructions.

## 2019-10-22 LAB
BACTERIA SPEC CULT: NORMAL
SPECIMEN SOURCE: NORMAL

## 2019-10-22 NOTE — PATIENT INSTRUCTIONS
Sugessted increased rest, increased fluids and bedside humidification for comfort.  OTC tylenol and Ibuprofen for analgesia and antipyretic.  Dosed by packaging directions and weight .  Magic mouthwash as written.   Follow up with Primary Care Provider if any complications or new symptoms present.      Patient Education     Self-Care for Sore Throats    Sore throats happen for many reasons, such as colds, allergies, and infections caused by viruses or bacteria. In any case, your throat becomes red and sore. Your goal for self-care is to reduce your discomfort while giving your throat a chance to heal.  Moisten and soothe your throat  Tips include the following:    Try a sip of water first thing after waking up.    Keep your throat moist by drinking 6 or more glasses of clear liquids every day.    Run a cool-air humidifier in your room overnight.    Avoid cigarette smoke.     Suck on throat lozenges, cough drops, hard candy, ice chips, or frozen fruit-juice bars. Use the sugar-free versions if your diet or medical condition requires them.  Gargle to ease irritation  Gargling every hour or 2 can ease irritation. Try gargling with 1 of these solutions:    1/4 teaspoon of salt in 1/2 cup of warm water    An over-the-counter anesthetic gargle  Use medicine for more relief  Over-the-counter medicine can reduce sore throat symptoms. Ask your pharmacist if you have questions about which medicine to use:    Ease pain with anesthetic sprays. Aspirin or an aspirin substitute also helps. Remember, never give aspirin to anyone 18 or younger, or if you are already taking blood thinners.     For sore throats caused by allergies, try antihistamines to block the allergic reaction.    Remember: unless a sore throat is caused by a bacterial infection, antibiotics won t help you.  Prevent future sore throats  Prevention tips include the following:    Stop smoking or reduce contact with secondhand smoke. Smoke irritates the tender  throat lining.    Limit contact with pets and with allergy-causing substances, such as pollen and mold.    When you re around someone with a sore throat or cold, wash your hands often to keep viruses or bacteria from spreading.    Don t strain your vocal cords.  Call your healthcare provider  Contact your healthcare provider if you have:    A temperature over 101 F (38.3 C)    White spots on the throat    Great difficulty swallowing    Trouble breathing    A skin rash    Recent exposure to someone else with strep bacteria    Severe hoarseness and swollen glands in the neck or jaw   Date Last Reviewed: 8/1/2016 2000-2018 [employer]. [address] [place] [id number]. All rights reserved. This information is not intended as a substitute for professional medical care. Always follow your healthcare professional's instructions.

## 2019-10-31 ENCOUNTER — OFFICE VISIT (OUTPATIENT)
Dept: FAMILY MEDICINE | Facility: CLINIC | Age: 69
End: 2019-10-31
Payer: MEDICARE

## 2019-10-31 ENCOUNTER — TRANSFERRED RECORDS (OUTPATIENT)
Dept: HEALTH INFORMATION MANAGEMENT | Facility: CLINIC | Age: 69
End: 2019-10-31

## 2019-10-31 VITALS
HEIGHT: 64 IN | BODY MASS INDEX: 30.68 KG/M2 | DIASTOLIC BLOOD PRESSURE: 49 MMHG | SYSTOLIC BLOOD PRESSURE: 94 MMHG | HEART RATE: 78 BPM | TEMPERATURE: 98 F | WEIGHT: 179.7 LBS | OXYGEN SATURATION: 100 %

## 2019-10-31 DIAGNOSIS — N18.30 ANEMIA DUE TO STAGE 3 CHRONIC KIDNEY DISEASE (H): ICD-10-CM

## 2019-10-31 DIAGNOSIS — D63.1 ANEMIA DUE TO STAGE 3 CHRONIC KIDNEY DISEASE (H): ICD-10-CM

## 2019-10-31 DIAGNOSIS — I73.9 CLAUDICATION OF BOTH LOWER EXTREMITIES (H): ICD-10-CM

## 2019-10-31 DIAGNOSIS — E78.1 HYPERTRIGLYCERIDEMIA: Primary | ICD-10-CM

## 2019-10-31 DIAGNOSIS — G43.009 MIGRAINE WITHOUT AURA AND WITHOUT STATUS MIGRAINOSUS, NOT INTRACTABLE: ICD-10-CM

## 2019-10-31 PROCEDURE — 99214 OFFICE O/P EST MOD 30 MIN: CPT | Performed by: INTERNAL MEDICINE

## 2019-10-31 RX ORDER — GEMFIBROZIL 600 MG/1
300 TABLET, FILM COATED ORAL 2 TIMES DAILY
Qty: 90 TABLET | Refills: 3 | Status: SHIPPED | OUTPATIENT
Start: 2019-10-31 | End: 2020-07-29

## 2019-10-31 ASSESSMENT — MIFFLIN-ST. JEOR: SCORE: 1340.11

## 2019-10-31 NOTE — PATIENT INSTRUCTIONS
Please call [facility] at [phone] to schedule your FABRIZIO test of blood flow to your legs.

## 2019-10-31 NOTE — PROGRESS NOTES
Subjective     Rosa Sotomayor is a 68 year old female who presents to clinic today for the following health issues:    HPI   ED/UC Followup:    Facility:  [facility]  Date of visit: 10/21/19  Reason for visit: Rash  Current Status: Still having some itching on the right arm but no rash present.     Moderate to severe cramping in bilateral legs associated walking and improving with rest present since back surgery has allowed her to walk more (several months).   Left leg seems to be worse than right.    She got rash from fenofibrate, so she stopped that drug and the rash resolved.     She started a new migraine medication and it is working!    She has had intermittent non bloody diarrhea for 6 months that is severe    Patient Active Problem List   Diagnosis     Personal history of allergy to anesthetic agent     Anemia due to stage 3 chronic kidney disease (H)     Hypertriglyceridemia     Arnold-Chiari malformation (H)     Hyperlipidemia LDL goal <130     Anxiety     Secondary renal hyperparathyroidism (H)     Chronic bilateral low back pain without sciatica     Benign essential hypertension     Renal artery stenosis (H)     Macular degeneration, bilateral     Anemia, iron deficiency     Migraine without aura and without status migrainosus, not intractable     Aortic valve insufficiency     ESTEFANIA (obstructive sleep apnea)- mild (AHI 12)     Spinal stenosis of lumbar region with neurogenic claudication     Past Surgical History:   Procedure Laterality Date     BACK SURGERY      discectomy L3-4, 2018     C NONSPECIFIC PROCEDURE      wisdom teeth     C NONSPECIFIC PROCEDURE  2005    Varicose Veins     C TOTAL HIP ARTHROPLASTY      left in 2012, right 2017     C TOTAL KNEE ARTHROPLASTY      right 2014, left 2018     ROTATOR CUFF REPAIR RT/LT Right     2016     TUBAL LIGATION  1987       Social History     Tobacco Use     Smoking status: Never Smoker     Smokeless tobacco: Never Used   Substance Use  Topics     Alcohol use: No     Alcohol/week: 0.0 standard drinks     Family History   Problem Relation Age of Onset     Diabetes Mother         INSULIN     Hypertension Mother      Eye Disorder Mother         CATERACTS     Heart Disease Mother         CHF- OPEN HEART SURG X'S 2     Lipids Mother      Obesity Mother      Coronary Artery Disease Mother      Diabetes Father         ORAL     Hypertension Father      Arthritis Father      Eye Disorder Father         MAC DEGENERATION     Heart Disease Father         CHF     Lipids Father      Obesity Father      Diabetes Maternal Grandfather         INSULIN     Diabetes Brother         INSULIN     Gastrointestinal Disease Brother         CHOLITISI POSSIBLE CHRONES     Obesity Brother      Colon Cancer No family hx of      Breast Cancer No family hx of          Current Outpatient Medications   Medication Sig Dispense Refill     acetaminophen (TYLENOL) 500 MG tablet Take 500 mg by mouth       allopurinol (ZYLOPRIM) 100 MG tablet Take 1 tablet (100 mg) by mouth daily 90 tablet 3     atorvastatin (LIPITOR) 40 MG tablet TAKE 1 TABLET(40 MG) BY MOUTH DAILY 90 tablet 3     carvedilol (COREG) 25 MG tablet Take 1 tablet (25 mg) by mouth 2 times daily 180 tablet 3     cephALEXin (KEFLEX) 500 MG capsule Take 500 mg by mouth Take 4 capsules (2,000 mg) by mouth once as needed One hour prior to dental procedures       cetirizine (ZYRTEC) 10 MG tablet Take 10 mg by mouth       Cholecalciferol (VITAMIN D) 2000 UNITS tablet Take 1 tablet by mouth daily       clobetasol (TEMOVATE) 0.05 % cream Apply sparingly to affected area twice daily for 14 days.  Do not apply to face. 15 g 2     clobetasol (TEMOVATE) 0.05 % external solution Apply topically 2 times daily as needed  5     conjugated estrogens (PREMARIN) 0.625 MG/GM vaginal cream Place 0.5 g vaginally twice a week 30 g 0     Cranberry POWD 1 capsule       Cyanocobalamin (B-12) 1000 MCG TBCR Take 1,000 mcg by mouth daily 100 tablet 1      EPINEPHrine (EPIPEN 2-FRAN) 0.3 MG/0.3ML injection 2-pack Inject 0.3 mLs (0.3 mg) into the muscle once as needed for anaphylaxis 0.6 mL 1     erenumab-aooe (AIMOVIG) 70 MG/ML injection Inject 70 mg Subcutaneous every 28 days       gemfibrozil (LOPID) 600 MG tablet Take 0.5 tablets (300 mg) by mouth 2 times daily 90 tablet 3     hydrOXYzine (ATARAX) 25 MG tablet Take 1 tablet (25 mg) by mouth 3 times daily as needed for itching 120 tablet 1     losartan (COZAAR) 100 MG tablet Take 1 tablet (100 mg) by mouth At Bedtime 90 tablet 1     Magnesium Oxide 250 MG TABS Take 250 mg by mouth       mometasone (ELOCON) 0.1 % external cream Apply topically daily       ondansetron (ZOFRAN) 4 MG tablet Take by mouth every 8 hours as needed for nausea       SUMAtriptan (IMITREX) 20 MG/ACT nasal spray Spray 1 spray in nostril as needed for migraine May repeat in 2 hours. Max 2 sprays/24 hours. 12 each 11     topiramate (TOPAMAX) 100 MG tablet Take 1 tablet by mouth daily  3     torsemide (DEMADEX) 10 MG tablet Take 1 tablet (10 mg) by mouth daily 90 tablet 3     traMADol (ULTRAM) 50 MG tablet Take 1 tablet by mouth every 6 hours as needed  0     triamcinolone (KENALOG) 0.1 % cream Apply topically 2 times daily Apply to AA on trunk, arms or legs bid for 10-14 days 454 g 0     ZOLMitriptan (ZOMIG-ZMT) 5 MG ODT tab Take 5 mg by mouth       Allergies   Allergen Reactions     Bee Venom Anaphylaxis     Codeine Swelling     Body swelling and severe itching. Tolerates Morphine.      Fentanyl Anaphylaxis and Shortness Of Breath     Gabapentin Other (See Comments)     PN: chest heaviness with breathing  Chest heaviness with breathing  Chest heaviness with breathing  chest heaviness with breathing     Hydromorphone Itching     Tolerates morphine  Tolerates morphine     Midazolam Anaphylaxis, Itching and Shortness Of Breath     Tolerates Diazepam     Prilocaine Anaphylaxis     Anaphalactic shock     Propofol Anaphylaxis     Penicillins Itching  "and Rash     Tolerates Keflex,  Ancef  rash     Shingrix [Zoster Vac Recomb Adjuvanted] Itching and Rash     Sulfa Drugs Itching and Rash     rash     Clindamycin      Codeine      Diatrizoate Other (See Comments)     CKD 3     Hydrocodone      Lisinopril Cough     Lorazepam Itching     Nsaids Other (See Comments)     Contraindicated with kidney hx (including Celebrex, per pt)     Other [Seasonal Allergies] Anaphylaxis     GENERAL ANETHESIA       Oxycodone      Vancomycin Other (See Comments)     Thrush, yeast infection, bladder infection  Thrush, yeast infection, bladder infection     Erythromycin Rash     ointment  PN: ointment  ointment     Eucalyptus Oil Rash and Hives     hives     Nitrofuran Derivatives Rash     Nitrofurantoin Rash     Tramadol Rash         Reviewed and updated as needed this visit by Provider         Review of Systems   ROS COMP: Constitutional, HEENT, cardiovascular, pulmonary, gi and gu systems are negative, except as otherwise noted.      Objective    BP 94/49 (BP Location: Left arm, Patient Position: Sitting, Cuff Size: Adult Regular)   Pulse 78   Temp 98  F (36.7  C) (Oral)   Ht 1.626 m (5' 4\")   Wt 81.5 kg (179 lb 11.2 oz)   SpO2 100%   BMI 30.85 kg/m    Body mass index is 30.85 kg/m .  Physical Exam   General: Well-appearing, well-nourished appearing female who does not appear to be in acute distress.  Extremities: There are 2+ bilateral dorsalis pedis pulses, the extremities are warm and there is good capillary refill, there are negative Homans signs bilaterally.    Diagnostic Test Results:  Stress FABRIZIO bilateral pending        Assessment & Plan     1. Claudication of both lower extremities (H)  History is concerning for possible claudication, exam is reassuring, evaluate further with stress FABRIZIO bilateral  - US FABRIZIO Doppler with Exercise Bilateral; Future    2. Hypertriglyceridemia  Did not tolerate fenofibrate, has tolerated gemfibrozil in the past, likely needs drug therapy to " keep triglycerides less than 500, referral to nutrition for additional dietary suggestions to keep triglycerides down, start gemfibrozil, discussed potential for interaction with atorvastatin, if she develops new severe muscle symptoms, she will stop both drugs and contact me  - gemfibrozil (LOPID) 600 MG tablet; Take 0.5 tablets (300 mg) by mouth 2 times daily  Dispense: 90 tablet; Refill: 3  - NUTRITION REFERRAL  - Lipid panel reflex to direct LDL Fasting; Future    3. Migraine without aura and without status migrainosus, not intractable  Has had success with a new drug for migraines, continue follow-up with neurology as directed    4. Anemia due to stage 3 chronic kidney disease (H)  Recheck hemoglobin prior to upcoming follow-up visit  - **Hemoglobin FUTURE anytime; Future  - Iron and iron binding capacity; Future                 Return in about 1 month (around 11/30/2019) for Pain Check, Pre-visit Fasting Lab.    Josh Hollingsworth MD  [facility]    Total face to face contact time was greater than 28 minutes of which more than 50% of this time was spent counseling and coordinating care regarding the above topics.

## 2019-11-05 ENCOUNTER — OFFICE VISIT (OUTPATIENT)
Dept: NUTRITION | Facility: CLINIC | Age: 69
End: 2019-11-05
Payer: MEDICARE

## 2019-11-05 VITALS — WEIGHT: 184.7 LBS | BODY MASS INDEX: 31.7 KG/M2

## 2019-11-05 DIAGNOSIS — E78.1 HYPERTRIGLYCERIDEMIA: Primary | ICD-10-CM

## 2019-11-05 PROCEDURE — 97802 MEDICAL NUTRITION INDIV IN: CPT | Mod: GA

## 2019-11-05 PROCEDURE — 99207 ZZC NO CHARGE LOS: CPT

## 2019-11-05 NOTE — PROGRESS NOTES
Medical Nutrition Therapy  Visit Type:Initial assessment and intervention    Rosa Sotomayor presents today for MNT and education related to hyperlipidemia.   She is accompanied by self.     ASSESSMENT:   Patient comments/concerns relating to nutrition: I've adjusted my diet significantly due to my history of migraines. I feel like there is no way to adjust my diet further - what do I eat, sawdust? Had back surgery that I'm still recovering from but I am able to walk and just started riding a stationary bike.     Patient uses My[employer]itnessPal to track intake and gets around 800-1100 kcals/day with goal of 1300 kcals/day. She states she doesn't have much of any appetite and so doesn't see where she could add in more kcals. She also watches carbs/sugar closely due to hx of migraines. She is frustrated that she is not losing weight despite low kcal intake and focus on healthy foods, exercise.     NUTRITION HISTORY:    Breakfast: Kind bar, banana  Lunch: protein bar, medium apple  Dinner: rotisserie chicken, kale salad, 1/2 sweet potatoe  Snacks: carrots & hummus  Beverages: water OR green tea in AM     Misses meals? None but does eat small breakfast & lunch  Eats out:  Not assessed     Previous diet education:  No     Food allergies/intolerances: not assessed    Diet is high in: fruits and protein  Diet is low in: calories    EXERCISE: walking 4662-9863 steps/day, 15 minutes stationary bike     SOCIO/ECONOMIC:   Lives with: spouse    MEDICATIONS:  Current Outpatient Medications   Medication     acetaminophen (TYLENOL) 500 MG tablet     allopurinol (ZYLOPRIM) 100 MG tablet     atorvastatin (LIPITOR) 40 MG tablet     carvedilol (COREG) 25 MG tablet     cephALEXin (KEFLEX) 500 MG capsule     cetirizine (ZYRTEC) 10 MG tablet     Cholecalciferol (VITAMIN D) 2000 UNITS tablet     clobetasol (TEMOVATE) 0.05 % cream     clobetasol (TEMOVATE) 0.05 % external solution     conjugated estrogens (PREMARIN) 0.625 MG/GM vaginal cream      Cranberry POWD     Cyanocobalamin (B-12) 1000 MCG TBCR     EPINEPHrine (EPIPEN 2-FRAN) 0.3 MG/0.3ML injection 2-pack     erenumab-aooe (AIMOVIG) 70 MG/ML injection     gemfibrozil (LOPID) 600 MG tablet     hydrOXYzine (ATARAX) 25 MG tablet     losartan (COZAAR) 100 MG tablet     Magnesium Oxide 250 MG TABS     mometasone (ELOCON) 0.1 % external cream     ondansetron (ZOFRAN) 4 MG tablet     SUMAtriptan (IMITREX) 20 MG/ACT nasal spray     topiramate (TOPAMAX) 100 MG tablet     torsemide (DEMADEX) 10 MG tablet     traMADol (ULTRAM) 50 MG tablet     triamcinolone (KENALOG) 0.1 % cream     ZOLMitriptan (ZOMIG-ZMT) 5 MG ODT tab     No current facility-administered medications for this visit.        LABS:  Last Basic Metabolic Panel:  Lab Results   Component Value Date     07/08/2019      Lab Results   Component Value Date    POTASSIUM 4.1 07/19/2019     Lab Results   Component Value Date    CHLORIDE 106 07/08/2019     Lab Results   Component Value Date    OSIEL 9.0 07/08/2019     Lab Results   Component Value Date    CO2 24 07/08/2019     Lab Results   Component Value Date    BUN 41 07/08/2019     Lab Results   Component Value Date    CR 1.45 07/19/2019     Lab Results   Component Value Date     07/19/2019       ANTHROPOMETRICS:  Vitals: Wt 83.8 kg (184 lb 11.2 oz)   BMI 31.70 kg/m    Body mass index is 31.7 kg/m .      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 as evidenced by TGs 522 mg/dL    NUTRITION INTERVENTION:  Nutrition Prescription: Energy Intake: 1300 kcal/day avoiding  too significant of calorie deficit to support adequate energy and modest weight loss   Increased fiber intake, stanol/sterol intake  Decreased process/refined sugar intake   Education given to support: weight reduction, consistent meals, fat modification, exercise, fiber, portion control and heart healthy diet  Education Materials Provided: Heart Healthy Food Choices  Motivational Interviewing    PATIENT'S BEHAVIOR CHANGE GOALS:   See Patient Instructions for patient stated behavior change goals. AVS was printed and given to patient at today's appointment.    MONITOR / EVALUATE:  RD will monitor/evaluate:  Pertinent Labs  Weight change    FOLLOW-UP:  Follow up with RD as needed.  Call RD with questions/concerns.     Taylor Zapien RD, LD, CDE   Time spent in minutes: 30  Encounter: Individual

## 2019-11-06 ENCOUNTER — HOSPITAL ENCOUNTER (OUTPATIENT)
Dept: ULTRASOUND IMAGING | Facility: CLINIC | Age: 69
Discharge: HOME | End: 2019-11-06
Attending: INTERNAL MEDICINE | Admitting: INTERNAL MEDICINE
Payer: MEDICARE

## 2019-11-06 DIAGNOSIS — I73.9 CLAUDICATION OF BOTH LOWER EXTREMITIES (H): Primary | ICD-10-CM

## 2019-11-06 DIAGNOSIS — I73.9 CLAUDICATION OF BOTH LOWER EXTREMITIES (H): ICD-10-CM

## 2019-11-06 PROCEDURE — 93924 LWR XTR VASC STDY BILAT: CPT

## 2019-11-07 ENCOUNTER — TELEPHONE (OUTPATIENT)
Dept: OTHER | Facility: CLINIC | Age: 69
End: 2019-11-07

## 2019-11-07 ENCOUNTER — TRANSFERRED RECORDS (OUTPATIENT)
Dept: HEALTH INFORMATION MANAGEMENT | Facility: CLINIC | Age: 69
End: 2019-11-07

## 2019-11-07 NOTE — RESULT ENCOUNTER NOTE
The following letter pertains to your most recent diagnostic tests:    The FABRIZIO screening test does show potential risk for inadequate blood flow to the right lower extremity which might explain cramping that occurs with exercise and improves with rest.  Further evaluation with additional testing is likely necessary.  As such, I recommend a consultation with a vascular surgeon to further evaluate.  I recommend any of the following doctors:  Dr. Harmon, Dr. Mclean, Dr. Hay, Dr. Doyle.  I have sent a referral to their office.  Their staff will get in touch with you to arrange for a consultation.      Sincerely,    Dr. Hollingsworth

## 2019-11-22 ENCOUNTER — OFFICE VISIT (OUTPATIENT)
Dept: OTHER | Facility: CLINIC | Age: 69
End: 2019-11-22
Attending: INTERNAL MEDICINE
Payer: MEDICARE

## 2019-11-22 VITALS
RESPIRATION RATE: 14 BRPM | BODY MASS INDEX: 30.73 KG/M2 | HEART RATE: 59 BPM | WEIGHT: 180 LBS | OXYGEN SATURATION: 97 % | HEIGHT: 64 IN | SYSTOLIC BLOOD PRESSURE: 131 MMHG | DIASTOLIC BLOOD PRESSURE: 62 MMHG

## 2019-11-22 DIAGNOSIS — I73.9 CLAUDICATION OF BOTH LOWER EXTREMITIES (H): ICD-10-CM

## 2019-11-22 PROCEDURE — 99204 OFFICE O/P NEW MOD 45 MIN: CPT | Mod: ZP | Performed by: INTERNAL MEDICINE

## 2019-11-22 PROCEDURE — G0463 HOSPITAL OUTPT CLINIC VISIT: HCPCS

## 2019-11-22 ASSESSMENT — MIFFLIN-ST. JEOR: SCORE: 1341.47

## 2019-11-22 NOTE — PROGRESS NOTES
Vascular Medicine Progress Note     Rosa Sotomayor is a 68 year old female who is here for bilateral lower extremity claudication/pain    Interval History   Patient recently had an FABRIZIO resting and post exercise, patient resting FABRIZIO was completely normal with normal triphasic waveform patterns post exercise right lower extremity went down from 1.2-0.88 and left side went from 1.1-0.99 patient started having pain after the first minute of walking on the treadmill and pain was getting worse then after minute #3 pain subsided after she rested    Its noteworthy that the patient is status post laminectomy and decompression from L2-L5 7 months ago she was having disc bulging and severe spinal stenosis more pronounced at L4 and L5    Patient mentioned that the pain starts as cramps in both calves and then it gets worse if she stands or she sit down the pain is gone  Patient denies any history of nocturnal pain or rest pain and she denies any history of pain if she is on her back lying or on her side    Patient denies any history of palpitation or chest pain no shortness of breath        Physical Exam       BP: 131/62 Pulse: 59   Resp: 14 SpO2: 97 %      Vitals:    11/22/19 0854   Weight: 81.6 kg (180 lb)     Vital Signs with Ranges  Pulse:  [59] 59  Resp:  [14] 14  BP: (131)/(62) 131/62  SpO2:  [97 %] 97 %  [unfilled]    Constitutional: awake, alert, cooperative, no apparent distress, and appears stated age  Eyes: Lids and lashes normal, pupils equal, round and reactive to light, extra ocular muscles intact, sclera clear, conjunctiva normal  ENT: normocepalic, without obvious abnormality, oropharynx pink and moist  Hematologic / Lymphatic: no lymphadenopathy  Respiratory: No increased work of breathing, good air exchange, clear to auscultation bilaterally, no crackles or wheezing  Cardiovascular: regular rate and rhythm, normal S1 and S2 and no murmur noted  GI: Normal bowel sounds, soft, non-distended,  non-tender  Skin: no redness, warmth, or swelling, no rashes and no lesions  Musculoskeletal: There is no redness, warmth, or swelling of the joints.  Full range of motion noted.  Motor strength is 5 out of 5 all extremities bilaterally.  Tone is normal.  Neurologic: Awake, alert, oriented to name, place and time.  Cranial nerves II-XII are grossly intact.  Motor is 5 out of 5 bilaterally.    Neuropsychiatric:  Normal affect, memory, insight.  Pulses: Palpable +2 pulses bilateral radial, femoral, DP and PT arteries popliteal artery is felt +1 on the right side could not appreciated on the left side  . No carotid bruits appreciated.     Medications         Data   No results found for this or any previous visit (from the past 24 hour(s)).     Reviewed all her reports, reviewed all her labs and her imaging and her FABRIZIO resting and post exercise    Assessment & Plan   (I73.9) Claudication of both lower extremities (H)  Comment: Bilateral lower extremity pain with claudication normal FABRIZIO at rest and drop in FABRIZIO post exercise placing the patient in the category of mild to moderate PAD right lower extremity worse than left although the patient's symptoms are more pronounced on the left side  Plan: US Lower Extremity Arterial Duplex Bilateral,         Follow-Up with Vascular Medicine                Summary: Mild to moderate PAD post exercise with claudication patient's symptoms are worse on the left more than the right although her numbers are worse on the right more than the left  We will rule out any heavily calcified/plaque arteries especially in the area of the mid to distal superficial femoral artery bilateral    Claudication can be secondary to residual spinal stenosis    We will see the patient once test results are available    Vascular risk factors for this patient  Age  Hypertension patient is within target  Hyperlipidemia LDL should be less than 100 patient is still away from target  Patient is a  non-smoker  Inactivity, patient is very active    Sylvester Fleming MD

## 2019-11-22 NOTE — PROGRESS NOTES
"Rosa Sotomayor is a 68 year old female who presents for:  Chief Complaint   Patient presents with     Nurse Visit     Pt referred to [facility] by Josh Hollingsworth MD for bilateral lower extremity claudication.          Vitals:    Vitals:    11/22/19 0854   BP: 131/62   BP Location: Right arm   Patient Position: Chair   Cuff Size: Adult Regular   Pulse: 59   Resp: 14   SpO2: 97%   Weight: 180 lb (81.6 kg)   Height: 5' 4\" (1.626 m)       BMI:  Estimated body mass index is 30.9 kg/m  as calculated from the following:    Height as of this encounter: 5' 4\" (1.626 m).    Weight as of this encounter: 180 lb (81.6 kg).    Pain Score:  Data Unavailable        Kassy Alarcon CMA    "

## 2019-11-29 DIAGNOSIS — D63.1 ANEMIA DUE TO STAGE 3 CHRONIC KIDNEY DISEASE (H): ICD-10-CM

## 2019-11-29 DIAGNOSIS — N18.30 ANEMIA DUE TO STAGE 3 CHRONIC KIDNEY DISEASE (H): ICD-10-CM

## 2019-11-29 DIAGNOSIS — E78.1 HYPERTRIGLYCERIDEMIA: ICD-10-CM

## 2019-11-29 DIAGNOSIS — D64.9 NORMOCYTIC ANEMIA: ICD-10-CM

## 2019-11-29 LAB
BASOPHILS # BLD AUTO: 0 10E9/L (ref 0–0.2)
BASOPHILS NFR BLD AUTO: 0.5 %
CHOLEST SERPL-MCNC: 192 MG/DL
DIFFERENTIAL METHOD BLD: ABNORMAL
EOSINOPHIL # BLD AUTO: 0.3 10E9/L (ref 0–0.7)
EOSINOPHIL NFR BLD AUTO: 8 %
ERYTHROCYTE [DISTWIDTH] IN BLOOD BY AUTOMATED COUNT: 15.1 % (ref 10–15)
HCT VFR BLD AUTO: 31 % (ref 35–47)
HDLC SERPL-MCNC: 45 MG/DL
HGB BLD-MCNC: 9.8 G/DL (ref 11.7–15.7)
IRON SATN MFR SERPL: 21 % (ref 15–46)
IRON SERPL-MCNC: 67 UG/DL (ref 35–180)
LDLC SERPL CALC-MCNC: 89 MG/DL
LYMPHOCYTES # BLD AUTO: 1.5 10E9/L (ref 0.8–5.3)
LYMPHOCYTES NFR BLD AUTO: 35 %
MCH RBC QN AUTO: 32.7 PG (ref 26.5–33)
MCHC RBC AUTO-ENTMCNC: 31.6 G/DL (ref 31.5–36.5)
MCV RBC AUTO: 103 FL (ref 78–100)
MONOCYTES # BLD AUTO: 0.5 10E9/L (ref 0–1.3)
MONOCYTES NFR BLD AUTO: 11 %
NEUTROPHILS # BLD AUTO: 1.9 10E9/L (ref 1.6–8.3)
NEUTROPHILS NFR BLD AUTO: 45.5 %
NONHDLC SERPL-MCNC: 147 MG/DL
PLATELET # BLD AUTO: 230 10E9/L (ref 150–450)
RBC # BLD AUTO: 3 10E12/L (ref 3.8–5.2)
TIBC SERPL-MCNC: 322 UG/DL (ref 240–430)
TRIGL SERPL-MCNC: 291 MG/DL
WBC # BLD AUTO: 4.3 10E9/L (ref 4–11)

## 2019-11-29 PROCEDURE — 83540 ASSAY OF IRON: CPT | Performed by: INTERNAL MEDICINE

## 2019-11-29 PROCEDURE — 36415 COLL VENOUS BLD VENIPUNCTURE: CPT | Performed by: INTERNAL MEDICINE

## 2019-11-29 PROCEDURE — 80061 LIPID PANEL: CPT | Performed by: INTERNAL MEDICINE

## 2019-11-29 PROCEDURE — 85025 COMPLETE CBC W/AUTO DIFF WBC: CPT | Performed by: INTERNAL MEDICINE

## 2019-11-29 PROCEDURE — 83550 IRON BINDING TEST: CPT | Performed by: INTERNAL MEDICINE

## 2019-12-02 ENCOUNTER — OFFICE VISIT (OUTPATIENT)
Dept: FAMILY MEDICINE | Facility: CLINIC | Age: 69
End: 2019-12-02
Payer: MEDICARE

## 2019-12-02 VITALS
BODY MASS INDEX: 31.07 KG/M2 | WEIGHT: 182 LBS | DIASTOLIC BLOOD PRESSURE: 56 MMHG | TEMPERATURE: 98.5 F | HEIGHT: 64 IN | OXYGEN SATURATION: 97 % | SYSTOLIC BLOOD PRESSURE: 105 MMHG | HEART RATE: 79 BPM

## 2019-12-02 DIAGNOSIS — M25.572 PAIN IN JOINT, ANKLE AND FOOT, LEFT: ICD-10-CM

## 2019-12-02 DIAGNOSIS — N18.30 ANEMIA DUE TO STAGE 3 CHRONIC KIDNEY DISEASE (H): ICD-10-CM

## 2019-12-02 DIAGNOSIS — D63.1 ANEMIA DUE TO STAGE 3 CHRONIC KIDNEY DISEASE (H): ICD-10-CM

## 2019-12-02 DIAGNOSIS — I10 BENIGN ESSENTIAL HYPERTENSION: Primary | ICD-10-CM

## 2019-12-02 PROCEDURE — 99213 OFFICE O/P EST LOW 20 MIN: CPT | Performed by: INTERNAL MEDICINE

## 2019-12-02 RX ORDER — CARVEDILOL 25 MG/1
25 TABLET ORAL 2 TIMES DAILY
Qty: 180 TABLET | Refills: 3 | Status: SHIPPED | OUTPATIENT
Start: 2019-12-02 | End: 2020-07-29

## 2019-12-02 ASSESSMENT — MIFFLIN-ST. JEOR: SCORE: 1350.55

## 2019-12-02 NOTE — TELEPHONE ENCOUNTER
RECORDS STATUS - ALL OTHER DIAGNOSIS      RECORDS RECEIVED FROM: Epic/[facility]   DATE RECEIVED: 12/12/2019   NOTES STATUS DETAILS   OFFICE NOTE from referring provider Complete  Josh Hollingsworth MD   OFFICE NOTE from medical oncologist Complete EPIC   DISCHARGE SUMMARY from hospital Complete EPIC   DISCHARGE REPORT from the ER N/A    OPERATIVE REPORT Complete EPIC   MEDICATION LIST Complete [facility]   CLINICAL TRIAL TREATMENTS TO DATE N/A    LABS     PATHOLOGY REPORTS N/A    ANYTHING RELATED TO DIAGNOSIS Complete EPIC   GENONOMIC TESTING     TYPE:     IMAGING (NEED IMAGES & REPORT)     CT SCANS Complete  PACS   MRI     MAMMO     ULTRASOUND Complete PACS   PET       Action    Action Taken 12/2/2019 3:40pm     I called pt Rosa to check on any outside materials. Pt confirmed that all records are internal. Per pt no Biopsies, lab reports are in [facility].

## 2019-12-02 NOTE — PROGRESS NOTES
Subjective     Rosa Sotomayor is a 68 year old female who presents to clinic today for the following health issues:    HPI   Patient states that she is here to discuss lab results.     Tolerating gemfibrozil without issues  Will have arterial ultrasound to follow up on abnormal FABRIZIO test  Hemoglobin dipped to 9.8 and she feels moderately fatigued for several weeks and want to discuss Aranesp infusion with hematologist, but Dr. Romo left   She had normal iron stores  She describes moderate pain in left ankle onset one month ago after treadmill test   Has been working with PT, but not getting better     Patient Active Problem List   Diagnosis     Personal history of allergy to anesthetic agent     Anemia due to stage 3 chronic kidney disease (H)     Hypertriglyceridemia     Arnold-Chiari malformation (H)     Hyperlipidemia LDL goal <130     Anxiety     Secondary renal hyperparathyroidism (H)     Chronic bilateral low back pain without sciatica     Benign essential hypertension     Renal artery stenosis (H)     Macular degeneration, bilateral     Anemia, iron deficiency     Migraine without aura and without status migrainosus, not intractable     Aortic valve insufficiency     ESTEFANIA (obstructive sleep apnea)- mild (AHI 12)     Spinal stenosis of lumbar region with neurogenic claudication     Past Surgical History:   Procedure Laterality Date     BACK SURGERY      discectomy L3-4, 2018     C NONSPECIFIC PROCEDURE      wisdom teeth     C NONSPECIFIC PROCEDURE  2005    Varicose Veins     C TOTAL HIP ARTHROPLASTY      left in 2012, right 2017     C TOTAL KNEE ARTHROPLASTY      right 2014, left 2018     ROTATOR CUFF REPAIR RT/LT Right     2016     TUBAL LIGATION  1987       Social History     Tobacco Use     Smoking status: Never Smoker     Smokeless tobacco: Never Used   Substance Use Topics     Alcohol use: No     Alcohol/week: 0.0 standard drinks     Family History   Problem Relation Age of Onset     Diabetes Mother          INSULIN     Hypertension Mother      Eye Disorder Mother         CATERACTS     Heart Disease Mother         CHF- OPEN HEART SURG X'S 2     Lipids Mother      Obesity Mother      Coronary Artery Disease Mother      Diabetes Father         ORAL     Hypertension Father      Arthritis Father      Eye Disorder Father         MAC DEGENERATION     Heart Disease Father         CHF     Lipids Father      Obesity Father      Diabetes Maternal Grandfather         INSULIN     Diabetes Brother         INSULIN     Gastrointestinal Disease Brother         CHOLITISI POSSIBLE CHRONES     Obesity Brother      Colon Cancer No family hx of      Breast Cancer No family hx of          Current Outpatient Medications   Medication Sig Dispense Refill     acetaminophen (TYLENOL) 500 MG tablet Take 500 mg by mouth       allopurinol (ZYLOPRIM) 100 MG tablet Take 1 tablet (100 mg) by mouth daily 90 tablet 3     atorvastatin (LIPITOR) 40 MG tablet TAKE 1 TABLET(40 MG) BY MOUTH DAILY 90 tablet 3     carvedilol (COREG) 25 MG tablet Take 1 tablet (25 mg) by mouth 2 times daily 180 tablet 3     cephALEXin (KEFLEX) 500 MG capsule Take 500 mg by mouth Take 4 capsules (2,000 mg) by mouth once as needed One hour prior to dental procedures       cetirizine (ZYRTEC) 10 MG tablet Take 10 mg by mouth       Cholecalciferol (VITAMIN D) 2000 UNITS tablet Take 1 tablet by mouth daily       clobetasol (TEMOVATE) 0.05 % cream Apply sparingly to affected area twice daily for 14 days.  Do not apply to face. 15 g 2     clobetasol (TEMOVATE) 0.05 % external solution Apply topically 2 times daily as needed  5     conjugated estrogens (PREMARIN) 0.625 MG/GM vaginal cream Place 0.5 g vaginally twice a week 30 g 0     Cranberry POWD 1 capsule       Cyanocobalamin (B-12) 1000 MCG TBCR Take 1,000 mcg by mouth daily 100 tablet 1     EPINEPHrine (EPIPEN 2-FRAN) 0.3 MG/0.3ML injection 2-pack Inject 0.3 mLs (0.3 mg) into the muscle once as needed for anaphylaxis 0.6 mL 1      erenumab-aooe (AIMOVIG) 70 MG/ML injection Inject 70 mg Subcutaneous every 28 days       gemfibrozil (LOPID) 600 MG tablet Take 0.5 tablets (300 mg) by mouth 2 times daily 90 tablet 3     hydrOXYzine (ATARAX) 25 MG tablet Take 1 tablet (25 mg) by mouth 3 times daily as needed for itching 120 tablet 1     losartan (COZAAR) 100 MG tablet Take 1 tablet (100 mg) by mouth At Bedtime 90 tablet 1     Magnesium Oxide 250 MG TABS Take 250 mg by mouth       mometasone (ELOCON) 0.1 % external cream Apply topically daily       ondansetron (ZOFRAN) 4 MG tablet Take by mouth every 8 hours as needed for nausea       SUMAtriptan (IMITREX) 20 MG/ACT nasal spray Spray 1 spray in nostril as needed for migraine May repeat in 2 hours. Max 2 sprays/24 hours. 12 each 11     topiramate (TOPAMAX) 100 MG tablet Take 1 tablet by mouth daily  3     torsemide (DEMADEX) 10 MG tablet Take 1 tablet (10 mg) by mouth daily 90 tablet 3     traMADol (ULTRAM) 50 MG tablet Take 1 tablet by mouth every 6 hours as needed  0     triamcinolone (KENALOG) 0.1 % cream Apply topically 2 times daily Apply to AA on trunk, arms or legs bid for 10-14 days 454 g 0     ZOLMitriptan (ZOMIG-ZMT) 5 MG ODT tab Take 5 mg by mouth       Allergies   Allergen Reactions     Bee Venom Anaphylaxis     Codeine Swelling     Body swelling and severe itching. Tolerates Morphine.      Fentanyl Anaphylaxis and Shortness Of Breath     Gabapentin Other (See Comments)     PN: chest heaviness with breathing  Chest heaviness with breathing  Chest heaviness with breathing  chest heaviness with breathing     Hydromorphone Itching     Tolerates morphine  Tolerates morphine     Midazolam Anaphylaxis, Itching and Shortness Of Breath     Tolerates Diazepam     Prilocaine Anaphylaxis     Anaphalactic shock     Propofol Anaphylaxis     Penicillins Itching and Rash     Tolerates Keflex,  Ancef  rash     Shingrix [Zoster Vac Recomb Adjuvanted] Itching and Rash     Sulfa Drugs Itching and Rash      "rash     Clindamycin      Codeine      Diatrizoate Other (See Comments)     CKD 3     Hydrocodone      Lisinopril Cough     Lorazepam Itching     Nsaids Other (See Comments)     Contraindicated with kidney hx (including Celebrex, per pt)     Other [Seasonal Allergies] Anaphylaxis     GENERAL ANETHESIA       Oxycodone      Vancomycin Other (See Comments)     Thrush, yeast infection, bladder infection  Thrush, yeast infection, bladder infection     Erythromycin Rash     ointment  PN: ointment  ointment     Eucalyptus Oil Rash and Hives     hives     Nitrofuran Derivatives Rash     Nitrofurantoin Rash     Tramadol Rash         Reviewed and updated as needed this visit by Provider         Review of Systems         Objective    /56   Pulse 79   Temp 98.5  F (36.9  C) (Oral)   Ht 1.626 m (5' 4\")   Wt 82.6 kg (182 lb)   SpO2 97%   BMI 31.24 kg/m    Body mass index is 31.24 kg/m .  Physical Exam   GEN:  Unchanged comfortable appearing  MSK:  Tender tendinous insertion site medial left ankle, negative 'squeeze' and draw tests, normal gait, no swelling, no focal areas of bony tenderness, negative Bharathi's sign    Diagnostic Test Results:  Labs reviewed in Epic        Assessment & Plan     1. Benign essential hypertension  Under good control; refill Corge  - carvedilol (COREG) 25 MG tablet; Take 1 tablet (25 mg) by mouth 2 times daily  Dispense: 180 tablet; Refill: 3    2. Anemia due to stage 3 chronic kidney disease (H)  Referral to hematology for consideration of Aranesp infusion  - Oncology/Hematology Adult Referral; Future    3. Pain in joint, ankle and foot, left  Suspect soft tissue injury from walking on incline on treadmill for FABRIZIO test  Can see Dr. Booth to see if a brace may help or other therapies  - SPORTS MEDICINE REFERRAL               Return in about 3 months (around 3/2/2020) for Preventive Visit.  Or sooner as needed     Josh Hollingsworth MD  [facility]        "

## 2019-12-04 ENCOUNTER — TRANSFERRED RECORDS (OUTPATIENT)
Dept: HEALTH INFORMATION MANAGEMENT | Facility: CLINIC | Age: 69
End: 2019-12-04

## 2019-12-04 ENCOUNTER — ANCILLARY PROCEDURE (OUTPATIENT)
Dept: GENERAL RADIOLOGY | Facility: CLINIC | Age: 69
End: 2019-12-04
Attending: FAMILY MEDICINE
Payer: MEDICARE

## 2019-12-04 ENCOUNTER — OFFICE VISIT (OUTPATIENT)
Dept: ORTHOPEDICS | Facility: CLINIC | Age: 69
End: 2019-12-04
Payer: MEDICARE

## 2019-12-04 VITALS
BODY MASS INDEX: 31.07 KG/M2 | HEIGHT: 64 IN | DIASTOLIC BLOOD PRESSURE: 72 MMHG | SYSTOLIC BLOOD PRESSURE: 134 MMHG | WEIGHT: 182 LBS

## 2019-12-04 DIAGNOSIS — M25.572 LEFT ANKLE PAIN, UNSPECIFIED CHRONICITY: ICD-10-CM

## 2019-12-04 DIAGNOSIS — M76.822 POSTERIOR TIBIAL TENDINITIS OF LEFT LOWER EXTREMITY: Primary | ICD-10-CM

## 2019-12-04 PROCEDURE — 99203 OFFICE O/P NEW LOW 30 MIN: CPT | Performed by: FAMILY MEDICINE

## 2019-12-04 PROCEDURE — 73610 X-RAY EXAM OF ANKLE: CPT | Mod: LT | Performed by: FAMILY MEDICINE

## 2019-12-04 ASSESSMENT — MIFFLIN-ST. JEOR: SCORE: 1350.55

## 2019-12-04 NOTE — LETTER
12/4/2019         RE: Rosa Sotomayor  [address] [address]  [place] [address]        Dear Colleague,    Thank you for referring your patient, Rosa Sotomayor, to the  SP[facility]TS MEDICINE. Please see a copy of my visit note below.    HPI     [facility]   Clinic Visit s Dec 4, 2019    PCP: Josh Hollingsworth      Rosa is a 68 year old female who is seen as self referral for   Chief Complaint   Patient presents with     Left Ankle - Pain       Injury: pain that began after her incline treadmill test.       Location of Pain: left ankle posterior, nonradiating   Duration of Pain: acute, 5 week(s),   Rating of Pain at worst: 7/10  Rating of Pain Currently: 3/10  Pain is better with: nothing   Pain is worse with: nothing specific  Treatment so far consists of: warm bath, massage, home exercises and tylenol  Associated symptoms: no distal numbness or tingling; denies swelling or warmth  Recent imaging completed: No recent imaging completed.  Prior History of related problems: none    Wears orthotics regularly for years.    Social History: retired     Past Medical History:   Diagnosis Date     Complication of anesthesia      Postmenopausal bleeding 2/28/2007       Patient Active Problem List    Diagnosis Date Noted     Spinal stenosis of lumbar region with neurogenic claudication 05/13/2019     Priority: Medium     ESTEFANIA (obstructive sleep apnea)- mild (AHI 12) 12/17/2018     Priority: Medium     12/13/2018 [facility] Sleep Study (188.0 lbs) - AHI 12.4, RDI 31.7, Supine AHI 52.0, REM AHI 34.3, Low O2 75.1%, Time Spent ?88% 1.4 minutes / Time Spent ?89% 2.4 minutes.       Aortic valve insufficiency 04/19/2018     Priority: Medium     Migraine without aura and without status migrainosus, not intractable 11/20/2017     Priority: Medium     Anemia, iron deficiency 06/23/2017     Priority: Medium     Renal artery stenosis (H) 01/05/2017     Priority: Medium     Macular degeneration,  bilateral 01/05/2017     Priority: Medium     Benign essential hypertension 12/28/2016     Priority: Medium     Chronic bilateral low back pain without sciatica 11/29/2016     Priority: Medium     Secondary renal hyperparathyroidism (H) 07/31/2016     Priority: Medium     Arnold-Chiari malformation (H) 07/23/2016     Priority: Medium     Hyperlipidemia LDL goal <130 07/23/2016     Priority: Medium     Anxiety 07/23/2016     Priority: Medium     Anemia due to stage 3 chronic kidney disease (H) 07/22/2016     Priority: Medium     Hypertriglyceridemia 07/22/2016     Priority: Medium     Personal history of allergy to anesthetic agent 03/10/2007     Priority: Medium       Family History   Problem Relation Age of Onset     Diabetes Mother         INSULIN     Hypertension Mother      Eye Disorder Mother         CATERACTS     Heart Disease Mother         CHF- OPEN HEART SURG X'S 2     Lipids Mother      Obesity Mother      Coronary Artery Disease Mother      Diabetes Father         ORAL     Hypertension Father      Arthritis Father      Eye Disorder Father         MAC DEGENERATION     Heart Disease Father         CHF     Lipids Father      Obesity Father      Diabetes Maternal Grandfather         INSULIN     Diabetes Brother         INSULIN     Gastrointestinal Disease Brother         CHOLITISI POSSIBLE CHRONES     Obesity Brother      Colon Cancer No family hx of      Breast Cancer No family hx of        Social History     Socioeconomic History     Marital status:      Spouse name: Not on file     Number of children: Not on file     Years of education: Not on file     Highest education level: Not on file   Occupational History     Not on file   Social Needs     Financial resource strain: Not on file     Food insecurity:     Worry: Not on file     Inability: Not on file     Transportation needs:     Medical: Not on file     Non-medical: Not on file   Tobacco Use     Smoking status: Never Smoker     Smokeless  "tobacco: Never Used   Substance and Sexual Activity     Alcohol use: No     Alcohol/week: 0.0 standard drinks     Drug use: No     Sexual activity: Yes     Partners: Male     Comment: postmeno   Lifestyle     Physical activity:     Days per week: Not on file     Minutes per session: Not on file     Stress: Not on file   Relationships     Social connections:     Talks on phone: Not on file     Gets together: Not on file     Attends [employer] service: Not on file     Active member of club or organization: Not on file     Attends meetings of clubs or organizations: Not on file     Relationship status: Not on file     Intimate partner violence:     Fear of current or ex partner: Not on file     Emotionally abused: Not on file     Physically abused: Not on file     Forced sexual activity: Not on file   Other Topics Concern     Parent/sibling w/ CABG, MI or angioplasty before 65F 55M? Not Asked   Social History Narrative     Not on file       Past Surgical History:   Procedure Laterality Date     BACK SURGERY      discectomy L3-4, 2018     C NONSPECIFIC PROCEDURE      wisdom teeth     C NONSPECIFIC PROCEDURE  2005    Varicose Veins     C TOTAL HIP ARTHROPLASTY      left in 2012, right 2017     C TOTAL KNEE ARTHROPLASTY      right 2014, left 2018     ROTATOR CUFF REPAIR RT/LT Right     2016     TUBAL LIGATION  1987           Review of Systems   Musculoskeletal: Positive for joint pain.   All other systems reviewed and are negative.        Physical Exam  /72   Ht 1.626 m (5' 4\")   Wt 82.6 kg (182 lb)   BMI 31.24 kg/m     Constitutional:well-developed, well-nourished, and in no distress.   Cardiovascular: Intact distal pulses.    Neurological: alert. Gait genu valgus ankles  Skin: Skin is warm and dry.   Psychiatric: Mood and affect normal.   Respiratory: unlabored, speaks in full sentences  Lymph: no LAD, no lymphangitis      Left Ankle Exam     Tenderness   Left ankle tenderness location: posteromedial ankle, in " PTT area.   Swelling: mild    Range of Motion   Dorsiflexion: normal   Plantar flexion: normal   Eversion: normal   Inversion: normal     Muscle Strength   Dorsiflexion:  5/5   Plantar flexion:  5/5   Anterior tibial:  5/5   Posterior tibial:  5/5  Gastrocsoleus:  5/5  Peroneal muscle:  5/5    Tests   Anterior drawer: negative  Varus tilt: negative    Other   Erythema: absent  Scars: absent  Sensation: normal  Pulse: present    Comments:  Mild pain with repeated toe raise             X-ray images Ordered and independently reviewed by me in the office today with the patient. X-ray shows:    Recent Results (from the past 48 hour(s))   XR Ankle Left G/E 3 Views    Narrative    12/5/2019    Posterior and plantar calcaneal traction spurring identified.  No acute   fractures.  Poorly defined linear calcifications off the lateral aspect of   the cuboid of unknown etiology.  This may reflect prior trauma.           ASSESSMENT/PLAN    ICD-10-CM    1. Posterior tibial tendinitis of left lower extremity M76.822 [facility]   2. Left ankle pain, unspecified chronicity M25.572 XR Ankle Left G/E 3 Views     Onset of symptoms related to walking on incline treadmill, and condition with the patient's biomechanics about the ankles, conceivably creating strain on the posterior tibial tendon with pain correlating well to that area.  She already has custom orthotics in her shoes, therefore I referred her to physical therapy for ankle strengthening.      Again, thank you for allowing me to participate in the care of your patient.        Sincerely,        Alex Booth MD

## 2019-12-04 NOTE — PROGRESS NOTES
HPI     [facility]   Clinic Visit s Dec 4, 2019    PCP: Josh Hollingsworth      Rosa is a 68 year old female who is seen as self referral for   Chief Complaint   Patient presents with     Left Ankle - Pain       Injury: pain that began after her incline treadmill test.       Location of Pain: left ankle posterior, nonradiating   Duration of Pain: acute, 5 week(s),   Rating of Pain at worst: 7/10  Rating of Pain Currently: 3/10  Pain is better with: nothing   Pain is worse with: nothing specific  Treatment so far consists of: warm bath, massage, home exercises and tylenol  Associated symptoms: no distal numbness or tingling; denies swelling or warmth  Recent imaging completed: No recent imaging completed.  Prior History of related problems: none    Wears orthotics regularly for years.    Social History: retired     Past Medical History:   Diagnosis Date     Complication of anesthesia      Postmenopausal bleeding 2/28/2007       Patient Active Problem List    Diagnosis Date Noted     Spinal stenosis of lumbar region with neurogenic claudication 05/13/2019     Priority: Medium     ESTEFANIA (obstructive sleep apnea)- mild (AHI 12) 12/17/2018     Priority: Medium     12/13/2018 [facility] Sleep Study (188.0 lbs) - AHI 12.4, RDI 31.7, Supine AHI 52.0, REM AHI 34.3, Low O2 75.1%, Time Spent ?88% 1.4 minutes / Time Spent ?89% 2.4 minutes.       Aortic valve insufficiency 04/19/2018     Priority: Medium     Migraine without aura and without status migrainosus, not intractable 11/20/2017     Priority: Medium     Anemia, iron deficiency 06/23/2017     Priority: Medium     Renal artery stenosis (H) 01/05/2017     Priority: Medium     Macular degeneration, bilateral 01/05/2017     Priority: Medium     Benign essential hypertension 12/28/2016     Priority: Medium     Chronic bilateral low back pain without sciatica 11/29/2016     Priority: Medium     Secondary renal hyperparathyroidism (H) 07/31/2016      Priority: Medium     Arnold-Chiari malformation (H) 07/23/2016     Priority: Medium     Hyperlipidemia LDL goal <130 07/23/2016     Priority: Medium     Anxiety 07/23/2016     Priority: Medium     Anemia due to stage 3 chronic kidney disease (H) 07/22/2016     Priority: Medium     Hypertriglyceridemia 07/22/2016     Priority: Medium     Personal history of allergy to anesthetic agent 03/10/2007     Priority: Medium       Family History   Problem Relation Age of Onset     Diabetes Mother         INSULIN     Hypertension Mother      Eye Disorder Mother         CATERACTS     Heart Disease Mother         CHF- OPEN HEART SURG X'S 2     Lipids Mother      Obesity Mother      Coronary Artery Disease Mother      Diabetes Father         ORAL     Hypertension Father      Arthritis Father      Eye Disorder Father         MAC DEGENERATION     Heart Disease Father         CHF     Lipids Father      Obesity Father      Diabetes Maternal Grandfather         INSULIN     Diabetes Brother         INSULIN     Gastrointestinal Disease Brother         CHOLITISI POSSIBLE CHRONES     Obesity Brother      Colon Cancer No family hx of      Breast Cancer No family hx of        Social History     Socioeconomic History     Marital status:      Spouse name: Not on file     Number of children: Not on file     Years of education: Not on file     Highest education level: Not on file   Occupational History     Not on file   Social Needs     Financial resource strain: Not on file     Food insecurity:     Worry: Not on file     Inability: Not on file     Transportation needs:     Medical: Not on file     Non-medical: Not on file   Tobacco Use     Smoking status: Never Smoker     Smokeless tobacco: Never Used   Substance and Sexual Activity     Alcohol use: No     Alcohol/week: 0.0 standard drinks     Drug use: No     Sexual activity: Yes     Partners: Male     Comment: postmeno   Lifestyle     Physical activity:     Days per week: Not on  "file     Minutes per session: Not on file     Stress: Not on file   Relationships     Social connections:     Talks on phone: Not on file     Gets together: Not on file     Attends Nondenominational service: Not on file     Active member of club or organization: Not on file     Attends meetings of clubs or organizations: Not on file     Relationship status: Not on file     Intimate partner violence:     Fear of current or ex partner: Not on file     Emotionally abused: Not on file     Physically abused: Not on file     Forced sexual activity: Not on file   Other Topics Concern     Parent/sibling w/ CABG, MI or angioplasty before 65F 55M? Not Asked   Social History Narrative     Not on file       Past Surgical History:   Procedure Laterality Date     BACK SURGERY      discectomy L3-4, 2018     C NONSPECIFIC PROCEDURE      wisdom teeth     C NONSPECIFIC PROCEDURE  2005    Varicose Veins     C TOTAL HIP ARTHROPLASTY      left in 2012, right 2017     C TOTAL KNEE ARTHROPLASTY      right 2014, left 2018     ROTATOR CUFF REPAIR RT/LT Right     2016     TUBAL LIGATION  1987           Review of Systems   Musculoskeletal: Positive for joint pain.   All other systems reviewed and are negative.        Physical Exam  /72   Ht 1.626 m (5' 4\")   Wt 82.6 kg (182 lb)   BMI 31.24 kg/m    Constitutional:well-developed, well-nourished, and in no distress.   Cardiovascular: Intact distal pulses.    Neurological: alert. Gait genu valgus ankles  Skin: Skin is warm and dry.   Psychiatric: Mood and affect normal.   Respiratory: unlabored, speaks in full sentences  Lymph: no LAD, no lymphangitis      Left Ankle Exam     Tenderness   Left ankle tenderness location: posteromedial ankle, in PTT area.   Swelling: mild    Range of Motion   Dorsiflexion: normal   Plantar flexion: normal   Eversion: normal   Inversion: normal     Muscle Strength   Dorsiflexion:  5/5   Plantar flexion:  5/5   Anterior tibial:  5/5   Posterior tibial:  " 5/5  Gastrocsoleus:  5/5  Peroneal muscle:  5/5    Tests   Anterior drawer: negative  Varus tilt: negative    Other   Erythema: absent  Scars: absent  Sensation: normal  Pulse: present    Comments:  Mild pain with repeated toe raise             X-ray images Ordered and independently reviewed by me in the office today with the patient. X-ray shows:    Recent Results (from the past 48 hour(s))   XR Ankle Left G/E 3 Views    Narrative    12/5/2019    Posterior and plantar calcaneal traction spurring identified.  No acute   fractures.  Poorly defined linear calcifications off the lateral aspect of   the cuboid of unknown etiology.  This may reflect prior trauma.           ASSESSMENT/PLAN    ICD-10-CM    1. Posterior tibial tendinitis of left lower extremity M76.822 [facility]   2. Left ankle pain, unspecified chronicity M25.572 XR Ankle Left G/E 3 Views     Onset of symptoms related to walking on incline treadmill, and condition with the patient's biomechanics about the ankles, conceivably creating strain on the posterior tibial tendon with pain correlating well to that area.  She already has custom orthotics in her shoes, therefore I referred her to physical therapy for ankle strengthening.

## 2019-12-06 ENCOUNTER — THERAPY VISIT (OUTPATIENT)
Dept: PHYSICAL THERAPY | Facility: CLINIC | Age: 69
End: 2019-12-06
Payer: MEDICARE

## 2019-12-06 DIAGNOSIS — M76.822 POSTERIOR TIBIAL TENDINITIS OF LEFT LOWER EXTREMITY: ICD-10-CM

## 2019-12-06 DIAGNOSIS — M76.822 LEFT TIBIALIS POSTERIOR TENDONITIS: ICD-10-CM

## 2019-12-06 PROCEDURE — 97140 MANUAL THERAPY 1/> REGIONS: CPT | Mod: GP | Performed by: PHYSICAL THERAPIST

## 2019-12-06 PROCEDURE — 97110 THERAPEUTIC EXERCISES: CPT | Mod: GP | Performed by: PHYSICAL THERAPIST

## 2019-12-06 PROCEDURE — 97035 APP MDLTY 1+ULTRASOUND EA 15: CPT | Mod: GP | Performed by: PHYSICAL THERAPIST

## 2019-12-06 PROCEDURE — 97161 PT EVAL LOW COMPLEX 20 MIN: CPT | Mod: GP | Performed by: PHYSICAL THERAPIST

## 2019-12-06 NOTE — LETTER
DEPARTMENT OF HEALTH AND HUMAN SERVICES  [facility]    PLAN/UPDATED PLAN OF PROGRESS FOR OUTPATIENT REHABILITATION    PATIENTS NAME:  Rosa Sotomayor   : 1950    PROVIDER NUMBER:    [id number]    HICN: [id number]     PROVIDER NAME: [facility]    MEDICAL RECORD NUMBER: [id number]     START OF CARE DATE:  SOC Date: 19   TYPE:  PT    PRIMARY/TREATMENT DIAGNOSIS: (Pertinent Medical Diagnosis)     Posterior tibial tendinitis of left lower extremity  Left tibialis posterior tendonitis    VISITS FROM START OF CARE:  Rxs Used: 1     [facility] Initial Evaluation  Subjective:  Patient is referred with a 5 week hx of L medial lower leg and ankle pain. Sxs developed after performing a treadmill compartment test. PMH includes lumbar decompression 6 months ago and L L/E edema related to stage 3 kidney disease. Current ankle sxs are worse with prolonged standing or walking.    The history is provided by the patient.   Type of problem:  Left ankle   Condition occurred with:  Repetition/overuse. This is a new condition    Patient reports pain:  Medial and posterior. Radiates to:  No radiation. Associated symptoms:  Edema and loss of strength. Symptoms are exacerbated by walking, standing, weight bearing, descending stairs and ascending stairs and relieved by rest, ice and bracing/immobilizing. MD visit 19     Rosa Sotomayor being seen for left posterior tibialis tendonitis.   Where condition occurred: for unknown reasons.  and reported as 8/10 on pain scale. General health as reported by patient is good. Pertinent medical history includes:  Anemia, overweight, osteoarthritis, kidney disease, migraines/headaches, implanted device and sleep disorder/apnea.    Surgeries include:  Orthopedic surgery (lumbar decompression May 2019, hips and knees).  Current medications:  High blood pressure medication.   Primary job  tasks include:  Computer work and prolonged sitting.  Pain is described as aching and is intermittent. Pain is worse during the day. Since onset symptoms are unchanged. Special tests:  X-ray and other.        Barriers include:  None as reported by patient.  Red flags:  Calf pain-swelling-warmth.                           PATIENTS NAME:  Rosa Sotomayor   : 1950    Objective:  Gait:    Gait Type:  Normal     Ankle/Foot Evaluation  ROM:  Prom wnl ankle: Normal STJ and MTJ mobility B.  AROM:    Dorsiflexion:  Left:   10  Right:   12  Plantarflexion:  Left:  40    Right:  40  Strength:    Dorsiflexion:  Left: 5/5     Pain:   Right: 5/5   Pain:  Plantarflexion: Left: 4/5   Pain:   Right: 5-/5  Pain:  Inversion:Left: 5/5  Pain:     Right: 5/5  Pain:  Eversion:Left: 5/5  Pain:  Right: 5/5  Pain:  LIGAMENT TESTING: not assessed  SPECIAL TESTS: not assessed  PALPATION:   Left ankle tenderness present at:  posterior tibialis  Right ankle tenderness present at:   posterior tibialis  EDEMA:   Left ankle edema present at: general  MOBILITY TESTING: normal  FUNCTIONAL TESTS: not assessed  General   ROS    Assessment/Plan:    Patient is a 68 year old female with left side ankle complaints.    Patient has the following significant findings with corresponding treatment plan.                Diagnosis 1:  Left posterior tibialis tendonitis  Pain -  hot/cold therapy, US, manual therapy, splint/taping/bracing/orthotics, self management, education and home program  Decreased strength - therapeutic exercise, therapeutic activities and home program  Decreased proprioception - neuro re-education, therapeutic activities and home program  Edema - vasopneumatics  Impaired gait - gait training and home program  Impaired muscle performance - neuro re-education and home program    Therapy Evaluation Codes:   1) History comprised of:   Personal factors that impact the plan of care:      None.    Comorbidity factors that impact the plan of  "care are:      kidney disease.     Medications impacting care: None.  2) Examination of Body Systems comprised of:   Body structures and functions that impact the plan of care:      Ankle.   Activity limitations that impact the plan of care are:      Stairs, Standing and Walking.  3) Clinical presentation characteristics are:   Stable/Uncomplicated.    PATIENTS NAME:  Rosa Sotomayor   : 1950    4) Decision-Making    Low complexity using standardized patient assessment instrument and/or measureable assessment of functional outcome.  Cumulative Therapy Evaluation is: Low complexity.    Communication ability:  Patient appears to be able to clearly communicate and understand verbal and written communication and follow directions correctly.  Treatment Explanation - The following has been discussed with the patient:   RX ordered/plan of care  Anticipated outcomes  Possible risks and side effects  This patient would benefit from PT intervention to resume normal activities.   Rehab potential is good.  Frequency:  2 X week, once daily  Duration:  for 4 weeks  Discharge Plan:  Achieve all LTG.  Independent in home treatment program.  Reach maximal therapeutic benefit.      Caregiver Signature/Credentials _____________________________ Date ________       Treating Provider: Chad Cody PT, Cert MDT    I have reviewed and certified the need for these services and plan of treatment while under my care.        PHYSICIAN'S SIGNATURE:   ____________________________________  Date___________                      Alex Booth MD    Certification period:  Beginning of Cert date period: 19 to  End of Cert period date: 20     Functional Level Progress Report: Please see attached \"Goal Flow sheet for Functional level.\"    ____X____Continue Services or________ DC Services                Service dates: From  SOC Date: 19 date to present                         "

## 2019-12-06 NOTE — PROGRESS NOTES
[facility] Initial Evaluation  Subjective:       Pertinent medical history includes:  Anemia, implanted device, kidney disease, migraines/headaches, osteoarthritis, overweight and sleep disorder/apnea (Calf pain, swelling, warmth).  Medical allergies: Many.  Surgeries include:  Orthopedic surgery (R & L hip, R & L knees, back L7, L5).  Current medications:  High blood pressure medication.   Primary job tasks include:  Computer work, prolonged sitting and repetitive tasks.           Occupation: Retired.                           Objective:  System    Physical Exam    General     ROS    Assessment/Plan:

## 2019-12-06 NOTE — PROGRESS NOTES
[facility] Initial Evaluation  Subjective:  Patient is referred with a 5 week hx of L medial lower leg and ankle pain. Sxs developed after performing a treadmill compartment test. PMH includes lumbar decompression 6 months ago and L L/E edema related to stage 3 kidney disease. Current ankle sxs are worse with prolonged standing or walking.    The history is provided by the patient.   Type of problem:  Left ankle   Condition occurred with:  Repetition/overuse. This is a new condition    Patient reports pain:  Medial and posterior. Radiates to:  No radiation. Associated symptoms:  Edema and loss of strength. Symptoms are exacerbated by walking, standing, weight bearing, descending stairs and ascending stairs and relieved by rest, ice and bracing/immobilizing.    Rosa Sotomayor being seen for left posterior tibialis tendonitis.   Where condition occurred: for unknown reasons.  and reported as 8/10 on pain scale. General health as reported by patient is good. Pertinent medical history includes:  Anemia, overweight, osteoarthritis, kidney disease, migraines/headaches, implanted device and sleep disorder/apnea.    Surgeries include:  Orthopedic surgery (lumbar decompression May 2019, hips and knees).  Current medications:  High blood pressure medication.   Primary job tasks include:  Computer work and prolonged sitting.  Pain is described as aching and is intermittent. Pain is worse during the day. Since onset symptoms are unchanged. Special tests:  X-ray and other.        Barriers include:  None as reported by patient.  Red flags:  Calf pain-swelling-warmth.                      Objective:    Gait:    Gait Type:  Normal               Ankle/Foot Evaluation  ROM:  Prom wnl ankle: Normal STJ and MTJ mobility B.  AROM:    Dorsiflexion:  Left:   10  Right:   12  Plantarflexion:  Left:  40    Right:  40            Strength:    Dorsiflexion:  Left: 5/5     Pain:   Right: 5/5   Pain:  Plantarflexion: Left:  4/5   Pain:   Right: 5-/5  Pain:  Inversion:Left: 5/5  Pain:     Right: 5/5  Pain:  Eversion:Left: 5/5  Pain:  Right: 5/5  Pain:                  LIGAMENT TESTING: not assessed              SPECIAL TESTS: not assessed    PALPATION:   Left ankle tenderness present at:  posterior tibialis  Right ankle tenderness present at:   posterior tibialis  EDEMA:   Left ankle edema present at: general          MOBILITY TESTING: normal              FUNCTIONAL TESTS: not assessed                                                              General     ROS    Assessment/Plan:    Patient is a 68 year old female with left side ankle complaints.    Patient has the following significant findings with corresponding treatment plan.                Diagnosis 1:  Left posterior tibialis tendonitis  Pain -  hot/cold therapy, US, manual therapy, splint/taping/bracing/orthotics, self management, education and home program  Decreased strength - therapeutic exercise, therapeutic activities and home program  Decreased proprioception - neuro re-education, therapeutic activities and home program  Edema - vasopneumatics  Impaired gait - gait training and home program  Impaired muscle performance - neuro re-education and home program    Therapy Evaluation Codes:   1) History comprised of:   Personal factors that impact the plan of care:      None.    Comorbidity factors that impact the plan of care are:      kidney disease.     Medications impacting care: None.  2) Examination of Body Systems comprised of:   Body structures and functions that impact the plan of care:      Ankle.   Activity limitations that impact the plan of care are:      Stairs, Standing and Walking.  3) Clinical presentation characteristics are:   Stable/Uncomplicated.  4) Decision-Making    Low complexity using standardized patient assessment instrument and/or measureable assessment of functional outcome.  Cumulative Therapy Evaluation is: Low complexity.    Previous and current  functional limitations:  (See Goal Flow Sheet for this information)    Short term and Long term goals: (See Goal Flow Sheet for this information)     Communication ability:  Patient appears to be able to clearly communicate and understand verbal and written communication and follow directions correctly.  Treatment Explanation - The following has been discussed with the patient:   RX ordered/plan of care  Anticipated outcomes  Possible risks and side effects  This patient would benefit from PT intervention to resume normal activities.   Rehab potential is good.    Frequency:  2 X week, once daily  Duration:  for 4 weeks  Discharge Plan:  Achieve all LTG.  Independent in home treatment program.  Reach maximal therapeutic benefit.    Please refer to the daily flowsheet for treatment today, total treatment time and time spent performing 1:1 timed codes.

## 2019-12-09 ENCOUNTER — THERAPY VISIT (OUTPATIENT)
Dept: PHYSICAL THERAPY | Facility: CLINIC | Age: 69
End: 2019-12-09
Payer: MEDICARE

## 2019-12-09 DIAGNOSIS — M76.822 LEFT TIBIALIS POSTERIOR TENDONITIS: ICD-10-CM

## 2019-12-09 PROCEDURE — 97035 APP MDLTY 1+ULTRASOUND EA 15: CPT | Mod: GP | Performed by: PHYSICAL THERAPIST

## 2019-12-09 PROCEDURE — 97140 MANUAL THERAPY 1/> REGIONS: CPT | Mod: GP | Performed by: PHYSICAL THERAPIST

## 2019-12-12 ENCOUNTER — ONCOLOGY VISIT (OUTPATIENT)
Dept: ONCOLOGY | Facility: CLINIC | Age: 69
End: 2019-12-12
Attending: INTERNAL MEDICINE
Payer: MEDICARE

## 2019-12-12 ENCOUNTER — PRE VISIT (OUTPATIENT)
Dept: ONCOLOGY | Facility: CLINIC | Age: 69
End: 2019-12-12

## 2019-12-12 ENCOUNTER — THERAPY VISIT (OUTPATIENT)
Dept: PHYSICAL THERAPY | Facility: CLINIC | Age: 69
End: 2019-12-12
Payer: MEDICARE

## 2019-12-12 VITALS
HEART RATE: 74 BPM | TEMPERATURE: 98.3 F | DIASTOLIC BLOOD PRESSURE: 69 MMHG | OXYGEN SATURATION: 99 % | WEIGHT: 182 LBS | RESPIRATION RATE: 15 BRPM | BODY MASS INDEX: 31.07 KG/M2 | HEIGHT: 64 IN | SYSTOLIC BLOOD PRESSURE: 122 MMHG

## 2019-12-12 DIAGNOSIS — D64.9 NORMOCYTIC ANEMIA: Primary | ICD-10-CM

## 2019-12-12 DIAGNOSIS — N18.30 ANEMIA DUE TO STAGE 3 CHRONIC KIDNEY DISEASE (H): ICD-10-CM

## 2019-12-12 DIAGNOSIS — M76.822 LEFT TIBIALIS POSTERIOR TENDONITIS: ICD-10-CM

## 2019-12-12 DIAGNOSIS — D63.1 ANEMIA DUE TO STAGE 3 CHRONIC KIDNEY DISEASE (H): ICD-10-CM

## 2019-12-12 DIAGNOSIS — N18.9 CHRONIC KIDNEY DISEASE, UNSPECIFIED CKD STAGE: ICD-10-CM

## 2019-12-12 PROCEDURE — G0463 HOSPITAL OUTPT CLINIC VISIT: HCPCS

## 2019-12-12 PROCEDURE — 97140 MANUAL THERAPY 1/> REGIONS: CPT | Mod: GP | Performed by: PHYSICAL THERAPIST

## 2019-12-12 PROCEDURE — 99213 OFFICE O/P EST LOW 20 MIN: CPT | Performed by: INTERNAL MEDICINE

## 2019-12-12 PROCEDURE — G0283 ELEC STIM OTHER THAN WOUND: HCPCS | Mod: GP | Performed by: PHYSICAL THERAPIST

## 2019-12-12 PROCEDURE — 97035 APP MDLTY 1+ULTRASOUND EA 15: CPT | Mod: GP | Performed by: PHYSICAL THERAPIST

## 2019-12-12 ASSESSMENT — PAIN SCALES - GENERAL: PAINLEVEL: NO PAIN (0)

## 2019-12-12 ASSESSMENT — MIFFLIN-ST. JEOR: SCORE: 1350.55

## 2019-12-12 NOTE — PATIENT INSTRUCTIONS
1. Schedule bone marrow biopsy under local anesthesia.  2. See me after biopsy.  3. Nephrology appointment.

## 2019-12-12 NOTE — PROGRESS NOTES
"Oncology Rooming Note    December 12, 2019 2:20 PM   Rosa Sotomayor is a 68 year old female who presents for:    Chief Complaint   Patient presents with     Oncology Clinic Visit     Initial Vitals: /69   Pulse 74   Temp 98.3  F (36.8  C) (Oral)   Resp 15   Ht 1.626 m (5' 4\")   Wt 82.6 kg (182 lb)   SpO2 99%   BMI 31.24 kg/m   Estimated body mass index is 31.24 kg/m  as calculated from the following:    Height as of this encounter: 1.626 m (5' 4\").    Weight as of this encounter: 82.6 kg (182 lb). Body surface area is 1.93 meters squared.  No Pain (0) Comment: Data Unavailable   No LMP recorded. Patient is postmenopausal.  Allergies reviewed: Yes  Medications reviewed: Yes    Medications: Medication refills not needed today.  Pharmacy name entered into [employer]: [id number] - [place], [place] - [address] AT [facility] OF [address]    Clinical concerns:  DOCTOR was notified.      Fany Mccarty CMA            "

## 2019-12-12 NOTE — Clinical Note
"    12/12/2019         RE: Rosa Sotomayor  [address] [facility]  [place] [address]        Dear Colleague,    Thank you for referring your patient, Rosa Sotomayor, to the [facility]. Please see a copy of my visit note below.    Oncology Rooming Note    December 12, 2019 2:20 PM   Rosa Sotomayor is a 68 year old female who presents for:    Chief Complaint   Patient presents with     Oncology Clinic Visit     Initial Vitals: /69   Pulse 74   Temp 98.3  F (36.8  C) (Oral)   Resp 15   Ht 1.626 m (5' 4\")   Wt 82.6 kg (182 lb)   SpO2 99%   BMI 31.24 kg/m    Estimated body mass index is 31.24 kg/m  as calculated from the following:    Height as of this encounter: 1.626 m (5' 4\").    Weight as of this encounter: 82.6 kg (182 lb). Body surface area is 1.93 meters squared.  No Pain (0) Comment: Data Unavailable   No LMP recorded. Patient is postmenopausal.  Allergies reviewed: Yes  Medications reviewed: Yes    Medications: Medication refills not needed today.  Pharmacy name entered into [employer]: [facility][id number] - [place], [place] - [address] AT [facility] [address]    Clinical concerns:  DOCTOR was notified.      Fany Mccarty, [facility]              Visit Date:   12/12/2019      SUBJECTIVE:  Ms. Sotomayor is a 68-year-old female with chronic anemia.  Her anemia is likely due to chronic renal insufficiency.  The patient has been seeing Dr. Romo.  The patient has had multiple investigations done.   1.  Multiple labs were done on 03/13/2019.   -- WBC 3.5, hemoglobin 10.8 and platelets of 204.  MCV of 96.   -- Creatinine of 1.26.   -- Normal calcium.   -- Normal LFT.   -- Normal folate.   -- Normal vitamin B12.   -- Normal iron.   2.  Elevated ferritin.   -- Erythropoietin mildly elevated at 38.   -- Normal LDH.   -- Soluable transferrin receptor is mildly elevated at 5.2.   -- Normal haptoglobin.   3.  On 11/29/2019, WBC of 4.3, hemoglobin 9.8 and platelets 230.  MCV of 103.   4.  " On 2019, CT scan did not reveal any splenomegaly or lymphadenopathy.      The patient says that lately she has been more tired.  She sleeps 9-10 hours a day.  She still feels tired.  No headache.  Some lightheadedness.  No chest pain.  No shortness of breath at rest.  She does get short of breath on exertion.  No abdominal pain, nausea or vomiting.  No urinary or bowel complaints.  No bleeding.  No fever, chills or night sweats.      PHYSICAL EXAMINATION:  Normal.  ECOG PS of 2.      LABORATORY DATA:  Reviewed.      ASSESSMENT:   1.  A 68-year-old female with chronic normocytic anemia.   2.  Intermittent leukopenia   3.  Other medical problems including chronic kidney disease.      PLAN:   1.  I discussed with her regarding her anemia.  Over time, her anemia has been getting worse.  That may account for her fatigue and shortness of breath on exertion.   2.  Discussed different causes of anemia discussed.  This is from renal disease, anemia of chronic disease.  We also discussed regarding possibility of primary bone marrow pathology like MDS.      Discussed regarding further workup.  I would recommend a bone marrow biopsy.  The patient is agreeable for it.  She wants it under local anesthesia, which will be arranged.   3.  I will see her after the bone marrow biopsy.  I explained to the patient that bone marrow biopsy is normal, then we will plan on starting her on erythropoietin stimulating agent for anemia due to renal disease.  She is agreeable for it.      She follows up with nephrologist at [facility].  She would like to see a nephrologist locally.  An appointment will be scheduled.   4.  The patient had a few questions, which were all answered.  I will see her after the bone marrow biopsy.         CAITLIN CHAVEZ MD             D: 2019   T: 2019   MT: ORQUIDEA      Name:     NEO DENT   MRN:      [id number]        Account:      [id number]   :      1950           Visit  Date:   12/12/2019      Document: [id number]       Again, thank you for allowing me to participate in the care of your patient.        Sincerely,        Nico Calderon MD

## 2019-12-13 ENCOUNTER — PATIENT OUTREACH (OUTPATIENT)
Dept: ONCOLOGY | Facility: CLINIC | Age: 69
End: 2019-12-13

## 2019-12-13 NOTE — PROGRESS NOTES
Andrey from [facility] consultants called patient to schedule Nephrology appointment for January. Patient stated to Andrey that referral was urgent. andrey wanted verification if referral is urgent or not and would like a call back [phone]. Routed to Dr. Calderon and CCRN.

## 2019-12-13 NOTE — PROGRESS NOTES
Visit Date:   12/12/2019      SUBJECTIVE:  Ms. Sotomayor is a 68-year-old female with chronic anemia.  Her anemia is likely due to chronic renal insufficiency.  The patient has been seeing Dr. Romo.  The patient has had multiple investigations done.   1.  Multiple labs were done on 03/13/2019.   -WBC 3.5, hemoglobin 10.8 and platelets of 204.  MCV of 96.   -Creatinine of 1.26.   -Normal calcium.   -Normal LFT.   -Normal folate.   -Normal vitamin B12.   -Normal iron. Elevated ferritin.   -Erythropoietin mildly elevated at 38.   -Normal LDH.   -Soluable transferrin receptor is mildly elevated at 5.2.   -Normal haptoglobin.   2.  On 11/29/2019, WBC of 4.3, hemoglobin 9.8 and platelets 230.  MCV of 103.   3.  On 07/08/2019, CT scan did not reveal any splenomegaly or lymphadenopathy.      The patient says that lately she has been more tired.  She sleeps 9-10 hours a day.  She still feels tired.  No headache.  Some lightheadedness.  No chest pain.  No shortness of breath at rest.  She does get short of breath on exertion.  No abdominal pain, nausea or vomiting.  No urinary or bowel complaints.  No bleeding.  No fever, chills or night sweats.      PHYSICAL EXAMINATION:   GENERAL:  Alert and oriented x 3.   VITAL SIGNS:  Reviewed.  ECOG PS of 2.     EYES:  No icterus.   THROAT:  No ulcer. No thrush.   NECK:  Supple. No lymphadenopathy. No thyromegaly.   AXILLAE:  No lymphadenopathy.   LUNGS:  Good air entry bilaterally.  No crackles or wheezing.   HEART:  Regular.  No murmur.   ABDOMEN:  Soft.  Nontender. No mass.   EXTREMITIES:  No pedal edema.  No calf swelling or tenderness.   SKIN:  No rash.      LABORATORY DATA:  Reviewed.      ASSESSMENT:   1.  A 68-year-old female with chronic normocytic anemia.   2.  Intermittent leukopenia   3.  Other medical problems including chronic kidney disease.      PLAN:   1.  I discussed with her regarding her anemia.  Over time, her anemia has been getting worse.  That can explain her fatigue  and shortness of breath on exertion.     2.  Discussed different causes of anemia discussed.  This is from renal disease and anemia of chronic disease.  We also discussed regarding possibility of primary bone marrow pathology like MDS.      Discussed regarding further workup.  I would recommend a bone marrow biopsy.  The patient is agreeable for it.  She wants it under local anesthesia, which will be arranged.     3.  I will see her after the bone marrow biopsy.  I explained to the patient that if bone marrow biopsy is normal, then we will plan on starting her on erythropoietin stimulating agent for anemia due to renal disease.  She is agreeable for it.      She follows up with nephrologist at [facility].  She would like to see a nephrologist locally.  An appointment will be scheduled.     4.  The patient had a few questions, which were all answered.  I will see her after the bone marrow biopsy.         CAITLIN CHAVEZ MD             D: 2019   T: 2019   MT: ORQUIDEA      Name:     NEO DENT   MRN:      [id number]        Account:      [id number]   :      1950           Visit Date:   2019      Document: [id number]

## 2019-12-16 NOTE — PROGRESS NOTES
Dr. Calderon responded that consult is routine. Called Padmini back from [facility] and informed her.

## 2019-12-17 ENCOUNTER — THERAPY VISIT (OUTPATIENT)
Dept: PHYSICAL THERAPY | Facility: CLINIC | Age: 69
End: 2019-12-17
Payer: MEDICARE

## 2019-12-17 DIAGNOSIS — M76.822 LEFT TIBIALIS POSTERIOR TENDONITIS: ICD-10-CM

## 2019-12-17 PROCEDURE — 97035 APP MDLTY 1+ULTRASOUND EA 15: CPT | Mod: GP | Performed by: PHYSICAL THERAPIST

## 2019-12-17 PROCEDURE — 97140 MANUAL THERAPY 1/> REGIONS: CPT | Mod: GP | Performed by: PHYSICAL THERAPIST

## 2019-12-18 ENCOUNTER — OFFICE VISIT (OUTPATIENT)
Dept: OTHER | Facility: CLINIC | Age: 69
End: 2019-12-18
Attending: INTERNAL MEDICINE
Payer: MEDICARE

## 2019-12-18 ENCOUNTER — ANESTHESIA (OUTPATIENT)
Dept: GASTROENTEROLOGY | Facility: CLINIC | Age: 69
End: 2019-12-18
Payer: MEDICARE

## 2019-12-18 ENCOUNTER — HOSPITAL ENCOUNTER (OUTPATIENT)
Facility: CLINIC | Age: 69
Discharge: HOME | End: 2019-12-18
Attending: PATHOLOGY | Admitting: PATHOLOGY
Payer: MEDICARE

## 2019-12-18 ENCOUNTER — ANESTHESIA EVENT (OUTPATIENT)
Dept: GASTROENTEROLOGY | Facility: CLINIC | Age: 69
End: 2019-12-18
Payer: MEDICARE

## 2019-12-18 ENCOUNTER — HOSPITAL ENCOUNTER (OUTPATIENT)
Dept: ULTRASOUND IMAGING | Facility: CLINIC | Age: 69
End: 2019-12-18
Attending: INTERNAL MEDICINE
Payer: MEDICARE

## 2019-12-18 VITALS
OXYGEN SATURATION: 98 % | HEART RATE: 75 BPM | BODY MASS INDEX: 31.24 KG/M2 | HEIGHT: 64 IN | RESPIRATION RATE: 16 BRPM | DIASTOLIC BLOOD PRESSURE: 78 MMHG | WEIGHT: 183 LBS | SYSTOLIC BLOOD PRESSURE: 130 MMHG

## 2019-12-18 VITALS
BODY MASS INDEX: 31.24 KG/M2 | DIASTOLIC BLOOD PRESSURE: 63 MMHG | OXYGEN SATURATION: 97 % | HEART RATE: 79 BPM | SYSTOLIC BLOOD PRESSURE: 122 MMHG | HEIGHT: 64 IN | WEIGHT: 183 LBS | RESPIRATION RATE: 14 BRPM

## 2019-12-18 DIAGNOSIS — D63.1 ANEMIA DUE TO STAGE 3 CHRONIC KIDNEY DISEASE (H): Primary | ICD-10-CM

## 2019-12-18 DIAGNOSIS — N18.30 ANEMIA DUE TO STAGE 3 CHRONIC KIDNEY DISEASE (H): Primary | ICD-10-CM

## 2019-12-18 DIAGNOSIS — I73.9 CLAUDICATION OF BOTH LOWER EXTREMITIES (H): ICD-10-CM

## 2019-12-18 LAB
BASOPHILS # BLD AUTO: 0 10E9/L (ref 0–0.2)
BASOPHILS NFR BLD AUTO: 0.2 %
DIFFERENTIAL METHOD BLD: ABNORMAL
EOSINOPHIL # BLD AUTO: 0.4 10E9/L (ref 0–0.7)
EOSINOPHIL NFR BLD AUTO: 9.3 %
ERYTHROCYTE [DISTWIDTH] IN BLOOD BY AUTOMATED COUNT: 14.3 % (ref 10–15)
HCT VFR BLD AUTO: 29.6 % (ref 35–47)
HGB BLD-MCNC: 9.4 G/DL (ref 11.7–15.7)
IMM GRANULOCYTES # BLD: 0 10E9/L (ref 0–0.4)
IMM GRANULOCYTES NFR BLD: 0 %
LYMPHOCYTES # BLD AUTO: 1.4 10E9/L (ref 0.8–5.3)
LYMPHOCYTES NFR BLD AUTO: 32.2 %
MCH RBC QN AUTO: 31.5 PG (ref 26.5–33)
MCHC RBC AUTO-ENTMCNC: 31.8 G/DL (ref 31.5–36.5)
MCV RBC AUTO: 99 FL (ref 78–100)
MONOCYTES # BLD AUTO: 0.3 10E9/L (ref 0–1.3)
MONOCYTES NFR BLD AUTO: 7.9 %
NEUTROPHILS # BLD AUTO: 2.2 10E9/L (ref 1.6–8.3)
NEUTROPHILS NFR BLD AUTO: 50.4 %
NRBC # BLD AUTO: 0 10*3/UL
NRBC BLD AUTO-RTO: 0 /100
PLATELET # BLD AUTO: 190 10E9/L (ref 150–450)
RBC # BLD AUTO: 2.98 10E12/L (ref 3.8–5.2)
RETICS # AUTO: 59.9 10E9/L (ref 25–95)
RETICS/RBC NFR AUTO: 2 % (ref 0.5–2)
WBC # BLD AUTO: 4.3 10E9/L (ref 4–11)

## 2019-12-18 PROCEDURE — 38222 DX BONE MARROW BX & ASPIR: CPT | Performed by: PATHOLOGY

## 2019-12-18 PROCEDURE — 85025 COMPLETE CBC W/AUTO DIFF WBC: CPT | Performed by: PATHOLOGY

## 2019-12-18 PROCEDURE — 88185 FLOWCYTOMETRY/TC ADD-ON: CPT | Performed by: INTERNAL MEDICINE

## 2019-12-18 PROCEDURE — 88305 TISSUE EXAM BY PATHOLOGIST: CPT | Performed by: INTERNAL MEDICINE

## 2019-12-18 PROCEDURE — 40000424 ZZHCL STATISTIC BONE MARROW CORE PERF TC 38221: Performed by: INTERNAL MEDICINE

## 2019-12-18 PROCEDURE — 00000058 ZZHCL STATISTIC BONE MARROW ASP PERF TC 38220: Performed by: INTERNAL MEDICINE

## 2019-12-18 PROCEDURE — 88305 TISSUE EXAM BY PATHOLOGIST: CPT | Mod: 26 | Performed by: INTERNAL MEDICINE

## 2019-12-18 PROCEDURE — 00000159 ZZHCL STATISTIC H-SEND OUTS PREP: Performed by: INTERNAL MEDICINE

## 2019-12-18 PROCEDURE — 88237 TISSUE CULTURE BONE MARROW: CPT | Performed by: INTERNAL MEDICINE

## 2019-12-18 PROCEDURE — 85060 BLOOD SMEAR INTERPRETATION: CPT | Performed by: INTERNAL MEDICINE

## 2019-12-18 PROCEDURE — 40000948 ZZHCL STATISTIC BONE MARROW ASP TC 85097: Performed by: INTERNAL MEDICINE

## 2019-12-18 PROCEDURE — G0463 HOSPITAL OUTPT CLINIC VISIT: HCPCS | Mod: 25

## 2019-12-18 PROCEDURE — 99214 OFFICE O/P EST MOD 30 MIN: CPT | Mod: ZP | Performed by: INTERNAL MEDICINE

## 2019-12-18 PROCEDURE — 88313 SPECIAL STAINS GROUP 2: CPT | Mod: 26,76 | Performed by: INTERNAL MEDICINE

## 2019-12-18 PROCEDURE — 93925 LOWER EXTREMITY STUDY: CPT

## 2019-12-18 PROCEDURE — 38222 DX BONE MARROW BX & ASPIR: CPT | Performed by: INTERNAL MEDICINE

## 2019-12-18 PROCEDURE — 38221 DX BONE MARROW BIOPSIES: CPT | Performed by: INTERNAL MEDICINE

## 2019-12-18 PROCEDURE — 38221 DX BONE MARROW BIOPSIES: CPT | Performed by: PATHOLOGY

## 2019-12-18 PROCEDURE — 88311 DECALCIFY TISSUE: CPT | Performed by: INTERNAL MEDICINE

## 2019-12-18 PROCEDURE — 40001005 ZZHCL STATISTIC FLOW >15 ABY TC 88189: Performed by: INTERNAL MEDICINE

## 2019-12-18 PROCEDURE — 88311 DECALCIFY TISSUE: CPT | Mod: 26 | Performed by: INTERNAL MEDICINE

## 2019-12-18 PROCEDURE — 88313 SPECIAL STAINS GROUP 2: CPT | Performed by: INTERNAL MEDICINE

## 2019-12-18 PROCEDURE — 88280 CHROMOSOME KARYOTYPE STUDY: CPT | Performed by: INTERNAL MEDICINE

## 2019-12-18 PROCEDURE — 85045 AUTOMATED RETICULOCYTE COUNT: CPT | Performed by: PATHOLOGY

## 2019-12-18 PROCEDURE — 88184 FLOWCYTOMETRY/ TC 1 MARKER: CPT | Performed by: INTERNAL MEDICINE

## 2019-12-18 PROCEDURE — 36415 COLL VENOUS BLD VENIPUNCTURE: CPT | Performed by: PATHOLOGY

## 2019-12-18 PROCEDURE — 88264 CHROMOSOME ANALYSIS 20-25: CPT | Performed by: INTERNAL MEDICINE

## 2019-12-18 PROCEDURE — 40000795 ZZHCL STATISTIC DNA PROCESS AND HOLD: Performed by: PATHOLOGY

## 2019-12-18 PROCEDURE — 40000847 ZZHCL STATISTIC MORPHOLOGY W/INTERP HISTOLOGY TC 85060: Performed by: INTERNAL MEDICINE

## 2019-12-18 PROCEDURE — 00000008 ZZHCL STATISTIC ADDL BM ASP PERF PF 38220: Performed by: INTERNAL MEDICINE

## 2019-12-18 PROCEDURE — 85097 BONE MARROW INTERPRETATION: CPT | Performed by: INTERNAL MEDICINE

## 2019-12-18 RX ORDER — FLUMAZENIL 0.1 MG/ML
0.2 INJECTION, SOLUTION INTRAVENOUS
Status: CANCELLED | OUTPATIENT
Start: 2019-12-18 | End: 2019-12-18

## 2019-12-18 RX ORDER — LIDOCAINE HYDROCHLORIDE 10 MG/ML
8-10 INJECTION, SOLUTION EPIDURAL; INFILTRATION; INTRACAUDAL; PERINEURAL
Status: DISCONTINUED | OUTPATIENT
Start: 2019-12-18 | End: 2019-12-18 | Stop reason: HOSPADM

## 2019-12-18 RX ORDER — NALOXONE HYDROCHLORIDE 0.4 MG/ML
.1-.4 INJECTION, SOLUTION INTRAMUSCULAR; INTRAVENOUS; SUBCUTANEOUS
Status: CANCELLED | OUTPATIENT
Start: 2019-12-18 | End: 2019-12-19

## 2019-12-18 ASSESSMENT — MIFFLIN-ST. JEOR
SCORE: 1355.08
SCORE: 1355.33

## 2019-12-18 NOTE — PROGRESS NOTES
Vascular Medicine Progress Note     Rosa Sotomayor is a 68 year old female who is here for follow-up on Doppler arterial ultrasound bilaterally    Interval History   Doppler arterial ultrasound bilaterally done today showed no evidence of significant stenosis bilaterally, triphasic waveform pattern mostly in both of lower extremities triphasic waveform at the left and right CFA are triphasic indicating no significant disease proximally    Physical Exam       BP: 122/63 Pulse: 79   Resp: 14 SpO2: 97 %      Vitals:    12/18/19 0940   Weight: 83 kg (183 lb)     Vital Signs with Ranges  Pulse:  [79] 79  Resp:  [14] 14  BP: (122)/(63) 122/63  SpO2:  [97 %] 97 %  [unfilled]    Constitutional: awake, alert, cooperative, no apparent distress, and appears stated age  Eyes: Lids and lashes normal, pupils equal, round and reactive to light, extra ocular muscles intact, sclera clear, conjunctiva normal  ENT: normocepalic, without obvious abnormality, oropharynx pink and moist  Hematologic / Lymphatic: no lymphadenopathy  Respiratory: No increased work of breathing, good air exchange, clear to auscultation bilaterally, no crackles or wheezing  Cardiovascular: regular rate and rhythm, normal S1 and S2 and no murmur noted  GI: Normal bowel sounds, soft, non-distended, non-tender  Skin: no redness, warmth, or swelling, no rashes and no lesions  Musculoskeletal: There is no redness, warmth, or swelling of the joints.  Full range of motion noted.  Motor strength is 5 out of 5 all extremities bilaterally.  Tone is normal.  Neurologic: Awake, alert, oriented to name, place and time.  Cranial nerves II-XII are grossly intact.  Motor is 5 out of 5 bilaterally.    Neuropsychiatric:  Normal affect, memory, insight.  Pulses: Palpable bilateral. No carotid bruits appreciated.     Medications         Data   No results found for this or any previous visit (from the past 24 hour(s)).     Reviewed the images personally    Assessment &  Plan   (I73.9) Claudication of both lower extremities (H)  Comment: No evidence of significant disease  Plan: Most probably lower extremity pain is from spinal stenosis        Summary: As needed for follow-up    Sylvester Fleming MD

## 2019-12-18 NOTE — OR NURSING
Bone marrow biopsy site dressing CDI. Pt demonstrated understanding of post procedure discharge instructions.

## 2019-12-18 NOTE — PROCEDURES
The patient was positively identified and informed consent was obtained (see the completed Affirmation of Consent for Bone Marrow Aspiration and/or Biopsy Procedure(s) form in the patient's chart). The patient was placed in the prone position and the bony landmarks of the pelvis were identified. Medical staff reconfirmed the patient's name, date of birth and procedure. The skin over the posterior iliac crest was scrubbed and draped in a sterile fashion. The local area of the procedure was anesthetized with a total of 10 mL of 1% Lidocaine and a small incision was made.  The patient did not receive conscious sedation.    Trephine bone marrow core(s) was/were obtained from the left posterior iliac crest. Bone marrow aspirate was obtained from the left posterior iliac crest for: morphology with possible immunophenotyping and/or cytogenetics and molecular diagnostics    Direct pressure was applied to the biopsy site with sterile gauze. The biopsy site was cleaned with alcohol and a sterile dressing was placed over the biopsy incision using a pressure bandage. The patient was then placed in the supine position to maintain pressure on the biopsy site. Post-procedure wound care instructions, including routine dressing instructions and analgesia, were given to the patient. The procedure was completed without complication.

## 2019-12-18 NOTE — PROGRESS NOTES
"Rosa Sotomayor is a 68 year old female who presents for:  Chief Complaint   Patient presents with     Nurse Visit     Sin Culver (9:00 VHC, 10:00 E) follow up to 11/22/19 appt - *LMB 11/22/19        Vitals:    Vitals:    12/18/19 0940   BP: 122/63   BP Location: Right arm   Patient Position: Chair   Cuff Size: Adult Regular   Pulse: 79   Resp: 14   SpO2: 97%   Weight: 183 lb (83 kg)   Height: 5' 4\" (1.626 m)       BMI:  Estimated body mass index is 31.41 kg/m  as calculated from the following:    Height as of this encounter: 5' 4\" (1.626 m).    Weight as of this encounter: 183 lb (83 kg).    Pain Score:  Data Unavailable        Kassy Alarcon [facility]    "

## 2019-12-19 LAB
COPATH REPORT: NORMAL
COPATH REPORT: NORMAL

## 2019-12-20 ENCOUNTER — THERAPY VISIT (OUTPATIENT)
Dept: PHYSICAL THERAPY | Facility: CLINIC | Age: 69
End: 2019-12-20
Payer: MEDICARE

## 2019-12-20 DIAGNOSIS — M76.822 LEFT TIBIALIS POSTERIOR TENDONITIS: ICD-10-CM

## 2019-12-20 PROCEDURE — 97140 MANUAL THERAPY 1/> REGIONS: CPT | Mod: KX | Performed by: PHYSICAL THERAPIST

## 2019-12-20 PROCEDURE — 97035 APP MDLTY 1+ULTRASOUND EA 15: CPT | Mod: KX | Performed by: PHYSICAL THERAPIST

## 2019-12-23 ENCOUNTER — TRANSFERRED RECORDS (OUTPATIENT)
Dept: HEALTH INFORMATION MANAGEMENT | Facility: CLINIC | Age: 69
End: 2019-12-23

## 2019-12-23 LAB — COPATH REPORT: NORMAL

## 2019-12-27 ENCOUNTER — THERAPY VISIT (OUTPATIENT)
Dept: PHYSICAL THERAPY | Facility: CLINIC | Age: 69
End: 2019-12-27
Payer: MEDICARE

## 2019-12-27 DIAGNOSIS — M76.822 LEFT TIBIALIS POSTERIOR TENDONITIS: ICD-10-CM

## 2019-12-27 LAB — COPATH REPORT: NORMAL

## 2019-12-27 PROCEDURE — 97035 APP MDLTY 1+ULTRASOUND EA 15: CPT | Mod: GP | Performed by: PHYSICAL THERAPIST

## 2019-12-27 PROCEDURE — 97140 MANUAL THERAPY 1/> REGIONS: CPT | Mod: GP | Performed by: PHYSICAL THERAPIST

## 2019-12-27 PROCEDURE — G0283 ELEC STIM OTHER THAN WOUND: HCPCS | Mod: GP | Performed by: PHYSICAL THERAPIST

## 2019-12-27 NOTE — RESULT ENCOUNTER NOTE
Dear Ms. Sotomayor,    Your bone marrow is good. No evidence of any cancer. Will discuss more during appointment.    Please, call me with any questions.    Nico Calderon MD

## 2019-12-31 ENCOUNTER — ONCOLOGY VISIT (OUTPATIENT)
Dept: ONCOLOGY | Facility: CLINIC | Age: 69
End: 2019-12-31
Attending: INTERNAL MEDICINE
Payer: MEDICARE

## 2019-12-31 ENCOUNTER — TRANSFERRED RECORDS (OUTPATIENT)
Dept: HEALTH INFORMATION MANAGEMENT | Facility: CLINIC | Age: 69
End: 2019-12-31

## 2019-12-31 VITALS
BODY MASS INDEX: 31.45 KG/M2 | WEIGHT: 184.2 LBS | OXYGEN SATURATION: 97 % | HEART RATE: 91 BPM | TEMPERATURE: 98.9 F | HEIGHT: 64 IN | DIASTOLIC BLOOD PRESSURE: 72 MMHG | SYSTOLIC BLOOD PRESSURE: 126 MMHG | RESPIRATION RATE: 16 BRPM

## 2019-12-31 DIAGNOSIS — N18.30 ANEMIA DUE TO STAGE 3 CHRONIC KIDNEY DISEASE (H): Primary | ICD-10-CM

## 2019-12-31 DIAGNOSIS — D63.1 ANEMIA DUE TO STAGE 3 CHRONIC KIDNEY DISEASE (H): Primary | ICD-10-CM

## 2019-12-31 PROCEDURE — G0463 HOSPITAL OUTPT CLINIC VISIT: HCPCS

## 2019-12-31 PROCEDURE — 99214 OFFICE O/P EST MOD 30 MIN: CPT | Performed by: INTERNAL MEDICINE

## 2019-12-31 ASSESSMENT — MIFFLIN-ST. JEOR: SCORE: 1355.53

## 2019-12-31 ASSESSMENT — PAIN SCALES - GENERAL: PAINLEVEL: NO PAIN (0)

## 2019-12-31 NOTE — LETTER
12/31/2019         RE: Rosa Sotomayor  [address]  [place] [address]        Dear Colleague,    Thank you for referring your patient, Rosa Sotomayor, to the [facility]. Please see a copy of my visit note below.    Visit Date:   12/31/2019     HEMATOLOGY HISTORY: Ms. Sotomayor is a female with chronic anemia.  Her anemia is due to chronic renal disease and chronic disease.   1.  Multiple labs were done on 03/13/2019.   -WBC 3.5, hemoglobin 10.8 and platelets of 204.  MCV of 96.   -Creatinine of 1.26.   -Normal calcium.   -Normal LFT.   -Normal folate.   -Normal vitamin B12.   -Normal iron. Elevated ferritin.   -Erythropoietin mildly elevated at 38.   -Normal LDH.   -Soluable transferrin receptor is mildly elevated at 5.2.   -Normal haptoglobin.   2. On 11/29/2019, WBC of 4.3, hemoglobin 9.8 and platelets 230.  MCV of 103.   3. On 07/08/2019, CT scan did not reveal any splenomegaly or lymphadenopathy.   4. Bone marrow biopsy was done on 12/18/2019.  It reveals normal cellular bone marrow for age with 30% cellularity.  There is trilineage hematopoiesis.  No evidence of MDS.  Increased storage iron.  Cytogenetics is normal.  Flow cytometry is normal.      SUBJECTIVE:  Ms. Sotomayor is a 69-year-old female with chronic anemia.  She is here for follow up on the result of investigations.      Bone marrow biopsy was done on 12/18/2019.  It reveals normal cellular bone marrow for age with 30% cellularity.  There is trilineage hematopoiesis.  No evidence of MDS.  Increased storage iron.  Cytogenetics is normal.  Flow cytometry is normal.      The patient's main problem is fatigue.  The patient says that in last few months, her fatigue has been getting worse.  Her hemoglobin has been decreasing and it is 9.4 now.  Normal WBC and platelets.      No headache.  Some lightheadedness.  No chest pain.  No shortness of breath.  No nausea or vomiting.  No bleeding.      PHYSICAL EXAMINATION:   GENERAL:  The  patient was alert and oriented x 3.   VITAL SIGNS:  Reviewed.  ECOG PS of 2.   The rest of the systems not examined.      ASSESSMENT:   1.  A 69-year-old female with chronic normocytic anemia.  This is secondary to renal disease and anemia of chronic disease.   2.  Intermittent leukopenia   3.  Chronic kidney disease.      PLAN:   1.  I had a long discussion with the patient and her .  Bone marrow biopsy was reviewed.  I told her there is no MDS, leukemia or lymphoma.  She was happy to know that.      2.  Discussed regarding anemia.  Her anemia is mainly due to renal disease.  Some anemia is also due to anemia of chronic disease.      3.  Discussed regarding treatment.  Treatment is indicated as patient is getting symptomatic with worsening fatigue.      4.  Discussed regarding different treatment options.  I would recommend erythropoietin stimulating agent.  Side effects including bone pain, high blood pressure, heart attack, stroke, blood clot and death were all discussed.  She wants to take it.      The patient is leaving for [place] this weekend.  She will be back in March.  I told her that I would recommend starting the erythropoietin stimulating agent when she is back from [place].  She is agreeable for it.      My plan will be to start her on either Procrit or Aranesp.  We will get insurance coverage before starting it.  The patient will call us when she returns from [place].      5.  For renal disease, she will continue to follow up with nephrologist.      6.  The patient had multiple questions, which were all answered.  She will see a local physician in [place] if she does not feel well.      TOTAL FACE TO FACE TIME SPENT:  25 minutes, more than 50% of the time spent in counseling and coordination of care.         CAITLIN CHAVEZ MD             D: 2019   T: 2019   MT: DIANNE      Name:     NEO DENT   MRN:      [id number]        Account:      [id number]   :      1950            Visit Date:   12/31/2019      Document: [id number]        Again, thank you for allowing me to participate in the care of your patient.        Sincerely,        Nico Calderon MD

## 2020-01-01 NOTE — PROGRESS NOTES
Visit Date:   12/31/2019     HEMATOLOGY HISTORY: Ms. Sotomayor is a female with chronic anemia.  Her anemia is due to chronic renal disease and chronic disease.   1.  Multiple labs were done on 03/13/2019.   -WBC 3.5, hemoglobin 10.8 and platelets of 204.  MCV of 96.   -Creatinine of 1.26.   -Normal calcium.   -Normal LFT.   -Normal folate.   -Normal vitamin B12.   -Normal iron. Elevated ferritin.   -Erythropoietin mildly elevated at 38.   -Normal LDH.   -Soluable transferrin receptor is mildly elevated at 5.2.   -Normal haptoglobin.   2. On 11/29/2019, WBC of 4.3, hemoglobin 9.8 and platelets 230.  MCV of 103.   3. On 07/08/2019, CT scan did not reveal any splenomegaly or lymphadenopathy.   4. Bone marrow biopsy was done on 12/18/2019.  It reveals normal cellular bone marrow for age with 30% cellularity.  There is trilineage hematopoiesis.  No evidence of MDS.  Increased storage iron.  Cytogenetics is normal.  Flow cytometry is normal.      SUBJECTIVE:  Ms. Sotomayor is a 69-year-old female with chronic anemia.  She is here for follow up on the result of investigations.      Bone marrow biopsy was done on 12/18/2019.  It reveals normal cellular bone marrow for age with 30% cellularity.  There is trilineage hematopoiesis.  No evidence of MDS.  Increased storage iron.  Cytogenetics is normal.  Flow cytometry is normal.      The patient's main problem is fatigue.  The patient says that in last few months, her fatigue has been getting worse.  Her hemoglobin has been decreasing and it is 9.4 now.  Normal WBC and platelets.      No headache.  Some lightheadedness.  No chest pain.  No shortness of breath.  No nausea or vomiting.  No bleeding.      PHYSICAL EXAMINATION:   GENERAL:  The patient was alert and oriented x 3.   VITAL SIGNS:  Reviewed.  ECOG PS of 2.   The rest of the systems not examined.      ASSESSMENT:   1.  A 69-year-old female with chronic normocytic anemia.  This is secondary to renal disease and anemia of chronic  disease.   2.  Intermittent leukopenia   3.  Chronic kidney disease.      PLAN:   1.  I had a long discussion with the patient and her .  Bone marrow biopsy was reviewed.  I told her there is no MDS, leukemia or lymphoma.  She was happy to know that.      2.  Discussed regarding anemia.  Her anemia is mainly due to renal disease.  Some anemia is also due to anemia of chronic disease.      3.  Discussed regarding treatment.  Treatment is indicated as patient is getting symptomatic with worsening fatigue.      4.  Discussed regarding different treatment options.  I would recommend erythropoietin stimulating agent.  Side effects including bone pain, high blood pressure, heart attack, stroke, blood clot and death were all discussed.  She wants to take it.      The patient is leaving for [place] this weekend.  She will be back in March.  I told her that I would recommend starting the erythropoietin stimulating agent when she is back from [place].  She is agreeable for it.      My plan will be to start her on either Procrit or Aranesp.  We will get insurance coverage before starting it.  The patient will call us when she returns from [place].      5.  For renal disease, she will continue to follow up with nephrologist.      6.  The patient had multiple questions, which were all answered.  She will see a local physician in [place] if she does not feel well.      TOTAL FACE TO FACE TIME SPENT:  25 minutes, more than 50% of the time spent in counseling and coordination of care.         CAITLIN CHAVEZ MD             D: 2019   T: 2019   MT: DIANNE      Name:     NEO DENT   MRN:      [id number]        Account:      [id number]   :      1950           Visit Date:   2019      Document: [id number]

## 2020-01-02 ENCOUNTER — PATIENT OUTREACH (OUTPATIENT)
Dept: ONCOLOGY | Facility: CLINIC | Age: 70
End: 2020-01-02

## 2020-01-02 NOTE — PROGRESS NOTES
"Patient called wanting wanting to have Dr. Calderon advise if the cramping in her calves with activity could be related to her anemia. Patient verbalizes that she has seen an orthopedic MD and a Vascular surgeon and everything has checkout to be negative.    Patient states she is to be starting \"injections\" soon when she is back to help with resolve with the anemia and wonders if Dr. Calderon thinks that will help the cramping in her calves as well?    Will send a message to Dr. Calderon with the above concern.    Babita Jane RN    "

## 2020-01-02 NOTE — PROGRESS NOTES
Per Dr. Calderon,    Anemia can cause cramping in the muscles.  When she returns from [place], she will be started on Aranesp.  Hopefully it will improve her anemia and her symptoms    I called patient with the above symptoms.     Babita Jane RN

## 2020-01-03 ENCOUNTER — THERAPY VISIT (OUTPATIENT)
Dept: PHYSICAL THERAPY | Facility: CLINIC | Age: 70
End: 2020-01-03
Payer: MEDICARE

## 2020-01-03 DIAGNOSIS — M76.822 LEFT TIBIALIS POSTERIOR TENDONITIS: ICD-10-CM

## 2020-01-03 PROCEDURE — 97140 MANUAL THERAPY 1/> REGIONS: CPT | Mod: GP | Performed by: PHYSICAL THERAPIST

## 2020-01-03 PROCEDURE — G0283 ELEC STIM OTHER THAN WOUND: HCPCS | Mod: GP | Performed by: PHYSICAL THERAPIST

## 2020-01-03 PROCEDURE — 97035 APP MDLTY 1+ULTRASOUND EA 15: CPT | Mod: GP | Performed by: PHYSICAL THERAPIST

## 2020-02-10 ENCOUNTER — HEALTH MAINTENANCE LETTER (OUTPATIENT)
Age: 70
End: 2020-02-10

## 2020-02-21 ENCOUNTER — DOCUMENTATION ONLY (OUTPATIENT)
Dept: SLEEP MEDICINE | Facility: CLINIC | Age: 70
End: 2020-02-21

## 2020-02-21 DIAGNOSIS — G47.33 OSA (OBSTRUCTIVE SLEEP APNEA): ICD-10-CM

## 2020-02-21 NOTE — PROGRESS NOTES
Pt call in regarding scheduling a follow up appt and we reviewed her data from her device.       Subjective measures:  Pt states things are going well and has no issues or complaints.  Pt is benefiting from therapy.       Assessment: Pt meeting objective benchmarks.  Patient meeting subjective benchmarks.     Action plan: follow up per provider request     PAP settings: CPAP min 7  cm  H20       CPAP max 15 cm  H20          90th % pressure 8.1  cm  H20    Mask type:  Nasal Pillows    Objective measures: 14 day rolling measures         Compliance  100 %     % of night spent in large leak  0 % last  upload      AHI 0.57   last  upload      Average number of minutes 478          Objective measure goal  Compliance   Goal >70%  Leak   Goal < 10%  AHI  Goal < 5  Usage  Goal >240      Total time spent on accessing and interpreting remote patient PAP therapy data  10 minutes      Total time spent counseling, coaching  and reviewing PAP therapy data with patient  3 minutes

## 2020-02-25 PROBLEM — M76.822 LEFT TIBIALIS POSTERIOR TENDONITIS: Status: RESOLVED | Noted: 2019-12-06 | Resolved: 2020-02-25

## 2020-02-25 NOTE — PROGRESS NOTES
Discharge Note    Progress reporting period is from last progress note on   to Butch 3, 2020.    Rosa failed to follow up and current status is unknown.  Please see information below for last relevant information on current status.  Patient seen for 7 visits.    SUBJECTIVE  Subjective changes noted by patient:  Better this past week.  .  Current pain level is 2/10.     Previous pain level was  8/10.   Changes in function:  Yes (See Goal flowsheet attached for changes in current functional level)  Adverse reaction to treatment or activity: None    OBJECTIVE  Changes noted in objective findings: See flow.     ASSESSMENT/PLAN  Diagnosis: left posterior tibialis tendonitis   Updated problem list and treatment plan:   Pain - HEP  STG/LTGs have been met or progress has been made towards goals:  Yes, please see goal flowsheet for most current information  Assessment of Progress: current status is unknown.    Last current status: Pt is progressing slower than anticipated   Self Management Plans:  HEP  I have re-evaluated this patient and find that the nature, scope, duration and intensity of the therapy is appropriate for the medical condition of the patient.  Rosa continues to require the following intervention to meet STG and LTG's:  HEP.    Recommendations:  Discharge with current home program.  Patient to follow up with MD as needed.    Please refer to the daily flowsheet for treatment today, total treatment time and time spent performing 1:1 timed codes.

## 2020-03-04 ENCOUNTER — ONCOLOGY VISIT (OUTPATIENT)
Dept: ONCOLOGY | Facility: CLINIC | Age: 70
End: 2020-03-04
Attending: INTERNAL MEDICINE
Payer: MEDICARE

## 2020-03-04 ENCOUNTER — PATIENT OUTREACH (OUTPATIENT)
Dept: ONCOLOGY | Facility: CLINIC | Age: 70
End: 2020-03-04

## 2020-03-04 ENCOUNTER — INFUSION THERAPY VISIT (OUTPATIENT)
Dept: INFUSION THERAPY | Facility: CLINIC | Age: 70
End: 2020-03-04
Attending: INTERNAL MEDICINE
Payer: MEDICARE

## 2020-03-04 ENCOUNTER — HOSPITAL ENCOUNTER (OUTPATIENT)
Facility: CLINIC | Age: 70
Setting detail: SPECIMEN
Discharge: HOME | End: 2020-03-04
Attending: INTERNAL MEDICINE | Admitting: INTERNAL MEDICINE
Payer: MEDICARE

## 2020-03-04 VITALS
TEMPERATURE: 97.8 F | OXYGEN SATURATION: 99 % | RESPIRATION RATE: 16 BRPM | SYSTOLIC BLOOD PRESSURE: 97 MMHG | DIASTOLIC BLOOD PRESSURE: 60 MMHG | HEART RATE: 84 BPM | HEIGHT: 64 IN | WEIGHT: 181.2 LBS | BODY MASS INDEX: 30.93 KG/M2

## 2020-03-04 DIAGNOSIS — D63.1 ANEMIA DUE TO STAGE 3 CHRONIC KIDNEY DISEASE (H): Primary | ICD-10-CM

## 2020-03-04 DIAGNOSIS — N18.30 ANEMIA DUE TO STAGE 3 CHRONIC KIDNEY DISEASE (H): Primary | ICD-10-CM

## 2020-03-04 DIAGNOSIS — D64.9 NORMOCYTIC ANEMIA: ICD-10-CM

## 2020-03-04 DIAGNOSIS — K90.9 MALABSORPTION OF IRON: ICD-10-CM

## 2020-03-04 LAB
BASOPHILS # BLD AUTO: 0 10E9/L (ref 0–0.2)
BASOPHILS NFR BLD AUTO: 0.4 %
DIFFERENTIAL METHOD BLD: ABNORMAL
EOSINOPHIL # BLD AUTO: 0.3 10E9/L (ref 0–0.7)
EOSINOPHIL NFR BLD AUTO: 3.9 %
ERYTHROCYTE [DISTWIDTH] IN BLOOD BY AUTOMATED COUNT: 14.7 % (ref 10–15)
FERRITIN SERPL-MCNC: 403 NG/ML (ref 8–252)
HCT VFR BLD AUTO: 33.4 % (ref 35–47)
HGB BLD-MCNC: 10.7 G/DL (ref 11.7–15.7)
IMM GRANULOCYTES # BLD: 0 10E9/L (ref 0–0.4)
IMM GRANULOCYTES NFR BLD: 0.1 %
IRON SATN MFR SERPL: 17 % (ref 15–46)
IRON SERPL-MCNC: 59 UG/DL (ref 35–180)
LYMPHOCYTES # BLD AUTO: 1.7 10E9/L (ref 0.8–5.3)
LYMPHOCYTES NFR BLD AUTO: 24.7 %
MCH RBC QN AUTO: 31.4 PG (ref 26.5–33)
MCHC RBC AUTO-ENTMCNC: 32 G/DL (ref 31.5–36.5)
MCV RBC AUTO: 98 FL (ref 78–100)
MONOCYTES # BLD AUTO: 0.7 10E9/L (ref 0–1.3)
MONOCYTES NFR BLD AUTO: 10.5 %
NEUTROPHILS # BLD AUTO: 4.2 10E9/L (ref 1.6–8.3)
NEUTROPHILS NFR BLD AUTO: 60.4 %
NRBC # BLD AUTO: 0 10*3/UL
NRBC BLD AUTO-RTO: 0 /100
PLATELET # BLD AUTO: 256 10E9/L (ref 150–450)
RBC # BLD AUTO: 3.41 10E12/L (ref 3.8–5.2)
TIBC SERPL-MCNC: 341 UG/DL (ref 240–430)
WBC # BLD AUTO: 7 10E9/L (ref 4–11)

## 2020-03-04 PROCEDURE — 83550 IRON BINDING TEST: CPT | Performed by: INTERNAL MEDICINE

## 2020-03-04 PROCEDURE — G0463 HOSPITAL OUTPT CLINIC VISIT: HCPCS

## 2020-03-04 PROCEDURE — 36415 COLL VENOUS BLD VENIPUNCTURE: CPT

## 2020-03-04 PROCEDURE — 85025 COMPLETE CBC W/AUTO DIFF WBC: CPT | Performed by: INTERNAL MEDICINE

## 2020-03-04 PROCEDURE — 99214 OFFICE O/P EST MOD 30 MIN: CPT | Performed by: INTERNAL MEDICINE

## 2020-03-04 PROCEDURE — 83540 ASSAY OF IRON: CPT | Performed by: INTERNAL MEDICINE

## 2020-03-04 PROCEDURE — 82728 ASSAY OF FERRITIN: CPT | Performed by: INTERNAL MEDICINE

## 2020-03-04 RX ORDER — HEPARIN SODIUM,PORCINE 10 UNIT/ML
5 VIAL (ML) INTRAVENOUS
Status: CANCELLED | OUTPATIENT
Start: 2020-03-05

## 2020-03-04 RX ORDER — HEPARIN SODIUM (PORCINE) LOCK FLUSH IV SOLN 100 UNIT/ML 100 UNIT/ML
5 SOLUTION INTRAVENOUS
Status: CANCELLED | OUTPATIENT
Start: 2020-03-05

## 2020-03-04 ASSESSMENT — PAIN SCALES - GENERAL: PAINLEVEL: NO PAIN (0)

## 2020-03-04 ASSESSMENT — MIFFLIN-ST. JEOR: SCORE: 1341.92

## 2020-03-04 NOTE — PROGRESS NOTES
"Oncology Rooming Note    March 4, 2020 8:51 AM   Rosa Sotomayor is a 69 year old female who presents for:    Chief Complaint   Patient presents with     Oncology Clinic Visit     Initial Vitals: BP 97/60   Pulse 84   Temp 97.8  F (36.6  C) (Oral)   Resp 16   Ht 1.626 m (5' 4\")   Wt 82.2 kg (181 lb 3.2 oz)   SpO2 99%   BMI 31.10 kg/m   Estimated body mass index is 31.1 kg/m  as calculated from the following:    Height as of this encounter: 1.626 m (5' 4\").    Weight as of this encounter: 82.2 kg (181 lb 3.2 oz). Body surface area is 1.93 meters squared.  No Pain (0) Comment: Data Unavailable   No LMP recorded. Patient is postmenopausal.  Allergies reviewed: Yes  Medications reviewed: Yes    Medications: Medication refills not needed today.  Pharmacy name entered into [employer]: [id number] - [place], [place] - [address] AT [facility] OF [place] [address]    Clinical concerns: no      Nadeen Farr CMA            "

## 2020-03-04 NOTE — PROGRESS NOTES
Medical Assistant Note:  Rosa Sotomayor presents today for blood draw.    Patient seen by provider today: Yes: Kvng.   present during visit today: Not Applicable.    Concerns: No Concerns.    Procedure:  Lab draw site: LAC, Needle type: BF, Gauge: 23.    Post Assessment:  Labs drawn without difficulty: Yes.    Discharge Plan:  Departure Mode: Ambulatory.    Face to Face Time: 5 MIN  .    Fany Mccarty, CMA

## 2020-03-04 NOTE — LETTER
"    3/4/2020         RE: Rosa Sotomayor  [address] [facility]  [place] [address]        Dear Colleague,    Thank you for referring your patient, Rosa Sotomayor, to the [facility]. Please see a copy of my visit note below.    Oncology Rooming Note    March 4, 2020 8:51 AM   Rosa Sotomayor is a 69 year old female who presents for:    Chief Complaint   Patient presents with     Oncology Clinic Visit     Initial Vitals: BP 97/60   Pulse 84   Temp 97.8  F (36.6  C) (Oral)   Resp 16   Ht 1.626 m (5' 4\")   Wt 82.2 kg (181 lb 3.2 oz)   SpO2 99%   BMI 31.10 kg/m   Estimated body mass index is 31.1 kg/m  as calculated from the following:    Height as of this encounter: 1.626 m (5' 4\").    Weight as of this encounter: 82.2 kg (181 lb 3.2 oz). Body surface area is 1.93 meters squared.  No Pain (0) Comment: Data Unavailable   No LMP recorded. Patient is postmenopausal.  Allergies reviewed: Yes  Medications reviewed: Yes    Medications: Medication refills not needed today.  Pharmacy name entered into [employer]: [facility] #[id number] - [place] - [id number] AT [facility] [address] [place] [address]    Clinical concerns: no      Nadeen Farr, [facility]              Visit Date:   03/04/2020     HEMATOLOGY HISTORY: Ms. Sotomayor is a female with chronic anemia.  Her anemia is due to chronic renal disease and chronic disease.   1.  Multiple labs were done on 03/13/2019.   -WBC 3.5, hemoglobin 10.8 and platelets of 204.  MCV of 96.   -Creatinine of 1.26.   -Normal calcium.   -Normal LFT.   -Normal folate.   -Normal vitamin B12.   -Normal iron. Elevated ferritin.   -Erythropoietin mildly elevated at 38.   -Normal LDH.   -Soluable transferrin receptor is mildly elevated at 5.2.   -Normal haptoglobin.   2. On 11/29/2019, WBC of 4.3, hemoglobin 9.8 and platelets 230.  MCV of 103.   3. On 07/08/2019, CT scan did not reveal any splenomegaly or lymphadenopathy.   4. Bone marrow biopsy was done on 12/18/2019.  It " reveals normal cellular bone marrow for age with 30% cellularity.  There is trilineage hematopoiesis.  No evidence of MDS.  Increased storage iron.  Cytogenetics is normal.  Flow cytometry is normal.      SUBJECTIVE:  Mrs. 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  The patient does have chronic kidney disease and follows up with Dr. Cotton in Nephrology.      The patient has symptomatic anemia.  She is here to start Aranesp.      The patient says that she is tired all the time.  She sleeps almost 9 hours and still feels tired.  No headache.  She gets some dizziness.  No neck pain.  No chest pain.  No shortness of breath at rest.  She gets short of breath and palpitations on exertion.  She has leg/muscle cramps.  That has gotten worse.      No visual problem.  No abdominal pain.  No nausea or vomiting.  No urinary or bowel complaints.  No bleeding from any site.  No fever or chills.      PHYSICAL EXAMINATION:   GENERAL:  She is alert, oriented x 3.   VITAL SIGNS:  Reviewed.  ECOG PS of 2.   The rest of the systems not examined.      LABORATORY DATA:  Reviewed.  Hemoglobin is 10.7.  WBC and platelets are normal.      ASSESSMENT:   1.  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   2.  Chronic kidney disease.      PLAN:   1.  I explained to the patient that her hemoglobin is above 10.  Because of that, we will not be able to give her Aranesp.  She can get Aranesp when the hemoglobin is below 10. At this time, we will monitor her CBC.  She will see me in a month with CBC.       2.  The patient is symptomatic.  She is more fatigued.  She has muscle cramps.  She gets short of breath on exertion. I am going to check iron studies.  If she has iron deficiency, we will give her IV iron.  The patient in the past has received IV iron and that has helped.      In the past, she has taken oral iron.   That has not helped with her anemia.  Because of that, she received IV iron.  She has malabsorption of iron.  If she has iron deficiency, we will give her IV iron.     3.  I advised the patient to see a physician sooner if she has worsening weakness, chest pain, worsening shortness of breath or any other concerns.      ADDENDUM:  Iron is normal at 59, but saturation is low at 17%.  Low saturation index is suggestive of iron deficiency.  We will give her IV Injectafer for iron deficiency anemia.  Oral iron not being given because of malabsorption of iron.      Total face to face time spent, 25 minutes, more than 50% of the time spent in counseling and coordination of care.         CAITLIN CHAVEZ MD             D: 2020   T: 2020   MT: MARCIE      Name:     NEO DENT   MRN:      [id number]        Account:      [id number]   :      1950           Visit Date:   2020      Document: [id number]      Visit Date:   2020     HEMATOLOGY HISTORY: Ms. Dent is a female with chronic anemia.  Her anemia is due to chronic renal disease and chronic disease.   1.  Multiple labs were done on 2019.   -WBC 3.5, hemoglobin 10.8 and platelets of 204.  MCV of 96.   -Creatinine of 1.26.   -Normal calcium.   -Normal LFT.   -Normal folate.   -Normal vitamin B12.   -Normal iron. Elevated ferritin.   -Erythropoietin mildly elevated at 38.   -Normal LDH.   -Soluable transferrin receptor is mildly elevated at 5.2.   -Normal haptoglobin.   2. On 2019, WBC of 4.3, hemoglobin 9.8 and platelets 230.  MCV of 103.   3. On 2019, CT scan did not reveal any splenomegaly or lymphadenopathy.   4. Bone marrow biopsy was done on 2019.  It reveals normal cellular bone marrow for age with 30% cellularity.  There is trilineage hematopoiesis.  No evidence of MDS.  Increased storage iron.  Cytogenetics is normal.  Flow cytometry is normal.      SUBJECTIVE:  Mrs. Dent there is a 69-year-old female with  chronic normocytic anemia due to renal disease and anemia of chronic disease.  The patient does have chronic kidney disease and follows up with Dr. Cotton in Nephrology.      The patient has symptomatic anemia.  She is here to start Aranesp.      The patient says that she is tired all the time.  She sleeps almost 9 hours and still feels tired.  No headache.  She gets some dizziness.  No neck pain.  No chest pain.  No shortness of breath at rest.  She gets short of breath and palpitations on exertion.  She has leg/muscle cramps.  That has gotten worse.      No visual problem.  No abdominal pain.  No nausea or vomiting.  No urinary or bowel complaints.  No bleeding from any site.  No fever or chills.      PHYSICAL EXAMINATION:   GENERAL:  She is alert, oriented x 3.   VITAL SIGNS:  Reviewed.  ECOG PS of 2.   The rest of the systems not examined.      LABORATORY DATA:  Reviewed.  Hemoglobin is 10.7.  WBC and platelets are normal.      ASSESSMENT:   1.  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   2.  Chronic kidney disease.      PLAN:   1.  I explained to the patient that her hemoglobin is above 10.  Because of that, we will not be able to give her Aranesp.  She can get Aranesp when the hemoglobin is below 10. At this time, we will monitor her CBC.  She will see me in a month with CBC.       2.  The patient is symptomatic.  She is more fatigued.  She has muscle cramps.  She gets short of breath on exertion. I am going to check iron studies.  If she has iron deficiency, we will give her IV iron.  The patient in the past has received IV iron and that has helped.      In the past, she has taken oral iron.  That has not helped with her anemia.  Because of that, she received IV iron.  She has malabsorption of iron.  If she has iron deficiency, we will give her IV iron.     3.  I advised the patient to see a physician sooner if she has worsening weakness, chest  pain, worsening shortness of breath or any other concerns.      ADDENDUM:  Iron is normal at 59, but saturation is low at 17%.  Low saturation index is suggestive of iron deficiency.  We will give her IV Injectafer for iron deficiency anemia.  Oral iron not being given because of malabsorption of iron.      Total face to face time spent, 25 minutes, more than 50% of the time spent in counseling and coordination of care.         CAITLIN CHAVEZ MD             D: 2020   T: 2020   MT: MARCIE      Name:     NEO DENT   MRN:      [id number]        Account:      [id number]   :      1950           Visit Date:   2020      Document: [id number]          Again, thank you for allowing me to participate in the care of your patient.        Sincerely,        Caitlin Chavez MD

## 2020-03-04 NOTE — PROGRESS NOTES
Susan, please, call the patient and tell her that she does have iron deficiency.  Iron saturation index is low.  Please schedule the patient for 2 doses of IV Injectafer.    Scheduling, please call the patient with appointment time for IV iron.     bk     Writer called and spoke with patient regarding the above result note and instructions. Patient verbalized understanding and stated she had iron infusions in the distant past.    Patient was transferred to scheduling.    Babita Jane RN

## 2020-03-04 NOTE — PROGRESS NOTES
Visit Date:   03/04/2020     HEMATOLOGY HISTORY: Ms. Sotomayor is a female with chronic anemia.  Her anemia is due to chronic renal disease and chronic disease.   1.  Multiple labs were done on 03/13/2019.   -WBC 3.5, hemoglobin 10.8 and platelets of 204.  MCV of 96.   -Creatinine of 1.26.   -Normal calcium.   -Normal LFT.   -Normal folate.   -Normal vitamin B12.   -Normal iron. Elevated ferritin.   -Erythropoietin mildly elevated at 38.   -Normal LDH.   -Soluable transferrin receptor is mildly elevated at 5.2.   -Normal haptoglobin.   2. On 11/29/2019, WBC of 4.3, hemoglobin 9.8 and platelets 230.  MCV of 103.   3. On 07/08/2019, CT scan did not reveal any splenomegaly or lymphadenopathy.   4. Bone marrow biopsy was done on 12/18/2019.  It reveals normal cellular bone marrow for age with 30% cellularity.  There is trilineage hematopoiesis.  No evidence of MDS.  Increased storage iron.  Cytogenetics is normal.  Flow cytometry is normal.      SUBJECTIVE:  Mrs. 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  The patient does have chronic kidney disease and follows up with Dr. Cotton in Nephrology.      The patient has symptomatic anemia.  She is here to start Aranesp.      The patient says that she is tired all the time.  She sleeps almost 9 hours and still feels tired.  No headache.  She gets some dizziness.  No neck pain.  No chest pain.  No shortness of breath at rest.  She gets short of breath and palpitations on exertion.  She has leg/muscle cramps.  That has gotten worse.      No visual problem.  No abdominal pain.  No nausea or vomiting.  No urinary or bowel complaints.  No bleeding from any site.  No fever or chills.      PHYSICAL EXAMINATION:   GENERAL:  She is alert, oriented x 3.   VITAL SIGNS:  Reviewed.  ECOG PS of 2.   The rest of the systems not examined.      LABORATORY DATA:  Reviewed.  Hemoglobin is 10.7.  WBC and platelets are normal.       ASSESSMENT:   1.  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   2.  Chronic kidney disease.      PLAN:   1.  I explained to the patient that her hemoglobin is above 10.  Because of that, we will not be able to give her Aranesp.  She can get Aranesp when the hemoglobin is below 10. At this time, we will monitor her CBC.  She will see me in a month with CBC.       2.  The patient is symptomatic.  She is more fatigued.  She has muscle cramps.  She gets short of breath on exertion. I am going to check iron studies.  If she has iron deficiency, we will give her IV iron.  The patient in the past has received IV iron and that has helped.      In the past, she has taken oral iron.  That has not helped with her anemia.  Because of that, she received IV iron.  She has malabsorption of iron.  If she has iron deficiency, we will give her IV iron.     3.  I advised the patient to see a physician sooner if she has worsening weakness, chest pain, worsening shortness of breath or any other concerns.      ADDENDUM:  Iron is normal at 59, but saturation is low at 17%.  Low saturation index is suggestive of iron deficiency.  We will give her IV Injectafer for iron deficiency anemia.  Oral iron not being given because of malabsorption of iron.      Total face to face time spent, 25 minutes, more than 50% of the time spent in counseling and coordination of care.         CAITLIN CHAVEZ MD             D: 2020   T: 2020   MT: MARCIE      Name:     NEO DENT   MRN:      [id number]        Account:      [id number]   :      1950           Visit Date:   2020      Document: [id number]

## 2020-03-04 NOTE — PATIENT INSTRUCTIONS
1. Labs today.  2. No procrit/aranesp today.  3. Follow-up in 1 month with CBC.  Scheduled. Yudith     AVS printed and given to patient/ Yudith

## 2020-03-05 ENCOUNTER — INFUSION THERAPY VISIT (OUTPATIENT)
Dept: INFUSION THERAPY | Facility: CLINIC | Age: 70
End: 2020-03-05
Attending: INTERNAL MEDICINE
Payer: MEDICARE

## 2020-03-05 VITALS
SYSTOLIC BLOOD PRESSURE: 132 MMHG | DIASTOLIC BLOOD PRESSURE: 86 MMHG | RESPIRATION RATE: 20 BRPM | TEMPERATURE: 97.8 F | HEART RATE: 83 BPM

## 2020-03-05 DIAGNOSIS — K90.9 MALABSORPTION OF IRON: Primary | ICD-10-CM

## 2020-03-05 DIAGNOSIS — D50.8 OTHER IRON DEFICIENCY ANEMIA: ICD-10-CM

## 2020-03-05 PROCEDURE — 25000128 H RX IP 250 OP 636: Performed by: INTERNAL MEDICINE

## 2020-03-05 PROCEDURE — 96365 THER/PROPH/DIAG IV INF INIT: CPT

## 2020-03-05 PROCEDURE — 25800030 ZZH RX IP 258 OP 636: Performed by: INTERNAL MEDICINE

## 2020-03-05 RX ORDER — HEPARIN SODIUM,PORCINE 10 UNIT/ML
5 VIAL (ML) INTRAVENOUS
Status: CANCELLED | OUTPATIENT
Start: 2020-03-12

## 2020-03-05 RX ORDER — HEPARIN SODIUM (PORCINE) LOCK FLUSH IV SOLN 100 UNIT/ML 100 UNIT/ML
5 SOLUTION INTRAVENOUS
Status: CANCELLED | OUTPATIENT
Start: 2020-03-12

## 2020-03-05 RX ADMIN — FERRIC CARBOXYMALTOSE INJECTION 750 MG: 50 INJECTION, SOLUTION INTRAVENOUS at 15:20

## 2020-03-05 RX ADMIN — SODIUM CHLORIDE 250 ML: 9 INJECTION, SOLUTION INTRAVENOUS at 15:20

## 2020-03-05 ASSESSMENT — PAIN SCALES - GENERAL: PAINLEVEL: NO PAIN (0)

## 2020-03-05 NOTE — PROGRESS NOTES
Infusion Nursing Note:  Rosa Beniteze presents today for injectafer.    Patient seen by provider today: No   present during visit today: Not Applicable.    Note: N/A.    Intravenous Access:  Peripheral IV placed.    Treatment Conditions:  Not Applicable.      Post Infusion Assessment:  Patient tolerated infusion without incident.  Patient observed for 30 minutes post injectafer per protocol.  Site patent and intact, free from redness, edema or discomfort.  No evidence of extravasations.  Access discontinued per protocol.       Discharge Plan:   AVS to patient via MYCHART.  Patient will return 3/12 for next appointment.   Patient discharged in stable condition accompanied by: self.  Departure Mode: Ambulatory.    Aditya Packer RN

## 2020-03-08 NOTE — PROGRESS NOTES
Visit Date:   03/04/2020     HEMATOLOGY HISTORY: Ms. Sotomayor is a female with chronic anemia.  Her anemia is due to chronic renal disease and chronic disease.   1.  Multiple labs were done on 03/13/2019.   -WBC 3.5, hemoglobin 10.8 and platelets of 204.  MCV of 96.   -Creatinine of 1.26.   -Normal calcium.   -Normal LFT.   -Normal folate.   -Normal vitamin B12.   -Normal iron. Elevated ferritin.   -Erythropoietin mildly elevated at 38.   -Normal LDH.   -Soluable transferrin receptor is mildly elevated at 5.2.   -Normal haptoglobin.   2. On 11/29/2019, WBC of 4.3, hemoglobin 9.8 and platelets 230.  MCV of 103.   3. On 07/08/2019, CT scan did not reveal any splenomegaly or lymphadenopathy.   4. Bone marrow biopsy was done on 12/18/2019.  It reveals normal cellular bone marrow for age with 30% cellularity.  There is trilineage hematopoiesis.  No evidence of MDS.  Increased storage iron.  Cytogenetics is normal.  Flow cytometry is normal.      SUBJECTIVE:  Mrs. 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  The patient does have chronic kidney disease and follows up with Dr. Cotton in Nephrology.      The patient has symptomatic anemia.  She is here to start Aranesp.      The patient says that she is tired all the time.  She sleeps almost 9 hours and still feels tired.  No headache.  She gets some dizziness.  No neck pain.  No chest pain.  No shortness of breath at rest.  She gets short of breath and palpitations on exertion.  She has leg/muscle cramps.  That has gotten worse.      No visual problem.  No abdominal pain.  No nausea or vomiting.  No urinary or bowel complaints.  No bleeding from any site.  No fever or chills.      PHYSICAL EXAMINATION:   GENERAL:  She is alert, oriented x 3.   VITAL SIGNS:  Reviewed.  ECOG PS of 2.   The rest of the systems not examined.      LABORATORY DATA:  Reviewed.  Hemoglobin is 10.7.  WBC and platelets are normal.       ASSESSMENT:   1.  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   2.  Chronic kidney disease.      PLAN:   1.  I explained to the patient that her hemoglobin is above 10.  Because of that, we will not be able to give her Aranesp.  She can get Aranesp when the hemoglobin is below 10. At this time, we will monitor her CBC.  She will see me in a month with CBC.       2.  The patient is symptomatic.  She is more fatigued.  She has muscle cramps.  She gets short of breath on exertion. I am going to check iron studies.  If she has iron deficiency, we will give her IV iron.  The patient in the past has received IV iron and that has helped.      In the past, she has taken oral iron.  That has not helped with her anemia.  Because of that, she received IV iron.  She has malabsorption of iron.  If she has iron deficiency, we will give her IV iron.     3.  I advised the patient to see a physician sooner if she has worsening weakness, chest pain, worsening shortness of breath or any other concerns.      ADDENDUM:  Iron is normal at 59, but saturation is low at 17%.  Low saturation index is suggestive of iron deficiency.  We will give her IV Injectafer for iron deficiency anemia.  Oral iron not being given because of malabsorption of iron.      Total face to face time spent, 25 minutes, more than 50% of the time spent in counseling and coordination of care.         CAITLIN CHAVEZ MD             D: 2020   T: 2020   MT: MARCIE      Name:     NEO DENT   MRN:      [id number]        Account:      [id number]   :      1950           Visit Date:   2020      Document: [id number]

## 2020-03-12 ENCOUNTER — INFUSION THERAPY VISIT (OUTPATIENT)
Dept: INFUSION THERAPY | Facility: CLINIC | Age: 70
End: 2020-03-12
Attending: INTERNAL MEDICINE
Payer: MEDICARE

## 2020-03-12 DIAGNOSIS — D50.8 OTHER IRON DEFICIENCY ANEMIA: ICD-10-CM

## 2020-03-12 DIAGNOSIS — K90.9 MALABSORPTION OF IRON: Primary | ICD-10-CM

## 2020-03-12 PROCEDURE — 25000128 H RX IP 250 OP 636: Performed by: INTERNAL MEDICINE

## 2020-03-12 PROCEDURE — 25800030 ZZH RX IP 258 OP 636: Performed by: INTERNAL MEDICINE

## 2020-03-12 PROCEDURE — 96365 THER/PROPH/DIAG IV INF INIT: CPT

## 2020-03-12 RX ORDER — HEPARIN SODIUM (PORCINE) LOCK FLUSH IV SOLN 100 UNIT/ML 100 UNIT/ML
5 SOLUTION INTRAVENOUS
Status: CANCELLED | OUTPATIENT
Start: 2020-03-12

## 2020-03-12 RX ORDER — HEPARIN SODIUM,PORCINE 10 UNIT/ML
5 VIAL (ML) INTRAVENOUS
Status: CANCELLED | OUTPATIENT
Start: 2020-03-12

## 2020-03-12 RX ADMIN — SODIUM CHLORIDE 250 ML: 9 INJECTION, SOLUTION INTRAVENOUS at 13:30

## 2020-03-12 RX ADMIN — FERRIC CARBOXYMALTOSE INJECTION 750 MG: 50 INJECTION, SOLUTION INTRAVENOUS at 13:28

## 2020-03-12 ASSESSMENT — PAIN SCALES - GENERAL: PAINLEVEL: NO PAIN (0)

## 2020-03-12 NOTE — PROGRESS NOTES
Infusion Nursing Note:  Rosa Beniteze presents today for injecatfer.    Patient seen by provider today: No   present during visit today: Not Applicable.    Note: c/o nausea for 2 days after last infusion, ate crackers and ginger ale during this in infusion to see if that would help    Intravenous Access:  Peripheral IV placed.    Treatment Conditions:  Not Applicable.      Post Infusion Assessment:  Patient tolerated infusion without incident.  Patient observed for 30 minutes post injectafer per protocol.  Site patent and intact, free from redness, edema or discomfort.  No evidence of extravasations.  Access discontinued per protocol.       Discharge Plan:   Discharge instructions reviewed with: Patient.  Patient and/or family verbalized understanding of discharge instructions and all questions answered.  Patient discharged in stable condition accompanied by: self.  Departure Mode: Ambulatory.    Drea Gregg RN

## 2020-03-31 ENCOUNTER — TELEPHONE (OUTPATIENT)
Dept: ONCOLOGY | Facility: CLINIC | Age: 70
End: 2020-03-31

## 2020-03-31 NOTE — TELEPHONE ENCOUNTER
"Patient is currently scheduled for an appointment at [facility] in [place].  Called patient to review current visitor restrictions and complete COVID-19 Patient Infection/Travel Screening Tool.     Due to the recent public health concerns around COVID-19 and in an effort to keep our patients and staff safe and healthy, we are implementing a screening process for the patients that come to our clinic.      I am going to ask you a few questions, please answer yes or no.  Your honesty about any symptoms is critical, as it keeps patients and staff healthy.      Do you have a:  Fever (or reported chills)?  No  Cough?  No  Shortness of breath?  No  Rash?  No    In the last month, have you been in contact with someone who was confirmed or suspected to have Coronavirus/COVID-19?  No    Have you traveled internationally in the last month?  No  If so, where?  N/A     I also wanted to let you know that to protect our patients from the flu and other common illnesses, [facility] enforce visitor restrictions year round, but due to the community spread of COVID-19 in [place], we are taking additional precautionary steps to ensure the health of our patients.  At this time, NO visitors are allowed on our hospital and clinic campuses.     Patient PASSED the screening assessment.    Patient instructed to come to the clinic as planned for their scheduled appointment and to call the clinic if any symptoms develop prior to their appointment.    \"COVID-19 is contagious and can be dangerous for our patients and staff.  Please send us a MyChart message or call our clinic before coming in if you feel any of the following symptoms: fever, cough, congestion, runny nose, sore throat, muscle aches and pains, or shortness of breath.  If you are already at our clinic, it is very important that you be honest about any symptoms you are experiencing to ensure your safety and that of other patients and staff who " "treat you.  If you do have symptoms, we will have a nurse and/or provider asses you to determine next steps.\"    Nadeen Farr, CMA on 3/31/2020 at 1:09 PM      "

## 2020-04-01 ENCOUNTER — VIRTUAL VISIT (OUTPATIENT)
Dept: ONCOLOGY | Facility: CLINIC | Age: 70
End: 2020-04-01
Attending: INTERNAL MEDICINE
Payer: MEDICARE

## 2020-04-01 ENCOUNTER — INFUSION THERAPY VISIT (OUTPATIENT)
Dept: INFUSION THERAPY | Facility: CLINIC | Age: 70
End: 2020-04-01
Attending: INTERNAL MEDICINE
Payer: MEDICARE

## 2020-04-01 ENCOUNTER — HOSPITAL ENCOUNTER (OUTPATIENT)
Facility: CLINIC | Age: 70
Setting detail: SPECIMEN
Discharge: HOME | End: 2020-04-01
Attending: INTERNAL MEDICINE | Admitting: INTERNAL MEDICINE
Payer: MEDICARE

## 2020-04-01 DIAGNOSIS — D64.9 NORMOCYTIC ANEMIA: ICD-10-CM

## 2020-04-01 DIAGNOSIS — D63.1 ANEMIA DUE TO STAGE 3 CHRONIC KIDNEY DISEASE (H): Primary | ICD-10-CM

## 2020-04-01 DIAGNOSIS — N18.30 ANEMIA DUE TO STAGE 3 CHRONIC KIDNEY DISEASE (H): Primary | ICD-10-CM

## 2020-04-01 DIAGNOSIS — N18.30 ANEMIA DUE TO STAGE 3 CHRONIC KIDNEY DISEASE (H): ICD-10-CM

## 2020-04-01 DIAGNOSIS — D63.1 ANEMIA DUE TO STAGE 3 CHRONIC KIDNEY DISEASE (H): ICD-10-CM

## 2020-04-01 LAB
ERYTHROCYTE [DISTWIDTH] IN BLOOD BY AUTOMATED COUNT: 14.8 % (ref 10–15)
HCT VFR BLD AUTO: 33.7 % (ref 35–47)
HGB BLD-MCNC: 10.9 G/DL (ref 11.7–15.7)
MCH RBC QN AUTO: 32.1 PG (ref 26.5–33)
MCHC RBC AUTO-ENTMCNC: 32.3 G/DL (ref 31.5–36.5)
MCV RBC AUTO: 99 FL (ref 78–100)
PLATELET # BLD AUTO: 220 10E9/L (ref 150–450)
RBC # BLD AUTO: 3.4 10E12/L (ref 3.8–5.2)
WBC # BLD AUTO: 4.2 10E9/L (ref 4–11)

## 2020-04-01 PROCEDURE — 36415 COLL VENOUS BLD VENIPUNCTURE: CPT

## 2020-04-01 PROCEDURE — 99441 ZZC PHYSICIAN TELEPHONE EVALUATION 5-10 MIN: CPT | Performed by: INTERNAL MEDICINE

## 2020-04-01 PROCEDURE — 85027 COMPLETE CBC AUTOMATED: CPT | Performed by: INTERNAL MEDICINE

## 2020-04-01 ASSESSMENT — PAIN SCALES - GENERAL: PAINLEVEL: NO PAIN (0)

## 2020-04-01 NOTE — PROGRESS NOTES
"Rosa Sotomayor is a 69 year old female who is being evaluated via a billable telephone visit.      The patient has been notified of following:     \"This telephone visit will be conducted via a call between you and your physician/provider. We have found that certain health care needs can be provided without the need for a physical exam.  This service lets us provide the care you need with a short phone conversation.  If a prescription is necessary we can send it directly to your pharmacy.  If lab work is needed we can place an order for that and you can then stop by our lab to have the test done at a later time.    If during the course of the call the physician/provider feels a telephone visit is not appropriate, you will not be charged for this service.\"     Patient has given verbal consent for Telephone visit?  Yes    Rosa Sotomayor:  Chief Complaint   Patient presents with     Oncology Clinic Visit       I have reviewed and updated the patient's Allergy and Medication List.    ALLERGIES  Bee venom; Codeine; Fentanyl; Gabapentin; Hydromorphone; Midazolam; Prilocaine; Propofol; Penicillins; Shingrix [zoster vac recomb adjuvanted]; Sulfa drugs; Clindamycin; Codeine; Diatrizoate; Hydrocodone; Lisinopril; Lorazepam; Nsaids; Other [seasonal allergies]; Oxycodone; Vancomycin; Erythromycin; Eucalyptus oil; Nitrofuran derivatives; Nitrofurantoin; and Tramadol      Phone call duration: 5 minutes    Evelin Farr CMA  "

## 2020-04-01 NOTE — PROGRESS NOTES
Medical Assistant Note:  Rosa Sotomayor presents today for lab draw.    Patient seen by provider today: No.   present during visit today: Not Applicable.    Concerns: No Concerns.    Procedure:  Lab draw site: LAC, Needle type: Butterfly, Gauge: 23.    Post Assessment:  Labs drawn without difficulty: Yes.    Discharge Plan:  Departure Mode: Ambulatory.    Face to Face Time: 4 min.    Shari Schoenberger, CMA

## 2020-04-01 NOTE — PROGRESS NOTES
Visit Date:   04/01/2020     HEMATOLOGY HISTORY: Ms. Sotomayor is a female with chronic anemia.  Her anemia is due to chronic renal disease and chronic disease.   1.  Multiple labs were done on 03/13/2019.   -WBC 3.5, hemoglobin 10.8 and platelets of 204.  MCV of 96.   -Creatinine of 1.26.   -Normal calcium.   -Normal LFT.   -Normal folate.   -Normal vitamin B12.   -Normal iron. Elevated ferritin.   -Erythropoietin mildly elevated at 38.   -Normal LDH.   -Soluable transferrin receptor is mildly elevated at 5.2.   -Normal haptoglobin.   2. On 11/29/2019, WBC of 4.3, hemoglobin 9.8 and platelets 230.  MCV of 103.   3. On 07/08/2019, CT scan did not reveal any splenomegaly or lymphadenopathy.   4. Bone marrow biopsy was done on 12/18/2019.  It reveals normal cellular bone marrow for age with 30% cellularity.  There is trilineage hematopoiesis.  No evidence of MDS.  Increased storage iron.  Cytogenetics is normal.  Flow cytometry is normal.      SUBJECTIVE:  Mrs. 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  Plan was to start her on erythropoietin stimulating agent with Aranesp.  It has not been started as lately her hemoglobin has been above 10.      The patient's overall condition is about the same.  Some days she feels good and other days she feels weak.  Intermittently she gets headache.  Intermittently she gets dizziness.  No visual problem.  No ear pain or sore throat.  No neck pain.  No chest pain.  Sometimes she gets shortness of breath on exertion.  No shortness of breath at rest.  No chest pain.  No abdominal pain, nausea or vomiting.  No bleeding.      The patient says that some days she will be doing good and other days she gets more symptomatic with fatigue.      LABORATORY DATA:  Reviewed.  Hemoglobin is 10.9 with MCV of 99.  Normal WBC and platelets.      ASSESSMENT:   1.  A 69-year-old female with chronic normocytic anemia due to renal disease  and anemia of chronic disease.  Anemia is stable.   2.  Chronic kidney disease.   3.  Fatigue.      PLAN:   1.  I discussed with her regarding her symptoms.  Her symptoms are due to anemia.  I explained to the patient that her hemoglobin is 10.9.  Typically this causes minimal symptoms.  The patient has been having fatigue.  She also gets intermittent headache, dizziness and shortness of breath.  These are all likely related to anemia.  In 2019, she had an echocardiogram done which had revealed a normal ejection fraction.   2.  Discussed regarding her anemia.  I told her her hemoglobin is better at 10.9.  We cannot give her erythropoietin stimulating agent until her hemoglobin is below 10.  For now we will continue to monitor her CBC once a month.   3.  The patient received 2 doses of IV iron.  The patient has not noticed any improvement in her symptoms.   4.  At this time, we will monitor her closely.  We will see her in a month with labs.  Advised her to call us if she has worsening weakness, chest pain, shortness of breath, bleeding or any other concerns.         CAITLIN CHAVEZ MD       This is a telephone visit between 10:11 AM and 10:17 AM.     D: 2020   T: 2020   MT: MARCIE      Name:     NEO DENT   MRN:      [id number]        Account:      [id number]   :      1950           Visit Date:   2020      Document: [id number]

## 2020-04-01 NOTE — PROGRESS NOTES
Visit Date:   04/01/2020     HEMATOLOGY HISTORY: Ms. Sotomayor is a female with chronic anemia.  Her anemia is due to chronic renal disease and chronic disease.   1.  Multiple labs were done on 03/13/2019.   -WBC 3.5, hemoglobin 10.8 and platelets of 204.  MCV of 96.   -Creatinine of 1.26.   -Normal calcium.   -Normal LFT.   -Normal folate.   -Normal vitamin B12.   -Normal iron. Elevated ferritin.   -Erythropoietin mildly elevated at 38.   -Normal LDH.   -Soluable transferrin receptor is mildly elevated at 5.2.   -Normal haptoglobin.   2. On 11/29/2019, WBC of 4.3, hemoglobin 9.8 and platelets 230.  MCV of 103.   3. On 07/08/2019, CT scan did not reveal any splenomegaly or lymphadenopathy.   4. Bone marrow biopsy was done on 12/18/2019.  It reveals normal cellular bone marrow for age with 30% cellularity.  There is trilineage hematopoiesis.  No evidence of MDS.  Increased storage iron.  Cytogenetics is normal.  Flow cytometry is normal.      SUBJECTIVE:  Mrs. 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  Plan was to start her on erythropoietin stimulating agent with Aranesp.  It has not been started as lately her hemoglobin has been above 10.      The patient's overall condition is about the same.  Some days she feels good and other days she feels weak.  Intermittently she gets headache.  Intermittently she gets dizziness.  No visual problem.  No ear pain or sore throat.  No neck pain.  No chest pain.  Sometimes she gets shortness of breath on exertion.  No shortness of breath at rest.  No chest pain.  No abdominal pain, nausea or vomiting.  No bleeding.      The patient says that some days she will be doing good and other days she gets more symptomatic with fatigue.      LABORATORY DATA:  Reviewed.  Hemoglobin is 10.9 with MCV of 99.  Normal WBC and platelets.      ASSESSMENT:   1.  A 69-year-old female with chronic normocytic anemia due to renal disease  and anemia of chronic disease.  Anemia is stable.   2.  Chronic kidney disease.   3.  Fatigue.      PLAN:   1.  I discussed with her regarding her symptoms.  Her symptoms are due to anemia.  I explained to the patient that her hemoglobin is 10.9.  Typically this causes minimal symptoms.  The patient has been having fatigue.  She also gets intermittent headache, dizziness and shortness of breath.  These are all likely related to anemia.  In 2019, she had an echocardiogram done which had revealed a normal ejection fraction.   2.  Discussed regarding her anemia.  I told her her hemoglobin is better at 10.9.  We cannot give her erythropoietin stimulating agent until her hemoglobin is below 10.  For now we will continue to monitor her CBC once a month.   3.  The patient received 2 doses of IV iron.  The patient has not noticed any improvement in her symptoms.   4.  At this time, we will monitor her closely.  We will see her in a month with labs.  Advised her to call us if she has worsening weakness, chest pain, shortness of breath, bleeding or any other concerns.         CAITLIN CHAVEZ MD             D: 2020   T: 2020   MT: MARCIE      Name:     NEO DENT   MRN:      [id number]        Account:      [id number]   :      1950           Visit Date:   2020      Document: [id number]      This is a telephone visit between 10:11 AM and 10:17 AM.

## 2020-04-22 NOTE — TELEPHONE ENCOUNTER
Pt referred to [facility] by Josh Hollingsworth MD for bilateral lower extremity claudication.      11/6/19 FABRIZIO in Epic.     Pt needs to be scheduled for consult with vascular medicine.  Will route to scheduling to coordinate an appointment at next available.    MICHAEL FernandezN, RN     SCOTT at [phone] to send photos through the portal.

## 2020-04-27 ENCOUNTER — TELEPHONE (OUTPATIENT)
Dept: ONCOLOGY | Facility: CLINIC | Age: 70
End: 2020-04-27

## 2020-04-27 NOTE — TELEPHONE ENCOUNTER
"Patient is currently scheduled for an appointment at [facility] in [place].  Called patient to review current visitor restrictions and complete COVID-19 Patient Infection/Travel Screening Tool.     Due to the recent public health concerns around COVID-19 and in an effort to keep our patients and staff safe and healthy, we are implementing a screening process for the patients that come to our clinic.      I am going to ask you a few questions, please answer yes or no.  Your honesty about any symptoms is critical, as it keeps patients and staff healthy.      Do you have a:  Fever (or reported chills)?  No  Cough?  No  Shortness of breath?  No  Rash?  No    In the last month, have you been in contact with someone who was confirmed or suspected to have Coronavirus/COVID-19?  No    Have you traveled internationally in the last month?  No  If so, where?  N/A     I also wanted to let you know that to protect our patients from the flu and other common illnesses, [facility] enforce visitor restrictions year round, but due to the community spread of COVID-19 in [place], we are taking additional precautionary steps to ensure the health of our patients.  At this time, NO visitors are allowed on our hospital and clinic campuses.     Patient PASSED the screening assessment.    Patient instructed to come to the clinic as planned for their scheduled appointment and to call the clinic if any symptoms develop prior to their appointment.    \"COVID-19 is contagious and can be dangerous for our patients and staff.  Please send us a MyChart message or call our clinic before coming in if you feel any of the following symptoms: fever, cough, congestion, runny nose, sore throat, muscle aches and pains, or shortness of breath.  If you are already at our clinic, it is very important that you be honest about any symptoms you are experiencing to ensure your safety and that of other patients and staff who " "treat you.  If you do have symptoms, we will have a nurse and/or provider asses you to determine next steps.\"    Nadeen Farr, CMA on 4/27/2020 at 1:05 PM    "

## 2020-04-28 ENCOUNTER — HOSPITAL ENCOUNTER (OUTPATIENT)
Facility: CLINIC | Age: 70
Setting detail: SPECIMEN
Discharge: HOME | End: 2020-04-28
Attending: INTERNAL MEDICINE | Admitting: INTERNAL MEDICINE
Payer: MEDICARE

## 2020-04-28 ENCOUNTER — INFUSION THERAPY VISIT (OUTPATIENT)
Dept: INFUSION THERAPY | Facility: CLINIC | Age: 70
End: 2020-04-28
Attending: INTERNAL MEDICINE
Payer: MEDICARE

## 2020-04-28 DIAGNOSIS — N18.30 ANEMIA DUE TO STAGE 3 CHRONIC KIDNEY DISEASE (H): ICD-10-CM

## 2020-04-28 DIAGNOSIS — D63.1 ANEMIA DUE TO STAGE 3 CHRONIC KIDNEY DISEASE (H): ICD-10-CM

## 2020-04-28 LAB
ERYTHROCYTE [DISTWIDTH] IN BLOOD BY AUTOMATED COUNT: 13.6 % (ref 10–15)
HCT VFR BLD AUTO: 35.1 % (ref 35–47)
HGB BLD-MCNC: 11.4 G/DL (ref 11.7–15.7)
MCH RBC QN AUTO: 32.1 PG (ref 26.5–33)
MCHC RBC AUTO-ENTMCNC: 32.5 G/DL (ref 31.5–36.5)
MCV RBC AUTO: 99 FL (ref 78–100)
PLATELET # BLD AUTO: 231 10E9/L (ref 150–450)
RBC # BLD AUTO: 3.55 10E12/L (ref 3.8–5.2)
WBC # BLD AUTO: 4.1 10E9/L (ref 4–11)

## 2020-04-28 PROCEDURE — 85027 COMPLETE CBC AUTOMATED: CPT | Performed by: INTERNAL MEDICINE

## 2020-04-28 PROCEDURE — 36415 COLL VENOUS BLD VENIPUNCTURE: CPT

## 2020-04-28 NOTE — PROGRESS NOTES
Medical Assistant Note:  Rosa Sotomayor presents today for blood draw.    Patient seen by provider today: No.   present during visit today: Not Applicable.    Concerns: No Concerns.    Procedure:  Lab draw site: lac, Needle type: bf, Gauge: 23.    Post Assessment:  Labs drawn without difficulty: Yes.    Discharge Plan:  Departure Mode: Ambulatory.    Face to Face Time: 5 min  .    Fany Mccarty, CMA

## 2020-04-29 ENCOUNTER — VIRTUAL VISIT (OUTPATIENT)
Dept: ONCOLOGY | Facility: CLINIC | Age: 70
End: 2020-04-29
Attending: INTERNAL MEDICINE
Payer: MEDICARE

## 2020-04-29 DIAGNOSIS — D64.9 NORMOCYTIC ANEMIA: Primary | ICD-10-CM

## 2020-04-29 DIAGNOSIS — E61.1 IRON DEFICIENCY: ICD-10-CM

## 2020-04-29 PROCEDURE — 99212 OFFICE O/P EST SF 10 MIN: CPT | Mod: GT | Performed by: INTERNAL MEDICINE

## 2020-04-29 NOTE — RESULT ENCOUNTER NOTE
Dear Ms. Sotomayor,    Hemoglobin is better at 11.4.    Please, call me with any questions.    Nico Calderon MD

## 2020-04-29 NOTE — PROGRESS NOTES
"Rosa Sotomayor is a 69 year old female who is being evaluated via a billable video visit.      The patient has been notified of following:     \"This video visit will be conducted via a call between you and your physician/provider. We have found that certain health care needs can be provided without the need for an in-person physical exam.  This service lets us provide the care you need with a video conversation.  If a prescription is necessary we can send it directly to your pharmacy.  If lab work is needed we can place an order for that and you can then stop by our lab to have the test done at a later time.    Video visits are billed at different rates depending on your insurance coverage.  Please reach out to your insurance provider with any questions.    If during the course of the call the physician/provider feels a video visit is not appropriate, you will not be charged for this service.\"    Patient has given verbal consent for Video visit? Yes    How would you like to obtain your AVS? [facility]    Patient would like the video invitation sent by: Text to cell phone:     Will anyone else be joining your video visit? No        Video-Visit Details    Type of service:  Video Visit    Video Start Time: 2:14 PM  Video End Time: 2:14 PM    Originating Location (pt. Location): Home    Distant Location (provider location):  [facility]     Mode of Communication:  Video Conference via Titus Mccarty [facility]    Oncology Rooming Note    April 29, 2020 2:13 PM   Rosa Sotomayor is a 69 year old female who presents for:    Chief Complaint   Patient presents with     Video Visit     Initial Vitals: There were no vitals taken for this visit. Estimated body mass index is 31.1 kg/m  as calculated from the following:    Height as of 3/4/20: 1.626 m (5' 4\").    Weight as of 3/4/20: 82.2 kg (181 lb 3.2 oz). There is no height or weight on file to calculate BSA.  Data Unavailable Comment: Data " Unavailable   No LMP recorded. Patient is postmenopausal.  Allergies reviewed: Yes  Medications reviewed: Yes    Medications: Medication refills not needed today.  Pharmacy name entered into [employer]: [id number] - [place], [place] - [address] AT [facility] OF [place] [address]    Clinical concerns:  doctor was notified.      Fany Mccarty, CMA

## 2020-04-30 NOTE — PROGRESS NOTES
Visit Date:   04/29/2020     HEMATOLOGY HISTORY: Ms. Sotomayor is a female with chronic anemia.  Her anemia is due to chronic renal disease and chronic disease.   1.  Multiple labs were done on 03/13/2019.   -WBC 3.5, hemoglobin 10.8 and platelets of 204.  MCV of 96.   -Creatinine of 1.26.   -Normal calcium.   -Normal LFT.   -Normal folate.   -Normal vitamin B12.   -Normal iron. Elevated ferritin.   -Erythropoietin mildly elevated at 38.   -Normal LDH.   -Soluable transferrin receptor is mildly elevated at 5.2.   -Normal haptoglobin.   2. On 11/29/2019, WBC of 4.3, hemoglobin 9.8 and platelets 230.  MCV of 103.   3. On 07/08/2019, CT scan did not reveal any splenomegaly or lymphadenopathy.   4. Bone marrow biopsy was done on 12/18/2019.  It reveals normal cellular bone marrow for age with 30% cellularity.  There is trilineage hematopoiesis.  No evidence of MDS.  Increased storage iron.  Cytogenetics is normal.  Flow cytometry is normal.  5. IV injectafer in 03/2020.     SUBJECTIVE:  Ms. 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  She also has iron deficiency, for which she received IV Injectafer in 03/2020.      Her hemoglobin has responded.  Today, her hemoglobin is 11.4.  Normal WBC, platelets and MCV.      Overall, her condition is stable.  Her fatigue is slightly less.  She still gets more tired and short of breath if she goes up the steps.      No headache.  No dizziness.  No chest pain.  No shortness of breath at rest.  No nausea or vomiting.  No bleeding.  No fever, chills or night sweats.      PHYSICAL EXAMINATION:   GENERAL:  She is alert, oriented x 3.   This is a video visit.      LABORATORY DATA:  Reviewed.      ASSESSMENT:   1.  A 69-year-old female with chronic normocytic anemia, which has improved.   2.  Iron deficiency status post IV Injectafer.   3.  Fatigue, improving.      PLAN:   1.  The patient clinically is better.  Her fatigue is  less.  Anemia has improved with IV iron.  Her hemoglobin will not become normal as she has other medical problems and renal disease causing anemia.   2.  Will continue to monitor her.  I will see her in 2 months' time with labs.  I advised her to call us if she has worsening fatigue, muscle cramp or worsening shortness of breath on exertion.         CAITLIN CHAVEZ MD             D: 2020   T: 2020   MT:       Name:     NEO DENT   MRN:      [id number]        Account:      [id number]   :      1950           Visit Date:   2020      Document: [id number]

## 2020-05-03 NOTE — PROGRESS NOTES
Visit Date:   04/29/2020     HEMATOLOGY HISTORY: Ms. Sotomayor is a female with chronic anemia.  Her anemia is due to chronic renal disease and chronic disease.   1.  Multiple labs were done on 03/13/2019.   -WBC 3.5, hemoglobin 10.8 and platelets of 204.  MCV of 96.   -Creatinine of 1.26.   -Normal calcium.   -Normal LFT.   -Normal folate.   -Normal vitamin B12.   -Normal iron. Elevated ferritin.   -Erythropoietin mildly elevated at 38.   -Normal LDH.   -Soluable transferrin receptor is mildly elevated at 5.2.   -Normal haptoglobin.   2. On 11/29/2019, WBC of 4.3, hemoglobin 9.8 and platelets 230.  MCV of 103.   3. On 07/08/2019, CT scan did not reveal any splenomegaly or lymphadenopathy.   4. Bone marrow biopsy was done on 12/18/2019.  It reveals normal cellular bone marrow for age with 30% cellularity.  There is trilineage hematopoiesis.  No evidence of MDS.  Increased storage iron.  Cytogenetics is normal.  Flow cytometry is normal.  5. IV injectafer in 03/2020.     SUBJECTIVE:  Ms. 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  She also has iron deficiency, for which she received IV Injectafer in 03/2020.      Her hemoglobin has responded.  Today, her hemoglobin is 11.4.  Normal WBC, platelets and MCV.      Overall, her condition is stable.  Her fatigue is slightly less.  She still gets more tired and short of breath if she goes up the steps.      No headache.  No dizziness.  No chest pain.  No shortness of breath at rest.  No nausea or vomiting.  No bleeding.  No fever, chills or night sweats.      PHYSICAL EXAMINATION:   GENERAL:  She is alert, oriented x 3.   This is a video visit.      LABORATORY DATA:  Reviewed.      ASSESSMENT:   1.  A 69-year-old female with chronic normocytic anemia, which has improved.   2.  Iron deficiency status post IV Injectafer.   3.  Fatigue, improving.      PLAN:   1.  The patient clinically is better.  Her fatigue is  less.  Anemia has improved with IV iron.  Her hemoglobin will not become normal as she has other medical problems and renal disease causing anemia.   2.  Will continue to monitor her.  I will see her in 2 months' time with labs.  I advised her to call us if she has worsening fatigue, muscle cramp or worsening shortness of breath on exertion.         CAITLIN CHAVEZ MD             D: 2020   T: 2020   MT:       Name:     NEO DENT   MRN:      [id number]        Account:      [id number]   :      1950           Visit Date:   2020      Document: [id number]

## 2020-05-19 ENCOUNTER — TRANSFERRED RECORDS (OUTPATIENT)
Dept: HEALTH INFORMATION MANAGEMENT | Facility: CLINIC | Age: 70
End: 2020-05-19

## 2020-05-25 ENCOUNTER — TELEPHONE (OUTPATIENT)
Dept: ONCOLOGY | Facility: CLINIC | Age: 70
End: 2020-05-25

## 2020-05-26 ENCOUNTER — INFUSION THERAPY VISIT (OUTPATIENT)
Dept: INFUSION THERAPY | Facility: CLINIC | Age: 70
End: 2020-05-26
Attending: INTERNAL MEDICINE
Payer: MEDICARE

## 2020-05-26 ENCOUNTER — HOSPITAL ENCOUNTER (OUTPATIENT)
Facility: CLINIC | Age: 70
Setting detail: SPECIMEN
Discharge: HOME | End: 2020-05-26
Attending: INTERNAL MEDICINE | Admitting: INTERNAL MEDICINE
Payer: MEDICARE

## 2020-05-26 DIAGNOSIS — E61.1 IRON DEFICIENCY: ICD-10-CM

## 2020-05-26 DIAGNOSIS — D64.9 NORMOCYTIC ANEMIA: ICD-10-CM

## 2020-05-26 LAB
ERYTHROCYTE [DISTWIDTH] IN BLOOD BY AUTOMATED COUNT: 12.9 % (ref 10–15)
FERRITIN SERPL-MCNC: 1249 NG/ML (ref 8–252)
HCT VFR BLD AUTO: 33.7 % (ref 35–47)
HGB BLD-MCNC: 11.1 G/DL (ref 11.7–15.7)
IRON SATN MFR SERPL: 34 % (ref 15–46)
IRON SERPL-MCNC: 100 UG/DL (ref 35–180)
MCH RBC QN AUTO: 32.5 PG (ref 26.5–33)
MCHC RBC AUTO-ENTMCNC: 32.9 G/DL (ref 31.5–36.5)
MCV RBC AUTO: 99 FL (ref 78–100)
PLATELET # BLD AUTO: 260 10E9/L (ref 150–450)
RBC # BLD AUTO: 3.42 10E12/L (ref 3.8–5.2)
TIBC SERPL-MCNC: 290 UG/DL (ref 240–430)
WBC # BLD AUTO: 4.4 10E9/L (ref 4–11)

## 2020-05-26 PROCEDURE — 83550 IRON BINDING TEST: CPT | Performed by: INTERNAL MEDICINE

## 2020-05-26 PROCEDURE — 83540 ASSAY OF IRON: CPT | Performed by: INTERNAL MEDICINE

## 2020-05-26 PROCEDURE — 85027 COMPLETE CBC AUTOMATED: CPT | Performed by: INTERNAL MEDICINE

## 2020-05-26 PROCEDURE — 36415 COLL VENOUS BLD VENIPUNCTURE: CPT

## 2020-05-26 PROCEDURE — 82728 ASSAY OF FERRITIN: CPT | Performed by: INTERNAL MEDICINE

## 2020-05-27 ENCOUNTER — VIRTUAL VISIT (OUTPATIENT)
Dept: ONCOLOGY | Facility: CLINIC | Age: 70
End: 2020-05-27
Attending: INTERNAL MEDICINE
Payer: MEDICARE

## 2020-05-27 DIAGNOSIS — D64.9 NORMOCYTIC ANEMIA: Primary | ICD-10-CM

## 2020-05-27 PROCEDURE — 99213 OFFICE O/P EST LOW 20 MIN: CPT | Mod: 95 | Performed by: INTERNAL MEDICINE

## 2020-05-27 ASSESSMENT — PAIN SCALES - GENERAL: PAINLEVEL: NO PAIN (0)

## 2020-05-27 NOTE — PROGRESS NOTES
Visit Date:   05/27/2020     HEMATOLOGY HISTORY: Ms. Sotomayor is a female with chronic anemia.  Her anemia is due to chronic renal disease and chronic disease.   1.  Multiple labs were done on 03/13/2019.   -WBC 3.5, hemoglobin 10.8 and platelets of 204.  MCV of 96.   -Creatinine of 1.26.   -Normal calcium.   -Normal LFT.   -Normal folate.   -Normal vitamin B12.   -Normal iron. Elevated ferritin.   -Erythropoietin mildly elevated at 38.   -Normal LDH.   -Soluable transferrin receptor is mildly elevated at 5.2.   -Normal haptoglobin.   2. On 11/29/2019, WBC of 4.3, hemoglobin 9.8 and platelets 230.  MCV of 103.   3. On 07/08/2019, CT scan did not reveal any splenomegaly or lymphadenopathy.   4. Bone marrow biopsy was done on 12/18/2019.  It reveals normal cellular bone marrow for age with 30% cellularity.  There is trilineage hematopoiesis.  No evidence of MDS.  Increased storage iron.  Cytogenetics is normal.  Flow cytometry is normal.  5. IV injectafer in 03/2020.     SUBJECTIVE:  Mrs. 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  Previously, she had iron deficiency and has received IV iron.  Her hemoglobin is now remaining close to 11.      She has mild fatigue.  No headache.  No dizziness.  No chest pain or shortness of breath.  No nausea or vomiting.  Appetite is good.      PHYSICAL EXAMINATION:   GENERAL:  She is alert, oriented x 3.    This is a virtual visit.      LABORATORY DATA:  Reviewed.      ASSESSMENT:      1.  Chronic normocytic anemia.  Anemia is stable.   2.  Fatigue.   3.  Chronic kidney disease.      PLAN:   1.  The patient is clinically stable except for mild fatigue.  CBC reveals mild normocytic anemia.  Her iron is normal.  Ferritin is elevated.   2.  With regards to anemia, we will just monitor her.  She will have a CBC checked in 6 weeks.  If her hemoglobin consistently remains below 10, she will be started on Aranesp or Procrit.    3.  She had a few questions, which were all answered.  I advised her to call us if she has worsening fatigue, chest pain, shortness of breath or any other concerns.         CAITLIN CHAVEZ MD             D: 2020   T: 2020   MT: ADRY      Name:     NEO DENT   MRN:      [id number]        Account:      [id number]   :      1950           Visit Date:   2020      Document: [id number]      This is a video visit for 5 minutes.

## 2020-05-27 NOTE — LETTER
5/27/2020         RE: Rosa Sotomayor  [address]  [place] [address]        Dear Colleague,    Thank you for referring your patient, Rosa Sotomayor, to the [facility]. Please see a copy of my visit note below.    Visit Date:   05/27/2020     HEMATOLOGY HISTORY: Ms. Sotomayor is a female with chronic anemia.  Her anemia is due to chronic renal disease and chronic disease.   1.  Multiple labs were done on 03/13/2019.   -WBC 3.5, hemoglobin 10.8 and platelets of 204.  MCV of 96.   -Creatinine of 1.26.   -Normal calcium.   -Normal LFT.   -Normal folate.   -Normal vitamin B12.   -Normal iron. Elevated ferritin.   -Erythropoietin mildly elevated at 38.   -Normal LDH.   -Soluable transferrin receptor is mildly elevated at 5.2.   -Normal haptoglobin.   2. On 11/29/2019, WBC of 4.3, hemoglobin 9.8 and platelets 230.  MCV of 103.   3. On 07/08/2019, CT scan did not reveal any splenomegaly or lymphadenopathy.   4. Bone marrow biopsy was done on 12/18/2019.  It reveals normal cellular bone marrow for age with 30% cellularity.  There is trilineage hematopoiesis.  No evidence of MDS.  Increased storage iron.  Cytogenetics is normal.  Flow cytometry is normal.  5. IV injectafer in 03/2020.     SUBJECTIVE:  Mrs. 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  Previously, she had iron deficiency and has received IV iron.  Her hemoglobin is now remaining close to 11.      She has mild fatigue.  No headache.  No dizziness.  No chest pain or shortness of breath.  No nausea or vomiting.  Appetite is good.      PHYSICAL EXAMINATION:   GENERAL:  She is alert, oriented x 3.    This is a virtual visit.      LABORATORY DATA:  Reviewed.      ASSESSMENT:      1.  Chronic normocytic anemia.  Anemia is stable.   2.  Fatigue.   3.  Chronic kidney disease.      PLAN:   1.  The patient is clinically stable except for mild fatigue.  CBC reveals mild  normocytic anemia.  Her iron is normal.  Ferritin is elevated.   2.  With regards to anemia, we will just monitor her.  She will have a CBC checked in 6 weeks.  If her hemoglobin consistently remains below 10, she will be started on Aranesp or Procrit.   3.  She had a few questions, which were all answered.  I advised her to call us if she has worsening fatigue, chest pain, shortness of breath or any other concerns.         CAITLIN CHAVEZ MD             D: 2020   T: 2020   MT: ADRY      Name:     NEO DENT   MRN:      [id number]        Account:      [id number]   :      1950           Visit Date:   2020      Document: [id number]      This is a video visit for 5 minutes.    Visit Date:   2020     HEMATOLOGY HISTORY: Ms. Dent is a female with chronic anemia.  Her anemia is due to chronic renal disease and chronic disease.   1.  Multiple labs were done on 2019.   -WBC 3.5, hemoglobin 10.8 and platelets of 204.  MCV of 96.   -Creatinine of 1.26.   -Normal calcium.   -Normal LFT.   -Normal folate.   -Normal vitamin B12.   -Normal iron. Elevated ferritin.   -Erythropoietin mildly elevated at 38.   -Normal LDH.   -Soluable transferrin receptor is mildly elevated at 5.2.   -Normal haptoglobin.   2. On 2019, WBC of 4.3, hemoglobin 9.8 and platelets 230.  MCV of 103.   3. On 2019, CT scan did not reveal any splenomegaly or lymphadenopathy.   4. Bone marrow biopsy was done on 2019.  It reveals normal cellular bone marrow for age with 30% cellularity.  There is trilineage hematopoiesis.  No evidence of MDS.  Increased storage iron.  Cytogenetics is normal.  Flow cytometry is normal.  5. IV injectafer in 2020.     SUBJECTIVE:  Mrs. 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  Previously, she had iron deficiency and has received IV iron.  Her hemoglobin is now remaining close to 11.      She has mild  fatigue.  No headache.  No dizziness.  No chest pain or shortness of breath.  No nausea or vomiting.  Appetite is good.      PHYSICAL EXAMINATION:   GENERAL:  She is alert, oriented x 3.    This is a virtual visit.      LABORATORY DATA:  Reviewed.      ASSESSMENT:      1.  Chronic normocytic anemia.  Anemia is stable.   2.  Fatigue.   3.  Chronic kidney disease.      PLAN:   1.  The patient is clinically stable except for mild fatigue.  CBC reveals mild normocytic anemia.  Her iron is normal.  Ferritin is elevated.   2.  With regards to anemia, we will just monitor her.  She will have a CBC checked in 6 weeks.  If her hemoglobin consistently remains below 10, she will be started on Aranesp or Procrit.   3.  She had a few questions, which were all answered.  I advised her to call us if she has worsening fatigue, chest pain, shortness of breath or any other concerns.         CAITLIN CHAVEZ MD             D: 2020   T: 2020   MT: ADRY      Name:     NEO DENT   MRN:      [id number]        Account:      [id number]   :      1950           Visit Date:   2020      Document: [id number]      This is a video visit for 5 minutes.        Again, thank you for allowing me to participate in the care of your patient.        Sincerely,        Caitlin Chavez MD     Consent 3/Introductory Paragraph: I gave the patient a chance to ask questions they had about the procedure.  Following this I explained the Mohs procedure and consent was obtained. The risks, benefits and alternatives to therapy were discussed in detail. Specifically, the risks of infection, scarring, bleeding, prolonged wound healing, incomplete removal, allergy to anesthesia, nerve injury and recurrence were addressed. Prior to the procedure, the treatment site was clearly identified and confirmed by the patient. All components of Universal Protocol/PAUSE Rule completed.

## 2020-05-27 NOTE — LETTER
5/27/2020         RE: Rosa Sotomayor  [address]  [place] [address]        Dear Colleague,    Thank you for referring your patient, Rosa Sotomayor, to the [facility]. Please see a copy of my visit note below.    Visit Date:   05/27/2020     HEMATOLOGY HISTORY: Ms. Sotomayor is a female with chronic anemia.  Her anemia is due to chronic renal disease and chronic disease.   1.  Multiple labs were done on 03/13/2019.   -WBC 3.5, hemoglobin 10.8 and platelets of 204.  MCV of 96.   -Creatinine of 1.26.   -Normal calcium.   -Normal LFT.   -Normal folate.   -Normal vitamin B12.   -Normal iron. Elevated ferritin.   -Erythropoietin mildly elevated at 38.   -Normal LDH.   -Soluable transferrin receptor is mildly elevated at 5.2.   -Normal haptoglobin.   2. On 11/29/2019, WBC of 4.3, hemoglobin 9.8 and platelets 230.  MCV of 103.   3. On 07/08/2019, CT scan did not reveal any splenomegaly or lymphadenopathy.   4. Bone marrow biopsy was done on 12/18/2019.  It reveals normal cellular bone marrow for age with 30% cellularity.  There is trilineage hematopoiesis.  No evidence of MDS.  Increased storage iron.  Cytogenetics is normal.  Flow cytometry is normal.  5. IV injectafer in 03/2020.     SUBJECTIVE:  Mrs. 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  Previously, she had iron deficiency and has received IV iron.  Her hemoglobin is now remaining close to 11.      She has mild fatigue.  No headache.  No dizziness.  No chest pain or shortness of breath.  No nausea or vomiting.  Appetite is good.      PHYSICAL EXAMINATION:   GENERAL:  She is alert, oriented x 3.    This is a virtual visit.      LABORATORY DATA:  Reviewed.      ASSESSMENT:      1.  Chronic normocytic anemia.  Anemia is stable.   2.  Fatigue.   3.  Chronic kidney disease.      PLAN:   1.  The patient is clinically stable except for mild fatigue.  CBC reveals mild  normocytic anemia.  Her iron is normal.  Ferritin is elevated.   2.  With regards to anemia, we will just monitor her.  She will have a CBC checked in 6 weeks.  If her hemoglobin consistently remains below 10, she will be started on Aranesp or Procrit.   3.  She had a few questions, which were all answered.  I advised her to call us if she has worsening fatigue, chest pain, shortness of breath or any other concerns.         CAITLIN CHAVEZ MD             D: 2020   T: 2020   MT: ADRY      Name:     NEO DENT   MRN:      [id number]        Account:      [id number]   :      1950           Visit Date:   2020      Document: [id number]      This is a video visit for 5 minutes.    Visit Date:   2020     HEMATOLOGY HISTORY: Ms. Dent is a female with chronic anemia.  Her anemia is due to chronic renal disease and chronic disease.   1.  Multiple labs were done on 2019.   -WBC 3.5, hemoglobin 10.8 and platelets of 204.  MCV of 96.   -Creatinine of 1.26.   -Normal calcium.   -Normal LFT.   -Normal folate.   -Normal vitamin B12.   -Normal iron. Elevated ferritin.   -Erythropoietin mildly elevated at 38.   -Normal LDH.   -Soluable transferrin receptor is mildly elevated at 5.2.   -Normal haptoglobin.   2. On 2019, WBC of 4.3, hemoglobin 9.8 and platelets 230.  MCV of 103.   3. On 2019, CT scan did not reveal any splenomegaly or lymphadenopathy.   4. Bone marrow biopsy was done on 2019.  It reveals normal cellular bone marrow for age with 30% cellularity.  There is trilineage hematopoiesis.  No evidence of MDS.  Increased storage iron.  Cytogenetics is normal.  Flow cytometry is normal.  5. IV injectafer in 2020.     SUBJECTIVE:  Mrs. 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  Previously, she had iron deficiency and has received IV iron.  Her hemoglobin is now remaining close to 11.      She has mild  fatigue.  No headache.  No dizziness.  No chest pain or shortness of breath.  No nausea or vomiting.  Appetite is good.      PHYSICAL EXAMINATION:   GENERAL:  She is alert, oriented x 3.    This is a virtual visit.      LABORATORY DATA:  Reviewed.      ASSESSMENT:      1.  Chronic normocytic anemia.  Anemia is stable.   2.  Fatigue.   3.  Chronic kidney disease.      PLAN:   1.  The patient is clinically stable except for mild fatigue.  CBC reveals mild normocytic anemia.  Her iron is normal.  Ferritin is elevated.   2.  With regards to anemia, we will just monitor her.  She will have a CBC checked in 6 weeks.  If her hemoglobin consistently remains below 10, she will be started on Aranesp or Procrit.   3.  She had a few questions, which were all answered.  I advised her to call us if she has worsening fatigue, chest pain, shortness of breath or any other concerns.         CAITLIN CHAVEZ MD             D: 2020   T: 2020   MT: ADRY      Name:     NEO DENT   MRN:      [id number]        Account:      [id number]   :      1950           Visit Date:   2020      Document: [id number]      This is a video visit for 5 minutes.        Again, thank you for allowing me to participate in the care of your patient.        Sincerely,        Caitlin Chavez MD

## 2020-05-27 NOTE — PROGRESS NOTES
Visit Date:   05/27/2020     HEMATOLOGY HISTORY: Ms. Sotomayor is a female with chronic anemia.  Her anemia is due to chronic renal disease and chronic disease.   1.  Multiple labs were done on 03/13/2019.   -WBC 3.5, hemoglobin 10.8 and platelets of 204.  MCV of 96.   -Creatinine of 1.26.   -Normal calcium.   -Normal LFT.   -Normal folate.   -Normal vitamin B12.   -Normal iron. Elevated ferritin.   -Erythropoietin mildly elevated at 38.   -Normal LDH.   -Soluable transferrin receptor is mildly elevated at 5.2.   -Normal haptoglobin.   2. On 11/29/2019, WBC of 4.3, hemoglobin 9.8 and platelets 230.  MCV of 103.   3. On 07/08/2019, CT scan did not reveal any splenomegaly or lymphadenopathy.   4. Bone marrow biopsy was done on 12/18/2019.  It reveals normal cellular bone marrow for age with 30% cellularity.  There is trilineage hematopoiesis.  No evidence of MDS.  Increased storage iron.  Cytogenetics is normal.  Flow cytometry is normal.  5. IV injectafer in 03/2020.     SUBJECTIVE:  Mrs. 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  Previously, she had iron deficiency and has received IV iron.  Her hemoglobin is now remaining close to 11.      She has mild fatigue.  No headache.  No dizziness.  No chest pain or shortness of breath.  No nausea or vomiting.  Appetite is good.      PHYSICAL EXAMINATION:   GENERAL:  She is alert, oriented x 3.    This is a virtual visit.      LABORATORY DATA:  Reviewed.      ASSESSMENT:      1.  Chronic normocytic anemia.  Anemia is stable.   2.  Fatigue.   3.  Chronic kidney disease.      PLAN:   1.  The patient is clinically stable except for mild fatigue.  CBC reveals mild normocytic anemia.  Her iron is normal.  Ferritin is elevated.   2.  With regards to anemia, we will just monitor her.  She will have a CBC checked in 6 weeks.  If her hemoglobin consistently remains below 10, she will be started on Aranesp or Procrit.    3.  She had a few questions, which were all answered.  I advised her to call us if she has worsening fatigue, chest pain, shortness of breath or any other concerns.         CAITLIN CHAVEZ MD             D: 2020   T: 2020   MT: ADRY      Name:     NEO DENT   MRN:      [id number]        Account:      [id number]   :      1950           Visit Date:   2020      Document: [id number]      This is a video visit for 5 minutes.

## 2020-07-08 DIAGNOSIS — R60.0 EDEMA LEG: ICD-10-CM

## 2020-07-09 RX ORDER — TORSEMIDE 10 MG/1
TABLET ORAL
Qty: 90 TABLET | Refills: 3 | Status: SHIPPED | OUTPATIENT
Start: 2020-07-09 | End: 2021-05-18

## 2020-07-09 NOTE — TELEPHONE ENCOUNTER
Routing refill request to provider for review/approval because:  Labs out of range:  Scr  Labs not current:  Na  Future appointments have lab draw tomorrow but no pending BMP.

## 2020-07-10 ENCOUNTER — HOSPITAL ENCOUNTER (OUTPATIENT)
Facility: CLINIC | Age: 70
Setting detail: SPECIMEN
Discharge: HOME | End: 2020-07-10
Attending: INTERNAL MEDICINE | Admitting: INTERNAL MEDICINE
Payer: MEDICARE

## 2020-07-10 ENCOUNTER — INFUSION THERAPY VISIT (OUTPATIENT)
Dept: INFUSION THERAPY | Facility: CLINIC | Age: 70
End: 2020-07-10
Attending: INTERNAL MEDICINE
Payer: MEDICARE

## 2020-07-10 DIAGNOSIS — D64.9 NORMOCYTIC ANEMIA: ICD-10-CM

## 2020-07-10 LAB
BASOPHILS # BLD AUTO: 0 10E9/L (ref 0–0.2)
BASOPHILS NFR BLD AUTO: 0.4 %
DIFFERENTIAL METHOD BLD: ABNORMAL
EOSINOPHIL # BLD AUTO: 0.2 10E9/L (ref 0–0.7)
EOSINOPHIL NFR BLD AUTO: 5.1 %
ERYTHROCYTE [DISTWIDTH] IN BLOOD BY AUTOMATED COUNT: 13 % (ref 10–15)
HCT VFR BLD AUTO: 31.3 % (ref 35–47)
HGB BLD-MCNC: 10.3 G/DL (ref 11.7–15.7)
IMM GRANULOCYTES # BLD: 0 10E9/L (ref 0–0.4)
IMM GRANULOCYTES NFR BLD: 0 %
LYMPHOCYTES # BLD AUTO: 1.9 10E9/L (ref 0.8–5.3)
LYMPHOCYTES NFR BLD AUTO: 40 %
MCH RBC QN AUTO: 32.8 PG (ref 26.5–33)
MCHC RBC AUTO-ENTMCNC: 32.9 G/DL (ref 31.5–36.5)
MCV RBC AUTO: 100 FL (ref 78–100)
MONOCYTES # BLD AUTO: 0.4 10E9/L (ref 0–1.3)
MONOCYTES NFR BLD AUTO: 8.7 %
NEUTROPHILS # BLD AUTO: 2.2 10E9/L (ref 1.6–8.3)
NEUTROPHILS NFR BLD AUTO: 45.8 %
NRBC # BLD AUTO: 0 10*3/UL
NRBC BLD AUTO-RTO: 0 /100
PLATELET # BLD AUTO: 265 10E9/L (ref 150–450)
RBC # BLD AUTO: 3.14 10E12/L (ref 3.8–5.2)
WBC # BLD AUTO: 4.7 10E9/L (ref 4–11)

## 2020-07-10 PROCEDURE — 36415 COLL VENOUS BLD VENIPUNCTURE: CPT

## 2020-07-10 PROCEDURE — 85025 COMPLETE CBC W/AUTO DIFF WBC: CPT | Performed by: INTERNAL MEDICINE

## 2020-07-10 NOTE — RESULT ENCOUNTER NOTE
Dear Ms. Bran,    Hemoglobin is lower at 10.3. Will continue to monitor it.    Please, call me with any questions.    Nico Calderon MD

## 2020-07-14 ENCOUNTER — VIRTUAL VISIT (OUTPATIENT)
Dept: ONCOLOGY | Facility: CLINIC | Age: 70
End: 2020-07-14
Attending: INTERNAL MEDICINE
Payer: MEDICARE

## 2020-07-14 DIAGNOSIS — D64.9 NORMOCYTIC ANEMIA: Primary | ICD-10-CM

## 2020-07-14 PROCEDURE — 99213 OFFICE O/P EST LOW 20 MIN: CPT | Mod: 95 | Performed by: INTERNAL MEDICINE

## 2020-07-14 ASSESSMENT — PAIN SCALES - GENERAL: PAINLEVEL: NO PAIN (0)

## 2020-07-14 NOTE — LETTER
"    7/14/2020         RE: Rosa Sotomayor  [address]  [place] [address]        Dear Colleague,    Thank you for referring your patient, Rosa Sotomayor, to the [facility]. Please see a copy of my visit note below.    Rosa Sotomayor is a 69 year old female who is being evaluated via a billable video visit.      The patient has been notified of following:     \"This video visit will be conducted via a call between you and your physician/provider. We have found that certain health care needs can be provided without the need for an in-person physical exam.  This service lets us provide the care you need with a video conversation.  If a prescription is necessary we can send it directly to your pharmacy.  If lab work is needed we can place an order for that and you can then stop by our lab to have the test done at a later time.    Video visits are billed at different rates depending on your insurance coverage.  Please reach out to your insurance provider with any questions.    If during the course of the call the physician/provider feels a video visit is not appropriate, you will not be charged for this service.\"    Patient has given verbal consent for Video visit? Yes  How would you like to obtain your AVS? MyChart  Patient would like the video invitation sent by: Text to cell phone:  [phone]  Will anyone else be joining your video visit? No        Video-Visit Details    Type of service:  Video Visit      Originating Location (pt. Location): Home    Distant Location (provider location):  [facility]     Platform used for Video Visit: Doximity Shari J. Schoenberger, CMA          Video visit for 11 minutes.    Again, thank you for allowing me to participate in the care of your patient.        Sincerely,        Nico Calderon MD    "

## 2020-07-14 NOTE — PATIENT INSTRUCTIONS
1. Labs every 6-8 weeks.  2. Follow-up in 3 months with labs.    Please call patient to schedule appts

## 2020-07-14 NOTE — LETTER
"    7/14/2020         RE: Rosa Sotomayor  [address]  [place] [address]        Dear Colleague,    Thank you for referring your patient, Rosa Sotomayor, to the [facility]. Please see a copy of my visit note below.    Rosa Sotomayor is a 69 year old female who is being evaluated via a billable video visit.      The patient has been notified of following:     \"This video visit will be conducted via a call between you and your physician/provider. We have found that certain health care needs can be provided without the need for an in-person physical exam.  This service lets us provide the care you need with a video conversation.  If a prescription is necessary we can send it directly to your pharmacy.  If lab work is needed we can place an order for that and you can then stop by our lab to have the test done at a later time.    Video visits are billed at different rates depending on your insurance coverage.  Please reach out to your insurance provider with any questions.    If during the course of the call the physician/provider feels a video visit is not appropriate, you will not be charged for this service.\"    Patient has given verbal consent for Video visit? Yes  How would you like to obtain your AVS? MyChart  Patient would like the video invitation sent by: Text to cell phone:  [phone]  Will anyone else be joining your video visit? No        Video-Visit Details    Type of service:  Video Visit      Originating Location (pt. Location): Home    Distant Location (provider location):  [facility]     Platform used for Video Visit: Doximity Shari J. Schoenberger, CMA          Video visit for 11 minutes.    Again, thank you for allowing me to participate in the care of your patient.        Sincerely,        Nico Calderon MD    "

## 2020-07-14 NOTE — PROGRESS NOTES
"Rosa Sotomayor is a 69 year old female who is being evaluated via a billable video visit.      The patient has been notified of following:     \"This video visit will be conducted via a call between you and your physician/provider. We have found that certain health care needs can be provided without the need for an in-person physical exam.  This service lets us provide the care you need with a video conversation.  If a prescription is necessary we can send it directly to your pharmacy.  If lab work is needed we can place an order for that and you can then stop by our lab to have the test done at a later time.    Video visits are billed at different rates depending on your insurance coverage.  Please reach out to your insurance provider with any questions.    If during the course of the call the physician/provider feels a video visit is not appropriate, you will not be charged for this service.\"    Patient has given verbal consent for Video visit? Yes  How would you like to obtain your AVS? Luishart  Patient would like the video invitation sent by: Text to cell phone:  [phone]  Will anyone else be joining your video visit? No        Video-Visit Details    Type of service:  Video Visit      Originating Location (pt. Location): Home    Distant Location (provider location):  [facility]     Platform used for Video Visit: Doximity Shari J. Schoenberger, [facility]        "

## 2020-07-15 ENCOUNTER — HOSPITAL ENCOUNTER (OUTPATIENT)
Dept: MAMMOGRAPHY | Facility: CLINIC | Age: 70
Discharge: HOME | End: 2020-07-15
Attending: OBSTETRICS & GYNECOLOGY | Admitting: OBSTETRICS & GYNECOLOGY
Payer: MEDICARE

## 2020-07-15 DIAGNOSIS — Z12.31 VISIT FOR SCREENING MAMMOGRAM: ICD-10-CM

## 2020-07-15 PROCEDURE — 77067 SCR MAMMO BI INCL CAD: CPT

## 2020-07-15 NOTE — PROGRESS NOTES
Visit Date:   07/15/2020     HEMATOLOGY HISTORY: Ms. Sotomayor is a female with chronic anemia.  Her anemia is due to chronic renal disease and chronic disease.   1.  Multiple labs were done on 03/13/2019.   -WBC 3.5, hemoglobin 10.8 and platelets of 204.  MCV of 96.   -Creatinine of 1.26.   -Normal calcium.   -Normal LFT.   -Normal folate.   -Normal vitamin B12.   -Normal iron. Elevated ferritin.   -Erythropoietin mildly elevated at 38.   -Normal LDH.   -Soluable transferrin receptor is mildly elevated at 5.2.   -Normal haptoglobin.   2. On 11/29/2019, WBC of 4.3, hemoglobin 9.8 and platelets 230.  MCV of 103.   3. On 07/08/2019, CT scan did not reveal any splenomegaly or lymphadenopathy.   4. Bone marrow biopsy was done on 12/18/2019.  It reveals normal cellular bone marrow for age with 30% cellularity.  There is trilineage hematopoiesis.  No evidence of MDS.  Increased storage iron.  Cytogenetics is normal.  Flow cytometry is normal.  5. IV injectafer in 03/2020.     SUBJECTIVE:  Ms. Sotomayor is a 69-year-old female with chronic normocytic anemia due to renal disease and chronic disease.  She previously had iron deficiency for which she received IV iron.      The plan is to start her on an erythropoietin stimulating agent when her hemoglobin is below 10.      REVIEW OF SYSTEMS:  Lately, she has been feeling more tired.  No headache.  No dizziness.  No chest pain.  No shortness of breath.  No abdominal pain, nausea or vomiting.  Appetite is good.  No bleeding from any sites.      The patient also has been feeling more cold.  The patient says that whenever her hemoglobin goes down, she feels more cold.      PHYSICAL EXAMINATION:   GENERAL:  She is alert, oriented x 3.  She is not in any distress.    No cough.  No labored breathing.   The rest of the systems were not examined.      LABORATORY DATA:  Reviewed.      ASSESSMENT:   1.  A 69-year-old female with chronic normocytic anemia.   2.  Fatigue.   3.  Other medical  problems including chronic kidney disease.      PLAN:   1.  CBC was reviewed with her.  I told her her hemoglobin is slightly lower.  Normal WBC and platelets. We will continue to monitor her CBC every 6-8 weeks.  If her hemoglobin is consistently below 10, then we will start her on an erythropoietin stimulating agent.  She is agreeable for it.      2.  The patient had iron deficiency before.  It has been treated with IV iron.  With the next lab, we will check iron studies.      3.  I will see her in 3 months' time.  I advised her to call us if she has worsening weakness, chest pain, shortness of breath, bleeding from any site or any other concerns.         CAITLIN CHAVEZ MD             D: 07/15/2020   T: 07/15/2020   MT: SHEMAR      Name:     NEO DENT   MRN:      [id number]        Account:      [id number]   :      1950           Visit Date:   07/15/2020      Document: [id number]

## 2020-07-19 NOTE — PROGRESS NOTES
Visit Date:   07/15/2020     HEMATOLOGY HISTORY: Ms. Sotomayor is a female with chronic anemia.  Her anemia is due to chronic renal disease and chronic disease.   1.  Multiple labs were done on 03/13/2019.   -WBC 3.5, hemoglobin 10.8 and platelets of 204.  MCV of 96.   -Creatinine of 1.26.   -Normal calcium.   -Normal LFT.   -Normal folate.   -Normal vitamin B12.   -Normal iron. Elevated ferritin.   -Erythropoietin mildly elevated at 38.   -Normal LDH.   -Soluable transferrin receptor is mildly elevated at 5.2.   -Normal haptoglobin.   2. On 11/29/2019, WBC of 4.3, hemoglobin 9.8 and platelets 230.  MCV of 103.   3. On 07/08/2019, CT scan did not reveal any splenomegaly or lymphadenopathy.   4. Bone marrow biopsy was done on 12/18/2019.  It reveals normal cellular bone marrow for age with 30% cellularity.  There is trilineage hematopoiesis.  No evidence of MDS.  Increased storage iron.  Cytogenetics is normal.  Flow cytometry is normal.  5. IV injectafer in 03/2020.     SUBJECTIVE:  Ms. Sotomayor is a 69-year-old female with chronic normocytic anemia due to renal disease and chronic disease.  She previously had iron deficiency for which she received IV iron.      The plan is to start her on an erythropoietin stimulating agent when her hemoglobin is below 10.      REVIEW OF SYSTEMS:  Lately, she has been feeling more tired.  No headache.  No dizziness.  No chest pain.  No shortness of breath.  No abdominal pain, nausea or vomiting.  Appetite is good.  No bleeding from any sites.      The patient also has been feeling more cold.  The patient says that whenever her hemoglobin goes down, she feels more cold.      PHYSICAL EXAMINATION:   GENERAL:  She is alert, oriented x 3.  She is not in any distress.    No cough.  No labored breathing.   The rest of the systems were not examined.      LABORATORY DATA:  Reviewed.      ASSESSMENT:   1.  A 69-year-old female with chronic normocytic anemia.   2.  Fatigue.   3.  Other medical  problems including chronic kidney disease.      PLAN:   1.  CBC was reviewed with her.  I told her her hemoglobin is slightly lower.  Normal WBC and platelets. We will continue to monitor her CBC every 6-8 weeks.  If her hemoglobin is consistently below 10, then we will start her on an erythropoietin stimulating agent.  She is agreeable for it.      2.  The patient had iron deficiency before.  It has been treated with IV iron.  With the next lab, we will check iron studies.      3.  I will see her in 3 months' time.  I advised her to call us if she has worsening weakness, chest pain, shortness of breath, bleeding from any site or any other concerns.         CAITLIN CHAVEZ MD             D: 07/15/2020   T: 07/15/2020   MT: SHEMAR      Name:     NEO DENT   MRN:      [id number]        Account:      [id number]   :      1950           Visit Date:   07/15/2020      Document: [id number]

## 2020-07-23 ENCOUNTER — TELEPHONE (OUTPATIENT)
Dept: UROLOGY | Facility: CLINIC | Age: 70
End: 2020-07-23

## 2020-07-23 NOTE — TELEPHONE ENCOUNTER
Health Call Center    Phone Message    May a detailed message be left on voicemail: yes     Reason for Call: Symptoms or Concerns     If patient has red-flag symptoms, warm transfer to triage line    Current symptom or concern: UTI    Symptoms have been present for:   week(s)    Has patient previously been seen for this? Yes    By : Dr. Chung      Are there any new or worsening symptoms? Yes: Patient calling stating she has a UTI and her Womens health provider at [facility] in [place] is having a hard time prescribing medication that helps, Writer offered appointment but patient is requesting a return call. Please call to discuss thank you.       Action Taken: Message routed to:  [facility] urology    Travel Screening: Not Applicable

## 2020-07-24 NOTE — TELEPHONE ENCOUNTER
"Informed patient that we received results of urine culture and are aware patient was placed on Macrobid 50mg twice daily for ten days by Dr. Pabon. Patient did not want to take this due to her kidney disease.  Per our MD-\"Without having any more background and history I am not comfortable prescribing anything at this time.\" Informed Dr. Pabon's nurse to inform Dr. Pabon of our MD's recommendations-\"1) Macrobid would work but her creatinine last year was 1.34 so it is reasonable to try to avoid this but could consider an BMP to see if she can still take it.     2) If she is allergic to the sulfa component in the Bactrim she may be able to take a course of  trimethoprim   3) Also could consider cefdinir which is similar to the keflex which she has taken in the past\" , but that she is not comfortable prescribing medication at this time. Patient plans to make an appointment with our office. Message sent to 's. Dr. Pabon's office was called and informed of MD's recommendations. They suggested that Dr. Pabon was not in, but that patient could see another provider at their clinic today or wait until Monday for Dr. Pabon to make a decision. Informed patient of this information and instructed her to reach out to their clinic to treat as they ordered the culture.     Bekah Benjamin LPN    "

## 2020-07-24 NOTE — TELEPHONE ENCOUNTER
Health Call Center    Phone Message    May a detailed message be left on voicemail: yes     Reason for Call: Other: Rosa is calling to follow up with Dr. Chung and nurse. She states she spoke with a nurse yesterday that was going to be requesting her UTI results from primary care office but she hasn't heard back yet. Please give her a call back at your earliest convenience. Thank you      Action Taken: Message routed to:  [facility] Urology    Travel Screening: Not Applicable

## 2020-07-29 ENCOUNTER — VIRTUAL VISIT (OUTPATIENT)
Dept: FAMILY MEDICINE | Facility: CLINIC | Age: 70
End: 2020-07-29
Payer: MEDICARE

## 2020-07-29 DIAGNOSIS — E78.1 HYPERTRIGLYCERIDEMIA: ICD-10-CM

## 2020-07-29 DIAGNOSIS — I10 BENIGN ESSENTIAL HYPERTENSION: ICD-10-CM

## 2020-07-29 DIAGNOSIS — Q07.00 ARNOLD-CHIARI MALFORMATION (H): Primary | ICD-10-CM

## 2020-07-29 DIAGNOSIS — N18.30 ANEMIA DUE TO STAGE 3 CHRONIC KIDNEY DISEASE (H): ICD-10-CM

## 2020-07-29 DIAGNOSIS — D63.1 ANEMIA DUE TO STAGE 3 CHRONIC KIDNEY DISEASE (H): ICD-10-CM

## 2020-07-29 DIAGNOSIS — Z13.1 ENCOUNTER FOR SCREENING EXAMINATION FOR IMPAIRED GLUCOSE REGULATION AND DIABETES MELLITUS: ICD-10-CM

## 2020-07-29 DIAGNOSIS — G43.009 MIGRAINE WITHOUT AURA AND WITHOUT STATUS MIGRAINOSUS, NOT INTRACTABLE: ICD-10-CM

## 2020-07-29 DIAGNOSIS — T78.40XD ALLERGIC REACTION, SUBSEQUENT ENCOUNTER: ICD-10-CM

## 2020-07-29 DIAGNOSIS — N25.81 SECONDARY RENAL HYPERPARATHYROIDISM (H): ICD-10-CM

## 2020-07-29 DIAGNOSIS — E78.5 HYPERLIPIDEMIA LDL GOAL <130: ICD-10-CM

## 2020-07-29 PROBLEM — I70.1 RENAL ARTERY STENOSIS (H): Status: RESOLVED | Noted: 2017-01-05 | Resolved: 2020-07-29

## 2020-07-29 PROCEDURE — 99214 OFFICE O/P EST MOD 30 MIN: CPT | Mod: 95 | Performed by: INTERNAL MEDICINE

## 2020-07-29 RX ORDER — LOSARTAN POTASSIUM 100 MG/1
100 TABLET ORAL AT BEDTIME
Qty: 90 TABLET | Refills: 1 | Status: SHIPPED | OUTPATIENT
Start: 2020-07-29 | End: 2021-02-17

## 2020-07-29 RX ORDER — GEMFIBROZIL 600 MG/1
300 TABLET, FILM COATED ORAL 2 TIMES DAILY
Qty: 90 TABLET | Refills: 3 | Status: SHIPPED | OUTPATIENT
Start: 2020-07-29 | End: 2021-05-18

## 2020-07-29 RX ORDER — EPINEPHRINE 0.3 MG/.3ML
0.3 INJECTION SUBCUTANEOUS
Qty: 0.6 ML | Refills: 1 | Status: SHIPPED | OUTPATIENT
Start: 2020-07-29 | End: 2021-06-30

## 2020-07-29 RX ORDER — ONDANSETRON 4 MG/1
4 TABLET, FILM COATED ORAL EVERY 8 HOURS PRN
Qty: 90 TABLET | Refills: 3 | Status: SHIPPED | OUTPATIENT
Start: 2020-07-29 | End: 2021-06-30

## 2020-07-29 RX ORDER — CARVEDILOL 25 MG/1
25 TABLET ORAL 2 TIMES DAILY
Qty: 180 TABLET | Refills: 3 | Status: SHIPPED | OUTPATIENT
Start: 2020-07-29 | End: 2021-06-30

## 2020-07-29 RX ORDER — ATORVASTATIN CALCIUM 40 MG/1
TABLET, FILM COATED ORAL
Qty: 90 TABLET | Refills: 3 | Status: SHIPPED | OUTPATIENT
Start: 2020-07-29 | End: 2021-06-30

## 2020-07-29 NOTE — PROGRESS NOTES
"Rosa Sotomayor is a 69 year old female who is being evaluated via a billable video visit.      The patient has been notified of following:     \"This video visit will be conducted via a call between you and your physician/provider. We have found that certain health care needs can be provided without the need for an in-person physical exam.  This service lets us provide the care you need with a video conversation.  If a prescription is necessary we can send it directly to your pharmacy.  If lab work is needed we can place an order for that and you can then stop by our lab to have the test done at a later time.    Video visits are billed at different rates depending on your insurance coverage.  Please reach out to your insurance provider with any questions.    If during the course of the call the physician/provider feels a video visit is not appropriate, you will not be charged for this service.\"    Patient has given verbal consent for Video visit? Yes  How would you like to obtain your AVS? MyChart  If you are dropped from the video visit, the video invite should be resent to: Text to cell phone: [phone]  Will anyone else be joining your video visit? No    Subjective     Rosa Sotomayor is a 69 year old female who presents today via video visit for the following health issues:    HPI    Follow up on medications and discuss lab work     Video Start Time: 8:37 AM    CC:  Follow up chronic anemia, CKD, hypertriglyceridemia    She continues to follow up closely with hematology regarding her anemia   She requests follow up labs         Patient Active Problem List   Diagnosis     Personal history of allergy to anesthetic agent     Anemia due to stage 3 chronic kidney disease (H)     Hypertriglyceridemia     Arnold-Chiari malformation (H)     Hyperlipidemia LDL goal <130     Anxiety     Secondary renal hyperparathyroidism (H)     Chronic bilateral low back pain without sciatica     Benign essential hypertension     " Macular degeneration, bilateral     Anemia, iron deficiency     Migraine without aura and without status migrainosus, not intractable     Aortic valve insufficiency     ESTEFANIA (obstructive sleep apnea)- mild (AHI 12)     Spinal stenosis of lumbar region with neurogenic claudication     Malabsorption of iron     Past Surgical History:   Procedure Laterality Date     BACK SURGERY      discectomy L3-4, 2018     BONE MARROW BIOPSY, BONE SPECIMEN, NEEDLE/TROCAR N/A 12/18/2019    Procedure: BONE MARROW BIOPSY;  Surgeon: Eran Bowser MD;  Location: [facility]     C NONSPECIFIC PROCEDURE      wisdom teeth     C NONSPECIFIC PROCEDURE  2005    Varicose Veins     C TOTAL HIP ARTHROPLASTY      left in 2012, right 2017     C TOTAL KNEE ARTHROPLASTY      right 2014, left 2018     ROTATOR CUFF REPAIR RT/LT Right     2016     TUBAL LIGATION  1987       Social History     Tobacco Use     Smoking status: Never Smoker     Smokeless tobacco: Never Used   Substance Use Topics     Alcohol use: No     Alcohol/week: 0.0 standard drinks     Family History   Problem Relation Age of Onset     Diabetes Mother         INSULIN     Hypertension Mother      Eye Disorder Mother         CATERACTS     Heart Disease Mother         CHF- OPEN HEART SURG X'S 2     Lipids Mother      Obesity Mother      Coronary Artery Disease Mother      Diabetes Father         ORAL     Hypertension Father      Arthritis Father      Eye Disorder Father         MAC DEGENERATION     Heart Disease Father         CHF     Lipids Father      Obesity Father      Diabetes Maternal Grandfather         INSULIN     Diabetes Brother         INSULIN     Gastrointestinal Disease Brother         CHOLITISI POSSIBLE CHRONES     Obesity Brother      Colon Cancer No family hx of      Breast Cancer No family hx of          Current Outpatient Medications   Medication Sig Dispense Refill     acetaminophen (TYLENOL) 500 MG tablet Take 500 mg by mouth       allopurinol (ZYLOPRIM) 100 MG  tablet Take 1 tablet (100 mg) by mouth daily 90 tablet 3     atorvastatin (LIPITOR) 40 MG tablet TAKE 1 TABLET(40 MG) BY MOUTH DAILY 90 tablet 3     carvedilol (COREG) 25 MG tablet Take 1 tablet (25 mg) by mouth 2 times daily 180 tablet 3     cephALEXin (KEFLEX) 500 MG capsule Take 500 mg by mouth Take 4 capsules (2,000 mg) by mouth once as needed One hour prior to dental procedures       cetirizine (ZYRTEC) 10 MG tablet Take 10 mg by mouth       Cholecalciferol (VITAMIN D) 2000 UNITS tablet Take 1 tablet by mouth daily       Cranberry POWD 1 capsule       Cyanocobalamin (B-12) 1000 MCG TBCR Take 1,000 mcg by mouth daily 100 tablet 1     EPINEPHrine (EPIPEN 2-FRAN) 0.3 MG/0.3ML injection 2-pack Inject 0.3 mLs (0.3 mg) into the muscle once as needed for anaphylaxis 0.6 mL 1     erenumab-aooe (AIMOVIG) 70 MG/ML injection Inject 70 mg Subcutaneous every 28 days       gemfibrozil (LOPID) 600 MG tablet Take 0.5 tablets (300 mg) by mouth 2 times daily 90 tablet 3     hydrOXYzine (ATARAX) 25 MG tablet Take 1 tablet (25 mg) by mouth 3 times daily as needed for itching 120 tablet 1     losartan (COZAAR) 100 MG tablet Take 1 tablet (100 mg) by mouth At Bedtime 90 tablet 1     Magnesium Oxide 250 MG TABS Take 250 mg by mouth       ondansetron (ZOFRAN) 4 MG tablet Take 1 tablet (4 mg) by mouth every 8 hours as needed for nausea 90 tablet 3     SUMAtriptan (IMITREX) 20 MG/ACT nasal spray Spray 1 spray in nostril as needed for migraine May repeat in 2 hours. Max 2 sprays/24 hours. 12 each 11     topiramate (TOPAMAX) 100 MG tablet Take 1 tablet by mouth daily  3     torsemide (DEMADEX) 10 MG tablet TAKE 1 TABLET(10 MG) BY MOUTH DAILY 90 tablet 3     ZOLMitriptan (ZOMIG-ZMT) 5 MG ODT tab Take 5 mg by mouth       clobetasol (TEMOVATE) 0.05 % cream Apply sparingly to affected area twice daily for 14 days.  Do not apply to face. (Patient not taking: Reported on 7/29/2020) 15 g 2     clobetasol (TEMOVATE) 0.05 % external solution Apply  topically 2 times daily as needed  5     conjugated estrogens (PREMARIN) 0.625 MG/GM vaginal cream Place 0.5 g vaginally twice a week (Patient not taking: Reported on 7/14/2020) 30 g 0     mometasone (ELOCON) 0.1 % external cream Apply topically daily       traMADol (ULTRAM) 50 MG tablet Take 1 tablet by mouth every 6 hours as needed  0     triamcinolone (KENALOG) 0.1 % cream Apply topically 2 times daily Apply to AA on trunk, arms or legs bid for 10-14 days (Patient not taking: Reported on 7/29/2020) 454 g 0     Allergies   Allergen Reactions     Bee Venom Anaphylaxis     Codeine Swelling     Body swelling and severe itching. Tolerates Morphine.      Fentanyl Anaphylaxis and Shortness Of Breath     Gabapentin Other (See Comments)     PN: chest heaviness with breathing  Chest heaviness with breathing  Chest heaviness with breathing  chest heaviness with breathing     Hydromorphone Itching     Tolerates morphine  Tolerates morphine     Midazolam Anaphylaxis, Itching and Shortness Of Breath     Tolerates Diazepam     Prilocaine Anaphylaxis     Anaphalactic shock     Propofol Anaphylaxis     Penicillins Itching and Rash     Tolerates Keflex,  Ancef  rash     Shingrix [Zoster Vac Recomb Adjuvanted] Itching and Rash     Sulfa Drugs Itching and Rash     rash     Clindamycin      Codeine      Diatrizoate Other (See Comments)     CKD 3     Hydrocodone      Lisinopril Cough     Lorazepam Itching     Nsaids Other (See Comments)     Contraindicated with kidney hx (including Celebrex, per pt)     Other [Seasonal Allergies] Anaphylaxis     GENERAL ANETHESIA       Oxycodone      Vancomycin Other (See Comments)     Thrush, yeast infection, bladder infection  Thrush, yeast infection, bladder infection     Erythromycin Rash     ointment  PN: ointment  ointment     Eucalyptus Oil Rash and Hives     hives     Nitrofuran Derivatives Rash     Nitrofurantoin Rash     Tramadol Rash       Reviewed and updated as needed this visit by  Provider         Review of Systems   She gets diarrhea after dairy and ice cream  She was treated for a UTI on several occassions by her GYN  Her urologist recommended a cystoscopy, but she does not want to do this because the medications provided by the GYN are helping her       Objective             Physical Exam     GENERAL: Healthy, alert and no distress  EYES: Eyes grossly normal to inspection.  No discharge or erythema, or obvious scleral/conjunctival abnormalities.  RESP: No audible wheeze, cough, or visible cyanosis.  No visible retractions or increased work of breathing.    SKIN: Visible skin clear. No significant rash, abnormal pigmentation or lesions.  NEURO: Cranial nerves grossly intact.  Mentation and speech appropriate for age.  PSYCH: Mentation appears normal, affect unchanged anxious, judgement and insight intact, normal speech and appearance well-groomed.      Diagnostic Test Results:  Labs reviewed in Epic        Assessment & Plan       ICD-10-CM    1. Arnold-Chiari malformation (H)  Q07.00    2. Secondary renal hyperparathyroidism (H)  N25.81 Basic metabolic panel   3. Anemia due to stage 3 chronic kidney disease (H)  N18.3 Basic metabolic panel    D63.1    4. Hyperlipidemia LDL goal <130  E78.5 Lipid panel reflex to direct LDL Fasting   5. Hypertriglyceridemia  E78.1 atorvastatin (LIPITOR) 40 MG tablet     gemfibrozil (LOPID) 600 MG tablet   6. Allergic reaction, subsequent encounter  T78.40XD EPINEPHrine (EPIPEN 2-FRAN) 0.3 MG/0.3ML injection 2-pack   7. Benign essential hypertension  I10 carvedilol (COREG) 25 MG tablet     losartan (COZAAR) 100 MG tablet   8. Encounter for screening examination for impaired glucose regulation and diabetes mellitus  Z13.1 Glucose   9. Migraine without aura and without status migrainosus, not intractable  G43.009 ondansetron (ZOFRAN) 4 MG tablet   Add requested labs to draw previously planned by hematology  Continue follow up GYN re recent UTI's  She should see  "Dr. Chung for evaluation for preventable causes when safer RE covid; long discussion re this  We discussed lactase supplements and lactose free dairy products  She is using zofran for frequent migraines and requests refill    BMI:   Estimated body mass index is 31.1 kg/m  as calculated from the following:    Height as of 3/4/20: 1.626 m (5' 4\").    Weight as of 3/4/20: 82.2 kg (181 lb 3.2 oz).               Return in about 3 months (around 10/29/2020) for recheck.  Patient instructed to return to clinic or contact us sooner if symptoms worsen or new symptoms develop.     Josh Hollingsworth MD  [facility]      Video-Visit Details    Type of service:  Video Visit    Video End Time:9:14 AM    Originating Location (pt. Location): Home    Distant Location (provider location):  [facility]     Platform used for Video Visit: Doximity    Return in about 3 months (around 10/29/2020) for recheck.       Josh Hollingsworth MD        "

## 2020-08-20 ENCOUNTER — HOSPITAL ENCOUNTER (OUTPATIENT)
Facility: CLINIC | Age: 70
Setting detail: SPECIMEN
Discharge: HOME | End: 2020-08-20
Attending: INTERNAL MEDICINE | Admitting: INTERNAL MEDICINE
Payer: MEDICARE

## 2020-08-20 ENCOUNTER — INFUSION THERAPY VISIT (OUTPATIENT)
Dept: INFUSION THERAPY | Facility: CLINIC | Age: 70
End: 2020-08-20
Attending: INTERNAL MEDICINE
Payer: MEDICARE

## 2020-08-20 DIAGNOSIS — D64.9 NORMOCYTIC ANEMIA: ICD-10-CM

## 2020-08-20 DIAGNOSIS — D63.1 ANEMIA DUE TO STAGE 3 CHRONIC KIDNEY DISEASE (H): ICD-10-CM

## 2020-08-20 DIAGNOSIS — N25.81 SECONDARY RENAL HYPERPARATHYROIDISM (H): ICD-10-CM

## 2020-08-20 DIAGNOSIS — Z13.1 ENCOUNTER FOR SCREENING EXAMINATION FOR IMPAIRED GLUCOSE REGULATION AND DIABETES MELLITUS: ICD-10-CM

## 2020-08-20 DIAGNOSIS — E78.5 HYPERLIPIDEMIA LDL GOAL <130: ICD-10-CM

## 2020-08-20 DIAGNOSIS — N18.30 ANEMIA DUE TO STAGE 3 CHRONIC KIDNEY DISEASE (H): ICD-10-CM

## 2020-08-20 LAB
ANION GAP SERPL CALCULATED.3IONS-SCNC: 4 MMOL/L (ref 3–14)
BASOPHILS # BLD AUTO: 0 10E9/L (ref 0–0.2)
BASOPHILS NFR BLD AUTO: 0.5 %
BUN SERPL-MCNC: 40 MG/DL (ref 7–30)
CALCIUM SERPL-MCNC: 9.3 MG/DL (ref 8.5–10.1)
CHLORIDE SERPL-SCNC: 113 MMOL/L (ref 94–109)
CHOLEST SERPL-MCNC: 166 MG/DL
CO2 SERPL-SCNC: 24 MMOL/L (ref 20–32)
CREAT SERPL-MCNC: 1.9 MG/DL (ref 0.52–1.04)
DIFFERENTIAL METHOD BLD: ABNORMAL
EOSINOPHIL # BLD AUTO: 0.5 10E9/L (ref 0–0.7)
EOSINOPHIL NFR BLD AUTO: 12.5 %
ERYTHROCYTE [DISTWIDTH] IN BLOOD BY AUTOMATED COUNT: 12.8 % (ref 10–15)
GFR SERPL CREATININE-BSD FRML MDRD: 27 ML/MIN/{1.73_M2}
GLUCOSE SERPL-MCNC: 105 MG/DL (ref 70–99)
HCT VFR BLD AUTO: 31.4 % (ref 35–47)
HDLC SERPL-MCNC: 48 MG/DL
HGB BLD-MCNC: 10.2 G/DL (ref 11.7–15.7)
IMM GRANULOCYTES # BLD: 0 10E9/L (ref 0–0.4)
IMM GRANULOCYTES NFR BLD: 0.3 %
IRON SATN MFR SERPL: 31 % (ref 15–46)
IRON SERPL-MCNC: 90 UG/DL (ref 35–180)
LDLC SERPL CALC-MCNC: 71 MG/DL
LYMPHOCYTES # BLD AUTO: 1.4 10E9/L (ref 0.8–5.3)
LYMPHOCYTES NFR BLD AUTO: 38 %
MCH RBC QN AUTO: 32 PG (ref 26.5–33)
MCHC RBC AUTO-ENTMCNC: 32.5 G/DL (ref 31.5–36.5)
MCV RBC AUTO: 98 FL (ref 78–100)
MONOCYTES # BLD AUTO: 0.3 10E9/L (ref 0–1.3)
MONOCYTES NFR BLD AUTO: 7.9 %
NEUTROPHILS # BLD AUTO: 1.5 10E9/L (ref 1.6–8.3)
NEUTROPHILS NFR BLD AUTO: 40.8 %
NONHDLC SERPL-MCNC: 118 MG/DL
NRBC # BLD AUTO: 0 10*3/UL
NRBC BLD AUTO-RTO: 0 /100
PLATELET # BLD AUTO: 228 10E9/L (ref 150–450)
POTASSIUM SERPL-SCNC: 4.5 MMOL/L (ref 3.4–5.3)
RBC # BLD AUTO: 3.19 10E12/L (ref 3.8–5.2)
SODIUM SERPL-SCNC: 141 MMOL/L (ref 133–144)
TIBC SERPL-MCNC: 295 UG/DL (ref 240–430)
TRIGL SERPL-MCNC: 233 MG/DL
WBC # BLD AUTO: 3.7 10E9/L (ref 4–11)

## 2020-08-20 PROCEDURE — 80061 LIPID PANEL: CPT | Performed by: INTERNAL MEDICINE

## 2020-08-20 PROCEDURE — 36415 COLL VENOUS BLD VENIPUNCTURE: CPT

## 2020-08-20 PROCEDURE — 83550 IRON BINDING TEST: CPT | Performed by: INTERNAL MEDICINE

## 2020-08-20 PROCEDURE — 85025 COMPLETE CBC W/AUTO DIFF WBC: CPT | Performed by: INTERNAL MEDICINE

## 2020-08-20 PROCEDURE — 83540 ASSAY OF IRON: CPT | Performed by: INTERNAL MEDICINE

## 2020-08-20 PROCEDURE — 80048 BASIC METABOLIC PNL TOTAL CA: CPT | Performed by: INTERNAL MEDICINE

## 2020-08-20 NOTE — RESULT ENCOUNTER NOTE
The following letter pertains to your most recent diagnostic tests:    The cholesterol is stable.  The Creatinine is higher than your baseline.  Other results look OK.  I would not change cholesterol medications based on these results.  For your chronic kidney disease, I would recommend follow up with your nephrologist as directed.        Sincerely,    Dr. Hollingsworth

## 2020-08-20 NOTE — PROGRESS NOTES
Medical Assistant Note:  Rosa Sotomayor presents today for lab draw.    Patient seen by provider today: No.   present during visit today: Not Applicable.    Concerns: No Concerns.    Procedure:  Lab draw site: LAC, Needle type: BF, Gauge: 21. Gauze and coban applied     Post Assessment:  Labs drawn without difficulty: Yes.    Discharge Plan:  Departure Mode: Ambulatory.    Face to Face Time: 5.    Nohemi Mccullough CMA

## 2020-08-20 NOTE — LETTER
August 20, 2020      Rosa Sotomayor  [address]  [place] [address]        Dear ,    The following letter pertains to your most recent diagnostic tests:     The cholesterol is stable.  The Creatinine is higher than your baseline.  Other results look OK.  I would not change cholesterol medications based on these results.  For your chronic kidney disease, I would recommend follow up with your nephrologist as directed.         Resulted Orders   Lipid panel reflex to direct LDL Fasting   Result Value Ref Range    Cholesterol 166 <200 mg/dL    Triglycerides 233 (H) <150 mg/dL      Comment:      Borderline high:  150-199 mg/dl  High:             200-499 mg/dl  Very high:       >499 mg/dl      HDL Cholesterol 48 (L) >49 mg/dL    LDL Cholesterol Calculated 71 <100 mg/dL      Comment:      Desirable:       <100 mg/dl    Non HDL Cholesterol 118 <130 mg/dL   Basic metabolic panel   Result Value Ref Range    Sodium 141 133 - 144 mmol/L    Potassium 4.5 3.4 - 5.3 mmol/L    Chloride 113 (H) 94 - 109 mmol/L    Carbon Dioxide 24 20 - 32 mmol/L    Anion Gap 4 3 - 14 mmol/L    Glucose 105 (H) 70 - 99 mg/dL    Urea Nitrogen 40 (H) 7 - 30 mg/dL    Creatinine 1.90 (H) 0.52 - 1.04 mg/dL    GFR Estimate 27 (L) >60 mL/min/[1.73_m2]      Comment:      Non  GFR Calc  Starting 12/18/2018, serum creatinine based estimated GFR (eGFR) will be   calculated using the Chronic Kidney Disease Epidemiology Collaboration   (CKD-EPI) equation.      GFR Estimate If Black 31 (L) >60 mL/min/[1.73_m2]      Comment:       GFR Calc  Starting 12/18/2018, serum creatinine based estimated GFR (eGFR) will be   calculated using the Chronic Kidney Disease Epidemiology Collaboration   (CKD-EPI) equation.      Calcium 9.3 8.5 - 10.1 mg/dL       If you have any questions or concerns, please call the clinic at the number listed above.       Sincerely,     Dr. Darrick MD/ simone ma

## 2020-08-21 NOTE — RESULT ENCOUNTER NOTE
Dear Ms. Bran,    Your blood tests are overall stable. Hemoglobin of 10.2. Iron is normal. WBC is mildly low. Will monitor it.    Please, call me with any questions.    Nico Calderon MD

## 2020-09-10 DIAGNOSIS — G47.33 OSA (OBSTRUCTIVE SLEEP APNEA): ICD-10-CM

## 2020-09-30 ENCOUNTER — INFUSION THERAPY VISIT (OUTPATIENT)
Dept: INFUSION THERAPY | Facility: CLINIC | Age: 70
End: 2020-09-30
Attending: INTERNAL MEDICINE
Payer: MEDICARE

## 2020-09-30 ENCOUNTER — HOSPITAL ENCOUNTER (OUTPATIENT)
Facility: CLINIC | Age: 70
Setting detail: SPECIMEN
Discharge: HOME | End: 2020-09-30
Attending: INTERNAL MEDICINE | Admitting: INTERNAL MEDICINE
Payer: MEDICARE

## 2020-09-30 DIAGNOSIS — D64.9 NORMOCYTIC ANEMIA: ICD-10-CM

## 2020-09-30 LAB
BASOPHILS # BLD AUTO: 0 10E9/L (ref 0–0.2)
BASOPHILS NFR BLD AUTO: 0.6 %
DIFFERENTIAL METHOD BLD: ABNORMAL
EOSINOPHIL # BLD AUTO: 0.3 10E9/L (ref 0–0.7)
EOSINOPHIL NFR BLD AUTO: 5.5 %
ERYTHROCYTE [DISTWIDTH] IN BLOOD BY AUTOMATED COUNT: 13.1 % (ref 10–15)
HCT VFR BLD AUTO: 33.4 % (ref 35–47)
HGB BLD-MCNC: 10.8 G/DL (ref 11.7–15.7)
IMM GRANULOCYTES # BLD: 0 10E9/L (ref 0–0.4)
IMM GRANULOCYTES NFR BLD: 0.2 %
LYMPHOCYTES # BLD AUTO: 1.9 10E9/L (ref 0.8–5.3)
LYMPHOCYTES NFR BLD AUTO: 36.9 %
MCH RBC QN AUTO: 31.7 PG (ref 26.5–33)
MCHC RBC AUTO-ENTMCNC: 32.3 G/DL (ref 31.5–36.5)
MCV RBC AUTO: 98 FL (ref 78–100)
MONOCYTES # BLD AUTO: 0.6 10E9/L (ref 0–1.3)
MONOCYTES NFR BLD AUTO: 10.5 %
NEUTROPHILS # BLD AUTO: 2.4 10E9/L (ref 1.6–8.3)
NEUTROPHILS NFR BLD AUTO: 46.3 %
PLATELET # BLD AUTO: 260 10E9/L (ref 150–450)
RBC # BLD AUTO: 3.41 10E12/L (ref 3.8–5.2)
WBC # BLD AUTO: 5.2 10E9/L (ref 4–11)

## 2020-09-30 PROCEDURE — 36415 COLL VENOUS BLD VENIPUNCTURE: CPT

## 2020-09-30 PROCEDURE — 85025 COMPLETE CBC W/AUTO DIFF WBC: CPT | Performed by: INTERNAL MEDICINE

## 2020-10-01 ENCOUNTER — VIRTUAL VISIT (OUTPATIENT)
Dept: ONCOLOGY | Facility: CLINIC | Age: 70
End: 2020-10-01
Attending: INTERNAL MEDICINE
Payer: MEDICARE

## 2020-10-01 DIAGNOSIS — D64.9 NORMOCYTIC ANEMIA: Primary | ICD-10-CM

## 2020-10-01 PROCEDURE — 999N001193 HC VIDEO/TELEPHONE VISIT; NO CHARGE

## 2020-10-01 PROCEDURE — 99213 OFFICE O/P EST LOW 20 MIN: CPT | Mod: 95 | Performed by: INTERNAL MEDICINE

## 2020-10-01 RX ORDER — ESTRADIOL 10 UG/1
INSERT VAGINAL
COMMUNITY
Start: 2020-09-30

## 2020-10-01 NOTE — LETTER
"    10/1/2020         RE: Rosa Sotomayor  [address]  [place] [address]        Dear Colleague,    Thank you for referring your patient, Rosa Sotomayor, to the [facility]. Please see a copy of my visit note below.    Rosa Sotomayor is a 69 year old female who is being evaluated via a billable video visit.      The patient has been notified of following:     \"This video visit will be conducted via a call between you and your physician/provider. We have found that certain health care needs can be provided without the need for an in-person physical exam.  This service lets us provide the care you need with a video conversation.  If a prescription is necessary we can send it directly to your pharmacy.  If lab work is needed we can place an order for that and you can then stop by our lab to have the test done at a later time.    Video visits are billed at different rates depending on your insurance coverage.  Please reach out to your insurance provider with any questions.    If during the course of the call the physician/provider feels a video visit is not appropriate, you will not be charged for this service.\"    Patient has given verbal consent for Video visit? Yes  How would you like to obtain your AVS? Unigohart       Text to cell phone: [phone]        Will anyone else be joining your video visit? No        Video-Visit Details    Type of service:  Video Visit    Originating Location (pt. Location): Home    Distant Location (provider location):  [facility]     Platform used for Video Visit: Freddie Farr [facility]        Visit Date:   10/01/2020      SUBJECTIVE:  Ms. 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  Her hemoglobin has been remaining between 10-11.  Plan is to start erythropoietin stimulating agent when hemoglobin is below 10.      REVIEW OF SYSTEMS:  The " patient has fatigue.  That has been a problem.  No headache.  No dizziness.  No chest pain.  She gets mildly short of breath on exertion.  No abdominal pain, nausea or vomiting.  No bleeding.      All other review of systems are negative.      PHYSICAL EXAMINATION:   GENERAL:  She is alert and oriented x 3.   This is a telephone visit.      LABORATORY DATA:  Reviewed.      ASSESSMENT:   1.  A 69-year-old female with chronic normocytic anemia.  Anemia is stable.   2.  Chronic kidney disease.   3.  Other medical problems including hypertension and hyperlipidemia.      RECOMMENDATIONS:   1.  CBC was reviewed with her.  Her hemoglobin is 10.8.      I explained to the patient that for the last few months, her hemoglobin has been remaining above 10.  She is not a candidate for erythropoietin stimulating agent at this time.  When her hemoglobin is below 10, consider starting on Aranesp or Procrit.      2.  She will have a CBC checked every 2-3 months.  I will see her in 6 months.        Advised her to call us if she has worsening weakness, shortness of breath, dizziness or any other concerns.         CAITLIN CHAVEZ MD             D: 10/01/2020   T: 10/01/2020   MT: SHEMAR      Name:     NEO DENT   MRN:      [id number]        Account:      [id number]   :      1950           Visit Date:   10/01/2020      Document: [id number]        Again, thank you for allowing me to participate in the care of your patient.        Sincerely,        Caitlin Chavez MD

## 2020-10-01 NOTE — LETTER
"    10/1/2020         RE: Rosa Sotomayor  [address]  [place] [address]        Dear Colleague,    Thank you for referring your patient, Rosa Sotomayor, to the [facility]. Please see a copy of my visit note below.    Rosa Sotomayor is a 69 year old female who is being evaluated via a billable video visit.      The patient has been notified of following:     \"This video visit will be conducted via a call between you and your physician/provider. We have found that certain health care needs can be provided without the need for an in-person physical exam.  This service lets us provide the care you need with a video conversation.  If a prescription is necessary we can send it directly to your pharmacy.  If lab work is needed we can place an order for that and you can then stop by our lab to have the test done at a later time.    Video visits are billed at different rates depending on your insurance coverage.  Please reach out to your insurance provider with any questions.    If during the course of the call the physician/provider feels a video visit is not appropriate, you will not be charged for this service.\"    Patient has given verbal consent for Video visit? Yes  How would you like to obtain your AVS? Collaajhart       Text to cell phone: [phone]        Will anyone else be joining your video visit? No        Video-Visit Details    Type of service:  Video Visit    Originating Location (pt. Location): Home    Distant Location (provider location):  [facility]     Platform used for Video Visit: Freddie Farr [facility]        Visit Date:   10/01/2020      SUBJECTIVE:  Ms. 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  Her hemoglobin has been remaining between 10-11.  Plan is to start erythropoietin stimulating agent when hemoglobin is below 10.      REVIEW OF SYSTEMS:  The " patient has fatigue.  That has been a problem.  No headache.  No dizziness.  No chest pain.  She gets mildly short of breath on exertion.  No abdominal pain, nausea or vomiting.  No bleeding.      All other review of systems are negative.      PHYSICAL EXAMINATION:   GENERAL:  She is alert and oriented x 3.   This is a telephone visit.      LABORATORY DATA:  Reviewed.      ASSESSMENT:   1.  A 69-year-old female with chronic normocytic anemia.  Anemia is stable.   2.  Chronic kidney disease.   3.  Other medical problems including hypertension and hyperlipidemia.      RECOMMENDATIONS:   1.  CBC was reviewed with her.  Her hemoglobin is 10.8.      I explained to the patient that for the last few months, her hemoglobin has been remaining above 10.  She is not a candidate for erythropoietin stimulating agent at this time.  When her hemoglobin is below 10, consider starting on Aranesp or Procrit.      2.  She will have a CBC checked every 2-3 months.  I will see her in 6 months.        Advised her to call us if she has worsening weakness, shortness of breath, dizziness or any other concerns.         CAITLIN CHAVEZ MD             D: 10/01/2020   T: 10/01/2020   MT: SHEMAR      Name:     NEO DENT   MRN:      [id number]        Account:      [id number]   :      1950           Visit Date:   10/01/2020      Document: [id number]        Again, thank you for allowing me to participate in the care of your patient.        Sincerely,        Caitlin Chavez MD

## 2020-10-01 NOTE — PROGRESS NOTES
"Rosa Sotomayor is a 69 year old female who is being evaluated via a billable video visit.      The patient has been notified of following:     \"This video visit will be conducted via a call between you and your physician/provider. We have found that certain health care needs can be provided without the need for an in-person physical exam.  This service lets us provide the care you need with a video conversation.  If a prescription is necessary we can send it directly to your pharmacy.  If lab work is needed we can place an order for that and you can then stop by our lab to have the test done at a later time.    Video visits are billed at different rates depending on your insurance coverage.  Please reach out to your insurance provider with any questions.    If during the course of the call the physician/provider feels a video visit is not appropriate, you will not be charged for this service.\"    Patient has given verbal consent for Video visit? Yes  How would you like to obtain your AVS? Vestechart       Text to cell phone: [phone]        Will anyone else be joining your video visit? No        Video-Visit Details    Type of service:  Video Visit    Originating Location (pt. Location): Home    Distant Location (provider location):  [facility]     Platform used for Video Visit: Freddie Farr CMA      "

## 2020-10-01 NOTE — PROGRESS NOTES
Visit Date:   10/01/2020     HEMATOLOGY HISTORY: Ms. Sotomayor is a female with chronic anemia.  Her anemia is due to chronic renal disease and chronic disease.   1.  Multiple labs were done on 03/13/2019.   -WBC 3.5, hemoglobin 10.8 and platelets of 204.  MCV of 96.   -Creatinine of 1.26.   -Normal calcium.   -Normal LFT.   -Normal folate.   -Normal vitamin B12.   -Normal iron. Elevated ferritin.   -Erythropoietin mildly elevated at 38.   -Normal LDH.   -Soluable transferrin receptor is mildly elevated at 5.2.   -Normal haptoglobin.   2. On 11/29/2019, WBC of 4.3, hemoglobin 9.8 and platelets 230.  MCV of 103.   3. On 07/08/2019, CT scan did not reveal any splenomegaly or lymphadenopathy.   4. Bone marrow biopsy was done on 12/18/2019.  It reveals normal cellular bone marrow for age with 30% cellularity.  There is trilineage hematopoiesis.  No evidence of MDS.  Increased storage iron.  Cytogenetics is normal.  Flow cytometry is normal.  5. IV injectafer in 03/2020.     SUBJECTIVE:  Ms. 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  Her hemoglobin has been remaining between 10-11.  Plan is to start erythropoietin stimulating agent when hemoglobin is below 10.      REVIEW OF SYSTEMS:  The patient has fatigue.  That has been her main complaint.  No headache.  No dizziness.  No chest pain.  She gets mildly short of breath on exertion.  No abdominal pain, nausea or vomiting.  No bleeding.      All other review of systems are negative.      PHYSICAL EXAMINATION:   GENERAL:  She is alert and oriented x 3.   This is a telephone visit.      LABORATORY DATA:  Reviewed.      ASSESSMENT:   1.  A 69-year-old female with chronic normocytic anemia.  Anemia is stable.   2.  Chronic kidney disease.   3.  Other medical problems including hypertension and hyperlipidemia.      RECOMMENDATIONS:   1.  CBC was reviewed with her.  Her hemoglobin is 10.8.      I explained to the  patient that for the last few months, her hemoglobin has been remaining above 10.  She is not a candidate for erythropoietin stimulating agent at this time.  When her hemoglobin is below 10, will consider starting on Aranesp or Procrit.      2.  She will have CBC checked every 2-3 months.  I will see her in 6 months. Advised her to call us if she has worsening weakness, shortness of breath, dizziness or any other concerns.         CAITLIN CHAVEZ MD             D: 10/01/2020   T: 10/01/2020   MT: SHEMAR      Name:     NEO DENT   MRN:      [id number]        Account:      [id number]   :      1950           Visit Date:   10/01/2020      Document: [id number]

## 2020-10-11 NOTE — PROGRESS NOTES
Visit Date:   10/01/2020     HEMATOLOGY HISTORY: Ms. Sotomayor is a female with chronic anemia.  Her anemia is due to chronic renal disease and chronic disease.   1.  Multiple labs were done on 03/13/2019.   -WBC 3.5, hemoglobin 10.8 and platelets of 204.  MCV of 96.   -Creatinine of 1.26.   -Normal calcium.   -Normal LFT.   -Normal folate.   -Normal vitamin B12.   -Normal iron. Elevated ferritin.   -Erythropoietin mildly elevated at 38.   -Normal LDH.   -Soluable transferrin receptor is mildly elevated at 5.2.   -Normal haptoglobin.   2. On 11/29/2019, WBC of 4.3, hemoglobin 9.8 and platelets 230.  MCV of 103.   3. On 07/08/2019, CT scan did not reveal any splenomegaly or lymphadenopathy.   4. Bone marrow biopsy was done on 12/18/2019.  It reveals normal cellular bone marrow for age with 30% cellularity.  There is trilineage hematopoiesis.  No evidence of MDS.  Increased storage iron.  Cytogenetics is normal.  Flow cytometry is normal.  5. IV injectafer in 03/2020.     SUBJECTIVE:  Ms. 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  Her hemoglobin has been remaining between 10-11.  Plan is to start erythropoietin stimulating agent when hemoglobin is below 10.      REVIEW OF SYSTEMS:  The patient has fatigue.  That has been her main complaint.  No headache.  No dizziness.  No chest pain.  She gets mildly short of breath on exertion.  No abdominal pain, nausea or vomiting.  No bleeding.      All other review of systems are negative.      PHYSICAL EXAMINATION:   GENERAL:  She is alert and oriented x 3.   This is a telephone visit.      LABORATORY DATA:  Reviewed.      ASSESSMENT:   1.  A 69-year-old female with chronic normocytic anemia.  Anemia is stable.   2.  Chronic kidney disease.   3.  Other medical problems including hypertension and hyperlipidemia.      RECOMMENDATIONS:   1.  CBC was reviewed with her.  Her hemoglobin is 10.8.      I explained to the  patient that for the last few months, her hemoglobin has been remaining above 10.  She is not a candidate for erythropoietin stimulating agent at this time.  When her hemoglobin is below 10, will consider starting on Aranesp or Procrit.      2.  She will have CBC checked every 2-3 months.  I will see her in 6 months. Advised her to call us if she has worsening weakness, shortness of breath, dizziness or any other concerns.         CAITLIN CHAVEZ MD             D: 10/01/2020   T: 10/01/2020   MT: SHEMAR      Name:     NEO DENT   MRN:      [id number]        Account:      [id number]   :      1950           Visit Date:   10/01/2020      Document: [id number]

## 2020-10-29 ENCOUNTER — OFFICE VISIT (OUTPATIENT)
Dept: FAMILY MEDICINE | Facility: CLINIC | Age: 70
End: 2020-10-29
Payer: MEDICARE

## 2020-10-29 VITALS
SYSTOLIC BLOOD PRESSURE: 112 MMHG | BODY MASS INDEX: 29.88 KG/M2 | TEMPERATURE: 77 F | OXYGEN SATURATION: 100 % | WEIGHT: 175 LBS | HEART RATE: 77 BPM | HEIGHT: 64 IN | DIASTOLIC BLOOD PRESSURE: 57 MMHG

## 2020-10-29 DIAGNOSIS — E78.5 HYPERLIPIDEMIA LDL GOAL <130: ICD-10-CM

## 2020-10-29 DIAGNOSIS — R82.90 NONSPECIFIC FINDING ON EXAMINATION OF URINE: ICD-10-CM

## 2020-10-29 DIAGNOSIS — E78.1 HYPERTRIGLYCERIDEMIA: ICD-10-CM

## 2020-10-29 DIAGNOSIS — M79.605 PAIN IN BOTH LOWER EXTREMITIES: Primary | ICD-10-CM

## 2020-10-29 DIAGNOSIS — I10 BENIGN ESSENTIAL HYPERTENSION: ICD-10-CM

## 2020-10-29 DIAGNOSIS — N39.0 URINARY TRACT INFECTION WITH HEMATURIA, SITE UNSPECIFIED: ICD-10-CM

## 2020-10-29 DIAGNOSIS — M79.604 PAIN IN BOTH LOWER EXTREMITIES: Primary | ICD-10-CM

## 2020-10-29 DIAGNOSIS — R30.0 DYSURIA: ICD-10-CM

## 2020-10-29 DIAGNOSIS — R31.9 URINARY TRACT INFECTION WITH HEMATURIA, SITE UNSPECIFIED: ICD-10-CM

## 2020-10-29 DIAGNOSIS — D63.1 ANEMIA DUE TO STAGE 3B CHRONIC KIDNEY DISEASE (H): ICD-10-CM

## 2020-10-29 DIAGNOSIS — N18.32 ANEMIA DUE TO STAGE 3B CHRONIC KIDNEY DISEASE (H): ICD-10-CM

## 2020-10-29 LAB
ALBUMIN UR-MCNC: NEGATIVE MG/DL
APPEARANCE UR: CLEAR
BACTERIA #/AREA URNS HPF: ABNORMAL /HPF
BILIRUB UR QL STRIP: NEGATIVE
COLOR UR AUTO: YELLOW
GLUCOSE UR STRIP-MCNC: NEGATIVE MG/DL
HGB UR QL STRIP: ABNORMAL
HYALINE CASTS #/AREA URNS LPF: ABNORMAL /LPF
KETONES UR STRIP-MCNC: NEGATIVE MG/DL
LEUKOCYTE ESTERASE UR QL STRIP: ABNORMAL
NITRATE UR QL: NEGATIVE
PH UR STRIP: 5.5 PH (ref 5–7)
RBC #/AREA URNS AUTO: ABNORMAL /HPF
SOURCE: ABNORMAL
SP GR UR STRIP: 1.01 (ref 1–1.03)
UROBILINOGEN UR STRIP-ACNC: 0.2 EU/DL (ref 0.2–1)
WBC #/AREA URNS AUTO: ABNORMAL /HPF

## 2020-10-29 PROCEDURE — 87086 URINE CULTURE/COLONY COUNT: CPT | Performed by: INTERNAL MEDICINE

## 2020-10-29 PROCEDURE — 87186 SC STD MICRODIL/AGAR DIL: CPT | Performed by: INTERNAL MEDICINE

## 2020-10-29 PROCEDURE — 81001 URINALYSIS AUTO W/SCOPE: CPT | Performed by: INTERNAL MEDICINE

## 2020-10-29 PROCEDURE — 99214 OFFICE O/P EST MOD 30 MIN: CPT | Performed by: INTERNAL MEDICINE

## 2020-10-29 PROCEDURE — 87088 URINE BACTERIA CULTURE: CPT | Performed by: INTERNAL MEDICINE

## 2020-10-29 RX ORDER — TIZANIDINE 2 MG/1
2 TABLET ORAL 3 TIMES DAILY PRN
Qty: 42 TABLET | Refills: 3 | Status: SHIPPED | OUTPATIENT
Start: 2020-10-29 | End: 2021-06-30

## 2020-10-29 ASSESSMENT — MIFFLIN-ST. JEOR: SCORE: 1313.79

## 2020-10-29 NOTE — PROGRESS NOTES
"Subjective     Rosa Sotomayor is a 69 year old female who presents to clinic today for the following health issues:    HPI           2 days of burning with urination, urgency, frequency moderate, she wonders if she is getting a UTI and whether she should have UA  She has chronic foot pain for months for which she is having an MRI this afternoon per her foot and ankle specialist  She complains of intermittens \"restless legs\" present for years, but worse recently, tizanidine helps without side effects   with further description she describes it more of an 'anxiousness or electrical current' in her whole body that compels her to move her legs, lasting hours and resolving without intervention, the tizanidine seems to 'calm the muscles' and improve the sensation; she does not tolerate gabapentin; she does not which to take a daily medication for this symptom since it is rare and intermittent; spells are rare; her recent iron studies were normal  She is watching her diet to address high triglycerides and they were improved  Her Cr was rechecked at nephrology and was better   She had some questions about travel with COVID and other matters regarding COVID         Review of Systems   Pertinent +/-'s in HPI       Objective    /57 (BP Location: Right arm, Patient Position: Chair, Cuff Size: Adult Regular)   Pulse 77   Temp (!) 77  F (25  C)   Ht 1.626 m (5' 4\")   Wt 79.4 kg (175 lb)   SpO2 100%   BMI 30.04 kg/m    Body mass index is 30.04 kg/m .  Physical Exam   GENERAL: healthy, alert and no distress  NEURO: Normal strength and tone, mentation intact and speech normal  PSYCH: mentation appears normal, affect normal/bright    Labs reviewed         Assessment & Plan     Pain in both lower extremities  We discussed a referral to sleep medicine to further characterize  We discussed other medications like sinamet, dopamine agonists etc; she was no too enthused about potential side effects   In the end with decided to " "stick with PRN below which she tolerates well  - tiZANidine (ZANAFLEX) 2 MG tablet; Take 1 tablet (2 mg) by mouth 3 times daily as needed for muscle spasms    Dysuria    - *UA reflex to Microscopic and Culture ([place] [facility] (except [place] and [place])    Benign essential hypertension  Under good control     Hyperlipidemia LDL goal <130  Under good control     Anemia due to stage 3b chronic kidney disease  Stable; continue follow up with nephrology and hematology as directed     Hypertriglyceridemia         BMI:   Estimated body mass index is 30.04 kg/m  as calculated from the following:    Height as of this encounter: 1.626 m (5' 4\").    Weight as of this encounter: 79.4 kg (175 lb).   Weight management plan: Discussed healthy diet and exercise guidelines           Return in about 6 months (around 4/29/2021) for recheck if symptoms persist.  Patient instructed to return to clinic or contact us sooner if symptoms worsen or new symptoms develop.     Josh Hollingsworth MD  [facility]  Total face to face contact time was greater than 30 minutes of which more than 50% of this time was spent counseling and coordinating care regarding the above topics.    "

## 2020-10-30 RX ORDER — AMOXICILLIN 875 MG
875 TABLET ORAL 2 TIMES DAILY
Qty: 14 TABLET | Refills: 0 | Status: SHIPPED | OUTPATIENT
Start: 2020-10-30 | End: 2020-12-04

## 2020-10-31 LAB
BACTERIA SPEC CULT: ABNORMAL
Lab: ABNORMAL
SPECIMEN SOURCE: ABNORMAL

## 2020-12-04 ENCOUNTER — VIRTUAL VISIT (OUTPATIENT)
Dept: SLEEP MEDICINE | Facility: CLINIC | Age: 70
End: 2020-12-04
Payer: MEDICARE

## 2020-12-04 DIAGNOSIS — G25.81 RESTLESS LEGS SYNDROME (RLS): ICD-10-CM

## 2020-12-04 DIAGNOSIS — G47.33 OSA (OBSTRUCTIVE SLEEP APNEA): Primary | Chronic | ICD-10-CM

## 2020-12-04 DIAGNOSIS — G47.00 INSOMNIA, UNSPECIFIED TYPE: ICD-10-CM

## 2020-12-04 PROCEDURE — 99443 PR PHYSICIAN TELEPHONE EVALUATION 21-30 MIN: CPT | Performed by: PHYSICIAN ASSISTANT

## 2020-12-04 NOTE — PROGRESS NOTES
CPAP Follow-Up Visit:    Date on this visit: 12/4/2020    Rosa Sotomayor has a follow-up of her CPAP use for mild ESTEFANIA. She was initially seen at the [facility] for snoring, waking a lot at night, and not feeling refreshed by sleep for 1.5 years. Her medical history is significant for Arnold-Chiari malformation (unknown grade, no surgery), chronic back pain, migraine, hyperlipidemia, aortic insufficiency, fibromuscular dysplasia, possible renovascular HTN, renal artery stenosis, CKD st 3, anxiety, macular degeneration, osteoarthritis.     PSG showed: AHI was 12.4/hr, with desaturations down to 83%. S/He spent 2.4 minutes below 90% SpO2. RDI 31.7/hr.  REM RDI 55.2/hr.  Supine RDI 78.4/hr (75 minutes spent supine). Her non-supine RDI was 18.6/hr.  Periodic Limb Movement Index 7.2/hour, 0.5/hr were associated with arousals. Her heart rate was over 100 most of the night, up to 120 bpm. She had a headache during the night and felt a pulsing.      PAP machine: RespirDesignWines. Pressure settings: 7-15 cm    The compliance data shows that (up to 11/17/20) the patient used the CPAP for 92% of nights for >4 hours.  The 90th% pressure is 8.1 cm.  The average time in large leak is 0.  The average nightly usage is 400.  The average AHI is 1/hr.       Interface:  Mask: Nuance Pro nasal pillows mask. The headgear slides up the back of her head. She was sent the wrong hose attachment the last time she obtained supplies.   Chin strap: No  Leak: Yes if the straps slip  Using Humidifier: Yes, it is often dry in the morning but she fills it about 1/4 of the way.  Condensation in hose or mask: No     Difficulties with therapy:    [-] Snoring with CPAP:  [-] Difficulty tolerating the pressure:  [-] Epistaxis/dry nose:   [-] Nasal congestion: once recently  [+] Dry mouth: some days  [+] Mouth breathing:   [-] Pain/skin breakdown:        Bedtime: 12-1 AM. She reads in bed from 10-11 PM. She is up around 8-9 AM. She denies  napping. She gets up once for the restroom. She notices tossing and turning more between 2-4 AM.   Some days she wakes feeling she has not slept. She is really tired. That is secondary to anemia. She dozes for 10 min inadvertently on some days watching the news around 4-5 PM.    She has been having restless legs. She has been on an iron supplement once daily. She sometimes takes tizanidine when they get really bad. She has CKD st 3. She had a bad reaction to gabapentin in the past.     Past medical/surgical history, family history, social history, medications and allergies were reviewed.      Problem List:  Patient Active Problem List    Diagnosis Date Noted     Malabsorption of iron 03/04/2020     Priority: Medium     Spinal stenosis of lumbar region with neurogenic claudication 05/13/2019     Priority: Medium     ESTEFANIA (obstructive sleep apnea)- mild (AHI 12) 12/17/2018     Priority: Medium     12/13/2018 [facility] Diagnostic Sleep Study (188.0 lbs) - AHI 12.4, RDI 31.7, Supine AHI 52.0, REM AHI 34.3, Low O2 75.1%, Time Spent ?88% 1.4 minutes / Time Spent ?89% 2.4 minutes.       Aortic valve insufficiency 04/19/2018     Priority: Medium     Migraine without aura and without status migrainosus, not intractable 11/20/2017     Priority: Medium     Anemia, iron deficiency 06/23/2017     Priority: Medium     Macular degeneration, bilateral 01/05/2017     Priority: Medium     Benign essential hypertension 12/28/2016     Priority: Medium     Chronic bilateral low back pain without sciatica 11/29/2016     Priority: Medium     Secondary renal hyperparathyroidism (H) 07/31/2016     Priority: Medium     Arnold-Chiari malformation (H) 07/23/2016     Priority: Medium     Hyperlipidemia LDL goal <130 07/23/2016     Priority: Medium     Anxiety 07/23/2016     Priority: Medium     Anemia due to stage 3 chronic kidney disease (H) 07/22/2016     Priority: Medium     Hypertriglyceridemia 07/22/2016     Priority: Medium     Personal  history of allergy to anesthetic agent 03/10/2007     Priority: Medium        Impression/Plan:    (G47.33) ESTEFANIA (obstructive sleep apnea)- mild (AHI 12)  (primary encounter diagnosis)  Comment: She is doing fairly well with CPAP, but her mask sometimes slips up the back of her head and she seems to have some mouth leak.   Plan: Continue auto CPAP 7-15 cm. A prescription was written for new supplies. Try Benitez FX mask. We talked about options for addressing mouth leak; full face mask, chin strap, tape over lips. If still having a lot of mouth leak, may try reducing the upper pressure limit.    (G25.81) Restless legs syndrome (RLS)  Comment: She has noticed more RLS. Has been treating anemia with oral iron once daily. Not quite qualifying for IV iron. She feels tired and has headaches. She on tizanidine prn when her symptoms get really bad. She has CKD st 3 and bad reaction to gabapentin in the past.  Plan:  I offered a prescription for ropinirole. She was not interested at this time. She was encouraged to take iron with Vit C and avoid taking it with any acid blockers.    (G47.00) Insomnia, unspecified type  Comment:  tosses and turns between 2-4 AM. Possibly related to limb movements/restless legs. Possibly related to less consistency of sleep schedule.   Plan: She was advised to try to limit time in bed to between 12-8 AM.      She will follow up with me in about 1 year(s), sooner as needed.     32 minutes (3:35 PM-4:07 PM) spent with patient, all of which were spent face-to-face counseling, consulting, coordinating plan of care.      Bennett Goltz, PA-C    CC: No ref. provider found

## 2020-12-04 NOTE — PROGRESS NOTES
"Rosa Sotomayor is a 69 year old female who is being evaluated via a billable telephone visit.      The patient has been notified of following:     \"This telephone visit will be conducted via a call between you and your physician/provider. We have found that certain health care needs can be provided without the need for a physical exam.  This service lets us provide the care you need with a short phone conversation.  If a prescription is necessary we can send it directly to your pharmacy.  If lab work is needed we can place an order for that and you can then stop by our lab to have the test done at a later time.    Telephone visits are billed at different rates depending on your insurance coverage. During this emergency period, for some insurers they may be billed the same as an in-person visit.  Please reach out to your insurance provider with any questions.    If during the course of the call the physician/provider feels a telephone visit is not appropriate, you will not be charged for this service.\"    Patient has given verbal consent for Telephone visit?  Yes    What phone number would you like to be contacted at? [phone]    How would you like to obtain your AVS? MyChart            "

## 2020-12-08 DIAGNOSIS — M48.062 SPINAL STENOSIS OF LUMBAR REGION WITH NEUROGENIC CLAUDICATION: Primary | ICD-10-CM

## 2020-12-08 NOTE — TELEPHONE ENCOUNTER
Reason for Call:  Medication or medication refill:    Do you use a [facility]?  Name of the pharmacy and phone number for the current request:  [id number] [place] [facility] [place] [place] - [address] AT [facility] [address]    Name of the medication requested: cephALEXin (KEFLEX) 500 MG capsule     Other request: Pt needs this Rx for an upcoming dental visit on 12.16.2020    Can we leave a detailed message on this number? YES    Phone number patient can be reached at: Cell number on file:    Telephone Information:   Mobile [phone]     Best Time: any    Call taken on 12/8/2020 at 10:33 AM by Mary Gillis

## 2020-12-09 RX ORDER — CEPHALEXIN 500 MG/1
CAPSULE ORAL
Qty: 4 CAPSULE | Refills: 3 | Status: SHIPPED | OUTPATIENT
Start: 2020-12-09 | End: 2021-06-30

## 2020-12-28 ENCOUNTER — HOSPITAL ENCOUNTER (OUTPATIENT)
Facility: CLINIC | Age: 70
Setting detail: SPECIMEN
Discharge: HOME | End: 2020-12-28
Attending: INTERNAL MEDICINE | Admitting: INTERNAL MEDICINE
Payer: MEDICARE

## 2020-12-28 ENCOUNTER — INFUSION THERAPY VISIT (OUTPATIENT)
Dept: INFUSION THERAPY | Facility: CLINIC | Age: 70
End: 2020-12-28
Attending: INTERNAL MEDICINE
Payer: MEDICARE

## 2020-12-28 DIAGNOSIS — D64.9 NORMOCYTIC ANEMIA: ICD-10-CM

## 2020-12-28 LAB
BASOPHILS # BLD AUTO: 0 10E9/L (ref 0–0.2)
BASOPHILS NFR BLD AUTO: 0.5 %
DIFFERENTIAL METHOD BLD: ABNORMAL
EOSINOPHIL # BLD AUTO: 0.2 10E9/L (ref 0–0.7)
EOSINOPHIL NFR BLD AUTO: 5.4 %
ERYTHROCYTE [DISTWIDTH] IN BLOOD BY AUTOMATED COUNT: 13.3 % (ref 10–15)
HCT VFR BLD AUTO: 33.9 % (ref 35–47)
HGB BLD-MCNC: 10.7 G/DL (ref 11.7–15.7)
IMM GRANULOCYTES # BLD: 0 10E9/L (ref 0–0.4)
IMM GRANULOCYTES NFR BLD: 0 %
LYMPHOCYTES # BLD AUTO: 1.5 10E9/L (ref 0.8–5.3)
LYMPHOCYTES NFR BLD AUTO: 37.3 %
MCH RBC QN AUTO: 31.5 PG (ref 26.5–33)
MCHC RBC AUTO-ENTMCNC: 31.6 G/DL (ref 31.5–36.5)
MCV RBC AUTO: 100 FL (ref 78–100)
MONOCYTES # BLD AUTO: 0.3 10E9/L (ref 0–1.3)
MONOCYTES NFR BLD AUTO: 8 %
NEUTROPHILS # BLD AUTO: 2 10E9/L (ref 1.6–8.3)
NEUTROPHILS NFR BLD AUTO: 48.8 %
NRBC # BLD AUTO: 0 10*3/UL
NRBC BLD AUTO-RTO: 0 /100
PLATELET # BLD AUTO: 234 10E9/L (ref 150–450)
RBC # BLD AUTO: 3.4 10E12/L (ref 3.8–5.2)
WBC # BLD AUTO: 4.1 10E9/L (ref 4–11)

## 2020-12-28 PROCEDURE — 36415 COLL VENOUS BLD VENIPUNCTURE: CPT

## 2020-12-28 PROCEDURE — 85025 COMPLETE CBC W/AUTO DIFF WBC: CPT | Performed by: INTERNAL MEDICINE

## 2020-12-28 NOTE — RESULT ENCOUNTER NOTE
Dear Ms. Sotomayor,    Hemoglobin is stable at 10.7.    Please, call me with any questions.    Nico Calderon MD

## 2021-01-11 ENCOUNTER — DOCUMENTATION ONLY (OUTPATIENT)
Dept: FAMILY MEDICINE | Facility: CLINIC | Age: 71
End: 2021-01-11

## 2021-01-11 DIAGNOSIS — D63.1 ANEMIA OF CHRONIC RENAL FAILURE: ICD-10-CM

## 2021-01-11 DIAGNOSIS — N18.9 ANEMIA OF CHRONIC RENAL FAILURE: ICD-10-CM

## 2021-01-11 DIAGNOSIS — N18.30 CHRONIC KIDNEY DISEASE, STAGE III (MODERATE) (H): Primary | ICD-10-CM

## 2021-01-12 ENCOUNTER — OFFICE VISIT (OUTPATIENT)
Dept: FAMILY MEDICINE | Facility: CLINIC | Age: 71
End: 2021-01-12
Payer: MEDICARE

## 2021-01-12 ENCOUNTER — NURSE TRIAGE (OUTPATIENT)
Dept: NURSING | Facility: CLINIC | Age: 71
End: 2021-01-12

## 2021-01-12 VITALS
TEMPERATURE: 98.9 F | HEART RATE: 72 BPM | BODY MASS INDEX: 30.04 KG/M2 | SYSTOLIC BLOOD PRESSURE: 145 MMHG | WEIGHT: 175 LBS | DIASTOLIC BLOOD PRESSURE: 75 MMHG | OXYGEN SATURATION: 99 %

## 2021-01-12 DIAGNOSIS — H81.12 BENIGN PAROXYSMAL POSITIONAL VERTIGO, LEFT: Primary | ICD-10-CM

## 2021-01-12 PROCEDURE — 83550 IRON BINDING TEST: CPT | Performed by: NURSE PRACTITIONER

## 2021-01-12 PROCEDURE — 36415 COLL VENOUS BLD VENIPUNCTURE: CPT | Performed by: NURSE PRACTITIONER

## 2021-01-12 PROCEDURE — 82728 ASSAY OF FERRITIN: CPT | Performed by: NURSE PRACTITIONER

## 2021-01-12 PROCEDURE — 80048 BASIC METABOLIC PNL TOTAL CA: CPT | Performed by: NURSE PRACTITIONER

## 2021-01-12 PROCEDURE — 83540 ASSAY OF IRON: CPT | Performed by: NURSE PRACTITIONER

## 2021-01-12 PROCEDURE — 99214 OFFICE O/P EST MOD 30 MIN: CPT | Performed by: NURSE PRACTITIONER

## 2021-01-12 RX ORDER — DIAZEPAM 5 MG
2.5 TABLET ORAL
Qty: 10 TABLET | Refills: 0 | Status: SHIPPED | OUTPATIENT
Start: 2021-01-12 | End: 2022-08-17

## 2021-01-12 NOTE — TELEPHONE ENCOUNTER
"Patient calling reporting she is feeling dizzy. Stating she had an episode on 12/24/20 that resolved. Symptoms returned 1/11/21. Reporting she feels lightheaded with ambulation. Stating she is \"more cautious.\" Denies needing support for ambulation. Denies vertigo sensation of room spinning. Afebrile.   Reporting left ear \"has been bothering me a little bit, I notice when I lay down at night it hurts.\"    Triage disposition to go to office now.   Transferred to Central Scheduling.    Tatiana Serna RN  [facility]        COVID 19 Nurse Triage Plan/Patient Instructions    Please be aware that novel coronavirus (COVID-19) may be circulating in the community. If you develop symptoms such as fever, cough, or SOB or if you have concerns about the presence of another infection including coronavirus (COVID-19), please contact your health care provider or visit [email or web address].     Disposition/Instructions    In-Person Visit with provider recommended. Reference Visit Selection Guide.    Thank you for taking steps to prevent the spread of this virus.  o Limit your contact with others.  o Wear a simple mask to cover your cough.  o Wash your hands well and often.    Resources    [facility]: About COVID-19: [email or web address]    CDC: What to Do If You're Sick: www.cdc.gov/coronavirus/2019-ncov/about/steps-when-sick.html    CDC: Ending Home Isolation: www.cdc.gov/coronavirus/2019-ncov/hcp/disposition-in-home-patients.html     CDC: Caring for Someone: www.cdc.gov/coronavirus/2019-ncov/if-you-are-sick/care-for-someone.html     [facility]: Interim Guidance for Hospital Discharge to Home: www.health.[facility]/diseases/coronavirus/hcp/hospdischarge.pdf    [facility] clinical trials (COVID-19 research studies): clinicalaffairs.[facility]/umn-clinical-trials     Below are the COVID-19 hotlines at the [place] Department of Health ([facility]). Interpreters are available.   o For health questions: Call " 379.893.2866 or 1-217.619.4417 (7 a.m. to 7 p.m.)  o For questions about schools and childcare: Call 894-315-1985 or 1-833.597.1250 (7 a.m. to 7 p.m.)                     Additional Information    Negative: Shock suspected (e.g., cold/pale/clammy skin, too weak to stand, low BP, rapid pulse)    Negative: Difficult to awaken or acting confused (e.g., disoriented, slurred speech)    Negative: Fainted, and still feels dizzy afterwards    Negative: Severe difficulty breathing (e.g., struggling for each breath, speaks in single words)    Negative: Overdose (accidental or intentional) of medications    Negative: New neurologic deficit that is present now: * Weakness of the face, arm, or leg on one side of the body * Numbness of the face, arm, or leg on one side of the body * Loss of speech or garbled speech    Negative: Heart beating < 50 beats per minute OR > 140 beats per minute    Negative: Sounds like a life-threatening emergency to the triager    Negative: Chest pain    Negative: Rectal bleeding, bloody stool, or tarry-black stool    Negative: Vomiting is the main symptom    Negative: Diarrhea is the main symptom    Negative: Headache is the main symptom    Negative: Heat exhaustion suspected (i.e., dehydration from heat exposure)    Negative: Patient states that he/she is having an anxiety/panic attack    Negative: SEVERE dizziness (e.g., unable to stand, requires support to walk, feels like passing out now)    Negative: SEVERE headache or neck pain    Negative: Spinning or tilting sensation (vertigo) present now and one or more stroke risk factors (i.e., hypertension, diabetes, prior stroke/TIA, heart attack, age over 60) (Exception: prior physician evaluation for this AND no different/worse than usual)    Negative: Loss of vision or double vision    Negative: Extra heart beats OR irregular heart beating (i.e., 'palpitations')    Negative: Difficulty breathing    Negative: Drinking very little and has signs of  dehydration (e.g., no urine > 12 hours, very dry mouth, very lightheaded)    Negative: Follows bleeding (e.g., stomach, rectum, vagina) (Exception: became dizzy from sight of small amount blood)    Negative: Patient sounds very sick or weak to the triager    Lightheadedness (dizziness) present now, after 2 hours of rest and fluids    Protocols used: DIZZINESS-A-OH

## 2021-01-12 NOTE — PROGRESS NOTES
"  Assessment & Plan     Benign paroxysmal positional vertigo, left  Diazepam .5mg 1/2 tablet at HS prn vertigo    Roula Apodaca, GREGG CNP  [facility] EBER Lee is a 70 year old who presents to clinic today for the following health issues     HPI     Dizziness started last night, happens mostly when moving too quickly and turning her head. States that had similar episode on Merari Carolann but also had a migraine around that time as well. 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  Spoke with that center about merari headache and no changes made  Had previously had symptoms like this about 18 years ago. Had extensive testing and treatment back then and the only thing that helped was diazepam  No headache present at this time  Has previously seen neurologist for this   Had an episode of loss of coordination with right arm while getting a glass  Of water unintentionally tipped it out  Walking carefully so as not to lose balance    chronic anemia- Dr Calderon- iron infusions   CKD4- Dr Holloway    Review of Systems   Constitutional, HEENT, cardiovascular, pulmonary, GI, , musculoskeletal, neuro, skin, endocrine and psych systems are negative, except as otherwise noted.      Objective    BP (!) 145/75 (BP Location: Right arm, Patient Position: Sitting, Cuff Size: Adult Regular)   Pulse 72   Temp 98.9  F (37.2  C) (Tympanic)   Wt 79.4 kg (175 lb)   SpO2 99%   BMI 30.04 kg/m      Physical Exam     GENERAL: healthy, alert and no distress  EYES: Eyes grossly normal to inspection, PERRL and conjunctivae and sclerae normal  HENT: ear canals and TM's normal, nose and mouth without ulcers or lesions  NECK: no adenopathy, no asymmetry, masses, or scars and thyroid normal to palpation  RESP: lungs clear to auscultation - no rales, rhonchi or wheezes  CV: regular rate and rhythm, normal S1 S2, no S3 or S4, no murmur, click or rub, no peripheral edema and peripheral pulses " strong  ABDOMEN: soft, nontender, no hepatosplenomegaly, no masses and bowel sounds normal  MS: no gross musculoskeletal defects noted, no edema, full ROM all extremities  SKIN: no suspicious lesions or rashes  NEURO: Normal strength and tone, mentation intact and speech normal; good FTN and heel /shin; dizziness produced with left lateral rotation of head in supine position and again upon sitting. Able to move without difficulty or assistance to and from the table and walking in hallway without gait disturbance    PSYCH: mentation appears normal, affect normal/bright

## 2021-01-13 LAB
ANION GAP SERPL CALCULATED.3IONS-SCNC: 8 MMOL/L (ref 3–14)
BUN SERPL-MCNC: 37 MG/DL (ref 7–30)
CALCIUM SERPL-MCNC: 9.2 MG/DL (ref 8.5–10.1)
CHLORIDE SERPL-SCNC: 114 MMOL/L (ref 94–109)
CO2 SERPL-SCNC: 20 MMOL/L (ref 20–32)
CREAT SERPL-MCNC: 1.45 MG/DL (ref 0.52–1.04)
FERRITIN SERPL-MCNC: 1037 NG/ML (ref 8–252)
GFR SERPL CREATININE-BSD FRML MDRD: 37 ML/MIN/{1.73_M2}
GLUCOSE SERPL-MCNC: 93 MG/DL (ref 70–99)
IRON SATN MFR SERPL: 24 % (ref 15–46)
IRON SERPL-MCNC: 73 UG/DL (ref 35–180)
POTASSIUM SERPL-SCNC: 4.7 MMOL/L (ref 3.4–5.3)
SODIUM SERPL-SCNC: 142 MMOL/L (ref 133–144)
TIBC SERPL-MCNC: 300 UG/DL (ref 240–430)

## 2021-01-14 ENCOUNTER — PATIENT OUTREACH (OUTPATIENT)
Dept: ONCOLOGY | Facility: CLINIC | Age: 71
End: 2021-01-14

## 2021-01-14 ENCOUNTER — TELEPHONE (OUTPATIENT)
Dept: ONCOLOGY | Facility: CLINIC | Age: 71
End: 2021-01-14

## 2021-01-14 NOTE — PROGRESS NOTES
"Writer received a call from patient stating that she was evaluated earlier this week by her PCP for her dizziness/vertigo that still has not gone away but does improve with each day. She had labs done in evaluation of this vertigo and would like Dr. Calderon to review labs and give her a call to discuss his thought regarding the recent results.    She continues to be \"very tired\" and experiences shortness of breath as well.    Patient would like a return call at [phone].    Message sent to Dr. Calderon.     Babita Jane RN    "

## 2021-01-14 NOTE — TELEPHONE ENCOUNTER
Patient had called and wanted to know if she is iron deficient.  Patient has been having dizziness.  She had multiple labs done on 01/12/2021.  Her iron and saturation index are normal.  Ferritin is elevated.  Explained to the patient that she does not have iron deficiency.  Patient is chronically anemic.  On December 28, 2020 her hemoglobin was stable at 10.7.  Patient advised to follow-up with her PCP.

## 2021-01-21 ENCOUNTER — TELEPHONE (OUTPATIENT)
Dept: FAMILY MEDICINE | Facility: CLINIC | Age: 71
End: 2021-01-21

## 2021-01-21 NOTE — TELEPHONE ENCOUNTER
Prior Authorization Retail Medication Request    Medication/Dose: Diazepam 5mg  ICD code (if different than what is on RX):  H81.12  Previously Tried and Failed:  Hydroxyzine  Rationale:  Patient stable on medication as needed    Insurance Name:  [employer]  Insurance ID:  [id number]      Pharmacy Information (if different than what is on RX)  Name:  [id number]  Phone:  [phone]

## 2021-01-23 NOTE — TELEPHONE ENCOUNTER
PA Initiation - This request was initiated by the Central Prior Auth Team. If you have any questions, we can be reached at [phone]    Medication: diazepam (VALIUM) 5 MG tablet  Insurance Company: [employer] - Phone [phone] Fax [phone]  Pharmacy Filling the Rx: [id number] - [place] - [address] AT [facility] [address]  Filling Pharmacy Phone: [phone]  Filling Pharmacy Fax: [phone]  Start Date: 1/23/2021

## 2021-01-25 NOTE — TELEPHONE ENCOUNTER
Prior Authorization Approval    Authorization Effective Date: 1/23/2021  Authorization Expiration Date:  Until Further Notice   Medication: diazepam (VALIUM) 5 MG tablet - APPROVED   Approved Dose/Quantity:   Reference #:     Insurance Company: [employer] - Phone [phone] Fax [phone]  Expected CoPay:       CoPay Card Available:      Foundation Assistance Needed:    Which Pharmacy is filling the prescription (Not needed for infusion/clinic administered): [id number] - [place], [place] - [address] AT [facility] OF [address]  Pharmacy Notified: Yes  Patient Notified: No

## 2021-02-15 DIAGNOSIS — I10 BENIGN ESSENTIAL HYPERTENSION: ICD-10-CM

## 2021-02-17 RX ORDER — LOSARTAN POTASSIUM 100 MG/1
100 TABLET ORAL AT BEDTIME
Qty: 90 TABLET | Refills: 1 | Status: SHIPPED | OUTPATIENT
Start: 2021-02-17 | End: 2021-06-30

## 2021-02-17 NOTE — TELEPHONE ENCOUNTER
Routing refill request to provider for review/approval because:  Labs out of range:    Creatinine   Date Value Ref Range Status   01/12/2021 1.45 (H) 0.52 - 1.04 mg/dL Final       Radha OSPINA RN,BSN

## 2021-02-22 ENCOUNTER — OFFICE VISIT (OUTPATIENT)
Dept: INFUSION THERAPY | Facility: CLINIC | Age: 71
End: 2021-02-22
Attending: INTERNAL MEDICINE
Payer: MEDICARE

## 2021-02-22 ENCOUNTER — HOSPITAL ENCOUNTER (OUTPATIENT)
Facility: CLINIC | Age: 71
Setting detail: SPECIMEN
Discharge: HOME | End: 2021-02-22
Attending: INTERNAL MEDICINE | Admitting: INTERNAL MEDICINE
Payer: MEDICARE

## 2021-02-22 DIAGNOSIS — D64.9 NORMOCYTIC ANEMIA: ICD-10-CM

## 2021-02-22 LAB
ERYTHROCYTE [DISTWIDTH] IN BLOOD BY AUTOMATED COUNT: 13 % (ref 10–15)
HCT VFR BLD AUTO: 33.7 % (ref 35–47)
HGB BLD-MCNC: 10.5 G/DL (ref 11.7–15.7)
MCH RBC QN AUTO: 31 PG (ref 26.5–33)
MCHC RBC AUTO-ENTMCNC: 31.2 G/DL (ref 31.5–36.5)
MCV RBC AUTO: 99 FL (ref 78–100)
PLATELET # BLD AUTO: 223 10E9/L (ref 150–450)
RBC # BLD AUTO: 3.39 10E12/L (ref 3.8–5.2)
WBC # BLD AUTO: 4.3 10E9/L (ref 4–11)

## 2021-02-22 PROCEDURE — 85027 COMPLETE CBC AUTOMATED: CPT | Performed by: INTERNAL MEDICINE

## 2021-02-22 PROCEDURE — 36415 COLL VENOUS BLD VENIPUNCTURE: CPT

## 2021-02-22 NOTE — LETTER
2/22/2021         RE: Rosa Sotomayor  [address]  [place] [address]        Dear Colleague,    Thank you for referring your patient, Rosa Sotomayor, to the [facility]. Please see a copy of my visit note below.    Medical Assistant Note:  Rosa Sotomayor presents today for lab draw.    Patient seen by provider today: No.   present during visit today: Not Applicable.    Concerns: No Concerns.    Procedure:  Lab draw site: LAC, Needle type: BF, Gauge: 21. Gauze and coban applied  Post Assessment:  Labs drawn without difficulty: Yes.    Discharge Plan:  Departure Mode: Ambulatory.    Face to Face Time: 5.    Nohemi Mccullough [facility]                Again, thank you for allowing me to participate in the care of your patient.        Sincerely,         Lab Draw

## 2021-02-23 NOTE — RESULT ENCOUNTER NOTE
Dear Ms. Sotomayor,    Hemoglobin is stable at 10.5.    Please, call me with any questions.    Nico Calderon MD

## 2021-04-02 ENCOUNTER — HOSPITAL ENCOUNTER (OUTPATIENT)
Facility: CLINIC | Age: 71
Setting detail: SPECIMEN
Discharge: HOME | End: 2021-04-02
Attending: INTERNAL MEDICINE | Admitting: INTERNAL MEDICINE
Payer: MEDICARE

## 2021-04-02 ENCOUNTER — INFUSION THERAPY VISIT (OUTPATIENT)
Dept: INFUSION THERAPY | Facility: CLINIC | Age: 71
End: 2021-04-02
Attending: INTERNAL MEDICINE
Payer: MEDICARE

## 2021-04-02 DIAGNOSIS — D64.9 NORMOCYTIC ANEMIA: ICD-10-CM

## 2021-04-02 LAB
BASOPHILS # BLD AUTO: 0 10E9/L (ref 0–0.2)
BASOPHILS NFR BLD AUTO: 0.5 %
DIFFERENTIAL METHOD BLD: ABNORMAL
EOSINOPHIL # BLD AUTO: 0.3 10E9/L (ref 0–0.7)
EOSINOPHIL NFR BLD AUTO: 7.2 %
ERYTHROCYTE [DISTWIDTH] IN BLOOD BY AUTOMATED COUNT: 13.1 % (ref 10–15)
HCT VFR BLD AUTO: 32.2 % (ref 35–47)
HGB BLD-MCNC: 10.1 G/DL (ref 11.7–15.7)
IMM GRANULOCYTES # BLD: 0 10E9/L (ref 0–0.4)
IMM GRANULOCYTES NFR BLD: 0 %
LYMPHOCYTES # BLD AUTO: 1.4 10E9/L (ref 0.8–5.3)
LYMPHOCYTES NFR BLD AUTO: 34.8 %
MCH RBC QN AUTO: 31 PG (ref 26.5–33)
MCHC RBC AUTO-ENTMCNC: 31.4 G/DL (ref 31.5–36.5)
MCV RBC AUTO: 99 FL (ref 78–100)
MONOCYTES # BLD AUTO: 0.3 10E9/L (ref 0–1.3)
MONOCYTES NFR BLD AUTO: 7.4 %
NEUTROPHILS # BLD AUTO: 2 10E9/L (ref 1.6–8.3)
NEUTROPHILS NFR BLD AUTO: 50.1 %
NRBC # BLD AUTO: 0 10*3/UL
NRBC BLD AUTO-RTO: 0 /100
PLATELET # BLD AUTO: 221 10E9/L (ref 150–450)
RBC # BLD AUTO: 3.26 10E12/L (ref 3.8–5.2)
WBC # BLD AUTO: 3.9 10E9/L (ref 4–11)

## 2021-04-02 PROCEDURE — 85025 COMPLETE CBC W/AUTO DIFF WBC: CPT | Performed by: INTERNAL MEDICINE

## 2021-04-02 PROCEDURE — 36415 COLL VENOUS BLD VENIPUNCTURE: CPT

## 2021-04-02 NOTE — RESULT ENCOUNTER NOTE
Dear Ms. Beniteze,    Hemoglobin of 10.1.    Please, call me with any questions.    Nico Calderon MD

## 2021-04-03 ENCOUNTER — HEALTH MAINTENANCE LETTER (OUTPATIENT)
Age: 71
End: 2021-04-03

## 2021-04-05 ENCOUNTER — VIRTUAL VISIT (OUTPATIENT)
Dept: ONCOLOGY | Facility: CLINIC | Age: 71
End: 2021-04-05
Attending: INTERNAL MEDICINE
Payer: MEDICARE

## 2021-04-05 DIAGNOSIS — D64.9 NORMOCYTIC ANEMIA: Primary | ICD-10-CM

## 2021-04-05 PROCEDURE — 99213 OFFICE O/P EST LOW 20 MIN: CPT | Mod: 95 | Performed by: INTERNAL MEDICINE

## 2021-04-05 NOTE — PROGRESS NOTES
Rosa is a 70 year old who is being evaluated via a billable video visit.      How would you like to obtain your AVS? SocialGuides     Please send a text message to: [phone]        Type of service:  Video Visit    Originating Location (pt. Location): Home    Distant Location (provider location):  [facility]     Platform used for Video Visit: [employer]

## 2021-04-05 NOTE — LETTER
4/5/2021         RE: Rosa Sotomayor  [address] [facility] [address]  [place] [address]        Dear Colleague,    Thank you for referring your patient, Rosa Sotomayor, to the [facility]. Please see a copy of my visit note below.    Rosa is a 70 year old who is being evaluated via a billable video visit.      How would you like to obtain your AVS? Antenna     Please send a text message to: [phone]        Type of service:  Video Visit    Originating Location (pt. Location): Home    Distant Location (provider location):  [facility]     Platform used for Video Visit: AmWell    Visit Date:   04/05/2021     HEMATOLOGY HISTORY: Ms. Sotomayor is a female with chronic anemia.  Her anemia is due to chronic renal disease and chronic disease.   1.  Multiple labs were done on 03/13/2019.   -WBC 3.5, hemoglobin 10.8 and platelets of 204.  MCV of 96.   -Creatinine of 1.26.   -Normal calcium.   -Normal LFT.   -Normal folate.   -Normal vitamin B12.   -Normal iron. Elevated ferritin.   -Erythropoietin mildly elevated at 38.   -Normal LDH.   -Soluable transferrin receptor is mildly elevated at 5.2.   -Normal haptoglobin.   2. On 11/29/2019, WBC of 4.3, hemoglobin 9.8 and platelets 230.  MCV of 103.   3. On 07/08/2019, CT scan did not reveal any splenomegaly or lymphadenopathy.   4. Bone marrow biopsy was done on 12/18/2019.  It reveals normal cellular bone marrow for age with 30% cellularity.  There is trilineage hematopoiesis.  No evidence of MDS.  Increased storage iron.  Cytogenetics is normal.  Flow cytometry is normal.  5. IV injectafer in 03/2020.     SUBJECTIVE:  Ms. Sotomayor is a 70-year-old female with chronic normocytic anemia secondary to anemia of renal disease and chronic disease.  Hemoglobin has been remaining above 10.  Plan is to start erythropoietin stimulating agent when hemoglobin is below 10.      The patient has chronic fatigue.  It is not improving.  She also  gets short of breath on exertion, especially going up the steps.      REVIEW OF SYSTEMS:  No headache.  No dizziness.  No chest pain.  No shortness of breath at rest.  No nausea or vomiting.  No bleeding.  All other review of systems is negative.      PHYSICAL EXAMINATION:   GENERAL:  She is alert, oriented x 3.  She is not in any distress.   RESPIRATORY:  No cough.  No labored breathing.   The rest of a comprehensive physical examination is deferred due to public [facility] emergency video visit restrictions.      LABORATORY DATA:  CBC reviewed.      ASSESSMENT:   1.  A 70-year-old female with chronic normocytic anemia.  Anemia is stable.   2.  Mild leukopenia.   3.  Fatigue.   4.  Shortness of breath on exertion.      PLAN:   1.  CBC reviewed.  I explained to her that anemia is stable.  Anemia is due to renal disease and anemia of chronic disease.      We will continue to monitor CBC every 3 months.  If her hemoglobin persistently remains below 10, we will start her on erythropoietin stimulating agent.  The patient is agreeable for this plan.      2.  She has mild leukopenia.  We will monitor it.  She is not neutropenic.  She is not getting any recurrent infection.  I reviewed her previous labs.  Intermittently, she has been leukopenic before.      3.  The patient has fatigue.  This is due to anemia and other medical problems including renal disease.      4.  She also gets short of breath on exertion.  This is due to her other medical problems including anemia.  I advised her to see a physician if she has worsening shortness of breath.        5.  She will continue to follow-up with the nephrologist regarding her renal disease.      6.  I will see her in 3 months' time.  I advised her to call us with any questions or concerns.         CAITLIN CHAVEZ MD             D: 2021   T: 2021   MT: SHEMAR      Name:     NEO DENT   MRN:      [id number]        Account:      [id number]   :      1950            Visit Date:   04/05/2021      Document: [id number]      Video time of 7 minutes.  Video started at 10:02 AM and completed at 10:09 AM.  Another 10 minutes spent in review of chart and investigations today.    This office note has been dictated.          Again, thank you for allowing me to participate in the care of your patient.        Sincerely,        Nico Calderon MD

## 2021-04-05 NOTE — LETTER
4/5/2021         RE: Rosa Sotomayor  [address]  [place] [address]        Dear Colleague,    Thank you for referring your patient, Rosa Sotomayor, to the [facility]. Please see a copy of my visit note below.    Rosa is a 70 year old who is being evaluated via a billable video visit.      How would you like to obtain your AVS? RUNform     Please send a text message to: [phone]        Type of service:  Video Visit    Originating Location (pt. Location): Home    Distant Location (provider location):  [facility]     Platform used for Video Visit: AmWell    Visit Date:   04/05/2021     HEMATOLOGY HISTORY: Ms. Sotomayor is a female with chronic anemia.  Her anemia is due to chronic renal disease and chronic disease.   1.  Multiple labs were done on 03/13/2019.   -WBC 3.5, hemoglobin 10.8 and platelets of 204.  MCV of 96.   -Creatinine of 1.26.   -Normal calcium.   -Normal LFT.   -Normal folate.   -Normal vitamin B12.   -Normal iron. Elevated ferritin.   -Erythropoietin mildly elevated at 38.   -Normal LDH.   -Soluable transferrin receptor is mildly elevated at 5.2.   -Normal haptoglobin.   2. On 11/29/2019, WBC of 4.3, hemoglobin 9.8 and platelets 230.  MCV of 103.   3. On 07/08/2019, CT scan did not reveal any splenomegaly or lymphadenopathy.   4. Bone marrow biopsy was done on 12/18/2019.  It reveals normal cellular bone marrow for age with 30% cellularity.  There is trilineage hematopoiesis.  No evidence of MDS.  Increased storage iron.  Cytogenetics is normal.  Flow cytometry is normal.  5. IV injectafer in 03/2020.     SUBJECTIVE:  Ms. Sotomayor is a 70-year-old female with chronic normocytic anemia secondary to anemia of renal disease and chronic disease.  Hemoglobin has been remaining above 10.  Plan is to start erythropoietin stimulating agent when hemoglobin is below 10.      The patient has chronic fatigue.  It is not improving.  She also  gets short of breath on exertion, especially going up the steps.      REVIEW OF SYSTEMS:  No headache.  No dizziness.  No chest pain.  No shortness of breath at rest.  No nausea or vomiting.  No bleeding.  All other review of systems is negative.      PHYSICAL EXAMINATION:   GENERAL:  She is alert, oriented x 3.  She is not in any distress.   RESPIRATORY:  No cough.  No labored breathing.   The rest of a comprehensive physical examination is deferred due to public [facility] emergency video visit restrictions.      LABORATORY DATA:  CBC reviewed.      ASSESSMENT:   1.  A 70-year-old female with chronic normocytic anemia.  Anemia is stable.   2.  Mild leukopenia.   3.  Fatigue.   4.  Shortness of breath on exertion.      PLAN:   1.  CBC reviewed.  I explained to her that anemia is stable.  Anemia is due to renal disease and anemia of chronic disease.      We will continue to monitor CBC every 3 months.  If her hemoglobin persistently remains below 10, we will start her on erythropoietin stimulating agent.  The patient is agreeable for this plan.      2.  She has mild leukopenia.  We will monitor it.  She is not neutropenic.  She is not getting any recurrent infection.  I reviewed her previous labs.  Intermittently, she has been leukopenic before.      3.  The patient has fatigue.  This is due to anemia and other medical problems including renal disease.      4.  She also gets short of breath on exertion.  This is due to her other medical problems including anemia.  I advised her to see a physician if she has worsening shortness of breath.        5.  She will continue to follow-up with the nephrologist regarding her renal disease.      6.  I will see her in 3 months' time.  I advised her to call us with any questions or concerns.         CAITLIN CHAVEZ MD             D: 2021   T: 2021   MT: SHEMAR      Name:     NEO DENT   MRN:      [id number]        Account:      [id number]   :      1950            Visit Date:   04/05/2021      Document: [id number]      Video time of 7 minutes.  Video started at 10:02 AM and completed at 10:09 AM.  Another 10 minutes spent in review of chart and investigations today.    This office note has been dictated.          Again, thank you for allowing me to participate in the care of your patient.        Sincerely,        Nico Calderon MD

## 2021-04-05 NOTE — PROGRESS NOTES
Visit Date:   04/05/2021     HEMATOLOGY HISTORY: Ms. Sotomayor is a female with chronic anemia.  Her anemia is due to chronic renal disease and chronic disease.   1.  Multiple labs were done on 03/13/2019.   -WBC 3.5, hemoglobin 10.8 and platelets of 204.  MCV of 96.   -Creatinine of 1.26.   -Normal calcium.   -Normal LFT.   -Normal folate.   -Normal vitamin B12.   -Normal iron. Elevated ferritin.   -Erythropoietin mildly elevated at 38.   -Normal LDH.   -Soluable transferrin receptor is mildly elevated at 5.2.   -Normal haptoglobin.   2. On 11/29/2019, WBC of 4.3, hemoglobin 9.8 and platelets 230.  MCV of 103.   3. On 07/08/2019, CT scan did not reveal any splenomegaly or lymphadenopathy.   4. Bone marrow biopsy was done on 12/18/2019.  It reveals normal cellular bone marrow for age with 30% cellularity.  There is trilineage hematopoiesis.  No evidence of MDS.  Increased storage iron.  Cytogenetics is normal.  Flow cytometry is normal.  5. IV injectafer in 03/2020.     SUBJECTIVE:  Ms. Sotomayor is a 70-year-old female with chronic normocytic anemia secondary to anemia of renal disease and chronic disease.  Hemoglobin has been remaining above 10.  Plan is to start erythropoietin stimulating agent when hemoglobin is below 10.      The patient has chronic fatigue.  It is not improving.  She also gets short of breath on exertion, especially going up the steps.      REVIEW OF SYSTEMS:  No headache.  No dizziness.  No chest pain.  No shortness of breath at rest.  No nausea or vomiting.  No bleeding.  All other review of systems is negative.      PHYSICAL EXAMINATION:   GENERAL:  She is alert, oriented x 3.  She is not in any distress.   RESPIRATORY:  No cough.  No labored breathing.   The rest of a comprehensive physical examination is deferred due to public health emergency video visit restrictions.      LABORATORY DATA:  CBC reviewed.      ASSESSMENT:   1.  A 70-year-old female with chronic normocytic anemia.  Anemia is stable.    2.  Mild leukopenia.   3.  Fatigue.   4.  Shortness of breath on exertion.      PLAN:   1.  CBC reviewed.  I explained to her that anemia is stable.  Anemia is due to renal disease and anemia of chronic disease.      We will continue to monitor CBC every 3 months.  If her hemoglobin persistently remains below 10, we will start her on erythropoietin stimulating agent.  The patient is agreeable for this plan.      2.  She has mild leukopenia.  We will monitor it.  She is not neutropenic.  She is not getting any recurrent infection.  I reviewed her previous labs.  Intermittently, she has been leukopenic before.      3.  The patient has fatigue.  This is due to anemia and other medical problems including renal disease.      4.  She also gets short of breath on exertion.  This is due to her other medical problems including anemia.  I advised her to see a physician if she has worsening shortness of breath.        5.  She will continue to follow-up with the nephrologist regarding her renal disease.      6.  I will see her in 3 months' time.  I advised her to call us with any questions or concerns.         CAITLIN CHAVEZ MD             D: 2021   T: 2021   MT: SHEMAR      Name:     NEO DENT   MRN:      [id number]        Account:      [id number]   :      1950           Visit Date:   2021      Document: [id number]      Video time of 7 minutes.  Video started at 10:02 AM and completed at 10:09 AM.  Another 10 minutes spent in review of chart and investigations today.

## 2021-05-18 DIAGNOSIS — E78.1 HYPERTRIGLYCERIDEMIA: ICD-10-CM

## 2021-05-18 DIAGNOSIS — R60.0 EDEMA LEG: ICD-10-CM

## 2021-05-18 NOTE — TELEPHONE ENCOUNTER
LOV 10- JL, follow up April 2021 - not done  (1- [facility])  No future OV scheduled    SIG/pharm note given  MyC sent    Geovanna Tao RT (R)

## 2021-05-20 RX ORDER — TORSEMIDE 10 MG/1
10 TABLET ORAL DAILY
Qty: 90 TABLET | Refills: 0 | Status: SHIPPED | OUTPATIENT
Start: 2021-05-20 | End: 2021-06-30

## 2021-05-20 RX ORDER — GEMFIBROZIL 600 MG/1
300 TABLET, FILM COATED ORAL 2 TIMES DAILY
Qty: 90 TABLET | Refills: 0 | Status: SHIPPED | OUTPATIENT
Start: 2021-05-20 | End: 2021-06-30

## 2021-05-20 NOTE — TELEPHONE ENCOUNTER
"Routing refill request to provider for review/approval because:  VERY high drug interaction warning:   Drug-Drug: gemfibrozil and atorvastatin  The risk of myopathy and rhabdomyolysis may be increased by co-administration of statin therapy and gemfibrozil Specifically, co-administration of lovastatin or simvastatin with gemfibrozil should be avoided or is contraindicated in official package labeling. Reduced maximum doses of statins (other than lovastatin or simvastatin) may be recommended for patients receiving gemfibrozil in official package labeling for the other statin products.  .BP high and creatinine high     Requested Prescriptions   Pending Prescriptions Disp Refills     gemfibrozil (LOPID) 600 MG tablet 90 tablet 0     Sig: Take 0.5 tablets (300 mg) by mouth 2 times daily - office visit due for further refills       Fibrates Passed - 5/18/2021  9:03 AM        Passed - Lipid panel on file in past 12 months     Recent Labs   Lab Test 08/20/20  1006   CHOL 166   TRIG 233*   HDL 48*   LDL 71   NHDL 118               Passed - No abnormal creatine kinase in past 12 months     No lab results found.             Passed - Recent (12 mo) or future (30 days) visit within the authorizing provider's specialty     Patient has had an office visit with the authorizing provider or a provider within the authorizing providers department within the previous 12 mos or has a future within next 30 days. See \"Patient Info\" tab in inbasket, or \"Choose Columns\" in Meds & Orders section of the refill encounter.              Passed - Medication is active on med list        Passed - Patient is age 18 or older        Passed - No active pregnancy on record        Passed - No positive pregnancy test in past 12 months           torsemide (DEMADEX) 10 MG tablet 90 tablet 0     Sig: Take 1 tablet (10 mg) by mouth daily - office visit due for further refills       Diuretics (Including Combos) Protocol Failed - 5/18/2021  9:03 AM        Failed " "- Blood pressure under 140/90 in past 12 months     BP Readings from Last 3 Encounters:   01/12/21 (!) 145/75   10/29/20 112/57   03/12/20 (P) 115/71                 Failed - Normal serum creatinine on file in past 12 months     Recent Labs   Lab Test 01/12/21  1656   CR 1.45*              Passed - Recent (12 mo) or future (30 days) visit within the authorizing provider's specialty     Patient has had an office visit with the authorizing provider or a provider within the authorizing providers department within the previous 12 mos or has a future within next 30 days. See \"Patient Info\" tab in inbasket, or \"Choose Columns\" in Meds & Orders section of the refill encounter.              Passed - Medication is active on med list        Passed - Patient is age 18 or older        Passed - No active pregancy on record        Passed - Normal serum potassium on file in past 12 months     Recent Labs   Lab Test 01/12/21  1656   POTASSIUM 4.7                    Passed - Normal serum sodium on file in past 12 months     Recent Labs   Lab Test 01/12/21  1656                 Passed - No positive pregnancy test in past 12 months             "

## 2021-05-25 ENCOUNTER — TRANSFERRED RECORDS (OUTPATIENT)
Dept: HEALTH INFORMATION MANAGEMENT | Facility: CLINIC | Age: 71
End: 2021-05-25

## 2021-05-30 VITALS — HEIGHT: 64 IN | BODY MASS INDEX: 28.51 KG/M2 | WEIGHT: 167 LBS

## 2021-06-01 VITALS — BODY MASS INDEX: 31.31 KG/M2 | HEIGHT: 64 IN | WEIGHT: 183.4 LBS

## 2021-06-10 NOTE — ANESTHESIA CARE TRANSFER NOTE
Last vitals:   Vitals:    05/23/17 0837   BP: 147/88   Pulse: 81   Resp: 16   Temp: 36.3  C (97.4  F)   SpO2: 100%     Patient's level of consciousness is awake  Spontaneous respirations: yes  Maintains airway independently: yes  Dentition unchanged: yes  Oropharynx: oropharynx clear of all foreign objects    QCDR Measures:  ASA# 20 - Surgical Safety Checklist: ASA20A - Safety Checks Done  PQRS# 430 - Adult PONV Prevention: 4558F - Pt received => 2 anti-emetic agents (different classes) preop & intraop  ASA# 8 - Peds PONV Prevention: NA - Not pediatric patient, not GA or 2 or more risk factors NOT present  PQRS# 424 - Isamar-op Temp Management: 4559F - At least one body temp DOCUMENTED => 35.5C or 95.9F within required timeframe  PQRS# 426 - PACU Transfer Protocol: - Transfer of care checklist used  ASA# 14 - Acute Post-op Pain: ASA14B - Patient did NOT experience pain >= 7 out of 10    I completed my SBAR handoff to the receiving nurse per policy and procedure.  To PACU on RA. VSS. Report to TITO Evans.

## 2021-06-10 NOTE — ANESTHESIA PREPROCEDURE EVALUATION
Anesthesia Evaluation      Patient summary reviewed   History of anesthetic complications     Airway   Mallampati: II  Neck ROM: full   Pulmonary - negative ROS and normal exam    breath sounds clear to auscultation                         Cardiovascular   (+) hypertension, valvular problems/murmurs (mod AR) AI, ,     (-) past MI, CAD, angina  ECG reviewed  Rhythm: regular  Rate: normal,         Neuro/Psych    (+) anxiety/panic attacks,     Endo/Other    (+) arthritis,      GI/Hepatic/Renal    (+)   chronic renal disease CRI,   (-) GERD          Dental - normal exam                        Anesthesia Plan  Planned anesthetic: spinal  No versed, propofol or fentanyl.  PRN diazepam.  ASA 3     Anesthetic plan and risks discussed with: patient    Post-op plan: routine recovery

## 2021-06-10 NOTE — ANESTHESIA PROCEDURE NOTES
Spinal Block    Patient location during procedure: OR  Start time: 5/23/2017 7:18 AM  End time: 5/23/2017 7:21 AM  Reason for block: primary anesthetic    Staffing:  Performing  Anesthesiologist: YUMIKO MISTRY    Preanesthetic Checklist  Completed: patient identified, risks, benefits, and alternatives discussed, timeout performed, consent obtained, airway assessed, oxygen available, suction available, emergency drugs available and hand hygiene performed  Spinal Block  Patient position: sitting  Prep: ChloraPrep and site prepped and draped  Patient monitoring: heart rate, cardiac monitor, continuous pulse ox and blood pressure  Approach: midline  Location: L3-4  Injection technique: single-shot  Needle type: pencil-tip   Needle gauge: 24 G

## 2021-06-10 NOTE — ANESTHESIA POSTPROCEDURE EVALUATION
Patient: Rosa Sotomayor  RIGHT TOTAL HIP ARTHROPLASTY DIRECT ANTERIOR  Anesthesia type: spinal    Patient location: PACU  Last vitals:   Vitals:    05/23/17 1015   BP: 172/81   Pulse: 78   Resp: 18   Temp:    SpO2: 98%     Post vital signs: stable  Level of consciousness: awake and responds to simple questions  Post-anesthesia pain: pain controlled  Post-anesthesia nausea and vomiting: no  Pulmonary: unassisted, return to baseline  Cardiovascular: stable and blood pressure at baseline  Hydration: adequate  Anesthetic events: no    QCDR Measures:  ASA# 11 - Isamar-op Cardiac Arrest: ASA11B - Patient did NOT experience unanticipated cardiac arrest  ASA# 12 - Isamar-op Mortality Rate: ASA12B - Patient did NOT die  ASA# 13 - PACU Re-Intubation Rate: ASA13B - Patient did NOT require a new airway mgmt  ASA# 10 - Composite Anes Safety: ASA10A - No serious adverse event  ASA# 38 - New Corneal Injury: ASA38A - No new exposure keratitis or corneal abrasion in PACU    Additional Notes:

## 2021-06-17 NOTE — ANESTHESIA CARE TRANSFER NOTE
Last vitals:   Vitals:    04/10/18 1033   BP: 155/67   Pulse: 74   Resp: 16   Temp: 36.8  C (98.3  F)   SpO2: 97%     Patient's level of consciousness is awake  Spontaneous respirations: yes  Maintains airway independently: yes  Dentition unchanged: yes  Oropharynx: oropharynx clear of all foreign objects    QCDR Measures:  ASA# 20 - Surgical Safety Checklist: WHO surgical safety checklist completed prior to induction  PQRS# 430 - Adult PONV Prevention: 4558F - Pt received => 2 anti-emetic agents (different classes) preop & intraop  ASA# 8 - Peds PONV Prevention: NA - Not pediatric patient, not GA or 2 or more risk factors NOT present  PQRS# 424 - Isamar-op Temp Management: 4559F - At least one body temp DOCUMENTED => 35.5C or 95.9F within required timeframe  PQRS# 426 - PACU Transfer Protocol: - Transfer of care checklist used  ASA# 14 - Acute Post-op Pain: ASA14B - Patient did NOT experience pain >= 7 out of 10

## 2021-06-17 NOTE — ANESTHESIA PROCEDURE NOTES
Peripheral Block    Patient location during procedure: pre-op  Start time: 4/10/2018 8:26 AM  End time: 4/10/2018 8:30 AM  post-op analgesia per surgeon order as noted in medical record  Staffing:  Performing  Anesthesiologist: VALENTINA POWELL  Preanesthetic Checklist  Completed: patient identified, site marked, risks, benefits, and alternatives discussed, timeout performed, consent obtained, at patient's request, airway assessed, oxygen available, suction available, emergency drugs available and hand hygiene performed  Peripheral Block  Block type: saphenous  Prep: ChloraPrep  Patient position: supine  Patient monitoring: cardiac monitor, continuous pulse oximetry, blood pressure and heart rate  Laterality: left  Injection technique: ultrasound guided    Ultrasound used to visualize needle placement in proximity to nerve being blocked: yes   Permanent ultrasound image captured for medical record  Sterile gel and probe cover used for ultrasound.    Needle  Needle type: Stimuplex   Needle gauge: 21 G  Needle length: 4 in  no peripheral nerve catheter placed  Assessment  Injection assessment: negative aspiration for heme, no difficulty with injection, no paresthesia on injection and incremental injection

## 2021-06-17 NOTE — ANESTHESIA PREPROCEDURE EVALUATION
Anesthesia Evaluation      Patient summary reviewed   History of anesthetic complications     Airway   Mallampati: II  Neck ROM: full   Pulmonary - negative ROS and normal exam   (-) COPD, recent URI, sleep apnea                         Cardiovascular - normal exam  Exercise tolerance: > or = 4 METS  (+) hypertension, valvular problems/murmurs (mod AR), ,     (-) past MI, CAD, angina  ECG reviewed        Neuro/Psych    (+) anxiety/panic attacks,     Endo/Other    (+) arthritis, obesity,      Comments: Numerous drug allergies.    GI/Hepatic/Renal    (+)   chronic renal disease,   (-) GERD          Dental - normal exam                        Anesthesia Plan  Planned anesthetic: spinal and peripheral nerve block  Tibial and saphenous nerve blocks for POP per surgeon request.  Decadron, Zofran.  Diazepam, Morphine.  Patient prefers no sedation in OR.  ASA 3     Anesthetic plan and risks discussed with: patient  Anesthesia plan special considerations: antiemetics,   Post-op plan: routine recovery

## 2021-06-17 NOTE — ANESTHESIA PROCEDURE NOTES
Peripheral Block    Patient location during procedure: pre-op  Start time: 4/10/2018 8:17 AM  End time: 4/10/2018 8:26 AM  post-op analgesia per surgeon order as noted in medical record  Staffing:  Performing  Anesthesiologist: VALENTINA POWELL  Preanesthetic Checklist  Completed: patient identified, site marked, risks, benefits, and alternatives discussed, timeout performed, consent obtained, at patient's request, airway assessed, oxygen available, suction available, emergency drugs available and hand hygiene performed  Peripheral Block  Block type: other, tibial  Prep: ChloraPrep  Patient position: supine  Patient monitoring: cardiac monitor, continuous pulse oximetry, blood pressure and heart rate  Laterality: left  Injection technique: ultrasound guided    Ultrasound used to visualize needle placement in proximity to nerve being blocked: yes   Permanent ultrasound image captured for medical record  Sterile gel and probe cover used for ultrasound.    Needle  Needle type: Stimuplex   Needle gauge: 21 G  Needle length: 4 in  no peripheral nerve catheter placed  Assessment  Injection assessment: negative aspiration for heme, no difficulty with injection, no paresthesia on injection and incremental injection

## 2021-06-17 NOTE — ANESTHESIA PROCEDURE NOTES
Spinal Block    Patient location during procedure: OR  Start time: 4/10/2018 8:51 AM  End time: 4/10/2018 8:56 AM  Reason for block: primary anesthetic    Staffing:  Performing  Anesthesiologist: VALENTINA POWELL    Preanesthetic Checklist  Completed: patient identified, risks, benefits, and alternatives discussed, timeout performed, consent obtained, airway assessed, oxygen available, suction available, emergency drugs available and hand hygiene performed  Spinal Block  Patient position: sitting  Prep: ChloraPrep and site prepped and draped  Patient monitoring: blood pressure, continuous pulse ox, cardiac monitor and heart rate  Approach: midline  Location: L3-4  Injection technique: single-shot  Needle type: pencil-tip   Needle gauge: 24 G

## 2021-06-17 NOTE — ANESTHESIA POSTPROCEDURE EVALUATION
Patient: Rosa Sotomayor  LEFT TOTAL KNEE ARTHROPLASTY  Anesthesia type: spinal    Patient location: PACU  Last vitals:   Vitals:    04/10/18 1525   BP: 165/83   Pulse: 94   Resp: 16   Temp: 36.4  C (97.6  F)   SpO2: 94%     Post vital signs: stable  Level of consciousness: awake and responds to simple questions  Post-anesthesia pain: pain controlled  Post-anesthesia nausea and vomiting: no  Pulmonary: unassisted, return to baseline  Cardiovascular: stable and blood pressure at baseline  Hydration: adequate  Anesthetic events: no    QCDR Measures:  ASA# 11 - Isamar-op Cardiac Arrest: ASA11B - Patient did NOT experience unanticipated cardiac arrest  ASA# 12 - Isamar-op Mortality Rate: ASA12B - Patient did NOT die  ASA# 13 - PACU Re-Intubation Rate: NA - No ETT / LMA used for case   ASA# 10 - Composite Anes Safety: ASA10A - No serious adverse event    Additional Notes: doing well, no complications.

## 2021-06-30 ENCOUNTER — OFFICE VISIT (OUTPATIENT)
Dept: FAMILY MEDICINE | Facility: CLINIC | Age: 71
End: 2021-06-30
Payer: MEDICARE

## 2021-06-30 VITALS
OXYGEN SATURATION: 99 % | TEMPERATURE: 97.5 F | WEIGHT: 176 LBS | HEIGHT: 64 IN | HEART RATE: 75 BPM | DIASTOLIC BLOOD PRESSURE: 67 MMHG | BODY MASS INDEX: 30.05 KG/M2 | SYSTOLIC BLOOD PRESSURE: 108 MMHG

## 2021-06-30 DIAGNOSIS — M79.605 PAIN IN BOTH LOWER EXTREMITIES: ICD-10-CM

## 2021-06-30 DIAGNOSIS — T78.40XD ALLERGIC REACTION, SUBSEQUENT ENCOUNTER: ICD-10-CM

## 2021-06-30 DIAGNOSIS — G43.009 MIGRAINE WITHOUT AURA AND WITHOUT STATUS MIGRAINOSUS, NOT INTRACTABLE: ICD-10-CM

## 2021-06-30 DIAGNOSIS — Q07.00 ARNOLD-CHIARI MALFORMATION (H): ICD-10-CM

## 2021-06-30 DIAGNOSIS — R60.0 EDEMA LEG: ICD-10-CM

## 2021-06-30 DIAGNOSIS — E78.1 HYPERTRIGLYCERIDEMIA: ICD-10-CM

## 2021-06-30 DIAGNOSIS — I10 BENIGN ESSENTIAL HYPERTENSION: ICD-10-CM

## 2021-06-30 DIAGNOSIS — N25.81 SECONDARY RENAL HYPERPARATHYROIDISM (H): ICD-10-CM

## 2021-06-30 DIAGNOSIS — R73.01 IFG (IMPAIRED FASTING GLUCOSE): Primary | ICD-10-CM

## 2021-06-30 DIAGNOSIS — M79.604 PAIN IN BOTH LOWER EXTREMITIES: ICD-10-CM

## 2021-06-30 DIAGNOSIS — M48.062 SPINAL STENOSIS OF LUMBAR REGION WITH NEUROGENIC CLAUDICATION: ICD-10-CM

## 2021-06-30 LAB
CHOLEST SERPL-MCNC: 182 MG/DL
GLUCOSE SERPL-MCNC: 108 MG/DL (ref 70–99)
HBA1C MFR BLD: 5.1 % (ref 0–5.6)
HDLC SERPL-MCNC: 52 MG/DL
LDLC SERPL CALC-MCNC: 87 MG/DL
NONHDLC SERPL-MCNC: 130 MG/DL
TRIGL SERPL-MCNC: 216 MG/DL

## 2021-06-30 PROCEDURE — 80061 LIPID PANEL: CPT | Performed by: INTERNAL MEDICINE

## 2021-06-30 PROCEDURE — 99214 OFFICE O/P EST MOD 30 MIN: CPT | Performed by: INTERNAL MEDICINE

## 2021-06-30 PROCEDURE — 82947 ASSAY GLUCOSE BLOOD QUANT: CPT | Performed by: INTERNAL MEDICINE

## 2021-06-30 PROCEDURE — 83036 HEMOGLOBIN GLYCOSYLATED A1C: CPT | Performed by: INTERNAL MEDICINE

## 2021-06-30 PROCEDURE — 36415 COLL VENOUS BLD VENIPUNCTURE: CPT | Performed by: INTERNAL MEDICINE

## 2021-06-30 RX ORDER — EPINEPHRINE 0.3 MG/.3ML
0.3 INJECTION SUBCUTANEOUS
Qty: 0.6 ML | Refills: 11 | Status: SHIPPED | OUTPATIENT
Start: 2021-06-30 | End: 2022-08-17

## 2021-06-30 RX ORDER — TIZANIDINE 2 MG/1
2 TABLET ORAL 3 TIMES DAILY PRN
Qty: 42 TABLET | Refills: 3 | Status: SHIPPED | OUTPATIENT
Start: 2021-06-30 | End: 2021-10-27

## 2021-06-30 RX ORDER — GEMFIBROZIL 600 MG/1
300 TABLET, FILM COATED ORAL 2 TIMES DAILY
Qty: 90 TABLET | Refills: 3 | Status: SHIPPED | OUTPATIENT
Start: 2021-06-30 | End: 2022-08-11

## 2021-06-30 RX ORDER — TORSEMIDE 10 MG/1
10 TABLET ORAL DAILY
Qty: 90 TABLET | Refills: 3 | Status: SHIPPED | OUTPATIENT
Start: 2021-06-30 | End: 2022-08-11

## 2021-06-30 RX ORDER — CEPHALEXIN 500 MG/1
CAPSULE ORAL
Qty: 16 CAPSULE | Refills: 3 | Status: SHIPPED | OUTPATIENT
Start: 2021-06-30 | End: 2022-08-18

## 2021-06-30 RX ORDER — ATORVASTATIN CALCIUM 40 MG/1
TABLET, FILM COATED ORAL
Qty: 90 TABLET | Refills: 3 | Status: SHIPPED | OUTPATIENT
Start: 2021-06-30 | End: 2022-08-11

## 2021-06-30 RX ORDER — CARVEDILOL 25 MG/1
25 TABLET ORAL 2 TIMES DAILY
Qty: 180 TABLET | Refills: 3 | Status: SHIPPED | OUTPATIENT
Start: 2021-06-30 | End: 2022-06-29

## 2021-06-30 RX ORDER — CEPHALEXIN 500 MG/1
CAPSULE ORAL
Qty: 4 CAPSULE | Refills: 3 | Status: SHIPPED | OUTPATIENT
Start: 2021-06-30 | End: 2021-06-30

## 2021-06-30 RX ORDER — LOSARTAN POTASSIUM 100 MG/1
100 TABLET ORAL AT BEDTIME
Qty: 90 TABLET | Refills: 3 | Status: SHIPPED | OUTPATIENT
Start: 2021-06-30 | End: 2021-09-15

## 2021-06-30 RX ORDER — ONDANSETRON 4 MG/1
4 TABLET, FILM COATED ORAL EVERY 8 HOURS PRN
Qty: 90 TABLET | Refills: 3 | Status: SHIPPED | OUTPATIENT
Start: 2021-06-30 | End: 2022-08-18

## 2021-06-30 ASSESSMENT — MIFFLIN-ST. JEOR: SCORE: 1313.33

## 2021-06-30 NOTE — PROGRESS NOTES
Assessment & Plan     Secondary renal hyperparathyroidism (H)  Continue follow up with nephrology as directed     Arnold-Chiari malformation (H)      Benign essential hypertension  Well controlled continue current drugs   - carvedilol (COREG) 25 MG tablet; Take 1 tablet (25 mg) by mouth 2 times daily  - losartan (COZAAR) 100 MG tablet; Take 1 tablet (100 mg) by mouth At Bedtime    Hypertriglyceridemia  On statin and fibrate therapy recheck labs   - atorvastatin (LIPITOR) 40 MG tablet; TAKE 1 TABLET(40 MG) BY MOUTH DAILY  - gemfibrozil (LOPID) 600 MG tablet; Take 0.5 tablets (300 mg) by mouth 2 times daily  - Lipid panel reflex to direct LDL Fasting    Spinal stenosis of lumbar region with neurogenic claudication    - cephALEXin (KEFLEX) 500 MG capsule; Take 4 capsules (2,000 mg) by mouth once as needed One hour prior to dental procedures Take 500 mg by mouth Take 4 capsules (2,000 mg) by mouth once as needed One hour prior to dental procedures    Migraine without aura and without status migrainosus, not intractable  Stable   - ondansetron (ZOFRAN) 4 MG tablet; Take 1 tablet (4 mg) by mouth every 8 hours as needed for nausea    Edema leg  Stable   - torsemide (DEMADEX) 10 MG tablet; Take 1 tablet (10 mg) by mouth daily - office visit due for further refills    Pain in both lower extremities  Stable   - tiZANidine (ZANAFLEX) 2 MG tablet; Take 1 tablet (2 mg) by mouth 3 times daily as needed for muscle spasms    Allergic reaction, subsequent encounter  Refill drugs   - EPINEPHrine (EPIPEN 2-FRAN) 0.3 MG/0.3ML injection 2-pack; Inject 0.3 mLs (0.3 mg) into the muscle once as needed for anaphylaxis    IFG (impaired fasting glucose)  Recheck   - Hemoglobin A1c  - Glucose      39 minutes spent on the date of the encounter doing chart review, history and exam, documentation and further activities per the note       BMI:   Estimated body mass index is 30.21 kg/m  as calculated from the following:    Height as of this  "encounter: 1.626 m (5' 4\").    Weight as of this encounter: 79.8 kg (176 lb).   Weight management plan: Discussed healthy diet and exercise guidelines        Return in about 1 year (around 6/30/2022) for Preventive Visit.  Patient instructed to return to clinic or contact us sooner if symptoms worsen or new symptoms develop.     Josh Hollingsworth MD  [facility] Lee is a 70 year old who presents for the following health issues     HPI     Follow up  Follow up CKD, IFG, hyperlipidemia, anemia, spinal stenosis, migraines, hypertension   Struggling with Achilles tendonitis, seeing orthopedics  Migraines are under control  Seeing nephrology and hematology   Anemia persists, but stable  Last Cr in march was improved from previous check  As usual she has many good questions  Her brother thinks she should be checked for 'autoimmunity'  She has had a negative TEJAS, RA serologies and most recent sed rate in Epic was normal    Review of Systems   Constitutional, HEENT, cardiovascular, pulmonary, gi and gu systems are negative, except as otherwise noted.      Objective    /67 (BP Location: Left arm, Patient Position: Sitting, Cuff Size: Adult Regular)   Pulse 75   Temp 97.5  F (36.4  C) (Temporal)   Ht 1.626 m (5' 4\")   Wt 79.8 kg (176 lb)   SpO2 99%   BMI 30.21 kg/m    Body mass index is 30.21 kg/m .  Physical Exam   Well appearing no distress     Labs pending             "

## 2021-06-30 NOTE — LETTER
July 1, 2021      Rosa Sotomayor  [address]  [place] [address]        Dear ,    The following letter pertains to your most recent diagnostic tests:     The cholesterol panel looks improved from last check.  The triglycerides are in a safe zone.  Please continue your current medications for management of cholesterol including atorvastatin and gemfibrozil.     The blood sugar (glucose) is mildly elevated, but not in the diabetic range.     The hemoglobin A1c is normal.     Resulted Orders   Lipid panel reflex to direct LDL Fasting   Result Value Ref Range    Cholesterol 182 <200 mg/dL    Triglycerides 216 (H) <150 mg/dL      Comment:      Borderline high:  150-199 mg/dl  High:             200-499 mg/dl  Very high:       >499 mg/dl  Fasting specimen      HDL Cholesterol 52 >49 mg/dL    LDL Cholesterol Calculated 87 <100 mg/dL      Comment:      Desirable:       <100 mg/dl    Non HDL Cholesterol 130 (H) <130 mg/dL      Comment:      Above Desirable:  130-159 mg/dl  Borderline high:  160-189 mg/dl  High:             190-219 mg/dl  Very high:       >219 mg/dl     Hemoglobin A1c   Result Value Ref Range    Hemoglobin A1C 5.1 0 - 5.6 %      Comment:      Normal <5.7% Prediabetes 5.7-6.4%  Diabetes 6.5% or higher - adopted from ADA   consensus guidelines.     Glucose   Result Value Ref Range    Glucose 108 (H) 70 - 99 mg/dL      Comment:      Fasting specimen       If you have any questions or concerns, please call the clinic at the number listed above.       Sincerely,      Josh Hollingsworth MD        sander

## 2021-06-30 NOTE — RESULT ENCOUNTER NOTE
The following letter pertains to your most recent diagnostic tests:    The cholesterol panel looks improved from last check.  The triglycerides are in a safe zone.  Please continue your current medications for management of cholesterol including atorvastatin and gemfibrozil.    The blood sugar (glucose) is mildly elevated, but not in the diabetic range.    The hemoglobin A1c is normal.      Sincerely,    Dr. Hollingsworth

## 2021-07-01 ENCOUNTER — INFUSION THERAPY VISIT (OUTPATIENT)
Dept: INFUSION THERAPY | Facility: CLINIC | Age: 71
End: 2021-07-01
Attending: INTERNAL MEDICINE
Payer: MEDICARE

## 2021-07-01 ENCOUNTER — HOSPITAL ENCOUNTER (OUTPATIENT)
Facility: CLINIC | Age: 71
Setting detail: SPECIMEN
Discharge: HOME | End: 2021-07-01
Attending: INTERNAL MEDICINE | Admitting: INTERNAL MEDICINE
Payer: MEDICARE

## 2021-07-01 DIAGNOSIS — D64.9 NORMOCYTIC ANEMIA: ICD-10-CM

## 2021-07-01 LAB
BASOPHILS # BLD AUTO: 0 10E9/L (ref 0–0.2)
BASOPHILS NFR BLD AUTO: 0.6 %
DIFFERENTIAL METHOD BLD: ABNORMAL
EOSINOPHIL # BLD AUTO: 0.3 10E9/L (ref 0–0.7)
EOSINOPHIL NFR BLD AUTO: 5.3 %
ERYTHROCYTE [DISTWIDTH] IN BLOOD BY AUTOMATED COUNT: 13.1 % (ref 10–15)
HCT VFR BLD AUTO: 32.5 % (ref 35–47)
HGB BLD-MCNC: 10.3 G/DL (ref 11.7–15.7)
IMM GRANULOCYTES # BLD: 0 10E9/L (ref 0–0.4)
IMM GRANULOCYTES NFR BLD: 0.2 %
LYMPHOCYTES # BLD AUTO: 1.7 10E9/L (ref 0.8–5.3)
LYMPHOCYTES NFR BLD AUTO: 34.8 %
MCH RBC QN AUTO: 31.6 PG (ref 26.5–33)
MCHC RBC AUTO-ENTMCNC: 31.7 G/DL (ref 31.5–36.5)
MCV RBC AUTO: 100 FL (ref 78–100)
MONOCYTES # BLD AUTO: 0.5 10E9/L (ref 0–1.3)
MONOCYTES NFR BLD AUTO: 10.6 %
NEUTROPHILS # BLD AUTO: 2.4 10E9/L (ref 1.6–8.3)
NEUTROPHILS NFR BLD AUTO: 48.5 %
NRBC # BLD AUTO: 0 10*3/UL
NRBC BLD AUTO-RTO: 0 /100
PLATELET # BLD AUTO: 237 10E9/L (ref 150–450)
RBC # BLD AUTO: 3.26 10E12/L (ref 3.8–5.2)
WBC # BLD AUTO: 4.9 10E9/L (ref 4–11)

## 2021-07-01 PROCEDURE — 85025 COMPLETE CBC W/AUTO DIFF WBC: CPT | Performed by: INTERNAL MEDICINE

## 2021-07-01 PROCEDURE — 36415 COLL VENOUS BLD VENIPUNCTURE: CPT

## 2021-07-03 NOTE — RESULT ENCOUNTER NOTE
Dear MsAsia Sotomayor,    Hemoglobin stable at 10.3.    Please, call me with any questions.    Nico Calderon MD

## 2021-07-06 ENCOUNTER — VIRTUAL VISIT (OUTPATIENT)
Dept: ONCOLOGY | Facility: CLINIC | Age: 71
End: 2021-07-06
Attending: INTERNAL MEDICINE
Payer: MEDICARE

## 2021-07-06 DIAGNOSIS — D64.9 NORMOCYTIC ANEMIA: Primary | ICD-10-CM

## 2021-07-06 PROCEDURE — 99214 OFFICE O/P EST MOD 30 MIN: CPT | Mod: 95 | Performed by: INTERNAL MEDICINE

## 2021-07-06 NOTE — PROGRESS NOTES
Rosa is a 70 year old who is being evaluated via a billable video visit.      How would you like to obtain your AVS? MyChart  If the video visit is dropped, the invitation should be resent by: Text to cell phone: [phone]  Will anyone else be joining your video visit? No      Video Start Time:   Video-Visit Details    Type of service:  Video Visit    Video End Time    Originating Location (pt. Location): Home    Distant Location (provider location):  [facility]     Platform used for Video Visit: [employer]

## 2021-07-06 NOTE — LETTER
7/6/2021         RE: Rosa Sotomayor  [address] [facility]  [place] [address]        Dear Colleague,    Thank you for referring your patient, Rosa Sotomayor, to the [facility]. Please see a copy of my visit note below.    Rosa is a 70 year old who is being evaluated via a billable video visit.      How would you like to obtain your AVS? MyChart  If the video visit is dropped, the invitation should be resent by: Text to cell phone: [phone]  Will anyone else be joining your video visit? No      Video Start Time:   Video-Visit Details    Type of service:  Video Visit    Video End Time    Originating Location (pt. Location): Home    Distant Location (provider location):  [facility]     Platform used for Video Visit: [employer]    HEMATOLOGY HISTORY: Ms. Sotomayor is a female with chronic anemia.  Her anemia is due to chronic renal disease and chronic disease.   1.  Multiple labs were done on 03/13/2019.   -WBC 3.5, hemoglobin 10.8 and platelets of 204.  MCV of 96.   -Creatinine of 1.26.   -Normal calcium.   -Normal LFT.   -Normal folate.   -Normal vitamin B12.   -Normal iron. Elevated ferritin.   -Erythropoietin mildly elevated at 38.   -Normal LDH.   -Soluable transferrin receptor is mildly elevated at 5.2.   -Normal haptoglobin.   2. On 11/29/2019, WBC of 4.3, hemoglobin 9.8 and platelets 230.  MCV of 103.   3. On 07/08/2019, CT scan did not reveal any splenomegaly or lymphadenopathy.   4. Bone marrow biopsy was done on 12/18/2019.  It reveals normal cellular bone marrow for age with 30% cellularity.  There is trilineage hematopoiesis.  No evidence of MDS.  Increased storage iron.  Cytogenetics is normal.  Flow cytometry is normal.  5. IV injectafer in 03/2020.    SUBJECTIVE:  Mrs. 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  Plan is to start her on erythropoietin-stimulating agent when  hemoglobin is below 10.  Her hemoglobin has been remaining at 10.     Main problem is fatigue.  The patient says that she is tired all the time.  No headache.  Some dizziness.  No chest pain.  No shortness of breath at rest.  No nausea or vomiting.  No bleeding.     All other review of systems is negative.     PHYSICAL EXAMINATION:  She is alert, oriented x 3.  She is not in any distress.  She looked comfortable.     Rest of systems not examined.     LABORATORY:  CBC reviewed.     ASSESSMENT:     1.  A 70-year-old female with chronic normocytic anemia, which is stable. Anemia is due to renal disease and anemia of chronic disease.  2.  Intermittent leukopenia.  3.  Fatigue.     PLAN:    1.  I explained to the patient that hemoglobin is stable at 10.3.  As it is above 10, we are not going to start her on an erythropoietin-stimulating agent.     The patient says she has been more weak and tired.  I advised her to talk to her nephrologist to see if they want to start her on an erythropoietin-stimulating agent with hemoglobin slightly above 10.  The recommendation is to start it when it is below 10.     I explained to her that her fatigue may not be related to her anemia.  She does have other medical problems, including renal disease, which can cause fatigue.     2.  I will see her in 3 months' time with labs.  I advised her to call us with any questions or concerns.     VIDEO VISIT TIME:  15 minutes.  Time was spent in today's visit with review of chart and investigations and documentation.    ADDENDUM: Case discussed with Dr. Cotton from nephrology. He would recommend RACHAEL when hemoglobin is below 10.    This office note has been dictated.          Again, thank you for allowing me to participate in the care of your patient.        Sincerely,        Nico Calderon MD

## 2021-07-06 NOTE — LETTER
7/6/2021         RE: Rosa Sotomayor  [address] [facility]  [place] [address]        Dear Colleague,    Thank you for referring your patient, Rosa Sotomayor, to the [facility]. Please see a copy of my visit note below.    Rosa is a 70 year old who is being evaluated via a billable video visit.      How would you like to obtain your AVS? MyChart  If the video visit is dropped, the invitation should be resent by: Text to cell phone: [phone]  Will anyone else be joining your video visit? No      Video Start Time:   Video-Visit Details    Type of service:  Video Visit    Video End Time    Originating Location (pt. Location): Home    Distant Location (provider location):  [facility]     Platform used for Video Visit: [employer]    HEMATOLOGY HISTORY: Ms. Sotomayor is a female with chronic anemia.  Her anemia is due to chronic renal disease and chronic disease.   1.  Multiple labs were done on 03/13/2019.   -WBC 3.5, hemoglobin 10.8 and platelets of 204.  MCV of 96.   -Creatinine of 1.26.   -Normal calcium.   -Normal LFT.   -Normal folate.   -Normal vitamin B12.   -Normal iron. Elevated ferritin.   -Erythropoietin mildly elevated at 38.   -Normal LDH.   -Soluable transferrin receptor is mildly elevated at 5.2.   -Normal haptoglobin.   2. On 11/29/2019, WBC of 4.3, hemoglobin 9.8 and platelets 230.  MCV of 103.   3. On 07/08/2019, CT scan did not reveal any splenomegaly or lymphadenopathy.   4. Bone marrow biopsy was done on 12/18/2019.  It reveals normal cellular bone marrow for age with 30% cellularity.  There is trilineage hematopoiesis.  No evidence of MDS.  Increased storage iron.  Cytogenetics is normal.  Flow cytometry is normal.  5. IV injectafer in 03/2020.    SUBJECTIVE:  Mrs. 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  Plan is to start her on erythropoietin-stimulating agent when  hemoglobin is below 10.  Her hemoglobin has been remaining at 10.     Main problem is fatigue.  The patient says that she is tired all the time.  No headache.  Some dizziness.  No chest pain.  No shortness of breath at rest.  No nausea or vomiting.  No bleeding.     All other review of systems is negative.     PHYSICAL EXAMINATION:  She is alert, oriented x 3.  She is not in any distress.  She looked comfortable.     Rest of systems not examined.     LABORATORY:  CBC reviewed.     ASSESSMENT:     1.  A 70-year-old female with chronic normocytic anemia, which is stable. Anemia is due to renal disease and anemia of chronic disease.  2.  Intermittent leukopenia.  3.  Fatigue.     PLAN:    1.  I explained to the patient that hemoglobin is stable at 10.3.  As it is above 10, we are not going to start her on an erythropoietin-stimulating agent.     The patient says she has been more weak and tired.  I advised her to talk to her nephrologist to see if they want to start her on an erythropoietin-stimulating agent with hemoglobin slightly above 10.  The recommendation is to start it when it is below 10.     I explained to her that her fatigue may not be related to her anemia.  She does have other medical problems, including renal disease, which can cause fatigue.     2.  I will see her in 3 months' time with labs.  I advised her to call us with any questions or concerns.     VIDEO VISIT TIME:  15 minutes.  Time was spent in today's visit with review of chart and investigations and documentation.    ADDENDUM: Case discussed with Dr. Cotton from nephrology. He would recommend RACHAEL when hemoglobin is below 10.    This office note has been dictated.          Again, thank you for allowing me to participate in the care of your patient.        Sincerely,        Nico Calderon MD

## 2021-07-11 NOTE — PROGRESS NOTES
HEMATOLOGY HISTORY: Ms. Sotomayor is a female with chronic anemia.  Her anemia is due to chronic renal disease and chronic disease.   1.  Multiple labs were done on 03/13/2019.   -WBC 3.5, hemoglobin 10.8 and platelets of 204.  MCV of 96.   -Creatinine of 1.26.   -Normal calcium.   -Normal LFT.   -Normal folate.   -Normal vitamin B12.   -Normal iron. Elevated ferritin.   -Erythropoietin mildly elevated at 38.   -Normal LDH.   -Soluable transferrin receptor is mildly elevated at 5.2.   -Normal haptoglobin.   2. On 11/29/2019, WBC of 4.3, hemoglobin 9.8 and platelets 230.  MCV of 103.   3. On 07/08/2019, CT scan did not reveal any splenomegaly or lymphadenopathy.   4. Bone marrow biopsy was done on 12/18/2019.  It reveals normal cellular bone marrow for age with 30% cellularity.  There is trilineage hematopoiesis.  No evidence of MDS.  Increased storage iron.  Cytogenetics is normal.  Flow cytometry is normal.  5. IV injectafer in 03/2020.    SUBJECTIVE:  Mrs. 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  Plan is to start her on erythropoietin-stimulating agent when hemoglobin is below 10.  Her hemoglobin has been remaining at 10.     Main problem is fatigue.  The patient says that she is tired all the time.  No headache.  Some dizziness.  No chest pain.  No shortness of breath at rest.  No nausea or vomiting.  No bleeding.     All other review of systems is negative.     PHYSICAL EXAMINATION:  She is alert, oriented x 3.  She is not in any distress.  She looked comfortable.     Rest of systems not examined.     LABORATORY:  CBC reviewed.     ASSESSMENT:     1.  A 70-year-old female with chronic normocytic anemia, which is stable. Anemia is due to renal disease and anemia of chronic disease.  2.  Intermittent leukopenia.  3.  Fatigue.     PLAN:    1.  I explained to the patient that hemoglobin is stable at 10.3.  As it is above 10, we are not going to start  her on an erythropoietin-stimulating agent.     The patient says she has been more weak and tired.  I advised her to talk to her nephrologist to see if they want to start her on an erythropoietin-stimulating agent with hemoglobin slightly above 10.  The recommendation is to start it when it is below 10.     I explained to her that her fatigue may not be related to her anemia.  She does have other medical problems, including renal disease, which can cause fatigue.     2.  I will see her in 3 months' time with labs.  I advised her to call us with any questions or concerns.     VIDEO VISIT TIME:  15 minutes.  Time was spent in today's visit with review of chart and investigations and documentation.    ADDENDUM: Case discussed with Dr. Cotton from nephrology. He would recommend RACHAEL when hemoglobin is below 10.

## 2021-07-16 ENCOUNTER — ANCILLARY PROCEDURE (OUTPATIENT)
Dept: MAMMOGRAPHY | Facility: CLINIC | Age: 71
End: 2021-07-16
Attending: OBSTETRICS & GYNECOLOGY
Payer: MEDICARE

## 2021-07-16 DIAGNOSIS — Z12.31 VISIT FOR SCREENING MAMMOGRAM: ICD-10-CM

## 2021-07-16 PROCEDURE — 77067 SCR MAMMO BI INCL CAD: CPT | Mod: TC | Performed by: RADIOLOGY

## 2021-07-16 PROCEDURE — 77063 BREAST TOMOSYNTHESIS BI: CPT | Mod: TC | Performed by: RADIOLOGY

## 2021-07-28 ENCOUNTER — TRANSFERRED RECORDS (OUTPATIENT)
Dept: HEALTH INFORMATION MANAGEMENT | Facility: CLINIC | Age: 71
End: 2021-07-28
Payer: COMMERCIAL

## 2021-09-07 ENCOUNTER — OFFICE VISIT (OUTPATIENT)
Dept: FAMILY MEDICINE | Facility: CLINIC | Age: 71
End: 2021-09-07
Payer: MEDICARE

## 2021-09-07 VITALS
TEMPERATURE: 97.8 F | BODY MASS INDEX: 29.94 KG/M2 | SYSTOLIC BLOOD PRESSURE: 134 MMHG | DIASTOLIC BLOOD PRESSURE: 65 MMHG | HEIGHT: 64 IN | RESPIRATION RATE: 16 BRPM | OXYGEN SATURATION: 98 % | WEIGHT: 175.4 LBS

## 2021-09-07 DIAGNOSIS — I10 BENIGN ESSENTIAL HYPERTENSION: ICD-10-CM

## 2021-09-07 DIAGNOSIS — I73.9 CLAUDICATION OF BOTH LOWER EXTREMITIES (H): ICD-10-CM

## 2021-09-07 DIAGNOSIS — N39.0 URINARY TRACT INFECTION WITHOUT HEMATURIA, SITE UNSPECIFIED: Primary | ICD-10-CM

## 2021-09-07 DIAGNOSIS — R82.90 NONSPECIFIC FINDING ON EXAMINATION OF URINE: ICD-10-CM

## 2021-09-07 DIAGNOSIS — R35.0 URINARY FREQUENCY: ICD-10-CM

## 2021-09-07 PROBLEM — H35.30 MACULAR DEGENERATION: Status: ACTIVE | Noted: 2019-05-16

## 2021-09-07 PROBLEM — G43.909 MIGRAINE HEADACHE: Status: ACTIVE | Noted: 2021-09-07

## 2021-09-07 PROBLEM — M51.16 LUMBAR DISC HERNIATION WITH RADICULOPATHY: Status: ACTIVE | Noted: 2018-08-16

## 2021-09-07 PROBLEM — M16.9 DEGENERATIVE JOINT DISEASE (DJD) OF HIP: Status: ACTIVE | Noted: 2017-05-23

## 2021-09-07 PROBLEM — Z88.9 MULTIPLE DRUG ALLERGIES: Status: ACTIVE | Noted: 2021-09-07

## 2021-09-07 LAB
BACTERIA #/AREA URNS HPF: ABNORMAL /HPF
RBC #/AREA URNS AUTO: ABNORMAL /HPF
SQUAMOUS #/AREA URNS AUTO: ABNORMAL /LPF
WBC #/AREA URNS AUTO: >100 /HPF

## 2021-09-07 PROCEDURE — 87186 SC STD MICRODIL/AGAR DIL: CPT | Performed by: INTERNAL MEDICINE

## 2021-09-07 PROCEDURE — 87088 URINE BACTERIA CULTURE: CPT | Performed by: INTERNAL MEDICINE

## 2021-09-07 PROCEDURE — 81015 MICROSCOPIC EXAM OF URINE: CPT | Performed by: INTERNAL MEDICINE

## 2021-09-07 PROCEDURE — 87086 URINE CULTURE/COLONY COUNT: CPT | Performed by: INTERNAL MEDICINE

## 2021-09-07 PROCEDURE — 99214 OFFICE O/P EST MOD 30 MIN: CPT | Performed by: INTERNAL MEDICINE

## 2021-09-07 RX ORDER — DOXYCYCLINE HYCLATE 100 MG
100 TABLET ORAL 2 TIMES DAILY
Qty: 14 TABLET | Refills: 0 | Status: SHIPPED | OUTPATIENT
Start: 2021-09-07 | End: 2022-06-09

## 2021-09-07 ASSESSMENT — MIFFLIN-ST. JEOR: SCORE: 1302.67

## 2021-09-07 NOTE — PROGRESS NOTES
Assessment and Plan  1. Urinary tract infection without hematuria, site unspecified  2. Urinary frequency  3.Nonspecific finding on examination of urine  New problem, with UA positive for UTI. Pt does have multiple medication allergies which will need to be sorted out if these are true allergies. Pt opting to postpone the allergy specialist referral offered at this time. Shared decision to give Doxycycline as pt is pan allergic to all the medications for UTI except this. She did develop tendo achilles tear and I se we cannot give Ciprofloxacin for the same reason.  All risks understood and agreed.  - doxycycline hyclate (VIBRA-TABS) 100 MG tablet; Take 1 tablet (100 mg) by mouth 2 times daily Do not take within 2 hours of antacids or calcium.  Dispense: 14 tablet; Refill: 0  - Urine Microscopic; Future  - Urine Microscopic   - Urine Culture; Future  - Urine Culture    2. Benign essential hypertension  Fluctuating with low BP as per the patient in her recent PT visit for her recent left Knee replacement. Discussed with the patient that her BP in office today appears at goal, she will need to get me the BP log for next 10 days checking twice a day and also check before she takes medications. BP goals to avoid the medication if her BP <90/60 which she understood and agreed. For now continue the current Carvedilol.    5. Claudication of both lower extremities (H)  Stable, patient denies any symptoms at this time.         Patient Instructions   Sent in antibiotic as discussed. Will await for Urine culture results.     =====================    Patient Education     Bladder Infection, Female (Adult)     Urine normally doesn't have any germs (bacteria) in it. But bacteria can get into the urinary tract from the skin around the rectum. Or they can travel in the blood from other parts of the body. Once they are in your urinary tract, they can cause infection in these areas:    The urethra (urethritis)    The bladder  (cystitis)    The kidneys (pyelonephritis)  The most common place for an infection is in the bladder. This is called a bladder infection. This is one of the most common infections in women. Most bladder infections are easily treated. They are not serious unless the infection spreads to the kidney.  The terms bladder infection, UTI, and cystitis are often used to describe the same thing. But they are not always the same. Cystitis is an inflammation of the bladder. The most common cause of cystitis is an infection.  Symptoms  The infection causes inflammation in the urethra and bladder. This causes many of the symptoms. The most common symptoms of a bladder infection are:    Pain or burning when urinating    Having to urinate more often than normal    Urgent need to urinate    Only a small amount of urine comes out    Blood in urine    Belly (abdominal) discomfort. This is often in the lower belly above the pubic bone.    Cloudy urine    Strong- or bad-smelling urine    Unable to urinate (urinary retention)    Unable to hold urine in (urinary incontinence)    Fever    Loss of appetite    Confusion (in older adults)  Causes  Bladder infections are not contagious. You can't get one from someone else, from a toilet seat, or from sharing a bath.  The most common cause of bladder infections is bacteria from the bowels. The bacteria get onto the skin around the opening of the urethra. From there, they can get into the urine. Then they travel up to the bladder, causing inflammation and infection. This often happens because of:    Wiping incorrectly after urinating. Always wipe from front to back.    Bowel incontinence    Pregnancy    Procedures such as having a catheter put in    Older age    Not emptying your bladder. This can give bacteria a chance to grow in your urine.    Fluid loss (dehydration)    Constipation    Having sex    Using a diaphragm for birth control   Treatment  Bladder infections are diagnosed by a urine  test and urine culture. They are treated with antibiotics. They often clear up quickly without problems. Treatment helps prevent a more serious kidney infection.  Medicines  Medicines can help in the treatment of a bladder infection:    Take antibiotics until they are used up, even if you feel better. It's important to finish them to make sure the infection has cleared.    You can use acetaminophen or ibuprofen for pain, fever, or discomfort, unless another medicine was prescribed. If you have long-term (chronic) liver or kidney disease, talk with your healthcare provider before using these medicines. Also talk with your provider if you've ever had a stomach ulcer or GI (gastrointestinal) bleeding, or are taking blood-thinner medicines.    If you are given phenazopydridine to reduce burning with urination, it will make your urine a bright orange color. This can stain clothing.  Care and prevention  These self-care steps can help prevent future infections:    Drink plenty of fluids. This helps to prevent dehydration and flush out your bladder. Do this unless you must restrict fluids for other health reasons, or your healthcare provider told you not to.    Clean yourself correctly after going to the bathroom. Wipe from front to back after using the toilet. This helps prevent the spread of bacteria.    Urinate more often. Don't try to hold urine in for a long time.    Wear loose-fitting clothes and cotton underwear. Don't wear tight-fitting pants.    Improve your diet and prevent constipation. Eat more fresh fruits and vegetables, and fiber. Eat less junk foods and fatty foods.    Don't have sex until your symptoms are gone.    Don't have caffeine, alcohol, and spicy foods. These can irritate your bladder.    Urinate right after you have sex to flush out your bladder.    If you use birth control pills and have frequent bladder infections, discuss it with your healthcare provider.  Follow-up care  Call your healthcare  provider if all symptoms are not gone after 3 days of treatment. This is especially important if you have repeat infections.  If a culture was done, you will be told if your treatment needs to be changed. If directed, you can call to find out the results.  If X-rays were done, you will be told if the results will affect your treatment.  Call 911  Call 911 if any of the following occur:    Trouble breathing    Hard to wake up or confusion    Fainting (loss of consciousness)    Fast heart rate  When to get medical advice  Call your healthcare provider right away if any of these occur:    Fever of 100.4 F (38.0 C) or higher, or as directed by your healthcare provider    Symptoms are not better after 3 days of treatment    Back or belly pain that gets worse    Repeated vomiting, or unable to keep medicine down    Weakness or dizziness    Vaginal discharge    Pain, redness, or swelling in the outer vaginal area (labia)  [employer] last reviewed this educational content on 11/1/2019 2000-2021 [employer]. All rights reserved. This information is not intended as a substitute for professional medical care. Always follow your healthcare professional's instructions.             Return in about 3 months (around 12/7/2021), or if symptoms worsen or fail to improve, for Annual Wellness Exam.    Debra Hutchison MD  [facility]        Minnie   Rosa is a 70 year old who presents for the following health issues      HPI     Genitourinary - Female  Onset/Duration: 4 days back  Description:   Painful urination (Dysuria): YES           Frequency: YES  Blood in urine (Hematuria): no  Delay in urine (Hesitency): YES  Intensity: severe, 10/10  Progression of Symptoms:  constant  Accompanying Signs & Symptoms:  Fever/chills: YES  Flank pain: no  Nausea and vomiting: no  Vaginal symptoms: none  Abdominal/Pelvic Pain: YES  History:   History of frequent UTI s: no  History of kidney stones:  no  Sexually Active: YES  Possibility of pregnancy: No  Precipitating or alleviating factors: No help  Therapies tried and outcome: Pyridium       Pt is new to me, follows PCP . Last CT renal normal in 7/2019.      Allergies   Allergen Reactions     Bee Venom Anaphylaxis     Codeine Swelling     Body swelling and severe itching. Tolerates Morphine.      Fentanyl Anaphylaxis and Shortness Of Breath     Gabapentin Other (See Comments) and Shortness Of Breath     PN: chest heaviness with breathing  Chest heaviness with breathing  Chest heaviness with breathing  chest heaviness with breathing     Hydromorphone Itching     Tolerates morphine  Tolerates morphine     Midazolam Anaphylaxis, Itching and Shortness Of Breath     Tolerates Diazepam     Other Environmental Allergy Anaphylaxis     GENERAL ANETHESIA     Prilocaine Anaphylaxis     Anaphalactic shock     Propofol Anaphylaxis     Penicillins Itching and Rash     Tolerates Keflex,  Ancef  rash     Shingrix [Zoster Vac Recomb Adjuvanted] Itching and Rash     Sulfa Drugs Itching and Rash     rash     Clindamycin      Codeine      Diatrizoate Other (See Comments)     CKD 3     Hydrocodone      Lisinopril Cough     Lorazepam Itching     Nsaids Other (See Comments)     Contraindicated with kidney hx (including Celebrex, per pt)     Other [Seasonal Allergies] Anaphylaxis     GENERAL ANETHESIA       Oxycodone      Vancomycin Other (See Comments)     Thrush, yeast infection, bladder infection  Thrush, yeast infection, bladder infection     Erythromycin Rash     ointment  PN: ointment  ointment     Eucalyptus Oil Rash and Hives     hives     Nitrofuran Derivatives Rash     Nitrofurantoin Rash     Tramadol Rash        Past Medical History:   Diagnosis Date     Arthritis      Complication of anesthesia      Hypertension      Postmenopausal bleeding 2/28/2007     Sleep apnea        Past Surgical History:   Procedure Laterality Date     ARTHROPLASTY HIP Left       ARTHROSCOPY KNEE       BACK SURGERY      discectomy L3-4, 2018     BONE MARROW BIOPSY, BONE SPECIMEN, NEEDLE/TROCAR N/A 12/18/2019    Procedure: BONE MARROW BIOPSY;  Surgeon: Eran Bowser MD;  Location: [facility]     C TOTAL HIP ARTHROPLASTY      left in 2012, right 2017     C TOTAL HIP ARTHROPLASTY Right 5/23/2017    Procedure: RIGHT TOTAL HIP ARTHROPLASTY DIRECT ANTERIOR;  Surgeon: Herman Llanos MD;  Location: [facility] OR;  Service: Orthopedics     C TOTAL KNEE ARTHROPLASTY      right 2014, left 2018     C TOTAL KNEE ARTHROPLASTY Right 10/16/2014    Procedure: Right Total Knee Arthroplasty;  Surgeon: Herman Llanos MD;  Location: [facility] OR;  Service: Orthopedics     C TOTAL KNEE ARTHROPLASTY Left 4/10/2018    Procedure: LEFT TOTAL KNEE ARTHROPLASTY;  Surgeon: Herman Llanos MD;  Location: [facility] OR;  Service: Orthopedics     JOINT REPLACEMENT Left      hip & knee     CA SHLDR ARTHROSCOP,SURG,W/ROTAT CUFF REPR Right 7/26/2016    Procedure: ARTHROSCOPIC ROTATOR CUFF REPAIR, SHOULDER Distal Clavicle Excision;  Surgeon: Nick Lawrence MD;  Location: [facility];  Service: Orthopedics     ROTATOR CUFF REPAIR RT/LT Right     2016     TUBAL LIGATION  1987     TUBAL LIGATION       VEIN LIGATION       ZZC NONSPECIFIC PROCEDURE      wisdom teeth     ZZC NONSPECIFIC PROCEDURE  2005    Varicose Veins       Family History   Problem Relation Age of Onset     Diabetes Mother         INSULIN     Hypertension Mother      Eye Disorder Mother         CATERACTS     Heart Disease Mother         CHF- OPEN HEART SURG X'S 2     Lipids Mother      Obesity Mother      Coronary Artery Disease Mother      Diabetes Father         ORAL     Hypertension Father      Arthritis Father      Eye Disorder Father         MAC DEGENERATION     Heart Disease Father         CHF     Lipids Father      Obesity Father      Diabetes Maternal Grandfather         INSULIN     Diabetes Brother         INSULIN      "Gastrointestinal Disease Brother         CHOLITISI POSSIBLE CHRONES     Obesity Brother      Colon Cancer No family hx of      Breast Cancer No family hx of        Social History     Tobacco Use     Smoking status: Never Smoker     Smokeless tobacco: Never Used   Substance Use Topics     Alcohol use: No     Alcohol/week: 0.0 standard drinks        Current Outpatient Medications   Medication     doxycycline hyclate (VIBRA-TABS) 100 MG tablet     acetaminophen (TYLENOL) 500 MG tablet     atorvastatin (LIPITOR) 40 MG tablet     carvedilol (COREG) 25 MG tablet     cephALEXin (KEFLEX) 500 MG capsule     cetirizine (ZYRTEC) 10 MG tablet     Cholecalciferol (VITAMIN D) 2000 UNITS tablet     clobetasol (TEMOVATE) 0.05 % external solution     Cranberry POWD     Cyanocobalamin (B-12) 1000 MCG TBCR     diazepam (VALIUM) 5 MG tablet     EPINEPHrine (EPIPEN 2-FRAN) 0.3 MG/0.3ML injection 2-pack     erenumab-aooe (AIMOVIG) 70 MG/ML injection     estradiol (VAGIFEM) 10 MCG TABS vaginal tablet     gemfibrozil (LOPID) 600 MG tablet     hydrOXYzine (ATARAX) 25 MG tablet     losartan (COZAAR) 100 MG tablet     Magnesium Oxide 250 MG TABS     mometasone (ELOCON) 0.1 % external cream     ondansetron (ZOFRAN) 4 MG tablet     SUMAtriptan (IMITREX) 20 MG/ACT nasal spray     tiZANidine (ZANAFLEX) 2 MG tablet     topiramate (TOPAMAX) 100 MG tablet     torsemide (DEMADEX) 10 MG tablet     ZOLMitriptan (ZOMIG-ZMT) 5 MG ODT tab     No current facility-administered medications for this visit.        Review of Systems   Constitutional, HEENT, cardiovascular, pulmonary, GI, , musculoskeletal, neuro, skin, endocrine and psych systems are negative, except as otherwise noted.      Objective    /65 (BP Location: Left arm, Cuff Size: Adult Large)   Temp 97.8  F (36.6  C) (Tympanic)   Resp 16   Ht 1.613 m (5' 3.5\")   Wt 79.6 kg (175 lb 6.4 oz)   SpO2 98%   BMI 30.58 kg/m    Body mass index is 30.58 kg/m .  Physical Exam   GENERAL: healthy, " alert and no distress  NECK: no adenopathy, no asymmetry, masses, or scars and thyroid normal to palpation  RESP: lungs clear to auscultation - no rales, rhonchi or wheezes  CV: regular rate and rhythm, normal S1 S2, no S3 or S4, no murmur, click or rub, no peripheral edema and peripheral pulses strong  ABDOMEN: soft, nontender, no hepatosplenomegaly, no masses and bowel sounds normal, No CVAT  MS: no gross musculoskeletal defects noted, no edema

## 2021-09-07 NOTE — PATIENT INSTRUCTIONS
Sent in antibiotic as discussed. Will await for Urine culture results.     =====================    Patient Education     Bladder Infection, Female (Adult)     Urine normally doesn't have any germs (bacteria) in it. But bacteria can get into the urinary tract from the skin around the rectum. Or they can travel in the blood from other parts of the body. Once they are in your urinary tract, they can cause infection in these areas:    The urethra (urethritis)    The bladder (cystitis)    The kidneys (pyelonephritis)  The most common place for an infection is in the bladder. This is called a bladder infection. This is one of the most common infections in women. Most bladder infections are easily treated. They are not serious unless the infection spreads to the kidney.  The terms bladder infection, UTI, and cystitis are often used to describe the same thing. But they are not always the same. Cystitis is an inflammation of the bladder. The most common cause of cystitis is an infection.  Symptoms  The infection causes inflammation in the urethra and bladder. This causes many of the symptoms. The most common symptoms of a bladder infection are:    Pain or burning when urinating    Having to urinate more often than normal    Urgent need to urinate    Only a small amount of urine comes out    Blood in urine    Belly (abdominal) discomfort. This is often in the lower belly above the pubic bone.    Cloudy urine    Strong- or bad-smelling urine    Unable to urinate (urinary retention)    Unable to hold urine in (urinary incontinence)    Fever    Loss of appetite    Confusion (in older adults)  Causes  Bladder infections are not contagious. You can't get one from someone else, from a toilet seat, or from sharing a bath.  The most common cause of bladder infections is bacteria from the bowels. The bacteria get onto the skin around the opening of the urethra. From there, they can get into the urine. Then they travel up to the  bladder, causing inflammation and infection. This often happens because of:    Wiping incorrectly after urinating. Always wipe from front to back.    Bowel incontinence    Pregnancy    Procedures such as having a catheter put in    Older age    Not emptying your bladder. This can give bacteria a chance to grow in your urine.    Fluid loss (dehydration)    Constipation    Having sex    Using a diaphragm for birth control   Treatment  Bladder infections are diagnosed by a urine test and urine culture. They are treated with antibiotics. They often clear up quickly without problems. Treatment helps prevent a more serious kidney infection.  Medicines  Medicines can help in the treatment of a bladder infection:    Take antibiotics until they are used up, even if you feel better. It's important to finish them to make sure the infection has cleared.    You can use acetaminophen or ibuprofen for pain, fever, or discomfort, unless another medicine was prescribed. If you have long-term (chronic) liver or kidney disease, talk with your healthcare provider before using these medicines. Also talk with your provider if you've ever had a stomach ulcer or GI (gastrointestinal) bleeding, or are taking blood-thinner medicines.    If you are given phenazopydridine to reduce burning with urination, it will make your urine a bright orange color. This can stain clothing.  Care and prevention  These self-care steps can help prevent future infections:    Drink plenty of fluids. This helps to prevent dehydration and flush out your bladder. Do this unless you must restrict fluids for other health reasons, or your healthcare provider told you not to.    Clean yourself correctly after going to the bathroom. Wipe from front to back after using the toilet. This helps prevent the spread of bacteria.    Urinate more often. Don't try to hold urine in for a long time.    Wear loose-fitting clothes and cotton underwear. Don't wear tight-fitting  pants.    Improve your diet and prevent constipation. Eat more fresh fruits and vegetables, and fiber. Eat less junk foods and fatty foods.    Don't have sex until your symptoms are gone.    Don't have caffeine, alcohol, and spicy foods. These can irritate your bladder.    Urinate right after you have sex to flush out your bladder.    If you use birth control pills and have frequent bladder infections, discuss it with your healthcare provider.  Follow-up care  Call your healthcare provider if all symptoms are not gone after 3 days of treatment. This is especially important if you have repeat infections.  If a culture was done, you will be told if your treatment needs to be changed. If directed, you can call to find out the results.  If X-rays were done, you will be told if the results will affect your treatment.  Call 911  Call 911 if any of the following occur:    Trouble breathing    Hard to wake up or confusion    Fainting (loss of consciousness)    Fast heart rate  When to get medical advice  Call your healthcare provider right away if any of these occur:    Fever of 100.4 F (38.0 C) or higher, or as directed by your healthcare provider    Symptoms are not better after 3 days of treatment    Back or belly pain that gets worse    Repeated vomiting, or unable to keep medicine down    Weakness or dizziness    Vaginal discharge    Pain, redness, or swelling in the outer vaginal area (labia)  [employer] last reviewed this educational content on 11/1/2019 2000-2021 [employer]. All rights reserved. This information is not intended as a substitute for professional medical care. Always follow your healthcare professional's instructions.

## 2021-09-07 NOTE — PROGRESS NOTES
Allergies   Allergen Reactions     Bee Venom Anaphylaxis     Codeine Swelling     Body swelling and severe itching. Tolerates Morphine.      Fentanyl Anaphylaxis and Shortness Of Breath     Gabapentin Other (See Comments)     PN: chest heaviness with breathing  Chest heaviness with breathing  Chest heaviness with breathing  chest heaviness with breathing     Hydromorphone Itching     Tolerates morphine  Tolerates morphine     Midazolam Anaphylaxis, Itching and Shortness Of Breath     Tolerates Diazepam     Prilocaine Anaphylaxis     Anaphalactic shock     Propofol Anaphylaxis     Penicillins Itching and Rash     Tolerates Keflex,  Ancef  rash     Shingrix [Zoster Vac Recomb Adjuvanted] Itching and Rash     Sulfa Drugs Itching and Rash     rash     Clindamycin      Codeine      Diatrizoate Other (See Comments)     CKD 3     Hydrocodone      Lisinopril Cough     Lorazepam Itching     Nsaids Other (See Comments)     Contraindicated with kidney hx (including Celebrex, per pt)     Other [Seasonal Allergies] Anaphylaxis     GENERAL ANETHESIA       Oxycodone      Vancomycin Other (See Comments)     Thrush, yeast infection, bladder infection  Thrush, yeast infection, bladder infection     Erythromycin Rash     ointment  PN: ointment  ointment     Eucalyptus Oil Rash and Hives     hives     Nitrofuran Derivatives Rash     Nitrofurantoin Rash     Tramadol Rash        Past Medical History:   Diagnosis Date     Arthritis      Complication of anesthesia      Hypertension      Postmenopausal bleeding 2/28/2007     Sleep apnea        Past Surgical History:   Procedure Laterality Date     ARTHROPLASTY HIP Left      ARTHROSCOPY KNEE       BACK SURGERY      discectomy L3-4, 2018     BONE MARROW BIOPSY, BONE SPECIMEN, NEEDLE/TROCAR N/A 12/18/2019    Procedure: BONE MARROW BIOPSY;  Surgeon: Eran Bowser MD;  Location: [facility]     C TOTAL HIP ARTHROPLASTY      left in 2012, right 2017     C TOTAL HIP ARTHROPLASTY Right  5/23/2017    Procedure: RIGHT TOTAL HIP ARTHROPLASTY DIRECT ANTERIOR;  Surgeon: Herman Llanos MD;  Location: [facility] Main OR;  Service: Orthopedics     C TOTAL KNEE ARTHROPLASTY      right 2014, left 2018     C TOTAL KNEE ARTHROPLASTY Right 10/16/2014    Procedure: Right Total Knee Arthroplasty;  Surgeon: Herman Llanos MD;  Location: [facility] Main OR;  Service: Orthopedics     C TOTAL KNEE ARTHROPLASTY Left 4/10/2018    Procedure: LEFT TOTAL KNEE ARTHROPLASTY;  Surgeon: Herman Llanos MD;  Location: [facility] Main OR;  Service: Orthopedics     JOINT REPLACEMENT Left      hip & knee     NH SHLDR ARTHROSCOP,SURG,W/ROTAT CUFF REPR Right 7/26/2016    Procedure: ARTHROSCOPIC ROTATOR CUFF REPAIR, SHOULDER Distal Clavicle Excision;  Surgeon: Nick Lawrence MD;  Location: [facility] Main OR;  Service: Orthopedics     ROTATOR CUFF REPAIR RT/LT Right     2016     TUBAL LIGATION  1987     TUBAL LIGATION       VEIN LIGATION       ZZC NONSPECIFIC PROCEDURE      wisdom teeth     ZZC NONSPECIFIC PROCEDURE  2005    Varicose Veins       Family History   Problem Relation Age of Onset     Diabetes Mother         INSULIN     Hypertension Mother      Eye Disorder Mother         CATERACTS     Heart Disease Mother         CHF- OPEN HEART SURG X'S 2     Lipids Mother      Obesity Mother      Coronary Artery Disease Mother      Diabetes Father         ORAL     Hypertension Father      Arthritis Father      Eye Disorder Father         MAC DEGENERATION     Heart Disease Father         CHF     Lipids Father      Obesity Father      Diabetes Maternal Grandfather         INSULIN     Diabetes Brother         INSULIN     Gastrointestinal Disease Brother         CHOLITISI POSSIBLE CHRONES     Obesity Brother      Colon Cancer No family hx of      Breast Cancer No family hx of        Social History     Tobacco Use     Smoking status: Never Smoker     Smokeless tobacco: Never Used   Substance Use Topics     Alcohol use: No      Alcohol/week: 0.0 standard drinks        Current Outpatient Medications   Medication     acetaminophen (TYLENOL) 500 MG tablet     atorvastatin (LIPITOR) 40 MG tablet     carvedilol (COREG) 25 MG tablet     cephALEXin (KEFLEX) 500 MG capsule     cetirizine (ZYRTEC) 10 MG tablet     Cholecalciferol (VITAMIN D) 2000 UNITS tablet     clobetasol (TEMOVATE) 0.05 % external solution     Cranberry POWD     Cyanocobalamin (B-12) 1000 MCG TBCR     diazepam (VALIUM) 5 MG tablet     EPINEPHrine (EPIPEN 2-FRAN) 0.3 MG/0.3ML injection 2-pack     erenumab-aooe (AIMOVIG) 70 MG/ML injection     estradiol (VAGIFEM) 10 MCG TABS vaginal tablet     gemfibrozil (LOPID) 600 MG tablet     hydrOXYzine (ATARAX) 25 MG tablet     losartan (COZAAR) 100 MG tablet     Magnesium Oxide 250 MG TABS     mometasone (ELOCON) 0.1 % external cream     ondansetron (ZOFRAN) 4 MG tablet     SUMAtriptan (IMITREX) 20 MG/ACT nasal spray     tiZANidine (ZANAFLEX) 2 MG tablet     topiramate (TOPAMAX) 100 MG tablet     torsemide (DEMADEX) 10 MG tablet     ZOLMitriptan (ZOMIG-ZMT) 5 MG ODT tab     No current facility-administered medications for this visit.

## 2021-09-09 ENCOUNTER — TELEPHONE (OUTPATIENT)
Dept: FAMILY MEDICINE | Facility: CLINIC | Age: 71
End: 2021-09-09

## 2021-09-09 ENCOUNTER — HOSPITAL ENCOUNTER (EMERGENCY)
Facility: CLINIC | Age: 71
Discharge: HOME | End: 2021-09-09
Attending: EMERGENCY MEDICINE | Admitting: EMERGENCY MEDICINE
Payer: MEDICARE

## 2021-09-09 VITALS
TEMPERATURE: 99.4 F | HEART RATE: 75 BPM | RESPIRATION RATE: 16 BRPM | DIASTOLIC BLOOD PRESSURE: 61 MMHG | OXYGEN SATURATION: 96 % | SYSTOLIC BLOOD PRESSURE: 154 MMHG

## 2021-09-09 DIAGNOSIS — N30.00 ACUTE CYSTITIS WITHOUT HEMATURIA: ICD-10-CM

## 2021-09-09 DIAGNOSIS — Z88.9 MULTIPLE ALLERGIES: ICD-10-CM

## 2021-09-09 DIAGNOSIS — N39.0 URINARY TRACT INFECTION WITHOUT HEMATURIA, SITE UNSPECIFIED: Primary | ICD-10-CM

## 2021-09-09 LAB
ALBUMIN UR-MCNC: NEGATIVE MG/DL
ANION GAP SERPL CALCULATED.3IONS-SCNC: 4 MMOL/L (ref 3–14)
APPEARANCE UR: ABNORMAL
BACTERIA #/AREA URNS HPF: ABNORMAL /HPF
BACTERIA UR CULT: ABNORMAL
BASOPHILS # BLD AUTO: 0 10E3/UL (ref 0–0.2)
BASOPHILS NFR BLD AUTO: 0 %
BILIRUB UR QL STRIP: NEGATIVE
BUN SERPL-MCNC: 33 MG/DL (ref 7–30)
CALCIUM SERPL-MCNC: 8.8 MG/DL (ref 8.5–10.1)
CHLORIDE BLD-SCNC: 113 MMOL/L (ref 94–109)
CO2 SERPL-SCNC: 22 MMOL/L (ref 20–32)
COLOR UR AUTO: ABNORMAL
CREAT SERPL-MCNC: 1.47 MG/DL (ref 0.52–1.04)
EOSINOPHIL # BLD AUTO: 0.3 10E3/UL (ref 0–0.7)
EOSINOPHIL NFR BLD AUTO: 5 %
ERYTHROCYTE [DISTWIDTH] IN BLOOD BY AUTOMATED COUNT: 13.2 % (ref 10–15)
GFR SERPL CREATININE-BSD FRML MDRD: 36 ML/MIN/1.73M2
GLUCOSE BLD-MCNC: 113 MG/DL (ref 70–99)
GLUCOSE UR STRIP-MCNC: NEGATIVE MG/DL
HCT VFR BLD AUTO: 30.6 % (ref 35–47)
HGB BLD-MCNC: 9.5 G/DL (ref 11.7–15.7)
HGB UR QL STRIP: NEGATIVE
IMM GRANULOCYTES # BLD: 0 10E3/UL
IMM GRANULOCYTES NFR BLD: 0 %
KETONES UR STRIP-MCNC: NEGATIVE MG/DL
LEUKOCYTE ESTERASE UR QL STRIP: ABNORMAL
LYMPHOCYTES # BLD AUTO: 2.3 10E3/UL (ref 0.8–5.3)
LYMPHOCYTES NFR BLD AUTO: 40 %
MCH RBC QN AUTO: 31.5 PG (ref 26.5–33)
MCHC RBC AUTO-ENTMCNC: 31 G/DL (ref 31.5–36.5)
MCV RBC AUTO: 101 FL (ref 78–100)
MONOCYTES # BLD AUTO: 0.6 10E3/UL (ref 0–1.3)
MONOCYTES NFR BLD AUTO: 11 %
MUCOUS THREADS #/AREA URNS LPF: PRESENT /LPF
NEUTROPHILS # BLD AUTO: 2.6 10E3/UL (ref 1.6–8.3)
NEUTROPHILS NFR BLD AUTO: 44 %
NITRATE UR QL: NEGATIVE
NRBC # BLD AUTO: 0 10E3/UL
NRBC BLD AUTO-RTO: 0 /100
PH UR STRIP: 5 [PH] (ref 5–7)
PLATELET # BLD AUTO: 242 10E3/UL (ref 150–450)
POTASSIUM BLD-SCNC: 4.2 MMOL/L (ref 3.4–5.3)
RBC # BLD AUTO: 3.02 10E6/UL (ref 3.8–5.2)
RBC URINE: 1 /HPF
SODIUM SERPL-SCNC: 139 MMOL/L (ref 133–144)
SP GR UR STRIP: 1.01 (ref 1–1.03)
SQUAMOUS EPITHELIAL: 1 /HPF
UROBILINOGEN UR STRIP-MCNC: NORMAL MG/DL
WBC # BLD AUTO: 5.7 10E3/UL (ref 4–11)
WBC URINE: 75 /HPF

## 2021-09-09 PROCEDURE — 99284 EMERGENCY DEPT VISIT MOD MDM: CPT | Mod: 25

## 2021-09-09 PROCEDURE — 80048 BASIC METABOLIC PNL TOTAL CA: CPT | Performed by: EMERGENCY MEDICINE

## 2021-09-09 PROCEDURE — 81001 URINALYSIS AUTO W/SCOPE: CPT | Performed by: EMERGENCY MEDICINE

## 2021-09-09 PROCEDURE — 250N000011 HC RX IP 250 OP 636: Performed by: EMERGENCY MEDICINE

## 2021-09-09 PROCEDURE — 36415 COLL VENOUS BLD VENIPUNCTURE: CPT | Performed by: EMERGENCY MEDICINE

## 2021-09-09 PROCEDURE — 96365 THER/PROPH/DIAG IV INF INIT: CPT

## 2021-09-09 PROCEDURE — 85004 AUTOMATED DIFF WBC COUNT: CPT | Performed by: EMERGENCY MEDICINE

## 2021-09-09 RX ORDER — CEPHALEXIN 500 MG/1
500 CAPSULE ORAL 3 TIMES DAILY
Qty: 30 CAPSULE | Refills: 0 | Status: SHIPPED | OUTPATIENT
Start: 2021-09-09 | End: 2021-09-19

## 2021-09-09 RX ORDER — CEFTRIAXONE 1 G/1
1 INJECTION, POWDER, FOR SOLUTION INTRAMUSCULAR; INTRAVENOUS ONCE
Status: COMPLETED | OUTPATIENT
Start: 2021-09-09 | End: 2021-09-09

## 2021-09-09 RX ADMIN — CEFTRIAXONE SODIUM 1 G: 1 INJECTION, POWDER, FOR SOLUTION INTRAMUSCULAR; INTRAVENOUS at 22:28

## 2021-09-09 NOTE — TELEPHONE ENCOUNTER
Result message given from Dr. Hutchison.     Pt continues to have urgency, pain and burning on urination and frequency. Pt is on day 3 of doxycycline.   Pt has been taking tylenol and pyridium for pain with little to know effect.     Scheduled pt for Lab only appt on 9-20-21.   Pt verbalized understanding, all questions answered.     Effie Shaw RN

## 2021-09-09 NOTE — TELEPHONE ENCOUNTER
Called pt; relayed Dr. Mata's message below. Pt states that she will aggressively hydrate today and continue oral doxycycline. If she still isn't improving or getting worse by tomorrow afternoon she will go to ER for IV antibiotics.     Pt verbalized understanding, all questions answered.     Effie Shaw RN

## 2021-09-09 NOTE — TELEPHONE ENCOUNTER
----- Message from Debra Hutchison MD sent at 9/9/2021  9:47 AM CDT -----  Your Urine culture is positive for a bug called Klebsiella which the culture sensitivity results show the medications which you have all of them in allergies and its resistant to penicillin. I have placed a repeat Urine culture after 10 days om 9/20/21 to make sure the infection is resolved with current antibiotic as we dont have much choices due to your 27 allergies in the list. Make a lab appointment to get this repeat Urine culture done after you complete the current medication.    Please let me know if you have any questions.     Thank you,  Debra Hutchison MD on 9/9/2021 at 9:46 AM

## 2021-09-09 NOTE — TELEPHONE ENCOUNTER
Recommend to hydrate herself well atleast 2 litres of water a day for improvement of symptoms.     Given pt multiple allergies there are no more antibiotic choices to be given Orally. If any worsening symptoms, recommend to go to ER for need of IV antibiotics if this medication is not working for her.     Thank you,  Debra Hutchison MD on 9/9/2021 at 1:05 PM

## 2021-09-10 ENCOUNTER — TELEPHONE (OUTPATIENT)
Dept: ONCOLOGY | Facility: CLINIC | Age: 71
End: 2021-09-10

## 2021-09-10 NOTE — TELEPHONE ENCOUNTER
Writer reviewed labs and patient's baseline hgb is 10.1. Writer will have  review on Monday. 9/13.    Babita Jane RN

## 2021-09-10 NOTE — DISCHARGE INSTRUCTIONS
Your urine culture shows that the bacteria causing your urine infection is susceptible to this class of antibiotics, which should be adequate to treat your infection.

## 2021-09-10 NOTE — TELEPHONE ENCOUNTER
Rosa called clinic [facility] on triage requesting call back.     Called Rosa back and she stated that she was in the ED yesterday and she was found to have a Hgb of 9.5 and she wanted to make sure that Dr. Calderon was aware. She is scheduled for more labs again on 10/4/21 . She would like a call back at [phone] with Dr. Calderon's recommendations.

## 2021-09-10 NOTE — ED PROVIDER NOTES
History   Chief Complaint:  Dysuria    HPI  History supplemented by electronic chart review    Rosa Sotomayor is a 70 year old female with history of recurrent UTI (most recent ~1 year ago), hypertension and CKD who presents with dysuria. Five days ago, the patient developed urinary urgency and dysuria. She states she has recurrent UTIs and this feels very similar, however, these are the most severe symptoms she has ever had.  Secondary to this, she was seen in clinic two days ago, obtained a urine analysis, urine culture, and was prescribed a round of doxycycline, with this antibiotic selected due to multiple medication intolerances. Since then, she has been taking this medication as prescribed with Tylenol and Pyridium without any relief in her pain. Typically these medications provide her relief. Secondary to her continued pain she presented here.  She had called her clinic and was told that she needed to have IV antibiotics because there were no additional oral options compatible with her urine culture and allergy profile.  She denies any new back pain, fever or emesis. Of note, she has seen a urologist and does not have a history of kidney stones.     Review of Systems   All other systems reviewed and are negative.    Allergies:  Bee Venom  Codeine  Fentanyl  Gabapentin  Hydromorphone  Midazolam  Prilocaine  Propofol  Penicillins  Shingrix  Sulfa Drugs  Clindamycin  Codeine  Diatrizoate  Hydrocodone  Lisinopril  Lorazepam  Nsaids  Oxycodone  Vancomycin  Erythromycin  Eucalyptus Oil  Nitrofuran Derivatives  Nitrofurantoin  Tramadol    Medications:  Zolmitriptan  Demadex  Topamax  Zanaflex  Zofran  Magnesium oxide  Cozaar  Atarax  Lopid  Vagifem  Aimovig  Epinephrine  Doxycycline hyclate  Valium  Cyanocobalamin  Zyrtec  Keflex  Coreg  Lipitor    Past Medical History:    Arthritis  Hypertension  Postmenopausal bleeding  Trochanteric bursitis  Arthralgia of hip  Chronic bilateral low back pain without  sciatica  Arterial fibromuscular dysplasia  Macular degeneration, bilateral  Iron deficiency anemia  ESTEFANIA  Asymptomatic varicose veins  Congenital anomaly of brain  Arthralgia of hip  Intractable chronic migraine without aura  Right knee DJD  Stage 3 CKD  Hypertriglyceridemia  Anxiety  Hyperlipidemia  Arnold-Chiari malformation  Environmental allergies  UTI    Past Surgical History:    [place] teeth  Vein ligation  Tubal ligation  Rotator cuff repair, bilateral  Shoulder arthroscopy with rotator cuff repair, right  Hip and knee replacement, right  Total knee arthroplasty, bilateral  Bone marrow biopsy, bone specimen  Discectomy L3-4     Family History:    Brother: Obesity, GI disease, Diabetes  Father: Obesity, CHF, Macular degeneration, Arthritis, Hypertension, Diabetes  Mother: CAD, Obesity, Lipids, CHF, Cataracts, Hypertension, Diabetes     Social History:  The patient was accompanied to the ED by her .    Physical Exam     Patient Vitals for the past 24 hrs:   BP Temp Temp src Pulse Resp SpO2   09/09/21 1823 (!) 154/61 99.4  F (37.4  C) Temporal 75 16 97 %     Physical Exam  General: Nontoxic appearing woman sitting upright in room 30,  at bedside  HENT: mucous membranes moist  CV: rate as above, regular rhythm  Resp: normal effort, speaks in full phrases, no stridor, no cough observed  GI: abdomen soft and nontender, no guarding, no palpable masses  MSK:   Thoracic spine: nontender, no CVAT  Lumbar spine: nontender  Pelvis stable.  : deferred  Skin: appropriately warm and dry, no erythema/ecchymosis/vesicles to back  Neuro: alert, clear speech, oriented  Psych: cooperative    Emergency Department Course   Laboratory:  UA with Microscopic: Leukocyte Esterase Large (A), WBC 75 (H), Bacteria Few (A), Mucous Urine Present (A) o/w Negative    CBC: WBC 5.7, HGB 9.5 (L),   BMP: Chloride 113 (H), Glucose 113 (H), Urea Nitrogen 33 (H), GFR 36 (L) o/w WNL (Creatinine 1.47 (H))    Emergency Department  Course:  Reviewed:  I reviewed nursing notes, vitals, past medical history and care everywhere    Assessments:  2150 I obtained history and examined the patient as noted above.     2313 I rechecked and updated the patient regarding the laboratory results and the plan for care.    Consults:   2147 I spoke with the pharmacist regarding the patient.    Interventions:  2228 Rocepin 1g IV    Disposition:  The patient was discharged to home.     Impression & Plan   Medical Decision Making:  Her symptoms are strongly suggestive of acute cystitis, and this is supported by her recent urinalysis and urine culture, which I have reviewed in detail, along with her past history and allergy profile.  I did consult with our ED clinical pharmacist, who also reviewed her case in detail and agreed with me that cephalosporins are an appropriate choice in light of her recent culture data and allergy list.  We were even able to determine that the patient has previously received Keflex on multiple occasions and has not had any apparent adverse reactions.  The patient has been sent here for IV antibiotics, and a dose of ceftriaxone was administered, though I do not think she needs to be hospitalized given her overall appearance, lack of symptoms suggestive of pyelonephritis, tolerance of oral intake at this time, and patient comfort with trial of ongoing outpatient management with new antibiotic.  I did not send repeat urine culture as I do not think it is indicated, given culture results from 2 days ago that are available to me.  I advised the patient that she should hopefully start improving over the next few days, though full cure may take longer, but did  her to return here for any sudden worsening such as fever, vomiting, flank pain, or other concerns.  I do not think she requires CT as I do not suspect ureteral stone, perinephric abscess, or other complicating structural factors.  She and her  expressed understanding of  and satisfaction with this plan and she was discharged home in improved condition.    Diagnosis:    ICD-10-CM    1. Acute cystitis without hematuria  N30.00    2. Multiple allergies  Z88.9        Discharge Medications:  New Prescriptions    CEPHALEXIN (KEFLEX) 500 MG CAPSULE    Take 1 capsule (500 mg) by mouth 3 times daily for 10 days       Scribe Disclosure:  I, Bogdan Nogueira, am serving as a scribe at 9:43 PM on 9/9/2021 to document services personally performed by Yuri Pierce MD based on my observations and the provider's statements to me.     This note was completed in part using Dragon voice recognition software. Although reviewed after completion, some word and grammatical errors may occur.       Yuri Pierce MD  09/10/21 1131

## 2021-09-12 ENCOUNTER — HEALTH MAINTENANCE LETTER (OUTPATIENT)
Age: 71
End: 2021-09-12

## 2021-09-13 NOTE — TELEPHONE ENCOUNTER
Please, let her know that we will monitor her hemoglobin. She should have it again rechecked in next 1 month.     Nico Calderon MD     Patient is scheduled on 10/4 for labs and review with Dr. Calderon.     Writer called and LVM with the above details and to call if she has any questions.    Babita Jane RN

## 2021-09-15 ENCOUNTER — OFFICE VISIT (OUTPATIENT)
Dept: FAMILY MEDICINE | Facility: CLINIC | Age: 71
End: 2021-09-15
Payer: MEDICARE

## 2021-09-15 VITALS
HEIGHT: 64 IN | OXYGEN SATURATION: 98 % | RESPIRATION RATE: 16 BRPM | DIASTOLIC BLOOD PRESSURE: 67 MMHG | TEMPERATURE: 97.5 F | WEIGHT: 176 LBS | BODY MASS INDEX: 30.05 KG/M2 | SYSTOLIC BLOOD PRESSURE: 135 MMHG | HEART RATE: 71 BPM

## 2021-09-15 DIAGNOSIS — I15.0 RENOVASCULAR HYPERTENSION: ICD-10-CM

## 2021-09-15 DIAGNOSIS — Z12.11 SCREEN FOR COLON CANCER: Primary | ICD-10-CM

## 2021-09-15 DIAGNOSIS — I10 BENIGN ESSENTIAL HYPERTENSION: ICD-10-CM

## 2021-09-15 DIAGNOSIS — H11.31 SUBCONJUNCTIVAL HEMORRHAGE OF RIGHT EYE: ICD-10-CM

## 2021-09-15 PROCEDURE — 99214 OFFICE O/P EST MOD 30 MIN: CPT | Performed by: NURSE PRACTITIONER

## 2021-09-15 RX ORDER — LOSARTAN POTASSIUM 50 MG/1
50 TABLET ORAL AT BEDTIME
Qty: 90 TABLET | Refills: 3 | Status: SHIPPED | OUTPATIENT
Start: 2021-09-15 | End: 2022-08-18

## 2021-09-15 ASSESSMENT — MIFFLIN-ST. JEOR: SCORE: 1305.39

## 2021-09-15 NOTE — PATIENT INSTRUCTIONS
Change Losartan to 50mg (you were previously on 100mg)     Follow up with Dr Hollingsworth if your blood pressure continues to be low     Call the [facility] center for a DEXA. The order is in     You should get a call for the colonoscopy

## 2021-09-15 NOTE — PROGRESS NOTES
Assessment & Plan   Problem List Items Addressed This Visit     Benign essential hypertension    Relevant Medications    losartan (COZAAR) 50 MG tablet    Renovascular hypertension    Relevant Medications    losartan (COZAAR) 50 MG tablet      Other Visit Diagnoses     Screen for colon cancer    -  Primary    Relevant Orders    Adult Gastro Ref - Procedure Only         Benign essential HTN -    Patient has been experiencing symptomatic hypotension. Decrease losartan to 50 mg daily. Continue torsemide 10 mg daily. Continue carvedilol 25 mg BID with the anticipation that this could also be tapered if need be. Patient will keep track of her blood pressures at home.    Follow up with Dr. Calderon in hematology as indicated. Hemoglobin level in ER was 9.5.She has appt scheduled     Acute cystitis appears to be resolving. Continue course of Keflex for a full 10 day course and follow up as needed.     Order placed for colonoscopy and DEXA scan was previously ordered.        GREGG Reed CNP  [facility] EBER Lee is a 70 year old who presents for the following health issues: Treatment in ER on 9/9/21 for a positive urine culture and was discharged on a 10 day course of Keflex to treat acute cystitis.     HPI     ED/UC Followup:    Facility:  [facility] Emergency Department  Date of visit: 09/09/21  Reason for visit: Dysuria  Current Status: Doing okay, had headache and recently had blood vessel rupture in right eye     Infection is getting better, but taking a long time as they told her it would in the ER. She felt better after 4 days.   She continues to have a low grade fever at night, 99.5.  She had a pounding headache in the ER. Tylenol relieved the pain for about two hours. She had accupuncture on Monday, relieved the headache for about a day, then she had a broken blood vessel in her eye and now her headache is relieved.   Her BP has been low in the  "morning (110/55). She takes her losartan, carvedilol, and torsemide as ordered, but delays her AM doses of antihypertensives except the losartan.   She has had an episode of hypotension with PT (87/57).   Her hemoglobin is lower than normal, and she sees a hematologist for this.   From a preventative care standpoint, her last DEXA scan was a few years ago here at [place]. She is due for a DEXA scan and then also is due for a colonoscopy.       Review of Systems   Detailed as above       Objective    /67 (BP Location: Right arm, Patient Position: Sitting, Cuff Size: Adult Regular)   Pulse 71   Temp 97.5  F (36.4  C) (Temporal)   Resp 16   Ht 1.613 m (5' 3.5\")   Wt 79.8 kg (176 lb)   SpO2 98%   BMI 30.69 kg/m    There is no height or weight on file to calculate BMI.  Physical Exam  Constitutional:       Appearance: Normal appearance.   Eyes:      Comments: R medial subconjunctival hemorrhage noted   Neurological:      Mental Status: She is alert.   Psychiatric:         Mood and Affect: Mood normal.                I saw this patient in collaboration with Bibi Guajardo, [employer] Student.       I was present with the APRN/PA student who participated in the service and in the documentation of the services provided. I have verified the history and personally performed the physical exam and medical decision making, as documented by the student and edited by me.     Long Paris, APRN, CNP    Bibi Guajardo, [employer] Student      "

## 2021-09-20 ENCOUNTER — LAB (OUTPATIENT)
Dept: LAB | Facility: CLINIC | Age: 71
End: 2021-09-20
Payer: MEDICARE

## 2021-09-20 DIAGNOSIS — N39.0 URINARY TRACT INFECTION WITHOUT HEMATURIA, SITE UNSPECIFIED: ICD-10-CM

## 2021-09-20 PROCEDURE — 87086 URINE CULTURE/COLONY COUNT: CPT

## 2021-09-21 LAB — BACTERIA UR CULT: NO GROWTH

## 2021-09-25 ENCOUNTER — OFFICE VISIT (OUTPATIENT)
Dept: URGENT CARE | Facility: URGENT CARE | Age: 71
End: 2021-09-25
Payer: MEDICARE

## 2021-09-25 VITALS
OXYGEN SATURATION: 96 % | HEART RATE: 80 BPM | BODY MASS INDEX: 31.04 KG/M2 | WEIGHT: 178 LBS | TEMPERATURE: 98 F | DIASTOLIC BLOOD PRESSURE: 65 MMHG | SYSTOLIC BLOOD PRESSURE: 116 MMHG

## 2021-09-25 DIAGNOSIS — N25.81 SECONDARY RENAL HYPERPARATHYROIDISM (H): ICD-10-CM

## 2021-09-25 DIAGNOSIS — N18.32 STAGE 3B CHRONIC KIDNEY DISEASE (H): ICD-10-CM

## 2021-09-25 DIAGNOSIS — I15.0 RENOVASCULAR HYPERTENSION: ICD-10-CM

## 2021-09-25 DIAGNOSIS — N30.01 ACUTE CYSTITIS WITH HEMATURIA: ICD-10-CM

## 2021-09-25 DIAGNOSIS — R35.0 URINARY FREQUENCY: Primary | ICD-10-CM

## 2021-09-25 DIAGNOSIS — R03.0 ELEVATED BLOOD PRESSURE READING: ICD-10-CM

## 2021-09-25 LAB
BACTERIA #/AREA URNS HPF: ABNORMAL /HPF
CLUE CELLS: ABNORMAL
RBC #/AREA URNS AUTO: ABNORMAL /HPF
SQUAMOUS #/AREA URNS AUTO: ABNORMAL /LPF
TRICHOMONAS, WET PREP: ABNORMAL
WBC #/AREA URNS AUTO: >100 /HPF
WBC CLUMPS #/AREA URNS HPF: PRESENT /HPF
WBC'S/HIGH POWER FIELD, WET PREP: ABNORMAL
YEAST, WET PREP: ABNORMAL

## 2021-09-25 PROCEDURE — 87086 URINE CULTURE/COLONY COUNT: CPT | Performed by: PHYSICIAN ASSISTANT

## 2021-09-25 PROCEDURE — 99214 OFFICE O/P EST MOD 30 MIN: CPT

## 2021-09-25 PROCEDURE — 87088 URINE BACTERIA CULTURE: CPT | Performed by: PHYSICIAN ASSISTANT

## 2021-09-25 PROCEDURE — 87210 SMEAR WET MOUNT SALINE/INK: CPT

## 2021-09-25 PROCEDURE — 87186 SC STD MICRODIL/AGAR DIL: CPT | Performed by: PHYSICIAN ASSISTANT

## 2021-09-25 PROCEDURE — 81015 MICROSCOPIC EXAM OF URINE: CPT

## 2021-09-25 RX ORDER — CIPROFLOXACIN 500 MG/1
500 TABLET, FILM COATED ORAL 2 TIMES DAILY
Qty: 14 TABLET | Refills: 0 | Status: SHIPPED | OUTPATIENT
Start: 2021-09-25 | End: 2021-09-30

## 2021-09-25 ASSESSMENT — ENCOUNTER SYMPTOMS
FEVER: 0
VOMITING: 0
NAUSEA: 0
FLANK PAIN: 0
HEMATURIA: 0
DYSURIA: 1
FREQUENCY: 1

## 2021-09-25 NOTE — PROGRESS NOTES
SUBJECTIVE:   Rosa Sotomayor is a 70 year old female presenting with a chief complaint of   Chief Complaint   Patient presents with     Urgent Care     UTI     x 2 days stopped meds on sunday it came back last night       She is an established patient of [facility].  Patient presents with complaints of dysuria.  Patient was on IV abx at ED and 10 days of keflex.  She was off abx for 6 days.  Patient also recently did monistat due to yeast infection symptoms recently.   Patient has seen urology in past.  Has not had a UTI in a year, other than recently.        Treatment:  Pyridium, increased fluid intake.  Review of Systems   Constitutional: Negative for fever.   Gastrointestinal: Negative for nausea and vomiting.   Genitourinary: Positive for dysuria and frequency. Negative for flank pain and hematuria.   All other systems reviewed and are negative.      Past Medical History:   Diagnosis Date     Arthritis      Complication of anesthesia      Hypertension      Postmenopausal bleeding 2/28/2007     Sleep apnea      Family History   Problem Relation Age of Onset     Diabetes Mother         INSULIN     Hypertension Mother      Eye Disorder Mother         CATERACTS     Heart Disease Mother         CHF- OPEN HEART SURG X'S 2     Lipids Mother      Obesity Mother      Coronary Artery Disease Mother      Diabetes Father         ORAL     Hypertension Father      Arthritis Father      Eye Disorder Father         MAC DEGENERATION     Heart Disease Father         CHF     Lipids Father      Obesity Father      Diabetes Maternal Grandfather         INSULIN     Diabetes Brother         INSULIN     Gastrointestinal Disease Brother         CHOLITISI POSSIBLE CHRONES     Obesity Brother      Colon Cancer No family hx of      Breast Cancer No family hx of      Current Outpatient Medications   Medication Sig Dispense Refill     acetaminophen (TYLENOL) 500 MG tablet Take 500 mg by mouth       atorvastatin (LIPITOR) 40 MG tablet TAKE 1  TABLET(40 MG) BY MOUTH DAILY 90 tablet 3     carvedilol (COREG) 25 MG tablet Take 1 tablet (25 mg) by mouth 2 times daily 180 tablet 3     cephALEXin (KEFLEX) 500 MG capsule Take 4 capsules (2,000 mg) by mouth once as needed One hour prior to dental procedures Take 500 mg by mouth Take 4 capsules (2,000 mg) by mouth once as needed One hour prior to dental procedures 16 capsule 3     cetirizine (ZYRTEC) 10 MG tablet Take 10 mg by mouth       Cholecalciferol (VITAMIN D) 2000 UNITS tablet Take 1 tablet by mouth daily       ciprofloxacin (CIPRO) 500 MG tablet Take 1 tablet (500 mg) by mouth 2 times daily for 7 days 14 tablet 0     clobetasol (TEMOVATE) 0.05 % external solution Apply topically 2 times daily as needed  5     Cranberry POWD 1 capsule       Cyanocobalamin (B-12) 1000 MCG TBCR Take 1,000 mcg by mouth daily 100 tablet 1     diazepam (VALIUM) 5 MG tablet Take 0.5 tablets (2.5 mg) by mouth nightly as needed (vertigo) 10 tablet 0     doxycycline hyclate (VIBRA-TABS) 100 MG tablet Take 1 tablet (100 mg) by mouth 2 times daily Do not take within 2 hours of antacids or calcium. 14 tablet 0     EPINEPHrine (EPIPEN 2-FRAN) 0.3 MG/0.3ML injection 2-pack Inject 0.3 mLs (0.3 mg) into the muscle once as needed for anaphylaxis 0.6 mL 11     erenumab-aooe (AIMOVIG) 70 MG/ML injection Inject 70 mg Subcutaneous every 28 days       estradiol (VAGIFEM) 10 MCG TABS vaginal tablet        gemfibrozil (LOPID) 600 MG tablet Take 0.5 tablets (300 mg) by mouth 2 times daily 90 tablet 3     hydrOXYzine (ATARAX) 25 MG tablet Take 1 tablet (25 mg) by mouth 3 times daily as needed for itching 120 tablet 1     losartan (COZAAR) 50 MG tablet Take 1 tablet (50 mg) by mouth At Bedtime 90 tablet 3     Magnesium Oxide 250 MG TABS Take 250 mg by mouth       mometasone (ELOCON) 0.1 % external cream Apply topically daily       ondansetron (ZOFRAN) 4 MG tablet Take 1 tablet (4 mg) by mouth every 8 hours as needed for nausea 90 tablet 3      SUMAtriptan (IMITREX) 20 MG/ACT nasal spray Spray 1 spray in nostril as needed for migraine May repeat in 2 hours. Max 2 sprays/24 hours. 12 each 11     tiZANidine (ZANAFLEX) 2 MG tablet Take 1 tablet (2 mg) by mouth 3 times daily as needed for muscle spasms 42 tablet 3     topiramate (TOPAMAX) 100 MG tablet Take 1 tablet by mouth daily  3     torsemide (DEMADEX) 10 MG tablet Take 1 tablet (10 mg) by mouth daily - office visit due for further refills 90 tablet 3     ZOLMitriptan (ZOMIG-ZMT) 5 MG ODT tab Take 5 mg by mouth       Social History     Tobacco Use     Smoking status: Never Smoker     Smokeless tobacco: Never Used   Substance Use Topics     Alcohol use: No     Alcohol/week: 0.0 standard drinks       OBJECTIVE  BP (!) 149/75 (BP Location: Right arm, Patient Position: Sitting, Cuff Size: Adult Regular)   Pulse 80   Temp 98  F (36.7  C) (Oral)   Wt 80.7 kg (178 lb)   SpO2 96%   Breastfeeding No   BMI 31.04 kg/m      Physical Exam  Vitals and nursing note reviewed.   Constitutional:       Appearance: Normal appearance. She is normal weight.   Eyes:      Extraocular Movements: Extraocular movements intact.      Conjunctiva/sclera: Conjunctivae normal.   Cardiovascular:      Rate and Rhythm: Normal rate and regular rhythm.      Pulses: Normal pulses.      Heart sounds: Normal heart sounds.   Pulmonary:      Effort: Pulmonary effort is normal.      Breath sounds: Normal breath sounds.   Abdominal:      Tenderness: There is no right CVA tenderness or left CVA tenderness.   Skin:     General: Skin is warm and dry.   Neurological:      General: No focal deficit present.      Mental Status: She is alert.   Psychiatric:         Mood and Affect: Mood normal.         Behavior: Behavior normal.         Labs:  Results for orders placed or performed in visit on 09/25/21 (from the past 24 hour(s))   Wet preparation    Specimen: Vagina; Swab   Result Value Ref Range    Trichomonas Absent Absent    Yeast Absent Absent     Clue Cells Absent Absent    WBCs/high power field 3+ (A) None   Urine Microscopic Exam   Result Value Ref Range    Bacteria Urine Moderate (A) None Seen /HPF    RBC Urine 0-2 0-2 /HPF /HPF    WBC Urine >100 (A) 0-5 /HPF /HPF    Squamous Epithelials Urine Few (A) None Seen /LPF    WBC Clumps Urine Present (A) None Seen /HPF       X-Ray was not done.    ASSESSMENT:      ICD-10-CM    1. Urinary frequency  R35.0 Wet preparation     Urine Microscopic Exam     Urine Microscopic Exam     Urine Culture     Urine Culture     CANCELED: UA Macro with Reflex to Micro and Culture - lab collect   2. Acute cystitis with hematuria  N30.01 ciprofloxacin (CIPRO) 500 MG tablet   3. Secondary renal hyperparathyroidism (H)  N25.81    4. Renovascular hypertension  I15.0    5. Stage 3b chronic kidney disease (H)  N18.32    6. Elevated blood pressure reading  R03.0         Medical Decision Making:    Differential Diagnosis:  UTI: UTI, Dysuria, Pyelonephritis and Vaginitis    Serious Comorbid Conditions:  Adult:  as above    PLAN:  Rx for cipro.  Urine culture pending.  Will recheck BP.  Patient is scheduled for a repeat PAP and will have a UA repeated at that time.    Discussed reasons to seek immediate medical attention.        Followup:    If not improving or if condition worsens, follow up with your Primary Care Provider, If not improving or if conditions worsens over the next 12-24 hours, go to the Emergency Department    There are no Patient Instructions on file for this visit.

## 2021-09-26 LAB — BACTERIA UR CULT: ABNORMAL

## 2021-09-30 ENCOUNTER — TELEPHONE (OUTPATIENT)
Dept: FAMILY MEDICINE | Facility: CLINIC | Age: 71
End: 2021-09-30

## 2021-09-30 DIAGNOSIS — N30.01 ACUTE CYSTITIS WITH HEMATURIA: ICD-10-CM

## 2021-09-30 RX ORDER — CIPROFLOXACIN 500 MG/1
500 TABLET, FILM COATED ORAL 2 TIMES DAILY
Qty: 6 TABLET | Refills: 0 | Status: SHIPPED | OUTPATIENT
Start: 2021-09-30 | End: 2021-11-23

## 2021-09-30 NOTE — TELEPHONE ENCOUNTER
Patient called -     Has recurring UTIs, went to UC on 9/25/21 for this     Was given 7 day course of Cipro and UC provider told her if it doesn't resolve symptoms call PCP to ask for additional tabs     States it just feels like it's kicking in now and she is almost done, asking if she can get an additional 3 days to complete a 10 day course?     Please advise     Urmila IZQUIERDO RN

## 2021-10-04 ENCOUNTER — LAB (OUTPATIENT)
Dept: INFUSION THERAPY | Facility: CLINIC | Age: 71
End: 2021-10-04
Attending: INTERNAL MEDICINE
Payer: MEDICARE

## 2021-10-04 DIAGNOSIS — D64.9 NORMOCYTIC ANEMIA: ICD-10-CM

## 2021-10-04 LAB
ERYTHROCYTE [DISTWIDTH] IN BLOOD BY AUTOMATED COUNT: 13.3 % (ref 10–15)
FERRITIN SERPL-MCNC: 834 NG/ML (ref 8–252)
HCT VFR BLD AUTO: 29.8 % (ref 35–47)
HGB BLD-MCNC: 9.2 G/DL (ref 11.7–15.7)
IRON SATN MFR SERPL: 35 % (ref 15–46)
IRON SERPL-MCNC: 97 UG/DL (ref 35–180)
MCH RBC QN AUTO: 31.4 PG (ref 26.5–33)
MCHC RBC AUTO-ENTMCNC: 30.9 G/DL (ref 31.5–36.5)
MCV RBC AUTO: 102 FL (ref 78–100)
PLATELET # BLD AUTO: 219 10E3/UL (ref 150–450)
RBC # BLD AUTO: 2.93 10E6/UL (ref 3.8–5.2)
TIBC SERPL-MCNC: 275 UG/DL (ref 240–430)
WBC # BLD AUTO: 3.7 10E3/UL (ref 4–11)

## 2021-10-04 PROCEDURE — 36415 COLL VENOUS BLD VENIPUNCTURE: CPT

## 2021-10-04 PROCEDURE — 85014 HEMATOCRIT: CPT | Performed by: INTERNAL MEDICINE

## 2021-10-04 PROCEDURE — 83550 IRON BINDING TEST: CPT | Performed by: INTERNAL MEDICINE

## 2021-10-04 PROCEDURE — 82728 ASSAY OF FERRITIN: CPT | Performed by: INTERNAL MEDICINE

## 2021-10-04 NOTE — PROGRESS NOTES
Medical Assistant Note:  Rosa Sotomayor presents today for blood draw.    Patient seen by provider today: No.   present during visit today: Not Applicable.    Concerns: No Concerns.    Procedure:  Lab draw site: lac, Needle type: bf, Gauge: 23.    Post Assessment:  Labs drawn without difficulty: Yes.    Discharge Plan:  Departure Mode: Ambulatory.    Face to Face Time: 5 min.    Fany Mccarty, CMA

## 2021-10-05 NOTE — RESULT ENCOUNTER NOTE
Dear MsAsia Sotomayor,    Hemoglobin is lower at 9.2. WBC is mildly low at 3.7. We will discuss more during appointment.    Please, call me with any questions.    Nico Calderon MD

## 2021-10-06 ENCOUNTER — VIRTUAL VISIT (OUTPATIENT)
Dept: ONCOLOGY | Facility: CLINIC | Age: 71
End: 2021-10-06
Attending: INTERNAL MEDICINE
Payer: MEDICARE

## 2021-10-06 DIAGNOSIS — N18.32 STAGE 3B CHRONIC KIDNEY DISEASE (H): Primary | ICD-10-CM

## 2021-10-06 PROCEDURE — 99214 OFFICE O/P EST MOD 30 MIN: CPT | Mod: 95 | Performed by: INTERNAL MEDICINE

## 2021-10-06 NOTE — LETTER
10/6/2021         RE: Rosa Sotomayor  [address] [facility]  [place] [address]        Dear Colleague,    Thank you for referring your patient, Rosa Sotomayor, to the [facility]. Please see a copy of my visit note below.    Rosa is a 70 year old who is being evaluated via a billable video visit.  Issues with blood pressure being low and feeling light headed.     How would you like to obtain your AVS? MyChart  If the video visit is dropped, the invitation should be resent by: Send to e-mail at: [email or web address]  Will anyone else be joining your video visit? No          HEMATOLOGY HISTORY: Ms. Sotomayor is a female with chronic anemia.  Her anemia is due to chronic renal disease and chronic disease.   1.  Multiple labs were done on 03/13/2019.   -WBC 3.5, hemoglobin 10.8 and platelets of 204.  MCV of 96.   -Creatinine of 1.26.   -Normal calcium.   -Normal LFT.   -Normal folate.   -Normal vitamin B12.   -Normal iron. Elevated ferritin.   -Erythropoietin mildly elevated at 38.   -Normal LDH.   -Soluable transferrin receptor is mildly elevated at 5.2.   -Normal haptoglobin.   2. On 11/29/2019, WBC of 4.3, hemoglobin 9.8 and platelets 230.  MCV of 103.   3. On 07/08/2019, CT scan did not reveal any splenomegaly or lymphadenopathy.   4. Bone marrow biopsy was done on 12/18/2019.  It reveals normal cellular bone marrow for age with 30% cellularity.  There is trilineage hematopoiesis.  No evidence of MDS.  Increased storage iron.  Cytogenetics is normal.  Flow cytometry is normal.  5. IV injectafer in 03/2020.  6. On 09/09/2021, hemoglobin of 9.5.  -On 10/04/2021, hemoglobin of 9.2.    SUBJECTIVE:  Ms. Sotomayor is a 70-year-old female with chronic anemia.  Anemia is due to renal disease and also from anemia of chronic disease.     The patient lately has not been feeling well.  She has been more fatigued.  Some headache.  She gets lightheadedness.  She gets short of breath on exertion.  No  nausea or vomiting. No bleeding.  No fever or chills.  All other review of systems is negative.     PHYSICAL EXAMINATION:    GENERAL:  She is alert and oriented x3.    VITAL SIGNS: Reviewed. Not in any distress.   Rest of systems not examined.     LABS:  CBC and iron studies reviewed.     ASSESSMENT:    1.  A 70-year-old female with worsening anemia.  2.  Mild leukopenia.  3.  Elevated ferritin.  4.  Fatigue.  5.  Shortness of breath on exertion.     PLAN:    1.  Labs were reviewed with her.  I explained to her anemia has gotten worse.  Anemia is mainly due to renal disease and also due to anemia of chronic disease.  The patient is symptomatic from anemia.  Anemia is getting worse.  Discussed regarding starting erythropoietin stimulating agent.  She is agreeable for it.  Side effect including blood clot, heart attack, stroke, high blood pressure, aches and pain, and other side effects discussed.  We will plan on starting her on iron once approved by insurance company.  2.  She has mild leukopenia.  Previously, she has been intermittently leukopenic.  We will monitor it.  If it gets worse, then further investigation including a bone marrow biopsy will be done.  3.  She is symptomatic.  I am hoping some of this will improve with improvement in anemia.  I explained to the patient that sometimes anemia might improve but symptoms do not improve.   4.  She had few questions, which were all answered.  I will see her in 3 months' time.  I advised her to call us with any questions or concerns.     Total video visit time of 30 minutes.  Time spent in today's visit, review of chart/investigations today and documentation.       This office note has been dictated.          Again, thank you for allowing me to participate in the care of your patient.        Sincerely,        Nico Calderon MD

## 2021-10-06 NOTE — PROGRESS NOTES
Rosa is a 70 year old who is being evaluated via a billable video visit.  Issues with blood pressure being low and feeling light headed.     How would you like to obtain your AVS? MyChart  If the video visit is dropped, the invitation should be resent by: Send to e-mail at: [email or web address]  Will anyone else be joining your video visit? No

## 2021-10-06 NOTE — LETTER
10/6/2021         RE: Rosa Sotomayor  [address] [facility]  [place] [address]        Dear Colleague,    Thank you for referring your patient, Rosa Sotomayor, to the [facility]. Please see a copy of my visit note below.    Rosa is a 70 year old who is being evaluated via a billable video visit.  Issues with blood pressure being low and feeling light headed.     How would you like to obtain your AVS? MyChart  If the video visit is dropped, the invitation should be resent by: Send to e-mail at: [email or web address]  Will anyone else be joining your video visit? No          HEMATOLOGY HISTORY: Ms. Sotomayor is a female with chronic anemia.  Her anemia is due to chronic renal disease and chronic disease.   1.  Multiple labs were done on 03/13/2019.   -WBC 3.5, hemoglobin 10.8 and platelets of 204.  MCV of 96.   -Creatinine of 1.26.   -Normal calcium.   -Normal LFT.   -Normal folate.   -Normal vitamin B12.   -Normal iron. Elevated ferritin.   -Erythropoietin mildly elevated at 38.   -Normal LDH.   -Soluable transferrin receptor is mildly elevated at 5.2.   -Normal haptoglobin.   2. On 11/29/2019, WBC of 4.3, hemoglobin 9.8 and platelets 230.  MCV of 103.   3. On 07/08/2019, CT scan did not reveal any splenomegaly or lymphadenopathy.   4. Bone marrow biopsy was done on 12/18/2019.  It reveals normal cellular bone marrow for age with 30% cellularity.  There is trilineage hematopoiesis.  No evidence of MDS.  Increased storage iron.  Cytogenetics is normal.  Flow cytometry is normal.  5. IV injectafer in 03/2020.  6. On 09/09/2021, hemoglobin of 9.5.  -On 10/04/2021, hemoglobin of 9.2.    SUBJECTIVE:  Ms. Sotomayor is a 70-year-old female with chronic anemia.  Anemia is due to renal disease and also from anemia of chronic disease.     The patient lately has not been feeling well.  She has been more fatigued.  Some headache.  She gets lightheadedness.  She gets short of breath on exertion.  No  nausea or vomiting. No bleeding.  No fever or chills.  All other review of systems is negative.     PHYSICAL EXAMINATION:    GENERAL:  She is alert and oriented x3.    VITAL SIGNS: Reviewed. Not in any distress.   Rest of systems not examined.     LABS:  CBC and iron studies reviewed.     ASSESSMENT:    1.  A 70-year-old female with worsening anemia.  2.  Mild leukopenia.  3.  Elevated ferritin.  4.  Fatigue.  5.  Shortness of breath on exertion.     PLAN:    1.  Labs were reviewed with her.  I explained to her anemia has gotten worse.  Anemia is mainly due to renal disease and also due to anemia of chronic disease.  The patient is symptomatic from anemia.  Anemia is getting worse.  Discussed regarding starting erythropoietin stimulating agent.  She is agreeable for it.  Side effect including blood clot, heart attack, stroke, high blood pressure, aches and pain, and other side effects discussed.  We will plan on starting her on iron once approved by insurance company.  2.  She has mild leukopenia.  Previously, she has been intermittently leukopenic.  We will monitor it.  If it gets worse, then further investigation including a bone marrow biopsy will be done.  3.  She is symptomatic.  I am hoping some of this will improve with improvement in anemia.  I explained to the patient that sometimes anemia might improve but symptoms do not improve.   4.  She had few questions, which were all answered.  I will see her in 3 months' time.  I advised her to call us with any questions or concerns.     Total video visit time of 30 minutes.  Time spent in today's visit, review of chart/investigations today and documentation.       This office note has been dictated.          Again, thank you for allowing me to participate in the care of your patient.        Sincerely,        Nico Calderon MD

## 2021-10-07 ENCOUNTER — TELEPHONE (OUTPATIENT)
Dept: FAMILY MEDICINE | Facility: CLINIC | Age: 71
End: 2021-10-07

## 2021-10-07 NOTE — TELEPHONE ENCOUNTER
Patient called with message to Dr. Hollingsworth, she saw rosa maria a few weeks ago in clinic and she lowered dose on losartan and it is still running low  Patients hemoglobin has been running low as well and Dr. Calderon from oncology recommended injections to stimulate blood and told her to have her kidney doctor follow her BP since this treatment can raise BP     She would like to know if she should follow you for her low BP or her kidney doctor and at what frequency?     Okay to leave detailed message    Simon Stanley RN

## 2021-10-07 NOTE — TELEPHONE ENCOUNTER
Pt was called and future OV was scheduled with PCP. Advised pt keep a log of BP readings to review with PCP. When asked what BP readings she is getting, pt states BP readings have been around 100/60, 101/60 and 95/60. Pt was advised to hold BP medications if BP below 90/50 and call clinic.       Pt agreed to keep a lod and call with BP update.

## 2021-10-10 NOTE — PROGRESS NOTES
HEMATOLOGY HISTORY: Ms. Sotomayor is a female with chronic anemia.  Her anemia is due to chronic renal disease and chronic disease.   1.  Multiple labs were done on 03/13/2019.   -WBC 3.5, hemoglobin 10.8 and platelets of 204.  MCV of 96.   -Creatinine of 1.26.   -Normal calcium.   -Normal LFT.   -Normal folate.   -Normal vitamin B12.   -Normal iron. Elevated ferritin.   -Erythropoietin mildly elevated at 38.   -Normal LDH.   -Soluable transferrin receptor is mildly elevated at 5.2.   -Normal haptoglobin.   2. On 11/29/2019, WBC of 4.3, hemoglobin 9.8 and platelets 230.  MCV of 103.   3. On 07/08/2019, CT scan did not reveal any splenomegaly or lymphadenopathy.   4. Bone marrow biopsy was done on 12/18/2019.  It reveals normal cellular bone marrow for age with 30% cellularity.  There is trilineage hematopoiesis.  No evidence of MDS.  Increased storage iron.  Cytogenetics is normal.  Flow cytometry is normal.  5. IV injectafer in 03/2020.  6. On 09/09/2021, hemoglobin of 9.5.  -On 10/04/2021, hemoglobin of 9.2.    SUBJECTIVE:  Ms. Sotomayor is a 70-year-old female with chronic anemia.  Anemia is due to renal disease and also from anemia of chronic disease.     The patient lately has not been feeling well.  She has been more fatigued.  Some headache.  She gets lightheadedness.  She gets short of breath on exertion.  No nausea or vomiting. No bleeding.  No fever or chills.  All other review of systems is negative.     PHYSICAL EXAMINATION:    GENERAL:  She is alert and oriented x3.    VITAL SIGNS: Reviewed. Not in any distress.   Rest of systems not examined.     LABS:  CBC and iron studies reviewed.     ASSESSMENT:    1.  A 70-year-old female with worsening anemia.  2.  Mild leukopenia.  3.  Elevated ferritin.  4.  Fatigue.  5.  Shortness of breath on exertion.     PLAN:    1.  Labs were reviewed with her.  I explained to her anemia has gotten worse.  Anemia is mainly due to renal disease and also due to anemia of chronic  disease.  The patient is symptomatic from anemia.  Anemia is getting worse.  Discussed regarding starting erythropoietin stimulating agent.  She is agreeable for it.  Side effect including blood clot, heart attack, stroke, high blood pressure, aches and pain, and other side effects discussed.  We will plan on starting her on iron once approved by insurance company.  2.  She has mild leukopenia.  Previously, she has been intermittently leukopenic.  We will monitor it.  If it gets worse, then further investigation including a bone marrow biopsy will be done.  3.  She is symptomatic.  I am hoping some of this will improve with improvement in anemia.  I explained to the patient that sometimes anemia might improve but symptoms do not improve.   4.  She had few questions, which were all answered.  I will see her in 3 months' time.  I advised her to call us with any questions or concerns.     Total video visit time of 30 minutes.  Time spent in today's visit, review of chart/investigations today and documentation.

## 2021-10-15 ENCOUNTER — ALLIED HEALTH/NURSE VISIT (OUTPATIENT)
Dept: INFUSION THERAPY | Facility: CLINIC | Age: 71
End: 2021-10-15
Attending: INTERNAL MEDICINE
Payer: MEDICARE

## 2021-10-15 VITALS
SYSTOLIC BLOOD PRESSURE: 129 MMHG | TEMPERATURE: 98.2 F | OXYGEN SATURATION: 97 % | DIASTOLIC BLOOD PRESSURE: 62 MMHG | HEART RATE: 67 BPM | RESPIRATION RATE: 16 BRPM

## 2021-10-15 DIAGNOSIS — N18.32 STAGE 3B CHRONIC KIDNEY DISEASE (H): Primary | ICD-10-CM

## 2021-10-15 DIAGNOSIS — D64.9 NORMOCYTIC ANEMIA: ICD-10-CM

## 2021-10-15 LAB
BASOPHILS # BLD AUTO: 0 10E3/UL (ref 0–0.2)
BASOPHILS NFR BLD AUTO: 1 %
EOSINOPHIL # BLD AUTO: 0.3 10E3/UL (ref 0–0.7)
EOSINOPHIL NFR BLD AUTO: 9 %
ERYTHROCYTE [DISTWIDTH] IN BLOOD BY AUTOMATED COUNT: 12.9 % (ref 10–15)
HCT VFR BLD AUTO: 31.2 % (ref 35–47)
HGB BLD-MCNC: 9.7 G/DL (ref 11.7–15.7)
IMM GRANULOCYTES # BLD: 0 10E3/UL
IMM GRANULOCYTES NFR BLD: 0 %
LYMPHOCYTES # BLD AUTO: 1.4 10E3/UL (ref 0.8–5.3)
LYMPHOCYTES NFR BLD AUTO: 41 %
MCH RBC QN AUTO: 32.1 PG (ref 26.5–33)
MCHC RBC AUTO-ENTMCNC: 31.1 G/DL (ref 31.5–36.5)
MCV RBC AUTO: 103 FL (ref 78–100)
MONOCYTES # BLD AUTO: 0.3 10E3/UL (ref 0–1.3)
MONOCYTES NFR BLD AUTO: 8 %
NEUTROPHILS # BLD AUTO: 1.5 10E3/UL (ref 1.6–8.3)
NEUTROPHILS NFR BLD AUTO: 41 %
NRBC # BLD AUTO: 0 10E3/UL
NRBC BLD AUTO-RTO: 0 /100
PLATELET # BLD AUTO: 210 10E3/UL (ref 150–450)
RBC # BLD AUTO: 3.02 10E6/UL (ref 3.8–5.2)
WBC # BLD AUTO: 3.5 10E3/UL (ref 4–11)

## 2021-10-15 PROCEDURE — 36415 COLL VENOUS BLD VENIPUNCTURE: CPT

## 2021-10-15 PROCEDURE — 85025 COMPLETE CBC W/AUTO DIFF WBC: CPT | Performed by: INTERNAL MEDICINE

## 2021-10-15 PROCEDURE — 250N000011 HC RX IP 250 OP 636: Mod: EC | Performed by: INTERNAL MEDICINE

## 2021-10-15 PROCEDURE — 96372 THER/PROPH/DIAG INJ SC/IM: CPT | Performed by: INTERNAL MEDICINE

## 2021-10-15 RX ADMIN — DARBEPOETIN ALFA 40 MCG: 40 INJECTION, SOLUTION INTRAVENOUS; SUBCUTANEOUS at 11:00

## 2021-10-15 ASSESSMENT — PAIN SCALES - GENERAL: PAINLEVEL: NO PAIN (0)

## 2021-10-15 NOTE — PROGRESS NOTES
Infusion Nursing Note:  Rosa FADY Bran presents today for aranesp.    Patient seen by provider today: No   present during visit today: Not Applicable.    Note: First aranesp; written info given and reviewed.      Intravenous Access:  No Intravenous access/labs at this visit.    Treatment Conditions:  Lab Results   Component Value Date    HGB 9.7 (L) 10/15/2021    WBC 3.5 (L) 10/15/2021    ANEU 2.4 07/01/2021    ANEUTAUTO 1.5 (L) 10/15/2021     10/15/2021          Post Infusion Assessment:  Patient tolerated injection without incident.  Site patent and intact, free from redness, edema or discomfort.       Discharge Plan:   AVS to patient via MYCHART.  Patient will return as [facility] for next appointment.   Patient discharged in stable condition accompanied by: self.  Departure Mode: Ambulatory.      Aditya Packer RN

## 2021-10-23 DIAGNOSIS — N18.32 STAGE 3B CHRONIC KIDNEY DISEASE (H): Primary | ICD-10-CM

## 2021-10-27 ENCOUNTER — OFFICE VISIT (OUTPATIENT)
Dept: FAMILY MEDICINE | Facility: CLINIC | Age: 71
End: 2021-10-27
Payer: MEDICARE

## 2021-10-27 VITALS
WEIGHT: 178 LBS | BODY MASS INDEX: 30.39 KG/M2 | TEMPERATURE: 97.1 F | DIASTOLIC BLOOD PRESSURE: 60 MMHG | HEART RATE: 71 BPM | RESPIRATION RATE: 16 BRPM | HEIGHT: 64 IN | OXYGEN SATURATION: 99 % | SYSTOLIC BLOOD PRESSURE: 123 MMHG

## 2021-10-27 DIAGNOSIS — M79.605 PAIN IN BOTH LOWER EXTREMITIES: ICD-10-CM

## 2021-10-27 DIAGNOSIS — M79.604 PAIN IN BOTH LOWER EXTREMITIES: ICD-10-CM

## 2021-10-27 DIAGNOSIS — Z12.11 COLON CANCER SCREENING: ICD-10-CM

## 2021-10-27 DIAGNOSIS — I10 BENIGN ESSENTIAL HYPERTENSION: Primary | ICD-10-CM

## 2021-10-27 DIAGNOSIS — E78.1 HYPERTRIGLYCERIDEMIA: ICD-10-CM

## 2021-10-27 PROCEDURE — 99213 OFFICE O/P EST LOW 20 MIN: CPT | Performed by: INTERNAL MEDICINE

## 2021-10-27 RX ORDER — TIZANIDINE 2 MG/1
2 TABLET ORAL 3 TIMES DAILY PRN
Qty: 42 TABLET | Refills: 3 | Status: SHIPPED | OUTPATIENT
Start: 2021-10-27 | End: 2022-08-18

## 2021-10-27 ASSESSMENT — MIFFLIN-ST. JEOR: SCORE: 1314.46

## 2021-10-27 NOTE — PROGRESS NOTES
"  Assessment & Plan     Benign essential hypertension  I think her blood pressure is OK on her current regimen       Hypertriglyceridemia  OK control of lopid ; recheck in June     Pain in both lower extremities  Refill below   - tiZANidine (ZANAFLEX) 2 MG tablet; Take 1 tablet (2 mg) by mouth 3 times daily as needed for muscle spasms    Colon cancer screening  Due for follow up exam ; referred   - Adult Gastro Ref - Procedure Only; Future      23 minutes spent on the date of the encounter doing chart review, history and exam, documentation and further activities per the note           Return in about 8 months (around 6/27/2022) for Preventive Visit.    Josh Hollingsworth MD  [facility] EBER Lee is a 70 year old who presents for the following health issues     HPI       Follow up blood pressure   Home blood pressure readings are excellent  100's to 120's over 50's to 60's on 50 mg of losartan   She had questions about her triglycerides and her 's A1c  She is getting aranesp injections now  Her WBC is a little low now too, she is following with Dr. Calderon     Review of Systems         Objective    /60   Pulse 71   Temp 97.1  F (36.2  C) (Tympanic)   Resp 16   Ht 1.613 m (5' 3.5\")   Wt 80.7 kg (178 lb)   SpO2 99%   Breastfeeding No   BMI 31.04 kg/m    Body mass index is 31.04 kg/m .  Physical Exam   Well appearing                 "

## 2021-11-12 ENCOUNTER — LAB (OUTPATIENT)
Dept: INFUSION THERAPY | Facility: CLINIC | Age: 71
End: 2021-11-12
Attending: INTERNAL MEDICINE
Payer: MEDICARE

## 2021-11-12 DIAGNOSIS — D64.9 NORMOCYTIC ANEMIA: ICD-10-CM

## 2021-11-12 DIAGNOSIS — N18.32 STAGE 3B CHRONIC KIDNEY DISEASE (H): Primary | ICD-10-CM

## 2021-11-12 LAB
BASOPHILS # BLD AUTO: 0 10E3/UL (ref 0–0.2)
BASOPHILS NFR BLD AUTO: 1 %
EOSINOPHIL # BLD AUTO: 0.2 10E3/UL (ref 0–0.7)
EOSINOPHIL NFR BLD AUTO: 5 %
ERYTHROCYTE [DISTWIDTH] IN BLOOD BY AUTOMATED COUNT: 12.8 % (ref 10–15)
HCT VFR BLD AUTO: 31.8 % (ref 35–47)
HCT VFR BLD AUTO: 32.2 % (ref 35–47)
HGB BLD-MCNC: 10.2 G/DL (ref 11.7–15.7)
HGB BLD-MCNC: 10.3 G/DL (ref 11.7–15.7)
IMM GRANULOCYTES # BLD: 0 10E3/UL
IMM GRANULOCYTES NFR BLD: 0 %
LYMPHOCYTES # BLD AUTO: 1.7 10E3/UL (ref 0.8–5.3)
LYMPHOCYTES NFR BLD AUTO: 40 %
MCH RBC QN AUTO: 31.5 PG (ref 26.5–33)
MCHC RBC AUTO-ENTMCNC: 31.7 G/DL (ref 31.5–36.5)
MCV RBC AUTO: 99 FL (ref 78–100)
MONOCYTES # BLD AUTO: 0.4 10E3/UL (ref 0–1.3)
MONOCYTES NFR BLD AUTO: 9 %
NEUTROPHILS # BLD AUTO: 1.9 10E3/UL (ref 1.6–8.3)
NEUTROPHILS NFR BLD AUTO: 45 %
NRBC # BLD AUTO: 0 10E3/UL
NRBC BLD AUTO-RTO: 0 /100
PLATELET # BLD AUTO: 247 10E3/UL (ref 150–450)
RBC # BLD AUTO: 3.24 10E6/UL (ref 3.8–5.2)
WBC # BLD AUTO: 4.2 10E3/UL (ref 4–11)

## 2021-11-12 PROCEDURE — 85025 COMPLETE CBC W/AUTO DIFF WBC: CPT | Performed by: INTERNAL MEDICINE

## 2021-11-12 PROCEDURE — 36415 COLL VENOUS BLD VENIPUNCTURE: CPT

## 2021-11-12 PROCEDURE — 85014 HEMATOCRIT: CPT | Mod: 91 | Performed by: INTERNAL MEDICINE

## 2021-11-12 PROCEDURE — 36415 COLL VENOUS BLD VENIPUNCTURE: CPT | Performed by: INTERNAL MEDICINE

## 2021-11-12 NOTE — PROGRESS NOTES
Pt hemoglobin 10.3. Discussed with Foster. Hold for today, send message to Dr. Calderon to see when he would like patient to return for a lab recheck.

## 2021-11-12 NOTE — ADDENDUM NOTE
Addended by: JOE ALEXIS on: 11/12/2021 09:45 AM     Modules accepted: Orders     I reviewed the chart and examined the patient with the resident and we discussed the findings and treatment plan.  The patient is tolerating the rehab program well. I agree with the findings and treatment plan above, which I modified as indicated. The patient requires 3 hrs a day of acute inpatient rehab.     77 yo M with PMH of CAD s/p CABG x3 2017 ([facility]), CVA 2017 with residual L sided weakness, HTN, DM, HLD, GERD, BPH presented for dizziness and multiple falls. Rehab of acute lacunar infarcts in the deep white matter of the left frontal lobe at the level of the centrum semiovale and chronic left sided weakness from CVA of 2017.    # Rehab of chronic left hemiparesis (dominant), and dysarthria and dysphagia with decline in function and falls, found to have an acute left subcortical infarct.   - MRI Acute lacunar infarcts involving the deep white matter of the left frontal lobe at the level of the centrum semiovale. No acute hemorrhage.  - Neurology was consulted and no intervention was recommended; no intervention at this time. Patient can Follow up with neurology at the clinic, as per conversation with neuro PA. Was already on DAPT and high dose statin.  - PT/OT/SLT/Neuropsych therapies   - Loop recorder placement  8/4/21   - Ambulates with CG assist w/ device - making gains. Continue rehab program.  - prepared for discharge home tomorrow     # Dizziness and fall 2/2 orthostatic hypotension   - Now controlled  - continue with HI stockings and abdominal binder  - continue midodrine 2x a day  - home medications of flomax, tizanidine were held by medicine team prior to rehab admission; valium was changed from 10 mg standing to 5 mg PRN by medicine per neurology recommendations for anxiety  - Became symptomatic again after 1 dose of Uroxatral, now discontinued. No further episodes of dizziness.     #S/P BLE pain likely secondary to PAD   - Significant atherosclerotic disease noted in bilateral common femoral arteries  - Patient's family and vascular team agreed to not proceed with intervention at this time   - no symptoms reported since on Rehab    # CAD s/p CABG 2009  - 6/2021: patent LIMA to LAD , patent SVG to OM , % , filled distally by collaterals from LAD.  - Continue DAPT ( Aspirin 81 mg daily and Plavix 75 mg daily ), ARB, Imdur, Ranexa, B-Blocker, Statin Therapy  - Cardio f/u OP     # HTN  - now controlled  - losartan 100mg discontinued for SAMANTA   - c/w nifedipine 60mg xL q24h   - Need to keep BP in higher range given symptomatic orthostasis    #HLD  - continue with atorvastatin and fenofibrate     # CKD 3  # Urinary retention  - Cr 1.3 in 2017, 1.5 in 2019, 1.7 in June; Stable. Now 1.9 and stable  - Renal bladder US showed simple cysts within both kidneys without hydronephrosis or nephrolithiasis   - Monitor Cr, IVF as needed   - Has been stable  - Unable to tolerate alpha blocker secondary to orthostasis  - Having increased spontaneous void.  Still receiving PVR q8hrs prn with strt cath prn   - having increased urinary frequency with decreased PVRs. Monitor  - Standing up to urinate q2h to urinate  - PVR scans / CIC discontinued 8/16    # DM type 2  - good control on carb consistent diet alone.     # Depression   - venlafaxine     # Anxiety   - Valium PRN as above    # Maintenance   - GI/Bowel Mgmt: miralax ,senna   - Bladder management: frequent urinations, standing up q2h to void. PVR scans and CIC dc'd 8/16.  - Skin: No active issues at this time  - FEN: monitor electrolytes, replete as needed   - Diet: Diet, DASH/TLC: Sodium & Cholesterol Restricted, Consistent Carbohydrate No Snacks (07-28-21 @ 15:46) [Active]    # Precautions / PROPHYLAXIS:    - Fall precaution   - Ortho: Weight bearing status: WBAT  - DVT prophylaxis: SC heparin

## 2021-11-13 DIAGNOSIS — N18.32 STAGE 3B CHRONIC KIDNEY DISEASE (H): Primary | ICD-10-CM

## 2021-11-22 ENCOUNTER — TELEPHONE (OUTPATIENT)
Dept: SLEEP MEDICINE | Facility: CLINIC | Age: 71
End: 2021-11-22
Payer: COMMERCIAL

## 2021-11-22 NOTE — TELEPHONE ENCOUNTER
Patient call regarding [employer] recall for their pap device.    Device type:: Auto CPAP    Supplemental oxygen: No , if yes moved to advanced device workflow.     [facility]     Age of your current device:  less than 5 years old    History review:     Does the patient have the following?      COPD No     Hypoventilation No    Pulmonary hypertension No    Neuromuscular disease related respiratory problems No    History of past or present cardiac arrhythmia  No    History of heart failure  No    Recent hospitalization for breathing problems No       Other concerns:    DOT license requiring treatment of obstructive sleep apnea occupation that requires operation of hazardous equipment  No    Extreme sleepiness or drowsy driving prior to using CPAP or BiPAP treatment? No       Discontinuation of PAP therapy would lead to substantial deterioration of functional status or quality of life. Unsure at this time.         Gave instructions for both continued use of the current device and stopping use of the current device.       If yes to any of the questions      Advised patient to continue using therapy until device is replaced or repaired.    Advised patient to avoid unapproved cleaning methods, such as ozone (see FDA safety communication on use of ozone ).    Has patient registered device? Yes Advised patient to register for repair or replacement on the [employer] website.  Patient can call [employer] at [phone] for additional support.  Only registered last     Bacterial filters to reduce exposure to particulates are sometimes cumbersome to use and are not currently recommended by the .If you choose to use a bacterial filter, consider discontinuing using humidity with the filter.     Does the patient feel they still need to have further discussion with provider ?   No     Please contact your DME to see if you are eligible to receive a new  device if it is over 5 years old.  You may also choose to pay out of pocket for a new device, if your insurance does not cover a new device at this time.            If no to all questions:     Advised patient to discontinue use of the device.     Has patient registered device? Yes Advised patient to register for repair or replacement on the [employer] website.  Patient can call [employer] at [phone] for additional support.    Get another device that is not impacted by recall if possible. Please contact your DME to see if you are eligible to receive a new device if it is over 5 years old.     Discuss alternative treatments, including positional therapy, oral appliance therapy, and surgery.       Discussed behavioral strategies such as weight loss, exercise, and avoidance of alcohol and sedatives before bedtime.    Does the patient have additional questions or concerns to be sent to the provider at this time?  No    Plan :     Patient chooses to discontinue therapy at this time  She was given information on both recommendations incase she needs to back to using due to a decrease in quality of life.

## 2021-11-23 ENCOUNTER — OFFICE VISIT (OUTPATIENT)
Dept: FAMILY MEDICINE | Facility: CLINIC | Age: 71
End: 2021-11-23
Payer: MEDICARE

## 2021-11-23 VITALS
HEART RATE: 77 BPM | DIASTOLIC BLOOD PRESSURE: 68 MMHG | OXYGEN SATURATION: 97 % | WEIGHT: 180 LBS | BODY MASS INDEX: 30.73 KG/M2 | TEMPERATURE: 97.1 F | RESPIRATION RATE: 16 BRPM | SYSTOLIC BLOOD PRESSURE: 125 MMHG | HEIGHT: 64 IN

## 2021-11-23 DIAGNOSIS — R39.89 URINARY PROBLEM: ICD-10-CM

## 2021-11-23 DIAGNOSIS — N30.00 ACUTE CYSTITIS WITHOUT HEMATURIA: Primary | ICD-10-CM

## 2021-11-23 DIAGNOSIS — Z88.9 MULTIPLE DRUG ALLERGIES: ICD-10-CM

## 2021-11-23 LAB
BACTERIA #/AREA URNS HPF: ABNORMAL /HPF
RBC #/AREA URNS AUTO: ABNORMAL /HPF
WBC #/AREA URNS AUTO: >100 /HPF

## 2021-11-23 PROCEDURE — 87186 SC STD MICRODIL/AGAR DIL: CPT | Performed by: INTERNAL MEDICINE

## 2021-11-23 PROCEDURE — 87086 URINE CULTURE/COLONY COUNT: CPT | Performed by: INTERNAL MEDICINE

## 2021-11-23 PROCEDURE — 81015 MICROSCOPIC EXAM OF URINE: CPT | Performed by: INTERNAL MEDICINE

## 2021-11-23 PROCEDURE — 99214 OFFICE O/P EST MOD 30 MIN: CPT | Performed by: INTERNAL MEDICINE

## 2021-11-23 PROCEDURE — 87088 URINE BACTERIA CULTURE: CPT | Performed by: INTERNAL MEDICINE

## 2021-11-23 RX ORDER — CIPROFLOXACIN 500 MG/1
500 TABLET, FILM COATED ORAL 2 TIMES DAILY
Qty: 10 TABLET | Refills: 0 | Status: SHIPPED | OUTPATIENT
Start: 2021-11-23 | End: 2021-11-28

## 2021-11-23 ASSESSMENT — MIFFLIN-ST. JEOR: SCORE: 1323.53

## 2021-11-23 NOTE — PROGRESS NOTES
"  Assessment & Plan     Acute cystitis without hematuria  Urinary problem  Multiple drug allergies  Reviewed cultures from Sept and appears cipro sensitive to both. Though would typically avoid fluoroquinolone for uncomplicated UTI, given her drug allergy list, recurrence of symptoms and recent tolerance to cipro, recommend 5 day course with plenty of hydration. Discussed holding tizandine and gemfibrozil while on this ab to reduce risk of myalgias/tendon rupture. She is agreeable.  We discussed culture has been ordered and if changes need to be made to regimen, will be notified.   Of note, only microscopy able to be performed today by lab as patient's pyridium contaminated UA sample.  We reviewed with pending holiday weekend, if worsening or not improving, go to urgent care or ER.  - UA Macro with Reflex to Micro and Culture - lab collect  - Urine Microscopic Exam  - Urine Culture Aerobic Bacterial - lab collect  - ciprofloxacin (CIPRO) 500 MG tablet  Dispense: 10 tablet; Refill: 0    Return if symptoms worsen or fail to improve.    Ariane Jacobs DO  [facility] EBER Lee is a 70 year old who presents for the following health issues     HPI     PCP: Darrick    Patient with UTI symptoms since Friday (4 days).  urgency, dysuria, frequency, retention.  States had UTI in Sept (cultures reviewed).   She states though her drug allergy list is quite long, she does tolerate cipro and keflex.   She denies n/v, denies f/c, denies back pain.  She did take pyridium for discomfort.  Denies hematuria.  States prior to Sept, did not have a UTI for many months.    Review of Systems   Constitutional, HEENT, cardiovascular, pulmonary, gi and gu systems are negative, except as otherwise noted.      Objective    /68 (BP Location: Right arm, Patient Position: Sitting, Cuff Size: Adult Regular)   Pulse 77   Temp 97.1  F (36.2  C) (Temporal)   Resp 16   Ht 1.613 m (5' 3.5\")   Wt 81.6 kg (180 lb) "   SpO2 97%   BMI 31.39 kg/m    Body mass index is 31.39 kg/m .  Physical Exam     GENERAL APPEARANCE: AAOx3, no distress. Well developed.    PSYCH: appropriate mood and affect.

## 2021-11-25 LAB — BACTERIA UR CULT: ABNORMAL

## 2021-12-03 ENCOUNTER — LAB (OUTPATIENT)
Dept: INFUSION THERAPY | Facility: CLINIC | Age: 71
End: 2021-12-03
Attending: INTERNAL MEDICINE
Payer: MEDICARE

## 2021-12-03 ENCOUNTER — TRANSFERRED RECORDS (OUTPATIENT)
Dept: HEALTH INFORMATION MANAGEMENT | Facility: CLINIC | Age: 71
End: 2021-12-03

## 2021-12-03 DIAGNOSIS — D50.8 OTHER IRON DEFICIENCY ANEMIA: Primary | ICD-10-CM

## 2021-12-03 LAB
HCT VFR BLD AUTO: 33.5 % (ref 35–47)
HGB BLD-MCNC: 10.3 G/DL (ref 11.7–15.7)

## 2021-12-03 PROCEDURE — 36415 COLL VENOUS BLD VENIPUNCTURE: CPT | Performed by: INTERNAL MEDICINE

## 2021-12-03 PROCEDURE — 85014 HEMATOCRIT: CPT | Performed by: INTERNAL MEDICINE

## 2021-12-06 ENCOUNTER — ONCOLOGY VISIT (OUTPATIENT)
Dept: ONCOLOGY | Facility: CLINIC | Age: 71
End: 2021-12-06
Attending: INTERNAL MEDICINE
Payer: MEDICARE

## 2021-12-06 VITALS
HEART RATE: 79 BPM | DIASTOLIC BLOOD PRESSURE: 73 MMHG | TEMPERATURE: 98.4 F | RESPIRATION RATE: 16 BRPM | BODY MASS INDEX: 31.32 KG/M2 | WEIGHT: 179.6 LBS | SYSTOLIC BLOOD PRESSURE: 121 MMHG | OXYGEN SATURATION: 98 %

## 2021-12-06 DIAGNOSIS — D64.9 NORMOCYTIC ANEMIA: Primary | ICD-10-CM

## 2021-12-06 PROCEDURE — G0463 HOSPITAL OUTPT CLINIC VISIT: HCPCS

## 2021-12-06 PROCEDURE — 99214 OFFICE O/P EST MOD 30 MIN: CPT | Performed by: INTERNAL MEDICINE

## 2021-12-06 ASSESSMENT — PAIN SCALES - GENERAL: PAINLEVEL: NO PAIN (0)

## 2021-12-06 NOTE — LETTER
"    12/6/2021         RE: Rosa Sotomayor  [address]  [place] [address]        Dear Colleague,    Thank you for referring your patient, Rosa Sotomayor, to the [facility]. Please see a copy of my visit note below.    Oncology Rooming Note    December 6, 2021 10:09 AM   Rosa Sotomayor is a 70 year old female who presents for:    Chief Complaint   Patient presents with     Oncology Clinic Visit     Initial Vitals: There were no vitals taken for this visit. Estimated body mass index is 31.39 kg/m  as calculated from the following:    Height as of 11/23/21: 1.613 m (5' 3.5\").    Weight as of 11/23/21: 81.6 kg (180 lb). There is no height or weight on file to calculate BSA.  Data Unavailable Comment: Data Unavailable   No LMP recorded. Patient is postmenopausal.  Allergies reviewed: Yes  Medications reviewed: Yes    Medications: Medication refills not needed today.  Pharmacy name entered into [employer]: [id number] - [place] - [id number] AT [facility] [address]    Clinical concerns:  doctor was notified.      Fany Mccarty, [facility]              HEMATOLOGY HISTORY: Ms. Sotomayor is a female with chronic anemia.  Her anemia is due to chronic renal disease and chronic disease.   1.  Multiple labs were done on 03/13/2019.   -WBC 3.5, hemoglobin 10.8 and platelets of 204.  MCV of 96.   -Creatinine of 1.26.   -Normal calcium.   -Normal LFT.   -Normal folate.   -Normal vitamin B12.   -Normal iron. Elevated ferritin.   -Erythropoietin mildly elevated at 38.   -Normal LDH.   -Soluable transferrin receptor is mildly elevated at 5.2.   -Normal haptoglobin.   2. On 11/29/2019, WBC of 4.3, hemoglobin 9.8 and platelets 230.  MCV of 103.   3. On 07/08/2019, CT scan did not reveal any splenomegaly or lymphadenopathy.   4. Bone marrow biopsy was done on 12/18/2019.  It reveals normal cellular bone marrow for age with 30% cellularity.  There is trilineage hematopoiesis. "  No evidence of MDS.  Increased storage iron.  Cytogenetics is normal.  Flow cytometry is normal.  5. IV injectafer in 03/2020.  6. On 09/09/2021, hemoglobin of 9.5.  -On 10/04/2021, hemoglobin of 9.2.  7. Aranesp started on 10/15/2021.    SUBJECTIVE:  Ms. Sotomayor is a 70-year-old female with chronic anemia.  Anemia is due to renal disease and anemia of chronic disease.  The patient was started on Aranesp on 10/15/2021.  Just with one dose her hemoglobin has been above 10 and she has not received any further dose.     The patient says that she gets tired easily.  While walking in the mall, she gets tired.  She also gets short of breath on exertion.     The patient has been getting more frequent migraine.  No chest pain. No shortness of breath at rest.  No nausea or vomiting.  No bleeding.     All other review of systems is negative.     PHYSICAL EXAMINATION:    She is alert and oriented x 3.  Not in distress.   Rest of systems not examined.     LABORATORY:  Hemoglobin and hematocrit reviewed.     ASSESSMENT:     1.  A 70-year-old female with normocytic anemia secondary to renal disease and anemia of chronic disease.  2.  Fatigue.  3.  Shortness of breath on exertion.     PLAN:     1.  Labs were reviewed with her.  I explained to her hemoglobin is 10.3.  She will not get Aranesp as it is above 10.  It  will be given when hemoglobin is below 10.  The patient will continue to have CBC checked every month.  Aranesp will be continued as per protocol.  2.  She has fatigue and gets short of breath on exertion.  I told the patient to see Dr. Hollingsworth to rule out other causes.  3.  She had few questions, which were all answered.  She will be going to [place] for the winter.  I will see her in March or April when she returns.  I advised her to call us with any questions or concerns.    This office note has been dictated.          Again, thank you for allowing me to participate in the care of your patient.         Sincerely,        Nico Calderon MD

## 2021-12-06 NOTE — PATIENT INSTRUCTIONS
1. Continue aranesp as per protocol.  2. See me in march or April.  3. Lab end of December and aranesp if needed.      Please call to schedule

## 2021-12-12 NOTE — PROGRESS NOTES
HEMATOLOGY HISTORY: Ms. Sotomayor is a female with chronic anemia.  Her anemia is due to chronic renal disease and chronic disease.   1.  Multiple labs were done on 03/13/2019.   -WBC 3.5, hemoglobin 10.8 and platelets of 204.  MCV of 96.   -Creatinine of 1.26.   -Normal calcium.   -Normal LFT.   -Normal folate.   -Normal vitamin B12.   -Normal iron. Elevated ferritin.   -Erythropoietin mildly elevated at 38.   -Normal LDH.   -Soluable transferrin receptor is mildly elevated at 5.2.   -Normal haptoglobin.   2. On 11/29/2019, WBC of 4.3, hemoglobin 9.8 and platelets 230.  MCV of 103.   3. On 07/08/2019, CT scan did not reveal any splenomegaly or lymphadenopathy.   4. Bone marrow biopsy was done on 12/18/2019.  It reveals normal cellular bone marrow for age with 30% cellularity.  There is trilineage hematopoiesis.  No evidence of MDS.  Increased storage iron.  Cytogenetics is normal.  Flow cytometry is normal.  5. IV injectafer in 03/2020.  6. On 09/09/2021, hemoglobin of 9.5.  -On 10/04/2021, hemoglobin of 9.2.  7. Aranesp started on 10/15/2021.    SUBJECTIVE:  Ms. Sotomayor is a 70-year-old female with chronic anemia.  Anemia is due to renal disease and anemia of chronic disease.  The patient was started on Aranesp on 10/15/2021.  Just with one dose her hemoglobin has been above 10 and she has not received any further dose.     The patient says that she gets tired easily.  While walking in the mall, she gets tired.  She also gets short of breath on exertion.     The patient has been getting more frequent migraine.  No chest pain. No shortness of breath at rest.  No nausea or vomiting.  No bleeding.     All other review of systems is negative.     PHYSICAL EXAMINATION:    She is alert and oriented x 3.  Not in distress.   Rest of systems not examined.     LABORATORY:  Hemoglobin and hematocrit reviewed.     ASSESSMENT:     1.  A 70-year-old female with normocytic anemia secondary to renal disease and anemia of chronic  disease.  2.  Fatigue.  3.  Shortness of breath on exertion.     PLAN:     1.  Labs were reviewed with her.  I explained to her hemoglobin is 10.3.  She will not get Aranesp as it is above 10.  It  will be given when hemoglobin is below 10.  The patient will continue to have CBC checked every month.  Aranesp will be continued as per protocol.  2.  She has fatigue and gets short of breath on exertion.  I told the patient to see Dr. Hollingsworth to rule out other causes.  3.  She had few questions, which were all answered.  She will be going to [place] for the winter.  I will see her in March or April when she returns.  I advised her to call us with any questions or concerns.

## 2021-12-29 ENCOUNTER — LAB (OUTPATIENT)
Dept: INFUSION THERAPY | Facility: CLINIC | Age: 71
End: 2021-12-29
Attending: INTERNAL MEDICINE
Payer: MEDICARE

## 2021-12-29 DIAGNOSIS — D64.9 NORMOCYTIC ANEMIA: ICD-10-CM

## 2021-12-29 LAB
BASOPHILS # BLD AUTO: 0 10E3/UL (ref 0–0.2)
BASOPHILS NFR BLD AUTO: 1 %
EOSINOPHIL # BLD AUTO: 0.2 10E3/UL (ref 0–0.7)
EOSINOPHIL NFR BLD AUTO: 5 %
ERYTHROCYTE [DISTWIDTH] IN BLOOD BY AUTOMATED COUNT: 13.1 % (ref 10–15)
HCT VFR BLD AUTO: 33 % (ref 35–47)
HGB BLD-MCNC: 10.4 G/DL (ref 11.7–15.7)
HOLD SPECIMEN: NORMAL
IMM GRANULOCYTES # BLD: 0 10E3/UL
IMM GRANULOCYTES NFR BLD: 0 %
LYMPHOCYTES # BLD AUTO: 1.3 10E3/UL (ref 0.8–5.3)
LYMPHOCYTES NFR BLD AUTO: 35 %
MCH RBC QN AUTO: 31.3 PG (ref 26.5–33)
MCHC RBC AUTO-ENTMCNC: 31.5 G/DL (ref 31.5–36.5)
MCV RBC AUTO: 99 FL (ref 78–100)
MONOCYTES # BLD AUTO: 0.4 10E3/UL (ref 0–1.3)
MONOCYTES NFR BLD AUTO: 10 %
NEUTROPHILS # BLD AUTO: 1.7 10E3/UL (ref 1.6–8.3)
NEUTROPHILS NFR BLD AUTO: 49 %
NRBC # BLD AUTO: 0 10E3/UL
NRBC BLD AUTO-RTO: 0 /100
PLATELET # BLD AUTO: 209 10E3/UL (ref 150–450)
RBC # BLD AUTO: 3.32 10E6/UL (ref 3.8–5.2)
WBC # BLD AUTO: 3.6 10E3/UL (ref 4–11)

## 2021-12-29 PROCEDURE — 36415 COLL VENOUS BLD VENIPUNCTURE: CPT

## 2021-12-29 PROCEDURE — 85025 COMPLETE CBC W/AUTO DIFF WBC: CPT | Performed by: INTERNAL MEDICINE

## 2021-12-29 NOTE — RESULT ENCOUNTER NOTE
Dear Ms. Bran,    CBC is a stable with hemoglobin of 10.4.    Please, call me with any questions.    Nico Calderon MD

## 2022-01-05 ENCOUNTER — TELEPHONE (OUTPATIENT)
Dept: FAMILY MEDICINE | Facility: CLINIC | Age: 72
End: 2022-01-05
Payer: COMMERCIAL

## 2022-01-05 NOTE — TELEPHONE ENCOUNTER
Pt calling states she flew to [place] on Monday and has developed vertigo.  Dramamine is not helping her.  Wondering if Dr. Hollingsworth will prescribe valium for her?  States he has done this in the past.  States it has been a couple of years since she had the vertigo.    Pharmacy pended.    Pt can be reached at [phone].    Cecy BONILLA RN  EP Triage

## 2022-01-06 ENCOUNTER — TELEPHONE (OUTPATIENT)
Dept: FAMILY MEDICINE | Facility: CLINIC | Age: 72
End: 2022-01-06
Payer: COMMERCIAL

## 2022-01-06 NOTE — TELEPHONE ENCOUNTER
Spoke to patient and she continues vertigo. She denies  any other symptoms an she will seek medical care in [place].     Michelle Valdez RN  -[facility]

## 2022-01-30 DIAGNOSIS — N18.32 STAGE 3B CHRONIC KIDNEY DISEASE (H): Primary | ICD-10-CM

## 2022-02-10 DIAGNOSIS — G47.33 OSA (OBSTRUCTIVE SLEEP APNEA): ICD-10-CM

## 2022-03-22 ENCOUNTER — OFFICE VISIT (OUTPATIENT)
Dept: SLEEP MEDICINE | Facility: CLINIC | Age: 72
End: 2022-03-22
Payer: MEDICARE

## 2022-03-22 VITALS
WEIGHT: 182.4 LBS | RESPIRATION RATE: 16 BRPM | OXYGEN SATURATION: 99 % | BODY MASS INDEX: 31.14 KG/M2 | DIASTOLIC BLOOD PRESSURE: 62 MMHG | SYSTOLIC BLOOD PRESSURE: 106 MMHG | HEIGHT: 64 IN | HEART RATE: 75 BPM

## 2022-03-22 DIAGNOSIS — G25.81 RESTLESS LEGS SYNDROME (RLS): ICD-10-CM

## 2022-03-22 DIAGNOSIS — G47.33 OSA (OBSTRUCTIVE SLEEP APNEA): Primary | Chronic | ICD-10-CM

## 2022-03-22 PROCEDURE — 99214 OFFICE O/P EST MOD 30 MIN: CPT | Performed by: PHYSICIAN ASSISTANT

## 2022-03-22 RX ORDER — BACILLUS COAGULANS/INULIN 1B-250 MG
CAPSULE ORAL
COMMUNITY

## 2022-03-22 NOTE — NURSING NOTE
"Chief Complaint   Patient presents with     CPAP Follow Up       Initial /62   Pulse 75   Resp 16   Ht 1.626 m (5' 4\")   Wt 82.7 kg (182 lb 6.4 oz)   SpO2 99%   BMI 31.31 kg/m   Estimated body mass index is 31.31 kg/m  as calculated from the following:    Height as of this encounter: 1.626 m (5' 4\").    Weight as of this encounter: 82.7 kg (182 lb 6.4 oz).    Medication Reconciliation: complete   ESS: 3  Neck circumference: 33 centimeters.  DME: Forgot to ask.  Zulema Torres CMA      "

## 2022-03-22 NOTE — PROGRESS NOTES
CPAP Follow-Up Visit:    Date on this visit: 3/22/2022    Rosa Sotomayor has a follow-up of her CPAP use for mild ESTEFANIA. She was initially seen at the [facility] for snoring, waking a lot at night, and not feeling refreshed by sleep for 1.5 years. Her medical history is significant for Arnold-Chiari malformation (unknown grade, no surgery), chronic back pain, migraine, hyperlipidemia, aortic insufficiency, fibromuscular dysplasia, possible renovascular HTN, renal artery stenosis, CKD st 3, anxiety, macular degeneration, osteoarthritis.     PSG showed: AHI was 12.4/hr, with desaturations down to 83%. S/He spent 2.4 minutes below 90% SpO2. RDI 31.7/hr.  REM RDI 55.2/hr.  Supine RDI 78.4/hr (75 minutes spent supine). Her non-supine RDI was 18.6/hr.  Periodic Limb Movement Index 7.2/hour, 0.5/hr were associated with arousals. Her heart rate was over 100 most of the night, up to 120 bpm. She had a headache during the night and felt a pulsing.     She feels everything is going pretty well. She is tired, but possibly from anemia. She wakes up tired. She even dreams she is tired. She does not sleep in the day, no inadvertent dozing unless watching TV with a monotone voice (seldom and only for 10-15 minutes). She does not doze while reading and she reads a lot. If she lays down, she won't fall asleep. She might doze a little as a passenger on a 1-2 hour ride if the scenery is boring. She has no trouble while she is driving. If she napped at 5 PM, she wouldn't sleep well at night.      PAP machine: Respironics. Pressure settings: 7-15 cm  She has registered her machine.       The compliance data shows that the patient used the CPAP for 100% of nights for >4 hours.  The 90th% pressure is 8.2 cm.  The average time in large leak is 0.  The average nightly usage is 470 min.  The average AHI is 0.8/hr.       Interface:  Mask: Nuance pro nasal pillows mask. Cleans the cushion daily  Chin strap: No  Leak: No  Using  Humidifier: Yes, does not run out  Condensation in hose or mask: No     Difficulties with therapy:    [-] Snoring with CPAP:  [-] Difficulty tolerating the pressure:  [-] Epistaxis/dry nose:   [-] Nasal congestion:  [-] Dry mouth:  [-] Mouth breathing:   [-] Pain/skin breakdown: sometimes itchy from the mask     Improvements noted with CPAP:   [+] waking up more refreshed  [+] sleeping better with less arousals  [+] nocturia improved   [+] improved energy level during the day    Weight change since sleep study: 182 lbs, down about 10 pounds in the last 1-2 years.    Bedtime: 12-1 AM.  Wake time: 9-9:30 AM. Falls asleep in 5 minutes once she puts her book down. Wakes 0-2 times per night for 5 minutes. Reason for waking: urinate  Naps: no       She gets RLS more frequently than ever due to the anemia. She sometimes has to get up and walk for a while. She had an aranesp injection a couple of months ago, when her hemoglobin goes below 10. She thinks there was a slight improvement in RLS after the injection. She has not tolerated oral iron supplements in the past.     Past medical/surgical history, family history, social history, medications and allergies were reviewed.      Problem List:  Patient Active Problem List    Diagnosis Date Noted     Multiple drug allergies 09/07/2021     Priority: Medium     Migraine headache 09/07/2021     Priority: Medium     Malabsorption of iron 03/04/2020     Priority: Medium     Macular degeneration 05/16/2019     Priority: Medium     Spinal stenosis of lumbar region with neurogenic claudication 05/13/2019     Priority: Medium     ESTEFANIA (obstructive sleep apnea)- mild (AHI 12) 12/17/2018     Priority: Medium     12/13/2018 [facility] Sleep Study (188.0 lbs) - AHI 12.4, RDI 31.7, Supine AHI 52.0, REM AHI 34.3, Low O2 75.1%, Time Spent ?88% 1.4 minutes / Time Spent ?89% 2.4 minutes.       Lumbar disc herniation with radiculopathy 08/16/2018     Priority: Medium     Aortic valve  insufficiency 04/19/2018     Priority: Medium     Migraine without aura and without status migrainosus, not intractable 11/20/2017     Priority: Medium     Anemia, iron deficiency 06/23/2017     Priority: Medium     Degenerative joint disease (DJD) of hip 05/23/2017     Priority: Medium     Macular degeneration, bilateral 01/05/2017     Priority: Medium     Benign essential hypertension 12/28/2016     Priority: Medium     Arterial fibromuscular dysplasia (H) 12/21/2016     Priority: Medium     Renovascular hypertension 12/21/2016     Priority: Medium     Chronic bilateral low back pain without sciatica 11/29/2016     Priority: Medium     Secondary renal hyperparathyroidism (H) 07/31/2016     Priority: Medium     Constipation 07/30/2016     Priority: Medium     Environmental allergies 07/29/2016     Priority: Medium     S/P shoulder surgery 07/29/2016     Priority: Medium     Arnold-Chiari malformation (H) 07/23/2016     Priority: Medium     Hyperlipidemia LDL goal <130 07/23/2016     Priority: Medium     Anxiety 07/23/2016     Priority: Medium     Hypertriglyceridemia 07/22/2016     Priority: Medium     Stage 3 chronic kidney disease (H) 09/22/2015     Priority: Medium     Right knee DJD 10/16/2014     Priority: Medium     Intractable chronic migraine without aura 05/30/2012     Priority: Medium     Formatting of this note might be different from the original.  Chronic migraine  Formatting of this note might be different from the original.  Chronic migraine, followed by Dr. Pearl with [facility] neurology.       Arthralgia of hip 08/17/2011     Priority: Medium     Trochanteric bursitis 07/11/2011     Priority: Medium     Menopausal symptom 04/13/2009     Priority: Medium     Overview:   on HRT  Formatting of this note might be different from the original.  on HRT  ; Menopause       Personal history of allergy to anesthetic agent 03/10/2007     Priority: Medium     Congenital anomaly of brain (H) 08/26/2005      Priority: Medium     Overview:   Formatting of this note might be different from the original.  Arnold Chiari Malformation       Asymptomatic varicose veins 08/27/2004     Priority: Medium     Overview:   with bilat. venous insufficiency  Formatting of this note might be different from the original.  with bilat. venous insufficiency  ; Varicose Veins  Formatting of this note might be different from the original.  Overview:   with bilat. venous insufficiency  ; Varicose Veins          Impression/Plan:    (G47.33) ESTEFANIA (obstructive sleep apnea)- mild (AHI 12)  (primary encounter diagnosis)  Comment: Rosa is using CPAP regularly and her apnea appears to be well-controlled. She continues to feel tired, but not sleepy. She does have anemia related to her kidney disease that could be contributing.   Plan: Comprehensive DME        Continue auto CPAP 7-15 cm. A prescription was written for new supplies. We reviewed recommendations for cleaning and replacing supplies.       (G25.81) Restless legs syndrome (RLS)  Comment: Has had a worsening related to anemia. She will get up and walk for a little before bed.  Plan: We talked about option of starting ropinirole, but she does not feel she needs to add another medication at this time.      She will follow up with me in about 1 year(s).     30 minutes were spent on the date of the encounter doing chart review, history and exam, documentation and further activities as noted above.     Bennett Goltz, PA-C    CC: No ref. provider found

## 2022-03-22 NOTE — NURSING NOTE
DME orders have been automatically faxed to [facility].  Zulema Torres, CMA

## 2022-03-30 NOTE — NURSING NOTE
1 year appointment reminder message was created and will be sent out 2 - 3 months prior to next appointment due date. Giuseppe Rosen, [facility]

## 2022-04-04 ENCOUNTER — LAB (OUTPATIENT)
Dept: INFUSION THERAPY | Facility: CLINIC | Age: 72
End: 2022-04-04
Attending: INTERNAL MEDICINE
Payer: MEDICARE

## 2022-04-04 DIAGNOSIS — D64.9 NORMOCYTIC ANEMIA: ICD-10-CM

## 2022-04-04 LAB
BASOPHILS # BLD AUTO: 0 10E3/UL (ref 0–0.2)
BASOPHILS NFR BLD AUTO: 1 %
EOSINOPHIL # BLD AUTO: 0.2 10E3/UL (ref 0–0.7)
EOSINOPHIL NFR BLD AUTO: 5 %
ERYTHROCYTE [DISTWIDTH] IN BLOOD BY AUTOMATED COUNT: 13.3 % (ref 10–15)
HCT VFR BLD AUTO: 35.5 % (ref 35–47)
HGB BLD-MCNC: 10.8 G/DL (ref 11.7–15.7)
IMM GRANULOCYTES # BLD: 0 10E3/UL
IMM GRANULOCYTES NFR BLD: 0 %
LYMPHOCYTES # BLD AUTO: 1.8 10E3/UL (ref 0.8–5.3)
LYMPHOCYTES NFR BLD AUTO: 42 %
MCH RBC QN AUTO: 31.3 PG (ref 26.5–33)
MCHC RBC AUTO-ENTMCNC: 30.4 G/DL (ref 31.5–36.5)
MCV RBC AUTO: 103 FL (ref 78–100)
MONOCYTES # BLD AUTO: 0.4 10E3/UL (ref 0–1.3)
MONOCYTES NFR BLD AUTO: 10 %
NEUTROPHILS # BLD AUTO: 1.8 10E3/UL (ref 1.6–8.3)
NEUTROPHILS NFR BLD AUTO: 42 %
NRBC # BLD AUTO: 0 10E3/UL
NRBC BLD AUTO-RTO: 0 /100
PLATELET # BLD AUTO: 267 10E3/UL (ref 150–450)
RBC # BLD AUTO: 3.45 10E6/UL (ref 3.8–5.2)
WBC # BLD AUTO: 4.3 10E3/UL (ref 4–11)

## 2022-04-04 PROCEDURE — 85025 COMPLETE CBC W/AUTO DIFF WBC: CPT | Performed by: INTERNAL MEDICINE

## 2022-04-04 PROCEDURE — 36415 COLL VENOUS BLD VENIPUNCTURE: CPT

## 2022-04-05 NOTE — RESULT ENCOUNTER NOTE
Dear Ms. Bran,    Hemoglobin is 10.8. It is better than last time when it was 10.4.    Please, call me with any questions.    Nico Calderon MD

## 2022-04-07 ENCOUNTER — ONCOLOGY VISIT (OUTPATIENT)
Dept: ONCOLOGY | Facility: CLINIC | Age: 72
End: 2022-04-07
Attending: INTERNAL MEDICINE
Payer: MEDICARE

## 2022-04-07 VITALS
RESPIRATION RATE: 16 BRPM | SYSTOLIC BLOOD PRESSURE: 121 MMHG | HEART RATE: 82 BPM | OXYGEN SATURATION: 97 % | BODY MASS INDEX: 31.07 KG/M2 | WEIGHT: 181 LBS | DIASTOLIC BLOOD PRESSURE: 75 MMHG

## 2022-04-07 DIAGNOSIS — D64.9 NORMOCYTIC ANEMIA: Primary | ICD-10-CM

## 2022-04-07 PROCEDURE — G0463 HOSPITAL OUTPT CLINIC VISIT: HCPCS

## 2022-04-07 PROCEDURE — 99213 OFFICE O/P EST LOW 20 MIN: CPT | Performed by: INTERNAL MEDICINE

## 2022-04-07 ASSESSMENT — PAIN SCALES - GENERAL: PAINLEVEL: NO PAIN (0)

## 2022-04-07 NOTE — PROGRESS NOTES
"Oncology Rooming Note    April 7, 2022 10:03 AM   Rosa Sotomayor is a 71 year old female who presents for:    Chief Complaint   Patient presents with     Oncology Clinic Visit     Initial Vitals: /75   Pulse 82   Resp 16   Wt 82.1 kg (181 lb)   SpO2 97%   BMI 31.07 kg/m   Estimated body mass index is 31.07 kg/m  as calculated from the following:    Height as of 3/22/22: 1.626 m (5' 4\").    Weight as of this encounter: 82.1 kg (181 lb). Body surface area is 1.93 meters squared.  No Pain (0) Comment: Data Unavailable   No LMP recorded. Patient is postmenopausal.  Allergies reviewed: Yes  Medications reviewed: Yes    Medications: Medication refills not needed today.  Pharmacy name entered into [facility]:    [facility] #[id number] - [place] - [address] AT [facility] OF [place] & [facility] [address]  [facility] #[id number] - [place], [place] - [address] AT [facility] [address]    Clinical concerns: no      Nadeen Farr CMA            "

## 2022-04-07 NOTE — LETTER
"    4/7/2022         RE: Rosa Sotomayor  [address] [facility]  [place] [address]        Dear Colleague,    Thank you for referring your patient, Rosa Sotomayor, to the [facility]. Please see a copy of my visit note below.    Oncology Rooming Note    April 7, 2022 10:03 AM   Rosa Sotomayor is a 71 year old female who presents for:    Chief Complaint   Patient presents with     Oncology Clinic Visit     Initial Vitals: /75   Pulse 82   Resp 16   Wt 82.1 kg (181 lb)   SpO2 97%   BMI 31.07 kg/m   Estimated body mass index is 31.07 kg/m  as calculated from the following:    Height as of 3/22/22: 1.626 m (5' 4\").    Weight as of this encounter: 82.1 kg (181 lb). Body surface area is 1.93 meters squared.  No Pain (0) Comment: Data Unavailable   No LMP recorded. Patient is postmenopausal.  Allergies reviewed: Yes  Medications reviewed: Yes    Medications: Medication refills not needed today.  Pharmacy name entered into [email or web address]:    [facility][id number] - [place] - [address] AT [facility] [address] [place] & [facility] [address]  [facility] #[id number] - [place], [place] - [address] AT [facility] [address]    Clinical concerns: no      Nadeen Farr, [facility]              HEMATOLOGY HISTORY: Ms. Sotomayor is a female with chronic anemia.  Her anemia is due to chronic renal disease and chronic disease.   1.  Multiple labs were done on 03/13/2019.   -WBC 3.5, hemoglobin 10.8 and platelets of 204.  MCV of 96.   -Creatinine of 1.26.   -Normal calcium.   -Normal LFT.   -Normal folate.   -Normal vitamin B12.   -Normal iron. Elevated ferritin.   -Erythropoietin mildly elevated at 38.   -Normal LDH.   -Soluable transferrin receptor is mildly elevated at 5.2.   -Normal haptoglobin.   2. On 11/29/2019, WBC of 4.3, hemoglobin 9.8 and platelets 230.  MCV of 103.   3. On 07/08/2019, CT scan did not reveal any splenomegaly or lymphadenopathy.   4. Bone marrow biopsy was done " on 12/18/2019.  It reveals normal cellular bone marrow for age with 30% cellularity.  There is trilineage hematopoiesis.  No evidence of MDS.  Increased storage iron.  Cytogenetics is normal.  Flow cytometry is normal.  5. IV injectafer in 03/2020.  6. On 09/09/2021, hemoglobin of 9.5.  -On 10/04/2021, hemoglobin of 9.2.  7. Aranesp started on 10/15/2021. Stopped after 1 injection as hemoglobin remained above 10.     SUBJECTIVE:  Ms. Sotomayor is a 71-year-old female with chronic anemia.  Anemia is due to renal disease and anemia of chronic disease.      Her main problem is chronic fatigue.  She is chronically tired.  She is still able to take care of herself and do her daily activities but she has to rest frequently.  Patient does have sleep apnea.  She follows up with the sleep clinic.  Patient said that she gets a good night sleep of 7 to 8 hours and in spite of that she is always tired.    She has migraine.  She gets a headache from it.  No dizziness.  No chest pain.  No shortness of breath.  No nausea or vomiting.  Appetite is good.  No bleeding from any site. All other review of systems is negative.     PHYSICAL EXAMINATION:    She is alert and oriented x 3.  Not in distress.   Rest of systems not examined.     LABORATORY: CBC reviewed.     ASSESSMENT:     1.  A 71-year-old female with chronic anemia secondary to renal disease and anemia of chronic disease.  2.  Chronic fatigue.  3.  Chronic kidney disease.     PLAN:     1.  Discussed with her regarding anemia.  Explained to her that hemoglobin is stable.  Anemia is stable.  Her hemoglobin has been running around 10.5.    At this time, we will simply monitor it every 3 months.  If there is worsening, we may consider repeating a bone marrow biopsy to look for any developing myelodysplastic syndrome.    Patient was started on Aranesp but it was stopped just after 1 injection as her hemoglobin remained above 10.  If her hemoglobin persistently remains below 10, we can  again restart her on Aranesp based on renal disease.    Patient had few questions regarding anemia which were all discussed    2.  Patient is chronically tired.  I told her this chronic fatigue is due to multiple reason.  It is because of anemia, renal disease, sleep apnea and her age.  Hopefully fatigue does not get worse.    3.  She had few questions which were all answered.  I will see her in 6 months time.  Advised her to call us with any questions or concerns.    Total visit time of 20 minutes.  Time spent in today's visit, review of chart/investigations today and documentation today.          Again, thank you for allowing me to participate in the care of your patient.        Sincerely,        Nico Calderon MD

## 2022-04-07 NOTE — PROGRESS NOTES
HEMATOLOGY HISTORY: Ms. Sotomayor is a female with chronic anemia.  Her anemia is due to chronic renal disease and chronic disease.   1.  Multiple labs were done on 03/13/2019.   -WBC 3.5, hemoglobin 10.8 and platelets of 204.  MCV of 96.   -Creatinine of 1.26.   -Normal calcium.   -Normal LFT.   -Normal folate.   -Normal vitamin B12.   -Normal iron. Elevated ferritin.   -Erythropoietin mildly elevated at 38.   -Normal LDH.   -Soluable transferrin receptor is mildly elevated at 5.2.   -Normal haptoglobin.   2. On 11/29/2019, WBC of 4.3, hemoglobin 9.8 and platelets 230.  MCV of 103.   3. On 07/08/2019, CT scan did not reveal any splenomegaly or lymphadenopathy.   4. Bone marrow biopsy was done on 12/18/2019.  It reveals normal cellular bone marrow for age with 30% cellularity.  There is trilineage hematopoiesis.  No evidence of MDS.  Increased storage iron.  Cytogenetics is normal.  Flow cytometry is normal.  5. IV injectafer in 03/2020.  6. On 09/09/2021, hemoglobin of 9.5.  -On 10/04/2021, hemoglobin of 9.2.  7. Aranesp started on 10/15/2021. Stopped after 1 injection as hemoglobin remained above 10.     SUBJECTIVE:  Ms. Sotomayor is a 71-year-old female with chronic anemia.  Anemia is due to renal disease and anemia of chronic disease.      Her main problem is chronic fatigue.  She is chronically tired.  She is still able to take care of herself and do her daily activities but she has to rest frequently.  Patient does have sleep apnea.  She follows up with the sleep clinic.  Patient said that she gets a good night sleep of 7 to 8 hours and in spite of that she is always tired.    She has migraine.  She gets a headache from it.  No dizziness.  No chest pain.  No shortness of breath.  No nausea or vomiting.  Appetite is good.  No bleeding from any site. All other review of systems is negative.     PHYSICAL EXAMINATION:    She is alert and oriented x 3.  Not in distress.   Rest of systems not examined.     LABORATORY: CBC  reviewed.     ASSESSMENT:     1.  A 71-year-old female with chronic anemia secondary to renal disease and anemia of chronic disease.  2.  Chronic fatigue.  3.  Chronic kidney disease.     PLAN:     1.  Discussed with her regarding anemia.  Explained to her that hemoglobin is stable.  Anemia is stable.  Her hemoglobin has been running around 10.5.    At this time, we will simply monitor it every 3 months.  If there is worsening, we may consider repeating a bone marrow biopsy to look for any developing myelodysplastic syndrome.    Patient was started on Aranesp but it was stopped just after 1 injection as her hemoglobin remained above 10.  If her hemoglobin persistently remains below 10, we can again restart her on Aranesp based on renal disease.    Patient had few questions regarding anemia which were all discussed    2.  Patient is chronically tired.  I told her this chronic fatigue is due to multiple reason.  It is because of anemia, renal disease, sleep apnea and her age.  Hopefully fatigue does not get worse.    3.  She had few questions which were all answered.  I will see her in 6 months time.  Advised her to call us with any questions or concerns.    Total visit time of 20 minutes.  Time spent in today's visit, review of chart/investigations today and documentation today.

## 2022-04-24 ENCOUNTER — HEALTH MAINTENANCE LETTER (OUTPATIENT)
Age: 72
End: 2022-04-24

## 2022-05-23 ENCOUNTER — TRANSFERRED RECORDS (OUTPATIENT)
Dept: HEALTH INFORMATION MANAGEMENT | Facility: CLINIC | Age: 72
End: 2022-05-23
Payer: MEDICARE

## 2022-05-31 ENCOUNTER — TRANSFERRED RECORDS (OUTPATIENT)
Dept: HEALTH INFORMATION MANAGEMENT | Facility: CLINIC | Age: 72
End: 2022-05-31
Payer: MEDICARE

## 2022-06-03 ENCOUNTER — TRANSFERRED RECORDS (OUTPATIENT)
Dept: HEALTH INFORMATION MANAGEMENT | Facility: CLINIC | Age: 72
End: 2022-06-03
Payer: MEDICARE

## 2022-06-06 ENCOUNTER — NURSE TRIAGE (OUTPATIENT)
Dept: FAMILY MEDICINE | Facility: CLINIC | Age: 72
End: 2022-06-06
Payer: MEDICARE

## 2022-06-06 NOTE — TELEPHONE ENCOUNTER
"CC: Patient called reporting that her blood pressure has been low recently. Patient states she has not taken her blood pressure today. Writer asked patient to take BP while on the line, BP: 140/60, P: 64. Patient states recently it has been more commonly 115s-120s/45-60.      Patient states she has an upcoming appointment with PCP, but is worried about low BP and does not want to wait until end of the month to address. Pt would like to be seen in office.     Blood pressure: 115s-120s / 45-60. While on phone result 140/60  Onset: last few months   How: home automatic monitor   History: hx of hypertension, \"They want my blood pressure to not get high because of my kidney disease\"  Medications: pt takes losartan, carvedilol  Pulse rate: Patient states pulse is usually between 60 and 70   Other symptoms: Denies dizziness, lightheadedness, weakness, chest pain, bleeding, palpitations, abdominal pain, surgery, fever or dehydration    Patient states \"I watch my hydration closely\"     Patient had recent right humerus fracture on 5/23/22  but states her blood pressure has not changed since the injury, and she has been concerned for low blood pressure since before the injury.     Per triage protocol, patient to be seen in office within 3 days. Writer scheduled patient for appointment:  6/8/2022 11:00 AM (Arrive by 10:40 AM) Long Paris APRN CNP [facility]       Patient also has annual physical with PCP scheduled:  6/29/2022 9:30 AM (Arrive by 9:10 AM) Josh Hollingsworth MD [facility]     Writer advised patient to seek UC if symptomatic hypotension occurs or worsening, systolic BP under 90. Patient expressed verbal understanding. No further questions/concerns at this time.     Carlos Johnson RN  [facility]      Reason for Disposition    Diastolic BP < 50 mm Hg    Wants doctor to measure BP    Additional Information    Negative: Systolic BP < 90 and feeling " "dizzy, lightheaded, or weak    Negative: Started suddenly after an allergic medicine, an allergic food, or bee sting    Negative: Shock suspected (e.g., cold/pale/clammy skin, too weak to stand, low BP, rapid pulse)    Negative: Difficult to awaken or acting confused  (e.g., disoriented, slurred speech)    Negative: Fainted    Negative: Chest pain    Negative: Bleeding (e.g., vomiting blood, rectal bleeding or tarry stools, severe vaginal bleeding)    Negative: Extra heart beats or heart is beating fast  (i.e., \"palpitations\")    Negative: Sounds like a life-threatening emergency to the triager    Negative: Systolic BP < 80 and NOT dizzy, lightheaded or weak (feels normal)    Negative: Abdominal pain    Negative: Major surgery in the past month    Negative: Fever > 100.4 F (38.0 C)    Negative: Drinking very little and has signs of dehydration (e.g., no urine > 12 hours, very dry mouth, very lightheaded)    Negative: Fall in systolic BP > 20 mm Hg from normal and feeling dizzy, lightheaded, or weak    Negative: Patient sounds very sick or weak to the triager    Negative: Systolic BP < 90 and NOT dizzy, lightheaded or weak    Negative: Systolic BP  while taking blood pressure medications and NOT dizzy, lightheaded or weak    Negative: Fall in systolic BP > 20 mm Hg from normal and NOT dizzy, lightheaded, or weak    Negative: Fall in systolic BP > 20 mmHg after standing up    Negative: Fall in systolic BP > 20 mmHg after eating a meal    Protocols used: LOW BLOOD PRESSURE-A-OH      "

## 2022-06-08 ENCOUNTER — OFFICE VISIT (OUTPATIENT)
Dept: FAMILY MEDICINE | Facility: CLINIC | Age: 72
End: 2022-06-08
Payer: MEDICARE

## 2022-06-08 VITALS
BODY MASS INDEX: 30.39 KG/M2 | WEIGHT: 178 LBS | HEIGHT: 64 IN | TEMPERATURE: 97.2 F | HEART RATE: 72 BPM | RESPIRATION RATE: 16 BRPM | SYSTOLIC BLOOD PRESSURE: 129 MMHG | OXYGEN SATURATION: 99 % | DIASTOLIC BLOOD PRESSURE: 75 MMHG

## 2022-06-08 DIAGNOSIS — I10 BENIGN ESSENTIAL HYPERTENSION: Primary | ICD-10-CM

## 2022-06-08 PROCEDURE — 99213 OFFICE O/P EST LOW 20 MIN: CPT | Performed by: NURSE PRACTITIONER

## 2022-06-08 ASSESSMENT — PAIN SCALES - GENERAL: PAINLEVEL: SEVERE PAIN (7)

## 2022-06-21 ENCOUNTER — TRANSFERRED RECORDS (OUTPATIENT)
Dept: HEALTH INFORMATION MANAGEMENT | Facility: CLINIC | Age: 72
End: 2022-06-21

## 2022-06-29 ENCOUNTER — OFFICE VISIT (OUTPATIENT)
Dept: FAMILY MEDICINE | Facility: CLINIC | Age: 72
End: 2022-06-29
Payer: MEDICARE

## 2022-06-29 VITALS
HEART RATE: 80 BPM | RESPIRATION RATE: 16 BRPM | BODY MASS INDEX: 29.53 KG/M2 | DIASTOLIC BLOOD PRESSURE: 61 MMHG | HEIGHT: 64 IN | TEMPERATURE: 97.3 F | SYSTOLIC BLOOD PRESSURE: 96 MMHG | OXYGEN SATURATION: 98 % | WEIGHT: 173 LBS

## 2022-06-29 DIAGNOSIS — Z12.11 SCREEN FOR COLON CANCER: ICD-10-CM

## 2022-06-29 DIAGNOSIS — E78.5 HYPERLIPIDEMIA LDL GOAL <130: ICD-10-CM

## 2022-06-29 DIAGNOSIS — G43.809 OTHER MIGRAINE WITHOUT STATUS MIGRAINOSUS, NOT INTRACTABLE: ICD-10-CM

## 2022-06-29 DIAGNOSIS — Z78.0 ASYMPTOMATIC POSTMENOPAUSAL STATUS: ICD-10-CM

## 2022-06-29 DIAGNOSIS — N18.32 STAGE 3B CHRONIC KIDNEY DISEASE (H): ICD-10-CM

## 2022-06-29 DIAGNOSIS — I10 BENIGN ESSENTIAL HYPERTENSION: ICD-10-CM

## 2022-06-29 DIAGNOSIS — M25.561 ACUTE PAIN OF RIGHT KNEE: ICD-10-CM

## 2022-06-29 DIAGNOSIS — Z23 NEED FOR VACCINATION: ICD-10-CM

## 2022-06-29 DIAGNOSIS — D50.8 OTHER IRON DEFICIENCY ANEMIA: ICD-10-CM

## 2022-06-29 DIAGNOSIS — Q04.9 CONGENITAL MALFORMATION OF BRAIN, UNSPECIFIED (H): Primary | ICD-10-CM

## 2022-06-29 DIAGNOSIS — N25.81 SECONDARY RENAL HYPERPARATHYROIDISM (H): ICD-10-CM

## 2022-06-29 DIAGNOSIS — E78.1 HYPERTRIGLYCERIDEMIA: ICD-10-CM

## 2022-06-29 PROCEDURE — 90677 PCV20 VACCINE IM: CPT | Performed by: INTERNAL MEDICINE

## 2022-06-29 PROCEDURE — 99214 OFFICE O/P EST MOD 30 MIN: CPT | Mod: 25 | Performed by: INTERNAL MEDICINE

## 2022-06-29 PROCEDURE — 36415 COLL VENOUS BLD VENIPUNCTURE: CPT | Performed by: INTERNAL MEDICINE

## 2022-06-29 PROCEDURE — 99397 PER PM REEVAL EST PAT 65+ YR: CPT | Mod: 25 | Performed by: INTERNAL MEDICINE

## 2022-06-29 PROCEDURE — 80061 LIPID PANEL: CPT | Performed by: INTERNAL MEDICINE

## 2022-06-29 PROCEDURE — G0009 ADMIN PNEUMOCOCCAL VACCINE: HCPCS | Performed by: INTERNAL MEDICINE

## 2022-06-29 PROCEDURE — 80053 COMPREHEN METABOLIC PANEL: CPT | Performed by: INTERNAL MEDICINE

## 2022-06-29 RX ORDER — CARVEDILOL 6.25 MG/1
6.25 TABLET ORAL 2 TIMES DAILY WITH MEALS
Qty: 180 TABLET | Refills: 3 | COMMUNITY
Start: 2022-06-29 | End: 2022-06-30

## 2022-06-29 ASSESSMENT — ENCOUNTER SYMPTOMS
FREQUENCY: 0
HEADACHES: 1
COUGH: 0
MYALGIAS: 0
SORE THROAT: 0
CHILLS: 0
ARTHRALGIAS: 0
DYSURIA: 0
WEAKNESS: 0
DIZZINESS: 0
HEMATURIA: 0
FEVER: 0
DIARRHEA: 0
ABDOMINAL PAIN: 0
HEARTBURN: 0
HEMATOCHEZIA: 0
PALPITATIONS: 0
SHORTNESS OF BREATH: 0
JOINT SWELLING: 0
EYE PAIN: 0
PARESTHESIAS: 0
CONSTIPATION: 0
NERVOUS/ANXIOUS: 0
NAUSEA: 0

## 2022-06-29 ASSESSMENT — ACTIVITIES OF DAILY LIVING (ADL): CURRENT_FUNCTION: NO ASSISTANCE NEEDED

## 2022-06-29 ASSESSMENT — PAIN SCALES - GENERAL: PAINLEVEL: NO PAIN (0)

## 2022-06-29 NOTE — LETTER
July 1, 2022      Rosasienna Sotomayor  [address]  [place] [address]        Dear ,    We are writing to inform you of your test results.    The following letter pertains to your most recent diagnostic tests:     -The triglycerides are improved.  No need to change any medications regarding cholesterol and triglycerides management.       -The metabolic panel shows fairly stable kidney function.  The other parameters are OK the blood sugar (glucose) is mildly elevated, but NOT in the diabetic range        Resulted Orders   Lipid panel reflex to direct LDL Fasting   Result Value Ref Range    Cholesterol 163 <200 mg/dL    Triglycerides 183 (H) <150 mg/dL    Direct Measure HDL 52 >=50 mg/dL    LDL Cholesterol Calculated 74 <=100 mg/dL    Non HDL Cholesterol 111 <130 mg/dL    Patient Fasting > 8hrs? Yes     Narrative    Cholesterol  Desirable:  <200 mg/dL    Triglycerides  Normal:  Less than 150 mg/dL  Borderline High:  150-199 mg/dL  High:  200-499 mg/dL  Very High:  Greater than or equal to 500 mg/dL    Direct Measure HDL  Female:  Greater than or equal to 50 mg/dL   Male:  Greater than or equal to 40 mg/dL    LDL Cholesterol  Desirable:  <100mg/dL  Above Desirable:  100-129 mg/dL   Borderline High:  130-159 mg/dL   High:  160-189 mg/dL   Very High:  >= 190 mg/dL    Non HDL Cholesterol  Desirable:  130 mg/dL  Above Desirable:  130-159 mg/dL  Borderline High:  160-189 mg/dL  High:  190-219 mg/dL  Very High:  Greater than or equal to 220 mg/dL   Comprehensive metabolic panel   Result Value Ref Range    Sodium 141 133 - 144 mmol/L    Potassium 4.4 3.4 - 5.3 mmol/L    Chloride 113 (H) 94 - 109 mmol/L    Carbon Dioxide (CO2) 22 20 - 32 mmol/L    Anion Gap 6 3 - 14 mmol/L    Urea Nitrogen 39 (H) 7 - 30 mg/dL    Creatinine 1.67 (H) 0.52 - 1.04 mg/dL    Calcium 9.4 8.5 - 10.1 mg/dL    Glucose 106 (H) 70 - 99 mg/dL    Alkaline Phosphatase 149 40 - 150 U/L    AST 17 0 - 45 U/L    ALT 19 0 - 50 U/L    Protein  Total 7.9 6.8 - 8.8 g/dL    Albumin 4.4 3.4 - 5.0 g/dL    Bilirubin Total 0.6 0.2 - 1.3 mg/dL    GFR Estimate 33 (L) >60 mL/min/1.73m2      Comment:      Effective December 21, 2021 eGFRcr in adults is calculated using the 2021 CKD-EPI creatinine equation which includes age and gender (Pratik et al., NEJM, DOI: 10.1056/JAWWyy0949367)       If you have any questions or concerns, please call the clinic at the number listed above.       Sincerely,      Josh Hollingsworth MD

## 2022-06-29 NOTE — PROGRESS NOTES
"SUBJECTIVE:   Rosa Sotomayor is a 71 year old female who presents for Preventive Visit.      Patient has been advised of split billing requirements and indicates understanding: Yes  Are you in the first 12 months of your Medicare coverage?  No    Healthy Habits:     In general, how would you rate your overall health?  Good    Frequency of exercise:  2-3 days/week    Duration of exercise:  15-30 minutes    Do you usually eat at least 4 servings of fruit and vegetables a day, include whole grains    & fiber and avoid regularly eating high fat or \"junk\" foods?  No    Taking medications regularly:  Yes    Medication side effects:  None    Ability to successfully perform activities of daily living:  No assistance needed    Home Safety:  Throw rugs in the hallway and lack of grab bars in the bathroom    Hearing Impairment:  No hearing concerns    In the past 6 months, have you been bothered by leaking of urine?  No    In general, how would you rate your overall mental or emotional health?  Good      PHQ-2 Total Score: 0    Additional concerns today:  No    She fell and broke shoulder 5/23  Another fall on 6/17 with bruising of right knee  Dr. Thompson reduced carvedilol from 25 bid to 12.5 bid last visit due to low blood pressure readings    Do you feel safe in your environment? Yes       Fall risk  Fallen 2 or more times in the past year?: Yes (basement steps both)  Any fall with injury in the past year?: Yes (basement steps, shoulder broken)    Cognitive Screening   1) Repeat 3 items (Leader, Season, Table)    2) Clock draw: NORMAL  3) 3 item recall: Recalls 3 objects  Results: 3 items recalled: COGNITIVE IMPAIRMENT LESS LIKELY    Mini-CogTM Loi Reese. Licensed by the author for use in [facility]; reprinted with permission (leola@.[facility]). All rights reserved.      Do you have sleep apnea, excessive snoring or daytime drowsiness?: yes    Reviewed and updated as needed this visit by clinical staff   " Tobacco  Allergies  Meds                Reviewed and updated as needed this visit by Provider                   Social History     Tobacco Use     Smoking status: Never Smoker     Smokeless tobacco: Never Used   Substance Use Topics     Alcohol use: No     Alcohol/week: 0.0 standard drinks     If you drink alcohol do you typically have >3 drinks per day or >7 drinks per week? No    Alcohol Use 6/29/2022   Prescreen: >3 drinks/day or >7 drinks/week? No   Prescreen: >3 drinks/day or >7 drinks/week? -               Current providers sharing in care for this patient include:   Patient Care Team:  Josh Hollingsworth MD as PCP - General (Internal Medicine)  Josh Hollingsworth MD as Assigned PCP  Taylor Zapien RD as Registered Dietitian (Dietitian, Registered)  Nico Calderon MD as MD (Hematology & Oncology)  Nico Calderon MD as Assigned Cancer Care Provider  Goltz, Bennett Ezra, PA-C as Assigned Neuroscience Provider    The following health maintenance items are reviewed in Epic and correct as of today:  Health Maintenance Due   Topic Date Due     ADVANCE CARE PLANNING  Never done     MICROALBUMIN  03/02/2018     Pneumococcal Vaccine: 65+ Years (2 - PCV) 08/07/2021     COLORECTAL CANCER SCREENING  10/10/2021     COVID-19 Vaccine (4 - Booster for Moderna series) 04/11/2022     LIPID  06/30/2022     MAMMO SCREENING  07/16/2022     Patient Active Problem List   Diagnosis     Personal history of allergy to anesthetic agent     Stage 3 chronic kidney disease (H)     Hypertriglyceridemia     Arnold-Chiari malformation (H)     Hyperlipidemia LDL goal <130     Anxiety     Secondary renal hyperparathyroidism (H)     Chronic bilateral low back pain without sciatica     Benign essential hypertension     Macular degeneration, bilateral     Anemia, iron deficiency     Migraine without aura and without status migrainosus, not intractable     Aortic valve insufficiency     ESTEFANIA (obstructive sleep apnea)- mild (AHI 12)     Spinal  stenosis of lumbar region with neurogenic claudication     Malabsorption of iron     Arthralgia of hip     Asymptomatic varicose veins     Congenital anomaly of brain (H)     Constipation     Environmental allergies     Degenerative joint disease (DJD) of hip     Intractable chronic migraine without aura     Lumbar disc herniation with radiculopathy     Macular degeneration     Menopausal symptom     Multiple drug allergies     Migraine headache     Renovascular hypertension     Right knee DJD     S/P shoulder surgery     Trochanteric bursitis     Past Surgical History:   Procedure Laterality Date     ARTHROPLASTY HIP Left      ARTHROSCOPY KNEE       BACK SURGERY      discectomy L3-4, 2018     BONE MARROW BIOPSY, BONE SPECIMEN, NEEDLE/TROCAR N/A 12/18/2019    Procedure: BONE MARROW BIOPSY;  Surgeon: Eran Bowser MD;  Location:  GI     JOINT REPLACEMENT Left      hip & knee     LA SHLDR ARTHROSCOP,SURG,W/ROTAT CUFF REPR Right 7/26/2016    Procedure: ARTHROSCOPIC ROTATOR CUFF REPAIR, SHOULDER Distal Clavicle Excision;  Surgeon: Nick Lawrence MD;  Location: [facility];  Service: Orthopedics     ROTATOR CUFF REPAIR RT/LT Right     2016     TUBAL LIGATION  1987     TUBAL LIGATION       VEIN LIGATION       [facility] NONSPECIFIC PROCEDURE      wisdom teeth     [facility] NONSPECIFIC PROCEDURE  2005    Varicose Veins     [facility] TOTAL HIP ARTHROPLASTY      left in 2012, right 2017     [facility] TOTAL HIP ARTHROPLASTY Right 5/23/2017    Procedure: RIGHT TOTAL HIP ARTHROPLASTY DIRECT ANTERIOR;  Surgeon: Herman Llanos MD;  Location: [facility] OR;  Service: Orthopedics     [facility] TOTAL KNEE ARTHROPLASTY      right 2014, left 2018     [facility] TOTAL KNEE ARTHROPLASTY Right 10/16/2014    Procedure: Right Total Knee Arthroplasty;  Surgeon: Herman Llanos MD;  Location: [facility] OR;  Service: Orthopedics     [facility] TOTAL KNEE ARTHROPLASTY Left 4/10/2018    Procedure: LEFT TOTAL KNEE ARTHROPLASTY;  Surgeon: Herman Llanos MD;   Location: [facility] Main OR;  Service: Orthopedics       Social History     Tobacco Use     Smoking status: Never Smoker     Smokeless tobacco: Never Used   Substance Use Topics     Alcohol use: No     Alcohol/week: 0.0 standard drinks     Family History   Problem Relation Age of Onset     Diabetes Mother         INSULIN     Hypertension Mother      Eye Disorder Mother         CATERACTS     Heart Disease Mother         CHF- OPEN HEART SURG X'S 2     Lipids Mother      Obesity Mother      Coronary Artery Disease Mother      Diabetes Father         ORAL     Hypertension Father      Arthritis Father      Eye Disorder Father         MAC DEGENERATION     Heart Disease Father         CHF     Lipids Father      Obesity Father      Diabetes Maternal Grandfather         INSULIN     Diabetes Brother         INSULIN     Gastrointestinal Disease Brother         CHOLITISI POSSIBLE CHRONES     Obesity Brother      Colon Cancer No family hx of      Breast Cancer No family hx of          Current Outpatient Medications   Medication Sig Dispense Refill     acetaminophen (TYLENOL) 500 MG tablet Take 500 mg by mouth       atorvastatin (LIPITOR) 40 MG tablet TAKE 1 TABLET(40 MG) BY MOUTH DAILY 90 tablet 3     Bacillus Coagulans-Inulin (PROBIOTIC) 1-250 BILLION-MG CAPS        carvedilol (COREG) 25 MG tablet Take 1 tablet (25 mg) by mouth 2 times daily (Patient taking differently: Take 12.5 mg by mouth 2 times daily) 180 tablet 3     cephALEXin (KEFLEX) 500 MG capsule Take 4 capsules (2,000 mg) by mouth once as needed One hour prior to dental procedures Take 500 mg by mouth Take 4 capsules (2,000 mg) by mouth once as needed One hour prior to dental procedures (Patient taking differently: Take 4 capsules (2,000 mg) by mouth once as needed One hour prior to dental procedures.) 16 capsule 3     cetirizine (ZYRTEC) 10 MG tablet Take 10 mg by mouth       Cholecalciferol (VITAMIN D) 2000 UNITS tablet Take 1 tablet by mouth daily        clobetasol (TEMOVATE) 0.05 % external solution Apply topically 2 times daily as needed  5     Cranberry POWD 1 capsule       Cyanocobalamin (B-12) 1000 MCG TBCR Take 1,000 mcg by mouth daily 100 tablet 1     diazepam (VALIUM) 5 MG tablet Take 0.5 tablets (2.5 mg) by mouth nightly as needed (vertigo) 10 tablet 0     EPINEPHrine (EPIPEN 2-FRAN) 0.3 MG/0.3ML injection 2-pack Inject 0.3 mLs (0.3 mg) into the muscle once as needed for anaphylaxis 0.6 mL 11     erenumab-aooe (AIMOVIG) 70 MG/ML injection Inject 70 mg Subcutaneous every 28 days       estradiol (VAGIFEM) 10 MCG TABS vaginal tablet        gemfibrozil (LOPID) 600 MG tablet Take 0.5 tablets (300 mg) by mouth 2 times daily 90 tablet 3     hydrOXYzine (ATARAX) 25 MG tablet Take 1 tablet (25 mg) by mouth 3 times daily as needed for itching 120 tablet 1     losartan (COZAAR) 50 MG tablet Take 1 tablet (50 mg) by mouth At Bedtime 90 tablet 3     Magnesium Oxide 250 MG TABS Take 250 mg by mouth       mometasone (ELOCON) 0.1 % external cream Apply topically daily as needed       Multiple Vitamins-Minerals (EYE VITAMINS & MINERALS PO)        ondansetron (ZOFRAN) 4 MG tablet Take 1 tablet (4 mg) by mouth every 8 hours as needed for nausea 90 tablet 3     SUMAtriptan (IMITREX) 20 MG/ACT nasal spray Spray 1 spray in nostril as needed for migraine May repeat in 2 hours. Max 2 sprays/24 hours. 12 each 11     tiZANidine (ZANAFLEX) 2 MG tablet Take 1 tablet (2 mg) by mouth 3 times daily as needed for muscle spasms 42 tablet 3     topiramate (TOPAMAX) 100 MG tablet Take 1 tablet by mouth daily  3     torsemide (DEMADEX) 10 MG tablet Take 1 tablet (10 mg) by mouth daily - office visit due for further refills 90 tablet 3     ZOLMitriptan (ZOMIG-ZMT) 5 MG ODT tab Take 5 mg by mouth       Allergies   Allergen Reactions     Bee Venom Anaphylaxis     Codeine Swelling     Body swelling and severe itching. Tolerates Morphine.      Fentanyl Anaphylaxis and Shortness Of Breath      Gabapentin Shortness Of Breath and Other (See Comments)     Chest heaviness with breathing       Hydromorphone Itching     Tolerates morphine       Midazolam Anaphylaxis, Itching and Shortness Of Breath     Tolerates Diazepam     Other Environmental Allergy Anaphylaxis     GENERAL ANETHESIA     Prilocaine Anaphylaxis     Anaphalactic shock     Propofol Anaphylaxis     Penicillins Itching and Rash     Tolerates Keflex,  Ancef  rash     Shingrix [Zoster Vac Recomb Adjuvanted] Itching and Rash     Sulfa Drugs Itching and Rash     rash     Clindamycin      Codeine Itching     Diatrizoate Other (See Comments)     CKD 3     Hydrocodone Itching and Swelling     Lisinopril Cough     Lorazepam Itching     Methocarbamol Itching     Whole body after 1 pill      Nsaids Other (See Comments)     Contraindicated with kidney hx (including Celebrex, per pt)     Other [Seasonal Allergies] Anaphylaxis     GENERAL ANETHESIA       Oxycodone      Vancomycin Other (See Comments)     Thrush, yeast infection, bladder infection       Erythromycin Rash     ointment  PN: ointment  ointment     Eucalyptus Oil Rash and Hives     hives     Nitrofuran Derivatives Rash     Nitrofurantoin Rash     Tramadol Rash     Review of Systems   Constitutional: Negative for chills and fever.   HENT: Negative for congestion, ear pain, hearing loss and sore throat.    Eyes: Negative for pain and visual disturbance.   Respiratory: Negative for cough and shortness of breath.    Cardiovascular: Positive for peripheral edema. Negative for chest pain and palpitations.   Gastrointestinal: Negative for abdominal pain, constipation, diarrhea, heartburn, hematochezia and nausea.   Genitourinary: Negative for dysuria, frequency, genital sores, hematuria and urgency.   Musculoskeletal: Negative for arthralgias, joint swelling and myalgias.   Skin: Negative for rash.   Neurological: Positive for headaches. Negative for dizziness, weakness and paresthesias.  "  Psychiatric/Behavioral: Negative for mood changes. The patient is not nervous/anxious.      Reducing carvedilol dose may improve mild swelling that is a chronic problem  Headaches are better on infusion from headache specialist     OBJECTIVE:   BP 96/61 (BP Location: Left arm, Patient Position: Chair, Cuff Size: Adult Regular)   Pulse 80   Temp 97.3  F (36.3  C) (Temporal)   Resp 16   Ht 1.613 m (5' 3.5\")   Wt 78.5 kg (173 lb)   SpO2 98%   BMI 30.16 kg/m   Estimated body mass index is 30.16 kg/m  as calculated from the following:    Height as of this encounter: 1.613 m (5' 3.5\").    Weight as of this encounter: 78.5 kg (173 lb).  Physical Exam  GENERAL APPEARANCE: healthy, alert and no distress  EYES: Eyes grossly normal to inspection, PERRL and conjunctivae and sclerae normal  HENT: ear canals and TM's normal, nose and mouth without ulcers or lesions, oropharynx clear and oral mucous membranes moist  NECK: no adenopathy, no asymmetry, masses, or scars and thyroid normal to palpation  RESP: lungs clear to auscultation - no rales, rhonchi or wheezes  CV: regular rate and rhythm, normal S1 S2, no S3 or S4, no murmur, click or rub, no peripheral edema and peripheral pulses strong  ABDOMEN: soft, nontender, no hepatosplenomegaly, no masses and bowel sounds normal  MS: right arm in sling  SKIN: no suspicious lesions or rashes  NEURO: Normal strength and tone, sensory exam grossly normal, mentation intact and speech normal  PSYCH: mentation appears normal and affect normal/bright    Labs pending     ASSESSMENT / PLAN:       ICD-10-CM    1. Congenital malformation of brain, unspecified (H)  Q04.9 OFFICE/OUTPT VISIT,EST,LEVL IV   2. Secondary renal hyperparathyroidism (H)  N25.81 OFFICE/OUTPT VISIT,EST,LEVL IV   3. Stage 3b chronic kidney disease (H)  N18.32 Albumin Random Urine Quantitative with Creat Ratio     Comprehensive metabolic panel     CBC with platelets     OFFICE/OUTPT VISIT,EST,LEVL IV   4. Acute pain " "of right knee  M25.561 XR Knee Right 3 Views     OFFICE/OUTPT VISIT,EST,LEVL IV   5. Benign essential hypertension  I10 carvedilol (COREG) 6.25 MG tablet     OFFICE/OUTPT VISIT,EST,LEVL IV   6. Hyperlipidemia LDL goal <130  E78.5 OFFICE/OUTPT VISIT,EST,LEVL IV   7. Other migraine without status migrainosus, not intractable  G43.809 OFFICE/OUTPT VISIT,EST,LEVL IV   8. Hypertriglyceridemia  E78.1 Lipid panel reflex to direct LDL Fasting     OFFICE/OUTPT VISIT,EST,LEVL IV   9. Other iron deficiency anemia  D50.8 OFFICE/OUTPT VISIT,EST,LEVL IV   10. Screen for colon cancer  Z12.11 Colonscopy Screening  Referral   11. Asymptomatic postmenopausal status  Z78.0 DX Hip/Pelvis/Spine     Headaches are OK  Continue follow up nephrology re renal dysfunction  Check x-ray to exclude fracture from fall on knee  Blood pressure is over-treated, further reduce carvedilol dose, hopefully this reduces risk of future falls  Recheck triglycerides   Recheck hemoglobin   Reminded her to schedule colonoscopy  She had normal DEXA in 2018, but she requests DEXA due to fractuer   Patient has been advised of split billing requirements and indicates understanding: Yes    COUNSELING:  Reviewed preventive health counseling, as reflected in patient instructions  Special attention given to:       Regular exercise       Healthy diet/nutrition       Immunizations    Reminded her that she is a candidate for Prevnar 13 and needs a 4th COVID shot.    She declined COVID; agreed to prevnar           Consider lung cancer screening for ages 55-80 years (77 for Medicare) and 20 pack-year smoking history  ; never smoker        Hepatitis C screening       HIV screening for high risk patient       Mammogram:  Scheduled for this in July     Estimated body mass index is 30.16 kg/m  as calculated from the following:    Height as of this encounter: 1.613 m (5' 3.5\").    Weight as of this encounter: 78.5 kg (173 lb).    Weight management plan: Discussed " healthy diet and exercise guidelines    She reports that she has never smoked. She has never used smokeless tobacco.      Appropriate preventive services were discussed with this patient, including applicable screening as appropriate for cardiovascular disease, diabetes, osteopenia/osteoporosis, and glaucoma.  As appropriate for age/gender, discussed screening for colorectal cancer, prostate cancer, breast cancer, and cervical cancer. Checklist reviewing preventive services available has been given to the patient.    Reviewed patients plan of care and provided an AVS. The Basic Care Plan (routine screening as documented in Health Maintenance) for Rosa meets the Care Plan requirement. This Care Plan has been established and reviewed with the Patient.    Counseling Resources:  ATP IV Guidelines  Pooled Cohorts Equation Calculator  Breast Cancer Risk Calculator  Breast Cancer: Medication to Reduce Risk  FRAX Risk Assessment  ICSI Preventive Guidelines  Dietary Guidelines for Americans, 2010  USDA's MyPlate  ASA Prophylaxis  Lung CA Screening    Josh Hollingsworth MD  [facility]    Identified Health Risks:

## 2022-06-29 NOTE — NURSING NOTE
Check out note is for prevar 13.  I talked with Dr. Hollingsworth about CDC recommendation in April of pneumococcal 20 and ok'ed.  Had pneumococcal 23 in 2020    Prior to immunization administration, verified patients identity using patient s name and date of birth. Please see Immunization Activity for additional information.     Screening Questionnaire for Adult Immunization    Are you sick today?   No   Do you have allergies to medications, food, a vaccine component or latex?   Yes    Have you ever had a serious reaction after receiving a vaccination?   No   Do you have a long-term health problem with heart, lung, kidney, or metabolic disease (e.g., diabetes), asthma, a blood disorder, no spleen, complement component deficiency, a cochlear implant, or a spinal fluid leak?  Are you on long-term aspirin therapy?   Yes   Do you have cancer, leukemia, HIV/AIDS, or any other immune system problem?   No   Do you have a parent, brother, or sister with an immune system problem?   Yes   In the past 3 months, have you taken medications that affect  your immune system, such as prednisone, other steroids, or anticancer drugs; drugs for the treatment of rheumatoid arthritis, Crohn s disease, or psoriasis; or have you had radiation treatments?   No   Have you had a seizure, or a brain or other nervous system problem?   No   During the past year, have you received a transfusion of blood or blood    products, or been given immune (gamma) globulin or antiviral drug?   No   For women: Are you pregnant or is there a chance you could become       pregnant during the next month?   No   Have you received any vaccinations in the past 4 weeks?   No     Immunization questionnaire was positive for at least one answer.  Notified no one.        Per orders of terry Martin for injection of pneumococcal 20 (instead of prevnar 13)  given by Rhoda Carlson CMA. Patient instructed to remain in clinic for 15 minutes afterwards, and to report any  adverse reaction to me immediately.       Screening performed by Rhoda Carlson CMA on 6/29/2022 at 10:45 AM.

## 2022-06-30 ENCOUNTER — TELEPHONE (OUTPATIENT)
Dept: FAMILY MEDICINE | Facility: CLINIC | Age: 72
End: 2022-06-30

## 2022-06-30 DIAGNOSIS — I10 BENIGN ESSENTIAL HYPERTENSION: ICD-10-CM

## 2022-06-30 LAB
ALBUMIN SERPL-MCNC: 4.4 G/DL (ref 3.4–5)
ALP SERPL-CCNC: 149 U/L (ref 40–150)
ALT SERPL W P-5'-P-CCNC: 19 U/L (ref 0–50)
ANION GAP SERPL CALCULATED.3IONS-SCNC: 6 MMOL/L (ref 3–14)
AST SERPL W P-5'-P-CCNC: 17 U/L (ref 0–45)
BILIRUB SERPL-MCNC: 0.6 MG/DL (ref 0.2–1.3)
BUN SERPL-MCNC: 39 MG/DL (ref 7–30)
CALCIUM SERPL-MCNC: 9.4 MG/DL (ref 8.5–10.1)
CHLORIDE BLD-SCNC: 113 MMOL/L (ref 94–109)
CHOLEST SERPL-MCNC: 163 MG/DL
CO2 SERPL-SCNC: 22 MMOL/L (ref 20–32)
CREAT SERPL-MCNC: 1.67 MG/DL (ref 0.52–1.04)
FASTING STATUS PATIENT QL REPORTED: YES
GFR SERPL CREATININE-BSD FRML MDRD: 33 ML/MIN/1.73M2
GLUCOSE BLD-MCNC: 106 MG/DL (ref 70–99)
HDLC SERPL-MCNC: 52 MG/DL
LDLC SERPL CALC-MCNC: 74 MG/DL
NONHDLC SERPL-MCNC: 111 MG/DL
POTASSIUM BLD-SCNC: 4.4 MMOL/L (ref 3.4–5.3)
PROT SERPL-MCNC: 7.9 G/DL (ref 6.8–8.8)
SODIUM SERPL-SCNC: 141 MMOL/L (ref 133–144)
TRIGL SERPL-MCNC: 183 MG/DL

## 2022-06-30 PROCEDURE — 82043 UR ALBUMIN QUANTITATIVE: CPT | Performed by: INTERNAL MEDICINE

## 2022-06-30 RX ORDER — CARVEDILOL 6.25 MG/1
6.25 TABLET ORAL 2 TIMES DAILY WITH MEALS
Qty: 180 TABLET | Refills: 3 | Status: SHIPPED | OUTPATIENT
Start: 2022-06-30 | End: 2022-08-11

## 2022-06-30 NOTE — TELEPHONE ENCOUNTER
PCP Patient called pharmacy never received coreg- per chart review script sent as historical- pended. Patient states they also requested refills of all medications during visit. Please advise.       carvedilol (COREG) 6.25 MG tablet 180 tablet 3 6/29/2022  No   Sig - Route: Take 1 tablet (6.25 mg) by mouth 2 times daily (with meals) - Oral   Class: Historical   Order: [id number]       Printout Tracking    External Result Report     Pharmacy    [id number] - [place] - [address] AT [facility] OF [address]     Callback: [phone]- ok to leave detailed VM    Poonam Mayorga RN  [facility]

## 2022-07-01 LAB
CREAT UR-MCNC: 96 MG/DL
MICROALBUMIN UR-MCNC: 232 MG/L
MICROALBUMIN/CREAT UR: 241.67 MG/G CR (ref 0–25)

## 2022-07-01 NOTE — RESULT ENCOUNTER NOTE
The following letter pertains to your most recent diagnostic tests:    -The triglycerides are improved.  No need to change any medications regarding cholesterol and triglycerides management.      -The metabolic panel shows fairly stable kidney function.  The other parameters are OK the blood sugar (glucose) is mildly elevated, but NOT in the diabetic range        Sincerely,    Dr. Hollingsworth

## 2022-07-07 ENCOUNTER — TELEPHONE (OUTPATIENT)
Dept: GASTROENTEROLOGY | Facility: CLINIC | Age: 72
End: 2022-07-07

## 2022-07-07 ENCOUNTER — LAB (OUTPATIENT)
Dept: INFUSION THERAPY | Facility: CLINIC | Age: 72
End: 2022-07-07
Attending: INTERNAL MEDICINE
Payer: MEDICARE

## 2022-07-07 DIAGNOSIS — D50.8 OTHER IRON DEFICIENCY ANEMIA: Primary | ICD-10-CM

## 2022-07-07 DIAGNOSIS — D64.9 NORMOCYTIC ANEMIA: ICD-10-CM

## 2022-07-07 LAB
ERYTHROCYTE [DISTWIDTH] IN BLOOD BY AUTOMATED COUNT: 13.2 % (ref 10–15)
HCT VFR BLD AUTO: 32.4 % (ref 35–47)
HGB BLD-MCNC: 10.4 G/DL (ref 11.7–15.7)
HOLD SPECIMEN: NORMAL
MCH RBC QN AUTO: 31.3 PG (ref 26.5–33)
MCHC RBC AUTO-ENTMCNC: 32.1 G/DL (ref 31.5–36.5)
MCV RBC AUTO: 98 FL (ref 78–100)
PLATELET # BLD AUTO: 239 10E3/UL (ref 150–450)
RBC # BLD AUTO: 3.32 10E6/UL (ref 3.8–5.2)
WBC # BLD AUTO: 4 10E3/UL (ref 4–11)

## 2022-07-07 PROCEDURE — 85027 COMPLETE CBC AUTOMATED: CPT | Performed by: INTERNAL MEDICINE

## 2022-07-07 PROCEDURE — 36415 COLL VENOUS BLD VENIPUNCTURE: CPT

## 2022-07-07 NOTE — TELEPHONE ENCOUNTER
Screening Questions    BlueKIND OF PREP RedLOCATION [review exclusion criteria] GreenSEDATION TYPE      1. Are you active on mychart? Y    2. What insurance is in the chart? BCBS     3.   Ordering/Referring Provider: JEFF TSANG    4. BMI   (If greater than 40 review exclusion criteria [PAC APPT IF [MAC] @ [facility])  30.2  [If yes, BMI OVER 40-EXTENDED PREP]      **(Sedation review/consideration needed)**  Do you have a legal guardian or Medical Power of    and/or are you able to give consent for your medical care?     Y    5. Have you had a positive covid test in the last 90 days?   N - N    6.  Are you currently on dialysis?   N [ If yes, G-PREP & HOSPITAL setting ONLY]     7.  Do you have chronic kidney disease?  Y [ If yes, G-PREP ]    8.   Do you have a diagnosis of diabetes?   N   [ If yes, G-PREP ]    9.  On a regular basis do you go 3-5 days between bowel movements?   N   [ If yes, EXTENDED PREP]    10.  Are you taking any prescription pain medications on a routine schedule?    N - N [ If yes, EXTENDED PREP] [If yes, MAC]      11.   Do you have any chemical dependencies such as alcohol, street drugs, or methadone?    N [If yes, MAC]    12.   Do you have any history of post-traumatic stress syndrome, severe anxiety or history of psychosis?    N  [If yes, MAC]    13.  [FEMALES] Are you currently pregnant?     If yes, how many weeks?       Respiratory/Heart Screening:  [If yes to any of the following HOSPITAL setting only]     14. Do you have Pulmonary Hypertension [Lungs]?   N       15. Do you have UNCONTROLLED asthma?   N     16.  Do you use daily home oxygen?  N      17. Do you have mod to severe Obstructive Sleep Apnea?         (OKAY @ [facility]  [facility]  SH  PH  RI  MG - if pt is not on OXYGEN)  Y, uses CPAP, a little      18.   Have you had a heart or lung transplant?   N      19.   Have you had a stroke or Transient ischemic attack (TIA - aka  mini stroke ) within 6 months?  (If yes, please review  exclusion criteria)  N     20.   In the past 6 months, have you had any heart related issues including cardiomyopathy or heart attack?   N           If yes, did it require cardiac stenting or other implantable device?   N      21.   Do you have any implantable devices in your body (pacemaker, defib, LVAD)? (If yes, please review exclusion criteria)  N     22. Do you take nitroglycerin?   N           If yes, how often? N  (if yes, HOSPITAL setting ONLY)    23.  Are you currently taking any blood thinners?    [If yes, INFORM patient to follw up w/ ORDERING PROVIDER FOR BRIDGING INSTRUCTIONS]     N    24.   Do you transfer independently?                (If NO, please HOSPITAL setting ONLY)  Y    25.   Preferred LOCAL Pharmacy for Pre Prescription:      [id number] - [place], [place] - [address] AT [facility] [address] [place] [address]    Scheduling Details  (Please ask for phone number if not scheduled by patient)      Caller : Rosa  Date of Procedure: 8/25  Surgeon: Pepito per order  Location:     Sedation Type: CS l  Per protocol  Conscious Sedation- Needs  for 6 hours after the procedure  MAC/General-Needs  for 24 hours after procedure    n :[Pre-op Required] at U  SH  MG and OR for MAC sedation   (advise patient they will need a pre-op WITH IN 30 DAYS of procedure date)     Type of Procedure Scheduled:   Lower Endoscopy [Colonoscopy]    Which Colonoscopy Prep was Sent?:   golytely - kidney disease    [facility]OR[facility]S CF PATIENTS & GROEN'S PATIENTS NEEDS EXTENDED PREP    Informed patient they will need an adult  y  Cannot take any type of public or medical transportation alone    Pre-Procedure Covid test to be completed at [facility] or Externally: y  **INFORMED OF HOME TESTING & LAB OPTION**    Confirmed Nurse will call to complete assessment Y    Additional comments:   Pt will want to discuss prep options.

## 2022-07-08 NOTE — RESULT ENCOUNTER NOTE
Dear Ms. Sotomayor,    Hemoglobin is stable at 10.4.    Please, call me with any questions.    Nico Calderon MD

## 2022-07-11 NOTE — PATIENT INSTRUCTIONS
1. CBC every 3 months.  2. Follow-up in 3 months.    *Please call to schedule   [No Acute Distress] : no acute distress [Frail] : frail [Normal Conjunctiva] : normal conjunctiva [Normal Venous Pressure] : normal venous pressure [Normal S1, S2] : normal S1, S2 [No Rub] : no rub [Murmur] : murmur [Good Air Entry] : good air entry [No Respiratory Distress] : no respiratory distress  [Soft] : abdomen soft [Non Tender] : non-tender [Abnormal Gait] : abnormal gait [No Edema] : no edema [No Cyanosis] : no cyanosis [No Clubbing] : no clubbing [No Skin Lesions] : no skin lesions [No Focal Deficits] : no focal deficits [Memory Deficit] : memory deficit [de-identified] : Patient is n a wheelchair [de-identified] : Grade II/VI systolic murmur at LAS and RSB [de-identified] : Patient has dementia

## 2022-07-19 ENCOUNTER — HOSPITAL ENCOUNTER (OUTPATIENT)
Dept: MAMMOGRAPHY | Facility: CLINIC | Age: 72
Discharge: HOME | End: 2022-07-19
Attending: OBSTETRICS & GYNECOLOGY
Payer: MEDICARE

## 2022-07-19 ENCOUNTER — HOSPITAL ENCOUNTER (OUTPATIENT)
Dept: BONE DENSITY | Facility: CLINIC | Age: 72
Discharge: HOME | End: 2022-07-19
Attending: INTERNAL MEDICINE
Payer: MEDICARE

## 2022-07-19 DIAGNOSIS — Z12.31 VISIT FOR SCREENING MAMMOGRAM: ICD-10-CM

## 2022-07-19 DIAGNOSIS — Z78.0 ASYMPTOMATIC POSTMENOPAUSAL STATUS: ICD-10-CM

## 2022-07-19 PROCEDURE — 77080 DXA BONE DENSITY AXIAL: CPT

## 2022-07-19 PROCEDURE — 77067 SCR MAMMO BI INCL CAD: CPT

## 2022-07-19 NOTE — LETTER
July 20, 2022      Rosasienna Sotomayor  [address]  [place] [address]        Dear ,    We are writing to inform you of your test results.    The following letter pertains to your most recent diagnostic tests:     Good news! The bone mineral density is normal.         Resulted Orders   DX Hip/Pelvis/Spine    Narrative    EXAM: DX HIP/PELVIS/SPINE  LOCATION: [facility]  DATE/TIME: 7/19/2022 1:05 PM    INDICATION:  Asymptomatic postmenopausal status  COMPARISON: 07/26/2018  TECHNIQUE: Dual-energy x-ray absorptiometry performed with routine technique.    FINDINGS:    Lumbar Spine: L1-L2: BMD: 1.119 g/cm2. T-score: -0.4. Z-score: 0.8  LEFT Radius 33%: BMD: 0.787 g/cm2. T-score: 1.0. Z-score: 0.8    WHO Criteria:  Normal: T score at or above -1 SD  Osteopenia: T score between -1 and -2.5 SD  Osteoporosis: T score at or below -2.5 SD    COMPARISON: There has been a 0.9 % decrease in L1-L4 BMD. There has been a 17.9 % decrease in left radius 33% BMD.    FRAX Results: Not applicable due to Normal bone mineral density.      Impression    IMPRESSION: NORMAL. Bone mineral density measurements are within normal limits using T score.       If you have any questions or concerns, please call the clinic at the number listed above.       Sincerely,      Josh Hollingsworth MD

## 2022-07-19 NOTE — RESULT ENCOUNTER NOTE
The following letter pertains to your most recent diagnostic tests:    Good news! The bone mineral density is normal.        Sincerely,    Dr. Hollingsworth

## 2022-08-09 DIAGNOSIS — R60.0 EDEMA LEG: ICD-10-CM

## 2022-08-09 DIAGNOSIS — I10 BENIGN ESSENTIAL HYPERTENSION: ICD-10-CM

## 2022-08-09 DIAGNOSIS — E78.1 HYPERTRIGLYCERIDEMIA: ICD-10-CM

## 2022-08-11 RX ORDER — ATORVASTATIN CALCIUM 40 MG/1
TABLET, FILM COATED ORAL
Qty: 90 TABLET | Refills: 2 | Status: SHIPPED | OUTPATIENT
Start: 2022-08-11 | End: 2023-05-09

## 2022-08-11 RX ORDER — CARVEDILOL 6.25 MG/1
6.25 TABLET ORAL 2 TIMES DAILY WITH MEALS
Qty: 180 TABLET | Refills: 3 | Status: SHIPPED | OUTPATIENT
Start: 2022-08-11 | End: 2023-06-14

## 2022-08-11 RX ORDER — TORSEMIDE 10 MG/1
10 TABLET ORAL DAILY
Qty: 90 TABLET | Refills: 3 | Status: SHIPPED | OUTPATIENT
Start: 2022-08-11 | End: 2023-06-14

## 2022-08-11 RX ORDER — GEMFIBROZIL 600 MG/1
300 TABLET, FILM COATED ORAL 2 TIMES DAILY
Qty: 90 TABLET | Refills: 3 | Status: SHIPPED | OUTPATIENT
Start: 2022-08-11 | End: 2023-06-14

## 2022-08-11 NOTE — TELEPHONE ENCOUNTER
Atorvastatin  Routing refill request to provider for review/approval because:  Drug not on the [facility] refill protocol       Torsemide, carvidolol and Gemfibrozil  Routing refill request to provider for review/approval because:  Drug not on the [facility] refill protocol     Poonam Mayorga RN  [facility]

## 2022-08-15 DIAGNOSIS — I10 BENIGN ESSENTIAL HYPERTENSION: ICD-10-CM

## 2022-08-15 DIAGNOSIS — G43.009 MIGRAINE WITHOUT AURA AND WITHOUT STATUS MIGRAINOSUS, NOT INTRACTABLE: ICD-10-CM

## 2022-08-15 DIAGNOSIS — M48.062 SPINAL STENOSIS OF LUMBAR REGION WITH NEUROGENIC CLAUDICATION: ICD-10-CM

## 2022-08-15 DIAGNOSIS — M79.605 PAIN IN BOTH LOWER EXTREMITIES: ICD-10-CM

## 2022-08-15 DIAGNOSIS — M79.604 PAIN IN BOTH LOWER EXTREMITIES: ICD-10-CM

## 2022-08-15 NOTE — TELEPHONE ENCOUNTER
Pharmacy seeking refill of Carvedilol 25mg  See 6/29/2022 OV - dose reduction made 25mg to 12.mg to 6.25mg     New dose is 6.25mg BID as of 6/29/2022 OV per Dr Hollingsworth    #180 R3 sent to pharmacy 8/11/2022    Too soon to renew, Rx good for one year.    Fax sent back to pharmacy.    Geovanna Tao, RT (R)

## 2022-08-17 DIAGNOSIS — H81.12 BENIGN PAROXYSMAL POSITIONAL VERTIGO, LEFT: ICD-10-CM

## 2022-08-17 DIAGNOSIS — T78.40XD ALLERGIC REACTION, SUBSEQUENT ENCOUNTER: ICD-10-CM

## 2022-08-17 RX ORDER — EPINEPHRINE 0.3 MG/.3ML
0.3 INJECTION SUBCUTANEOUS
Qty: 0.6 ML | Refills: 11 | Status: SHIPPED | OUTPATIENT
Start: 2022-08-17 | End: 2024-07-03

## 2022-08-17 RX ORDER — DIAZEPAM 5 MG
2.5 TABLET ORAL
Qty: 10 TABLET | Refills: 0 | Status: SHIPPED | OUTPATIENT
Start: 2022-08-17 | End: 2023-06-14

## 2022-08-17 NOTE — TELEPHONE ENCOUNTER
Patient calling requesting refills of the following medications:    Ondansetron     Cephalexin     Tizanidine     Epi Pen     Valium     Refill encounters already routed to refill basket for ondansetron, cephalexin, and tizanidine.    PCP please advise if able to order new rx for Epi Pen & Valium    LOV: 6/29/22    Carlos Johnson RN  [facility]

## 2022-08-17 NOTE — TELEPHONE ENCOUNTER
Routing refill request to provider for review/approval because:  Drug not on the [facility] refill protocol     Poonam Mayorga RN  [facility]

## 2022-08-18 RX ORDER — ONDANSETRON 4 MG/1
TABLET, FILM COATED ORAL
Qty: 90 TABLET | Refills: 3 | Status: SHIPPED | OUTPATIENT
Start: 2022-08-18 | End: 2023-06-14

## 2022-08-18 RX ORDER — TIZANIDINE 2 MG/1
TABLET ORAL
Qty: 42 TABLET | Refills: 3 | Status: SHIPPED | OUTPATIENT
Start: 2022-08-18 | End: 2022-11-28

## 2022-08-18 RX ORDER — LOSARTAN POTASSIUM 50 MG/1
TABLET ORAL
Qty: 90 TABLET | Refills: 3 | Status: SHIPPED | OUTPATIENT
Start: 2022-08-18 | End: 2023-06-14

## 2022-08-18 RX ORDER — CEPHALEXIN 500 MG/1
CAPSULE ORAL
Qty: 16 CAPSULE | Refills: 3 | Status: SHIPPED | OUTPATIENT
Start: 2022-08-18 | End: 2023-08-22

## 2022-08-18 NOTE — TELEPHONE ENCOUNTER
Patient called to check status of this refill request for keflex informed that is was sent in patient states pharmacy sent automated message that it was denied [facility]    Simon Stanley RN

## 2022-08-18 NOTE — TELEPHONE ENCOUNTER
Routing refill request to provider for review/approval because:    Creatinine   Date Value Ref Range Status   06/29/2022 1.67 (H) 0.52 - 1.04 mg/dL Final   01/12/2021 1.45 (H) 0.52 - 1.04 mg/dL Final       Last office vist: 6/29/22- return in one year.     Next office visit: none      Poonam Mayorga RN  [facility]

## 2022-08-18 NOTE — TELEPHONE ENCOUNTER
Routing refill request to provider for review/approval because:  Drugs not on the [facility] refill protocol   Radha OSPINA RN,BSN

## 2022-08-25 ENCOUNTER — HOSPITAL ENCOUNTER (OUTPATIENT)
Facility: CLINIC | Age: 72
Discharge: HOME | End: 2022-08-25
Attending: INTERNAL MEDICINE | Admitting: INTERNAL MEDICINE
Payer: MEDICARE

## 2022-08-25 VITALS
SYSTOLIC BLOOD PRESSURE: 150 MMHG | RESPIRATION RATE: 23 BRPM | HEART RATE: 71 BPM | DIASTOLIC BLOOD PRESSURE: 63 MMHG | OXYGEN SATURATION: 99 %

## 2022-08-25 DIAGNOSIS — Z12.11 ENCOUNTER FOR SCREENING COLONOSCOPY: Primary | ICD-10-CM

## 2022-08-25 LAB — COLONOSCOPY: NORMAL

## 2022-08-25 PROCEDURE — 45380 COLONOSCOPY AND BIOPSY: CPT | Mod: PT | Performed by: INTERNAL MEDICINE

## 2022-08-25 PROCEDURE — 258N000003 HC RX IP 258 OP 636: Performed by: INTERNAL MEDICINE

## 2022-08-25 PROCEDURE — 88305 TISSUE EXAM BY PATHOLOGIST: CPT | Mod: TC | Performed by: INTERNAL MEDICINE

## 2022-08-25 RX ORDER — SODIUM CHLORIDE 9 MG/ML
INJECTION, SOLUTION INTRAVENOUS CONTINUOUS PRN
Status: COMPLETED | OUTPATIENT
Start: 2022-08-25 | End: 2022-08-25

## 2022-08-25 RX ADMIN — SODIUM CHLORIDE 999 ML: 9 INJECTION, SOLUTION INTRAVENOUS at 10:46

## 2022-08-25 ASSESSMENT — ACTIVITIES OF DAILY LIVING (ADL): ADLS_ACUITY_SCORE: 35

## 2022-08-25 NOTE — H&P
[facility]  Pre-Endoscopy History and Physical     Rosa Sotomayor MRN# [id number]   YOB: 1950 Age: 71 year old     Date of Procedure: 8/25/2022  Primary care provider: Josh Hollingsworth  Type of Endoscopy: colonoscopy  Reason for Procedure: Screening  Type of Anesthesia Anticipated: Moderate Sedation    HPI:    Rosa is a 71 year old female who will be undergoing the above procedure.      A history and physical has been performed. The patient's medications and allergies have been reviewed. The risks and benefits of the procedure and the sedation options and risks were discussed with the patient.  All questions were answered and informed consent was obtained.      She denies a personal or family history of anesthesia complications or bleeding disorders.     Allergies   Allergen Reactions     Bee Venom Anaphylaxis     Codeine Swelling     Body swelling and severe itching. Tolerates Morphine.      Fentanyl Anaphylaxis and Shortness Of Breath     Gabapentin Shortness Of Breath and Other (See Comments)     Chest heaviness with breathing       Hydromorphone Itching     Tolerates morphine       Midazolam Anaphylaxis, Itching and Shortness Of Breath     Tolerates Diazepam     Other Environmental Allergy Anaphylaxis     GENERAL ANETHESIA     Prilocaine Anaphylaxis     Anaphalactic shock     Propofol Anaphylaxis     Penicillins Itching and Rash     Tolerates Keflex,  Ancef  rash     Shingrix [Zoster Vac Recomb Adjuvanted] Headache, Itching and Rash     Sulfa Drugs Itching and Rash     Clindamycin      Codeine Itching     Diatrizoate Other (See Comments)     CKD 3     Hydrocodone Itching and Swelling     Lisinopril Cough     Lorazepam Itching     Methocarbamol Itching     Whole body after 1 pill      Nsaids Other (See Comments)     Contraindicated with kidney hx (including Celebrex, per pt)     Other [Seasonal Allergies] Anaphylaxis     GENERAL ANETHESIA       Oxycodone      Vancomycin  Other (See Comments)     Thrush, yeast infection, bladder infection       Erythromycin Rash     ointment  PN: ointment  ointment     Eucalyptus Oil Rash and Hives     hives     Nitrofuran Derivatives Rash     Nitrofurantoin Rash     Tramadol Rash        Prior to Admission Medications   Prescriptions Last Dose Informant Patient Reported? Taking?   Bacillus Coagulans-Inulin (PROBIOTIC) 1-250 BILLION-MG CAPS   Yes No   Cholecalciferol (VITAMIN D) 2000 UNITS tablet   Yes No   Sig: Take 1 tablet by mouth daily   Cranberry POWD   Yes No   Si capsule   Cyanocobalamin (B-12) 1000 MCG TBCR   No No   Sig: Take 1,000 mcg by mouth daily   EPINEPHrine (EPIPEN 2-FRAN) 0.3 MG/0.3ML injection 2-pack   No No   Sig: Inject 0.3 mLs (0.3 mg) into the muscle once as needed for anaphylaxis   Magnesium Oxide 250 MG TABS   Yes No   Sig: Take 250 mg by mouth   Multiple Vitamins-Minerals (EYE VITAMINS & MINERALS PO)   Yes No   SUMAtriptan (IMITREX) 20 MG/ACT nasal spray   No No   Sig: Spray 1 spray in nostril as needed for migraine May repeat in 2 hours. Max 2 sprays/24 hours.   ZOLMitriptan (ZOMIG-ZMT) 5 MG ODT tab   Yes No   Sig: Take 5 mg by mouth   acetaminophen (TYLENOL) 500 MG tablet   Yes No   Sig: Take 500 mg by mouth   atorvastatin (LIPITOR) 40 MG tablet   No No   Sig: TAKE 1 TABLET(40 MG) BY MOUTH DAILY   carvedilol (COREG) 6.25 MG tablet   No No   Sig: Take 1 tablet (6.25 mg) by mouth 2 times daily (with meals)   cephALEXin (KEFLEX) 500 MG capsule   No No   Sig: TAKE 4 CAPSULES BY MOUTH ONE HOUR PRIOR TO DENTAL PROCEDURES   cetirizine (ZYRTEC) 10 MG tablet   Yes No   Sig: Take 10 mg by mouth   clobetasol (TEMOVATE) 0.05 % external solution   Yes No   Sig: Apply topically 2 times daily as needed   diazepam (VALIUM) 5 MG tablet   No No   Sig: Take 0.5 tablets (2.5 mg) by mouth nightly as needed (vertigo)   erenumab-aooe (AIMOVIG) 70 MG/ML injection   Yes No   Sig: Inject 70 mg Subcutaneous every 28 days   estradiol (VAGIFEM) 10  MCG TABS vaginal tablet   Yes No   gemfibrozil (LOPID) 600 MG tablet   No No   Sig: Take 0.5 tablets (300 mg) by mouth 2 times daily   hydrOXYzine (ATARAX) 25 MG tablet   No No   Sig: Take 1 tablet (25 mg) by mouth 3 times daily as needed for itching   losartan (COZAAR) 50 MG tablet   No No   Sig: TAKE 1 TABLET(50 MG) BY MOUTH AT BEDTIME   mometasone (ELOCON) 0.1 % external cream   Yes No   Sig: Apply topically daily as needed   ondansetron (ZOFRAN) 4 MG tablet   No No   Sig: TAKE 1 TABLET(4 MG) BY MOUTH EVERY 8 HOURS AS NEEDED FOR NAUSEA   tiZANidine (ZANAFLEX) 2 MG tablet   No No   Sig: TAKE 1 TABLET(2 MG) BY MOUTH THREE TIMES DAILY AS NEEDED FOR MUSCLE SPASMS   topiramate (TOPAMAX) 100 MG tablet   Yes No   Sig: Take 1 tablet by mouth daily   torsemide (DEMADEX) 10 MG tablet   No No   Sig: Take 1 tablet (10 mg) by mouth daily - office visit due for further refills      Facility-Administered Medications: None       Patient Active Problem List   Diagnosis     Personal history of allergy to anesthetic agent     Stage 3 chronic kidney disease (H)     Hypertriglyceridemia     Arnold-Chiari malformation (H)     Hyperlipidemia LDL goal <130     Anxiety     Secondary renal hyperparathyroidism (H)     Chronic bilateral low back pain without sciatica     Benign essential hypertension     Macular degeneration, bilateral     Anemia, iron deficiency     Migraine without aura and without status migrainosus, not intractable     Aortic valve insufficiency     ESTEFANIA (obstructive sleep apnea)- mild (AHI 12)     Spinal stenosis of lumbar region with neurogenic claudication     Malabsorption of iron     Arthralgia of hip     Asymptomatic varicose veins     Congenital anomaly of brain (H)     Constipation     Environmental allergies     Degenerative joint disease (DJD) of hip     Intractable chronic migraine without aura     Lumbar disc herniation with radiculopathy     Macular degeneration     Menopausal symptom     Multiple drug  allergies     Migraine headache     Renovascular hypertension     Right knee DJD     S/P shoulder surgery     Trochanteric bursitis        Past Medical History:   Diagnosis Date     Arthritis      Complication of anesthesia      Hypertension      Postmenopausal bleeding 2/28/2007     Sleep apnea         Past Surgical History:   Procedure Laterality Date     ARTHROPLASTY HIP Left      ARTHROSCOPY KNEE       BACK SURGERY      discectomy L3-4, 2018     BONE MARROW BIOPSY, BONE SPECIMEN, NEEDLE/TROCAR N/A 12/18/2019    Procedure: BONE MARROW BIOPSY;  Surgeon: Eran Bowser MD;  Location:  GI     JOINT REPLACEMENT Left      hip & knee     FL SHLDR ARTHROSCOP,SURG,W/ROTAT CUFF REPR Right 7/26/2016    Procedure: ARTHROSCOPIC ROTATOR CUFF REPAIR, SHOULDER Distal Clavicle Excision;  Surgeon: Nick Lawrence MD;  Location: [facility] Main OR;  Service: Orthopedics     ROTATOR CUFF REPAIR RT/LT Right     2016     TUBAL LIGATION  1987     TUBAL LIGATION       VEIN LIGATION       [facility] NONSPECIFIC PROCEDURE      wisdom teeth     [facility] NONSPECIFIC PROCEDURE  2005    Varicose Veins     [facility] TOTAL HIP ARTHROPLASTY      left in 2012, right 2017     [facility] TOTAL HIP ARTHROPLASTY Right 5/23/2017    Procedure: RIGHT TOTAL HIP ARTHROPLASTY DIRECT ANTERIOR;  Surgeon: Herman Llanos MD;  Location: [facility] Main OR;  Service: Orthopedics     [facility] TOTAL KNEE ARTHROPLASTY      right 2014, left 2018     [facility] TOTAL KNEE ARTHROPLASTY Right 10/16/2014    Procedure: Right Total Knee Arthroplasty;  Surgeon: Herman Llanos MD;  Location: [facility] Main OR;  Service: Orthopedics     [facility] TOTAL KNEE ARTHROPLASTY Left 4/10/2018    Procedure: LEFT TOTAL KNEE ARTHROPLASTY;  Surgeon: Herman Llanos MD;  Location: [facility] Main OR;  Service: Orthopedics       Social History     Tobacco Use     Smoking status: Never Smoker     Smokeless tobacco: Never Used   Substance Use Topics     Alcohol use: No     Alcohol/week: 0.0 standard drinks  "      Family History   Problem Relation Age of Onset     Diabetes Mother         INSULIN     Hypertension Mother      Eye Disorder Mother         CATERACTS     Heart Disease Mother         CHF- OPEN HEART SURG X'S 2     Lipids Mother      Obesity Mother      Coronary Artery Disease Mother      Diabetes Father         ORAL     Hypertension Father      Arthritis Father      Eye Disorder Father         MAC DEGENERATION     Heart Disease Father         CHF     Lipids Father      Obesity Father      Diabetes Maternal Grandfather         INSULIN     Diabetes Brother         INSULIN     Gastrointestinal Disease Brother         CHOLITISI POSSIBLE CHRONES     Obesity Brother      Colon Cancer No family hx of      Breast Cancer No family hx of        REVIEW OF SYSTEMS:     5 point ROS negative except as noted above in HPI, including Gen., Resp., CV, GI &  system review.      PHYSICAL EXAM:   There were no vitals taken for this visit. Estimated body mass index is 30.16 kg/m  as calculated from the following:    Height as of 6/29/22: 1.613 m (5' 3.5\").    Weight as of 6/29/22: 78.5 kg (173 lb).   GENERAL APPEARANCE: healthy, alert and no distress  MENTAL STATUS: alert  AIRWAY EXAM: Mallampatti Class I (visualization of the soft palate, fauces, uvula, anterior and posterior pillars)  RESP: lungs clear to auscultation - no rales, rhonchi or wheezes  CV: regular rates and rhythm      DIAGNOSTICS:    Not indicated      IMPRESSION   ASA Class 2 - Mild systemic disease        PLAN:       Plan for colonoscopy. We discussed the risks, benefits and alternatives and the patient wished to proceed.    The above has been forwarded to the consulting provider.      Signed Electronically by: Americo Beyer MD,MD  August 25, 2022      [facility]  Ph: [phone] Fax: [phone]    "

## 2022-08-26 LAB
PATH REPORT.COMMENTS IMP SPEC: NORMAL
PATH REPORT.COMMENTS IMP SPEC: NORMAL
PATH REPORT.FINAL DX SPEC: NORMAL
PATH REPORT.GROSS SPEC: NORMAL
PATH REPORT.MICROSCOPIC SPEC OTHER STN: NORMAL
PATH REPORT.RELEVANT HX SPEC: NORMAL
PHOTO IMAGE: NORMAL

## 2022-08-26 PROCEDURE — 88305 TISSUE EXAM BY PATHOLOGIST: CPT | Mod: 26 | Performed by: PATHOLOGY

## 2022-10-05 ENCOUNTER — LAB (OUTPATIENT)
Dept: INFUSION THERAPY | Facility: CLINIC | Age: 72
End: 2022-10-05
Attending: INTERNAL MEDICINE
Payer: MEDICARE

## 2022-10-05 DIAGNOSIS — D64.9 NORMOCYTIC ANEMIA: ICD-10-CM

## 2022-10-05 LAB
ERYTHROCYTE [DISTWIDTH] IN BLOOD BY AUTOMATED COUNT: 13.3 % (ref 10–15)
HCT VFR BLD AUTO: 30.8 % (ref 35–47)
HGB BLD-MCNC: 9.9 G/DL (ref 11.7–15.7)
MCH RBC QN AUTO: 31.3 PG (ref 26.5–33)
MCHC RBC AUTO-ENTMCNC: 32.1 G/DL (ref 31.5–36.5)
MCV RBC AUTO: 98 FL (ref 78–100)
PLATELET # BLD AUTO: 220 10E3/UL (ref 150–450)
RBC # BLD AUTO: 3.16 10E6/UL (ref 3.8–5.2)
WBC # BLD AUTO: 3.8 10E3/UL (ref 4–11)

## 2022-10-05 PROCEDURE — 36415 COLL VENOUS BLD VENIPUNCTURE: CPT

## 2022-10-05 PROCEDURE — 85027 COMPLETE CBC AUTOMATED: CPT | Performed by: INTERNAL MEDICINE

## 2022-10-05 NOTE — RESULT ENCOUNTER NOTE
Dear MsAsia Sotomayor,    Hemoglobin is mildly lower at 9.9.    Please, call me with any questions.    Nico Calderon MD

## 2022-10-06 ENCOUNTER — VIRTUAL VISIT (OUTPATIENT)
Dept: ONCOLOGY | Facility: CLINIC | Age: 72
End: 2022-10-06
Attending: INTERNAL MEDICINE
Payer: MEDICARE

## 2022-10-06 DIAGNOSIS — N18.32 STAGE 3B CHRONIC KIDNEY DISEASE (H): ICD-10-CM

## 2022-10-06 DIAGNOSIS — D63.1 ANEMIA OF CHRONIC RENAL FAILURE, STAGE 3 (MODERATE), UNSPECIFIED WHETHER STAGE 3A OR 3B CKD (H): Primary | ICD-10-CM

## 2022-10-06 DIAGNOSIS — N18.30 ANEMIA OF CHRONIC RENAL FAILURE, STAGE 3 (MODERATE), UNSPECIFIED WHETHER STAGE 3A OR 3B CKD (H): Primary | ICD-10-CM

## 2022-10-06 PROCEDURE — 99214 OFFICE O/P EST MOD 30 MIN: CPT | Mod: 95 | Performed by: INTERNAL MEDICINE

## 2022-10-06 RX ORDER — MEPERIDINE HYDROCHLORIDE 25 MG/ML
25 INJECTION INTRAMUSCULAR; INTRAVENOUS; SUBCUTANEOUS EVERY 30 MIN PRN
Status: CANCELLED | OUTPATIENT
Start: 2022-10-10

## 2022-10-06 RX ORDER — ALBUTEROL SULFATE 90 UG/1
1-2 AEROSOL, METERED RESPIRATORY (INHALATION)
Status: CANCELLED
Start: 2022-10-10

## 2022-10-06 RX ORDER — METHYLPREDNISOLONE SODIUM SUCCINATE 125 MG/2ML
125 INJECTION, POWDER, LYOPHILIZED, FOR SOLUTION INTRAMUSCULAR; INTRAVENOUS
Status: CANCELLED
Start: 2022-10-10

## 2022-10-06 RX ORDER — EPINEPHRINE 1 MG/ML
0.3 INJECTION, SOLUTION INTRAMUSCULAR; SUBCUTANEOUS EVERY 5 MIN PRN
Status: CANCELLED | OUTPATIENT
Start: 2022-10-10

## 2022-10-06 RX ORDER — DIPHENHYDRAMINE HYDROCHLORIDE 50 MG/ML
50 INJECTION INTRAMUSCULAR; INTRAVENOUS
Status: CANCELLED
Start: 2022-10-10

## 2022-10-06 RX ORDER — ALBUTEROL SULFATE 0.83 MG/ML
2.5 SOLUTION RESPIRATORY (INHALATION)
Status: CANCELLED | OUTPATIENT
Start: 2022-10-10

## 2022-10-06 NOTE — PROGRESS NOTES
Rosa is a 71 year old who is being evaluated via a billable video visit.      How would you like to obtain your AVS? MyChart  If the video visit is dropped, the invitation should be resent by: Text to cell phone: [phone]  Will anyone else be joining your video visit? Leona LOYA

## 2022-10-06 NOTE — PROGRESS NOTES
HEMATOLOGY HISTORY: Ms. Sotomayor is a female with chronic anemia.  Her anemia is due to chronic renal disease and chronic disease.   1.  Multiple labs were done on 03/13/2019.   -WBC 3.5, hemoglobin 10.8 and platelets of 204.  MCV of 96.   -Creatinine of 1.26.   -Normal calcium.   -Normal LFT.   -Normal folate.   -Normal vitamin B12.   -Normal iron. Elevated ferritin.   -Erythropoietin mildly elevated at 38.   -Normal LDH.   -Soluable transferrin receptor is mildly elevated at 5.2.   -Normal haptoglobin.   2. On 11/29/2019, WBC of 4.3, hemoglobin 9.8 and platelets 230.  MCV of 103.   3. On 07/08/2019, CT scan did not reveal any splenomegaly or lymphadenopathy.   4. Bone marrow biopsy was done on 12/18/2019.  It reveals normal cellular bone marrow for age with 30% cellularity.  There is trilineage hematopoiesis.  No evidence of MDS.  Increased storage iron.  Cytogenetics is normal.  Flow cytometry is normal.  5. IV injectafer in 03/2020.  6. On 09/09/2021, hemoglobin of 9.5.  -On 10/04/2021, hemoglobin of 9.2.  7. Aranesp started on 10/15/2021. Stopped after 1 injection as hemoglobin remained above 10.     SUBJECTIVE:  Mrs. Sotomayor is a 71-year-old female with chronic anemia.  Anemia is due to renal disease and anemia of chronic disease.      For anemia, she previously received 1 dose of Aranesp.  Her hemoglobin improved to more than 10.  Because of that she could not get any further Aranesp.    Patient continues to very tired.  Patient says that she is chronically tired.  Lately she has been more dizzy/lightheaded.  No headache.  No chest pain.  No shortness of breath at rest.  Gets short of breath on exertion.  No nausea or vomiting.  Appetite is fairly good.  No bleeding from any site. All other review of systems is negative.     PHYSICAL EXAMINATION:    She is alert and oriented x 3.  Not in distress.   Rest of systems not examined as this is a telephone visit.     LABORATORY: CBC reviewed.     ASSESSMENT:     1.  A  71-year-old female with chronic  normocytic anemia secondary to renal disease and anemia of chronic disease.  2.  Chronic fatigue.  3.  Chronic kidney disease.  4.  Mild leukopenia.     PLAN:     1.  CBC was reviewed with her.  Explained to her that her hemoglobin has decreased to 9.9.  Her anemia has gotten worse.  Patient said that she is more symptomatic.  She is more dizzy.    Discussed regarding treatment.  Explained to her that we can again give her erythropoietin stimulating agent.  She wants that.  We discussed regarding Aranesp.  Side effects reviewed.    We will start her on Aranesp 40 mcg once month for hemoglobin below 10.  Patient agreeable for it.  I am hoping she will respond and also feel better.    2.  She has mild leukopenia.  I reviewed her previous labs.  She has been intermittently leukopenic.  We will monitor CBC.  If leukopenia gets worse, further work-up will be done.    3.  I will see her in 2 months time.  Advised her to call us with any questions or concerns.     Total visit time of 20 minutes.  Time spent in today's visit, review of chart/investigations today and documentation today.

## 2022-10-06 NOTE — LETTER
10/6/2022         RE: Rosa Sotomayor  [address] [facility]  [place] [address]        Dear Colleague,    Thank you for referring your patient, Rosa Sotomayor, to the [facility]. Please see a copy of my visit note below.    Rosa is a 71 year old who is being evaluated via a billable video visit.      How would you like to obtain your AVS? MyChart  If the video visit is dropped, the invitation should be resent by: Text to cell phone: [phone]  Will anyone else be joining your video visit? No     Flores Menon               HEMATOLOGY HISTORY: Ms. Sotomayor is a female with chronic anemia.  Her anemia is due to chronic renal disease and chronic disease.   1.  Multiple labs were done on 03/13/2019.   -WBC 3.5, hemoglobin 10.8 and platelets of 204.  MCV of 96.   -Creatinine of 1.26.   -Normal calcium.   -Normal LFT.   -Normal folate.   -Normal vitamin B12.   -Normal iron. Elevated ferritin.   -Erythropoietin mildly elevated at 38.   -Normal LDH.   -Soluable transferrin receptor is mildly elevated at 5.2.   -Normal haptoglobin.   2. On 11/29/2019, WBC of 4.3, hemoglobin 9.8 and platelets 230.  MCV of 103.   3. On 07/08/2019, CT scan did not reveal any splenomegaly or lymphadenopathy.   4. Bone marrow biopsy was done on 12/18/2019.  It reveals normal cellular bone marrow for age with 30% cellularity.  There is trilineage hematopoiesis.  No evidence of MDS.  Increased storage iron.  Cytogenetics is normal.  Flow cytometry is normal.  5. IV injectafer in 03/2020.  6. On 09/09/2021, hemoglobin of 9.5.  -On 10/04/2021, hemoglobin of 9.2.  7. Aranesp started on 10/15/2021. Stopped after 1 injection as hemoglobin remained above 10.     SUBJECTIVE:  Mrs. Sotomayor is a 71-year-old female with chronic anemia.  Anemia is due to renal disease and anemia of chronic disease.      For anemia, she previously received 1 dose of Aranesp.  Her hemoglobin improved to more than 10.  Because of that she  could not get any further Aranesp.    Patient continues to very tired.  Patient says that she is chronically tired.  Lately she has been more dizzy/lightheaded.  No headache.  No chest pain.  No shortness of breath at rest.  Gets short of breath on exertion.  No nausea or vomiting.  Appetite is fairly good.  No bleeding from any site. All other review of systems is negative.     PHYSICAL EXAMINATION:    She is alert and oriented x 3.  Not in distress.   Rest of systems not examined as this is a telephone visit.     LABORATORY: CBC reviewed.     ASSESSMENT:     1.  A 71-year-old female with chronic  normocytic anemia secondary to renal disease and anemia of chronic disease.  2.  Chronic fatigue.  3.  Chronic kidney disease.  4.  Mild leukopenia.     PLAN:     1.  CBC was reviewed with her.  Explained to her that her hemoglobin has decreased to 9.9.  Her anemia has gotten worse.  Patient said that she is more symptomatic.  She is more dizzy.    Discussed regarding treatment.  Explained to her that we can again give her erythropoietin stimulating agent.  She wants that.  We discussed regarding Aranesp.  Side effects reviewed.    We will start her on Aranesp 40 mcg once month for hemoglobin below 10.  Patient agreeable for it.  I am hoping she will respond and also feel better.    2.  She has mild leukopenia.  I reviewed her previous labs.  She has been intermittently leukopenic.  We will monitor CBC.  If leukopenia gets worse, further work-up will be done.    3.  I will see her in 2 months time.  Advised her to call us with any questions or concerns.     Total visit time of 20 minutes.  Time spent in today's visit, review of chart/investigations today and documentation today.      Again, thank you for allowing me to participate in the care of your patient.        Sincerely,        Nico Calderon MD

## 2022-10-06 NOTE — LETTER
10/6/2022         RE: Rosa Sotomayor  [address] [facility]  [place] [address]        Dear Colleague,    Thank you for referring your patient, Rosa Sotomayor, to the [facility]. Please see a copy of my visit note below.    Rosa is a 71 year old who is being evaluated via a billable video visit.      How would you like to obtain your AVS? MyChart  If the video visit is dropped, the invitation should be resent by: Text to cell phone: [phone]  Will anyone else be joining your video visit? No     Flores Menon               HEMATOLOGY HISTORY: Ms. Sotomayor is a female with chronic anemia.  Her anemia is due to chronic renal disease and chronic disease.   1.  Multiple labs were done on 03/13/2019.   -WBC 3.5, hemoglobin 10.8 and platelets of 204.  MCV of 96.   -Creatinine of 1.26.   -Normal calcium.   -Normal LFT.   -Normal folate.   -Normal vitamin B12.   -Normal iron. Elevated ferritin.   -Erythropoietin mildly elevated at 38.   -Normal LDH.   -Soluable transferrin receptor is mildly elevated at 5.2.   -Normal haptoglobin.   2. On 11/29/2019, WBC of 4.3, hemoglobin 9.8 and platelets 230.  MCV of 103.   3. On 07/08/2019, CT scan did not reveal any splenomegaly or lymphadenopathy.   4. Bone marrow biopsy was done on 12/18/2019.  It reveals normal cellular bone marrow for age with 30% cellularity.  There is trilineage hematopoiesis.  No evidence of MDS.  Increased storage iron.  Cytogenetics is normal.  Flow cytometry is normal.  5. IV injectafer in 03/2020.  6. On 09/09/2021, hemoglobin of 9.5.  -On 10/04/2021, hemoglobin of 9.2.  7. Aranesp started on 10/15/2021. Stopped after 1 injection as hemoglobin remained above 10.     SUBJECTIVE:  Mrs. Sotomayor is a 71-year-old female with chronic anemia.  Anemia is due to renal disease and anemia of chronic disease.      For anemia, she previously received 1 dose of Aranesp.  Her hemoglobin improved to more than 10.  Because of that she  could not get any further Aranesp.    Patient continues to very tired.  Patient says that she is chronically tired.  Lately she has been more dizzy/lightheaded.  No headache.  No chest pain.  No shortness of breath at rest.  Gets short of breath on exertion.  No nausea or vomiting.  Appetite is fairly good.  No bleeding from any site. All other review of systems is negative.     PHYSICAL EXAMINATION:    She is alert and oriented x 3.  Not in distress.   Rest of systems not examined as this is a telephone visit.     LABORATORY: CBC reviewed.     ASSESSMENT:     1.  A 71-year-old female with chronic  normocytic anemia secondary to renal disease and anemia of chronic disease.  2.  Chronic fatigue.  3.  Chronic kidney disease.  4.  Mild leukopenia.     PLAN:     1.  CBC was reviewed with her.  Explained to her that her hemoglobin has decreased to 9.9.  Her anemia has gotten worse.  Patient said that she is more symptomatic.  She is more dizzy.    Discussed regarding treatment.  Explained to her that we can again give her erythropoietin stimulating agent.  She wants that.  We discussed regarding Aranesp.  Side effects reviewed.    We will start her on Aranesp 40 mcg once month for hemoglobin below 10.  Patient agreeable for it.  I am hoping she will respond and also feel better.    2.  She has mild leukopenia.  I reviewed her previous labs.  She has been intermittently leukopenic.  We will monitor CBC.  If leukopenia gets worse, further work-up will be done.    3.  I will see her in 2 months time.  Advised her to call us with any questions or concerns.     Total visit time of 20 minutes.  Time spent in today's visit, review of chart/investigations today and documentation today.      Again, thank you for allowing me to participate in the care of your patient.        Sincerely,        Nico Calderon MD

## 2022-10-07 ENCOUNTER — ALLIED HEALTH/NURSE VISIT (OUTPATIENT)
Dept: INFUSION THERAPY | Facility: CLINIC | Age: 72
End: 2022-10-07
Attending: INTERNAL MEDICINE
Payer: MEDICARE

## 2022-10-07 VITALS
TEMPERATURE: 98.2 F | SYSTOLIC BLOOD PRESSURE: 105 MMHG | RESPIRATION RATE: 16 BRPM | DIASTOLIC BLOOD PRESSURE: 65 MMHG | HEART RATE: 81 BPM

## 2022-10-07 DIAGNOSIS — N18.32 STAGE 3B CHRONIC KIDNEY DISEASE (H): Primary | ICD-10-CM

## 2022-10-07 DIAGNOSIS — N18.30 ANEMIA OF CHRONIC RENAL FAILURE, STAGE 3 (MODERATE), UNSPECIFIED WHETHER STAGE 3A OR 3B CKD (H): ICD-10-CM

## 2022-10-07 DIAGNOSIS — D63.1 ANEMIA OF CHRONIC RENAL FAILURE, STAGE 3 (MODERATE), UNSPECIFIED WHETHER STAGE 3A OR 3B CKD (H): ICD-10-CM

## 2022-10-07 PROCEDURE — 250N000011 HC RX IP 250 OP 636: Performed by: INTERNAL MEDICINE

## 2022-10-07 PROCEDURE — 96372 THER/PROPH/DIAG INJ SC/IM: CPT | Performed by: INTERNAL MEDICINE

## 2022-10-07 RX ORDER — ALBUTEROL SULFATE 0.83 MG/ML
2.5 SOLUTION RESPIRATORY (INHALATION)
Status: CANCELLED | OUTPATIENT
Start: 2022-10-07

## 2022-10-07 RX ORDER — MEPERIDINE HYDROCHLORIDE 25 MG/ML
25 INJECTION INTRAMUSCULAR; INTRAVENOUS; SUBCUTANEOUS EVERY 30 MIN PRN
Status: CANCELLED | OUTPATIENT
Start: 2022-10-07

## 2022-10-07 RX ORDER — EPINEPHRINE 1 MG/ML
0.3 INJECTION, SOLUTION INTRAMUSCULAR; SUBCUTANEOUS EVERY 5 MIN PRN
Status: CANCELLED | OUTPATIENT
Start: 2022-10-07

## 2022-10-07 RX ORDER — METHYLPREDNISOLONE SODIUM SUCCINATE 125 MG/2ML
125 INJECTION, POWDER, LYOPHILIZED, FOR SOLUTION INTRAMUSCULAR; INTRAVENOUS
Status: CANCELLED
Start: 2022-10-07

## 2022-10-07 RX ORDER — ALBUTEROL SULFATE 90 UG/1
1-2 AEROSOL, METERED RESPIRATORY (INHALATION)
Status: CANCELLED
Start: 2022-10-07

## 2022-10-07 RX ORDER — DIPHENHYDRAMINE HYDROCHLORIDE 50 MG/ML
50 INJECTION INTRAMUSCULAR; INTRAVENOUS
Status: CANCELLED
Start: 2022-10-07

## 2022-10-07 RX ADMIN — DARBEPOETIN ALFA 40 MCG: 40 INJECTION, SOLUTION INTRAVENOUS; SUBCUTANEOUS at 09:10

## 2022-10-07 NOTE — PROGRESS NOTES
Infusion Nursing Note:  Rosa Sotomayor presents today for aranesp.    Patient seen by provider today: No   present during visit today: Not Applicable.    Note: pt denies any changes in health or new concerns.    Intravenous Access:  No Intravenous access/labs at this visit.    Treatment Conditions:  Lab Results   Component Value Date    HGB 9.9 (L) 10/05/2022    WBC 3.8 (L) 10/05/2022    ANEU 2.4 07/01/2021    ANEUTAUTO 1.8 04/04/2022     10/05/2022      Results reviewed, labs did NOT meet treatment parameters.    Post Infusion Assessment:  Patient tolerated injection without incident.     Discharge Plan:   Patient and/or family verbalized understanding of discharge instructions and all questions answered.  AVS to patient via [facility].  Patient will return 11/7 for next appointment.   Patient discharged in stable condition accompanied by: self.  Departure Mode: Ambulatory.      Tatiana Nettles RN

## 2022-11-03 ENCOUNTER — LAB (OUTPATIENT)
Dept: INFUSION THERAPY | Facility: CLINIC | Age: 72
End: 2022-11-03
Attending: INTERNAL MEDICINE
Payer: MEDICARE

## 2022-11-03 DIAGNOSIS — N18.30 ANEMIA OF CHRONIC RENAL FAILURE, STAGE 3 (MODERATE), UNSPECIFIED WHETHER STAGE 3A OR 3B CKD (H): ICD-10-CM

## 2022-11-03 DIAGNOSIS — D63.1 ANEMIA OF CHRONIC RENAL FAILURE, STAGE 3 (MODERATE), UNSPECIFIED WHETHER STAGE 3A OR 3B CKD (H): ICD-10-CM

## 2022-11-03 LAB
BASOPHILS # BLD AUTO: 0 10E3/UL (ref 0–0.2)
BASOPHILS NFR BLD AUTO: 1 %
EOSINOPHIL # BLD AUTO: 0.3 10E3/UL (ref 0–0.7)
EOSINOPHIL NFR BLD AUTO: 6 %
ERYTHROCYTE [DISTWIDTH] IN BLOOD BY AUTOMATED COUNT: 13.4 % (ref 10–15)
HCT VFR BLD AUTO: 33.4 % (ref 35–47)
HGB BLD-MCNC: 10.8 G/DL (ref 11.7–15.7)
IMM GRANULOCYTES # BLD: 0 10E3/UL
IMM GRANULOCYTES NFR BLD: 0 %
LYMPHOCYTES # BLD AUTO: 1.6 10E3/UL (ref 0.8–5.3)
LYMPHOCYTES NFR BLD AUTO: 39 %
MCH RBC QN AUTO: 31.5 PG (ref 26.5–33)
MCHC RBC AUTO-ENTMCNC: 32.3 G/DL (ref 31.5–36.5)
MCV RBC AUTO: 97 FL (ref 78–100)
MONOCYTES # BLD AUTO: 0.5 10E3/UL (ref 0–1.3)
MONOCYTES NFR BLD AUTO: 11 %
NEUTROPHILS # BLD AUTO: 1.8 10E3/UL (ref 1.6–8.3)
NEUTROPHILS NFR BLD AUTO: 43 %
NRBC # BLD AUTO: 0 10E3/UL
NRBC BLD AUTO-RTO: 0 /100
PLATELET # BLD AUTO: 211 10E3/UL (ref 150–450)
RBC # BLD AUTO: 3.43 10E6/UL (ref 3.8–5.2)
WBC # BLD AUTO: 4.2 10E3/UL (ref 4–11)

## 2022-11-03 PROCEDURE — 36415 COLL VENOUS BLD VENIPUNCTURE: CPT

## 2022-11-03 PROCEDURE — 85025 COMPLETE CBC W/AUTO DIFF WBC: CPT | Performed by: INTERNAL MEDICINE

## 2022-11-03 NOTE — PROGRESS NOTES
Medical Assistant Note:  Rosa Sotomayor presents today for lab draw.    Patient seen by provider today: No.   present during visit today: Not Applicable.    Concerns: No Concerns.    Procedure:  Lab draw site: LAC, Needle type: BF2, Gauge: 23. Gauze and coban applied    Post Assessment:  Labs drawn without difficulty: Yes.    Discharge Plan:  Departure Mode: Ambulatory.    Face to Face Time: 5.    Nohemi Mccullough CMA

## 2022-11-04 NOTE — RESULT ENCOUNTER NOTE
Dear Ms. Sotomayor,    Hemoglobin is better at 10.8.    Please, call me with any questions.    Nico Calderon MD

## 2022-11-22 ENCOUNTER — APPOINTMENT (OUTPATIENT)
Dept: GENERAL RADIOLOGY | Facility: CLINIC | Age: 72
End: 2022-11-22
Attending: EMERGENCY MEDICINE
Payer: COMMERCIAL

## 2022-11-22 ENCOUNTER — HOSPITAL ENCOUNTER (EMERGENCY)
Facility: CLINIC | Age: 72
Discharge: HOME | End: 2022-11-22
Attending: PHYSICIAN ASSISTANT | Admitting: PHYSICIAN ASSISTANT
Payer: COMMERCIAL

## 2022-11-22 ENCOUNTER — APPOINTMENT (OUTPATIENT)
Dept: CT IMAGING | Facility: CLINIC | Age: 72
End: 2022-11-22
Attending: EMERGENCY MEDICINE
Payer: COMMERCIAL

## 2022-11-22 VITALS
RESPIRATION RATE: 20 BRPM | DIASTOLIC BLOOD PRESSURE: 75 MMHG | SYSTOLIC BLOOD PRESSURE: 171 MMHG | HEART RATE: 77 BPM | WEIGHT: 171 LBS | BODY MASS INDEX: 29.19 KG/M2 | OXYGEN SATURATION: 98 % | HEIGHT: 64 IN | TEMPERATURE: 98.8 F

## 2022-11-22 DIAGNOSIS — S06.0X0A CONCUSSION WITHOUT LOSS OF CONSCIOUSNESS, INITIAL ENCOUNTER: ICD-10-CM

## 2022-11-22 DIAGNOSIS — M54.2 NECK PAIN: ICD-10-CM

## 2022-11-22 PROCEDURE — 72125 CT NECK SPINE W/O DYE: CPT

## 2022-11-22 PROCEDURE — 73030 X-RAY EXAM OF SHOULDER: CPT | Mod: RT

## 2022-11-22 PROCEDURE — 99285 EMERGENCY DEPT VISIT HI MDM: CPT | Mod: 25

## 2022-11-22 PROCEDURE — 250N000013 HC RX MED GY IP 250 OP 250 PS 637: Performed by: EMERGENCY MEDICINE

## 2022-11-22 PROCEDURE — 70450 CT HEAD/BRAIN W/O DYE: CPT

## 2022-11-22 RX ORDER — ACETAMINOPHEN 500 MG
1000 TABLET ORAL ONCE
Status: COMPLETED | OUTPATIENT
Start: 2022-11-22 | End: 2022-11-22

## 2022-11-22 RX ADMIN — ACETAMINOPHEN 1000 MG: 500 TABLET ORAL at 20:29

## 2022-11-22 ASSESSMENT — ACTIVITIES OF DAILY LIVING (ADL): ADLS_ACUITY_SCORE: 33

## 2022-11-23 ASSESSMENT — ENCOUNTER SYMPTOMS
HEADACHES: 1
COLOR CHANGE: 0
WEAKNESS: 0
WOUND: 0
SHORTNESS OF BREATH: 0
DIZZINESS: 0
ABDOMINAL PAIN: 0
SPEECH DIFFICULTY: 0
NECK PAIN: 1
PHOTOPHOBIA: 0
VOMITING: 0
NUMBNESS: 0
NAUSEA: 0
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
## 2022-11-28 ENCOUNTER — OFFICE VISIT (OUTPATIENT)
Dept: FAMILY MEDICINE | Facility: CLINIC | Age: 72
End: 2022-11-28
Payer: COMMERCIAL

## 2022-11-28 VITALS
OXYGEN SATURATION: 99 % | BODY MASS INDEX: 29.82 KG/M2 | DIASTOLIC BLOOD PRESSURE: 67 MMHG | HEIGHT: 64 IN | HEART RATE: 73 BPM | WEIGHT: 174.7 LBS | TEMPERATURE: 97.2 F | RESPIRATION RATE: 18 BRPM | SYSTOLIC BLOOD PRESSURE: 108 MMHG
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
## 2022-11-28 PROCEDURE — 99213 OFFICE O/P EST LOW 20 MIN: CPT | Performed by: INTERNAL MEDICINE

## 2022-11-28 RX ORDER — TIZANIDINE 2 MG/1
TABLET ORAL
Qty: 90 TABLET | Refills: 3 | Status: SHIPPED | OUTPATIENT
Start: 2022-11-28 | End: 2023-06-14

## 2022-11-28 ASSESSMENT — PAIN SCALES - GENERAL: PAINLEVEL: MODERATE PAIN (5)
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
## 2022-11-29 NOTE — ED AVS SNAPSHOT
Emergency Department    [address] [place] [id number]    Phone:  [phone]    Fax:  [phone]                                       Rosa Sotomayor   MRN: [id number]    Department:   Emergency Department   Date of Visit:  8/9/2018           After Visit Summary Signature Page     I have received my discharge instructions, and my questions have been answered. I have discussed any challenges I see with this plan with the nurse or doctor.    ..........................................................................................................................................  Patient/Patient Representative Signature      ..........................................................................................................................................  Patient Representative Print Name and Relationship to Patient    ..................................................               ................................................  Date                                            Time    ..........................................................................................................................................  Reviewed by Signature/Title    ...................................................              ..............................................  Date                                                            Time           Double O-Z Flap Text: The defect edges were debeveled with a #15 scalpel blade.  Given the location of the defect, shape of the defect and the proximity to free margins a Double O-Z flap was deemed most appropriate.  Using a sterile surgical marker, an appropriate transposition flap was drawn incorporating the defect and placing the expected incisions within the relaxed skin tension lines where possible. The area thus outlined was incised deep to adipose tissue with a #15 scalpel blade.  The skin margins were undermined to an appropriate distance in all directions utilizing iris scissors.

## 2022-12-01 ENCOUNTER — VIRTUAL VISIT (OUTPATIENT)
Dept: NEUROLOGY | Facility: CLINIC | Age: 72
End: 2022-12-01
Payer: COMMERCIAL

## 2022-12-01 DIAGNOSIS — S06.0X0A CONCUSSION WITHOUT LOSS OF CONSCIOUSNESS, INITIAL ENCOUNTER: ICD-10-CM

## 2022-12-01 DIAGNOSIS — F07.81 POST CONCUSSION SYNDROME: Primary | ICD-10-CM

## 2022-12-01 DIAGNOSIS — M54.2 NECK PAIN: ICD-10-CM

## 2022-12-01 DIAGNOSIS — Z78.9 HISTORY OF CLENCHING OF MANDIBLE: ICD-10-CM

## 2022-12-01 PROCEDURE — 99205 OFFICE O/P NEW HI 60 MIN: CPT | Mod: 95 | Performed by: NURSE PRACTITIONER
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
## 2022-12-01 NOTE — PROGRESS NOTES
CONCUSSION SYMPTOMS ASSESSMENT 12/1/2022   Headache or Pressure In Head 4 - moderate to severe   Upset Stomach or Throwing Up 0 - none   Problems with Balance 2 - mild to moderate   Feeling Dizzy 0 - none   Sensitivity to Light 1 - mild   Sensitivity to Noise 0 - none   Mood Changes 0 - none   Feeling sluggish, hazy, or foggy 2 - mild to moderate   Trouble Concentrating, Lack of Focus 1 - mild   Motion Sickness 0 - none   Vision Changes 0 - none   Memory Problems 0 - none   Feeling Confused 0 - none   Neck Pain 5 - severe   Trouble Sleeping 1 - mild   Total Number of Symptoms 7   Symptom Severity Score 16

## 2022-12-02 ENCOUNTER — TELEPHONE (OUTPATIENT)
Dept: NEUROLOGY | Facility: CLINIC | Age: 72
End: 2022-12-02

## 2022-12-02 NOTE — PATIENT INSTRUCTIONS
Concussion  will call and set up your Occupational and Physical therapy evaluations    PMR  will call and set up your consult for Botox    The virtual facilitator will call and set up your 10 week follow up visit with me    Concussion Consult Overview Information Sheet is located under Letters In My Chart    Please message me through My Chart if you do not hear from any of the people above     Message me through My Chart if you have any problems, questions, updates, or concerns    If you have any problems with My Chart please call [phone] and they can help

## 2022-12-02 NOTE — TELEPHONE ENCOUNTER
1st attempt and lvm for pt to schedule 10week video follow-up with neuro/dulce due around 2/09/2023

## 2022-12-05 ENCOUNTER — TELEPHONE (OUTPATIENT)
Dept: PHYSICAL MEDICINE AND REHAB | Facility: CLINIC | Age: 72
End: 2022-12-05

## 2022-12-05 ENCOUNTER — LAB (OUTPATIENT)
Dept: INFUSION THERAPY | Facility: CLINIC | Age: 72
End: 2022-12-05
Attending: INTERNAL MEDICINE
Payer: MEDICARE

## 2022-12-05 DIAGNOSIS — N18.30 ANEMIA OF CHRONIC RENAL FAILURE, STAGE 3 (MODERATE), UNSPECIFIED WHETHER STAGE 3A OR 3B CKD (H): Primary | ICD-10-CM

## 2022-12-05 DIAGNOSIS — N18.32 STAGE 3B CHRONIC KIDNEY DISEASE (H): ICD-10-CM

## 2022-12-05 DIAGNOSIS — D63.1 ANEMIA OF CHRONIC RENAL FAILURE, STAGE 3 (MODERATE), UNSPECIFIED WHETHER STAGE 3A OR 3B CKD (H): Primary | ICD-10-CM

## 2022-12-05 LAB
BASOPHILS # BLD AUTO: 0 10E3/UL (ref 0–0.2)
BASOPHILS NFR BLD AUTO: 1 %
EOSINOPHIL # BLD AUTO: 0.2 10E3/UL (ref 0–0.7)
EOSINOPHIL NFR BLD AUTO: 4 %
ERYTHROCYTE [DISTWIDTH] IN BLOOD BY AUTOMATED COUNT: 13.5 % (ref 10–15)
HCT VFR BLD AUTO: 34.2 % (ref 35–47)
HGB BLD-MCNC: 10.9 G/DL (ref 11.7–15.7)
IMM GRANULOCYTES # BLD: 0 10E3/UL
IMM GRANULOCYTES NFR BLD: 0 %
LYMPHOCYTES # BLD AUTO: 1.5 10E3/UL (ref 0.8–5.3)
LYMPHOCYTES NFR BLD AUTO: 35 %
MCH RBC QN AUTO: 31.9 PG (ref 26.5–33)
MCHC RBC AUTO-ENTMCNC: 31.9 G/DL (ref 31.5–36.5)
MCV RBC AUTO: 100 FL (ref 78–100)
MONOCYTES # BLD AUTO: 0.3 10E3/UL (ref 0–1.3)
MONOCYTES NFR BLD AUTO: 8 %
NEUTROPHILS # BLD AUTO: 2.3 10E3/UL (ref 1.6–8.3)
NEUTROPHILS NFR BLD AUTO: 52 %
NRBC # BLD AUTO: 0 10E3/UL
NRBC BLD AUTO-RTO: 0 /100
PLATELET # BLD AUTO: 209 10E3/UL (ref 150–450)
RBC # BLD AUTO: 3.42 10E6/UL (ref 3.8–5.2)
WBC # BLD AUTO: 4.3 10E3/UL (ref 4–11)

## 2022-12-05 PROCEDURE — 36415 COLL VENOUS BLD VENIPUNCTURE: CPT

## 2022-12-05 PROCEDURE — 85004 AUTOMATED DIFF WBC COUNT: CPT | Performed by: INTERNAL MEDICINE

## 2022-12-05 RX ORDER — EPINEPHRINE 1 MG/ML
0.3 INJECTION, SOLUTION INTRAMUSCULAR; SUBCUTANEOUS EVERY 5 MIN PRN
Status: CANCELLED | OUTPATIENT
Start: 2022-12-05

## 2022-12-05 RX ORDER — ALBUTEROL SULFATE 0.83 MG/ML
2.5 SOLUTION RESPIRATORY (INHALATION)
Status: CANCELLED | OUTPATIENT
Start: 2022-12-05

## 2022-12-05 RX ORDER — MEPERIDINE HYDROCHLORIDE 25 MG/ML
25 INJECTION INTRAMUSCULAR; INTRAVENOUS; SUBCUTANEOUS EVERY 30 MIN PRN
Status: CANCELLED | OUTPATIENT
Start: 2022-12-05

## 2022-12-05 RX ORDER — DIPHENHYDRAMINE HYDROCHLORIDE 50 MG/ML
50 INJECTION INTRAMUSCULAR; INTRAVENOUS
Status: CANCELLED
Start: 2022-12-05

## 2022-12-05 RX ORDER — METHYLPREDNISOLONE SODIUM SUCCINATE 125 MG/2ML
125 INJECTION, POWDER, LYOPHILIZED, FOR SOLUTION INTRAMUSCULAR; INTRAVENOUS
Status: CANCELLED
Start: 2022-12-05

## 2022-12-05 RX ORDER — ALBUTEROL SULFATE 90 UG/1
1-2 AEROSOL, METERED RESPIRATORY (INHALATION)
Status: CANCELLED
Start: 2022-12-05

## 2022-12-05 NOTE — PROGRESS NOTES
Medical Assistant Note:  Rosa Sotomayor presents today for lab draw.    Patient seen by provider today: No.   present during visit today: Not Applicable.    Concerns: No Concerns.    Procedure:  Lab draw site: LAC, Needle type: BF, Gauge: 23. Mariana and coban applied    Post Assessment:  Labs drawn without difficulty: Yes.    Discharge Plan:  Departure Mode: Ambulatory.    Face to Face Time: 5.    Nohemi Mccullough CMA

## 2022-12-05 NOTE — TELEPHONE ENCOUNTER
LVM for pt to call back to scheduled appt with Dr Fountain at [facility] for History of clenching of mandible  Neck pain, next available.

## 2022-12-06 ENCOUNTER — ONCOLOGY VISIT (OUTPATIENT)
Dept: ONCOLOGY | Facility: CLINIC | Age: 72
End: 2022-12-06
Attending: INTERNAL MEDICINE
Payer: MEDICARE

## 2022-12-06 ENCOUNTER — HOSPITAL ENCOUNTER (OUTPATIENT)
Dept: OCCUPATIONAL THERAPY | Facility: CLINIC | Age: 72
Discharge: HOME | End: 2022-12-06
Payer: COMMERCIAL

## 2022-12-06 VITALS
HEIGHT: 64 IN | OXYGEN SATURATION: 100 % | WEIGHT: 174.4 LBS | BODY MASS INDEX: 29.78 KG/M2 | SYSTOLIC BLOOD PRESSURE: 123 MMHG | RESPIRATION RATE: 16 BRPM | DIASTOLIC BLOOD PRESSURE: 72 MMHG | HEART RATE: 76 BPM

## 2022-12-06 DIAGNOSIS — D64.9 NORMOCYTIC ANEMIA: Primary | ICD-10-CM

## 2022-12-06 DIAGNOSIS — Z78.9 IMPAIRED INSTRUMENTAL ACTIVITIES OF DAILY LIVING (IADL): ICD-10-CM

## 2022-12-06 DIAGNOSIS — F07.81 POST CONCUSSION SYNDROME: Primary | ICD-10-CM

## 2022-12-06 PROCEDURE — 97535 SELF CARE MNGMENT TRAINING: CPT | Mod: GO | Performed by: OCCUPATIONAL THERAPIST

## 2022-12-06 PROCEDURE — 97165 OT EVAL LOW COMPLEX 30 MIN: CPT | Mod: GO | Performed by: OCCUPATIONAL THERAPIST

## 2022-12-06 PROCEDURE — G0463 HOSPITAL OUTPT CLINIC VISIT: HCPCS | Performed by: INTERNAL MEDICINE

## 2022-12-06 PROCEDURE — 99214 OFFICE O/P EST MOD 30 MIN: CPT | Performed by: INTERNAL MEDICINE

## 2022-12-06 ASSESSMENT — PAIN SCALES - GENERAL: PAINLEVEL: SEVERE PAIN (6)

## 2022-12-06 NOTE — PATIENT INSTRUCTIONS
No aranesp today.  CBC end of December. Give aranesp if hemoglobin below 10.  See me in 3-4 months.
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
## 2022-12-06 NOTE — PROGRESS NOTES
"Oncology Rooming Note    December 6, 2022 10:00 AM   Rosa Sotomayor is a 71 year old female who presents for:    Chief Complaint   Patient presents with     Oncology Clinic Visit     Initial Vitals: /72   Pulse 76   Resp 16   Ht 1.613 m (5' 3.5\")   Wt 79.1 kg (174 lb 6.4 oz)   SpO2 100%   BMI 30.41 kg/m   Estimated body mass index is 30.41 kg/m  as calculated from the following:    Height as of this encounter: 1.613 m (5' 3.5\").    Weight as of this encounter: 79.1 kg (174 lb 6.4 oz). Body surface area is 1.88 meters squared.  Severe Pain (6) Comment: Data Unavailable   No LMP recorded. Patient is postmenopausal.  Allergies reviewed: Yes  Medications reviewed: Yes    Medications: Medication refills not needed today.  Pharmacy name entered into [facility]:    [facility] #[id number] - [place] - [address] AT [facility] [address] [place] [address]   [facility] #[id number] - [place], [place] - [address] AT [facility] [address]    Clinical concerns: None       Adilene Hooks MA              "

## 2022-12-09 ENCOUNTER — HOSPITAL ENCOUNTER (OUTPATIENT)
Dept: PHYSICAL THERAPY | Facility: CLINIC | Age: 72
Discharge: HOME | End: 2022-12-09
Payer: COMMERCIAL

## 2022-12-09 DIAGNOSIS — S06.0X0A CONCUSSION WITHOUT LOSS OF CONSCIOUSNESS, INITIAL ENCOUNTER: Primary | ICD-10-CM

## 2022-12-09 DIAGNOSIS — M54.2 NECK PAIN: ICD-10-CM

## 2022-12-09 PROCEDURE — 97162 PT EVAL MOD COMPLEX 30 MIN: CPT | Mod: GP | Performed by: PHYSICAL THERAPIST

## 2022-12-09 PROCEDURE — 97110 THERAPEUTIC EXERCISES: CPT | Mod: GP | Performed by: PHYSICAL THERAPIST

## 2022-12-09 NOTE — PROGRESS NOTES
12/09/22 1000   Signing Clinician's Name / Credentials   Signing clinician's name / credentials Jina Kirkpatrick MPT   Functional Gait Assessment (ALIREZA Castle., Shabbir GAsia F., et al. (2004))   1. GAIT LEVEL SURFACE 2   2. CHANGE IN GAIT SPEED 3   3. GAIT WITH HORIZONTAL HEAD TURNS 3   4. GAIT WITH VERTICAL HEAD TURNS 3   5. GAIT AND PIVOT TURN 2   6. STEP OVER OBSTACLE 2   7. GAIT WITH NARROW BASE OF SUPPORT 1   8. GAIT WITH EYES CLOSED 1   9. AMBULATING BACKWARDS 1   10. STEPS 2   Total Functional Gait Assessment Score   TOTAL SCORE: (MAXIMUM SCORE 30) 20
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
## 2022-12-19 ENCOUNTER — HOSPITAL ENCOUNTER (OUTPATIENT)
Dept: PHYSICAL THERAPY | Facility: CLINIC | Age: 72
Discharge: HOME | End: 2022-12-19
Payer: COMMERCIAL

## 2022-12-19 DIAGNOSIS — M54.2 NECK PAIN: ICD-10-CM

## 2022-12-19 DIAGNOSIS — S06.0X0A CONCUSSION WITHOUT LOSS OF CONSCIOUSNESS, INITIAL ENCOUNTER: Primary | ICD-10-CM

## 2022-12-19 DIAGNOSIS — R42 DIZZINESS: ICD-10-CM

## 2022-12-19 PROCEDURE — 97140 MANUAL THERAPY 1/> REGIONS: CPT | Mod: GP | Performed by: PHYSICAL THERAPIST

## 2022-12-19 PROCEDURE — 97110 THERAPEUTIC EXERCISES: CPT | Mod: GP | Performed by: PHYSICAL THERAPIST

## 2022-12-19 PROCEDURE — 97535 SELF CARE MNGMENT TRAINING: CPT | Mod: GP | Performed by: PHYSICAL THERAPIST

## 2022-12-20 ENCOUNTER — HOSPITAL ENCOUNTER (OUTPATIENT)
Dept: OCCUPATIONAL THERAPY | Facility: CLINIC | Age: 72
Discharge: HOME | End: 2022-12-20
Payer: COMMERCIAL

## 2022-12-20 DIAGNOSIS — Z78.9 IMPAIRED INSTRUMENTAL ACTIVITIES OF DAILY LIVING (IADL): ICD-10-CM

## 2022-12-20 DIAGNOSIS — F07.81 POST CONCUSSION SYNDROME: Primary | ICD-10-CM

## 2022-12-20 PROCEDURE — 97535 SELF CARE MNGMENT TRAINING: CPT | Mod: GO | Performed by: OCCUPATIONAL THERAPIST

## 2022-12-26 ENCOUNTER — LAB (OUTPATIENT)
Dept: INFUSION THERAPY | Facility: CLINIC | Age: 72
End: 2022-12-26
Attending: INTERNAL MEDICINE
Payer: MEDICARE

## 2022-12-26 DIAGNOSIS — N18.32 STAGE 3B CHRONIC KIDNEY DISEASE (H): ICD-10-CM

## 2022-12-26 DIAGNOSIS — N18.30 ANEMIA OF CHRONIC RENAL FAILURE, STAGE 3 (MODERATE), UNSPECIFIED WHETHER STAGE 3A OR 3B CKD (H): Primary | ICD-10-CM

## 2022-12-26 DIAGNOSIS — D63.1 ANEMIA OF CHRONIC RENAL FAILURE, STAGE 3 (MODERATE), UNSPECIFIED WHETHER STAGE 3A OR 3B CKD (H): Primary | ICD-10-CM

## 2022-12-26 LAB
HCT VFR BLD AUTO: 35.1 % (ref 35–47)
HGB BLD-MCNC: 11.3 G/DL (ref 11.7–15.7)

## 2022-12-26 PROCEDURE — 85018 HEMOGLOBIN: CPT | Performed by: INTERNAL MEDICINE

## 2022-12-26 PROCEDURE — 36415 COLL VENOUS BLD VENIPUNCTURE: CPT | Performed by: INTERNAL MEDICINE

## 2022-12-26 PROCEDURE — 85014 HEMATOCRIT: CPT | Performed by: INTERNAL MEDICINE

## 2022-12-26 RX ORDER — METHYLPREDNISOLONE SODIUM SUCCINATE 125 MG/2ML
125 INJECTION, POWDER, LYOPHILIZED, FOR SOLUTION INTRAMUSCULAR; INTRAVENOUS
Status: CANCELLED
Start: 2022-12-26

## 2022-12-26 RX ORDER — ALBUTEROL SULFATE 0.83 MG/ML
2.5 SOLUTION RESPIRATORY (INHALATION)
Status: CANCELLED | OUTPATIENT
Start: 2022-12-26

## 2022-12-26 RX ORDER — ALBUTEROL SULFATE 90 UG/1
1-2 AEROSOL, METERED RESPIRATORY (INHALATION)
Status: CANCELLED
Start: 2022-12-26

## 2022-12-26 RX ORDER — DIPHENHYDRAMINE HYDROCHLORIDE 50 MG/ML
50 INJECTION INTRAMUSCULAR; INTRAVENOUS
Status: CANCELLED
Start: 2022-12-26

## 2022-12-26 RX ORDER — EPINEPHRINE 1 MG/ML
0.3 INJECTION, SOLUTION INTRAMUSCULAR; SUBCUTANEOUS EVERY 5 MIN PRN
Status: CANCELLED | OUTPATIENT
Start: 2022-12-26

## 2022-12-26 RX ORDER — MEPERIDINE HYDROCHLORIDE 25 MG/ML
25 INJECTION INTRAMUSCULAR; INTRAVENOUS; SUBCUTANEOUS EVERY 30 MIN PRN
Status: CANCELLED | OUTPATIENT
Start: 2022-12-26

## 2022-12-26 NOTE — RESULT ENCOUNTER NOTE
Dear Ms. Sotomayor,    Hemoglobin is better at 11.3.    Please, call me with any questions.    Nico Calderon MD

## 2022-12-26 NOTE — PROGRESS NOTES
Hgb noted at 11.3 today, does not meet parameters for aranesp. Fast track appointment cancelled. Patient given copy of results.     Andressa Estrada RN

## 2022-12-27 ENCOUNTER — TRANSFERRED RECORDS (OUTPATIENT)
Dept: HEALTH INFORMATION MANAGEMENT | Facility: CLINIC | Age: 72
End: 2022-12-27

## 2022-12-30 ENCOUNTER — HOSPITAL ENCOUNTER (OUTPATIENT)
Dept: PHYSICAL THERAPY | Facility: CLINIC | Age: 72
Discharge: HOME | End: 2022-12-30
Payer: COMMERCIAL

## 2022-12-30 DIAGNOSIS — H81.10 BENIGN PAROXYSMAL POSITIONAL VERTIGO, UNSPECIFIED LATERALITY: ICD-10-CM

## 2022-12-30 DIAGNOSIS — R42 DIZZINESS: Primary | ICD-10-CM

## 2022-12-30 PROCEDURE — 97110 THERAPEUTIC EXERCISES: CPT | Mod: GP | Performed by: PHYSICAL THERAPIST

## 2022-12-30 PROCEDURE — 95992 CANALITH REPOSITIONING PROC: CPT | Mod: GP | Performed by: PHYSICAL THERAPIST

## 2022-12-30 PROCEDURE — 97112 NEUROMUSCULAR REEDUCATION: CPT | Mod: GP | Performed by: PHYSICAL THERAPIST

## 2023-02-03 NOTE — TELEPHONE ENCOUNTER
"Patient is currently scheduled for an appointment at [facility] in [place].  Called patient to review current visitor restrictions and complete COVID-19 Patient Infection/Travel Screening Tool.     Due to the recent public health concerns around COVID-19 and in an effort to keep our patients and staff safe and healthy, we are implementing a screening process for the patients that come to our clinic.      I am going to ask you a few questions, please answer yes or no.  Your honesty about any symptoms is critical, as it keeps patients and staff healthy.      Do you have a:  Fever (or reported chills)?  No  New cough (started within the past 14 days)?  No  New shortness of breath (started within the past 14 days)?  No  Rash?  No    In the last month, have you been in contact with someone who was confirmed or suspected to have Coronavirus/COVID-19?  No    Have you traveled internationally in the last month?  No  If so, where?  N/A     I also wanted to let you know that to protect our patients from the flu and other common illnesses, [facility] enforce visitor restrictions year round, but due to the community spread of COVID-19 in [place], we are taking additional precautionary steps to ensure the health of our patients.  At this time, NO visitors are allowed on our hospital and clinic campuses.     Patient PASSED the screening assessment.    Patient instructed to come to the clinic as planned for their scheduled appointment and to call the clinic if any symptoms develop prior to their appointment.    \"Per CDC Guidelines, we are asking all patients that are coming into the building to wear a cloth covering that covers your mouth and nose.  You will be screened again at the entrance to the clinic for any Covid 19 symptoms. If you screen positive to any Covid 19 symptoms during our screening process you will be provided a surgical mask to wear during your time in the " "building.\"    \"COVID-19 is contagious and can be dangerous for our patients and staff.  Please send us a [employer] message or call our clinic before coming in if you feel any of the following symptoms: fever, cough, congestion, runny nose, sore throat, muscle aches and pains, or shortness of breath.  If you are already at our clinic, it is very important that you be honest about any symptoms you are experiencing to ensure your safety and that of other patients and staff who treat you.  If you do have symptoms, we will have a nurse and/or provider asses you to determine next steps.\"    Rodrigue Zheng RN on 5/25/2020 at 1:49 PM    " Cell Phone/PDA (specify)/Clothing

## 2023-02-07 ENCOUNTER — VIRTUAL VISIT (OUTPATIENT)
Dept: NEUROLOGY | Facility: CLINIC | Age: 73
End: 2023-02-07
Payer: COMMERCIAL

## 2023-02-07 ENCOUNTER — HOSPITAL ENCOUNTER (OUTPATIENT)
Dept: PHYSICAL THERAPY | Facility: CLINIC | Age: 73
Discharge: HOME | End: 2023-02-07
Payer: COMMERCIAL

## 2023-02-07 DIAGNOSIS — F07.81 POST CONCUSSION SYNDROME: Primary | ICD-10-CM

## 2023-02-07 DIAGNOSIS — T75.3XXS MOTION SICKNESS, SEQUELA: ICD-10-CM

## 2023-02-07 PROCEDURE — 97110 THERAPEUTIC EXERCISES: CPT | Mod: GP | Performed by: PHYSICAL THERAPIST

## 2023-02-07 PROCEDURE — 99214 OFFICE O/P EST MOD 30 MIN: CPT | Mod: 95 | Performed by: NURSE PRACTITIONER

## 2023-02-07 PROCEDURE — 97112 NEUROMUSCULAR REEDUCATION: CPT | Mod: GP | Performed by: PHYSICAL THERAPIST
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
## 2023-02-07 NOTE — NURSING NOTE
CONCUSSION SYMPTOMS ASSESSMENT 12/1/2022 12/9/2022 2/7/2023   Headache or Pressure In Head 4 - moderate to severe 4 - moderate to severe 1 - mild   Upset Stomach or Throwing Up 0 - none 0 - none 0 - none   Problems with Balance 2 - mild to moderate 2 - mild to moderate 4 - moderate to severe   Feeling Dizzy 0 - none 1 - mild 2 - mild to moderate   Sensitivity to Light 1 - mild 4 - moderate to severe 2 - mild to moderate   Sensitivity to Noise 0 - none 4 - moderate to severe 3 - moderate   Mood Changes 0 - none 0 - none 0 - none   Feeling sluggish, hazy, or foggy 2 - mild to moderate 2 - mild to moderate 2 - mild to moderate   Trouble Concentrating, Lack of Focus 1 - mild 1 - mild 2 - mild to moderate   Motion Sickness 0 - none 0 - none 3 - moderate   Vision Changes 0 - none 1 - mild 0 - none   Memory Problems 0 - none 1 - mild 2 - mild to moderate   Feeling Confused 0 - none 1 - mild 0 - none   Neck Pain 5 - severe 4 - moderate to severe 2 - mild to moderate   Trouble Sleeping 1 - mild 3 - moderate 2 - mild to moderate   Total Number of Symptoms 7 12 11   Symptom Severity Score 16 28 25

## 2023-02-13 ENCOUNTER — TELEPHONE (OUTPATIENT)
Dept: SLEEP MEDICINE | Facility: CLINIC | Age: 73
End: 2023-02-13
Payer: MEDICARE

## 2023-02-13 NOTE — TELEPHONE ENCOUNTER
Pt called [facility] and LVM wanting to sched a sleep appt. Called pt back and lvm for her to call central scheduling.

## 2023-02-16 ENCOUNTER — HOSPITAL ENCOUNTER (OUTPATIENT)
Dept: PHYSICAL THERAPY | Facility: CLINIC | Age: 73
Discharge: HOME | End: 2023-02-16
Payer: COMMERCIAL

## 2023-02-16 DIAGNOSIS — R42 DIZZINESS: ICD-10-CM

## 2023-02-16 DIAGNOSIS — F07.81 POST CONCUSSION SYNDROME: Primary | ICD-10-CM

## 2023-02-16 DIAGNOSIS — T75.3XXS MOTION SICKNESS, SEQUELA: ICD-10-CM

## 2023-02-16 PROCEDURE — 97112 NEUROMUSCULAR REEDUCATION: CPT | Mod: GP | Performed by: PHYSICAL THERAPIST

## 2023-02-20 ENCOUNTER — HOSPITAL ENCOUNTER (OUTPATIENT)
Dept: OCCUPATIONAL THERAPY | Facility: CLINIC | Age: 73
Discharge: HOME | End: 2023-02-20
Payer: COMMERCIAL

## 2023-02-20 DIAGNOSIS — Z78.9 IMPAIRED INSTRUMENTAL ACTIVITIES OF DAILY LIVING (IADL): ICD-10-CM

## 2023-02-20 DIAGNOSIS — F07.81 POST CONCUSSION SYNDROME: Primary | ICD-10-CM

## 2023-02-20 PROCEDURE — 97535 SELF CARE MNGMENT TRAINING: CPT | Mod: GO | Performed by: OCCUPATIONAL THERAPIST

## 2023-02-21 NOTE — PROGRESS NOTES
[facility]    Outpatient Occupational Therapy Progress Note  Patient: Rosa Sotomayor  : 1950    Beginning/End Dates of Reporting Period:  23 to 23    Referring Provider: Roseanne Marti NP     Therapy Diagnosis: Post Concussive Syndrome     Client Self Report:  Patient returns for OT intervention now that she has returned from a month in [place] with her spouse and various family members and friends. Patient reports that she was trying to get rest days in as indicated and adherence to other recommendations such as sunglasses use, but generally was busy a majority of the time in [place].      Outcome Measures (most recent score):  Concussion Symptom Assessment (score out of 90). A higher score indicates greater impairment: 49    Goals:     Goal Identifier Concussion Management Strategies   Goal Description Patient will verbalize and demonstrate understanding of concussion symptom management strategies pertaining to sensory sensitivities, fatigue and pain in order to increase efficiency in IADL's   Target Date 23   Date Met      Progress (detail required for progress note): 23: 49, increased from 25 at last time of filing (23).  Patient has been in [place] and therefore has recently resumed therapy services.  Will extend end date of POC     Goal Identifier Attention/Processing   Goal Description Patient will demonstrate independent integration of attention/memory strategies as evidence by completion of a moderately complex problem solving task with 95% accuracy.   Target Date 23   Date Met      Progress (detail required for progress note): Goal not addressed this reporting period due to recent vacation and inconsistent therapy attendance since initiation of services     Plan:  Continue therapy per current plan of care.  End date extended on goals to reflect inconsistent attendance due to  vacation in [place].     Discharge:  No

## 2023-02-22 NOTE — PROGRESS NOTES
[facility]          OUTPATIENT OCCUPATIONAL THERAPY  EVALUATION  PLAN OF TREATMENT FOR OUTPATIENT REHABILITATION  (COMPLETE FOR INITIAL CLAIMS ONLY)  Patient's Last Name, First Name, M.I.  YOB: 1950  Rosa Sotomayor                        Provider's Name  [facility] Medical Record No.  [id number]                               Onset Date:     11/22/22   Start of Care Date:     12/06/22   Type:     ___PT   _X_OT   ___SLP Medical Diagnosis:     Post concussion syndrome (F07.81)  - Primary                          OT Diagnosis:     Decreased baseline ease and efficiency in I/ADL's Visits from SOC:  1   _________________________________________________________________________________  Plan of Treatment/Functional Goals:  Self care/Home management, Community/work reintegration                    Goals  Goal Identifier: Concussion Management Strategies  Goal Description: Patient will verbalize and demonstrate understanding of concussion symptom management strategies pertaining to sensory sensitivities, fatigue and pain in order to increase efficiency in IADL's  Target Date: 5/1/23     Goal Identifier: Attention/Processing  Goal Description: Patient will demonstrate independent integration of attention/memory strategies as evidence by completion of a moderately complex problem solving task with 95% accuracy.  Target Date: 5/1/23                     Therapy Frequency: 1x/week     Predicted Duration of Therapy Intervention (days/wks): up to 5 weeks  Nona Jiménez OT          I CERTIFY THE NEED FOR THESE SERVICES FURNISHED UNDER        THIS PLAN OF TREATMENT AND WHILE UNDER MY CARE     (Physician co-signature of this document indicates review and certification of the therapy plan).                          Referring Physician: Roseanne Marti NP     Initial  Assessment        See Epic Evaluation      Start Of Care Date: 12/06/22    Cert Date: 2/22/23-5/1/23 (progress note written 2/21/23) with [employer] as primary insurance.

## 2023-02-22 NOTE — ADDENDUM NOTE
Encounter addended by: Nona Jiménez, OT on: 2/22/2023 1:22 PM   Actions taken: Clinical Note Signed, Document created, Document edited

## 2023-03-01 ENCOUNTER — HOSPITAL ENCOUNTER (OUTPATIENT)
Dept: PHYSICAL THERAPY | Facility: CLINIC | Age: 73
Discharge: HOME | End: 2023-03-01
Payer: COMMERCIAL

## 2023-03-01 DIAGNOSIS — F07.81 POST CONCUSSION SYNDROME: Primary | ICD-10-CM

## 2023-03-01 DIAGNOSIS — T75.3XXS MOTION SICKNESS, SEQUELA: ICD-10-CM

## 2023-03-01 DIAGNOSIS — R42 DIZZINESS: ICD-10-CM

## 2023-03-01 PROCEDURE — 97112 NEUROMUSCULAR REEDUCATION: CPT | Mod: GP | Performed by: PHYSICAL THERAPIST

## 2023-03-03 ENCOUNTER — LAB (OUTPATIENT)
Dept: INFUSION THERAPY | Facility: CLINIC | Age: 73
End: 2023-03-03
Attending: INTERNAL MEDICINE
Payer: MEDICARE

## 2023-03-03 DIAGNOSIS — D64.9 NORMOCYTIC ANEMIA: ICD-10-CM

## 2023-03-03 LAB
ERYTHROCYTE [DISTWIDTH] IN BLOOD BY AUTOMATED COUNT: 13.2 % (ref 10–15)
HCT VFR BLD AUTO: 33.8 % (ref 35–47)
HGB BLD-MCNC: 10.9 G/DL (ref 11.7–15.7)
MCH RBC QN AUTO: 31.4 PG (ref 26.5–33)
MCHC RBC AUTO-ENTMCNC: 32.2 G/DL (ref 31.5–36.5)
MCV RBC AUTO: 97 FL (ref 78–100)
PLATELET # BLD AUTO: 247 10E3/UL (ref 150–450)
RBC # BLD AUTO: 3.47 10E6/UL (ref 3.8–5.2)
WBC # BLD AUTO: 5 10E3/UL (ref 4–11)

## 2023-03-03 PROCEDURE — 36415 COLL VENOUS BLD VENIPUNCTURE: CPT

## 2023-03-03 PROCEDURE — 85027 COMPLETE CBC AUTOMATED: CPT | Performed by: INTERNAL MEDICINE

## 2023-03-03 NOTE — RESULT ENCOUNTER NOTE
Dear Ms. Sotomayor,    Hemoglobin is stable at 10.9.    Please, call me with any questions.    Nico Calderon MD

## 2023-03-06 ENCOUNTER — ONCOLOGY VISIT (OUTPATIENT)
Dept: ONCOLOGY | Facility: CLINIC | Age: 73
End: 2023-03-06
Attending: INTERNAL MEDICINE
Payer: MEDICARE

## 2023-03-06 VITALS
HEART RATE: 77 BPM | SYSTOLIC BLOOD PRESSURE: 118 MMHG | BODY MASS INDEX: 31.14 KG/M2 | DIASTOLIC BLOOD PRESSURE: 74 MMHG | WEIGHT: 178.6 LBS | OXYGEN SATURATION: 100 % | RESPIRATION RATE: 16 BRPM

## 2023-03-06 DIAGNOSIS — D64.9 NORMOCYTIC ANEMIA: Primary | ICD-10-CM

## 2023-03-06 PROCEDURE — G0463 HOSPITAL OUTPT CLINIC VISIT: HCPCS | Performed by: INTERNAL MEDICINE

## 2023-03-06 PROCEDURE — 99213 OFFICE O/P EST LOW 20 MIN: CPT | Performed by: INTERNAL MEDICINE

## 2023-03-06 ASSESSMENT — PAIN SCALES - GENERAL: PAINLEVEL: NO PAIN (0)
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
## 2023-03-06 NOTE — PROGRESS NOTES
"Oncology Rooming Note    March 6, 2023 9:15 AM   Rosa Sotomayor is a 72 year old female who presents for:    Chief Complaint   Patient presents with     Oncology Clinic Visit     Initial Vitals: There were no vitals taken for this visit. Estimated body mass index is 30.41 kg/m  as calculated from the following:    Height as of 12/6/22: 1.613 m (5' 3.5\").    Weight as of 12/6/22: 79.1 kg (174 lb 6.4 oz). There is no height or weight on file to calculate BSA.  Data Unavailable Comment: Data Unavailable   No LMP recorded. Patient is postmenopausal.  Allergies reviewed: Yes  Medications reviewed: Yes    Medications: Medication refills not needed today.  Pharmacy name entered into [employer]:    [facility][id number] - [place], [place] - [address] AT [facility] OF [place] [address] [facility] [address]  [facility] #[id number] - [place], [place] - [address] AT [facility] [address]    Clinical concerns:  doctor was notified.      Fany Mccarty CMA            "

## 2023-03-07 ENCOUNTER — HOSPITAL ENCOUNTER (OUTPATIENT)
Dept: OCCUPATIONAL THERAPY | Facility: CLINIC | Age: 73
Discharge: HOME | End: 2023-03-07
Payer: COMMERCIAL

## 2023-03-07 DIAGNOSIS — Z78.9 IMPAIRED INSTRUMENTAL ACTIVITIES OF DAILY LIVING (IADL): ICD-10-CM

## 2023-03-07 DIAGNOSIS — F07.81 POST CONCUSSION SYNDROME: Primary | ICD-10-CM

## 2023-03-07 PROCEDURE — 97533 SENSORY INTEGRATION: CPT | Mod: GO | Performed by: OCCUPATIONAL THERAPIST
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
## 2023-03-16 ENCOUNTER — HOSPITAL ENCOUNTER (OUTPATIENT)
Dept: PHYSICAL THERAPY | Facility: CLINIC | Age: 73
Discharge: HOME | End: 2023-03-16
Payer: COMMERCIAL

## 2023-03-16 DIAGNOSIS — F07.81 POST CONCUSSION SYNDROME: Primary | ICD-10-CM

## 2023-03-16 DIAGNOSIS — T75.3XXS MOTION SICKNESS, SEQUELA: ICD-10-CM

## 2023-03-16 DIAGNOSIS — R42 DIZZINESS: ICD-10-CM

## 2023-03-16 PROCEDURE — 97112 NEUROMUSCULAR REEDUCATION: CPT | Mod: GP | Performed by: PHYSICAL THERAPIST

## 2023-03-16 PROCEDURE — 97750 PHYSICAL PERFORMANCE TEST: CPT | Mod: GP | Performed by: PHYSICAL THERAPIST

## 2023-03-22 NOTE — PROGRESS NOTES
[facility]    Outpatient Physical Therapy Progress Note  Patient: Rosa Sotomayor  : 1950    Beginning/End Dates of Reporting Period:  23 to 3/16/23    Referring Provider: Roseanne Marti CNP    Therapy Diagnosis: post concussion syndrome     Client Self Report: She has been able to tolerate grocery shopping at [facility] for about 35 minutes and walked up/down the aisles. She has been having more neck soreness and headaches the past 3 month. She has been doing some self massage and stretches. She has been loading and unloading the  and laundry, but reduced loads in order to manage her dizziness symptoms with repetitive movements.    Objective Measurements:  Objective Measure: FGA  Details:   Objective Measure: Cervical ROM  Details: L: 50 degrees, R 55 degrees       Goals:  Goal Identifier CSA   Goal Description Client will report improved overall concussion symptoms scoring an 6 or les on the CSA   Target Date 23   Date Met      Progress (detail required for progress note): PROGRESSING: Pt's CSA score has fluctuated from 28 to 49 and now to 26.     Goal Identifier DVA   Goal Description Client will demonstrate improved gaze stabilization wit head turns and movement scoring within 3 lines from baseline at the 2 Hz speed and no reports of dizziness.   Target Date 23   Date Met      Progress (detail required for progress note): baseline: 6 lines difference     Goal Identifier FGA   Goal Description Client will demonstrate improved standing balance scoring a 28/30 or better on the FGA   Target Date 23   Date Met      Progress (detail required for progress note): no change: Pt scored 20/30 which is still fall risk.  Tolerance of movement and visual input has been the primary focus of therapy therefore understandable that this areas has not improved     Goal Identifier ELIANE    Goal Description Client will report decreased pain and improved ROM to at least 55 degrees in cervical rotation in both directions.   Target Date 06/03/23   Date Met      Progress (detail required for progress note): baseline: L 39 degrees and R 52 degrees and improved to 50 degrees on L and 55 degrees on R.       Plan:  Continue therapy per current plan of care.    Discharge:  No

## 2023-03-28 ENCOUNTER — HOSPITAL ENCOUNTER (OUTPATIENT)
Dept: OCCUPATIONAL THERAPY | Facility: CLINIC | Age: 73
Discharge: HOME | End: 2023-03-28
Payer: COMMERCIAL

## 2023-03-28 DIAGNOSIS — F07.81 POST CONCUSSION SYNDROME: Primary | ICD-10-CM

## 2023-03-28 DIAGNOSIS — Z78.9 IMPAIRED INSTRUMENTAL ACTIVITIES OF DAILY LIVING (IADL): ICD-10-CM

## 2023-03-28 PROCEDURE — 97535 SELF CARE MNGMENT TRAINING: CPT | Mod: GO | Performed by: OCCUPATIONAL THERAPIST

## 2023-04-03 ENCOUNTER — HOSPITAL ENCOUNTER (OUTPATIENT)
Dept: PHYSICAL THERAPY | Facility: CLINIC | Age: 73
Discharge: HOME | End: 2023-04-03
Payer: COMMERCIAL

## 2023-04-03 DIAGNOSIS — F07.81 POST CONCUSSION SYNDROME: Primary | ICD-10-CM

## 2023-04-03 DIAGNOSIS — R42 DIZZINESS: ICD-10-CM

## 2023-04-03 DIAGNOSIS — T75.3XXS MOTION SICKNESS, SEQUELA: ICD-10-CM

## 2023-04-03 PROCEDURE — 97112 NEUROMUSCULAR REEDUCATION: CPT | Mod: GP | Performed by: PHYSICAL THERAPIST

## 2023-04-05 ENCOUNTER — LAB (OUTPATIENT)
Dept: INFUSION THERAPY | Facility: CLINIC | Age: 73
End: 2023-04-05
Attending: INTERNAL MEDICINE
Payer: MEDICARE

## 2023-04-05 DIAGNOSIS — N18.30 ANEMIA OF CHRONIC RENAL FAILURE, STAGE 3 (MODERATE), UNSPECIFIED WHETHER STAGE 3A OR 3B CKD (H): ICD-10-CM

## 2023-04-05 DIAGNOSIS — D63.1 ANEMIA OF CHRONIC RENAL FAILURE, STAGE 3 (MODERATE), UNSPECIFIED WHETHER STAGE 3A OR 3B CKD (H): ICD-10-CM

## 2023-04-05 DIAGNOSIS — N18.32 STAGE 3B CHRONIC KIDNEY DISEASE (H): ICD-10-CM

## 2023-04-05 DIAGNOSIS — D64.9 NORMOCYTIC ANEMIA: Primary | ICD-10-CM

## 2023-04-05 LAB
ERYTHROCYTE [DISTWIDTH] IN BLOOD BY AUTOMATED COUNT: 13.5 % (ref 10–15)
HCT VFR BLD AUTO: 32.9 % (ref 35–47)
HGB BLD-MCNC: 10.5 G/DL (ref 11.7–15.7)
MCH RBC QN AUTO: 32 PG (ref 26.5–33)
MCHC RBC AUTO-ENTMCNC: 31.9 G/DL (ref 31.5–36.5)
MCV RBC AUTO: 100 FL (ref 78–100)
PLATELET # BLD AUTO: 208 10E3/UL (ref 150–450)
RBC # BLD AUTO: 3.28 10E6/UL (ref 3.8–5.2)
WBC # BLD AUTO: 4 10E3/UL (ref 4–11)

## 2023-04-05 PROCEDURE — 85027 COMPLETE CBC AUTOMATED: CPT | Performed by: INTERNAL MEDICINE

## 2023-04-05 PROCEDURE — 36415 COLL VENOUS BLD VENIPUNCTURE: CPT

## 2023-04-05 RX ORDER — METHYLPREDNISOLONE SODIUM SUCCINATE 125 MG/2ML
125 INJECTION, POWDER, LYOPHILIZED, FOR SOLUTION INTRAMUSCULAR; INTRAVENOUS
Status: CANCELLED
Start: 2023-04-05

## 2023-04-05 RX ORDER — ALBUTEROL SULFATE 0.83 MG/ML
2.5 SOLUTION RESPIRATORY (INHALATION)
Status: CANCELLED | OUTPATIENT
Start: 2023-04-05

## 2023-04-05 RX ORDER — ALBUTEROL SULFATE 90 UG/1
1-2 AEROSOL, METERED RESPIRATORY (INHALATION)
Status: CANCELLED
Start: 2023-04-05

## 2023-04-05 RX ORDER — DIPHENHYDRAMINE HYDROCHLORIDE 50 MG/ML
50 INJECTION INTRAMUSCULAR; INTRAVENOUS
Status: CANCELLED
Start: 2023-04-05

## 2023-04-05 RX ORDER — EPINEPHRINE 1 MG/ML
0.3 INJECTION, SOLUTION INTRAMUSCULAR; SUBCUTANEOUS EVERY 5 MIN PRN
Status: CANCELLED | OUTPATIENT
Start: 2023-04-05

## 2023-04-05 RX ORDER — MEPERIDINE HYDROCHLORIDE 25 MG/ML
25 INJECTION INTRAMUSCULAR; INTRAVENOUS; SUBCUTANEOUS EVERY 30 MIN PRN
Status: CANCELLED | OUTPATIENT
Start: 2023-04-05

## 2023-04-05 NOTE — PROGRESS NOTES
Medical Assistant Note:  Rosa Sotomayor presents today for blood draw.    Patient seen by provider today: No.   present during visit today: Not Applicable.    Concerns: No Concerns.    Procedure:  Lab draw site: RAC, Needle type: BF, Gauge: 23g.    Post Assessment:  Labs drawn without difficulty: Yes.    Discharge Plan:  Departure Mode: Ambulatory.    Face to Face Time: 5.    Nadeen Farr, CMA

## 2023-04-17 ENCOUNTER — HOSPITAL ENCOUNTER (OUTPATIENT)
Dept: PHYSICAL THERAPY | Facility: CLINIC | Age: 73
Discharge: HOME | End: 2023-04-17
Payer: COMMERCIAL

## 2023-04-17 DIAGNOSIS — T75.3XXS MOTION SICKNESS, SEQUELA: ICD-10-CM

## 2023-04-17 DIAGNOSIS — F07.81 POST CONCUSSION SYNDROME: Primary | ICD-10-CM

## 2023-04-17 DIAGNOSIS — R42 DIZZINESS: ICD-10-CM

## 2023-04-17 PROCEDURE — 97112 NEUROMUSCULAR REEDUCATION: CPT | Mod: GP | Performed by: PHYSICAL THERAPIST

## 2023-05-02 ENCOUNTER — LAB (OUTPATIENT)
Dept: INFUSION THERAPY | Facility: CLINIC | Age: 73
End: 2023-05-02
Attending: INTERNAL MEDICINE
Payer: MEDICARE

## 2023-05-02 DIAGNOSIS — D64.9 NORMOCYTIC ANEMIA: ICD-10-CM

## 2023-05-02 LAB
ERYTHROCYTE [DISTWIDTH] IN BLOOD BY AUTOMATED COUNT: 13.5 % (ref 10–15)
HCT VFR BLD AUTO: 34.2 % (ref 35–47)
HGB BLD-MCNC: 11.2 G/DL (ref 11.7–15.7)
MCH RBC QN AUTO: 31.8 PG (ref 26.5–33)
MCHC RBC AUTO-ENTMCNC: 32.7 G/DL (ref 31.5–36.5)
MCV RBC AUTO: 97 FL (ref 78–100)
PLATELET # BLD AUTO: 245 10E3/UL (ref 150–450)
RBC # BLD AUTO: 3.52 10E6/UL (ref 3.8–5.2)
WBC # BLD AUTO: 5.4 10E3/UL (ref 4–11)

## 2023-05-02 PROCEDURE — 36415 COLL VENOUS BLD VENIPUNCTURE: CPT

## 2023-05-02 PROCEDURE — 85014 HEMATOCRIT: CPT | Performed by: INTERNAL MEDICINE

## 2023-05-02 NOTE — RESULT ENCOUNTER NOTE
Dear Ms. Bran,    Hemoglobin is good at 11.2.    Please, call me with any questions.    Nico Calderon MD

## 2023-05-04 ENCOUNTER — HOSPITAL ENCOUNTER (OUTPATIENT)
Dept: PHYSICAL THERAPY | Facility: CLINIC | Age: 73
Discharge: HOME | End: 2023-05-04
Payer: MEDICARE

## 2023-05-04 DIAGNOSIS — T75.3XXS MOTION SICKNESS, SEQUELA: ICD-10-CM

## 2023-05-04 DIAGNOSIS — F07.81 POST CONCUSSION SYNDROME: Primary | ICD-10-CM

## 2023-05-04 DIAGNOSIS — R42 DIZZINESS: ICD-10-CM

## 2023-05-04 PROCEDURE — 97112 NEUROMUSCULAR REEDUCATION: CPT | Mod: GP | Performed by: PHYSICAL THERAPIST

## 2023-05-08 ENCOUNTER — HOSPITAL ENCOUNTER (OUTPATIENT)
Dept: OCCUPATIONAL THERAPY | Facility: CLINIC | Age: 73
Discharge: HOME | End: 2023-05-08
Payer: MEDICARE

## 2023-05-08 DIAGNOSIS — F07.81 POST CONCUSSION SYNDROME: Primary | ICD-10-CM

## 2023-05-08 DIAGNOSIS — Z78.9 IMPAIRED INSTRUMENTAL ACTIVITIES OF DAILY LIVING (IADL): ICD-10-CM

## 2023-05-08 DIAGNOSIS — E78.1 HYPERTRIGLYCERIDEMIA: ICD-10-CM

## 2023-05-08 PROCEDURE — 97535 SELF CARE MNGMENT TRAINING: CPT | Mod: GO | Performed by: OCCUPATIONAL THERAPIST

## 2023-05-09 RX ORDER — ATORVASTATIN CALCIUM 40 MG/1
TABLET, FILM COATED ORAL
Qty: 90 TABLET | Refills: 0 | Status: SHIPPED | OUTPATIENT
Start: 2023-05-09 | End: 2023-06-14

## 2023-05-15 NOTE — PROGRESS NOTES
CPAP Follow-Up Visit:    Date on this visit: 5/16/2023    Rosa Sotomayor has a follow-up of her CPAP use for mild ESTEFANIA. She was initially seen at the [facility] for snoring, waking a lot at night, and not feeling refreshed by sleep for 1.5 years. Her medical history is significant for Arnold-Chiari malformation (unknown grade, no surgery), chronic back pain, migraine, hyperlipidemia, aortic insufficiency, fibromuscular dysplasia, possible renovascular HTN, renal artery stenosis, CKD st 3, anxiety, macular degeneration, osteoarthritis.     PSG showed: AHI was 12.4/hr, with desaturations down to 83%. S/He spent 2.4 minutes below 90% SpO2. RDI 31.7/hr.  REM RDI 55.2/hr.  Supine RDI 78.4/hr (75 minutes spent supine). Her non-supine RDI was 18.6/hr.  Periodic Limb Movement Index 7.2/hour, 0.5/hr were associated with arousals. Her heart rate was over 100 most of the night, up to 120 bpm. She had a headache during the night and felt a pulsing.    She likes the Dreamstation 2. She sometimes does not feel the air pressure is enough when she first puts it on.   She is still tired when she wakes and is tired in the day, but thinks it may be from anemia. She wore herself out yesterday doing PT and going to [employer] and tried to nap but couldn't.     PAP machine: Respironics Dreamstation 2. Pressure settings: 4-20 cm, she was on 7-15 cm    The compliance data shows that the patient used the CPAP for 29/30 nights, 96.7% of nights for >4 hours.  The 90th% pressure is 6.1 cm.  The average time in large leak is 0.  The average nightly usage is 6:46.  The average AHI is 1.4/hr. Her pressure is often between 4 and 5 cm.        Interface:  Mask: Nuance Pro mask, sometimes the strap in back slips up the back of her head, not often. She has trouble getting through to someone at [facility] for supplies.   Chin strap: No  Leak: No  Using Humidifier: Yes, does run out overnight. She does not put much water in it.   Condensation in  hose or mask: No     Difficulties with therapy:    [-] Snoring with CPAP: a couple of times when on her back  [-] Difficulty tolerating the pressure: maybe a little low initially at times  [-] Epistaxis/dry nose:   [-] Nasal congestion:  [+] Dry mouth: on occasion-download does not show much. She has not noticed as much mouth leak with new machine, probably because of lower pressures.  [+] Mouth breathing:   [-] Pain/skin breakdown:      Improvements noted with CPAP:   [+] waking up more refreshed  [+] sleeping better with less arousals  [+] improved energy level during the day    Weight change since sleep study: 180 lbs, down 10 pounds in the last 4 years    Bedtime: 12 AM.  Wake time: 8:40 AM. Falls asleep in 10 minutes. Wakes 0-2 times per night for 5 minutes. Reason for waking: having soda in the evening, restroom. Sometimes she removes the mask around 7 AM.  Naps: 0.       She has been having more RLS. She had to go off of magnesium. She has been taking tizanidine occasionally. It makes her really groggy in the morning. She takes hydroxyzine infrequently. She thinks sugar may make it worse.     Past medical/surgical history, family history, social history, medications and allergies were reviewed.      Problem List:  Patient Active Problem List    Diagnosis Date Noted     Anemia of chronic renal failure, stage 3 (moderate) (H) 10/06/2022     Priority: Medium     Multiple drug allergies 09/07/2021     Priority: Medium     Migraine headache 09/07/2021     Priority: Medium     Malabsorption of iron 03/04/2020     Priority: Medium     Macular degeneration 05/16/2019     Priority: Medium     Spinal stenosis of lumbar region with neurogenic claudication 05/13/2019     Priority: Medium     ESTEFANIA (obstructive sleep apnea)- mild (AHI 12) 12/17/2018     Priority: Medium     12/13/2018 [facility] Study (188.0 lbs) - AHI 12.4, RDI 31.7, Supine AHI 52.0, REM AHI 34.3, Low O2 75.1%, Time Spent ?88% 1.4 minutes /  Time Spent ?89% 2.4 minutes.       Lumbar disc herniation with radiculopathy 08/16/2018     Priority: Medium     Aortic valve insufficiency 04/19/2018     Priority: Medium     Migraine without aura and without status migrainosus, not intractable 11/20/2017     Priority: Medium     Anemia, iron deficiency 06/23/2017     Priority: Medium     Degenerative joint disease (DJD) of hip 05/23/2017     Priority: Medium     Macular degeneration, bilateral 01/05/2017     Priority: Medium     Benign essential hypertension 12/28/2016     Priority: Medium     Renovascular hypertension 12/21/2016     Priority: Medium     Chronic bilateral low back pain without sciatica 11/29/2016     Priority: Medium     Secondary renal hyperparathyroidism (H) 07/31/2016     Priority: Medium     Constipation 07/30/2016     Priority: Medium     Environmental allergies 07/29/2016     Priority: Medium     S/P shoulder surgery 07/29/2016     Priority: Medium     Arnold-Chiari malformation (H) 07/23/2016     Priority: Medium     Hyperlipidemia LDL goal <130 07/23/2016     Priority: Medium     Anxiety 07/23/2016     Priority: Medium     Hypertriglyceridemia 07/22/2016     Priority: Medium     Stage 3 chronic kidney disease (H) 09/22/2015     Priority: Medium     Right knee DJD 10/16/2014     Priority: Medium     Intractable chronic migraine without aura 05/30/2012     Priority: Medium     Formatting of this note might be different from the original.  Chronic migraine  Formatting of this note might be different from the original.  Chronic migraine, followed by Dr. Pearl with [facility] neurolog[facility].       Arthralgia of hip 08/17/2011     Priority: Medium     Trochanteric bursitis 07/11/2011     Priority: Medium     Menopausal symptom 04/13/2009     Priority: Medium     Overview:   on HRT  Formatting of this note might be different from the original.  on HRT  ; Menopause       Personal history of allergy to anesthetic agent 03/10/2007     Priority:  Medium     Congenital anomaly of brain (H) 08/26/2005     Priority: Medium     Overview:   Formatting of this note might be different from the original.  Arnold Chiari Malformation       Asymptomatic varicose veins 08/27/2004     Priority: Medium     Overview:   with bilat. venous insufficiency  Formatting of this note might be different from the original.  with bilat. venous insufficiency  ; Varicose Veins  Formatting of this note might be different from the original.  Overview:   with bilat. venous insufficiency  ; Varicose Veins          Impression/Plan:    (G47.33) ESTEFANIA (obstructive sleep apnea)- mild (AHI 12)  (primary encounter diagnosis)  Comment: Rosa is using CPAP regularly and benefiting from use.  She obtained a replacement machine from [employer] and likes the new machine.  Her pressures were set at 4-20 cm on the new machine rather than her prior prescription of 7-15 cm.  The download shows that her apnea continues to be well controlled.  She does feel the starting pressure is a little low.  She does notice less mouth leak with the lower pressures though.  She continues to feel fatigued in the daytime but often cannot nap if she tries.  This is likely related to her other health issues.  Plan: Comprehensive DME        Changed pressures to 5-15 cm. A prescription was written for new supplies. We reviewed recommendations for cleaning and replacing supplies.     (G25.81) Restless legs syndrome (RLS)  Comment: Symptoms have been worse lately. She had to go off of magnesium. She takes 2 mg tizanidine occ, but it makes her groggy. She thinks late sugar makes it worse.   Plan: Try cutting the tizanidine in half to see if that helps with less residual grogginess. We talked about considering ropinirole if it continues to be bothersome.         She will follow up with me in about 1 year(s).     37 minutes were spent on the date of the encounter doing chart review, history and exam, documentation and further  activities as noted above.     Bennett Goltz, PA-C    CC: No ref. provider found

## 2023-05-16 ENCOUNTER — OFFICE VISIT (OUTPATIENT)
Dept: SLEEP MEDICINE | Facility: CLINIC | Age: 73
End: 2023-05-16
Payer: MEDICARE

## 2023-05-16 VITALS
OXYGEN SATURATION: 98 % | HEIGHT: 64 IN | BODY MASS INDEX: 30.73 KG/M2 | WEIGHT: 180 LBS | SYSTOLIC BLOOD PRESSURE: 134 MMHG | HEART RATE: 81 BPM | DIASTOLIC BLOOD PRESSURE: 65 MMHG

## 2023-05-16 DIAGNOSIS — G25.81 RESTLESS LEGS SYNDROME (RLS): ICD-10-CM

## 2023-05-16 DIAGNOSIS — G47.33 OSA (OBSTRUCTIVE SLEEP APNEA): Primary | Chronic | ICD-10-CM

## 2023-05-16 PROCEDURE — 99214 OFFICE O/P EST MOD 30 MIN: CPT | Performed by: PHYSICIAN ASSISTANT

## 2023-05-16 ASSESSMENT — SLEEP AND FATIGUE QUESTIONNAIRES
HOW LIKELY ARE YOU TO NOD OFF OR FALL ASLEEP WHILE WATCHING TV: WOULD NEVER DOZE
HOW LIKELY ARE YOU TO NOD OFF OR FALL ASLEEP WHEN YOU ARE A PASSENGER IN A CAR FOR AN HOUR WITHOUT A BREAK: SLIGHT CHANCE OF DOZING
HOW LIKELY ARE YOU TO NOD OFF OR FALL ASLEEP WHILE SITTING INACTIVE IN A PUBLIC PLACE: WOULD NEVER DOZE
HOW LIKELY ARE YOU TO NOD OFF OR FALL ASLEEP WHILE LYING DOWN TO REST IN THE AFTERNOON WHEN CIRCUMSTANCES PERMIT: SLIGHT CHANCE OF DOZING
HOW LIKELY ARE YOU TO NOD OFF OR FALL ASLEEP IN A CAR, WHILE STOPPED FOR A FEW MINUTES IN TRAFFIC: WOULD NEVER DOZE
HOW LIKELY ARE YOU TO NOD OFF OR FALL ASLEEP WHILE SITTING AND TALKING TO SOMEONE: WOULD NEVER DOZE
HOW LIKELY ARE YOU TO NOD OFF OR FALL ASLEEP WHILE SITTING AND READING: WOULD NEVER DOZE
HOW LIKELY ARE YOU TO NOD OFF OR FALL ASLEEP WHILE SITTING QUIETLY AFTER LUNCH WITHOUT ALCOHOL: WOULD NEVER DOZE

## 2023-05-16 NOTE — NURSING NOTE
"Chief Complaint   Patient presents with     CPAP Follow Up       Initial /65   Pulse 81   Ht 1.626 m (5' 4\")   Wt 81.6 kg (180 lb)   SpO2 98%   BMI 30.90 kg/m   Estimated body mass index is 30.9 kg/m  as calculated from the following:    Height as of this encounter: 1.626 m (5' 4\").    Weight as of this encounter: 81.6 kg (180 lb).    Medication Reconciliation: complete  ESS 2  Brooklyn Valle MA  "

## 2023-05-18 ENCOUNTER — THERAPY VISIT (OUTPATIENT)
Dept: PHYSICAL THERAPY | Facility: CLINIC | Age: 73
End: 2023-05-18
Payer: COMMERCIAL

## 2023-05-18 DIAGNOSIS — T75.3XXS MOTION SICKNESS, SEQUELA: ICD-10-CM

## 2023-05-18 DIAGNOSIS — F07.81 POST CONCUSSION SYNDROME: Primary | ICD-10-CM

## 2023-05-18 DIAGNOSIS — R42 DIZZINESS: ICD-10-CM

## 2023-05-18 PROCEDURE — 97140 MANUAL THERAPY 1/> REGIONS: CPT | Mod: GP | Performed by: PHYSICAL THERAPIST

## 2023-05-18 PROCEDURE — 97112 NEUROMUSCULAR REEDUCATION: CPT | Mod: GP | Performed by: PHYSICAL THERAPIST

## 2023-05-18 PROCEDURE — 97110 THERAPEUTIC EXERCISES: CPT | Mod: GP | Performed by: PHYSICAL THERAPIST

## 2023-05-30 ENCOUNTER — LAB (OUTPATIENT)
Dept: INFUSION THERAPY | Facility: CLINIC | Age: 73
End: 2023-05-30
Attending: INTERNAL MEDICINE
Payer: MEDICARE

## 2023-05-30 DIAGNOSIS — D63.1 ANEMIA OF CHRONIC RENAL FAILURE, STAGE 3 (MODERATE), UNSPECIFIED WHETHER STAGE 3A OR 3B CKD (H): Primary | ICD-10-CM

## 2023-05-30 DIAGNOSIS — N18.32 STAGE 3B CHRONIC KIDNEY DISEASE (H): ICD-10-CM

## 2023-05-30 DIAGNOSIS — N18.30 ANEMIA OF CHRONIC RENAL FAILURE, STAGE 3 (MODERATE), UNSPECIFIED WHETHER STAGE 3A OR 3B CKD (H): Primary | ICD-10-CM

## 2023-05-30 LAB
HCT VFR BLD AUTO: 34.8 % (ref 35–47)
HGB BLD-MCNC: 11.2 G/DL (ref 11.7–15.7)

## 2023-05-30 PROCEDURE — 85014 HEMATOCRIT: CPT | Performed by: INTERNAL MEDICINE

## 2023-05-30 PROCEDURE — 85018 HEMOGLOBIN: CPT | Performed by: INTERNAL MEDICINE

## 2023-05-30 PROCEDURE — 36415 COLL VENOUS BLD VENIPUNCTURE: CPT | Performed by: INTERNAL MEDICINE

## 2023-05-30 RX ORDER — METHYLPREDNISOLONE SODIUM SUCCINATE 125 MG/2ML
125 INJECTION, POWDER, LYOPHILIZED, FOR SOLUTION INTRAMUSCULAR; INTRAVENOUS
Status: CANCELLED
Start: 2023-05-30

## 2023-05-30 RX ORDER — MEPERIDINE HYDROCHLORIDE 25 MG/ML
25 INJECTION INTRAMUSCULAR; INTRAVENOUS; SUBCUTANEOUS EVERY 30 MIN PRN
Status: CANCELLED | OUTPATIENT
Start: 2023-05-30

## 2023-05-30 RX ORDER — DIPHENHYDRAMINE HYDROCHLORIDE 50 MG/ML
50 INJECTION INTRAMUSCULAR; INTRAVENOUS
Status: CANCELLED
Start: 2023-05-30

## 2023-05-30 RX ORDER — ALBUTEROL SULFATE 90 UG/1
1-2 AEROSOL, METERED RESPIRATORY (INHALATION)
Status: CANCELLED
Start: 2023-05-30

## 2023-05-30 RX ORDER — EPINEPHRINE 1 MG/ML
0.3 INJECTION, SOLUTION INTRAMUSCULAR; SUBCUTANEOUS EVERY 5 MIN PRN
Status: CANCELLED | OUTPATIENT
Start: 2023-05-30

## 2023-05-30 RX ORDER — ALBUTEROL SULFATE 0.83 MG/ML
2.5 SOLUTION RESPIRATORY (INHALATION)
Status: CANCELLED | OUTPATIENT
Start: 2023-05-30

## 2023-05-30 NOTE — PROGRESS NOTES
Writer informed patient that she does not qualify for aranesp today. Hgb 11.2 today. Patient verbalized understanding to the above, and will return on 6/27/23 for her next lab/possible aranesp appointment.  Kimber BOSS RN

## 2023-05-30 NOTE — RESULT ENCOUNTER NOTE
Dear Ms. Sotomayor,    Hemoglobin is stable at 11.2.    Please, call me with any questions.    Nico Calderon MD

## 2023-06-02 ENCOUNTER — TRANSFERRED RECORDS (OUTPATIENT)
Dept: HEALTH INFORMATION MANAGEMENT | Facility: CLINIC | Age: 73
End: 2023-06-02
Payer: MEDICARE
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
## 2023-06-13 ENCOUNTER — OFFICE VISIT (OUTPATIENT)
Dept: PHYSICAL MEDICINE AND REHAB | Facility: CLINIC | Age: 73
End: 2023-06-13
Payer: COMMERCIAL

## 2023-06-13 VITALS — DIASTOLIC BLOOD PRESSURE: 61 MMHG | SYSTOLIC BLOOD PRESSURE: 125 MMHG | HEART RATE: 75 BPM

## 2023-06-13 DIAGNOSIS — G44.209 TENSION HEADACHE: Primary | ICD-10-CM

## 2023-06-13 PROCEDURE — 99205 OFFICE O/P NEW HI 60 MIN: CPT | Performed by: PHYSICAL MEDICINE & REHABILITATION
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
## 2023-06-14 ENCOUNTER — OFFICE VISIT (OUTPATIENT)
Dept: FAMILY MEDICINE | Facility: CLINIC | Age: 73
End: 2023-06-14
Payer: MEDICARE

## 2023-06-14 VITALS
HEART RATE: 98 BPM | HEIGHT: 64 IN | BODY MASS INDEX: 30.56 KG/M2 | WEIGHT: 179 LBS | DIASTOLIC BLOOD PRESSURE: 65 MMHG | SYSTOLIC BLOOD PRESSURE: 135 MMHG | TEMPERATURE: 97.7 F | RESPIRATION RATE: 16 BRPM | OXYGEN SATURATION: 98 %

## 2023-06-14 DIAGNOSIS — L29.9 PRURITIC DISORDER: ICD-10-CM

## 2023-06-14 DIAGNOSIS — N25.81 SECONDARY RENAL HYPERPARATHYROIDISM (H): ICD-10-CM

## 2023-06-14 DIAGNOSIS — G43.009 MIGRAINE WITHOUT AURA AND WITHOUT STATUS MIGRAINOSUS, NOT INTRACTABLE: ICD-10-CM

## 2023-06-14 DIAGNOSIS — Q04.9 CONGENITAL MALFORMATION OF BRAIN, UNSPECIFIED (H): ICD-10-CM

## 2023-06-14 DIAGNOSIS — I10 BENIGN ESSENTIAL HYPERTENSION: ICD-10-CM

## 2023-06-14 DIAGNOSIS — N18.32 STAGE 3B CHRONIC KIDNEY DISEASE (H): ICD-10-CM

## 2023-06-14 DIAGNOSIS — H81.12 BENIGN PAROXYSMAL POSITIONAL VERTIGO, LEFT: ICD-10-CM

## 2023-06-14 DIAGNOSIS — R60.0 EDEMA LEG: ICD-10-CM

## 2023-06-14 DIAGNOSIS — E78.1 HYPERTRIGLYCERIDEMIA: ICD-10-CM

## 2023-06-14 LAB — MAGNESIUM SERPL-MCNC: 2.1 MG/DL (ref 1.7–2.3)

## 2023-06-14 PROCEDURE — 83735 ASSAY OF MAGNESIUM: CPT | Performed by: INTERNAL MEDICINE

## 2023-06-14 PROCEDURE — 36415 COLL VENOUS BLD VENIPUNCTURE: CPT | Performed by: INTERNAL MEDICINE

## 2023-06-14 PROCEDURE — 99214 OFFICE O/P EST MOD 30 MIN: CPT | Performed by: INTERNAL MEDICINE

## 2023-06-14 RX ORDER — DIAZEPAM 5 MG
2.5 TABLET ORAL
Qty: 10 TABLET | Refills: 0 | Status: SHIPPED | OUTPATIENT
Start: 2023-06-14 | End: 2024-07-03

## 2023-06-14 RX ORDER — GEMFIBROZIL 600 MG/1
300 TABLET, FILM COATED ORAL 2 TIMES DAILY
Qty: 90 TABLET | Refills: 3 | Status: SHIPPED | OUTPATIENT
Start: 2023-06-14 | End: 2024-07-29

## 2023-06-14 RX ORDER — TIZANIDINE 2 MG/1
TABLET ORAL
Qty: 90 TABLET | Refills: 3 | Status: SHIPPED | OUTPATIENT
Start: 2023-06-14

## 2023-06-14 RX ORDER — ONDANSETRON 4 MG/1
4 TABLET, FILM COATED ORAL EVERY 8 HOURS PRN
Qty: 90 TABLET | Refills: 3 | Status: SHIPPED | OUTPATIENT
Start: 2023-06-14 | End: 2024-01-11

## 2023-06-14 RX ORDER — HYDROXYZINE HYDROCHLORIDE 25 MG/1
25 TABLET, FILM COATED ORAL 3 TIMES DAILY PRN
Qty: 120 TABLET | Refills: 1 | Status: SHIPPED | OUTPATIENT
Start: 2023-06-14

## 2023-06-14 RX ORDER — LOSARTAN POTASSIUM 50 MG/1
50 TABLET ORAL AT BEDTIME
Qty: 90 TABLET | Refills: 3 | Status: SHIPPED | OUTPATIENT
Start: 2023-06-14 | End: 2024-08-08

## 2023-06-14 RX ORDER — TORSEMIDE 10 MG/1
10 TABLET ORAL DAILY
Qty: 90 TABLET | Refills: 3 | Status: SHIPPED | OUTPATIENT
Start: 2023-06-14 | End: 2024-04-30

## 2023-06-14 RX ORDER — ATORVASTATIN CALCIUM 40 MG/1
TABLET, FILM COATED ORAL
Qty: 90 TABLET | Refills: 3 | Status: SHIPPED | OUTPATIENT
Start: 2023-06-14 | End: 2024-07-29

## 2023-06-14 RX ORDER — TOPIRAMATE 100 MG/1
100 TABLET, FILM COATED ORAL DAILY
Qty: 90 TABLET | Refills: 3 | Status: SHIPPED | OUTPATIENT
Start: 2023-06-14 | End: 2024-07-29

## 2023-06-14 RX ORDER — CARVEDILOL 6.25 MG/1
6.25 TABLET ORAL 2 TIMES DAILY WITH MEALS
Qty: 180 TABLET | Refills: 3 | Status: SHIPPED | OUTPATIENT
Start: 2023-06-14 | End: 2024-05-31

## 2023-06-14 ASSESSMENT — PAIN SCALES - GENERAL: PAINLEVEL: NO PAIN (0)

## 2023-06-14 NOTE — PROGRESS NOTES
Assessment & Plan     Congenital malformation of brain, unspecified (H)  Stable     Secondary renal hyperparathyroidism (H)  Stable     Stage 3b chronic kidney disease (H)  Recheck magnesium, restart supplement if normal, recheck in September on supplement if restarted   - Magnesium; Future  - Magnesium    Hypertriglyceridemia  Recheck lipids in Sept  - atorvastatin (LIPITOR) 40 MG tablet; TAKE 1 TABLET(40 MG) BY MOUTH DAILY  - gemfibrozil (LOPID) 600 MG tablet; Take 0.5 tablets (300 mg) by mouth 2 times daily    Benign essential hypertension  Well controlled; refilled medications   - carvedilol (COREG) 6.25 MG tablet; Take 1 tablet (6.25 mg) by mouth 2 times daily (with meals)  - losartan (COZAAR) 50 MG tablet; Take 1 tablet (50 mg) by mouth At Bedtime    Benign paroxysmal positional vertigo, left  Uses this rarely, but plans travel this summer, asked for refill   - diazepam (VALIUM) 5 MG tablet; Take 0.5 tablets (2.5 mg) by mouth nightly as needed (vertigo)    Pruritic disorder  Stable; refill below   - hydrOXYzine (ATARAX) 25 MG tablet; Take 1 tablet (25 mg) by mouth 3 times daily as needed for itching    Migraine without aura and without status migrainosus, not intractable  Headache syndrome is complex, agree with notion of seeing headache clinic   - ondansetron (ZOFRAN) 4 MG tablet; Take 1 tablet (4 mg) by mouth every 8 hours as needed for nausea  - tiZANidine (ZANAFLEX) 2 MG tablet; TAKE 1 TABLET(2 MG) BY MOUTH THREE TIMES DAILY AS NEEDED FOR MUSCLE SPASMS Strength: 2 mg  - topiramate (TOPAMAX) 100 MG tablet; Take 1 tablet (100 mg) by mouth daily    Edema leg  Refilled diuretic   - torsemide (DEMADEX) 10 MG tablet; Take 1 tablet (10 mg) by mouth daily      27 minutes spent by me on the date of the encounter doing chart review, history and exam, documentation and further activities per the note       BMI:   Estimated body mass index is 30.73 kg/m  as calculated from the following:    Height as of this  "encounter: 1.626 m (5' 4\").    Weight as of this encounter: 81.2 kg (179 lb).           Josh Hollingsworth MD  [facility] EBER Lee is a 72 year old, presenting for the following health issues:  Recheck Medication         View : No data to display.              History of Present Illness       Reason for visit:  Med check    She eats 2-3 servings of fruits and vegetables daily.She consumes 0 sweetened beverage(s) daily.She exercises with enough effort to increase her heart rate 10 to 19 minutes per day.  She exercises with enough effort to increase her heart rate 3 or less days per week.   She is taking medications regularly.         Follow up CKD, migraine, hypertension, hyperlipidemia, chronic pain, history of vertigo  Needs refills on multiple medications  Mag was high at recent nephrology visit, she was asked to stop supplement  Supplement really helps headaches and leg cramps, she would like to restart  She asks if she can have level checked and restart if it is normal with recheck on supplement in Sept when she has preventive exam scheduled   She has been referred to headache clinic       Review of Systems         Objective    /65 (BP Location: Right arm, Patient Position: Sitting, Cuff Size: Adult Regular)   Pulse 98   Temp 97.7  F (36.5  C) (Temporal)   Resp 16   Ht 1.626 m (5' 4\")   Wt 81.2 kg (179 lb)   SpO2 98%   BMI 30.73 kg/m    Body mass index is 30.73 kg/m .  Physical Exam   Well appearing, comfortable, no distress                     "

## 2023-06-14 NOTE — RESULT ENCOUNTER NOTE
The following letter pertains to your most recent diagnostic tests:    Good news! The magnesium is OK.  Restart your supplement and we can recheck in September.        Sincerely,    Dr. Hollingsworth

## 2023-06-15 DIAGNOSIS — I10 BENIGN ESSENTIAL HYPERTENSION: ICD-10-CM

## 2023-06-15 RX ORDER — CARVEDILOL 6.25 MG/1
TABLET ORAL
Qty: 180 TABLET | Refills: 3 | OUTPATIENT
Start: 2023-06-15

## 2023-06-19 ENCOUNTER — PATIENT OUTREACH (OUTPATIENT)
Dept: CARE COORDINATION | Facility: CLINIC | Age: 73
End: 2023-06-19
Payer: MEDICARE

## 2023-06-27 ENCOUNTER — LAB (OUTPATIENT)
Dept: INFUSION THERAPY | Facility: CLINIC | Age: 73
End: 2023-06-27
Attending: INTERNAL MEDICINE
Payer: MEDICARE

## 2023-06-27 DIAGNOSIS — D63.1 ANEMIA OF CHRONIC RENAL FAILURE, STAGE 3 (MODERATE), UNSPECIFIED WHETHER STAGE 3A OR 3B CKD (H): Primary | ICD-10-CM

## 2023-06-27 DIAGNOSIS — N18.32 STAGE 3B CHRONIC KIDNEY DISEASE (H): ICD-10-CM

## 2023-06-27 DIAGNOSIS — D64.9 NORMOCYTIC ANEMIA: ICD-10-CM

## 2023-06-27 DIAGNOSIS — N18.30 ANEMIA OF CHRONIC RENAL FAILURE, STAGE 3 (MODERATE), UNSPECIFIED WHETHER STAGE 3A OR 3B CKD (H): Primary | ICD-10-CM

## 2023-06-27 LAB
ERYTHROCYTE [DISTWIDTH] IN BLOOD BY AUTOMATED COUNT: 13.1 % (ref 10–15)
HCT VFR BLD AUTO: 33.4 % (ref 35–47)
HGB BLD-MCNC: 10.6 G/DL (ref 11.7–15.7)
MCH RBC QN AUTO: 31.7 PG (ref 26.5–33)
MCHC RBC AUTO-ENTMCNC: 31.7 G/DL (ref 31.5–36.5)
MCV RBC AUTO: 100 FL (ref 78–100)
PLATELET # BLD AUTO: 236 10E3/UL (ref 150–450)
RBC # BLD AUTO: 3.34 10E6/UL (ref 3.8–5.2)
WBC # BLD AUTO: 4.7 10E3/UL (ref 4–11)

## 2023-06-27 PROCEDURE — 85027 COMPLETE CBC AUTOMATED: CPT | Performed by: INTERNAL MEDICINE

## 2023-06-27 PROCEDURE — 36415 COLL VENOUS BLD VENIPUNCTURE: CPT

## 2023-06-27 RX ORDER — DIPHENHYDRAMINE HYDROCHLORIDE 50 MG/ML
50 INJECTION INTRAMUSCULAR; INTRAVENOUS
Status: CANCELLED
Start: 2023-06-27

## 2023-06-27 RX ORDER — METHYLPREDNISOLONE SODIUM SUCCINATE 125 MG/2ML
125 INJECTION, POWDER, LYOPHILIZED, FOR SOLUTION INTRAMUSCULAR; INTRAVENOUS
Status: CANCELLED
Start: 2023-06-27

## 2023-06-27 RX ORDER — MEPERIDINE HYDROCHLORIDE 25 MG/ML
25 INJECTION INTRAMUSCULAR; INTRAVENOUS; SUBCUTANEOUS EVERY 30 MIN PRN
Status: CANCELLED | OUTPATIENT
Start: 2023-06-27

## 2023-06-27 RX ORDER — EPINEPHRINE 1 MG/ML
0.3 INJECTION, SOLUTION INTRAMUSCULAR; SUBCUTANEOUS EVERY 5 MIN PRN
Status: CANCELLED | OUTPATIENT
Start: 2023-06-27

## 2023-06-27 RX ORDER — ALBUTEROL SULFATE 90 UG/1
1-2 AEROSOL, METERED RESPIRATORY (INHALATION)
Status: CANCELLED
Start: 2023-06-27

## 2023-06-27 RX ORDER — ALBUTEROL SULFATE 0.83 MG/ML
2.5 SOLUTION RESPIRATORY (INHALATION)
Status: CANCELLED | OUTPATIENT
Start: 2023-06-27

## 2023-06-27 NOTE — RESULT ENCOUNTER NOTE
Dear Ms. Sotomayor,    Hemoglobin is stable at 10.6.    Please, call me with any questions.    Nico Calderon MD

## 2023-07-19 ENCOUNTER — THERAPY VISIT (OUTPATIENT)
Dept: PHYSICAL THERAPY | Facility: CLINIC | Age: 73
End: 2023-07-19
Payer: COMMERCIAL

## 2023-07-19 DIAGNOSIS — F07.81 POST CONCUSSION SYNDROME: Primary | ICD-10-CM

## 2023-07-19 DIAGNOSIS — T75.3XXS MOTION SICKNESS, SEQUELA: ICD-10-CM

## 2023-07-19 DIAGNOSIS — R42 DIZZINESS: ICD-10-CM

## 2023-07-19 PROCEDURE — 97112 NEUROMUSCULAR REEDUCATION: CPT | Mod: GP | Performed by: PHYSICAL THERAPIST

## 2023-07-19 PROCEDURE — 97750 PHYSICAL PERFORMANCE TEST: CPT | Mod: GP | Performed by: PHYSICAL THERAPIST

## 2023-07-20 ENCOUNTER — HOSPITAL ENCOUNTER (OUTPATIENT)
Dept: MAMMOGRAPHY | Facility: CLINIC | Age: 73
Discharge: HOME | End: 2023-07-20
Attending: INTERNAL MEDICINE | Admitting: INTERNAL MEDICINE
Payer: MEDICARE

## 2023-07-20 DIAGNOSIS — Z12.31 VISIT FOR SCREENING MAMMOGRAM: ICD-10-CM

## 2023-07-20 PROCEDURE — 77067 SCR MAMMO BI INCL CAD: CPT

## 2023-07-22 NOTE — PROGRESS NOTES
07/19/23 0900   Signing Clinician's Name / Credentials   Signing clinician's name / credentials Jones Stahl, PT, DPT, NCS   Functional Gait Assessment (ALIREZA aCstle, WEI Shea, et al. (2004))   1. GAIT LEVEL SURFACE 1   2. CHANGE IN GAIT SPEED 3   3. GAIT WITH HORIZONTAL HEAD TURNS 2   4. GAIT WITH VERTICAL HEAD TURNS 3   5. GAIT AND PIVOT TURN 2   6. STEP OVER OBSTACLE 2   7. GAIT WITH NARROW BASE OF SUPPORT 1   8. GAIT WITH EYES CLOSED 1   9. AMBULATING BACKWARDS 1   10. STEPS 2   Total Functional Gait Assessment Score   TOTAL SCORE: (MAXIMUM SCORE 30) 18       Functional Gait Assessment (FGA): The FGA assesses postural stability during various walking tasks.   Gait assistive device used: None    Scores of <22 /30 have been correlated with predicting falls in community-dwelling older adults according to Tin & Kvng 2010.   Scores of <18 /30 have been correlated with increased risk for falls in patients with Parkinsons Disease according to Edson Mcginnis, Aguilar et al 2014.  Minimal Detectable Change for patients with acute/chronic stroke = 4.2 according to Thistanley & Ritschel 2009  Minimal Detectable Change for patients with vestibular disorder = 8 according to [employer] K[employer] 2010    Assessment (rationale for performing, application to patient s function & care plan): Pt score elevated fall risk and displays caution with movements and turns which results in slower speeds. She had a slight reduction in her score compared to testing completed in March.   (Minutes billed as physical performance test):  10

## 2023-07-22 NOTE — PROGRESS NOTES
07/19/23 0500   Appointment Info   Signing clinician's name / credentials Jones Stahl, PT, DPT, NCS   Visits Used 11   Medical Diagnosis post concussion syndrome   PT Tx Diagnosis Post concussion syndrome   Progress Note/Certification   Start of Care Date 12/09/22   Onset of illness/injury or Date of Surgery 11/22/22   Therapy Frequency 1x/wk every other week   Predicted Duration up to 90 days   Progress Note Due Date 06/03/23   Progress Note Completed Date 07/19/23   PT Goal 1   Goal Identifier CSA   Goal Description Client will report improved overall concussion symptoms scoring an 6 or les on the CSA   Goal Progress PROGRESSING: Pt's CSA score has fluctuated from 28 to 49 then dropped 26 then 14 and now 9   Target Date 10/16/23   PT Goal 2   Goal Identifier DVA   Goal Description Client will demonstrate improved gaze stabilization wit head turns and movement scoring within 3 lines from baseline at the 2 Hz speed and no reports of dizziness.   Goal Progress baseline: 6 lines difference   Target Date 10/16/23   PT Goal 3   Goal Identifier FGA   Goal Description Client will demonstrate improved standing balance scoring a 28/30 or better on the FGA   Goal Progress no change: Pt scored 18/30 which is still fall risk.  She has shown more cautious movements with turns and overall gait speed due to movement sensitivity   Target Date 10/16/23   PT Goal 4   Goal Identifier CROM   Goal Description Client will report decreased pain and improved ROM to at least 55 degrees in cervical rotation in both directions.   Goal Progress baseline: L 39 degrees and R 52 degrees and improved to 51 degrees on L and 54 degrees on R.   Target Date 10/16/23   Subjective Report   Subjective Report Pt reports that she has been struggling with riding in a car for prolonged distances due to motion sensitivity.  She is being provoked by someone turning the car around.  She feels fatigue after long car rides.  She develops vertigo and  feels unsteady after the long rides.  She will be going on 2 road trips in the next 2 months and she feels unsure if she will be able to tolerate it.  She has been able to ride for about an hr before she needs to get out and rest   Treatment Interventions (PT)   Interventions Therapeutic Procedure/Exercise;Neuromuscular Re-education;Manual Therapy   Neuromuscular Re-education   Neuromuscular re-ed of mvmt, balance, coord, kinesthetic sense, posture, proprioception minutes (96746) 30   Neuro Re-ed 1 - Details Dynamic balance activities: walking with speed changes, walking with head turns, Tandem stance x 30 seconds each side then progressed to walking - VC for strict tandem stance. Semi tandem stance with head turns (pause in center) x 10 reps  Overall cues for abdominal engagement.  SBA for safety   Skilled Intervention In order to assist with motion intolerance and dizziness symptoms for ease of return to community activities/participation   Patient Response/Progress Dizziness provoked. Pt instructed to avoid letting symptoms increase >2 points.   Neuro Re-ed 2 motion tolerance   Neuro Re-ed 2 - Details Pt shown motion stimulation videos of riding in a car: she tolerated about 30 seconds of the following before needing a rest break where she could close her eyes: driving on straight road, car on curvy road, driving with shadows.  She was most challneged by shadows leading to reduced time.  VOR cancellation x 8 reps - VC for movement as tolerated.  Pt instructed in riding tolerance and beginning with 30 minutes each way during the day then progressing by 15 minutes each direction.  Then trial drive in the evening when more fatigued.  Pt encoruaged open communication with  to assist with symptom management.   Neuromuscular Re-education Neuro Re-ed 2   Neuro Re-ed 1 Balance   Physical Performance Test/measures   Physical Performance Test/Measurement, Minutes (89211) 10   Physical Performance Test/Measurement  Details see progress note for details   Skilled Intervention In order to assess progress   Progress see progress note   Education   Learner/Method Patient   Education Comments see above   Plan   Plan for next session assess tolerance to driving/long car rides, treadmill walking, activities on foam, semi tandem activities   Total Session Time   Timed Code Treatment Minutes 40   Total Treatment Time (sum of timed and untimed services) 40           PLAN  Continue therapy per current plan of care.    Beginning/End Dates of Progress Note Reporting Period:  3/2023 to 07/19/2023    Referring Provider:  Roseanne Marti

## 2023-08-03 ENCOUNTER — LAB (OUTPATIENT)
Dept: INFUSION THERAPY | Facility: CLINIC | Age: 73
End: 2023-08-03
Attending: INTERNAL MEDICINE
Payer: MEDICARE

## 2023-08-03 DIAGNOSIS — N18.30 ANEMIA OF CHRONIC RENAL FAILURE, STAGE 3 (MODERATE), UNSPECIFIED WHETHER STAGE 3A OR 3B CKD (H): Primary | ICD-10-CM

## 2023-08-03 DIAGNOSIS — D64.9 NORMOCYTIC ANEMIA: ICD-10-CM

## 2023-08-03 DIAGNOSIS — D63.1 ANEMIA OF CHRONIC RENAL FAILURE, STAGE 3 (MODERATE), UNSPECIFIED WHETHER STAGE 3A OR 3B CKD (H): Primary | ICD-10-CM

## 2023-08-03 LAB
ERYTHROCYTE [DISTWIDTH] IN BLOOD BY AUTOMATED COUNT: 13.2 % (ref 10–15)
HCT VFR BLD AUTO: 32.1 % (ref 35–47)
HGB BLD-MCNC: 10.3 G/DL (ref 11.7–15.7)
HOLD SPECIMEN: 0
MCH RBC QN AUTO: 31.5 PG (ref 26.5–33)
MCHC RBC AUTO-ENTMCNC: 32.1 G/DL (ref 31.5–36.5)
MCV RBC AUTO: 98 FL (ref 78–100)
PLATELET # BLD AUTO: 210 10E3/UL (ref 150–450)
RBC # BLD AUTO: 3.27 10E6/UL (ref 3.8–5.2)
WBC # BLD AUTO: 4.5 10E3/UL (ref 4–11)

## 2023-08-03 PROCEDURE — 36415 COLL VENOUS BLD VENIPUNCTURE: CPT

## 2023-08-03 PROCEDURE — 85027 COMPLETE CBC AUTOMATED: CPT | Performed by: INTERNAL MEDICINE

## 2023-08-04 NOTE — RESULT ENCOUNTER NOTE
Dear Ms. Sotomayor,    Hemoglobin is stable at 10.3.    Please, call me with any questions.    Nico Calderon MD

## 2023-08-10 ENCOUNTER — THERAPY VISIT (OUTPATIENT)
Dept: PHYSICAL THERAPY | Facility: CLINIC | Age: 73
End: 2023-08-10
Payer: COMMERCIAL

## 2023-08-10 DIAGNOSIS — T75.3XXS MOTION SICKNESS, SEQUELA: ICD-10-CM

## 2023-08-10 DIAGNOSIS — F07.81 POST CONCUSSION SYNDROME: Primary | ICD-10-CM

## 2023-08-10 DIAGNOSIS — R42 DIZZINESS: ICD-10-CM

## 2023-08-10 PROCEDURE — 97112 NEUROMUSCULAR REEDUCATION: CPT | Mod: GP | Performed by: PHYSICAL THERAPIST

## 2023-08-12 DIAGNOSIS — I10 BENIGN ESSENTIAL HYPERTENSION: ICD-10-CM

## 2023-08-14 RX ORDER — LOSARTAN POTASSIUM 50 MG/1
50 TABLET ORAL AT BEDTIME
Qty: 90 TABLET | Refills: 3 | OUTPATIENT
Start: 2023-08-14

## 2023-08-21 NOTE — PROGRESS NOTES
05/18/23 0500   Appointment Info   Signing clinician's name / credentials Jones Stahl, PT, DPT, NCS   Visits Used 10   Medical Diagnosis post concussion syndrome   PT Tx Diagnosis Post concussion syndrome/motion sensitivity   Quick Adds Certification   Progress Note/Certification   Start of Care Date 12/09/22   Onset of illness/injury or Date of Surgery 11/22/22   Therapy Frequency 1x/wk every other week   Predicted Duration 12 weeks   Certification date from 05/18/23   Certification date to 08/16/23   Progress Note Due Date 06/03/23   PT Goal 1   Goal Identifier CSA   Goal Description Client will report improved overall concussion symptoms scoring an 6 or les on the CSA   Goal Progress PROGRESSING: Pt's CSA score has fluctuated from 28 to 49 then dropped 26 and now 14   Target Date 08/16/23   PT Goal 2   Goal Identifier DVA   Goal Description Client will demonstrate improved gaze stabilization wit head turns and movement scoring within 3 lines from baseline at the 2 Hz speed and no reports of dizziness.   Goal Progress baseline: 6 lines difference   Target Date 08/16/23   PT Goal 3   Goal Identifier FGA   Goal Description Client will demonstrate improved standing balance scoring a 28/30 or better on the FGA   Goal Progress no change: Pt scored 20/30 which is still fall risk.  Tolerance of movement and visual input has been the primary focus of therapy therefore understandable that this areas has not improved   Target Date 08/16/23   PT Goal 4   Goal Identifier CROM   Goal Description Client will report decreased pain and improved ROM to at least 55 degrees in cervical rotation in both directions.   Goal Progress baseline: L 39 degrees and R 52 degrees and improved to 50 degrees on L and 55 degrees on R.   Target Date 08/16/23   Subjective Report   Subjective Report She had a do a lot of driving in a ramps and completing many turns - overall improved tolerance with activiity and navigating new environments  and situations.   Treatment Interventions (PT)   Interventions Therapeutic Procedure/Exercise;Neuromuscular Re-education;Manual Therapy   Therapeutic Procedure/Exercise   Therapeutic Procedures: strength, endurance, ROM, flexibillity minutes (99144) 10   Skilled Intervention In order to help with neck pain management   Patient Response/Progress Improved form and technique after cues. Pt demonstrating improved ROM with cervical rotation to the R however limited by rotation to L due to mm tightness on R   Ther Proc 1 - Details Pt instructed in seated upper trap stretch x 30 seconds x 3 bouts each side, seated levator scap stretch x 30 seconds x 3 bouts on each side - VC for hand placement and cues for proper technique. Side lying shoulder ER x 10 reps on R with VC for proper form and scap set with increased depression. Side lying shoulder horizontal abduction x 10 reps - VC for scapular depression.   Neuromuscular Re-education   Neuromuscular re-ed of mvmt, balance, coord, kinesthetic sense, posture, proprioception minutes (63712) 10   Skilled Intervention In order to assist with motion intolerance and dizziness symptoms for ease of return to community activities/participation   Patient Response/Progress Mild dizziness provoked   Neuro Re-ed 1 - Details Dynamic balance activities: walking with speed changes, walking with head turns, walking and stepping over hurdles x 4 bouts, Tandem stance x 30 seconds each side then progressed to walking - VC for strict tandem stance. Overall cues for abdominal engagement.  SBA for safety   Manual Therapy   Manual Therapy: Mobilization, MFR, MLD, friction massage minutes (39085) 15   Skilled Intervention To decrease pain and increase ROM in the cervical region, manual techniques   Patient Response/Progress Pt demonstrated reduced mm tone post techniques and increased ease of cervical ROM.   Manual Therapy 1 - Details STM to R upper trap, levator scap, scalenes and suboccipitals.  Gentle cervical distraction 3x30 sec. Therapist stretch of levator scapulae 2x30 sec on R and upper trap 2x30 sec on R.   Plan   Plan for next session test goals to determine progress   Total Session Time   Timed Code Treatment Minutes 35   Total Treatment Time (sum of timed and untimed services) 35           [facility]                                                                                   OUTPATIENT PHYSICAL THERAPY    PLAN OF TREATMENT FOR OUTPATIENT REHABILITATION   Patient's Last Name, First Name, Rosa Plummer YOB: 1950   Provider's Name   [facility]   Medical Record No.  [id number]     Onset Date: 11/22/22  Start of Care Date: 12/09/22     Medical Diagnosis:  post concussion syndrome      PT Treatment Diagnosis:  Post concussion syndrome/motion sensitivity Plan of Treatment  Frequency/Duration: 1x/wk every other week/ 12 weeks    Certification date from 05/18/23 to 08/16/23         See note for plan of treatment details and functional goals     Jones Stahl, PT                         I CERTIFY THE NEED FOR THESE SERVICES FURNISHED UNDER        THIS PLAN OF TREATMENT AND WHILE UNDER MY CARE     (Physician attestation of this document indicates review and certification of the therapy plan).                Referring Provider:  Roseanne Marti/Aravind Corona      Initial Assessment  See Epic Evaluation- Start of Care Date: 12/09/22

## 2023-08-21 NOTE — PROGRESS NOTES
"  Assessment & Plan   Problem List Items Addressed This Visit     Benign essential hypertension - Primary         Continue current BP medication regimen. Suspect her pain meds were playing a role in symptoms and lower BP that her norm.  Encourage daily home BP checks  Follow up with Dr. Hollingsworth at the end of June      GREGG Reed CNP  [facility] EBER Lee is a 71 year old who presents for the following health issues     HPI     blood pressure has been low recently. Patient states she has not taken her blood pressure today. BP: 140/60, P: 64. Patient states recently it has been more commonly 115s-120s/45-60.      Recent R humerus fracture due to tripping and fall, is going in next Tues for follow-up, may need surgery depending on healing    Pt reports feeling slightly lightheaded today, had a migraine last night. Dizziness is generalized, does not seem to be related to positioning or activity. Pt watches fluid intake, she feels she is drinking enough fluids, no dietary changes. Pt states her fitbit has been sending her notifications of low HR. Pt reports feeling increased fatigue  She has been taking tizanidine and previously hydroxyzine with tramadol.     Last night was a better night of sleep. Fell asleep at 10. Woke at 1:30 to ice her shoulder. Previously was icing every hour, then 2 hours.    Carvedilol cut in half one month ago by nephrology, currently taking 12.5 mg BID    BP is 129/75, does have BP monitor at home.       Review of Systems   Detailed as above         Objective    /75 (BP Location: Left arm, Patient Position: Sitting, Cuff Size: Adult Regular)   Pulse 72   Temp 97.2  F (36.2  C)   Resp 16   Ht 1.626 m (5' 4\")   Wt 80.7 kg (178 lb)   SpO2 99%   BMI 30.55 kg/m    Body mass index is 30.55 kg/m .  Physical Exam  Constitutional:       Appearance: Normal appearance.   Pulmonary:      Effort: Pulmonary effort is normal.   Musculoskeletal:     "  Comments: sling right arm   Neurological:      Mental Status: She is alert.   Psychiatric:         Mood and Affect: Mood normal.                I saw this patient in collaboration with Izabela Esteves NP Student      I was present with the APRN/PA student who participated in the service and in the documentation of the services provided. I have verified the history and personally performed the physical exam and medical decision making, as documented by the student and edited by me.     Long Prais, GREGG, CNP    Izabela Esteves NP Student       no

## 2023-08-22 DIAGNOSIS — M48.062 SPINAL STENOSIS OF LUMBAR REGION WITH NEUROGENIC CLAUDICATION: ICD-10-CM

## 2023-08-22 RX ORDER — CEPHALEXIN 500 MG/1
CAPSULE ORAL
Qty: 16 CAPSULE | Refills: 3 | Status: SHIPPED | OUTPATIENT
Start: 2023-08-22

## 2023-08-22 NOTE — TELEPHONE ENCOUNTER
Medication Question or Refill        What medication are you calling about (include dose and sig)?:   cephALEXin (KEFLEX) 500 MG capsule     Preferred Pharmacy:   [id number] - [place], [place] - [address] AT [facility] [place] [address]  [address] [place] [address]  Phone: [phone] Fax: [phone]      Controlled Substance Agreement on file:   CSA -- Patient Level:    CSA: None found at the patient level.       Who prescribed the medication?: PCP    Do you need a refill? Yes - for dental procedures. Requesting 16 capsules with refills as Pt is going through an implant & crown replacement mahin'ts right now, plus a cleaning    When did you use the medication last? 08.21.23    Patient offered an appointment? No    Do you have any questions or concerns?  No      Could we send this information to you in [facility] or would you prefer to receive a phone call?:   Patient would prefer a phone call   Okay to leave a detailed message?: Yes at Cell number on file:    Telephone Information:   Mobile [phone]

## 2023-08-30 ENCOUNTER — THERAPY VISIT (OUTPATIENT)
Dept: PHYSICAL THERAPY | Facility: CLINIC | Age: 73
End: 2023-08-30
Payer: COMMERCIAL

## 2023-08-30 DIAGNOSIS — R42 DIZZINESS: ICD-10-CM

## 2023-08-30 DIAGNOSIS — F07.81 POST CONCUSSION SYNDROME: Primary | ICD-10-CM

## 2023-08-30 DIAGNOSIS — T75.3XXS MOTION SICKNESS, SEQUELA: ICD-10-CM

## 2023-08-30 PROCEDURE — 97112 NEUROMUSCULAR REEDUCATION: CPT | Mod: GP | Performed by: PHYSICAL THERAPIST

## 2023-08-30 PROCEDURE — 97535 SELF CARE MNGMENT TRAINING: CPT | Mod: GP | Performed by: PHYSICAL THERAPIST

## 2023-08-30 PROCEDURE — 97750 PHYSICAL PERFORMANCE TEST: CPT | Mod: GP | Performed by: PHYSICAL THERAPIST

## 2023-09-03 NOTE — PROGRESS NOTES
08/30/23 0900   Signing Clinician's Name / Credentials   Signing clinician's name / credentials Jones Stahl, PT, DPT, NCS   Functional Gait Assessment (ALIREZA Castle, WEI Shea, et al. (2004))   1. GAIT LEVEL SURFACE 2  (5.88 sec)   2. CHANGE IN GAIT SPEED 3   3. GAIT WITH HORIZONTAL HEAD TURNS 3   4. GAIT WITH VERTICAL HEAD TURNS 3   5. GAIT AND PIVOT TURN 2   6. STEP OVER OBSTACLE 3   7. GAIT WITH NARROW BASE OF SUPPORT 2   8. GAIT WITH EYES CLOSED 1  (13.03 seconds)   9. AMBULATING BACKWARDS 1  (15.88 seconds)   10. STEPS 2   Total Functional Gait Assessment Score   TOTAL SCORE: (MAXIMUM SCORE 30) 22     Functional Gait Assessment (FGA): The FGA assesses postural stability during various walking tasks.   Gait assistive device used: None    Scores of <22 /30 have been correlated with predicting falls in community-dwelling older adults according to Tin & Kvng 2010.   Scores of <18 /30 have been correlated with increased risk for falls in patients with Parkinsons Disease according to Edson Mcginnis, Aguilar et al 2014.  Minimal Detectable Change for patients with acute/chronic stroke = 4.2 according to Thistanley & Ritschel 2009  Minimal Detectable Change for patients with vestibular disorder = 8 according to Tin & Kvng 2010    Assessment (rationale for performing, application to patient s function & care plan): Pt improved score from 18/30 to 22/30. She is on the borderline for fall risk. . She has demonstrated improved balance with head turns and stepping over obstacles as well as tandem walking. She continues to be cautious and slow during backwards walking, eyes closed and overall gait speed due to ongoing motion sensitivity.   (Minutes billed as physical performance test): 15

## 2023-09-03 NOTE — PROGRESS NOTES
08/30/23 0500   Appointment Info   Signing clinician's name / credentials Jones Stahl, PT, DPT, NCS   Visits Used 13   Medical Diagnosis post concussion syndrome   PT Tx Diagnosis Post concussion syndrome   Progress Note/Certification   Start of Care Date 12/09/22   Onset of illness/injury or Date of Surgery 11/22/22   Therapy Frequency 1x/wk every 3 weeks   Predicted Duration up to 90 days   Certification date from 08/17/23   Certification date to 10/16/23   Progress Note Completed Date 07/19/23   GOALS   PT Goals 4   PT Goal 1   Goal Identifier CSA   Goal Description Client will report improved overall concussion symptoms scoring an 6 or les on the CSA   Goal Progress PROGRESSING: Pt's CSA score has fluctuated from 28 to 49 then dropped 26 then 14 and now 9   Target Date 10/16/23   PT Goal 2   Goal Identifier DVA   Goal Description Client will demonstrate improved gaze stabilization wit head turns and movement scoring within 3 lines from baseline at the 2 Hz speed and no reports of dizziness.   Goal Progress PROGRESSING: Pt has reduced line difference during the DVA from 6 to 3.  Her dizziness is provoked during this test but it does recover within 2 minutes, which is a significant improvement.  She is also tolerating longer car rides and busy environments for londer durations without significantly provoking her symptoms. This has allowed her do participate in more social and community activities.  She has yet to do a road trip, which is planned for the next 2 weeks and will be a true test to her improved movement tolerance   Target Date 10/16/23   PT Goal 3   Goal Identifier FGA   Goal Description Client will demonstrate improved standing balance scoring a 24/30 or better on the FGA   Goal Progress PROGRESSING: Pt improved score from 18/30 to 22/30. She is on the borderline for fall risk. .  She has demonstrated improved balance with head turns and stepping over obstacles as well as tandem walking. She  continues to be cautious and slow during backwards walking, eyes closed and overall gait speed due to ongoing motion sensitivity.   Target Date 10/16/23   PT Goal 4   Goal Identifier CROM   Goal Description Client will report decreased pain and improved ROM to at least 55 degrees in cervical rotation in both directions.   Goal Progress MET: baseline: L 39 degrees and R 52 degrees and improved to 51 degrees on L and 54 degrees on R.  Current AROM; L: 60 degree  R: 65 degrees.   Target Date 10/16/23   Date Met 08/30/23   Subjective Report   Subjective Report Pt is getting ready for her big road trip. She has been riding in the car and attempted riding a bus for about 20 minutes. Mild light headedness symptoms post bus ride.  She [facility] and limited head turns.  Tolerated the state [place] environment for about 2.5 hrs with intermittent seated rest breaks. She was more aware of her symptom management and limited activity then left when needed. She required a day to rest afterwards to manage lingering symptoms and fatigue.  She is now tolerating 80 minute car rides.  She will be riding in a car for about 4 hrs today. She is still challenged by walking up/down store aisles and turning head to look   Neuromuscular Re-education   Neuromuscular re-ed of mvmt, balance, coord, kinesthetic sense, posture, proprioception minutes (42872) 10   Neuromuscular Re-education Neuro Re-ed 2   Neuro Re-ed 1 Balance and motion tolerance   Neuro Re-ed 1 - Details Tandem stance L/R and R/L x 60 seconds each side with foot forward. Backwards walking x 20' x 2 reps - improved pace.  Tandem walking x 3 bouts - VC for progerssion to arms crossed. VORx 1 x 30 seconds - improved tolerance. VOR cancellation x 10 reps - Pt instrcuted to increase duration with VOR exercises at home to assist with reducing dizziness symptoms   Skilled Intervention In order to assist with motion intolerance and balance symptoms for  ease of return to community activities/participation   Patient Response/Progress Symptoms significantly improved compared to last session, She is tolerating more activity and overall balance has progressed.   Cervicogenic Screen   Neck ROM see above   Dynamic Visual Acuity (DVA)   Static Acuity (LogMar) 0.2   Horizontal Head Movement at 2 Hz (LogMar) 0.5   DVA Comments Continues to have dizziness symptoms: 6/10 which reduced to 1/10 within 1.5 minutes.   Self Care/home Management   ADL/Home Mgmt Training (10722) 15   Skilled Intervention To improve symptom tolerance during everyday activities   Patient Response/Progress improving tolerance with various environments, except challenged when in an unfamiliar store that requires more visual scanning and head turns which provokes symptoms   Self Care 1 - Details Pt instructed in what situations to continue challenging self with symptoms and head turns. as well as when to allow rest breaks.  She was encouraged to increase tolerance with busy environments and repetition such as clothing stores.  Instructed to increased head turns as she has been tolerating these environments better   Physical Performance Test/measures   Physical Performance Test/Measurement, Minutes (55332) 15   Physical Performance Test/Measurement Details See FGA progress note for details.  Pt score 22/30 which is an improvement from 18/30   Skilled Intervention In order to assess progress   Education   Learner/Method Patient   Education Comments HEP progression   Plan   Home program added backwards walking, tandem walking and tandem stance with head turns as well as chin tucks   Plan for next session assess response of symptoms with road trip and test FGA and CSA   Total Session Time   Timed Code Treatment Minutes 40   Total Treatment Time (sum of timed and untimed services) 40         [facility]                                                                                    OUTPATIENT PHYSICAL THERAPY    PLAN OF TREATMENT FOR OUTPATIENT REHABILITATION   Patient's Last Name, First Name, Rosa Plummer YOB: 1950   Provider's Name   [facility]   Medical Record No.  [id number]     Onset Date: 11/22/22  Start of Care Date: 12/09/22     Medical Diagnosis:  post concussion syndrome      PT Treatment Diagnosis:  Post concussion syndrome Plan of Treatment  Frequency/Duration: 1x/wk every 3 weeks/ up to 90 days    Certification date from 08/17/23 to 10/16/23         See note for plan of treatment details and functional goals     Jones Stahl, PT                         I CERTIFY THE NEED FOR THESE SERVICES FURNISHED UNDER        THIS PLAN OF TREATMENT AND WHILE UNDER MY CARE     (Physician attestation of this document indicates review and certification of the therapy plan).                Referring Provider:  Roseanne Marti/Aravind Corona MD      Initial Assessment  See Epic Evaluation- Start of Care Date: 12/09/22

## 2023-09-09 ENCOUNTER — HEALTH MAINTENANCE LETTER (OUTPATIENT)
Age: 73
End: 2023-09-09

## 2023-09-26 ENCOUNTER — OFFICE VISIT (OUTPATIENT)
Dept: FAMILY MEDICINE | Facility: CLINIC | Age: 73
End: 2023-09-26
Payer: MEDICARE

## 2023-09-26 VITALS
HEART RATE: 95 BPM | RESPIRATION RATE: 20 BRPM | OXYGEN SATURATION: 100 % | DIASTOLIC BLOOD PRESSURE: 64 MMHG | HEIGHT: 64 IN | TEMPERATURE: 97.7 F | WEIGHT: 175 LBS | SYSTOLIC BLOOD PRESSURE: 103 MMHG | BODY MASS INDEX: 29.88 KG/M2

## 2023-09-26 DIAGNOSIS — N18.30 ANEMIA OF CHRONIC RENAL FAILURE, STAGE 3 (MODERATE), UNSPECIFIED WHETHER STAGE 3A OR 3B CKD (H): ICD-10-CM

## 2023-09-26 DIAGNOSIS — G43.719 INTRACTABLE CHRONIC MIGRAINE WITHOUT AURA AND WITHOUT STATUS MIGRAINOSUS: ICD-10-CM

## 2023-09-26 DIAGNOSIS — N18.32 STAGE 3B CHRONIC KIDNEY DISEASE (H): ICD-10-CM

## 2023-09-26 DIAGNOSIS — Z00.00 ENCOUNTER FOR MEDICARE ANNUAL WELLNESS EXAM: Primary | ICD-10-CM

## 2023-09-26 DIAGNOSIS — I10 BENIGN ESSENTIAL HYPERTENSION: ICD-10-CM

## 2023-09-26 DIAGNOSIS — E78.5 HYPERLIPIDEMIA LDL GOAL <130: ICD-10-CM

## 2023-09-26 DIAGNOSIS — D63.1 ANEMIA OF CHRONIC RENAL FAILURE, STAGE 3 (MODERATE), UNSPECIFIED WHETHER STAGE 3A OR 3B CKD (H): ICD-10-CM

## 2023-09-26 DIAGNOSIS — J32.0 LEFT MAXILLARY SINUSITIS: ICD-10-CM

## 2023-09-26 DIAGNOSIS — E78.1 HYPERTRIGLYCERIDEMIA: ICD-10-CM

## 2023-09-26 LAB
ALBUMIN SERPL BCG-MCNC: 4.7 G/DL (ref 3.5–5.2)
ALP SERPL-CCNC: 106 U/L (ref 35–104)
ALT SERPL W P-5'-P-CCNC: 16 U/L (ref 0–50)
ANION GAP SERPL CALCULATED.3IONS-SCNC: 12 MMOL/L (ref 7–15)
AST SERPL W P-5'-P-CCNC: 22 U/L (ref 0–45)
BILIRUB SERPL-MCNC: 0.5 MG/DL
BUN SERPL-MCNC: 37.6 MG/DL (ref 8–23)
CALCIUM SERPL-MCNC: 9.6 MG/DL (ref 8.8–10.2)
CHLORIDE SERPL-SCNC: 108 MMOL/L (ref 98–107)
CHOLEST SERPL-MCNC: 155 MG/DL
CREAT SERPL-MCNC: 1.67 MG/DL (ref 0.51–0.95)
CREAT UR-MCNC: 115 MG/DL
DEPRECATED HCO3 PLAS-SCNC: 21 MMOL/L (ref 22–29)
EGFRCR SERPLBLD CKD-EPI 2021: 33 ML/MIN/1.73M2
ERYTHROCYTE [DISTWIDTH] IN BLOOD BY AUTOMATED COUNT: 13.2 % (ref 10–15)
GLUCOSE SERPL-MCNC: 115 MG/DL (ref 70–99)
HCT VFR BLD AUTO: 34.6 % (ref 35–47)
HDLC SERPL-MCNC: 48 MG/DL
HGB BLD-MCNC: 10.8 G/DL (ref 11.7–15.7)
LDLC SERPL CALC-MCNC: 59 MG/DL
MAGNESIUM SERPL-MCNC: 2.7 MG/DL (ref 1.7–2.3)
MCH RBC QN AUTO: 30.9 PG (ref 26.5–33)
MCHC RBC AUTO-ENTMCNC: 31.2 G/DL (ref 31.5–36.5)
MCV RBC AUTO: 99 FL (ref 78–100)
MICROALBUMIN UR-MCNC: 39.3 MG/L
MICROALBUMIN/CREAT UR: 34.17 MG/G CR (ref 0–25)
NONHDLC SERPL-MCNC: 107 MG/DL
PLATELET # BLD AUTO: 248 10E3/UL (ref 150–450)
POTASSIUM SERPL-SCNC: 4.9 MMOL/L (ref 3.4–5.3)
PROT SERPL-MCNC: 7.5 G/DL (ref 6.4–8.3)
RBC # BLD AUTO: 3.5 10E6/UL (ref 3.8–5.2)
SODIUM SERPL-SCNC: 141 MMOL/L (ref 135–145)
TRIGL SERPL-MCNC: 238 MG/DL
WBC # BLD AUTO: 4 10E3/UL (ref 4–11)

## 2023-09-26 PROCEDURE — 82043 UR ALBUMIN QUANTITATIVE: CPT | Performed by: INTERNAL MEDICINE

## 2023-09-26 PROCEDURE — 83735 ASSAY OF MAGNESIUM: CPT | Performed by: INTERNAL MEDICINE

## 2023-09-26 PROCEDURE — 80053 COMPREHEN METABOLIC PANEL: CPT | Performed by: INTERNAL MEDICINE

## 2023-09-26 PROCEDURE — 80061 LIPID PANEL: CPT | Performed by: INTERNAL MEDICINE

## 2023-09-26 PROCEDURE — 99214 OFFICE O/P EST MOD 30 MIN: CPT | Mod: 25 | Performed by: INTERNAL MEDICINE

## 2023-09-26 PROCEDURE — 85027 COMPLETE CBC AUTOMATED: CPT | Performed by: INTERNAL MEDICINE

## 2023-09-26 PROCEDURE — G0439 PPPS, SUBSEQ VISIT: HCPCS | Performed by: INTERNAL MEDICINE

## 2023-09-26 PROCEDURE — 36415 COLL VENOUS BLD VENIPUNCTURE: CPT | Performed by: INTERNAL MEDICINE

## 2023-09-26 PROCEDURE — 82570 ASSAY OF URINE CREATININE: CPT | Performed by: INTERNAL MEDICINE

## 2023-09-26 RX ORDER — DOXYCYCLINE 100 MG/1
100 TABLET ORAL 2 TIMES DAILY
Qty: 14 TABLET | Refills: 0 | Status: SHIPPED | OUTPATIENT
Start: 2023-09-26 | End: 2024-07-03

## 2023-09-26 ASSESSMENT — ENCOUNTER SYMPTOMS: HEADACHES: 1

## 2023-09-26 ASSESSMENT — PAIN SCALES - GENERAL: PAINLEVEL: NO PAIN (0)

## 2023-09-26 ASSESSMENT — ACTIVITIES OF DAILY LIVING (ADL): CURRENT_FUNCTION: NO ASSISTANCE NEEDED

## 2023-09-26 NOTE — PATIENT INSTRUCTIONS
Patient Education   Personalized Prevention Plan  You are due for the preventive services outlined below.  Your care team is available to assist you in scheduling these services.  If you have already completed any of these items, please share that information with your care team to update in your medical record.  Health Maintenance Due   Topic Date Due     COVID-19 Vaccine (4 - Moderna series) 02/05/2022     ANNUAL REVIEW OF HM ORDERS  10/27/2022     Annual Wellness Visit  06/29/2023     Basic Metabolic Panel  06/29/2023     Cholesterol Lab  06/29/2023     Kidney Microalbumin Urine Test  06/30/2023

## 2023-09-26 NOTE — RESULT ENCOUNTER NOTE
"The following letter pertains to your most recent diagnostic tests:    -Your micro albumin level is elevated. This means you have trace amounts of protein in the urine. It indicates that your kidneys are being affected by diabetes. Keeping blood pressure low is the best treatment in order to keep this stable. People with microalbuminuria should avoid anti-inflamatory agents such as motrin, alleve and ibuprofen as much as possible because excessive use of these medications can be harmful to the kidneys. Anybody that has microalbuminuria should be on lisinopril or losartan-as you already are-since these medications are protective for the kidney's in this situation.     -Your total cholesterol is 155 which is at your goal of total cholesterol less than 200.    -Your triglycerides are 238 which are above your goal of triglycerides less than 150.    -Your HDL or \"good cholesterol\" is 48 which is below your goal of HDL cholesterol greater than 50.    -Your LDL cholesterol or \"bad cholesterol\" is 59 which is at your goal of LDL cholesterol less than <70.  Your LDL goal is based on your risk factors for artery disease.    -Liver and gallbladder tests are normal for you. (ALT,AST, Alk phos, bilirubin), kidney function is STABLE for you (Creatinine, GFR), Sodium is normal, Potassium is normal for you, Calcium is normal for you, the Glucose (blood sugar) is elevated but it is not in the diabetic range (diabetes is defined as a fasting blood sugar reading greater than 126 on two separate occassions).      -The magnesium level is too high.      -The hemoglobin is stable.  The other blood counts look OK.       Bottom line:  I think the labs look stable with the exception of the magnesium level.    I think you should stop taking magnesium.    If you have bothersome symptoms after stopping the magnesium, I would recommend that you schedule an appointment with me (video or phone visit is OK) to discuss alternatives to magnesium to " help you with your symptoms.          Sincerely,    Dr. Hollingsworth

## 2023-09-26 NOTE — PROGRESS NOTES
"SUBJECTIVE:   Rosa is a 72 year old who presents for Preventive Visit.      Are you in the first 12 months of your Medicare coverage?  No    Healthy Habits:     In general, how would you rate your overall health?  Good    Frequency of exercise:  2-3 days/week    Duration of exercise:  15-30 minutes    Do you usually eat at least 4 servings of fruit and vegetables a day, include whole grains    & fiber and avoid regularly eating high fat or \"junk\" foods?  No    Taking medications regularly:  Yes    Medication side effects:  None    Ability to successfully perform activities of daily living:  No assistance needed    Home Safety:  No safety concerns identified    Hearing Impairment:  No hearing concerns    In the past 6 months, have you been bothered by leaking of urine?  No    In general, how would you rate your overall mental or emotional health?  Good    Additional concerns today:  No          Have you ever done Advance Care Planning? (For example, a Health Directive, POLST, or a discussion with a medical provider or your loved ones about your wishes): Yes, patient states has an Advance Care Planning document and will bring a copy to the clinic.       Fall risk  Fallen 2 or more times in the past year?: No  Any fall with injury in the past year?: No    Cognitive Screening   1) Repeat 3 items (Leader, Season, Table)    2) Clock draw: NORMAL  3) 3 item recall: Recalls 3 objects  Results: 3 items recalled: COGNITIVE IMPAIRMENT LESS LIKELY    Mini-CogTM Copyright S Mary Ann. Licensed by the author for use in [facility]; reprinted with permission (leola@.[facility]). All rights reserved.      Do you have sleep apnea, excessive snoring or daytime drowsiness? : yes    Reviewed and updated as needed this visit by clinical staff   Tobacco  Allergies  Meds              Reviewed and updated as needed this visit by Provider                 Social History     Tobacco Use     Smoking status: Never     Smokeless " tobacco: Never   Substance Use Topics     Alcohol use: No     Alcohol/week: 0.0 standard drinks of alcohol             9/26/2023     9:22 AM   Alcohol Use   Prescreen: >3 drinks/day or >7 drinks/week? No       Current providers sharing in care for this patient include:   Patient Care Team:  Josh Hollingsworth MD as PCP - General (Internal Medicine)  Josh Hollingsworth MD as Assigned PCP  Taylor Zapien RD as Registered Dietitian (Dietitian, Registered)  Nico Calderon MD as MD (Hematology & Oncology)  Nico Calderon MD as Assigned Cancer Care Provider  Goltz, Bennett Ezra, PA-C as Assigned Neuroscience Provider  Danette Beaver PA-C as Physician Assistant    The following health maintenance items are reviewed in Epic and correct as of today:  Health Maintenance   Topic Date Due     COVID-19 Vaccine (4 - Moderna series) 02/05/2022     MEDICARE ANNUAL WELLNESS VISIT  06/29/2023     BMP  06/29/2023     LIPID  06/29/2023     MICROALBUMIN  06/30/2023     MAMMO SCREENING  07/20/2024     HEMOGLOBIN  08/03/2024     DTAP/TDAP/TD IMMUNIZATION (3 - Td or Tdap) 08/30/2024     ANNUAL REVIEW OF HM ORDERS  09/26/2024     FALL RISK ASSESSMENT  09/26/2024     COLORECTAL CANCER SCREENING  08/25/2027     ADVANCE CARE PLANNING  09/26/2028     HEPATITIS C SCREENING  Completed     MIGRAINE ACTION PLAN  Completed     PHQ-2 (once per calendar year)  Completed     INFLUENZA VACCINE  Completed     Pneumococcal Vaccine: 65+ Years  Completed     URINALYSIS  Completed     IPV IMMUNIZATION  Aged Out     HPV IMMUNIZATION  Aged Out     MENINGITIS IMMUNIZATION  Aged Out     DEXA  Discontinued     URINE DRUG SCREEN  Discontinued     ZOSTER IMMUNIZATION  Discontinued     BP Readings from Last 3 Encounters:   09/26/23 103/64   06/14/23 135/65   06/13/23 125/61    Wt Readings from Last 3 Encounters:   09/26/23 79.4 kg (175 lb)   06/14/23 81.2 kg (179 lb)   05/16/23 81.6 kg (180 lb)                  Patient Active Problem List   Diagnosis     Personal  history of allergy to anesthetic agent     Stage 3 chronic kidney disease (H)     Hypertriglyceridemia     Arnold-Chiari malformation (H)     Hyperlipidemia LDL goal <130     Anxiety     Secondary renal hyperparathyroidism (H)     Chronic bilateral low back pain without sciatica     Benign essential hypertension     Macular degeneration, bilateral     Anemia, iron deficiency     Migraine without aura and without status migrainosus, not intractable     Aortic valve insufficiency     ESTEFANIA (obstructive sleep apnea)- mild (AHI 12)     Spinal stenosis of lumbar region with neurogenic claudication     Malabsorption of iron     Arthralgia of hip     Asymptomatic varicose veins     Congenital anomaly of brain (H)     Constipation     Environmental allergies     Degenerative joint disease (DJD) of hip     Intractable chronic migraine without aura     Lumbar disc herniation with radiculopathy     Macular degeneration     Menopausal symptom     Multiple drug allergies     Migraine headache     Renovascular hypertension     Right knee DJD     S/P shoulder surgery     Trochanteric bursitis     Anemia of chronic renal failure, stage 3 (moderate) (H)     Past Surgical History:   Procedure Laterality Date     ARTHROPLASTY HIP Left      ARTHROSCOPY KNEE       BACK SURGERY      discectomy L3-4, 2018     BONE MARROW BIOPSY, BONE SPECIMEN, NEEDLE/TROCAR N/A 12/18/2019    Procedure: BONE MARROW BIOPSY;  Surgeon: Eran Bowser MD;  Location:  GI     COLONOSCOPY N/A 8/25/2022    Procedure: COLONOSCOPY, WITH POLYPECTOMY AND BIOPSY;  Surgeon: Americo Beyer MD;  Location:  GI     JOINT REPLACEMENT Left      hip & knee     [facility] Right [facility]26/2016    Procedure: ARTHROSCOPIC ROTATOR CUFF REPAIR, SHOULDER Distal Clavicle Excision;  Surgeon: Nick Lawrence MD;  Location: [facility];  Service: Orthopedics     ROTATOR CUFF REPAIR RT/LT Right     2016     TUBAL LIGATION  1987      TUBAL LIGATION       VEIN LIGATION       [facility] NONSPECIFIC PROCEDURE      wisdom teeth     [facility] NONSPECIFIC PROCEDURE  2005    Varicose Veins     [facility] TOTAL HIP ARTHROPLASTY      left in 2012, right 2017     [facility] TOTAL HIP ARTHROPLASTY Right 5/23/2017    Procedure: RIGHT TOTAL HIP ARTHROPLASTY DIRECT ANTERIOR;  Surgeon: Herman Llanos MD;  Location: [facility] Main OR;  Service: Orthopedics     [facility] TOTAL KNEE ARTHROPLASTY      right 2014, left 2018     [facility] TOTAL KNEE ARTHROPLASTY Right 10/16/2014    Procedure: Right Total Knee Arthroplasty;  Surgeon: Herman Llanos MD;  Location: [facility] Main OR;  Service: Orthopedics     [facility] TOTAL KNEE ARTHROPLASTY Left 4/10/2018    Procedure: LEFT TOTAL KNEE ARTHROPLASTY;  Surgeon: Herman Llanos MD;  Location: [facility] Main OR;  Service: Orthopedics       Social History     Tobacco Use     Smoking status: Never     Smokeless tobacco: Never   Substance Use Topics     Alcohol use: No     Alcohol/week: 0.0 standard drinks of alcohol     Family History   Problem Relation Age of Onset     Diabetes Mother         INSULIN     Hypertension Mother      Eye Disorder Mother         CATERACTS     Heart Disease Mother         CHF- OPEN HEART SURG X'S 2     Lipids Mother      Obesity Mother      Coronary Artery Disease Mother      Diabetes Father         ORAL     Hypertension Father      Arthritis Father      Eye Disorder Father         MAC DEGENERATION     Heart Disease Father         CHF     Lipids Father      Obesity Father      Diabetes Maternal Grandfather         INSULIN     Diabetes Brother         INSULIN     Gastrointestinal Disease Brother         CHOLITISI POSSIBLE CHRONES     Obesity Brother      Colon Cancer No family hx of      Breast Cancer No family hx of          Current Outpatient Medications   Medication Sig Dispense Refill     acetaminophen (TYLENOL) 500 MG tablet Take 500 mg by mouth       atorvastatin (LIPITOR) 40 MG tablet TAKE 1 TABLET(40 MG) BY MOUTH DAILY 90 tablet  3     Bacillus Coagulans-Inulin (PROBIOTIC) 1-250 BILLION-MG CAPS        carvedilol (COREG) 6.25 MG tablet Take 1 tablet (6.25 mg) by mouth 2 times daily (with meals) 180 tablet 3     cephALEXin (KEFLEX) 500 MG capsule TAKE 4 CAPSULES BY MOUTH ONE HOUR PRIOR TO DENTAL PROCEDURES 16 capsule 3     cetirizine (ZYRTEC) 10 MG tablet Take 10 mg by mouth       Cholecalciferol (VITAMIN D) 2000 UNITS tablet Take 1 tablet by mouth daily       clobetasol (TEMOVATE) 0.05 % external solution Apply topically 2 times daily as needed  5     Cranberry POWD 1 capsule       Cyanocobalamin (B-12) 1000 MCG TBCR Take 1,000 mcg by mouth daily 100 tablet 1     diazepam (VALIUM) 5 MG tablet Take 0.5 tablets (2.5 mg) by mouth nightly as needed (vertigo) 10 tablet 0     EPINEPHrine (EPIPEN 2-FRAN) 0.3 MG/0.3ML injection 2-pack Inject 0.3 mLs (0.3 mg) into the muscle once as needed for anaphylaxis 0.6 mL 11     erenumab-aooe (AIMOVIG) 70 MG/ML injection Inject 70 mg Subcutaneous every 28 days       estradiol (VAGIFEM) 10 MCG TABS vaginal tablet        gemfibrozil (LOPID) 600 MG tablet Take 0.5 tablets (300 mg) by mouth 2 times daily 90 tablet 3     hydrOXYzine (ATARAX) 25 MG tablet Take 1 tablet (25 mg) by mouth 3 times daily as needed for itching 120 tablet 1     losartan (COZAAR) 50 MG tablet Take 1 tablet (50 mg) by mouth At Bedtime 90 tablet 3     Magnesium Oxide 250 MG TABS Take 250 mg by mouth       mometasone (ELOCON) 0.1 % external cream Apply topically daily as needed       Multiple Vitamins-Minerals (EYE VITAMINS & MINERALS PO)        ondansetron (ZOFRAN) 4 MG tablet Take 1 tablet (4 mg) by mouth every 8 hours as needed for nausea 90 tablet 3     SUMAtriptan (IMITREX) 20 MG/ACT nasal spray Spray 1 spray in nostril as needed for migraine May repeat in 2 hours. Max 2 sprays/24 hours. 12 each 11     tiZANidine (ZANAFLEX) 2 MG tablet TAKE 1 TABLET(2 MG) BY MOUTH THREE TIMES DAILY AS NEEDED FOR MUSCLE SPASMS Strength: 2 mg 90 tablet 3      topiramate (TOPAMAX) 100 MG tablet Take 1 tablet (100 mg) by mouth daily 90 tablet 3     torsemide (DEMADEX) 10 MG tablet Take 1 tablet (10 mg) by mouth daily 90 tablet 3     ZOLMitriptan (ZOMIG-ZMT) 5 MG ODT tab Take 5 mg by mouth       Allergies   Allergen Reactions     Bee Venom Anaphylaxis     Codeine Swelling     Body swelling and severe itching. Tolerates Morphine.      Fentanyl Anaphylaxis and Shortness Of Breath     Gabapentin Shortness Of Breath and Other (See Comments)     Chest heaviness with breathing       Hydromorphone Itching     Tolerates morphine       Midazolam Anaphylaxis, Itching and Shortness Of Breath     Tolerates Diazepam     Other Environmental Allergy Anaphylaxis     GENERAL ANETHESIA     Prilocaine Anaphylaxis     Anaphalactic shock     Propofol Anaphylaxis     Penicillins Itching and Rash     Tolerates Keflex,  Ancef  rash     Shingrix [Zoster Vac Recomb Adjuvanted] Headache, Itching and Rash     Sulfa Antibiotics Itching and Rash     Clindamycin      Diatrizoate Other (See Comments)     CKD 3     Hydrocodone Itching and Swelling     Lisinopril Cough     Lorazepam Itching     Methocarbamol Itching     Whole body after 1 pill      Morphine And Related Itching     Nsaids Other (See Comments)     Contraindicated with kidney hx (including Celebrex, per pt)     Other [Seasonal Allergies] Anaphylaxis     GENERAL ANETHESIA       Oxycodone      Vancomycin Other (See Comments)     Thrush, yeast infection, bladder infection       Erythromycin Rash     ointment  PN: ointment  ointment     Eucalyptus Oil Rash and Hives     hives     Nitrofuran Derivatives Rash     Nitrofurantoin Rash     Tramadol Rash       Review of Systems   HENT:  Positive for congestion.    Neurological:  Positive for headaches.     Persistent symptoms since COVID infection at the beginning of the month   Symptoms localized left > right maxillary sinus     OBJECTIVE:   /64 (BP Location: Left arm, Patient Position: Sitting,  "Cuff Size: Adult Large)   Pulse 95   Temp 97.7  F (36.5  C) (Oral)   Resp 20   Ht 1.626 m (5' 4\")   Wt 79.4 kg (175 lb)   LMP  (LMP Unknown)   SpO2 100%   Breastfeeding No   BMI 30.04 kg/m   Estimated body mass index is 30.04 kg/m  as calculated from the following:    Height as of this encounter: 1.626 m (5' 4\").    Weight as of this encounter: 79.4 kg (175 lb).  Physical Exam  GENERAL APPEARANCE: healthy, alert and no distress  EYES: Eyes grossly normal to inspection, PERRL and conjunctivae and sclerae normal  HENT: ear canals and TM's normal, nose and mouth without ulcers or lesions, oropharynx clear and oral mucous membranes moist; tender left maxillary sinus   NECK: no adenopathy, no asymmetry, masses, or scars and thyroid normal to palpation  RESP: lungs clear to auscultation - no rales, rhonchi or wheezes  CV: regular rate and rhythm, normal S1 S2, no S3 or S4, no murmur, click or rub, no peripheral edema and peripheral pulses strong  ABDOMEN: soft, nontender, no hepatosplenomegaly, no masses and bowel sounds normal  MS: no musculoskeletal defects are noted and gait is age appropriate without ataxia  SKIN: no suspicious lesions or rashes  NEURO: Normal strength and tone, sensory exam grossly normal, mentation intact and speech normal  PSYCH: mentation appears normal and affect normal/bright        ASSESSMENT / PLAN:       ICD-10-CM    1. Encounter for Medicare annual wellness exam  Z00.00       2. Stage 3b chronic kidney disease (H)  N18.32 Magnesium      3. Benign essential hypertension  I10       4. Anemia of chronic renal failure, stage 3 (moderate), unspecified whether stage 3a or 3b CKD (H)  N18.30 Albumin Random Urine Quantitative with Creat Ratio    D63.1       5. Hypertriglyceridemia  E78.1       6. Hyperlipidemia LDL goal <130  E78.5 Lipid panel reflex to direct LDL Non-fasting     Comprehensive metabolic panel     CBC with platelets      7. Intractable chronic migraine without aura and " "without status migrainosus  G43.719       8. ESTEFANIA (obstructive sleep apnea)- mild (AHI 12)  G47.33       9. Left maxillary sinusitis  J32.0 doxycycline monohydrate (ADOXA) 100 MG tablet      Recheck renal labs including mag level  Blood pressure OK   Recheck hemoglobin   Recheck lipids  Headaches  She may have sinusitis complicating her COVID infection; recommend trying over the counter Coricidin HBP as directed and saline rinses; if that does not help enough, she could take and complete course of doxycycline; side effects and risks discussed; return or call if that does  not help enough           COUNSELING:  Reviewed preventive health counseling, as reflected in patient instructions  Special attention given to:       Regular exercise       Healthy diet/nutrition       Immunizations  Recommended RSV shot and COVID shot 3 months after recover from recent infection            Consider lung cancer screening for ages 55-80 years (77 for Medicare) and 20 pack-year smoking history  ; never smoker        Colon cancer screening; repeat 2027       Mammogram:  Had this in July       BMI:   Estimated body mass index is 30.04 kg/m  as calculated from the following:    Height as of this encounter: 1.626 m (5' 4\").    Weight as of this encounter: 79.4 kg (175 lb).   Weight management plan: Discussed healthy diet and exercise guidelines      She reports that she has never smoked. She has never used smokeless tobacco.      Appropriate preventive services were discussed with this patient, including applicable screening as appropriate for cardiovascular disease, diabetes, osteopenia/osteoporosis, and glaucoma.  As appropriate for age/gender, discussed screening for colorectal cancer, prostate cancer, breast cancer, and cervical cancer. Checklist reviewing preventive services available has been given to the patient.    Reviewed patients plan of care and provided an AVS. The Basic Care Plan (routine screening as documented in Health " Maintenance) for Rosa meets the Care Plan requirement. This Care Plan has been established and reviewed with the Patient.          Josh Hollingsworth MD  [facility]    Identified Health Risks:

## 2023-10-10 ENCOUNTER — OFFICE VISIT (OUTPATIENT)
Dept: NEUROLOGY | Facility: CLINIC | Age: 73
End: 2023-10-10
Attending: PHYSICAL MEDICINE & REHABILITATION
Payer: COMMERCIAL

## 2023-10-10 VITALS
HEART RATE: 77 BPM | OXYGEN SATURATION: 99 % | HEIGHT: 64 IN | DIASTOLIC BLOOD PRESSURE: 69 MMHG | WEIGHT: 175 LBS | BODY MASS INDEX: 29.88 KG/M2 | SYSTOLIC BLOOD PRESSURE: 109 MMHG

## 2023-10-10 DIAGNOSIS — G43.E09 CHRONIC MIGRAINE WITH AURA WITHOUT STATUS MIGRAINOSUS, NOT INTRACTABLE: Primary | ICD-10-CM

## 2023-10-10 PROCEDURE — 99214 OFFICE O/P EST MOD 30 MIN: CPT

## 2023-10-10 RX ORDER — ZOLMITRIPTAN 5 MG/1
5 TABLET, FILM COATED ORAL
Qty: 18 TABLET | Refills: 11 | Status: SHIPPED | OUTPATIENT
Start: 2023-10-10 | End: 2023-10-13 | Stop reason: ALTCHOICE

## 2023-10-10 ASSESSMENT — HEADACHE IMPACT TEST (HIT 6)
HOW OFTEN HAVE YOU FELT TOO TIRED TO WORK BECAUSE OF YOUR HEADACHES: VERY OFTEN
WHEN YOU HAVE HEADACHES HOW OFTEN IS THE PAIN SEVERE: SOMETIMES
HOW OFTEN DO HEADACHES LIMIT YOUR DAILY ACTIVITIES: VERY OFTEN
WHEN YOU HAVE A HEADACHE HOW OFTEN DO YOU WISH YOU COULD LIE DOWN: VERY OFTEN
HIT6 TOTAL SCORE: 65
HOW OFTEN HAVE YOU FELT FED UP OR IRRITATED BECAUSE OF YOUR HEADACHES: VERY OFTEN
HOW OFTEN DID HEADACHS LIMIT CONCENTRATION ON WORK OR DAILY ACTIVITY: VERY OFTEN

## 2023-10-10 ASSESSMENT — PAIN SCALES - GENERAL: PAINLEVEL: EXTREME PAIN (8)

## 2023-10-10 NOTE — PATIENT INSTRUCTIONS
[employer]: Zolmitriptan ODT 9 tablets $24     Riboflavin (vitamin B2) up to 400 mg daily can be helpful for headaches     Send update via [employer] or call clinic in 1-2 months if you want to switch Aimovig to Botox, or try the higher Aimovig dose.
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
## 2023-10-13 ENCOUNTER — TELEPHONE (OUTPATIENT)
Dept: NEUROLOGY | Facility: CLINIC | Age: 73
End: 2023-10-13
Payer: MEDICARE

## 2023-10-13 DIAGNOSIS — G43.E09 CHRONIC MIGRAINE WITH AURA WITHOUT STATUS MIGRAINOSUS, NOT INTRACTABLE: ICD-10-CM

## 2023-10-13 NOTE — TELEPHONE ENCOUNTER
Prior Authorization Retail Medication Request    Medication/Dose: erenumab-aooe (AIMOVIG) 70 MG/ML injection  ICD code (if different than what is on RX):    Previously Tried and Failed:    Rationale:      Insurance Name:    Insurance ID:        Pharmacy Information (if different than what is on RX)  Name:    Phone:

## 2023-10-13 NOTE — TELEPHONE ENCOUNTER
Pending Prescriptions:                       Disp   Refills    erenumab-aooe (AIMOVIG) 70 MG/ML injection1 mL   11           Sig: Inject 1 mL (70 mg) Subcutaneous every 30 days       Last Appt: 10/10/2023  Next Appt: None

## 2023-10-17 NOTE — TELEPHONE ENCOUNTER
Prior Authorization Not Needed per Insurance          Medication: erenumab-aooe (AIMOVIG) 70 MG/ML injection-PA NOT NEEDED   Insurance Company: [employer] - Phone [phone] Fax [phone]  Expected CoPay:      Pharmacy Filling the Rx: [id number] [place] [facility] [address] AT [facility] [address] [facility] [facility] [address]  Pharmacy Notified:  Yes  Patient Notified:  No      Called pharmacy and pharmacy stated that PA is Not Needed at This Time and medication is covered. Pharmacy stated that they have a paid claim on medication on 10/5/2023 through patients insurance and patient has picked up medication. Insurance also stated that PA is Not Needed and medication is covered.

## 2023-10-18 ENCOUNTER — THERAPY VISIT (OUTPATIENT)
Dept: PHYSICAL THERAPY | Facility: CLINIC | Age: 73
End: 2023-10-18
Payer: COMMERCIAL

## 2023-10-18 DIAGNOSIS — F07.81 POST CONCUSSION SYNDROME: Primary | ICD-10-CM

## 2023-10-18 DIAGNOSIS — R42 DIZZINESS: ICD-10-CM

## 2023-10-18 DIAGNOSIS — N18.32 STAGE 3B CHRONIC KIDNEY DISEASE (H): Primary | ICD-10-CM

## 2023-10-18 DIAGNOSIS — T75.3XXS MOTION SICKNESS, SEQUELA: ICD-10-CM

## 2023-10-18 DIAGNOSIS — N18.30 ANEMIA OF CHRONIC RENAL FAILURE, STAGE 3 (MODERATE), UNSPECIFIED WHETHER STAGE 3A OR 3B CKD (H): ICD-10-CM

## 2023-10-18 DIAGNOSIS — D63.1 ANEMIA OF CHRONIC RENAL FAILURE, STAGE 3 (MODERATE), UNSPECIFIED WHETHER STAGE 3A OR 3B CKD (H): ICD-10-CM

## 2023-10-18 PROCEDURE — 97112 NEUROMUSCULAR REEDUCATION: CPT | Mod: GP | Performed by: PHYSICAL THERAPIST

## 2023-10-18 RX ORDER — DIPHENHYDRAMINE HYDROCHLORIDE 50 MG/ML
50 INJECTION INTRAMUSCULAR; INTRAVENOUS
Status: CANCELLED
Start: 2023-10-18

## 2023-10-18 RX ORDER — MEPERIDINE HYDROCHLORIDE 25 MG/ML
25 INJECTION INTRAMUSCULAR; INTRAVENOUS; SUBCUTANEOUS EVERY 30 MIN PRN
Status: CANCELLED | OUTPATIENT
Start: 2023-10-18

## 2023-10-18 RX ORDER — ALBUTEROL SULFATE 0.83 MG/ML
2.5 SOLUTION RESPIRATORY (INHALATION)
Status: CANCELLED | OUTPATIENT
Start: 2023-10-18

## 2023-10-18 RX ORDER — ALBUTEROL SULFATE 90 UG/1
1-2 AEROSOL, METERED RESPIRATORY (INHALATION)
Status: CANCELLED
Start: 2023-10-18

## 2023-10-18 RX ORDER — EPINEPHRINE 1 MG/ML
0.3 INJECTION, SOLUTION, CONCENTRATE INTRAVENOUS EVERY 5 MIN PRN
Status: CANCELLED | OUTPATIENT
Start: 2023-10-18

## 2023-10-18 NOTE — PROGRESS NOTES
10/18/23 0500   Appointment Info   Signing clinician's name / credentials Jones Stahl, PT, DPT, NCS   Visits Used 14   Medical Diagnosis post concussion syndrome   PT Tx Diagnosis Post concussion syndrome   Progress Note/Certification   Start of Care Date 12/09/22   Onset of illness/injury or Date of Surgery 11/22/22   Therapy Frequency 1x/wk every 3 weeks   Predicted Duration up to 90 days   Certification date from 10/17/23   Certification date to 12/17/23   Progress Note Completed Date 10/18/23   PT Goal 1   Goal Identifier CSA   Goal Description Client will report improved overall concussion symptoms scoring an 6 or les on the CSA   Goal Progress PROGRESSING: Pt's CSA score has fluctuated from 28 to 49 then dropped 26 to14 to 9 and now increased to 20, which is most likely due to recent COVID illness   Target Date 12/17/23   PT Goal 2   Goal Identifier DVA   Goal Description Client will demonstrate improved gaze stabilization wit head turns and movement scoring within 3 lines from baseline at the 2 Hz speed and no reports of dizziness.   Goal Progress PROGRESSING: Pt has reduced line difference during the DVA from 6 to 3.  She is tolerating longer car rides up to 3-4 hrs and busy environments for londer durations without significantly provoking her symptoms. This has allowed her do participate in more social and community activities.   Target Date 12/17/23   PT Goal 3   Goal Identifier FGA   Goal Description Client will demonstrate improved standing balance scoring a 24/30 or better on the FGA   Goal Progress PROGRESSING: Pt improved score from 18/30 to 22/30. She is on the borderline for fall risk. .  She has demonstrated improved balance with head turns and stepping over obstacles as well as tandem walking. She continues to be cautious and slow during backwards walking, eyes closed and overall gait speed due to ongoing motion sensitivity.   Target Date 12/17/23   PT Goal 4   Goal Identifier CROM   Goal  Description Client will report decreased pain and improved ROM to at least 55 degrees in cervical rotation in both directions.   Goal Progress MET: baseline: L 39 degrees and R 52 degrees and improved to 51 degrees on L and 54 degrees on R.  Current AROM; L: 60 degree  R: 65 degrees.   Target Date 10/16/23   Date Met 08/30/23   Subjective Report   Subjective Report Pt went on a long road trip in September. She tolerated about 3-4 hrs in the car and tolerated participating in a wedding rehearsal dinner.  She was unable to tolerate curvy roads for more than 2 hr before symptoms were untolerated.  She ended up getting COVID during the trip and had to return home early.  She tolerated driving/riding 10 hrs with some symptoms of headaches and motion sensitivity. She is currently using a  sports relief band to assist with motion sensitivity and migraine relief.  She has not been able to trial during a long car drive.   Objective Measure 1   Objective Measure CSA   Details 20   Neuromuscular Re-education   Neuromuscular re-ed of mvmt, balance, coord, kinesthetic sense, posture, proprioception minutes ([id number]) 38   Neuro Re-ed 1 Balance and motion tolerance   Neuro Re-ed 1 - Details On airex foam: narrow MARIA ISABEL: eyes open x 30 seconds, eyes closed x 30 seconds, horizontal head turns x 10 reps, vertical head turns x 10 reps - mild dizziness reported with head turns L/R.  Staggered stance on foam: L/R and R/L with static hold x 30 seconds, alternating arm reach overhead x 10 reps, rocking x 10 reps - VC for abdominal and glute engagement to assist with balance.  Dynamic balance:  walking with speed changes, walking with head turns, walking with eyes closed x 2 bouts, backwards walking - slightly slower pace, walking with 180 degree turns. Randem turns and change in directions on command of therapist.  Side stepping then progressed into forward crossovers x 30' - pt required single HHA with forward crossovers   Skilled  Intervention In order to assist with balance symptoms for ease of return to community activities/participation   Patient Response/Progress Overall symptoms and movement tolerance improving. She demonstrated less path deviations with activities   Education   Learner/Method Patient   Education Comments HEP progression   Plan   Home program added backwards walking, tandem walking and tandem stance with head turns as well as chin tucks   Plan for next session continue balance challenges   Total Session Time   Timed Code Treatment Minutes 38   Total Treatment Time (sum of timed and untimed services) 38         [facility]                                                                                   OUTPATIENT PHYSICAL THERAPY    PLAN OF TREATMENT FOR OUTPATIENT REHABILITATION   Patient's Last Name, First Name, Rosa Plummer YOB: 1950   Provider's Name   [facility]   Medical Record No.  [id number]     Onset Date: 11/22/22  Start of Care Date: 12/09/22     Medical Diagnosis:  post concussion syndrome      PT Treatment Diagnosis:  Post concussion syndrome Plan of Treatment  Frequency/Duration: 1x/wk every 3 weeks/ up to 90 days    Certification date from 10/17/23 to 12/17/23         See note for plan of treatment details and functional goals     Jones Stahl, PT                         I CERTIFY THE NEED FOR THESE SERVICES FURNISHED UNDER        THIS PLAN OF TREATMENT AND WHILE UNDER MY CARE     (Physician attestation of this document indicates review and certification of the therapy plan).                Referring Provider:  Roseanne Marti/Aravind Coroan MD       Initial Assessment  See Epic Evaluation- Start of Care Date: 12/09/22            PLAN  Continue therapy per current plan of care.    Beginning/End Dates of Progress Note Reporting Period:  10/18/23 to 10/18/2023    Referring Provider:  Roseanne Marti/Aravind Corona MD

## 2023-10-24 ENCOUNTER — ONCOLOGY VISIT (OUTPATIENT)
Dept: ONCOLOGY | Facility: CLINIC | Age: 73
End: 2023-10-24
Attending: INTERNAL MEDICINE
Payer: MEDICARE

## 2023-10-24 ENCOUNTER — LAB (OUTPATIENT)
Dept: INFUSION THERAPY | Facility: CLINIC | Age: 73
End: 2023-10-24
Attending: INTERNAL MEDICINE
Payer: MEDICARE

## 2023-10-24 VITALS
HEART RATE: 65 BPM | DIASTOLIC BLOOD PRESSURE: 74 MMHG | BODY MASS INDEX: 29.9 KG/M2 | RESPIRATION RATE: 14 BRPM | SYSTOLIC BLOOD PRESSURE: 126 MMHG | WEIGHT: 174.2 LBS | TEMPERATURE: 98.2 F | OXYGEN SATURATION: 100 %

## 2023-10-24 DIAGNOSIS — D63.1 ANEMIA OF CHRONIC RENAL FAILURE, STAGE 3 (MODERATE), UNSPECIFIED WHETHER STAGE 3A OR 3B CKD (H): ICD-10-CM

## 2023-10-24 DIAGNOSIS — N18.32 STAGE 3B CHRONIC KIDNEY DISEASE (H): ICD-10-CM

## 2023-10-24 DIAGNOSIS — N18.30 ANEMIA OF CHRONIC RENAL FAILURE, STAGE 3 (MODERATE), UNSPECIFIED WHETHER STAGE 3A OR 3B CKD (H): ICD-10-CM

## 2023-10-24 DIAGNOSIS — E83.41 HYPERMAGNESEMIA: ICD-10-CM

## 2023-10-24 DIAGNOSIS — D64.9 NORMOCYTIC ANEMIA: ICD-10-CM

## 2023-10-24 DIAGNOSIS — D64.9 NORMOCYTIC ANEMIA: Primary | ICD-10-CM

## 2023-10-24 LAB
ERYTHROCYTE [DISTWIDTH] IN BLOOD BY AUTOMATED COUNT: 13.3 % (ref 10–15)
HCT VFR BLD AUTO: 32.2 % (ref 35–47)
HGB BLD-MCNC: 10.1 G/DL (ref 11.7–15.7)
MCH RBC QN AUTO: 31.2 PG (ref 26.5–33)
MCHC RBC AUTO-ENTMCNC: 31.4 G/DL (ref 31.5–36.5)
MCV RBC AUTO: 99 FL (ref 78–100)
PLATELET # BLD AUTO: 201 10E3/UL (ref 150–450)
RBC # BLD AUTO: 3.24 10E6/UL (ref 3.8–5.2)
WBC # BLD AUTO: 4.9 10E3/UL (ref 4–11)

## 2023-10-24 PROCEDURE — G0463 HOSPITAL OUTPT CLINIC VISIT: HCPCS | Performed by: INTERNAL MEDICINE

## 2023-10-24 PROCEDURE — 36415 COLL VENOUS BLD VENIPUNCTURE: CPT

## 2023-10-24 PROCEDURE — 85027 COMPLETE CBC AUTOMATED: CPT | Performed by: INTERNAL MEDICINE

## 2023-10-24 PROCEDURE — 99213 OFFICE O/P EST LOW 20 MIN: CPT | Performed by: INTERNAL MEDICINE

## 2023-10-24 ASSESSMENT — PAIN SCALES - GENERAL: PAINLEVEL: NO PAIN (0)

## 2023-10-24 NOTE — PROGRESS NOTES
HEMATOLOGY HISTORY: Ms. Sotomayor is a female with chronic anemia.  Her anemia is due to chronic renal disease and chronic disease.   1.  Multiple labs were done on 03/13/2019.   -WBC 3.5, hemoglobin 10.8 and platelets of 204.  MCV of 96.   -Creatinine of 1.26.   -Normal calcium.   -Normal LFT.   -Normal folate.   -Normal vitamin B12.   -Normal iron. Elevated ferritin.   -Erythropoietin mildly elevated at 38.   -Normal LDH.   -Soluable transferrin receptor is mildly elevated at 5.2.   -Normal haptoglobin.   2. On 11/29/2019, WBC of 4.3, hemoglobin 9.8 and platelets 230.  MCV of 103.   3. On 07/08/2019, CT scan did not reveal any splenomegaly or lymphadenopathy.   4. Bone marrow biopsy was done on 12/18/2019.  It reveals normal cellular bone marrow for age with 30% cellularity.  There is trilineage hematopoiesis.  No evidence of MDS.  Increased storage iron.  Cytogenetics is normal.  Flow cytometry is normal.  5. IV injectafer in 03/2020.  6. On 09/09/2021, hemoglobin of 9.5.  -On 10/04/2021, hemoglobin of 9.2.  7. Aranesp started on 10/15/2021. Stopped after 1 injection as hemoglobin remained above 10.     SUBJECTIVE:  Mrs. Sotomayor is a 72-year-old female with chronic anemia due to renal disease and anemia of chronic disease.       For anemia, she was started on Aranesp.  She has not required it for long time as hemoglobin has been above 10.     Patient has generalized weakness.  She gets tired easily.  That is her main complaint.  No headache.  No dizziness.  No chest pain.  No shortness of breath at rest.  No abdominal pain.  No nausea or vomiting.  Appetite is good.  No urinary or bowel complaints.  No bleeding.  No fever or night sweats.  All other review of systems is negative.     PHYSICAL EXAMINATION:    She is alert and oriented x 3.  Not in distress.   Rest of systems not examined.     LABORATORY: CBC reviewed.     ASSESSMENT:     1.  A 72-year-old female with chronic normocytic anemia secondary to renal disease  and anemia of chronic disease.  2.  Chronic fatigue.  3.  Chronic kidney disease.     PLAN:     1.  Patient's overall condition is stable.  She continues to have generalized weakness.  No worsening of her fatigue.  No other new complaints.  Her generalized weakness is multifactorial from anemia, multiple other medical problems and her age.    2.  Discussed regarding anemia.  Explained to her that hemoglobin is 10.1.  She cannot get Aranesp.    Patient's hemoglobin has been remaining above 10 for almost 2 years.  I told the patient that she can have her CBC checked every 2 to 3 months.  Aranesp will be given if hemoglobin is below 10.  She is agreeable with this plan.    3.  She had few questions which were all answered.  I will see her in 6 months.  Advised her to call us with any questions or concerns.     Total visit time of 20 minutes.  Time spent in today's visit, review of chart/investigations today and documentation today.

## 2023-10-24 NOTE — LETTER
"    10/24/2023         RE: Rosa Sotomayor  [address]  [place] [address]        Dear Colleague,    Thank you for referring your patient, Rosa Sotomayor, to the [facility]. Please see a copy of my visit note below.    Oncology Rooming Note    October 24, 2023 2:20 PM   Rosa Sotomayor is a 72 year old female who presents for:    Chief Complaint   Patient presents with     Oncology Clinic Visit     Initial Vitals: LMP  (LMP Unknown)  Estimated body mass index is 30.04 kg/m  as calculated from the following:    Height as of 10/10/23: 1.626 m (5' 4\").    Weight as of 10/10/23: 79.4 kg (175 lb). There is no height or weight on file to calculate BSA.  Data Unavailable Comment: Data Unavailable   No LMP recorded (lmp unknown). Patient is postmenopausal.  Allergies reviewed: Yes  Medications reviewed: Yes    Medications: Medication refills not needed today.  Pharmacy name entered into [facility]:    [id number] - [place], [place] - [address] AT [facility] [address] [place] [address] [facility] [address]  [id number] - [place], [place] - [address] AT [facility] [address]    Clinical concerns: no       Shari J. Schoenberger, [facility]              HEMATOLOGY HISTORY: Ms. Sotomayor is a female with chronic anemia.  Her anemia is due to chronic renal disease and chronic disease.   1.  Multiple labs were done on 03/13/2019.   -WBC 3.5, hemoglobin 10.8 and platelets of 204.  MCV of 96.   -Creatinine of 1.26.   -Normal calcium.   -Normal LFT.   -Normal folate.   -Normal vitamin B12.   -Normal iron. Elevated ferritin.   -Erythropoietin mildly elevated at 38.   -Normal LDH.   -Soluable transferrin receptor is mildly elevated at 5.2.   -Normal haptoglobin.   2. On 11/29/2019, WBC of 4.3, hemoglobin 9.8 and platelets 230.  MCV of 103.   3. On 07/08/2019, CT scan did not reveal any splenomegaly or lymphadenopathy.   4. Bone marrow biopsy was done on 12/18/2019.  It reveals normal " cellular bone marrow for age with 30% cellularity.  There is trilineage hematopoiesis.  No evidence of MDS.  Increased storage iron.  Cytogenetics is normal.  Flow cytometry is normal.  5. IV injectafer in 03/2020.  6. On 09/09/2021, hemoglobin of 9.5.  -On 10/04/2021, hemoglobin of 9.2.  7. Aranesp started on 10/15/2021. Stopped after 1 injection as hemoglobin remained above 10.     SUBJECTIVE:  Mrs. Sotomayor is a 72-year-old female with chronic anemia due to renal disease and anemia of chronic disease.       For anemia, she was started on Aranesp.  She has not required it for long time as hemoglobin has been above 10.     Patient has generalized weakness.  She gets tired easily.  That is her main complaint.  No headache.  No dizziness.  No chest pain.  No shortness of breath at rest.  No abdominal pain.  No nausea or vomiting.  Appetite is good.  No urinary or bowel complaints.  No bleeding.  No fever or night sweats.  All other review of systems is negative.     PHYSICAL EXAMINATION:    She is alert and oriented x 3.  Not in distress.   Rest of systems not examined.     LABORATORY: CBC reviewed.     ASSESSMENT:     1.  A 72-year-old female with chronic normocytic anemia secondary to renal disease and anemia of chronic disease.  2.  Chronic fatigue.  3.  Chronic kidney disease.     PLAN:     1.  Patient's overall condition is stable.  She continues to have generalized weakness.  No worsening of her fatigue.  No other new complaints.  Her generalized weakness is multifactorial from anemia, multiple other medical problems and her age.    2.  Discussed regarding anemia.  Explained to her that hemoglobin is 10.1.  She cannot get Aranesp.    Patient's hemoglobin has been remaining above 10 for almost 2 years.  I told the patient that she can have her CBC checked every 2 to 3 months.  Aranesp will be given if hemoglobin is below 10.  She is agreeable with this plan.    3.  She had few questions which were all answered.  I  will see her in 6 months.  Advised her to call us with any questions or concerns.     Total visit time of 20 minutes.  Time spent in today's visit, review of chart/investigations today and documentation today.      Again, thank you for allowing me to participate in the care of your patient.        Sincerely,        Nico Calderon MD

## 2023-10-24 NOTE — PROGRESS NOTES
"Oncology Rooming Note    October 24, 2023 2:20 PM   Rosa Sotomayor is a 72 year old female who presents for:    Chief Complaint   Patient presents with    Oncology Clinic Visit     Initial Vitals: LMP  (LMP Unknown)  Estimated body mass index is 30.04 kg/m  as calculated from the following:    Height as of 10/10/23: 1.626 m (5' 4\").    Weight as of 10/10/23: 79.4 kg (175 lb). There is no height or weight on file to calculate BSA.  Data Unavailable Comment: Data Unavailable   No LMP recorded (lmp unknown). Patient is postmenopausal.  Allergies reviewed: Yes  Medications reviewed: Yes    Medications: Medication refills not needed today.  Pharmacy name entered into [facility]:    [id number] - [place] - [address] AT [facility] [address] [facility] [address]  [facility] #[id number] - [place], [place] - [address] AT [facility] [address]    Clinical concerns: no       Shari J. Schoenberger, CMA            "

## 2023-10-24 NOTE — PROGRESS NOTES
Medical Assistant Note:  Rosa Sotomayor presents today for blood draw.    Patient seen by provider today: Yes: oleksandr.   present during visit today: Not Applicable.    Concerns: No Concerns.    Procedure:  Lab draw site: rac, Needle type: bf, Gauge: 23.    Post Assessment:  Labs drawn without difficulty: Yes.    Discharge Plan:  Departure Mode: Ambulatory.    Face to Face Time: 5 min.    Fany Mccarty, CMA

## 2023-11-01 ENCOUNTER — THERAPY VISIT (OUTPATIENT)
Dept: PHYSICAL THERAPY | Facility: CLINIC | Age: 73
End: 2023-11-01
Payer: COMMERCIAL

## 2023-11-01 DIAGNOSIS — T75.3XXS MOTION SICKNESS, SEQUELA: ICD-10-CM

## 2023-11-01 DIAGNOSIS — F07.81 POST CONCUSSION SYNDROME: Primary | ICD-10-CM

## 2023-11-01 DIAGNOSIS — R42 DIZZINESS: ICD-10-CM

## 2023-11-01 PROCEDURE — 97112 NEUROMUSCULAR REEDUCATION: CPT | Mod: GP | Performed by: PHYSICAL THERAPIST

## 2023-11-01 PROCEDURE — 97110 THERAPEUTIC EXERCISES: CPT | Mod: GP | Performed by: PHYSICAL THERAPIST

## 2023-11-15 ENCOUNTER — THERAPY VISIT (OUTPATIENT)
Dept: PHYSICAL THERAPY | Facility: CLINIC | Age: 73
End: 2023-11-15
Payer: COMMERCIAL

## 2023-11-15 DIAGNOSIS — R42 DIZZINESS: ICD-10-CM

## 2023-11-15 DIAGNOSIS — F07.81 POST CONCUSSION SYNDROME: Primary | ICD-10-CM

## 2023-11-15 DIAGNOSIS — T75.3XXS MOTION SICKNESS, SEQUELA: ICD-10-CM

## 2023-11-15 PROCEDURE — 97112 NEUROMUSCULAR REEDUCATION: CPT | Mod: GP | Performed by: PHYSICAL THERAPIST

## 2023-11-15 PROCEDURE — 97750 PHYSICAL PERFORMANCE TEST: CPT | Mod: GP | Performed by: PHYSICAL THERAPIST

## 2023-11-16 NOTE — PROGRESS NOTES
11/15/23 1300   Signing Clinician's Name / Credentials   Signing clinician's name / credentials Jones Stahl, PT, DPT, NCS   Functional Gait Assessment (ALIREZA Castle, WEI Shea, et al. (2004))   1. GAIT LEVEL SURFACE 3  (5.5)   2. CHANGE IN GAIT SPEED 3   3. GAIT WITH HORIZONTAL HEAD TURNS 3   4. GAIT WITH VERTICAL HEAD TURNS 3   5. GAIT AND PIVOT TURN 3   6. STEP OVER OBSTACLE 3   7. GAIT WITH NARROW BASE OF SUPPORT 3   8. GAIT WITH EYES CLOSED 2  (8.63 seconds)   9. AMBULATING BACKWARDS 3   10. STEPS 2   Total Functional Gait Assessment Score   TOTAL SCORE: (MAXIMUM SCORE 30) 28     Functional Gait Assessment (FGA): The FGA assesses postural stability during various walking tasks.   Gait assistive device used: None    Scores of <22 /30 have been correlated with predicting falls in community-dwelling older adults according to Tin & Kvng 2010.   Scores of <18 /30 have been correlated with increased risk for falls in patients with Parkinsons Disease according to Edson Mcginnis Zhou et al 2014.  Minimal Detectable Change for patients with acute/chronic stroke = 4.2 according to Costa & Gena 2009  Minimal Detectable Change for patients with vestibular disorder = 8 according to Tin & Calderon 2010    Assessment (rationale for performing, application to patient s function & care plan): Pt demonstrated a significant improvement in balance and balance confidence according to the FGA. Her score increased from 22/30 to 28/30.   She now score low fall risk and within age/gender norms.   (Minutes billed as physical performance test): 10

## 2023-11-20 ENCOUNTER — TELEPHONE (OUTPATIENT)
Dept: PHYSICAL MEDICINE AND REHAB | Facility: CLINIC | Age: 73
End: 2023-11-20
Payer: MEDICARE

## 2023-11-20 NOTE — TELEPHONE ENCOUNTER
Health Call Center    Phone Message    May a detailed message be left on voicemail: yes     Reason for Call: Porsche from [facility] [employer] is requesting a call back in regards to a narrative report that was faxed to Dr. Serrano.  She has not gotten a response yet.  Please call her back to let her know if we received it and what the status is.  Thanks.

## 2023-11-22 NOTE — TELEPHONE ENCOUNTER
Returned call from Porsche to discuss Narrative report. I reported to Porsche that Dr. Serrano was not managing pt's concussion.  I told Porsche that Roseanne Marti was managing her care, but left the organization prior to discharge and patient was scheduled with Dr. Serrano.    I stated that Dr. Serrano saw the patient one time where the patient was discharged from the concussion clinic.  Porsche stated that they did have the office notes from [facility] and that they did not need any further information from our clinic.    Gael Ardon LAT ATC on 11/22/2023 at 9:29 AM

## 2023-12-07 ENCOUNTER — TRANSFERRED RECORDS (OUTPATIENT)
Dept: HEALTH INFORMATION MANAGEMENT | Facility: CLINIC | Age: 73
End: 2023-12-07
Payer: MEDICARE

## 2023-12-27 ENCOUNTER — LAB (OUTPATIENT)
Dept: INFUSION THERAPY | Facility: CLINIC | Age: 73
End: 2023-12-27
Attending: INTERNAL MEDICINE
Payer: MEDICARE

## 2023-12-27 DIAGNOSIS — E83.41 HYPERMAGNESEMIA: ICD-10-CM

## 2023-12-27 DIAGNOSIS — D64.9 NORMOCYTIC ANEMIA: ICD-10-CM

## 2023-12-27 LAB
ERYTHROCYTE [DISTWIDTH] IN BLOOD BY AUTOMATED COUNT: 12.6 % (ref 10–15)
HCT VFR BLD AUTO: 31.4 % (ref 35–47)
HGB BLD-MCNC: 10 G/DL (ref 11.7–15.7)
MAGNESIUM SERPL-MCNC: 2.1 MG/DL (ref 1.7–2.3)
MCH RBC QN AUTO: 31.2 PG (ref 26.5–33)
MCHC RBC AUTO-ENTMCNC: 31.8 G/DL (ref 31.5–36.5)
MCV RBC AUTO: 98 FL (ref 78–100)
PLATELET # BLD AUTO: 359 10E3/UL (ref 150–450)
RBC # BLD AUTO: 3.21 10E6/UL (ref 3.8–5.2)
WBC # BLD AUTO: 5.5 10E3/UL (ref 4–11)

## 2023-12-27 PROCEDURE — 83735 ASSAY OF MAGNESIUM: CPT | Performed by: INTERNAL MEDICINE

## 2023-12-27 PROCEDURE — 36415 COLL VENOUS BLD VENIPUNCTURE: CPT

## 2023-12-27 PROCEDURE — 85027 COMPLETE CBC AUTOMATED: CPT | Performed by: INTERNAL MEDICINE

## 2023-12-27 NOTE — PROGRESS NOTES
Medical Assistant Note:  Rosa Sotomayor presents today for blood draw.    Patient seen by provider today: No.   present during visit today: Not Applicable.    Concerns: No Concerns.    Procedure:  Lab draw site: rac, Needle type: bf, Gauge: 23.    Post Assessment:  Labs drawn without difficulty: Yes.    Discharge Plan:  Departure Mode: Ambulatory.    Face to Face Time: 5 min.    Fany Mccarty, CMA

## 2023-12-28 NOTE — RESULT ENCOUNTER NOTE
Dear Ms. Sotomayor,    Hemoglobin is stable at 10.0.    Please, call me with any questions.    Nico Calderon MD

## 2024-01-02 ENCOUNTER — PATIENT OUTREACH (OUTPATIENT)
Dept: ONCOLOGY | Facility: CLINIC | Age: 74
End: 2024-01-02
Payer: MEDICARE

## 2024-01-02 ENCOUNTER — INFUSION THERAPY VISIT (OUTPATIENT)
Dept: INFUSION THERAPY | Facility: CLINIC | Age: 74
End: 2024-01-02
Attending: INTERNAL MEDICINE
Payer: MEDICARE

## 2024-01-02 VITALS
RESPIRATION RATE: 16 BRPM | TEMPERATURE: 98.4 F | OXYGEN SATURATION: 99 % | SYSTOLIC BLOOD PRESSURE: 124 MMHG | DIASTOLIC BLOOD PRESSURE: 55 MMHG | HEART RATE: 83 BPM

## 2024-01-02 DIAGNOSIS — D63.1 ANEMIA OF CHRONIC RENAL FAILURE, STAGE 3 (MODERATE), UNSPECIFIED WHETHER STAGE 3A OR 3B CKD (H): ICD-10-CM

## 2024-01-02 DIAGNOSIS — D64.9 NORMOCYTIC ANEMIA: Primary | ICD-10-CM

## 2024-01-02 DIAGNOSIS — N18.30 ANEMIA OF CHRONIC RENAL FAILURE, STAGE 3 (MODERATE), UNSPECIFIED WHETHER STAGE 3A OR 3B CKD (H): ICD-10-CM

## 2024-01-02 DIAGNOSIS — D64.9 NORMOCYTIC ANEMIA: ICD-10-CM

## 2024-01-02 DIAGNOSIS — N18.32 STAGE 3B CHRONIC KIDNEY DISEASE (H): Primary | ICD-10-CM

## 2024-01-02 LAB
HCT VFR BLD AUTO: 30.3 % (ref 35–47)
HGB BLD-MCNC: 9.7 G/DL (ref 11.7–15.7)

## 2024-01-02 PROCEDURE — 36415 COLL VENOUS BLD VENIPUNCTURE: CPT

## 2024-01-02 PROCEDURE — 96372 THER/PROPH/DIAG INJ SC/IM: CPT | Performed by: INTERNAL MEDICINE

## 2024-01-02 PROCEDURE — 250N000011 HC RX IP 250 OP 636: Mod: JZ,EC | Performed by: INTERNAL MEDICINE

## 2024-01-02 PROCEDURE — 85014 HEMATOCRIT: CPT | Performed by: INTERNAL MEDICINE

## 2024-01-02 RX ORDER — EPINEPHRINE 1 MG/ML
0.3 INJECTION, SOLUTION INTRAMUSCULAR; SUBCUTANEOUS EVERY 5 MIN PRN
OUTPATIENT
Start: 2024-01-02

## 2024-01-02 RX ORDER — ALBUTEROL SULFATE 90 UG/1
1-2 AEROSOL, METERED RESPIRATORY (INHALATION)
Start: 2024-01-02

## 2024-01-02 RX ORDER — ALBUTEROL SULFATE 0.83 MG/ML
2.5 SOLUTION RESPIRATORY (INHALATION)
OUTPATIENT
Start: 2024-01-02

## 2024-01-02 RX ORDER — MEPERIDINE HYDROCHLORIDE 25 MG/ML
25 INJECTION INTRAMUSCULAR; INTRAVENOUS; SUBCUTANEOUS EVERY 30 MIN PRN
OUTPATIENT
Start: 2024-01-02

## 2024-01-02 RX ORDER — METHYLPREDNISOLONE SODIUM SUCCINATE 125 MG/2ML
125 INJECTION, POWDER, LYOPHILIZED, FOR SOLUTION INTRAMUSCULAR; INTRAVENOUS
Start: 2024-01-02

## 2024-01-02 RX ORDER — DIPHENHYDRAMINE HYDROCHLORIDE 50 MG/ML
50 INJECTION INTRAMUSCULAR; INTRAVENOUS
Start: 2024-01-02

## 2024-01-02 RX ADMIN — DARBEPOETIN ALFA 40 MCG: 40 INJECTION, SOLUTION INTRAVENOUS; SUBCUTANEOUS at 15:11

## 2024-01-02 ASSESSMENT — PAIN SCALES - GENERAL: PAINLEVEL: NO PAIN (0)

## 2024-01-02 NOTE — PROGRESS NOTES
Infusion Nursing Note:  Rosa Sotomayor presents today for Aranesp injection.    Patient seen by provider today: No   present during visit today: Not Applicable.    Note: pt reports feeling very symptomatic and was glad hgb below 10 so she gets Aranesp before leaving for [place] for 2 months.       Intravenous Access:  No Intravenous access/labs at this visit.    Treatment Conditions:  Lab Results   Component Value Date    HGB 9.7 (L) 01/02/2024    WBC 5.5 12/27/2023    ANEU 2.4 07/01/2021    ANEUTAUTO 2.3 12/05/2022     12/27/2023        Results reviewed, labs MET treatment parameters, ok to proceed with treatment.  Blood pressure meets parameters.      Post Infusion Assessment:  Patient tolerated injection without incident.       Discharge Plan:   Discharge instructions reviewed with: Patient.  Patient and/or family verbalized understanding of discharge instructions and all questions answered.  Patient discharged in stable condition accompanied by: self.  Departure Mode: Ambulatory.      Norma Jennings RN

## 2024-01-02 NOTE — PROGRESS NOTES
Patient called this morning being very symptomatic of FIGUEROA and weakness and was sure her hemoglobin was low last week and she would receive her Aranesp on 12/27. Patient did not meet parameters for Aranesp.     Patient reports that she is going on vacation tomorrow and with how she is feeling she doesn't know how she will make it through the airport.     Writer has scheduled patient for today to have a hemoglobin and hemotrcrit drawn today .     Babita Jane RN

## 2024-01-02 NOTE — PROGRESS NOTES
Infusion Nursing Note:  Rosa Sotomayor presents today for peripheral lab draw.  Patient seen by provider today: No   present during visit today: Not Applicable.    Note: N/A.    Intravenous Access:  Lab draw site left AC, Needle type butterfly, Gauge 23.  Labs drawn without difficulty.    Treatment Conditions:  Not Applicable. Labs pending at time of discharge.    Post Infusion Assessment:  Site patent and intact, free from redness, edema or discomfort.  No evidence of extravasations.  Access discontinued per protocol.     Discharge Plan:   Patient discharged in stable condition accompanied by: self.  Departure Mode: Ambulatory.      Andressa Estarda RN

## 2024-01-04 ENCOUNTER — OFFICE VISIT (OUTPATIENT)
Dept: FAMILY MEDICINE | Facility: CLINIC | Age: 74
End: 2024-01-04
Payer: MEDICARE

## 2024-01-04 VITALS
SYSTOLIC BLOOD PRESSURE: 113 MMHG | DIASTOLIC BLOOD PRESSURE: 68 MMHG | WEIGHT: 168.8 LBS | BODY MASS INDEX: 28.82 KG/M2 | RESPIRATION RATE: 13 BRPM | OXYGEN SATURATION: 100 % | TEMPERATURE: 97.9 F | HEART RATE: 76 BPM | HEIGHT: 64 IN

## 2024-01-04 DIAGNOSIS — B37.31 YEAST INFECTION OF THE VAGINA: Primary | ICD-10-CM

## 2024-01-04 PROCEDURE — 99213 OFFICE O/P EST LOW 20 MIN: CPT | Performed by: NURSE PRACTITIONER

## 2024-01-04 RX ORDER — FLUCONAZOLE 100 MG/1
100 TABLET ORAL DAILY
Qty: 14 TABLET | Refills: 0 | Status: SHIPPED | OUTPATIENT
Start: 2024-01-04 | End: 2024-01-18

## 2024-01-04 ASSESSMENT — PAIN SCALES - GENERAL: PAINLEVEL: NO PAIN (0)

## 2024-01-04 NOTE — PROGRESS NOTES
Assessment & Plan     (B37.31) Yeast infection of the vagina  (primary encounter diagnosis)  Comment: recurrent yeast. Has been treated 3 times. Will do a 14 day course considering multiple treatment failures. Also recommended florastor to help as well. Also discussed diet changes that can support getting rid of yeast. Follow-up prn   Plan: fluconazole (DIFLUCAN) 100 MG tablet               Patient Instructions   Florastor:  Take 1 pill 3 times a day for the first week then switch to 2 times a day.     Continue your current probiotic     Avoid sugar right now until your symptoms improve.   Eat lots of veggies and protein     I prefer Magnesium Glycinate (Pure Encapsulation on Amazon) or Magnesium L-Threonate.   Mag citrate helps with bowels. The others help more with your leg cramps and migraines.       AVOID ANTIBIOTICS AT ALL COSTS. If you have cold symptoms that other people around you have then it is likely a virus and not bacteria. Wait it out as long as possible      Xlear nasal spray is great to start when you feel symptoms coming on. It is a natural antimicrobial and works great!    GREGG Reed CNP  [facility] EBER Lee is a 73 year old, presenting for the following health issues:  Yeast Infection      History of Present Illness       Reason for visit:  Yeast infection  Symptom onset:  3-4 weeks ago    She eats 0-1 servings of fruits and vegetables daily.She consumes 0 sweetened beverage(s) daily.She exercises with enough effort to increase her heart rate 10 to 19 minutes per day.  She exercises with enough effort to increase her heart rate 3 or less days per week.   She is taking medications regularly.       Had covid Labor day and saw PCP after. The covid settled into the sinuses and took doxy.   Ended up getting a vaginal yeast infection.   Then went to GYN and was dx with yeast infection and did monistat   Then got another virus and got doxy again.  "  Again had vaginal symptoms again and was given 1 fluconazole. Then got another one.   So overall has been treated for vaginal yeast 3 times and also antibiotics (doxy) twice in the last few months.    Gut is pretty good  Sleeps pretty well. Uses CPAP.   Has chronic migraines. Worse with weather changes. Helped with Amovig.     Is doing magnesium and B2 for leg cramps and migraines.       Review of Systems   Detailed as above         Objective    /68 (BP Location: Left arm, Patient Position: Sitting, Cuff Size: Adult Regular)   Pulse 76   Temp 97.9  F (36.6  C) (Tympanic)   Resp 13   Ht 1.626 m (5' 4\")   Wt 76.6 kg (168 lb 12.8 oz)   LMP  (LMP Unknown)   SpO2 100%   BMI 28.97 kg/m    There is no height or weight on file to calculate BMI.  Physical Exam  Constitutional:       Appearance: Normal appearance.   Pulmonary:      Effort: Pulmonary effort is normal.   Neurological:      Mental Status: She is alert.   Psychiatric:         Mood and Affect: Mood normal.                    "

## 2024-01-04 NOTE — PATIENT INSTRUCTIONS
Florastor:  Take 1 pill 3 times a day for the first week then switch to 2 times a day.     Continue your current probiotic     Avoid sugar right now until your symptoms improve.   Eat lots of veggies and protein     I prefer Magnesium Glycinate (Pure Encapsulation on [employer]) or Magnesium L-Threonate.   Mag citrate helps with bowels. The others help more with your leg cramps and migraines.       AVOID ANTIBIOTICS AT ALL COSTS. If you have cold symptoms that other people around you have then it is likely a virus and not bacteria. Wait it out as long as possible      Xlear nasal spray is great to start when you feel symptoms coming on. It is a natural antimicrobial and works great!

## 2024-01-10 DIAGNOSIS — G43.009 MIGRAINE WITHOUT AURA AND WITHOUT STATUS MIGRAINOSUS, NOT INTRACTABLE: ICD-10-CM

## 2024-01-11 RX ORDER — ONDANSETRON 4 MG/1
4 TABLET, FILM COATED ORAL EVERY 8 HOURS PRN
Qty: 90 TABLET | Refills: 1 | Status: SHIPPED | OUTPATIENT
Start: 2024-01-11

## 2024-01-11 NOTE — TELEPHONE ENCOUNTER
Prescription approved per [facility] Refill Protocol.  Susana Anderson, RN  [facility] Triage Nurse

## 2024-01-25 ENCOUNTER — TELEPHONE (OUTPATIENT)
Dept: FAMILY MEDICINE | Facility: CLINIC | Age: 74
End: 2024-01-25
Payer: MEDICARE

## 2024-01-25 DIAGNOSIS — B37.31 YEAST INFECTION OF THE VAGINA: Primary | ICD-10-CM

## 2024-01-25 RX ORDER — FLUCONAZOLE 100 MG/1
100 TABLET ORAL DAILY
Qty: 10 TABLET | Refills: 0 | Status: SHIPPED | OUTPATIENT
Start: 2024-01-25 | End: 2024-02-04

## 2024-01-25 NOTE — TELEPHONE ENCOUNTER
Pt is calling with recurrent vaginal yeast infection symptoms, including urgency and burning (pt states it's not UTI as she is very familiar with the feeling of UTI). Patient states she is still taking probiotic x2 per day and is not eating sugar.    Writer unable to triage as [place]. Patient requests another 10 day supply of fluconazole, as she states that the UC in [place] will not understanding the situation and only prescribe a two-day course.     Patient will not be back in [place] for another month.    Please advise if another 10 day course of fluconazole can be provided, preferred pharmacy: [facility] [address] [place] [address], [place], [place], [phone], or if patient should be advised to seek medical care at location.    Callback: [phone] ok to leave a detailed vm    Torrie Sanchez RN  -[facility]

## 2024-01-25 NOTE — TELEPHONE ENCOUNTER
Completed 14 days. Took 6 days until she saw improvement. She did get completely better. Now symptoms are back.    Taking florastor.   Sent 10 days of fluconazole. She needs to schedule follow-up with OB.  GREGG Chi CNP

## 2024-02-13 ENCOUNTER — LAB REQUISITION (OUTPATIENT)
Dept: LAB | Facility: CLINIC | Age: 74
End: 2024-02-13
Payer: MEDICARE

## 2024-02-13 DIAGNOSIS — B37.32 CHRONIC CANDIDIASIS OF VULVA AND VAGINA: ICD-10-CM

## 2024-02-13 LAB
BACTERIAL VAGINOSIS VAG-IMP: NEGATIVE
CANDIDA DNA VAG QL NAA+PROBE: DETECTED
CANDIDA GLABRATA / CANDIDA KRUSEI DNA: NOT DETECTED
T VAGINALIS DNA VAG QL NAA+PROBE: NOT DETECTED

## 2024-02-13 PROCEDURE — 0352U MULTIPLEX VAGINAL PANEL BY PCR: CPT | Mod: ORL | Performed by: OBSTETRICS & GYNECOLOGY

## 2024-04-24 ENCOUNTER — LAB (OUTPATIENT)
Dept: INFUSION THERAPY | Facility: CLINIC | Age: 74
End: 2024-04-24
Attending: INTERNAL MEDICINE
Payer: MEDICARE

## 2024-04-24 ENCOUNTER — ONCOLOGY VISIT (OUTPATIENT)
Dept: ONCOLOGY | Facility: CLINIC | Age: 74
End: 2024-04-24
Attending: INTERNAL MEDICINE
Payer: MEDICARE

## 2024-04-24 VITALS
SYSTOLIC BLOOD PRESSURE: 110 MMHG | DIASTOLIC BLOOD PRESSURE: 69 MMHG | BODY MASS INDEX: 28.56 KG/M2 | RESPIRATION RATE: 18 BRPM | WEIGHT: 166.4 LBS | HEART RATE: 75 BPM | TEMPERATURE: 98.2 F | OXYGEN SATURATION: 100 %

## 2024-04-24 DIAGNOSIS — D64.9 NORMOCYTIC ANEMIA: ICD-10-CM

## 2024-04-24 DIAGNOSIS — D64.9 NORMOCYTIC ANEMIA: Primary | ICD-10-CM

## 2024-04-24 LAB
ERYTHROCYTE [DISTWIDTH] IN BLOOD BY AUTOMATED COUNT: 12.7 % (ref 10–15)
HCT VFR BLD AUTO: 36.1 % (ref 35–47)
HGB BLD-MCNC: 11.6 G/DL (ref 11.7–15.7)
MCH RBC QN AUTO: 31.7 PG (ref 26.5–33)
MCHC RBC AUTO-ENTMCNC: 32.1 G/DL (ref 31.5–36.5)
MCV RBC AUTO: 99 FL (ref 78–100)
PLATELET # BLD AUTO: 263 10E3/UL (ref 150–450)
RBC # BLD AUTO: 3.66 10E6/UL (ref 3.8–5.2)
WBC # BLD AUTO: 5.9 10E3/UL (ref 4–11)

## 2024-04-24 PROCEDURE — 99214 OFFICE O/P EST MOD 30 MIN: CPT | Performed by: INTERNAL MEDICINE

## 2024-04-24 PROCEDURE — 36415 COLL VENOUS BLD VENIPUNCTURE: CPT

## 2024-04-24 PROCEDURE — G0463 HOSPITAL OUTPT CLINIC VISIT: HCPCS | Performed by: INTERNAL MEDICINE

## 2024-04-24 PROCEDURE — 85027 COMPLETE CBC AUTOMATED: CPT | Performed by: INTERNAL MEDICINE

## 2024-04-24 ASSESSMENT — PAIN SCALES - GENERAL: PAINLEVEL: NO PAIN (0)

## 2024-04-24 NOTE — PROGRESS NOTES
"Oncology Rooming Note    April 24, 2024 11:17 AM   Rosa Sotomayor is a 73 year old female who presents for:    Chief Complaint   Patient presents with    Oncology Clinic Visit     Initial Vitals: LMP  (LMP Unknown)  Estimated body mass index is 28.97 kg/m  as calculated from the following:    Height as of 1/4/24: 1.626 m (5' 4\").    Weight as of 1/4/24: 76.6 kg (168 lb 12.8 oz). There is no height or weight on file to calculate BSA.  Data Unavailable Comment: Data Unavailable   No LMP recorded (lmp unknown). Patient is postmenopausal.  Allergies reviewed: Yes  Medications reviewed: Yes    Medications: Medication refills not needed today.  Pharmacy name entered into [facility]:    [facility][id number] - [facility] [place] - [address] AT [facility] [address] [facility]  [facility] #[id number] - [place], [place] - [address] AT [facility] [address]  [facility] #[id number] - [place], [place] - [address] AT [facility] & [facility]    Frailty Screening:   Is the patient here for a new oncology consult visit in cancer care? 2. No      Clinical concerns: no       Shari J. Schoenberger, CMA            "

## 2024-04-24 NOTE — PROGRESS NOTES
Medical Assistant Note:  Rosa Sotomayor presents today for blood draw.    Patient seen by provider today: Yes: oleksandr.   present during visit today: Not Applicable.    Concerns: No Concerns.    Procedure:  Lab draw site: lac, Needle type: bf, Gauge: 23.    Post Assessment:  Labs drawn without difficulty: Yes.    Discharge Plan:  Departure Mode: Ambulatory.    Face to Face Time: 5 min.    Fany Mccarty, CMA

## 2024-04-24 NOTE — LETTER
"    4/24/2024         RE: Rosa Sotomayor  [address] [facility] [address]  [place] [address]        Dear Colleague,    Thank you for referring your patient, Rosa Sotomayor, to the [facility]. Please see a copy of my visit note below.    Oncology Rooming Note    April 24, 2024 11:17 AM   Rosa Sotomayor is a 73 year old female who presents for:    Chief Complaint   Patient presents with     Oncology Clinic Visit     Initial Vitals: LMP  (LMP Unknown)  Estimated body mass index is 28.97 kg/m  as calculated from the following:    Height as of 1/4/24: 1.626 m (5' 4\").    Weight as of 1/4/24: 76.6 kg (168 lb 12.8 oz). There is no height or weight on file to calculate BSA.  Data Unavailable Comment: Data Unavailable   No LMP recorded (lmp unknown). Patient is postmenopausal.  Allergies reviewed: Yes  Medications reviewed: Yes    Medications: Medication refills not needed today.  Pharmacy name entered into [facility]:    [facility] #[id number] - [place] - [address] AT [facility] [address]  [id number] - [place], [place] - [address] [facility] [address]  [facility] #[id number] - [place], [place] - [address] AT [facility] & [facility]    Frailty Screening:   Is the patient here for a new oncology consult visit in cancer care? 2. No      Clinical concerns: no       Shari J. Schoenberger, [facility]              HEMATOLOGY HISTORY: Ms. Sotomayor is a female with chronic anemia.  Her anemia is due to chronic renal disease and chronic disease.   1.  Multiple labs were done on 03/13/2019.   -WBC 3.5, hemoglobin 10.8 and platelets of 204.  MCV of 96.   -Creatinine of 1.26.   -Normal calcium.   -Normal LFT.   -Normal folate.   -Normal vitamin B12.   -Normal iron. Elevated ferritin.   -Erythropoietin mildly elevated at 38.   -Normal LDH.   -Soluable transferrin receptor is mildly elevated at 5.2.   -Normal haptoglobin.   2. On 11/29/2019, WBC of " 4.3, hemoglobin 9.8 and platelets 230.  MCV of 103.   3. On 07/08/2019, CT scan did not reveal any splenomegaly or lymphadenopathy.   4. Bone marrow biopsy was done on 12/18/2019.  It reveals normal cellular bone marrow for age with 30% cellularity.  There is trilineage hematopoiesis.  No evidence of MDS.  Increased storage iron.  Cytogenetics is normal.  Flow cytometry is normal.  5. IV injectafer in 03/2020.  6. On 09/09/2021, hemoglobin of 9.5.  -On 10/04/2021, hemoglobin of 9.2.  7. Aranesp started on 10/15/2021.  She responds very well and requires it infrequently.       SUBJECTIVE:  Mrs. Sotomayor is a 73-year-old female with chronic anemia due to renal disease and anemia of chronic disease.       For anemia, she was started on Aranesp.  She requires it infrequently.  She received it in January 2024 for hemoglobin of 9.7.  She responded very well.  Now her hemoglobin is 11.6.      Patient has generalized weakness.  There has been some improvement in her fatigue. No headache.  No dizziness.  No chest pain.  No shortness of breath at rest.  No abdominal pain.  No nausea or vomiting.  Appetite is good.  No urinary or bowel complaints.  No bleeding.  No fever or night sweats.  All other review of systems is negative.     PHYSICAL EXAMINATION:    She is alert and oriented x 3.  Not in distress.  Vitals: Reviewed.  Rest of systems not examined.     LABORATORY: CBC reviewed.     ASSESSMENT:     1.  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  2.  Chronic fatigue.  3.  Chronic kidney disease.     PLAN:     CBC every 2-3 months.  Follow-up in 6 months.    DISCUSSION:  1.  Patient's overall condition is stable.  CBC done today reveals hemoglobin of 11.6.  Last Aranesp was in January 2024.  Explained to her that she responds very well to aranesp.    She had multiple labs done with nephrologist on 04/16/2024:  -Hemoglobin of 10.9.  -Iron of 90 and ferritin of  475.  -Creatinine of 1.63.    Discussed with her regarding anemia.  It is due to renal disease and anemia of chronic disease.  No iron deficiency.  Patient responds very well to Aranesp and she has been requiring it infrequently.  She does feel better when her hemoglobin is above 10.    2.  Patient will have CBC monitored every 2 to 3 months.  Aranesp will be given for hemoglobin below 10.0.    3.  I will see her in 6 months.  Advised her to call us with any questions or concerns.     Total visit time of 30 minutes.  Time spent in today's visit, review of chart/investigations today and documentation today.         Again, thank you for allowing me to participate in the care of your patient.        Sincerely,        Nico Calderon MD

## 2024-04-24 NOTE — PROGRESS NOTES
HEMATOLOGY HISTORY: Ms. Sotomayor is a female with chronic anemia.  Her anemia is due to chronic renal disease and chronic disease.   1.  Multiple labs were done on 03/13/2019.   -WBC 3.5, hemoglobin 10.8 and platelets of 204.  MCV of 96.   -Creatinine of 1.26.   -Normal calcium.   -Normal LFT.   -Normal folate.   -Normal vitamin B12.   -Normal iron. Elevated ferritin.   -Erythropoietin mildly elevated at 38.   -Normal LDH.   -Soluable transferrin receptor is mildly elevated at 5.2.   -Normal haptoglobin.   2. On 11/29/2019, WBC of 4.3, hemoglobin 9.8 and platelets 230.  MCV of 103.   3. On 07/08/2019, CT scan did not reveal any splenomegaly or lymphadenopathy.   4. Bone marrow biopsy was done on 12/18/2019.  It reveals normal cellular bone marrow for age with 30% cellularity.  There is trilineage hematopoiesis.  No evidence of MDS.  Increased storage iron.  Cytogenetics is normal.  Flow cytometry is normal.  5. IV injectafer in 03/2020.  6. On 09/09/2021, hemoglobin of 9.5.  -On 10/04/2021, hemoglobin of 9.2.  7. Aranesp started on 10/15/2021.  She responds very well and requires it infrequently.       SUBJECTIVE:  Mrs. Sotomayor is a 73-year-old female with chronic anemia due to renal disease and anemia of chronic disease.       For anemia, she was started on Aranesp.  She requires it infrequently.  She received it in January 2024 for hemoglobin of 9.7.  She responded very well.  Now her hemoglobin is 11.6.      Patient has generalized weakness.  There has been some improvement in her fatigue. No headache.  No dizziness.  No chest pain.  No shortness of breath at rest.  No abdominal pain.  No nausea or vomiting.  Appetite is good.  No urinary or bowel complaints.  No bleeding.  No fever or night sweats.  All other review of systems is negative.     PHYSICAL EXAMINATION:    She is alert and oriented x 3.  Not in distress.  Vitals: Reviewed.  Rest of systems not examined.     LABORATORY: CBC reviewed.     ASSESSMENT:     1.   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  2.  Chronic fatigue.  3.  Chronic kidney disease.     PLAN:     CBC every 2-3 months.  Follow-up in 6 months.    DISCUSSION:  1.  Patient's overall condition is stable.  CBC done today reveals hemoglobin of 11.6.  Last Aranesp was in January 2024.  Explained to her that she responds very well to aranesp.    She had multiple labs done with nephrologist on 04/16/2024:  -Hemoglobin of 10.9.  -Iron of 90 and ferritin of 475.  -Creatinine of 1.63.    Discussed with her regarding anemia.  It is due to renal disease and anemia of chronic disease.  No iron deficiency.  Patient responds very well to Aranesp and she has been requiring it infrequently.  She does feel better when her hemoglobin is above 10.    2.  Patient will have CBC monitored every 2 to 3 months.  Aranesp will be given for hemoglobin below 10.0.    3.  I will see her in 6 months.  Advised her to call us with any questions or concerns.     Total visit time of 30 minutes.  Time spent in today's visit, review of chart/investigations today and documentation today.

## 2024-04-25 ENCOUNTER — PATIENT OUTREACH (OUTPATIENT)
Dept: CARE COORDINATION | Facility: CLINIC | Age: 74
End: 2024-04-25
Payer: MEDICARE

## 2024-04-25 NOTE — PROGRESS NOTES
Social Work - Distress Screen Intervention  [facility]    Identified Concern and Score from Distress Screenin. How concerned are you about your ability to eat? (!) 10     2. How concerned are you about unintended weight loss or your current weight? (!) 10     3. How concerned are you about feeling depressed or very sad?  (!) 10     4. How concerned are you about feeling anxious or very scared?  (!) 10     5. Do you struggle with the loss of meaning and edgar in your life?  Not at all     6. How concerned are you about work and home life issues that may be affected by your cancer?  No data recorded   7. How concerned are you about knowing what resources are available to help you?  0     8. Do you currently have what you would describe as Mosque or spiritual struggles? Not at all     9. If you want to be contacted by one of our professionals, I can send a message to them right now.  No data recorded     Date of Distress Screen: 24  Data: At time of last visit, patient scored positive on distress screening.  outreached to patient today to follow up on elevated distress and introduce psychosocial services and support.  Intervention/Education provided:  contacted patient by phone to discuss distress screening results. Left voicemail message    Follow-up Required:  to remain available for support and await return call from patient    Annette Kathleen, MSW, LICSW, OSW-C  Clinical - Adult Oncology  She/Her/Hers  Phone: [phone]  [facility]: M, Thu  *every other Tue, 8am-4:30pm  [facility]: W, F, *every other Tue, 8am-4:30pm

## 2024-04-29 ENCOUNTER — LAB REQUISITION (OUTPATIENT)
Dept: LAB | Facility: CLINIC | Age: 74
End: 2024-04-29
Payer: MEDICARE

## 2024-04-29 DIAGNOSIS — R30.0 DYSURIA: ICD-10-CM

## 2024-04-29 DIAGNOSIS — B37.32 CHRONIC CANDIDIASIS OF VULVA AND VAGINA: ICD-10-CM

## 2024-04-29 PROCEDURE — 87186 SC STD MICRODIL/AGAR DIL: CPT | Mod: ORL,XU | Performed by: PHYSICIAN ASSISTANT

## 2024-04-29 PROCEDURE — 87086 URINE CULTURE/COLONY COUNT: CPT | Mod: ORL | Performed by: PHYSICIAN ASSISTANT

## 2024-04-30 ENCOUNTER — TELEPHONE (OUTPATIENT)
Dept: ONCOLOGY | Facility: CLINIC | Age: 74
End: 2024-04-30
Payer: MEDICARE

## 2024-04-30 DIAGNOSIS — R60.0 EDEMA LEG: ICD-10-CM

## 2024-04-30 RX ORDER — TORSEMIDE 10 MG/1
10 TABLET ORAL DAILY
Qty: 90 TABLET | Refills: 3 | Status: SHIPPED | OUTPATIENT
Start: 2024-04-30 | End: 2024-10-02

## 2024-04-30 NOTE — PROGRESS NOTES
Received positive distress screen regarding patient's concern for ability to eat and current weight.  Called and left RD contact information on patient's voicemail.  Await return call from patient.     Graham Vásquez, RD, LD  Clinical Dietitian  [facility]  [phone] (direct)

## 2024-05-01 LAB
BACTERIA UR CULT: ABNORMAL
BACTERIA UR CULT: ABNORMAL

## 2024-05-07 ENCOUNTER — LAB REQUISITION (OUTPATIENT)
Dept: LAB | Facility: CLINIC | Age: 74
End: 2024-05-07
Payer: MEDICARE

## 2024-05-07 DIAGNOSIS — N39.0 URINARY TRACT INFECTION, SITE NOT SPECIFIED: ICD-10-CM

## 2024-05-07 PROCEDURE — 87086 URINE CULTURE/COLONY COUNT: CPT | Mod: ORL | Performed by: PHYSICIAN ASSISTANT

## 2024-05-09 LAB — BACTERIA UR CULT: NORMAL

## 2024-05-09 ASSESSMENT — SLEEP AND FATIGUE QUESTIONNAIRES
HOW LIKELY ARE YOU TO NOD OFF OR FALL ASLEEP WHILE SITTING QUIETLY AFTER LUNCH WITHOUT ALCOHOL: WOULD NEVER DOZE
HOW LIKELY ARE YOU TO NOD OFF OR FALL ASLEEP WHEN YOU ARE A PASSENGER IN A CAR FOR AN HOUR WITHOUT A BREAK: SLIGHT CHANCE OF DOZING
HOW LIKELY ARE YOU TO NOD OFF OR FALL ASLEEP IN A CAR, WHILE STOPPED FOR A FEW MINUTES IN TRAFFIC: WOULD NEVER DOZE
HOW LIKELY ARE YOU TO NOD OFF OR FALL ASLEEP WHILE WATCHING TV: WOULD NEVER DOZE
HOW LIKELY ARE YOU TO NOD OFF OR FALL ASLEEP WHILE SITTING AND READING: WOULD NEVER DOZE
HOW LIKELY ARE YOU TO NOD OFF OR FALL ASLEEP WHILE SITTING INACTIVE IN A PUBLIC PLACE: WOULD NEVER DOZE
HOW LIKELY ARE YOU TO NOD OFF OR FALL ASLEEP WHILE LYING DOWN TO REST IN THE AFTERNOON WHEN CIRCUMSTANCES PERMIT: SLIGHT CHANCE OF DOZING
HOW LIKELY ARE YOU TO NOD OFF OR FALL ASLEEP WHILE SITTING AND TALKING TO SOMEONE: WOULD NEVER DOZE

## 2024-05-10 ENCOUNTER — VIRTUAL VISIT (OUTPATIENT)
Dept: SLEEP MEDICINE | Facility: CLINIC | Age: 74
End: 2024-05-10
Payer: MEDICARE

## 2024-05-10 VITALS — BODY MASS INDEX: 27.83 KG/M2 | WEIGHT: 163 LBS | HEIGHT: 64 IN

## 2024-05-10 DIAGNOSIS — G47.33 OSA (OBSTRUCTIVE SLEEP APNEA): Primary | Chronic | ICD-10-CM

## 2024-05-10 PROCEDURE — 99213 OFFICE O/P EST LOW 20 MIN: CPT | Mod: 95 | Performed by: NURSE PRACTITIONER

## 2024-05-10 ASSESSMENT — PAIN SCALES - GENERAL: PAINLEVEL: NO PAIN (0)

## 2024-05-10 NOTE — PROGRESS NOTES
Virtual Visit Details    Type of service:  Video Visit   Video Start Time: 11:24 AM  Video End Time:11:33 AM    Originating Location (pt. Location): Home    Distant Location (provider location):  Off-site  Platform used for Video Visit: [facility]      Sleep Apnea - Follow-up Visit:    Impression/Plan:  1. ESTEFANIA (obstructive sleep apnea)- mild (AHI 12)  - Comprehensive DME     Mild Sleep apnea. Tolerating PAP well. Daytime symptoms are improved.  She continues to use and benefit from PAP therapy.  Patient with occasional reports of waking with dry mouth possibly due to mouth opening with CPAP.  We discussed recommendations with regard to consideration for chinstrap, mouth date, or FFM.  Patient has declined use of FFM as it has found that uncomfortable in the past.  Her symptoms are only occasional and she has corrected this with having a little water at her night stand, if needed.    A review of her APAP download data over the last 30 days shows excellent use and compliance and mild ESTEFANIA that is well-controlled on current pressure settings.    Continue current plan of care.  A comprehensive DME order was placed for new nasal pillow mask and supplies sent to [facility] [employer], today.    Rosa Sotomayor will follow up with Bennett Goltz, PA-C, primary sleep provider, in about 1 year(s).     Twenty-five minutes spent with patient, all of which were spent face-to-face counseling, consulting, chart review/documentation, and coordinating plan of care on the date of the encounter.      GREGG Gamble CNP  Sleep Medicine      CC:  Josh Hollingsworth,         History of Present Illness:  Chief Complaint   Patient presents with    RECHECK    CPAP Follow Up     Annual       Rosa Sotomayor is a 73-year-old female with a PMH significant for Arnold-Chiari malformation (unknown grade, no surgery), chronic back pain, migraine, hyperlipidemia, aortic insufficiency, fibromuscular dysplasia, possible renovascular HTN, renal  artery stenosis, CKD st 3, chronic anemia, anxiety, macular degeneration, osteoarthritis who presents for follow-up of their mild sleep apnea, managed with CPAP.  She was last seen in an office visit on 5/16/2023 with Bennett Goltz, PA-C, in follow-up for ESTEFANIA on CPAP and RLS symptoms.    Previous Study Results:   12/13/2018 [place] [facility] Sleep Study (188.0 lbs) - AHI 12.4, RDI 31.7, Supine AHI 52.0, REM AHI 34.3, Low O2 75.1%, Time Spent ?88% 1.4 minutes / Time Spent ?89% 2.4 minutes.     DME: [facility]    Do you use a CPAP Machine at home: Yes  Overall, on a scale of 0-10 how would you rate your CPAP (0 poor, 10 great):      What type of mask do you use: Nasal Pillow  Is your mask comfortable: Yes  If not, why:      Is your mask leaking: No  If yes, where do you feel it:    How many night per week does the mask leak (0-7):      Do you notice snoring with mask on: No  Do you notice gasping arousals with mask on: No  Are you having significant oral or nasal dryness: Yes  Is the pressure setting comfortable: Yes  If not, why:      What is your typical bedtime: 12:00 pm  How long does it take you to go to sleep on PAP therapy: 10 min.  What time do you typically get out of bed for the day: 9:00 am  How many hours on average per night are you using PAP therapy: 8-9 hours  How many hours are you sleeping per night: 8-9 hours  Do you feel well rested in the morning: Yes      [employer]  Auto-PAP 5.0 - 15.0 cmH2O 30 day usage data:  4/10/24 - 5/9/24  86% of days with > 4 hours of use. 1/30 days with no use.   Average use 405 minutes per day.   Average leak 23.71 LPM.  Average % of night in large leak 0%.    CPAP 90% pressure 6.5cm.   AHI 1.2 events per hour.       EPWORTH SLEEPINESS SCALE         5/9/2024     1:24 PM    [place] Sleepiness Scale ( M.BHUMI Naranjo  [id number]<br>ESS - [place]/English - Final version - 21 Nov 07 - [employer].)   Sitting and reading Would never doze   Watching TV Would  never doze   Sitting, inactive in a public place (e.g. a theatre or a meeting) Would never doze   As a passenger in a car for an hour without a break Slight chance of dozing   Lying down to rest in the afternoon when circumstances permit Slight chance of dozing   Sitting and talking to someone Would never doze   Sitting quietly after a lunch without alcohol Would never doze   In a car, while stopped for a few minutes in traffic Would never doze   [place] Score (MC) 2   [place] Score (Sleep) 2       INSOMNIA SEVERITY INDEX (HEAVEN)          5/9/2024     1:18 PM   Insomnia Severity Index (HEAVEN)   Difficulty falling asleep 0   Difficulty staying asleep 0   Problems waking up too early 0   How SATISFIED/DISSATISFIED are you with your CURRENT sleep pattern? 1   How NOTICEABLE to others do you think your sleep problem is in terms of impairing the quality of your life? 0   How WORRIED/DISTRESSED are you about your current sleep problem? 0   To what extent do you consider your sleep problem to INTERFERE with your daily functioning (e.g. daytime fatigue, mood, ability to function at work/daily chores, concentration, memory, mood, etc.) CURRENTLY? 0   HEAVEN Total Score 1       Guidelines for Scoring/Interpretation:  Total score categories:  0-7 = No clinically significant insomnia   8-14 = Subthreshold insomnia   15-21 = Clinical insomnia (moderate severity)  22-28 = Clinical insomnia (severe)  Used via courtesy of www.[place].gov with permission from Osvaldo Navarro PhD., [facility]      Past medical/surgical history, family history, social history, medications and allergies were reviewed.      Problem List:  Patient Active Problem List    Diagnosis Date Noted    Anemia of chronic renal failure, stage 3 (moderate) (H) 10/06/2022     Priority: Medium    Multiple drug allergies 09/07/2021     Priority: Medium    Migraine headache 09/07/2021     Priority: Medium    Malabsorption of iron 03/04/2020     Priority: Medium     Macular degeneration 05/16/2019     Priority: Medium    Spinal stenosis of lumbar region with neurogenic claudication 05/13/2019     Priority: Medium    ESTEFANIA (obstructive sleep apnea)- mild (AHI 12) 12/17/2018     Priority: Medium     12/13/2018 [place] [facility] Sleep Study (188.0 lbs) - AHI 12.4, RDI 31.7, Supine AHI 52.0, REM AHI 34.3, Low O2 75.1%, Time Spent ?88% 1.4 minutes / Time Spent ?89% 2.4 minutes.      Lumbar disc herniation with radiculopathy 08/16/2018     Priority: Medium    Aortic valve insufficiency 04/19/2018     Priority: Medium    Migraine without aura and without status migrainosus, not intractable 11/20/2017     Priority: Medium    Anemia, iron deficiency 06/23/2017     Priority: Medium    Degenerative joint disease (DJD) of hip 05/23/2017     Priority: Medium    Macular degeneration, bilateral 01/05/2017     Priority: Medium    Benign essential hypertension 12/28/2016     Priority: Medium    Renovascular hypertension 12/21/2016     Priority: Medium    Chronic bilateral low back pain without sciatica 11/29/2016     Priority: Medium    Secondary renal hyperparathyroidism (H24) 07/31/2016     Priority: Medium    Constipation 07/30/2016     Priority: Medium    Environmental allergies 07/29/2016     Priority: Medium    S/P shoulder surgery 07/29/2016     Priority: Medium    Arnold-Chiari malformation (H) 07/23/2016     Priority: Medium    Hyperlipidemia LDL goal <130 07/23/2016     Priority: Medium    Anxiety 07/23/2016     Priority: Medium    Hypertriglyceridemia 07/22/2016     Priority: Medium    Stage 3 chronic kidney disease (H) 09/22/2015     Priority: Medium    Right knee DJD 10/16/2014     Priority: Medium    Intractable chronic migraine without aura 05/30/2012     Priority: Medium     Formatting of this note might be different from the original.  Chronic migraine  Formatting of this note might be different from the original.  Chronic migraine, followed by Dr. Pearl with [facility]  neurology.      Arthralgia of hip 08/17/2011     Priority: Medium    Trochanteric bursitis 07/11/2011     Priority: Medium    Menopausal symptom 04/13/2009     Priority: Medium     Overview:   on HRT  Formatting of this note might be different from the original.  on HRT  ; Menopause      Personal history of allergy to anesthetic agent 03/10/2007     Priority: Medium    Congenital anomaly of brain (H) 08/26/2005     Priority: Medium     Overview:   Formatting of this note might be different from the original.  Arnold Chiari Malformation      Asymptomatic varicose veins 08/27/2004     Priority: Medium     Overview:   with bilat. venous insufficiency  Formatting of this note might be different from the original.  with bilat. venous insufficiency  ; Varicose Veins  Formatting of this note might be different from the original.  Overview:   with bilat. venous insufficiency  ; Varicose Veins        Current Outpatient Medications   Medication Sig Dispense Refill    acetaminophen (TYLENOL) 500 MG tablet Take 500 mg by mouth      atorvastatin (LIPITOR) 40 MG tablet TAKE 1 TABLET(40 MG) BY MOUTH DAILY 90 tablet 3    Bacillus Coagulans-Inulin (PROBIOTIC) 1-250 BILLION-MG CAPS       carvedilol (COREG) 6.25 MG tablet Take 1 tablet (6.25 mg) by mouth 2 times daily (with meals) 180 tablet 3    cephALEXin (KEFLEX) 500 MG capsule TAKE 4 CAPSULES BY MOUTH ONE HOUR PRIOR TO DENTAL PROCEDURES 16 capsule 3    cetirizine (ZYRTEC) 10 MG tablet Take 10 mg by mouth      Cholecalciferol (VITAMIN D) 2000 UNITS tablet Take 1 tablet by mouth daily      clobetasol (TEMOVATE) 0.05 % external solution Apply topically 2 times daily as needed  5    Cranberry POWD 1 capsule      Cyanocobalamin (B-12) 1000 MCG TBCR Take 1,000 mcg by mouth daily 100 tablet 1    diazepam (VALIUM) 5 MG tablet Take 0.5 tablets (2.5 mg) by mouth nightly as needed (vertigo) 10 tablet 0    doxycycline monohydrate (ADOXA) 100 MG tablet Take 1 tablet (100 mg) by mouth 2 times  "daily 14 tablet 0    EPINEPHrine (EPIPEN 2-FRAN) 0.3 MG/0.3ML injection 2-pack Inject 0.3 mLs (0.3 mg) into the muscle once as needed for anaphylaxis 0.6 mL 11    erenumab-aooe (AIMOVIG) 70 MG/ML injection Inject 1 mL (70 mg) Subcutaneous every 30 days 1 mL 11    estradiol (VAGIFEM) 10 MCG TABS vaginal tablet       gemfibrozil (LOPID) 600 MG tablet Take 0.5 tablets (300 mg) by mouth 2 times daily 90 tablet 3    hydrOXYzine (ATARAX) 25 MG tablet Take 1 tablet (25 mg) by mouth 3 times daily as needed for itching 120 tablet 1    losartan (COZAAR) 50 MG tablet Take 1 tablet (50 mg) by mouth At Bedtime 90 tablet 3    Magnesium Oxide 250 MG TABS Take 250 mg by mouth      mometasone (ELOCON) 0.1 % external cream Apply topically daily as needed      Multiple Vitamins-Minerals (EYE VITAMINS & MINERALS PO)       ondansetron (ZOFRAN) 4 MG tablet TAKE 1 TABLET(4 MG) BY MOUTH EVERY 8 HOURS AS NEEDED FOR NAUSEA 90 tablet 1    SUMAtriptan (IMITREX) 20 MG/ACT nasal spray Spray 1 spray in nostril as needed for migraine May repeat in 2 hours. Max 2 sprays/24 hours. 12 each 11    tiZANidine (ZANAFLEX) 2 MG tablet TAKE 1 TABLET(2 MG) BY MOUTH THREE TIMES DAILY AS NEEDED FOR MUSCLE SPASMS Strength: 2 mg 90 tablet 3    topiramate (TOPAMAX) 100 MG tablet Take 1 tablet (100 mg) by mouth daily 90 tablet 3    torsemide (DEMADEX) 10 MG tablet TAKE 1 TABLET(10 MG) BY MOUTH DAILY 90 tablet 3    ZOLMitriptan (ZOMIG-ZMT) 5 MG ODT Take 1 tablet (5 mg) by mouth at onset of headache for migraine May repeat in 2 hours. Max 2 tablets/24 hours. 18 tablet 11     No current facility-administered medications for this visit.     Ht 1.626 m (5' 4\")   Wt 73.9 kg (163 lb)   LMP  (LMP Unknown)   BMI 27.98 kg/m        This note was written with the assistance of the Dragon voice-dictation technology software. The final document, although reviewed, may contain errors. For corrections, please contact the office.    "

## 2024-05-10 NOTE — PATIENT INSTRUCTIONS
MY INFORMATION ON SLEEP APNEA-  Rosa Sotomayor    DOCTOR : GREGG Gamble CNP  SLEEP CENTER :      MY CONTACT NUMBER:   [facility]  [phone]  [facility]   [phone]  [facility]   [phone]      [facility]  [phone]  [facility]   [phone]    DME:  [facility] [place] [address], [address]  [place] [address]  Phone: [phone]    Hours:  Mon - Fri: 8:00 a.m. - 4:30 p.m.  Sat: Closed  Sun: Closed      Weight Loss:    Weight loss decreases severity of sleep apnea in most people with obesity. For those with mild obesity who have developed snoring with weight gain, even 15-30 pound weight loss can improve and occasionally eliminate sleep apnea.  Structured and life-long dietary and health habits are necessary to lose weight and keep healthier weight levels.     Though there are significant health benefits from weight loss, long-term weight loss is very difficult to achieve- studies show success with dietary management in less than 10% of people. In addition, substantial weight loss may require years of dietary control and may be difficult if patients have severe obesity. In these cases, surgical management may be considered.    If you are interested in methods for weight loss, you should review the options discussed at the National Institutes of Health patient information sites:     http://win.niddk.nih.gov/publications/index.htm  http:/www.health.nih.gov/topic/WeightLossDieting    Bariatric programs offer counseling in all methods of weight loss:    Http:/www.[facility].org/Specialties/WeightLossSurgeryandMedicalMgmt/htm    Your BMI is Body mass index is 27.98 kg/m .    Body mass index (BMI) is one way to tell whether you are at a healthy weight, overweight, or obese. It measures your weight in relation to your height.  A BMI of  18.5 to 24.9 is in the healthy range. A person with a BMI of 25 to 29.9 is considered overweight, and someone with a BMI of 30 or greater is considered obese.  Another way to find out if you are at risk for health problems caused by overweight and obesity is to measure your waist. If you are a woman and your waist is more than 35 inches, or if you are a man and your waist is more than 40 inches, your risk of disease may be higher.  More than two-thirds of American adults are considered overweight or obese. Being overweight or obese increases the risk for further weight gain.  Excess weight may lead to heart disease and diabetes. Creating and following plans for healthy eating and physical activity may help you improve your health.    Methods for maintaining or losing weight.    Weight control is part of healthy lifestyle and includes exercise, emotional health, and healthy eating habits.  Careful eating habits lifelong is the mainstay of weight control.  Though there are significant health benefits from weight loss, long-term weight loss with diet alone may be very difficult to achieve- studies show long-term success with dietary management in less than 10% of people. Attaining a healthy weight may be especially difficult to achieve in those with severe obesity. In some cases, medications, devices and surgical management might be considered.    What can you do?    If you are overweight or obese and are interested in methods for weight loss, you should discuss this with your provider. In addition, we recommend that you review healthy life styles and methods for weight loss available through the National Institutes of Health patient information sites:     http://win.niddk.nih.gov/publications/index.htm      Surgery:  There are a number of surgeries that have been attempted to treat apnea. In general, surgical options are usually reserved for cases in which there is a physical abnormality contributing to obstruction or  "other treatment options are ineffective or not tolerated. Most surgical options are either unreliable or quite invasive. One of the more common procedures is:  Uvulopalatopharyngoplasty: In this procedure, the uvula (the finger-like tissue that hangs in the back of the throat), part of the soft palate (the tissue that the uvula is attached to), and sometimes the tonsils or adenoids are removed. The efficacy of this surgery is around 30-50% .  After surgery, complications may include:  Sleepiness and sleep apnea related to post-surgery medication   Swelling, infection and bleeding   A sore throat and/or difficulty swallowing   Drainage of secretions into the nose and a nasal quality to the voice. English language speech does not seem to be affected by this surgery.   Narrowing of the airway in the nose and throat (hence constricting breathing) snoring and even iatrogenically caused sleep apnea. By cutting the tissues, excess scar tissue can \"tighten\" the airway and make it even smaller than it was before UPPP.  Patients who have had the uvula removed will become unable to correctly speak Hebrew or any other language that has a uvular 'r' phoneme.    Surgeries to help resolve nasal congestion may help reduce the severity of apnea slightly. Nasal congestion does not cause apnea on its own, so these surgeries are usually not performed just for ESTEFANIA.  They may be worth considering if the nasal congestion is significantly bothersome independent of apnea.       "

## 2024-05-10 NOTE — NURSING NOTE
Is the patient currently in the state of [place]? YES    Visit mode:VIDEO    If the visit is dropped, the patient can be reconnected by: VIDEO VISIT: Text to cell phone:   Telephone Information:   Mobile [phone]       Will anyone else be joining the visit? NO  (If patient encounters technical issues they should call [phone]    How would you like to obtain your AVS? MyChart    Are changes needed to the allergy or medication list? No    Are refills needed on medications prescribed by this physician? NO    Reason for visit: RECHECK    Bienvenido ALBERTS

## 2024-05-31 DIAGNOSIS — I10 BENIGN ESSENTIAL HYPERTENSION: ICD-10-CM

## 2024-05-31 RX ORDER — CARVEDILOL 6.25 MG/1
6.25 TABLET ORAL 2 TIMES DAILY WITH MEALS
Qty: 180 TABLET | Refills: 1 | Status: SHIPPED | OUTPATIENT
Start: 2024-05-31

## 2024-06-03 ENCOUNTER — TRANSFERRED RECORDS (OUTPATIENT)
Dept: HEALTH INFORMATION MANAGEMENT | Facility: CLINIC | Age: 74
End: 2024-06-03
Payer: MEDICARE

## 2024-06-06 ENCOUNTER — TRANSFERRED RECORDS (OUTPATIENT)
Dept: HEALTH INFORMATION MANAGEMENT | Facility: CLINIC | Age: 74
End: 2024-06-06
Payer: MEDICARE

## 2024-06-20 ENCOUNTER — PATIENT OUTREACH (OUTPATIENT)
Dept: CARE COORDINATION | Facility: CLINIC | Age: 74
End: 2024-06-20
Payer: MEDICARE

## 2024-06-28 ENCOUNTER — LAB REQUISITION (OUTPATIENT)
Dept: LAB | Facility: CLINIC | Age: 74
End: 2024-06-28
Payer: MEDICARE

## 2024-06-28 DIAGNOSIS — R39.9 UNSPECIFIED SYMPTOMS AND SIGNS INVOLVING THE GENITOURINARY SYSTEM: ICD-10-CM

## 2024-06-28 PROCEDURE — 87186 SC STD MICRODIL/AGAR DIL: CPT | Mod: ORL,59 | Performed by: PHYSICIAN ASSISTANT

## 2024-06-28 PROCEDURE — 87086 URINE CULTURE/COLONY COUNT: CPT | Mod: ORL | Performed by: PHYSICIAN ASSISTANT

## 2024-07-01 LAB
BACTERIA UR CULT: ABNORMAL
BACTERIA UR CULT: ABNORMAL

## 2024-07-03 ENCOUNTER — OFFICE VISIT (OUTPATIENT)
Dept: FAMILY MEDICINE | Facility: CLINIC | Age: 74
End: 2024-07-03
Payer: MEDICARE

## 2024-07-03 VITALS
WEIGHT: 164.2 LBS | RESPIRATION RATE: 16 BRPM | TEMPERATURE: 98.3 F | OXYGEN SATURATION: 99 % | HEIGHT: 63 IN | BODY MASS INDEX: 29.09 KG/M2 | HEART RATE: 77 BPM | SYSTOLIC BLOOD PRESSURE: 95 MMHG | DIASTOLIC BLOOD PRESSURE: 60 MMHG

## 2024-07-03 DIAGNOSIS — I15.0 RENOVASCULAR HYPERTENSION: ICD-10-CM

## 2024-07-03 DIAGNOSIS — G47.33 OSA (OBSTRUCTIVE SLEEP APNEA): Chronic | ICD-10-CM

## 2024-07-03 DIAGNOSIS — Z01.818 PRE-OP EXAM: Primary | ICD-10-CM

## 2024-07-03 DIAGNOSIS — H25.9 AGE-RELATED CATARACT OF BOTH EYES, UNSPECIFIED AGE-RELATED CATARACT TYPE: ICD-10-CM

## 2024-07-03 DIAGNOSIS — Q07.00 ARNOLD-CHIARI MALFORMATION (H): ICD-10-CM

## 2024-07-03 DIAGNOSIS — N39.0 RECURRENT UTI: ICD-10-CM

## 2024-07-03 DIAGNOSIS — N18.30 ANEMIA OF CHRONIC RENAL FAILURE, STAGE 3 (MODERATE), UNSPECIFIED WHETHER STAGE 3A OR 3B CKD (H): ICD-10-CM

## 2024-07-03 DIAGNOSIS — D63.1 ANEMIA OF CHRONIC RENAL FAILURE, STAGE 3 (MODERATE), UNSPECIFIED WHETHER STAGE 3A OR 3B CKD (H): ICD-10-CM

## 2024-07-03 DIAGNOSIS — N18.32 STAGE 3B CHRONIC KIDNEY DISEASE (H): ICD-10-CM

## 2024-07-03 LAB
ALBUMIN UR-MCNC: NEGATIVE MG/DL
APPEARANCE UR: CLEAR
BACTERIA #/AREA URNS HPF: ABNORMAL /HPF
BILIRUB UR QL STRIP: NEGATIVE
COLOR UR AUTO: YELLOW
GLUCOSE UR STRIP-MCNC: NEGATIVE MG/DL
HGB UR QL STRIP: NEGATIVE
KETONES UR STRIP-MCNC: NEGATIVE MG/DL
LEUKOCYTE ESTERASE UR QL STRIP: ABNORMAL
NITRATE UR QL: NEGATIVE
PH UR STRIP: 6 [PH] (ref 5–7)
RBC #/AREA URNS AUTO: ABNORMAL /HPF
SP GR UR STRIP: 1.02 (ref 1–1.03)
SQUAMOUS #/AREA URNS AUTO: ABNORMAL /LPF
UROBILINOGEN UR STRIP-ACNC: 0.2 E.U./DL
WBC #/AREA URNS AUTO: ABNORMAL /HPF

## 2024-07-03 PROCEDURE — 99214 OFFICE O/P EST MOD 30 MIN: CPT | Performed by: PHYSICIAN ASSISTANT

## 2024-07-03 PROCEDURE — 81001 URINALYSIS AUTO W/SCOPE: CPT | Performed by: PHYSICIAN ASSISTANT

## 2024-07-03 PROCEDURE — 87086 URINE CULTURE/COLONY COUNT: CPT | Performed by: PHYSICIAN ASSISTANT

## 2024-07-03 RX ORDER — CEPHALEXIN 500 MG/1
500 CAPSULE ORAL 2 TIMES DAILY
Qty: 6 CAPSULE | Refills: 0 | Status: SHIPPED | OUTPATIENT
Start: 2024-07-03 | End: 2024-07-06

## 2024-07-03 ASSESSMENT — PAIN SCALES - GENERAL: PAINLEVEL: NO PAIN (0)

## 2024-07-03 NOTE — PROGRESS NOTES
Preoperative Evaluation  [address] [address]  Phone: [phone]  Primary Provider: Josh Hollingsworth MD  Pre-op Performing Provider: Brii Parker PA-C  Jul 3, 2024             7/3/2024   Surgical Information   What procedure is being done? cataract surgery   Facility or Hospital where procedure/surgery will be performed: [facility]   Who is doing the procedure / surgery? Dr Yohana Liz   Date of surgery / procedure: week of July 8th   Time of surgery / procedure: unknown   Where do you plan to recover after surgery? at home with family        Fax number for surgical facility: [phone]    Assessment & Plan     The proposed surgical procedure is considered LOW risk.      ICD-10-CM    1. Pre-op exam  Z01.818       2. Age-related cataract of both eyes, unspecified age-related cataract type  H25.9       3. Anemia of chronic renal failure, stage 3 (moderate), unspecified whether stage 3a or 3b CKD (H)  N18.30     D63.1       4. Stage 3b chronic kidney disease (H)  N18.32       5. Arnold-Chiari malformation (H)  Q07.00       6. ESTEFANIA (obstructive sleep apnea)- mild (AHI 12)  G47.33       7. Renovascular hypertension  I15.0       8. Recurrent UTI  N39.0 UA Macroscopic with reflex to Microscopic and Culture - Lab Collect     UA Macroscopic with reflex to Microscopic and Culture - Lab Collect        - Will check U/A today to ensure antibiotic is working. Results will not affect ability to undergoing surgery.      - No identified additional risk factors other than previously addressed    Antiplatelet or Anticoagulation Medication Instructions   - Bleeding risk is low for this procedure (e.g. dental, skin, cataract).    Additional Medication Instructions  Take all scheduled medications on the day of surgery EXCEPT for modifications listed below:   - Beta Blockers: Continue taking on the day of  surgery.    Recommendation  Approval given to proceed with proposed procedure, without further diagnostic evaluation.    Minnie Lee is a 73 year old, presenting for the following:  Pre-Op Exam (Cataract surgery- Bilateral eyes )          7/3/2024     9:50 AM   Additional Questions   Roomed by Maciej CHANEY related to upcoming procedure: patient with bilat symptomatic cataract. Presents today for pre-op exam.        7/3/2024   Pre-Op Questionnaire   Have you ever had a heart attack or stroke? No   Have you ever had surgery on your heart or blood vessels, such as a stent placement, a coronary artery bypass, or surgery on an artery in your head, neck, heart, or legs? No   Do you have chest pain with activity? No   Do you have a history of heart failure? No   Do you currently have a cold, bronchitis or symptoms of other infection? No   Do you have a cough, shortness of breath, or wheezing? No   Do you or anyone in your family have previous history of blood clots? No   Do you or does anyone in your family have a serious bleeding problem such as prolonged bleeding following surgeries or cuts? (!) YES - history prolonged bleeding with cuts, etc but no dx bleeding disorder   Have you ever had problems with anemia or been told to take iron pills? (!) YES - chronic anemia 2/2 CKD. CBC done 6/4/2024 shows stable baseline hgb.   Have you had any abnormal blood loss such as black, tarry or bloody stools, or abnormal vaginal bleeding? No   Have you ever had a blood transfusion? No   Are you willing to have a blood transfusion if it is medically needed before, during, or after your surgery? Yes   Have you or any of your relatives ever had problems with anesthesia? (!) YES - hx anaphylactic shock, likely secondary to fentanyl   Do you have sleep apnea, excessive snoring or daytime drowsiness? (!) YES    Do you have a CPAP machine? Yes   Do you have any artifical heart valves or other implanted medical devices like a  pacemaker, defibrillator, or continuous glucose monitor? No   Do you have artificial joints? (!) YES -s/p bilat TKA and JABIER   Are you allergic to latex? No        Health Care Directive  Patient does not have a Health Care Directive or Living Will: Patient states has Advance Directive and will bring in a copy to clinic.    Preoperative Review of    reviewed - no concerns      Status of Chronic Conditions:  See problem list for active medical problems.  Problems all longstanding and stable, except as noted/documented.  See ROS for pertinent symptoms related to these conditions.    Patient Active Problem List    Diagnosis Date Noted    Anemia of chronic renal failure, stage 3 (moderate) (H) 10/06/2022     Priority: Medium    Multiple drug allergies 09/07/2021     Priority: Medium    Migraine headache 09/07/2021     Priority: Medium    Malabsorption of iron 03/04/2020     Priority: Medium    Macular degeneration 05/16/2019     Priority: Medium    Spinal stenosis of lumbar region with neurogenic claudication 05/13/2019     Priority: Medium    ESTEFANIA (obstructive sleep apnea)- mild (AHI 12) 12/17/2018     Priority: Medium     12/13/2018 [facility] (188.0 lbs) - AHI 12.4, RDI 31.7, Supine AHI 52.0, REM AHI 34.3, Low O2 75.1%, Time Spent ?88% 1.4 minutes / Time Spent ?89% 2.4 minutes.      Lumbar disc herniation with radiculopathy 08/16/2018     Priority: Medium    Aortic valve insufficiency 04/19/2018     Priority: Medium    Migraine without aura and without status migrainosus, not intractable 11/20/2017     Priority: Medium    Anemia, iron deficiency 06/23/2017     Priority: Medium    Degenerative joint disease (DJD) of hip 05/23/2017     Priority: Medium    Macular degeneration, bilateral 01/05/2017     Priority: Medium    Benign essential hypertension 12/28/2016     Priority: Medium    Renovascular hypertension 12/21/2016     Priority: Medium    Chronic bilateral low back pain without sciatica  11/29/2016     Priority: Medium    Secondary renal hyperparathyroidism (H24) 07/31/2016     Priority: Medium    Constipation 07/30/2016     Priority: Medium    Environmental allergies 07/29/2016     Priority: Medium    S/P shoulder surgery 07/29/2016     Priority: Medium    Arnold-Chiari malformation (H) 07/23/2016     Priority: Medium    Hyperlipidemia LDL goal <130 07/23/2016     Priority: Medium    Anxiety 07/23/2016     Priority: Medium    Hypertriglyceridemia 07/22/2016     Priority: Medium    Stage 3 chronic kidney disease (H) 09/22/2015     Priority: Medium    Right knee DJD 10/16/2014     Priority: Medium    Intractable chronic migraine without aura 05/30/2012     Priority: Medium     Formatting of this note might be different from the original.  Chronic migraine  Formatting of this note might be different from the original.  Chronic migraine, followed by Dr. Pearl with [facility] neurology.      Arthralgia of hip 08/17/2011     Priority: Medium    Trochanteric bursitis 07/11/2011     Priority: Medium    Menopausal symptom 04/13/2009     Priority: Medium     Overview:   on HRT  Formatting of this note might be different from the original.  on HRT  ; Menopause      Personal history of allergy to anesthetic agent 03/10/2007     Priority: Medium    Congenital anomaly of brain (H) 08/26/2005     Priority: Medium     Overview:   Formatting of this note might be different from the original.  Arnold Chiari Malformation      Asymptomatic varicose veins 08/27/2004     Priority: Medium     Overview:   with bilat. venous insufficiency  Formatting of this note might be different from the original.  with bilat. venous insufficiency  ; Varicose Veins  Formatting of this note might be different from the original.  Overview:   with bilat. venous insufficiency  ; Varicose Veins        Past Medical History:   Diagnosis Date    Arthritis     Complication of anesthesia     Hypertension     Postmenopausal bleeding  2/28/2007    Sleep apnea      Past Surgical History:   Procedure Laterality Date    ARTHROPLASTY HIP Left     ARTHROSCOPY KNEE      BACK SURGERY      discectomy L3-4, 2018    BONE MARROW BIOPSY, BONE SPECIMEN, NEEDLE/TROCAR N/A 12/18/2019    Procedure: BONE MARROW BIOPSY;  Surgeon: Eran Bowser MD;  Location:  GI    COLONOSCOPY N/A 8/25/2022    Procedure: COLONOSCOPY, WITH POLYPECTOMY AND BIOPSY;  Surgeon: Americo Beyer MD;  Location:  GI    JOINT REPLACEMENT Left      hip & knee    NE SHLDR ARTHROSCOP,SURG,W/ROTAT CUFF REPR Right 7/26/2016    Procedure: ARTHROSCOPIC ROTATOR CUFF REPAIR, SHOULDER Distal Clavicle Excision;  Surgeon: Nick Lawrence MD;  Location: [facility];  Service: Orthopedics    ROTATOR CUFF REPAIR RT/LT Right     2016    TUBAL LIGATION  1987    TUBAL LIGATION      VEIN LIGATION      [facility] NONSPECIFIC PROCEDURE      wisdom teeth    [facility] NONSPECIFIC PROCEDURE  2005    Varicose Veins    [facility] TOTAL HIP ARTHROPLASTY      left in 2012, right 2017    [facility] TOTAL HIP ARTHROPLASTY Right 5/23/2017    Procedure: RIGHT TOTAL HIP ARTHROPLASTY DIRECT ANTERIOR;  Surgeon: Herman Llanos MD;  Location: [facility] OR;  Service: Orthopedics    [facility] TOTAL KNEE ARTHROPLASTY      right 2014, left 2018    [facility] TOTAL KNEE ARTHROPLASTY Right 10/16/2014    Procedure: Right Total Knee Arthroplasty;  Surgeon: Herman Llanos MD;  Location: [facility] OR;  Service: Orthopedics    [facility] TOTAL KNEE ARTHROPLASTY Left 4/10/2018    Procedure: LEFT TOTAL KNEE ARTHROPLASTY;  Surgeon: Herman Llanos MD;  Location: [facility];  Service: Orthopedics     Current Outpatient Medications   Medication Sig Dispense Refill    acetaminophen (TYLENOL) 500 MG tablet Take 500 mg by mouth      atorvastatin (LIPITOR) 40 MG tablet TAKE 1 TABLET(40 MG) BY MOUTH DAILY 90 tablet 3    Bacillus Coagulans-Inulin (PROBIOTIC) 1-250 BILLION-MG CAPS       carvedilol (COREG) 6.25 MG tablet Take 1 tablet (6.25 mg) by  mouth 2 times daily (with meals) 180 tablet 1    cephALEXin (KEFLEX) 500 MG capsule TAKE 4 CAPSULES BY MOUTH ONE HOUR PRIOR TO DENTAL PROCEDURES 16 capsule 3    cetirizine (ZYRTEC) 10 MG tablet Take 10 mg by mouth      Cholecalciferol (VITAMIN D) 2000 UNITS tablet Take 1 tablet by mouth daily      Cranberry POWD 1 capsule      Cyanocobalamin (B-12) 1000 MCG TBCR Take 1,000 mcg by mouth daily 100 tablet 1    erenumab-aooe (AIMOVIG) 70 MG/ML injection Inject 1 mL (70 mg) Subcutaneous every 30 days 1 mL 11    estradiol (VAGIFEM) 10 MCG TABS vaginal tablet       gemfibrozil (LOPID) 600 MG tablet Take 0.5 tablets (300 mg) by mouth 2 times daily 90 tablet 3    hydrOXYzine (ATARAX) 25 MG tablet Take 1 tablet (25 mg) by mouth 3 times daily as needed for itching 120 tablet 1    losartan (COZAAR) 50 MG tablet Take 1 tablet (50 mg) by mouth At Bedtime 90 tablet 3    Multiple Vitamins-Minerals (EYE VITAMINS & MINERALS PO)       ondansetron (ZOFRAN) 4 MG tablet TAKE 1 TABLET(4 MG) BY MOUTH EVERY 8 HOURS AS NEEDED FOR NAUSEA 90 tablet 1    tiZANidine (ZANAFLEX) 2 MG tablet TAKE 1 TABLET(2 MG) BY MOUTH THREE TIMES DAILY AS NEEDED FOR MUSCLE SPASMS Strength: 2 mg 90 tablet 3    topiramate (TOPAMAX) 100 MG tablet Take 1 tablet (100 mg) by mouth daily 90 tablet 3    torsemide (DEMADEX) 10 MG tablet TAKE 1 TABLET(10 MG) BY MOUTH DAILY 90 tablet 3    ZOLMitriptan (ZOMIG-ZMT) 5 MG ODT Take 1 tablet (5 mg) by mouth at onset of headache for migraine May repeat in 2 hours. Max 2 tablets/24 hours. 18 tablet 11    clobetasol (TEMOVATE) 0.05 % external solution Apply topically 2 times daily as needed (Patient not taking: Reported on 7/3/2024)  5    diazepam (VALIUM) 5 MG tablet Take 0.5 tablets (2.5 mg) by mouth nightly as needed (vertigo) (Patient not taking: Reported on 7/3/2024) 10 tablet 0    doxycycline monohydrate (ADOXA) 100 MG tablet Take 1 tablet (100 mg) by mouth 2 times daily (Patient not taking: Reported on 7/3/2024) 14 tablet 0     EPINEPHrine (EPIPEN 2-FRAN) 0.3 MG/0.3ML injection 2-pack Inject 0.3 mLs (0.3 mg) into the muscle once as needed for anaphylaxis (Patient not taking: Reported on 7/3/2024) 0.6 mL 11    Magnesium Oxide 250 MG TABS Take 250 mg by mouth (Patient not taking: Reported on 7/3/2024)      mometasone (ELOCON) 0.1 % external cream Apply topically daily as needed (Patient not taking: Reported on 7/3/2024)      SUMAtriptan (IMITREX) 20 MG/ACT nasal spray Spray 1 spray in nostril as needed for migraine May repeat in 2 hours. Max 2 sprays/24 hours. (Patient not taking: Reported on 7/3/2024) 12 each 11       Allergies   Allergen Reactions    Bee Venom Anaphylaxis    Codeine Swelling     Body swelling and severe itching. Tolerates Morphine.     Fentanyl Anaphylaxis and Shortness Of Breath    Gabapentin Shortness Of Breath and Other (See Comments)     Chest heaviness with breathing      Hydromorphone Itching     Tolerates morphine      Midazolam Anaphylaxis, Itching and Shortness Of Breath     Tolerates Diazepam    Other Environmental Allergy Anaphylaxis     GENERAL ANETHESIA    Prilocaine Anaphylaxis     Anaphalactic shock    Propofol Anaphylaxis    Penicillins Itching and Rash     Tolerates Keflex,  Ancef  rash    Shingrix [Zoster Vac Recomb Adjuvanted] Headache, Itching and Rash    Sulfa Antibiotics Itching and Rash    Clindamycin     Diatrizoate Other (See Comments)     CKD 3    Hydrocodone Itching and Swelling    Lisinopril Cough    Lorazepam Itching    Methocarbamol Itching     Whole body after 1 pill     Morphine And Codeine Itching    Nsaids Other (See Comments)     Contraindicated with kidney hx (including Celebrex, per pt)    Other [Seasonal Allergies] Anaphylaxis     GENERAL ANETHESIA      Oxycodone     Vancomycin Other (See Comments)     Thrush, yeast infection, bladder infection      Erythromycin Rash     ointment  PN: ointment  ointment    Eucalyptus Oil Rash and Hives     hives    Nitrofuran Derivatives Rash     "Nitrofurantoin Rash    Tramadol Rash        Social History     Tobacco Use    Smoking status: Never     Passive exposure: Never    Smokeless tobacco: Never   Substance Use Topics    Alcohol use: No     Alcohol/week: 0.0 standard drinks of alcohol       History   Drug Use No             Review of Systems  Constitutional, HEENT, cardiovascular, pulmonary, GI, , musculoskeletal, neuro, skin, endocrine and psych systems are negative, except as otherwise noted.    Patient on day 5/7 cephalexin for UTI, still notes some burning with urination today.    Objective    BP 95/60   Pulse 77   Temp 98.3  F (36.8  C) (Tympanic)   Resp 16   Ht 1.6 m (5' 3\")   Wt 74.5 kg (164 lb 3.2 oz)   LMP  (LMP Unknown)   SpO2 99%   BMI 29.09 kg/m     Estimated body mass index is 29.09 kg/m  as calculated from the following:    Height as of this encounter: 1.6 m (5' 3\").    Weight as of this encounter: 74.5 kg (164 lb 3.2 oz).  Physical Exam  GENERAL: alert and no distress  EYES: Eyes grossly normal to inspection, PERRL and conjunctivae and sclerae normal  HENT: ear canals and TM's normal, nose and mouth without ulcers or lesions  NECK: no adenopathy, no asymmetry, masses, or scars  RESP: lungs clear to auscultation - no rales, rhonchi or wheezes  CV: regular rate and rhythm, normal S1 S2, no S3 or S4, no murmur, click or rub, no peripheral edema  MS: no gross musculoskeletal defects noted, no edema  SKIN: no suspicious lesions or rashes  NEURO: Normal strength and tone, mentation intact and speech normal  PSYCH: mentation appears normal, affect normal/bright    Recent Labs   Lab Test 04/24/24  1026 01/02/24  1429 12/27/23  1102 10/24/23  1325 09/26/23  1012   HGB 11.6* 9.7* 10.0*   < > 10.8*     --  359   < > 248   NA  --   --   --   --  141   POTASSIUM  --   --   --   --  4.9   CR  --   --   --   --  1.67*    < > = values in this interval not displayed.        Diagnostics  Labs pending at this time.  Results will be reviewed " when available.   No EKG required for low risk surgery (cataract, skin procedure, breast biopsy, etc).    Revised Cardiac Risk Index (RCRI)  The patient has the following serious cardiovascular risks for perioperative complications:   - No serious cardiac risks = 0 points     RCRI Interpretation: 0 points: Class I (very low risk - 0.4% complication rate)         Signed Electronically by: Brii Parker PA-C  Copy of this evaluation report is provided to requesting physician.

## 2024-07-03 NOTE — PATIENT INSTRUCTIONS
Take carvedilol the morning of the procedure (ok to use small sip of water). Do not take your morning dose of gemfibrozil the morning of the procedure.

## 2024-07-04 LAB — BACTERIA UR CULT: NO GROWTH

## 2024-07-22 ENCOUNTER — LAB (OUTPATIENT)
Dept: INFUSION THERAPY | Facility: CLINIC | Age: 74
End: 2024-07-22
Attending: INTERNAL MEDICINE
Payer: MEDICARE

## 2024-07-22 DIAGNOSIS — D64.9 NORMOCYTIC ANEMIA: Primary | ICD-10-CM

## 2024-07-22 LAB
ALBUMIN SERPL BCG-MCNC: 4.6 G/DL (ref 3.5–5.2)
ALP SERPL-CCNC: 110 U/L (ref 40–150)
ALT SERPL W P-5'-P-CCNC: 16 U/L (ref 0–50)
ANION GAP SERPL CALCULATED.3IONS-SCNC: 10 MMOL/L (ref 7–15)
AST SERPL W P-5'-P-CCNC: 23 U/L (ref 0–45)
BILIRUB SERPL-MCNC: 0.3 MG/DL
BUN SERPL-MCNC: 27.6 MG/DL (ref 8–23)
CALCIUM SERPL-MCNC: 9.5 MG/DL (ref 8.8–10.4)
CHLORIDE SERPL-SCNC: 110 MMOL/L (ref 98–107)
CREAT SERPL-MCNC: 1.33 MG/DL (ref 0.51–0.95)
EGFRCR SERPLBLD CKD-EPI 2021: 43 ML/MIN/1.73M2
ERYTHROCYTE [DISTWIDTH] IN BLOOD BY AUTOMATED COUNT: 13.2 % (ref 10–15)
GLUCOSE SERPL-MCNC: 122 MG/DL (ref 70–99)
HCO3 SERPL-SCNC: 20 MMOL/L (ref 22–29)
HCT VFR BLD AUTO: 32.1 % (ref 35–47)
HGB BLD-MCNC: 10.5 G/DL (ref 11.7–15.7)
MCH RBC QN AUTO: 32.4 PG (ref 26.5–33)
MCHC RBC AUTO-ENTMCNC: 32.7 G/DL (ref 31.5–36.5)
MCV RBC AUTO: 99 FL (ref 78–100)
PLATELET # BLD AUTO: 218 10E3/UL (ref 150–450)
POTASSIUM SERPL-SCNC: 4.5 MMOL/L (ref 3.4–5.3)
PROT SERPL-MCNC: 7.2 G/DL (ref 6.4–8.3)
RBC # BLD AUTO: 3.24 10E6/UL (ref 3.8–5.2)
SODIUM SERPL-SCNC: 140 MMOL/L (ref 135–145)
WBC # BLD AUTO: 3.3 10E3/UL (ref 4–11)

## 2024-07-22 PROCEDURE — 36415 COLL VENOUS BLD VENIPUNCTURE: CPT

## 2024-07-22 PROCEDURE — 85027 COMPLETE CBC AUTOMATED: CPT | Performed by: INTERNAL MEDICINE

## 2024-07-22 PROCEDURE — 84460 ALANINE AMINO (ALT) (SGPT): CPT | Performed by: INTERNAL MEDICINE

## 2024-07-23 NOTE — RESULT ENCOUNTER NOTE
Dear Ms. Sotomayor,    Blood test is overall stable. Hemoglobin is 10.5. WBC is mildly low at 3.3. Will monitor it.    Please, call me with any questions.    Nico Calderon MD

## 2024-07-29 ENCOUNTER — HOSPITAL ENCOUNTER (OUTPATIENT)
Dept: MAMMOGRAPHY | Facility: CLINIC | Age: 74
Discharge: HOME | End: 2024-07-29
Attending: INTERNAL MEDICINE | Admitting: INTERNAL MEDICINE
Payer: MEDICARE

## 2024-07-29 DIAGNOSIS — Z12.31 VISIT FOR SCREENING MAMMOGRAM: ICD-10-CM

## 2024-07-29 DIAGNOSIS — G43.009 MIGRAINE WITHOUT AURA AND WITHOUT STATUS MIGRAINOSUS, NOT INTRACTABLE: ICD-10-CM

## 2024-07-29 DIAGNOSIS — E78.1 HYPERTRIGLYCERIDEMIA: ICD-10-CM

## 2024-07-29 PROCEDURE — 77063 BREAST TOMOSYNTHESIS BI: CPT

## 2024-07-29 RX ORDER — ATORVASTATIN CALCIUM 40 MG/1
TABLET, FILM COATED ORAL
Qty: 90 TABLET | Refills: 3 | Status: SHIPPED | OUTPATIENT
Start: 2024-07-29

## 2024-07-29 RX ORDER — TOPIRAMATE 100 MG/1
100 TABLET, FILM COATED ORAL DAILY
Qty: 90 TABLET | Refills: 3 | Status: SHIPPED | OUTPATIENT
Start: 2024-07-29

## 2024-07-29 RX ORDER — GEMFIBROZIL 600 MG/1
TABLET, FILM COATED ORAL
Qty: 90 TABLET | Refills: 3 | Status: SHIPPED | OUTPATIENT
Start: 2024-07-29

## 2024-08-01 ENCOUNTER — OFFICE VISIT (OUTPATIENT)
Dept: NEUROLOGY | Facility: CLINIC | Age: 74
End: 2024-08-01
Payer: MEDICARE

## 2024-08-01 VITALS — SYSTOLIC BLOOD PRESSURE: 120 MMHG | OXYGEN SATURATION: 100 % | HEART RATE: 66 BPM | DIASTOLIC BLOOD PRESSURE: 70 MMHG

## 2024-08-01 DIAGNOSIS — R42 VERTIGO: ICD-10-CM

## 2024-08-01 DIAGNOSIS — G43.E09 CHRONIC MIGRAINE WITH AURA WITHOUT STATUS MIGRAINOSUS, NOT INTRACTABLE: Primary | ICD-10-CM

## 2024-08-01 PROCEDURE — 99214 OFFICE O/P EST MOD 30 MIN: CPT

## 2024-08-01 PROCEDURE — G2211 COMPLEX E/M VISIT ADD ON: HCPCS

## 2024-08-01 RX ORDER — ZOLMITRIPTAN 5 MG/1
5 TABLET, ORALLY DISINTEGRATING ORAL
Qty: 18 TABLET | Refills: 11 | Status: SHIPPED | OUTPATIENT
Start: 2024-08-01

## 2024-08-01 RX ORDER — DIAZEPAM 5 MG
2.5 TABLET ORAL EVERY 8 HOURS PRN
Qty: 10 TABLET | Refills: 0 | Status: SHIPPED | OUTPATIENT
Start: 2024-08-01

## 2024-08-01 RX ORDER — PREDNISOLONE ACETATE 10 MG/ML
SUSPENSION/ DROPS OPHTHALMIC
COMMUNITY
Start: 2024-07-12 | End: 2024-10-02

## 2024-08-01 RX ORDER — OFLOXACIN 3 MG/ML
SOLUTION/ DROPS OPHTHALMIC
COMMUNITY
Start: 2024-07-12 | End: 2024-10-02

## 2024-08-01 ASSESSMENT — MIGRAINE DISABILITY ASSESSMENT (MIDAS)
HOW MANY DAYS DID YOU NOT DO HOUSEWORK BECAUSE OF HEADACHES: 15
HOW MANY DAYS WAS YOUR PRODUCTIVITY CUT IN HALF BECAUSE OF HEADACHES: 15
HOW MANY DAYS DID YOU MISS WORK OR SCHOOL BECAUSE OF HEADACHES: 0
HOW OFTEN WERE SOCIAL ACTIVITIES MISSED DUE TO HEADACHES: 3
HOW MANY DAYS IN THE PAST 3 MONTHS HAVE YOU HAD A HEADACHE: 54
TOTAL SCORE: 45
HOW MANY DAYS WAS HOUSEWORK PRODUCTIVITY CUT IN HALF DUE TO HEADACHES: 12
ON A SCALE FROM 0-10 ON AVERAGE HOW PAINFUL WERE HEADACHES: 8

## 2024-08-01 ASSESSMENT — PAIN SCALES - GENERAL: PAINLEVEL: NO PAIN (0)

## 2024-08-01 ASSESSMENT — HEADACHE IMPACT TEST (HIT 6)
WHEN YOU HAVE A HEADACHE HOW OFTEN DO YOU WISH YOU COULD LIE DOWN: VERY OFTEN
HOW OFTEN HAVE YOU FELT TOO TIRED TO WORK BECAUSE OF YOUR HEADACHES: SOMETIMES
WHEN YOU HAVE HEADACHES HOW OFTEN IS THE PAIN SEVERE: VERY OFTEN
HIT6 TOTAL SCORE: 60
HOW OFTEN DO HEADACHES LIMIT YOUR DAILY ACTIVITIES: SOMETIMES
HOW OFTEN HAVE YOU FELT FED UP OR IRRITATED BECAUSE OF YOUR HEADACHES: RARELY
HOW OFTEN DID HEADACHS LIMIT CONCENTRATION ON WORK OR DAILY ACTIVITY: SOMETIMES

## 2024-08-01 NOTE — PATIENT INSTRUCTIONS
I ordered Ajovy. We will try this instead of Aimovig.   If this is too expensive, please let me know and we will look into other options or go back to Aimovig.     Continue with topiramate.  Continue with zolmitriptan as needed.     Continue with diazepam 1/2 tablet as needed for vertigo.

## 2024-08-01 NOTE — NURSING NOTE
"Rosa Sotomayor is a 73 year old female who presents for:  Chief Complaint   Patient presents with    Headache     migraine follow up, recs in epic, scheduled per patient        Initial Vitals:  /70   Pulse 66   LMP  (LMP Unknown)   SpO2 100%  Estimated body mass index is 29.09 kg/m  as calculated from the following:    Height as of 7/3/24: 1.6 m (5' 3\").    Weight as of 7/3/24: 74.5 kg (164 lb 3.2 oz).. There is no height or weight on file to calculate BSA. BP completed using cuff size: regular  No Pain (0)    Nursing Comments:     Maranda Richardson MA   "
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
## 2024-08-01 NOTE — Clinical Note
Hello, can you please schedule this patient for a 3-month follow-up with me?  In person or virtual okay. Thanks, Danette Beaver PA-C

## 2024-08-02 ENCOUNTER — TELEPHONE (OUTPATIENT)
Dept: NEUROLOGY | Facility: CLINIC | Age: 74
End: 2024-08-02
Payer: MEDICARE

## 2024-08-02 NOTE — TELEPHONE ENCOUNTER
Prior Authorization Retail Medication Request    Medication/Dose: Fremanezumab-vfrm (AJOVY) 225 MG/1.5ML SOA  Diagnosis and ICD code (if different than what is on RX):    New/renewal/insurance change PA/secondary ins. PA:  Previously Tried and Failed:    Rationale:      Insurance   Primary:   Insurance ID:      Secondary (if applicable):  Insurance ID:      Pharmacy Information (if different than what is on RX)  Name:    Phone:    Fax:           Primary Visit Coverage    Payer Plan Sponsor Code Group Number Group Name   [facility] [employer] [facility] 1930       Primary Visit Coverage Subscriber    ID Name [facility] Address   [id number] [address] [id number] [address]      [place] [id number]     Secondary Visit Coverage    Payer Plan Sponsor Code Group Number Group Name   MEDICARE MEDICARE ILMCR       Secondary Visit Coverage Subscriber    ID Name [facility] Address   [id number] [address] [id number] [address]      [place] [id number]

## 2024-08-02 NOTE — TELEPHONE ENCOUNTER
Called patient to schedule follow up headache appointment. Patient available 10/23/2024. Scheduled patient for 10/23/70022 1:00PM.       Maranda Richardson MA

## 2024-08-06 ENCOUNTER — TELEPHONE (OUTPATIENT)
Dept: NEUROLOGY | Facility: CLINIC | Age: 74
End: 2024-08-06
Payer: MEDICARE

## 2024-08-06 NOTE — TELEPHONE ENCOUNTER
[facility] Call Center    Phone Message    May a detailed message be left on voicemail: yes     Reason for Call: patient called to discuss PA status for her Ajovy prescription, please call back at [phone]     Action Taken: Other: cs neurology    Travel Screening: Not Applicable     Date of Service:

## 2024-08-07 ENCOUNTER — TELEPHONE (OUTPATIENT)
Dept: NEUROLOGY | Facility: CLINIC | Age: 74
End: 2024-08-07

## 2024-08-07 DIAGNOSIS — G43.E09 CHRONIC MIGRAINE WITH AURA WITHOUT STATUS MIGRAINOSUS, NOT INTRACTABLE: Primary | ICD-10-CM

## 2024-08-07 NOTE — TELEPHONE ENCOUNTER
Advised patient that Prior Auth is still pending for Ajovy. Patient wondering exactly how long it will take for authorization to go through and patient is due for injection of Ajovy today. Advised patient that I am not exactly sure how long but as soon as it is approved our clinic will reach back out to confirm. Patient would like an estimate for how long this could take to complete, advised patient that I would send her message to our prior auth team to advise patient on PA status. Patient understands.       Maranda Richardson MA

## 2024-08-07 NOTE — TELEPHONE ENCOUNTER
Advised patient the following:  Ajovy was denied because it is not on formulary. Emgality is the other CGRP inhibitor on patient's formulary. Patient has never tried Emgality so I recommend trying this instead of needing to appeal the Ajovy. Will place order for Emgality and cancel Ajovy.   Please notify patient of this change. No need for appeal letter for Ajovy.   Danette Beaver PA-C    Patient understands and is going to wait to  Aimovig. I advised patient that I am placing a prior auth now, and will set for high priority in hopes that medication will be approved and able to be sent to pharmacy tomorrow. Advised patient I will give her a call in the morning with updates.   Patient understands.      Maranda Richardson MA

## 2024-08-07 NOTE — TELEPHONE ENCOUNTER
Returned patient phone call regarding insurance and prior auth. Patient states that she has reached out to her insurance company to check on prior auth status. Patient states it was denied and insurance wants to know medications tried and failed previously. Patient states that an appeal will need to be placed and provided the appeals fax number: [phone].     Patient also would like to know wether or not it would be okay for her to  Aimovig while waiting on Ajovy appeal. Checked with provider:  Per Danette Beaver PA-C, Aimovig should be fine for patient to continue with current/previous treatment so patient will have some kind of relief while we wait on appeals process.    Patient understands, and would like a [employer] message sent once appeal letter has been submitted. Advised patient that I will be sure to confirm submission of appeal once done. Patient also would like to know once med is approved would she start it right away or wait til the following month. Advised patient that instructions for med will come with prescription and once approved I can ask provider to clarify for patient. Rosa understands and will wait to hear from [employer] confirming submitted appeal.       Maranda Richardson MA

## 2024-08-07 NOTE — TELEPHONE ENCOUNTER
Per chart, PA request was created in 08/02/2024 encounter - however encounter looks to have been signed when sent the PA team which closed the encounter and voids sending it anywhere. PA team never received.       PA Initiation    Medication: FREMANEZUMAB-VFRM 225 MG/1.5ML SC SOAJ  Insurance Company: [employer] - Phone [phone] Fax [phone]  Pharmacy Filling the Rx: [facility] #[id number] - [place] - [address] AT [facility] OF [address]  Filling Pharmacy Phone: [phone]  Filling Pharmacy Fax: [phone]  Start Date: 8/7/2024

## 2024-08-07 NOTE — TELEPHONE ENCOUNTER
PRIOR AUTHORIZATION DENIED    Medication: FREMANEZUMAB-VFRM 225 MG/1.5ML SC SOAJ  Insurance Company: [employer] - Phone [phone] Fax [phone]  Denial Date: 8/7/2024  Denial Reason(s):     Appeal Information:     Patient Notified: No, care team must notify

## 2024-08-07 NOTE — TELEPHONE ENCOUNTER
Retail Pharmacy Prior Authorization Team   Phone: [phone]    PA Initiation    Medication: EMGALITY 120 MG/ML SC SOAJ  Insurance Company: [employer] - Phone [phone] Fax [phone]  Pharmacy Filling the Rx: [facility] #[id number] - [place] - [address] AT [facility] OF [address]  Filling Pharmacy Phone: [phone]  Filling Pharmacy Fax:    Start Date: 8/7/2024

## 2024-08-07 NOTE — TELEPHONE ENCOUNTER
Prior Authorization Retail Medication Request    Medication/Dose: galcanezumab-gnlm (EMGALITY) 240 MG/ML injection(Loading Dose); galcanezumab-gnlm (EMGALITY) 120 MG/ML injection(Maintenance Dose)  Diagnosis and ICD code (if different than what is on RX):    New/renewal/insurance change PA/secondary ins. PA:  Previously Tried and Failed:    Rationale:      Insurance   Primary:   Insurance ID:      Secondary (if applicable):  Insurance ID:      Pharmacy Information (if different than what is on RX)  Name:    Phone:    Fax:       Primary Visit Coverage    Payer Plan Sponsor Code Group Number Group Name   [facility] 1930       Primary Visit Coverage Subscriber    ID Name [facility] Address   [id number] [address] [id number] [facility]      [place] [address]     Secondary Visit Coverage    Payer Plan Sponsor Code Group Number Group Name   MEDICARE MEDICARE ILMCR       Secondary Visit Coverage Subscriber    ID Name [facility] Address   [id number] [address] [id number] [address] [facility]      [place] [id number]

## 2024-08-07 NOTE — TELEPHONE ENCOUNTER
Ajovy was denied because it is not on formulary. Emgality is the other CGRP inhibitor on patient's formulary. Patient has never tried Emgality so I recommend trying this instead of needing to appeal the Ajovy. Will place order for Emgality and cancel Ajovy.     Please notify patient of this change. No need for appeal letter for Ajovy.     Danette Beaver PA-C

## 2024-08-07 NOTE — TELEPHONE ENCOUNTER
[facility] Call Center    Phone Message    May a detailed message be left on voicemail: yes     Reason for Call:     Patient called states that she was just on the phone with her insurance company and would like to speak to care team to pass on a message. Please call back at [phone]         Action Taken: Other: [facility]    Travel Screening: Not Applicable     Date of Service:

## 2024-08-08 DIAGNOSIS — I10 BENIGN ESSENTIAL HYPERTENSION: ICD-10-CM

## 2024-08-08 RX ORDER — LOSARTAN POTASSIUM 50 MG/1
50 TABLET ORAL AT BEDTIME
Qty: 90 TABLET | Refills: 3 | Status: SHIPPED | OUTPATIENT
Start: 2024-08-08

## 2024-08-08 NOTE — TELEPHONE ENCOUNTER
MEDICATION APPEAL APPROVED    Medication: FREMANEZUMAB-VFRM 225 MG/1.5ML SC SOAJ  Authorization Effective Date: 8/7/2024  Authorization Expiration Date:  until further notice  Approved Dose/Quantity: as written  Reference #: [id number]   Appeal Insurance Company: [employer] - Phone [phone] Fax [phone]   Filling Pharmacy: [facility] #[id number]  [place] - [address] [address]  Patient Notified: n  Comments:   Alternate therapy sent in, forwarding to clinic    Plan approved re-determinatio

## 2024-08-08 NOTE — TELEPHONE ENCOUNTER
Per chart review.  Emgality ordered instead of Ajovy.     Will close this encounter.  PA for emgality is pending.     Clara ALLEN RN, BSN  [facility] Neurology

## 2024-08-08 NOTE — TELEPHONE ENCOUNTER
Prior Authorization Approval    Medication: EMGALITY 120 MG/ML SC SOAJ  Authorization Effective Date: 7/24/2024  Authorization Expiration Date:    Approved Dose/Quantity:   Reference #:     Insurance Company: [employer] - [employer] [phone] Fax [phone]  Expected CoPay: $    CoPay Card Available:      Financial Assistance Needed:   Which Pharmacy is filling the prescription: [facility] #[id number] - [place], [place] - [address] AT [facility] [address]  Pharmacy Notified: No- Appeal for Ajovy is now approved. Forwarding to clinic to contact patient to discuss treatment.

## 2024-10-02 ENCOUNTER — OFFICE VISIT (OUTPATIENT)
Dept: FAMILY MEDICINE | Facility: CLINIC | Age: 74
End: 2024-10-02
Payer: MEDICARE

## 2024-10-02 VITALS
WEIGHT: 168 LBS | TEMPERATURE: 98.7 F | BODY MASS INDEX: 28.68 KG/M2 | HEART RATE: 81 BPM | SYSTOLIC BLOOD PRESSURE: 117 MMHG | OXYGEN SATURATION: 98 % | RESPIRATION RATE: 16 BRPM | HEIGHT: 64 IN | DIASTOLIC BLOOD PRESSURE: 70 MMHG

## 2024-10-02 DIAGNOSIS — E78.5 HYPERLIPIDEMIA LDL GOAL <130: ICD-10-CM

## 2024-10-02 DIAGNOSIS — E78.1 HYPERTRIGLYCERIDEMIA: ICD-10-CM

## 2024-10-02 DIAGNOSIS — N18.31 STAGE 3A CHRONIC KIDNEY DISEASE (H): ICD-10-CM

## 2024-10-02 DIAGNOSIS — N95.1 MENOPAUSAL SYMPTOM: ICD-10-CM

## 2024-10-02 DIAGNOSIS — Z00.00 ENCOUNTER FOR MEDICARE ANNUAL WELLNESS EXAM: Primary | ICD-10-CM

## 2024-10-02 DIAGNOSIS — M25.552 HIP PAIN, LEFT: ICD-10-CM

## 2024-10-02 DIAGNOSIS — Z23 NEED FOR TDAP VACCINATION: ICD-10-CM

## 2024-10-02 DIAGNOSIS — R60.0 EDEMA LEG: ICD-10-CM

## 2024-10-02 PROBLEM — M51.16 LUMBAR DISC HERNIATION WITH RADICULOPATHY: Status: RESOLVED | Noted: 2018-08-16 | Resolved: 2024-10-02

## 2024-10-02 PROBLEM — Z88.9 MULTIPLE DRUG ALLERGIES: Status: RESOLVED | Noted: 2021-09-07 | Resolved: 2024-10-02

## 2024-10-02 PROBLEM — H35.30 MACULAR DEGENERATION: Status: RESOLVED | Noted: 2019-05-16 | Resolved: 2024-10-02

## 2024-10-02 PROBLEM — G43.909 MIGRAINE HEADACHE: Status: RESOLVED | Noted: 2021-09-07 | Resolved: 2024-10-02

## 2024-10-02 PROBLEM — K90.9 MALABSORPTION OF IRON: Status: RESOLVED | Noted: 2020-03-04 | Resolved: 2024-10-02

## 2024-10-02 PROBLEM — M16.9 DEGENERATIVE JOINT DISEASE (DJD) OF HIP: Status: RESOLVED | Noted: 2017-05-23 | Resolved: 2024-10-02

## 2024-10-02 PROBLEM — D50.9 ANEMIA, IRON DEFICIENCY: Status: RESOLVED | Noted: 2017-06-23 | Resolved: 2024-10-02

## 2024-10-02 LAB
ERYTHROCYTE [DISTWIDTH] IN BLOOD BY AUTOMATED COUNT: 13.2 % (ref 10–15)
EST. AVERAGE GLUCOSE BLD GHB EST-MCNC: 100 MG/DL
HBA1C MFR BLD: 5.1 % (ref 0–5.6)
HCT VFR BLD AUTO: 32.4 % (ref 35–47)
HGB BLD-MCNC: 10.4 G/DL (ref 11.7–15.7)
MCH RBC QN AUTO: 31.6 PG (ref 26.5–33)
MCHC RBC AUTO-ENTMCNC: 32.1 G/DL (ref 31.5–36.5)
MCV RBC AUTO: 99 FL (ref 78–100)
PLATELET # BLD AUTO: 204 10E3/UL (ref 150–450)
RBC # BLD AUTO: 3.29 10E6/UL (ref 3.8–5.2)
WBC # BLD AUTO: 3.3 10E3/UL (ref 4–11)

## 2024-10-02 PROCEDURE — 80061 LIPID PANEL: CPT | Performed by: INTERNAL MEDICINE

## 2024-10-02 PROCEDURE — 99214 OFFICE O/P EST MOD 30 MIN: CPT | Mod: 25 | Performed by: INTERNAL MEDICINE

## 2024-10-02 PROCEDURE — 83036 HEMOGLOBIN GLYCOSYLATED A1C: CPT | Mod: GZ | Performed by: INTERNAL MEDICINE

## 2024-10-02 PROCEDURE — G0439 PPPS, SUBSEQ VISIT: HCPCS | Performed by: INTERNAL MEDICINE

## 2024-10-02 PROCEDURE — 82043 UR ALBUMIN QUANTITATIVE: CPT | Performed by: INTERNAL MEDICINE

## 2024-10-02 PROCEDURE — 80053 COMPREHEN METABOLIC PANEL: CPT | Performed by: INTERNAL MEDICINE

## 2024-10-02 PROCEDURE — 36415 COLL VENOUS BLD VENIPUNCTURE: CPT | Performed by: INTERNAL MEDICINE

## 2024-10-02 PROCEDURE — 82570 ASSAY OF URINE CREATININE: CPT | Performed by: INTERNAL MEDICINE

## 2024-10-02 PROCEDURE — 85027 COMPLETE CBC AUTOMATED: CPT | Performed by: INTERNAL MEDICINE

## 2024-10-02 RX ORDER — ESTRADIOL 10 UG/1
10 INSERT VAGINAL
Qty: 24 TABLET | Refills: 3 | Status: SHIPPED | OUTPATIENT
Start: 2024-10-03

## 2024-10-02 RX ORDER — TORSEMIDE 10 MG/1
TABLET ORAL
Qty: 180 TABLET | Refills: 3 | Status: SHIPPED | OUTPATIENT
Start: 2024-10-02

## 2024-10-02 SDOH — HEALTH STABILITY: PHYSICAL HEALTH: ON AVERAGE, HOW MANY DAYS PER WEEK DO YOU ENGAGE IN MODERATE TO STRENUOUS EXERCISE (LIKE A BRISK WALK)?: 7 DAYS

## 2024-10-02 ASSESSMENT — PAIN SCALES - GENERAL: PAINLEVEL: NO PAIN (0)

## 2024-10-02 ASSESSMENT — SOCIAL DETERMINANTS OF HEALTH (SDOH): HOW OFTEN DO YOU GET TOGETHER WITH FRIENDS OR RELATIVES?: THREE TIMES A WEEK

## 2024-10-02 NOTE — PROGRESS NOTES
Preventive Care Visit  [facility]  Josh Hollingsworth MD, Internal Medicine  Oct 2, 2024      Assessment & Plan     Encounter for Medicare annual wellness exam    - Comprehensive metabolic panel; Future  - CBC with platelets; Future  - HEMOGLOBIN A1C; Future  - Comprehensive metabolic panel  - CBC with platelets  - HEMOGLOBIN A1C    Hyperlipidemia LDL goal <130  Recheck   - Lipid panel reflex to direct LDL Non-fasting; Future  - OFFICE/OUTPT VISIT,EST,LEVL III  - Lipid panel reflex to direct LDL Non-fasting    Hypertriglyceridemia  Recheck     Stage 3a chronic kidney disease (H)  Recheck   - Albumin Random Urine Quantitative with Creat Ratio; Future  - Albumin Random Urine Quantitative with Creat Ratio    Hip pain, left  Suspect bursitis  Trial of PT   Return to clinic if that does not help enough  - Physical Therapy  Referral; Future  - OFFICE/OUTPT VISIT,EST,LEVL III    Edema leg  Complains of leg swelling at end of day   Resolves overnight  Wants option to take extra torsemide on bad days  Has compression stockings   - OFFICE/OUTPT VISIT,EST,LEVL III  - torsemide (DEMADEX) 10 MG tablet; One tablet once daily in the AM.  May repeat around NOON for severe swelling.    Menopausal symptom  Needs vaginal estrogen prescription from me   - estradiol (VAGIFEM) 10 MCG TABS vaginal tablet; Place 1 tablet (10 mcg) vaginally twice a week.    Need for Tdap vaccination    - Tdap, tetanus-diptheria-acell pertussis, (BOOSTRIX) 5-2.5-18.5 LF-MCG/0.5 LADONNA injection; Inject 0.5 mLs into the muscle once for 1 dose.    Patient has been advised of split billing requirements and indicates understanding: Yes        Counseling  Appropriate preventive services were addressed with this patient via screening, questionnaire, or discussion as appropriate for fall prevention, nutrition, physical activity, Tobacco-use cessation, social engagement, weight loss and cognition.  Checklist reviewing preventive services  available has been given to the patient.  Reviewed patient's diet, addressing concerns and/or questions.   Discussed possible causes of fatigue.     FUTURE APPOINTMENTS:       - Follow-up for annual visit or as needed    Subjective   Rosa is a 73 year old, presenting for the following:  Physical        10/2/2024    10:50 AM   Additional Questions   Roomed by Lluvia         Health Care Directive  Patient does not have a Health Care Directive or Living Will: Patient states has Advance Directive and will bring in a copy to clinic.    HPI              10/2/2024   General Health   How would you rate your overall physical health? Good   Feel stress (tense, anxious, or unable to sleep) Not at all            10/2/2024   Nutrition   Diet: Low salt            10/2/2024   Exercise   Days per week of moderate/strenous exercise 7 days            10/2/2024   Social Factors   Frequency of gathering with friends or relatives Three times a week   Worry food won't last until get money to buy more No    No   Food not last or not have enough money for food? No    No   Do you have housing? (Housing is defined as stable permanent housing and does not include staying ouside in a car, in a tent, in an abandoned building, in an overnight shelter, or couch-surfing.) Yes    Yes   Are you worried about losing your housing? No    No   Lack of transportation? No    No   Unable to get utilities (heat,electricity)? No    No       Multiple values from one day are sorted in reverse-chronological order         10/2/2024   Fall Risk   Fallen 2 or more times in the past year? No    No   Trouble with walking or balance? No    No       Multiple values from one day are sorted in reverse-chronological order          10/2/2024   Activities of Daily Living- Home Safety   Needs help with the following daily activites None of the above   Safety concerns in the home None of the above            10/2/2024   Dental   Dentist two times every year? Yes             10/2/2024   Hearing Screening   Hearing concerns? None of the above            10/2/2024   Driving Risk Screening   Patient/family members have concerns about driving No            10/2/2024   General Alertness/Fatigue Screening   Have you been more tired than usual lately? (!) YES            10/2/2024   Urinary Incontinence Screening   Bothered by leaking urine in past 6 months No            10/2/2024   TB Screening   Were you born outside of the [place]? No            Today's PHQ-2 Score:       10/2/2024    10:44 AM   PHQ-2 ( 1999 Pf[employer])   Q1: Little interest or pleasure in doing things 0   Q2: Feeling down, depressed or hopeless 0   PHQ-2 Score 0   Q1: Little interest or pleasure in doing things Not at all   Q2: Feeling down, depressed or hopeless Not at all   PHQ-2 Score 0           10/2/2024   Substance Use   Alcohol more than 3/day or more than 7/wk No   Do you have a current opioid prescription? No   How severe/bad is pain from 1 to 10? 0/10 (No Pain)   Do you use any other substances recreationally? No        Social History     Tobacco Use    Smoking status: Never     Passive exposure: Never    Smokeless tobacco: Never   Vaping Use    Vaping status: Never Used   Substance Use Topics    Alcohol use: No     Alcohol/week: 0.0 standard drinks of alcohol    Drug use: No           7/29/2024   LAST FHS-7 RESULTS   1st degree relative breast or ovarian cancer No   Any relative bilateral breast cancer No   Any male have breast cancer No   Any ONE woman have BOTH breast AND ovarian cancer No   Any woman with breast cancer before 50yrs No   2 or more relatives with breast AND/OR ovarian cancer No   2 or more relatives with breast AND/OR bowel cancer No               ASCVD Risk   The 10-year ASCVD risk score (Vi LAMBERT, et al., 2019) is: 11.3%    Values used to calculate the score:      Age: 73 years      Sex: Female      Is Non- : No      Diabetic: No      Tobacco smoker: No      Systolic  Blood Pressure: 117 mmHg      Is BP treated: Yes      HDL Cholesterol: 48 mg/dL      Total Cholesterol: 155 mg/dL            Reviewed and updated as needed this visit by Provider                    Past Medical History:   Diagnosis Date    Arthritis     Complication of anesthesia     Hypertension     Postmenopausal bleeding 2/28/2007    Sleep apnea      Past Surgical History:   Procedure Laterality Date    ARTHROPLASTY HIP Left     ARTHROSCOPY KNEE      BACK SURGERY      discectomy L3-4, 2018    BONE MARROW BIOPSY, BONE SPECIMEN, NEEDLE/TROCAR N/A 12/18/2019    Procedure: BONE MARROW BIOPSY;  Surgeon: Eran Bowser MD;  Location:  GI    COLONOSCOPY N/A 8/25/2022    Procedure: COLONOSCOPY, WITH POLYPECTOMY AND BIOPSY;  Surgeon: Americo Beyer MD;  Location:  GI    JOINT REPLACEMENT Left      hip & knee    ND LDR ARTHROSCOP,SURG,W/ROTAT CUFF REPR Right 7/26/2016    Procedure: ARTHROSCOPIC ROTATOR CUFF REPAIR, SHOULDER Distal Clavicle Excision;  Surgeon: Nick Lawrence MD;  Location: [facility] OR;  Service: Orthopedics    ROTATOR CUFF REPAIR RT/LT Right     2016    TUBAL LIGATION  1987    TUBAL LIGATION      VEIN LIGATION      [facility] NONSPECIFIC PROCEDURE      wisdom teeth    [facility] NONSPECIFIC PROCEDURE  2005    Varicose Veins    [facility] TOTAL HIP ARTHROPLASTY      left in 2012, right 2017    [facility] TOTAL HIP ARTHROPLASTY Right 5/23/2017    Procedure: RIGHT TOTAL HIP ARTHROPLASTY DIRECT ANTERIOR;  Surgeon: Herman Llanos MD;  Location: [facility] OR;  Service: Orthopedics    [facility] TOTAL KNEE ARTHROPLASTY      right 2014, left 2018    [facility] TOTAL KNEE ARTHROPLASTY Right 10/16/2014    Procedure: Right Total Knee Arthroplasty;  Surgeon: Herman Llanos MD;  Location: [facility] Main OR;  Service: Orthopedics    [facility] TOTAL KNEE ARTHROPLASTY Left 4/10/2018    Procedure: LEFT TOTAL KNEE ARTHROPLASTY;  Surgeon: Herman Llanos MD;  Location: [facility] Main OR;  Service: Orthopedics     BP Readings from  Last 3 Encounters:   10/02/24 117/70   08/01/24 120/70   07/03/24 95/60    Wt Readings from Last 3 Encounters:   10/02/24 76.2 kg (168 lb)   07/03/24 74.5 kg (164 lb 3.2 oz)   05/10/24 73.9 kg (163 lb)                  Patient Active Problem List   Diagnosis    Personal history of allergy to anesthetic agent    Stage 3 chronic kidney disease (H)    Hypertriglyceridemia    Arnold-Chiari malformation (H)    Hyperlipidemia LDL goal <130    Anxiety    Secondary renal hyperparathyroidism (H)    Chronic bilateral low back pain without sciatica    Benign essential hypertension    Macular degeneration, bilateral    Anemia, iron deficiency    Migraine without aura and without status migrainosus, not intractable    Aortic valve insufficiency    ESTEFANIA (obstructive sleep apnea)- mild (AHI 12)    Spinal stenosis of lumbar region with neurogenic claudication    Malabsorption of iron    Arthralgia of hip    Asymptomatic varicose veins    Constipation    Environmental allergies    Degenerative joint disease (DJD) of hip    Intractable chronic migraine without aura    Lumbar disc herniation with radiculopathy    Macular degeneration    Menopausal symptom    Multiple drug allergies    Migraine headache    Renovascular hypertension    Right knee DJD    S/P shoulder surgery    Trochanteric bursitis    Anemia of chronic renal failure, stage 3 (moderate) (H)     Past Surgical History:   Procedure Laterality Date    ARTHROPLASTY HIP Left     ARTHROSCOPY KNEE      BACK SURGERY      discectomy L3-4, 2018    BONE MARROW BIOPSY, BONE SPECIMEN, NEEDLE/TROCAR N/A 12/18/2019    Procedure: BONE MARROW BIOPSY;  Surgeon: Eran Bowser MD;  Location:  GI    COLONOSCOPY N/A 8/25/2022    Procedure: COLONOSCOPY, WITH POLYPECTOMY AND BIOPSY;  Surgeon: Americo Beyer MD;  Location:  GI    JOINT REPLACEMENT Left      hip & knee    [facility] SHLDR ARTHROSCOP,SURG,W/ROTAT CUFF REPR Right 7/26/2016    Procedure: ARTHROSCOPIC ROTATOR CUFF  REPAIR, SHOULDER Distal Clavicle Excision;  Surgeon: Nick Lawrence MD;  Location: [facility] Main OR;  Service: Orthopedics    ROTATOR CUFF REPAIR RT/LT Right     2016    TUBAL LIGATION  1987    TUBAL LIGATION      VEIN LIGATION      [facility] NONSPECIFIC PROCEDURE      wisdom teeth    [facility] NONSPECIFIC PROCEDURE  2005    Varicose Veins    [facility] TOTAL HIP ARTHROPLASTY      left in 2012, right 2017    [facility] TOTAL HIP ARTHROPLASTY Right 5/23/2017    Procedure: RIGHT TOTAL HIP ARTHROPLASTY DIRECT ANTERIOR;  Surgeon: Herman Llanos MD;  Location: [facility] Main OR;  Service: Orthopedics    [facility] TOTAL KNEE ARTHROPLASTY      right 2014, left 2018    [facility] TOTAL KNEE ARTHROPLASTY Right 10/16/2014    Procedure: Right Total Knee Arthroplasty;  Surgeon: Herman Llanos MD;  Location: [facility] Main OR;  Service: Orthopedics    [facility] TOTAL KNEE ARTHROPLASTY Left 4/10/2018    Procedure: LEFT TOTAL KNEE ARTHROPLASTY;  Surgeon: Herman Llanos MD;  Location: [facility] Main OR;  Service: Orthopedics       Social History     Tobacco Use    Smoking status: Never     Passive exposure: Never    Smokeless tobacco: Never   Substance Use Topics    Alcohol use: No     Alcohol/week: 0.0 standard drinks of alcohol     Family History   Problem Relation Age of Onset    Diabetes Mother         INSULIN    Hypertension Mother     Eye Disorder Mother         CATERACTS    Heart Disease Mother         CHF- OPEN HEART SURG X'S 2    Lipids Mother     Obesity Mother     Coronary Artery Disease Mother     Diabetes Father         ORAL    Hypertension Father     Arthritis Father     Eye Disorder Father         MAC DEGENERATION    Heart Disease Father         CHF    Lipids Father     Obesity Father     Diabetes Maternal Grandfather         INSULIN    Diabetes Brother         INSULIN    Gastrointestinal Disease Brother         CHOLITISI POSSIBLE CHRONES    Obesity Brother     Colon Cancer No family hx of     Breast Cancer No family hx of          Current Outpatient  Medications   Medication Sig Dispense Refill    acetaminophen (TYLENOL) 500 MG tablet Take 500 mg by mouth daily as needed.      atorvastatin (LIPITOR) 40 MG tablet TAKE 1 TABLET(40 MG) BY MOUTH DAILY 90 tablet 3    Bacillus Coagulans-Inulin (PROBIOTIC) 1-250 BILLION-MG CAPS       carvedilol (COREG) 6.25 MG tablet Take 1 tablet (6.25 mg) by mouth 2 times daily (with meals) 180 tablet 1    cephALEXin (KEFLEX) 500 MG capsule TAKE 4 CAPSULES BY MOUTH ONE HOUR PRIOR TO DENTAL PROCEDURES 16 capsule 3    cetirizine (ZYRTEC) 10 MG tablet Take 10 mg by mouth      Cholecalciferol (VITAMIN D) 2000 UNITS tablet Take 1 tablet by mouth daily      Cranberry POWD 1 capsule      Cyanocobalamin (B-12) 1000 MCG TBCR Take 1,000 mcg by mouth daily 100 tablet 1    diazepam (VALIUM) 5 MG tablet Take 0.5 tablets (2.5 mg) by mouth every 8 hours as needed (vertigo/dizziness) 10 tablet 0    estradiol (VAGIFEM) 10 MCG TABS vaginal tablet       Fremanezumab-vfrm (AJOVY) 225 MG/1.5ML SOAJ Inject 225 mg subcutaneously every 30 days 1.5 mL 11    gemfibrozil (LOPID) 600 MG tablet TAKE 1/2 TABLET(300 MG) BY MOUTH TWICE DAILY 90 tablet 3    hydrOXYzine (ATARAX) 25 MG tablet Take 1 tablet (25 mg) by mouth 3 times daily as needed for itching 120 tablet 1    losartan (COZAAR) 50 MG tablet TAKE 1 TABLET(50 MG) BY MOUTH AT BEDTIME 90 tablet 3    Multiple Vitamins-Minerals (EYE VITAMINS & MINERALS PO)       ondansetron (ZOFRAN) 4 MG tablet TAKE 1 TABLET(4 MG) BY MOUTH EVERY 8 HOURS AS NEEDED FOR NAUSEA 90 tablet 1    tiZANidine (ZANAFLEX) 2 MG tablet TAKE 1 TABLET(2 MG) BY MOUTH THREE TIMES DAILY AS NEEDED FOR MUSCLE SPASMS Strength: 2 mg 90 tablet 3    topiramate (TOPAMAX) 100 MG tablet TAKE 1 TABLET(100 MG) BY MOUTH DAILY 90 tablet 3    torsemide (DEMADEX) 10 MG tablet TAKE 1 TABLET(10 MG) BY MOUTH DAILY 90 tablet 3    ZOLMitriptan (ZOMIG-ZMT) 5 MG ODT Take 1 tablet (5 mg) by mouth at onset of headache for migraine May repeat in 2 hours. Max 2  "tablets/24 hours. 18 tablet 11     Current providers sharing in care for this patient include:  Patient Care Team:  Josh Hollingsworth MD as PCP - General (Internal Medicine)  Josh Hollingsworth MD as Assigned PCP  Taylor Zapien RD as Registered Dietitian (Dietitian, Registered)  Nico Caldreon MD as MD (Hematology & Oncology)  Nico Calderon MD as Assigned Cancer Care Provider  Danette Beaver PA-C as Physician Assistant  Danette Beaver PA-C as Assigned Neuroscience Provider  Marlo Lao APRN CNP as Assigned Sleep Provider    The following health maintenance items are reviewed in Epic and correct as of today:  Health Maintenance   Topic Date Due    DTAP/TDAP/TD IMMUNIZATION (3 - Td or Tdap) 08/30/2024    COVID-19 Vaccine (4 - 2024-25 season) 09/01/2024    LIPID  09/26/2024    MICROALBUMIN  09/26/2024    ANNUAL REVIEW OF HM ORDERS  09/26/2024    MEDICARE ANNUAL WELLNESS VISIT  09/26/2024    BMP  07/22/2025    HEMOGLOBIN  07/22/2025    MAMMO SCREENING  07/29/2025    FALL RISK ASSESSMENT  10/02/2025    GLUCOSE  07/22/2027    COLORECTAL CANCER SCREENING  08/25/2027    ADVANCE CARE PLANNING  07/03/2029    HEPATITIS C SCREENING  Completed    MIGRAINE ACTION PLAN  Completed    PHQ-2 (once per calendar year)  Completed    INFLUENZA VACCINE  Completed    Pneumococcal Vaccine: 65+ Years  Completed    URINALYSIS  Completed    RSV VACCINE  Completed    HPV IMMUNIZATION  Aged Out    MENINGITIS IMMUNIZATION  Aged Out    RSV MONOCLONAL ANTIBODY  Aged Out    DEXA  Discontinued    URINE DRUG SCREEN  Discontinued    ZOSTER IMMUNIZATION  Discontinued       A 10 organ systems ROS is negative other than any pertinent positives or negatives previously stated.          Objective    Exam  /70 (BP Location: Right arm, Patient Position: Chair, Cuff Size: Adult Large)   Pulse 81   Temp 98.7  F (37.1  C) (Temporal)   Resp 16   Ht 1.613 m (5' 3.5\")   Wt 76.2 kg (168 lb)   LMP  (LMP Unknown)   SpO2 98%   BMI 29.29 kg/m   " "  Estimated body mass index is 29.29 kg/m  as calculated from the following:    Height as of this encounter: 1.613 m (5' 3.5\").    Weight as of this encounter: 76.2 kg (168 lb).    Physical Exam  GENERAL: alert and no distress  EYES: Eyes grossly normal to inspection, PERRL and conjunctivae and sclerae normal  HENT: ear canals and TM's normal, nose and mouth without ulcers or lesions  NECK: no adenopathy, no asymmetry, masses, or scars  RESP: lungs clear to auscultation - no rales, rhonchi or wheezes  CV: regular rate and rhythm, normal S1 S2, no S3 or S4, no murmur, click or rub, no peripheral edema  ABDOMEN: soft, nontender, no hepatosplenomegaly, no masses and bowel sounds normal  MS: full pain-free ROM left hip, bursal tenderness noted   SKIN: no suspicious lesions or rashes  NEURO: Normal strength and tone, mentation intact and speech normal  PSYCH: mentation appears normal, affect normal/bright        10/2/2024   Mini Cog   Clock Draw Score 2 Normal   3 Item Recall 3 objects recalled   Mini Cog Total Score 5                 Signed Electronically by: Josh Hollingsworth MD    "

## 2024-10-02 NOTE — PATIENT INSTRUCTIONS
Patient Education   Preventive Care Advice   This is general advice given by our system to help you stay healthy. However, your care team may have specific advice just for you. Please talk to your care team about your preventive care needs.  Nutrition  Eat 5 or more servings of fruits and vegetables each day.  Try wheat bread, brown rice and whole grain pasta (instead of white bread, rice, and pasta).  Get enough calcium and vitamin D. Check the label on foods and aim for 100% of the RDA (recommended daily allowance).  Lifestyle  Exercise at least 150 minutes each week  (30 minutes a day, 5 days a week).  Do muscle strengthening activities 2 days a week. These help control your weight and prevent disease.  No smoking.  Wear sunscreen to prevent skin cancer.  Have a dental exam and cleaning every 6 months.  Yearly exams  See your health care team every year to talk about:  Any changes in your health.  Any medicines your care team has prescribed.  Preventive care, family planning, and ways to prevent chronic diseases.  Shots (vaccines)   HPV shots (up to age 26), if you've never had them before.  Hepatitis B shots (up to age 59), if you've never had them before.  COVID-19 shot: Get this shot when it's due.  Flu shot: Get a flu shot every year.  Tetanus shot: Get a tetanus shot every 10 years.  Pneumococcal, hepatitis A, and RSV shots: Ask your care team if you need these based on your risk.  Shingles shot (for age 50 and up)  General health tests  Diabetes screening:  Starting at age 35, Get screened for diabetes at least every 3 years.  If you are younger than age 35, ask your care team if you should be screened for diabetes.  Cholesterol test: At age 39, start having a cholesterol test every 5 years, or more often if advised.  Bone density scan (DEXA): At age 50, ask your care team if you should have this scan for osteoporosis (brittle bones).  Hepatitis C: Get tested at least once in your life.  STIs (sexually  transmitted infections)  Before age 24: Ask your care team if you should be screened for STIs.  After age 24: Get screened for STIs if you're at risk. You are at risk for STIs (including HIV) if:  You are sexually active with more than one person.  You don't use condoms every time.  You or a partner was diagnosed with a sexually transmitted infection.  If you are at risk for HIV, ask about PrEP medicine to prevent HIV.  Get tested for HIV at least once in your life, whether you are at risk for HIV or not.  Cancer screening tests  Cervical cancer screening: If you have a cervix, begin getting regular cervical cancer screening tests starting at age 21.  Breast cancer scan (mammogram): If you've ever had breasts, begin having regular mammograms starting at age 40. This is a scan to check for breast cancer.  Colon cancer screening: It is important to start screening for colon cancer at age 45.  Have a colonoscopy test every 10 years (or more often if you're at risk) Or, ask your provider about stool tests like a FIT test every year or Cologuard test every 3 years.  To learn more about your testing options, visit:   .  For help making a decision, visit:   https://bit.ly/xe81197.  Prostate cancer screening test: If you have a prostate, ask your care team if a prostate cancer screening test (PSA) at age 55 is right for you.  Lung cancer screening: If you are a current or former smoker ages 50 to 80, ask your care team if ongoing lung cancer screenings are right for you.  For informational purposes only. Not to replace the advice of your health care provider. Copyright   2023 [facility]. All rights reserved. Clinically reviewed by the [facility]. ICVRx 995398 - REV 01/24.  Learning About Sleeping Well  What does sleeping well mean?     Sleeping well means getting enough sleep to feel good and stay healthy. How much sleep is enough varies among people.  The number of hours you  sleep and how you feel when you wake up are both important. If you do not feel refreshed, you probably need more sleep. Another sign of not getting enough sleep is feeling tired during the day.  Experts recommend that adults get at least 7 or more hours of sleep per day. Children and older adults need more sleep.  Why is getting enough sleep important?  Getting enough quality sleep is a basic part of good health. When your sleep suffers, your physical health, mood, and your thoughts can suffer too. You may find yourself feeling more grumpy or stressed. Not getting enough sleep also can lead to serious problems, including injury, accidents, anxiety, and depression.  What might cause poor sleeping?  Many things can cause sleep problems, including:  Changes to your sleep schedule.  Stress. Stress can be caused by fear about a single event, such as giving a speech. Or you may have ongoing stress, such as worry about work or school.  Depression, anxiety, and other mental or emotional conditions.  Changes in your sleep habits or surroundings. This includes changes that happen where you sleep, such as noise, light, or sleeping in a different bed. It also includes changes in your sleep pattern, such as having jet lag or working a late shift.  Health problems, such as pain, breathing problems, and restless legs syndrome.  Lack of regular exercise.  Using alcohol, nicotine, or caffeine before bed.  How can you help yourself?  Here are some tips that may help you sleep more soundly and wake up feeling more refreshed.  Your sleeping area   Use your bedroom only for sleeping and sex. A bit of light reading may help you fall asleep. But if it doesn't, do your reading elsewhere in the house. Try not to use your TV, computer, smartphone, or tablet while you are in bed.  Be sure your bed is big enough to stretch out comfortably, especially if you have a sleep partner.  Keep your bedroom quiet, dark, and cool. Use curtains, blinds,  "or a sleep mask to block out light. To block out noise, use earplugs, soothing music, or a \"white noise\" machine.  Your evening and bedtime routine   Create a relaxing bedtime routine. You might want to take a warm shower or bath, or listen to soothing music.  Go to bed at the same time every night. And get up at the same time every morning, even if you feel tired.  What to avoid   Limit caffeine (coffee, tea, caffeinated sodas) during the day, and don't have any for at least 6 hours before bedtime.  Avoid drinking alcohol before bedtime. Alcohol can cause you to wake up more often during the night.  Try not to smoke or use tobacco, especially in the evening. Nicotine can keep you awake.  Limit naps during the day, especially close to bedtime.  Avoid lying in bed awake for too long. If you can't fall asleep or if you wake up in the middle of the night and can't get back to sleep within about 20 minutes, get out of bed and go to another room until you feel sleepy.  Avoid taking medicine right before bed that may keep you awake or make you feel hyper or energized. Your doctor can tell you if your medicine may do this and if you can take it earlier in the day.  If you can't sleep   Imagine yourself in a peaceful, pleasant scene. Focus on the details and feelings of being in a place that is relaxing.  Get up and do a quiet or boring activity until you feel sleepy.  Avoid drinking any liquids before going to bed to help prevent waking up often to use the bathroom.  Where can you learn more?  Go to https://www.WIB.net/patiented  Enter J942 in the search box to learn more about \"Learning About Sleeping Well.\"  Current as of: July 10, 2023  Content Version: 14.1 2006-2024 [employer].   Care instructions adapted under license by your healthcare professional. If you have questions about a medical condition or this instruction, always ask your healthcare professional. [employer] disclaims " any warranty or liability for your use of this information.

## 2024-10-03 LAB
ALBUMIN SERPL BCG-MCNC: 4.6 G/DL (ref 3.5–5.2)
ALP SERPL-CCNC: 104 U/L (ref 40–150)
ALT SERPL W P-5'-P-CCNC: 18 U/L (ref 0–50)
ANION GAP SERPL CALCULATED.3IONS-SCNC: 11 MMOL/L (ref 7–15)
AST SERPL W P-5'-P-CCNC: 24 U/L (ref 0–45)
BILIRUB SERPL-MCNC: 0.4 MG/DL
BUN SERPL-MCNC: 36.8 MG/DL (ref 8–23)
CALCIUM SERPL-MCNC: 9.4 MG/DL (ref 8.8–10.4)
CHLORIDE SERPL-SCNC: 110 MMOL/L (ref 98–107)
CHOLEST SERPL-MCNC: 154 MG/DL
CREAT SERPL-MCNC: 1.45 MG/DL (ref 0.51–0.95)
CREAT UR-MCNC: 87.8 MG/DL
EGFRCR SERPLBLD CKD-EPI 2021: 38 ML/MIN/1.73M2
FASTING STATUS PATIENT QL REPORTED: YES
FASTING STATUS PATIENT QL REPORTED: YES
GLUCOSE SERPL-MCNC: 98 MG/DL (ref 70–99)
HCO3 SERPL-SCNC: 22 MMOL/L (ref 22–29)
HDLC SERPL-MCNC: 53 MG/DL
LDLC SERPL CALC-MCNC: 72 MG/DL
MICROALBUMIN UR-MCNC: <12 MG/L
MICROALBUMIN/CREAT UR: NORMAL MG/G{CREAT}
NONHDLC SERPL-MCNC: 101 MG/DL
POTASSIUM SERPL-SCNC: 4.2 MMOL/L (ref 3.4–5.3)
PROT SERPL-MCNC: 7.2 G/DL (ref 6.4–8.3)
SODIUM SERPL-SCNC: 143 MMOL/L (ref 135–145)
TRIGL SERPL-MCNC: 145 MG/DL

## 2024-10-03 NOTE — RESULT ENCOUNTER NOTE
This is Federico Shepard and I am a PA who is covering for Dr. Hollingsworth who is currently out of the office. I had a chance to review your recent results and am happy to report no concerns.     -Electrolytes, kidney function, and liver function are stable  -A1c shows no sign of diabetes/prediabetes.  -Cholesterol levels look perfect  -Stable blood counts.    Let me know if you have any questions or concerns,     Federico Shepard PA-C  [facility]

## 2024-10-09 DIAGNOSIS — G43.009 MIGRAINE WITHOUT AURA AND WITHOUT STATUS MIGRAINOSUS, NOT INTRACTABLE: ICD-10-CM

## 2024-10-09 DIAGNOSIS — M48.062 SPINAL STENOSIS OF LUMBAR REGION WITH NEUROGENIC CLAUDICATION: ICD-10-CM

## 2024-10-09 RX ORDER — TIZANIDINE 2 MG/1
TABLET ORAL
Qty: 90 TABLET | Refills: 3 | Status: SHIPPED | OUTPATIENT
Start: 2024-10-09

## 2024-10-09 RX ORDER — CEPHALEXIN 500 MG/1
CAPSULE ORAL
Qty: 16 CAPSULE | Refills: 3 | Status: SHIPPED | OUTPATIENT
Start: 2024-10-09

## 2024-10-21 ENCOUNTER — ONCOLOGY VISIT (OUTPATIENT)
Dept: ONCOLOGY | Facility: CLINIC | Age: 74
End: 2024-10-21
Attending: INTERNAL MEDICINE
Payer: MEDICARE

## 2024-10-21 ENCOUNTER — LAB (OUTPATIENT)
Dept: INFUSION THERAPY | Facility: CLINIC | Age: 74
End: 2024-10-21
Attending: INTERNAL MEDICINE
Payer: MEDICARE

## 2024-10-21 VITALS
SYSTOLIC BLOOD PRESSURE: 121 MMHG | BODY MASS INDEX: 29.96 KG/M2 | DIASTOLIC BLOOD PRESSURE: 70 MMHG | RESPIRATION RATE: 16 BRPM | OXYGEN SATURATION: 100 % | WEIGHT: 171.8 LBS | HEART RATE: 80 BPM

## 2024-10-21 DIAGNOSIS — D64.9 NORMOCYTIC ANEMIA: ICD-10-CM

## 2024-10-21 DIAGNOSIS — D72.819 LEUKOPENIA, UNSPECIFIED TYPE: ICD-10-CM

## 2024-10-21 DIAGNOSIS — D64.9 NORMOCYTIC ANEMIA: Primary | ICD-10-CM

## 2024-10-21 LAB
BASOPHILS # BLD AUTO: 0 10E3/UL (ref 0–0.2)
BASOPHILS NFR BLD AUTO: 1 %
EOSINOPHIL # BLD AUTO: 0.2 10E3/UL (ref 0–0.7)
EOSINOPHIL NFR BLD AUTO: 5 %
ERYTHROCYTE [DISTWIDTH] IN BLOOD BY AUTOMATED COUNT: 12.5 % (ref 10–15)
HCT VFR BLD AUTO: 31.5 % (ref 35–47)
HGB BLD-MCNC: 10.2 G/DL (ref 11.7–15.7)
IMM GRANULOCYTES # BLD: 0 10E3/UL
IMM GRANULOCYTES NFR BLD: 0 %
LYMPHOCYTES # BLD AUTO: 1.3 10E3/UL (ref 0.8–5.3)
LYMPHOCYTES NFR BLD AUTO: 39 %
MCH RBC QN AUTO: 31.5 PG (ref 26.5–33)
MCHC RBC AUTO-ENTMCNC: 32.4 G/DL (ref 31.5–36.5)
MCV RBC AUTO: 97 FL (ref 78–100)
MONOCYTES # BLD AUTO: 0.4 10E3/UL (ref 0–1.3)
MONOCYTES NFR BLD AUTO: 10 %
NEUTROPHILS # BLD AUTO: 1.5 10E3/UL (ref 1.6–8.3)
NEUTROPHILS NFR BLD AUTO: 45 %
NRBC # BLD AUTO: 0 10E3/UL
NRBC BLD AUTO-RTO: 0 /100
PLATELET # BLD AUTO: 207 10E3/UL (ref 150–450)
RBC # BLD AUTO: 3.24 10E6/UL (ref 3.8–5.2)
WBC # BLD AUTO: 3.4 10E3/UL (ref 4–11)
WBC # BLD AUTO: 3.4 10E3/UL (ref 4–11)

## 2024-10-21 PROCEDURE — 99214 OFFICE O/P EST MOD 30 MIN: CPT | Performed by: INTERNAL MEDICINE

## 2024-10-21 PROCEDURE — 36415 COLL VENOUS BLD VENIPUNCTURE: CPT

## 2024-10-21 PROCEDURE — G2211 COMPLEX E/M VISIT ADD ON: HCPCS | Performed by: INTERNAL MEDICINE

## 2024-10-21 PROCEDURE — 85025 COMPLETE CBC W/AUTO DIFF WBC: CPT | Performed by: INTERNAL MEDICINE

## 2024-10-21 PROCEDURE — G0463 HOSPITAL OUTPT CLINIC VISIT: HCPCS | Performed by: INTERNAL MEDICINE

## 2024-10-21 ASSESSMENT — PAIN SCALES - GENERAL: PAINLEVEL: NO PAIN (0)

## 2024-10-21 NOTE — PROGRESS NOTES
HEMATOLOGY HISTORY: Ms. Sotomayor is a female with chronic anemia.  Her anemia is due to chronic renal disease and chronic disease.   1.  Multiple labs were done on 03/13/2019.   -WBC 3.5, hemoglobin 10.8 and platelets of 204.  MCV of 96.   -Creatinine of 1.26.   -Normal calcium.   -Normal LFT.   -Normal folate.   -Normal vitamin B12.   -Normal iron. Elevated ferritin.   -Erythropoietin mildly elevated at 38.   -Normal LDH.   -Soluable transferrin receptor is mildly elevated at 5.2.   -Normal haptoglobin.   2. On 11/29/2019, WBC of 4.3, hemoglobin 9.8 and platelets 230.  MCV of 103.   3. On 07/08/2019, CT scan did not reveal any splenomegaly or lymphadenopathy.   4. Bone marrow biopsy was done on 12/18/2019.  It reveals normal cellular bone marrow for age with 30% cellularity.  There is trilineage hematopoiesis.  No evidence of MDS.  Increased storage iron.  Cytogenetics is normal.  Flow cytometry is normal.  5. IV injectafer in 03/2020.  6. On 09/09/2021, hemoglobin of 9.5.  -On 10/04/2021, hemoglobin of 9.2.  7. Aranesp started on 10/15/2021.  She responds very well and requires it infrequently.       SUBJECTIVE:    Mrs. Sotomayor is a 73-year-old female with chronic anemia due to renal disease and anemia of chronic disease.       For anemia, she was started on Aranesp in 2021.  She requires it infrequently.  She received it in January 2024 for hemoglobin of 9.7.  She responded very well.  Her hemoglobin has been remaining above 10.     Patient has generalized weakness. She feels tired all the time. No headache.  No dizziness.  No chest pain.  No shortness of breath at rest.  No abdominal pain.  No nausea or vomiting.  Appetite is good.  No urinary or bowel complaints.  No bleeding.  No fever or night sweats.  All other review of systems is negative.     PHYSICAL EXAMINATION:    She is alert and oriented x 3.  Not in distress.  Vitals: Reviewed.  Rest of systems not examined.     LABORATORY: CBC reviewed.     ASSESSMENT:      1.  A 74-year-old female with chronic normocytic anemia secondary to renal disease and anemia of chronic disease.  2.  Chronic fatigue.  3.  Chronic kidney disease.  4.  Mild leukopenia/neutropenia.     PLAN:     -CBC in 2 months.  -Follow-up in 6 months with CBC.     DISCUSSION:  1.  CBC was reviewed with her.  Explained to her that hemoglobin is 10.2.  Her anemia is remaining stable.    As her hemoglobin is above 10, she will not be able to get Aranesp today.    She will have CBC rechecked in 2 months before she leaves for [place].  If hemoglobin is below 10, she will get Aranesp.    2.  She has mild leukopenia/neutropenia.  Will monitor it.  If there is progressive worsening, further workup will be done.    3.  Patient is chronically tired.  This is due to her multiple medical problems and anemia.    4.  I will see her in 6 months time.  Advised her to call us with any questions or concerns.     Total visit time of 20 minutes.  Time spent in today's visit, review of chart/investigations today and documentation.

## 2024-10-21 NOTE — LETTER
10/21/2024      Rosa Sotomayor  [address]  [place] [address]      Dear Colleague,    Thank you for referring your patient, Rosa Sotomayor, to the [facility]. Please see a copy of my visit note below.    HEMATOLOGY HISTORY: Ms. Sotomayor is a female with chronic anemia.  Her anemia is due to chronic renal disease and chronic disease.   1.  Multiple labs were done on 03/13/2019.   -WBC 3.5, hemoglobin 10.8 and platelets of 204.  MCV of 96.   -Creatinine of 1.26.   -Normal calcium.   -Normal LFT.   -Normal folate.   -Normal vitamin B12.   -Normal iron. Elevated ferritin.   -Erythropoietin mildly elevated at 38.   -Normal LDH.   -Soluable transferrin receptor is mildly elevated at 5.2.   -Normal haptoglobin.   2. On 11/29/2019, WBC of 4.3, hemoglobin 9.8 and platelets 230.  MCV of 103.   3. On 07/08/2019, CT scan did not reveal any splenomegaly or lymphadenopathy.   4. Bone marrow biopsy was done on 12/18/2019.  It reveals normal cellular bone marrow for age with 30% cellularity.  There is trilineage hematopoiesis.  No evidence of MDS.  Increased storage iron.  Cytogenetics is normal.  Flow cytometry is normal.  5. IV injectafer in 03/2020.  6. On 09/09/2021, hemoglobin of 9.5.  -On 10/04/2021, hemoglobin of 9.2.  7. Aranesp started on 10/15/2021.  She responds very well and requires it infrequently.       SUBJECTIVE:    Mrs. Sotomayor is a 73-year-old female with chronic anemia due to renal disease and anemia of chronic disease.       For anemia, she was started on Aranesp in 2021.  She requires it infrequently.  She received it in January 2024 for hemoglobin of 9.7.  She responded very well.  Her hemoglobin has been remaining above 10.     Patient has generalized weakness. She feels tired all the time. No headache.  No dizziness.  No chest pain.  No shortness of breath at rest.  No abdominal pain.  No nausea or vomiting.  Appetite is good.  No urinary or bowel complaints.  No  bleeding.  No fever or night sweats.  All other review of systems is negative.     PHYSICAL EXAMINATION:    She is alert and oriented x 3.  Not in distress.  Vitals: Reviewed.  Rest of systems not examined.     LABORATORY: CBC reviewed.     ASSESSMENT:     1.  A 74-year-old female with chronic normocytic anemia secondary to renal disease and anemia of chronic disease.  2.  Chronic fatigue.  3.  Chronic kidney disease.  4.  Mild leukopenia/neutropenia.     PLAN:     -CBC in 2 months.  -Follow-up in 6 months with CBC.     DISCUSSION:  1.  CBC was reviewed with her.  Explained to her that hemoglobin is 10.2.  Her anemia is remaining stable.    As her hemoglobin is above 10, she will not be able to get Aranesp today.    She will have CBC rechecked in 2 months before she leaves for [place].  If hemoglobin is below 10, she will get Aranesp.    2.  She has mild leukopenia/neutropenia.  Will monitor it.  If there is progressive worsening, further workup will be done.    3.  Patient is chronically tired.  This is due to her multiple medical problems and anemia.    4.  I will see her in 6 months time.  Advised her to call us with any questions or concerns.     Total visit time of 20 minutes.  Time spent in today's visit, review of chart/investigations today and documentation.          Again, thank you for allowing me to participate in the care of your patient.        Sincerely,        Nico Calderon MD

## 2024-10-30 ENCOUNTER — OFFICE VISIT (OUTPATIENT)
Dept: NEUROLOGY | Facility: CLINIC | Age: 74
End: 2024-10-30
Payer: MEDICARE

## 2024-10-30 VITALS — HEART RATE: 87 BPM | DIASTOLIC BLOOD PRESSURE: 72 MMHG | SYSTOLIC BLOOD PRESSURE: 131 MMHG | OXYGEN SATURATION: 98 %

## 2024-10-30 DIAGNOSIS — G43.E19 INTRACTABLE CHRONIC MIGRAINE WITH AURA AND WITHOUT STATUS MIGRAINOSUS: Primary | ICD-10-CM

## 2024-10-30 PROCEDURE — G2211 COMPLEX E/M VISIT ADD ON: HCPCS

## 2024-10-30 PROCEDURE — 99214 OFFICE O/P EST MOD 30 MIN: CPT

## 2024-10-30 RX ORDER — ALBUTEROL SULFATE 0.83 MG/ML
2.5 SOLUTION RESPIRATORY (INHALATION)
OUTPATIENT
Start: 2024-11-13

## 2024-10-30 RX ORDER — MEPERIDINE HYDROCHLORIDE 25 MG/ML
25 INJECTION INTRAMUSCULAR; INTRAVENOUS; SUBCUTANEOUS
OUTPATIENT
Start: 2024-11-13

## 2024-10-30 RX ORDER — DIPHENHYDRAMINE HYDROCHLORIDE 50 MG/ML
25 INJECTION INTRAMUSCULAR; INTRAVENOUS
Start: 2024-11-13

## 2024-10-30 RX ORDER — METHYLPREDNISOLONE SODIUM SUCCINATE 40 MG/ML
40 INJECTION INTRAMUSCULAR; INTRAVENOUS
Start: 2024-11-13

## 2024-10-30 RX ORDER — EPTINEZUMAB-JJMR 100 MG/ML
100 INJECTION INTRAVENOUS
Status: SHIPPED
Start: 2024-10-30 | End: 2024-11-04

## 2024-10-30 RX ORDER — EPINEPHRINE 1 MG/ML
0.3 INJECTION, SOLUTION, CONCENTRATE INTRAVENOUS EVERY 5 MIN PRN
OUTPATIENT
Start: 2024-11-13

## 2024-10-30 RX ORDER — HEPARIN SODIUM (PORCINE) LOCK FLUSH IV SOLN 100 UNIT/ML 100 UNIT/ML
5 SOLUTION INTRAVENOUS
OUTPATIENT
Start: 2024-11-13

## 2024-10-30 RX ORDER — ALBUTEROL SULFATE 90 UG/1
1-2 INHALANT RESPIRATORY (INHALATION)
Start: 2024-11-13

## 2024-10-30 RX ORDER — DIPHENHYDRAMINE HYDROCHLORIDE 50 MG/ML
50 INJECTION INTRAMUSCULAR; INTRAVENOUS
Start: 2024-11-13

## 2024-10-30 RX ORDER — HEPARIN SODIUM,PORCINE 10 UNIT/ML
5-20 VIAL (ML) INTRAVENOUS DAILY PRN
OUTPATIENT
Start: 2024-11-13

## 2024-10-30 ASSESSMENT — MIGRAINE DISABILITY ASSESSMENT (MIDAS)
HOW MANY DAYS WAS YOUR PRODUCTIVITY CUT IN HALF BECAUSE OF HEADACHES: 7
TOTAL SCORE: 30
HOW MANY DAYS DID YOU NOT DO HOUSEWORK BECAUSE OF HEADACHES: 10
HOW OFTEN WERE SOCIAL ACTIVITIES MISSED DUE TO HEADACHES: 3
HOW MANY DAYS WAS HOUSEWORK PRODUCTIVITY CUT IN HALF DUE TO HEADACHES: 10
HOW MANY DAYS DID YOU MISS WORK OR SCHOOL BECAUSE OF HEADACHES: 0
ON A SCALE FROM 0-10 ON AVERAGE HOW PAINFUL WERE HEADACHES: 8
HOW MANY DAYS IN THE PAST 3 MONTHS HAVE YOU HAD A HEADACHE: 45

## 2024-10-30 ASSESSMENT — HEADACHE IMPACT TEST (HIT 6)
HOW OFTEN DID HEADACHS LIMIT CONCENTRATION ON WORK OR DAILY ACTIVITY: SOMETIMES
HOW OFTEN HAVE YOU FELT FED UP OR IRRITATED BECAUSE OF YOUR HEADACHES: SOMETIMES
WHEN YOU HAVE HEADACHES HOW OFTEN IS THE PAIN SEVERE: VERY OFTEN
HOW OFTEN HAVE YOU FELT TOO TIRED TO WORK BECAUSE OF YOUR HEADACHES: VERY OFTEN
WHEN YOU HAVE A HEADACHE HOW OFTEN DO YOU WISH YOU COULD LIE DOWN: ALWAYS
HOW OFTEN DO HEADACHES LIMIT YOUR DAILY ACTIVITIES: ALWAYS
HIT6 TOTAL SCORE: 68
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
## 2024-11-02 NOTE — PROGRESS NOTES
Medication Therapy Management (MTM) Encounter    ASSESSMENT:                            Medication Adherence/Access: No issues identified.    Headache   Migraine:   Migraine symptoms and pain are not controlled with current regimen. She has tried and failed many first line options. She may benefit from starting Vyepti 100mg infusions every 3 months. Reviewed infusion process, side effects, and time to expected benefit. Discussed that dose can be increased to 300mg if she does not find the 100mg to beneficial after the first 1-2 infusions. See below regarding blood pressure medication recommendation. She will still continue with her current preventative and acute treatment regimens.     Hypertension   Blood pressure is typically below goal of less than 130/80 with current blood pressure regimen. At her last neurology visit, it was recommended to change her losartan to candesartan to see if this would help with migraine prevention and still control blood pressure. Will reach out to primary care provider to see if he would consider this change. She is taking losartan 50mg daily so would recommend to be changed to candesartan 8mg daily and titrate up as tolerated if needed.      Hyperlipidemia   LDL at goal of less than 100mg/dl and patient is taking appropriate statin therapy. She has been tolerating the combination of atorvastatin and gemfibrozil so no changes recommended at this time.      Allergy   Stable     Supplements   Stable     Post-menopausal:   Stable    Vertigo  Stable    Muscle spasm/RLS  Stable    PLAN:                            Please call the [facility] ([phone]) to schedule your infusion appointment in a week if scheduling has not contacted you. If you are eligible for [facility] ([facility]), they will contact you to transition.    Today we discussed starting Vyepti infusions. The starting dose in 100mg every 3 months (90 days). It can be increased to 300mg if needed  Common side  effects include infusion reaction, stuffy nose, and scratchy throat.   Please let Danette and myself know if you end up having nausea during the infusion.   [email or web address]  I will send a message to your primary care provider to recommend a change from losartan to candesartan     Follow-up: 12/27/2024 at 1:00 PM via phone call    SUBJECTIVE/OBJECTIVE:                          Rosa Sotomayor is a 73 year old female seen for an initial visit. She was referred to me from SHADI Guardado.      Reason for visit: Discuss [employer]i.    Allergies/ADRs: Reviewed in chart  Past Medical History: Reviewed in chart  Tobacco: She reports that she has never smoked. She has never been exposed to tobacco smoke. She has never used smokeless tobacco.  Alcohol: not currently using    Medication Adherence/Access: no issues reported.    Headache   Migraine:   Preventive medications  -Topiramate 100 mg once daily    She reports she will be going to [place] from January to February    Acute medications  -Zolmitriptan 5 mg at the onset of a headache and may repeat in 2 hours - limited to less than 9 days   -Ondansetron 4mg every 8 hours as needed for nausea  -acetaminophen 500mg daily as needed - limited to less than 14 days    She reports she is using up her zolmitriptan each month with how bad her migraines are.     Triggers include: Weather changes  Associated symptoms include: Nausea, Vomiting, diarrhea, and Photophobia  Patient reports having 25/30 headache days per month. She is having severe migraines about 7 per month. Sometimes she will get two migraines in a day  Current non-pharmacologic treatments include: she will try to do breathing exercises to help with her nausea      -Previous treatments tried:  Acute therapies:  - Acetaminophen  - Sumatriptan nasal spray - she likes this but can be costly   - Zolmitriptan nasal spray - effective but costly   - DHE   - Meclizine - not very helpful      Preventative  therapies:  - Amitriptyline - not effective  - Atenolol - not effective   -Aimovig many years but effect seemed to go away.   -Ajovy for three months - as the months progressed the medication did not work   -Tried increasing losartan to 50mg twice daily but this did not help her migraines so she went back to once daily     Hypertension   -Losartan 50mg once daily  -Carvedilol 6.25mg twice daily   -Torsemide 10mg once in the morning and then can repeat at noon if needed  Patient reports no current medication side effects  Patient does not self-monitor blood pressure.    She is wondering if her losartan will be changed to candesartan soon. She will be due for refills of her losartan and does not want to fills this if it will be picked up soon.      Hyperlipidemia   -Atorvastatin 40mg daily  -Gemfibrozil 600mg once daily   Patient reports no significant myalgias or other side effects.She has been on this regimen for a while without any side effects  The 10-year ASCVD risk score (Vi DK, et al., 2019) is: 13.8%    Values used to calculate the score:      Age: 73 years      Sex: Female      Is Non- : No      Diabetic: No      Tobacco smoker: No      Systolic Blood Pressure: 131 mmHg      Is BP treated: Yes      HDL Cholesterol: 53 mg/dL      Total Cholesterol: 154 mg/dL     Allergy   -Hydroxyzine 25mg daily as needed for itching  -Cetirizine 10mg once daily  Patient reports no current medication side effects.    Primary triggers are pollens.   Patient feels that current therapy is effective.      Supplements   -Probiotic once daily  -Vitamin D 2000 units once daily  -Cranberry once daily  -Vitamin B12 1000mcg once daily   -Multivitamin once daily   No reported issues at this time.      Post-menopausal:   -estradiol 10mcg tabs inserted vaginally twice weekly  No side effects to report today    Vertigo  -diazepam 2.5mg every 8 hours as needed  No side effects to report today     Muscle  spasm/RLS  -Tizanidine 2mg three times daily as needed  No side effects to report today.     Today's Vitals: LMP  (LMP Unknown)   ----------------    I spent 27 minutes with this patient today. I offer these suggestions for consideration by Danette Edwards. A copy of the visit note was provided to the patient's provider(s).    A summary of these recommendations was sent via [employer].    Drea Cole, PharmD, BCACP  Medication Therapy Management Pharmacist   [facility]    Telemedicine Visit Details  The patient's medications can be safely assessed via a telemedicine encounter.  Type of service:  Telephone visit  Originating Location (pt. Location): Home  Distant Location (provider location):  Off-site  Start Time: 1:02 PM  End Time:  1:29 PM     Medication Therapy Recommendations  No medication therapy recommendations to display

## 2024-11-04 ENCOUNTER — VIRTUAL VISIT (OUTPATIENT)
Dept: PHARMACY | Facility: CLINIC | Age: 74
End: 2024-11-04
Payer: MEDICARE

## 2024-11-04 DIAGNOSIS — E78.1 HYPERTRIGLYCERIDEMIA: ICD-10-CM

## 2024-11-04 DIAGNOSIS — Z78.9 TAKES DIETARY SUPPLEMENTS: ICD-10-CM

## 2024-11-04 DIAGNOSIS — N95.1 MENOPAUSAL SYMPTOM: ICD-10-CM

## 2024-11-04 DIAGNOSIS — M62.838 MUSCLE SPASM: ICD-10-CM

## 2024-11-04 DIAGNOSIS — R42 VERTIGO: ICD-10-CM

## 2024-11-04 DIAGNOSIS — J30.2 SEASONAL ALLERGIC RHINITIS: ICD-10-CM

## 2024-11-04 DIAGNOSIS — I10 BENIGN ESSENTIAL HYPERTENSION: ICD-10-CM

## 2024-11-04 DIAGNOSIS — G43.009 MIGRAINE WITHOUT AURA AND WITHOUT STATUS MIGRAINOSUS, NOT INTRACTABLE: Primary | ICD-10-CM

## 2024-11-04 NOTE — PATIENT INSTRUCTIONS
"Recommendations from today's MTM visit:                                                    MTM (medication therapy management) is a service provided by a clinical pharmacist designed to help you get the most of out of your medicines.      Please call the [facility] ([phone]) to schedule your infusion appointment in a week if scheduling has not contacted you. If you are eligible for [facility] ([facility]), they will contact you to transition.    Today we discussed starting Vyepti infusions. The starting dose in 100mg every 3 months (90 days). It can be increased to 300mg if needed  Common side effects include infusion reaction, stuffy nose, and scratchy throat.   Please let Danette and myself know if you end up having nausea during the infusion.   [email or web address]  I will send a message to your primary care provider to recommend a change from losartan to candesartan     Follow-up: 12/27/2024 at 1:00 PM via phone call    It was great speaking with you today.  I value your experience and would be very thankful for your time in providing feedback in our clinic survey. In the next few days, you may receive an email or text message from [employer] with a link to a survey related to your  clinical pharmacist.\"     To schedule another MTM appointment, please call the clinic directly or you may call the MTM scheduling line at [phone] or toll-free at [phone].     My Clinical Pharmacist's contact information:                                                      Please feel free to contact me with any questions or concerns you have.      Drea Cole, PharmD, BCACP  Medication Therapy Management Pharmacist   [facility]     "

## 2024-11-04 NOTE — Clinical Note
Kendrick Hollingsworth,   Patient seen today for a MTM visit to start Vyepti infusions for her migraines. After reviewing her medications, I am wondering if you would be open to changing her losartan to candesartan which can be a helpful preventative migraine option. If agreeable, I would recommend changing from losartan 50mg to candesartan 8mg daily.   Thank you! Drea Cole, NanoD, BCACP Medication Therapy Management Pharmacist  [facility]

## 2024-11-12 ENCOUNTER — TELEPHONE (OUTPATIENT)
Dept: NEUROLOGY | Facility: CLINIC | Age: 74
End: 2024-11-12
Payer: MEDICARE

## 2024-11-12 DIAGNOSIS — G43.E09 CHRONIC MIGRAINE WITH AURA WITHOUT STATUS MIGRAINOSUS, NOT INTRACTABLE: ICD-10-CM

## 2024-11-12 NOTE — TELEPHONE ENCOUNTER
There was also a refill encounter.  Please see 11/12/24 refill encounter.     Clara RN, BSN  [facility]

## 2024-11-12 NOTE — TELEPHONE ENCOUNTER
[facility] Call Center    Phone Message    May a detailed message be left on voicemail: yes     Reason for Call:  Patient called to speak to care team, per pt she was not able to get her infusion scheduled until December 19 2024. Patient asking if Danette will approve one more refill of the Ajovy injection while she waits for her infusion? Please call back to discuss     Action Taken: Other: cs neurology    Travel Screening: Not Applicable     Date of Service:

## 2024-11-12 NOTE — TELEPHONE ENCOUNTER
Pt also called:  Patient called to speak to care team, per pt she was not able to get her infusion scheduled until December 19 2024. Patient asking if Danette will approve one more refill of the Ajovy injection while she waits for her infusion? Please call back to discuss     This refill request was for Aimovig, but reviewed chart and pt was last on Ajovy.      Please review and advise on refill of the Ajovy, until pt can start Vyepti.    Clara RN, BSN  [facility]

## 2024-11-12 NOTE — TELEPHONE ENCOUNTER
Provider ordered one more month of Ajovy.     Called and updated patient.     Clara RN, BSN  [facility]

## 2024-11-30 DIAGNOSIS — I10 BENIGN ESSENTIAL HYPERTENSION: ICD-10-CM

## 2024-12-02 RX ORDER — CARVEDILOL 6.25 MG/1
6.25 TABLET ORAL 2 TIMES DAILY WITH MEALS
Qty: 180 TABLET | Refills: 0 | Status: SHIPPED | OUTPATIENT
Start: 2024-12-02

## 2024-12-12 ENCOUNTER — TRANSFERRED RECORDS (OUTPATIENT)
Dept: HEALTH INFORMATION MANAGEMENT | Facility: CLINIC | Age: 74
End: 2024-12-12

## 2024-12-12 ENCOUNTER — OFFICE VISIT (OUTPATIENT)
Dept: FAMILY MEDICINE | Facility: CLINIC | Age: 74
End: 2024-12-12
Payer: MEDICARE

## 2024-12-12 VITALS
HEART RATE: 67 BPM | BODY MASS INDEX: 29.37 KG/M2 | HEIGHT: 64 IN | SYSTOLIC BLOOD PRESSURE: 124 MMHG | WEIGHT: 172 LBS | OXYGEN SATURATION: 100 % | RESPIRATION RATE: 16 BRPM | DIASTOLIC BLOOD PRESSURE: 74 MMHG | TEMPERATURE: 97.8 F

## 2024-12-12 DIAGNOSIS — I10 BENIGN ESSENTIAL HYPERTENSION: ICD-10-CM

## 2024-12-12 DIAGNOSIS — G43.009 MIGRAINE WITHOUT AURA AND WITHOUT STATUS MIGRAINOSUS, NOT INTRACTABLE: Primary | ICD-10-CM

## 2024-12-12 DIAGNOSIS — R42 VERTIGO: ICD-10-CM

## 2024-12-12 RX ORDER — DIAZEPAM 5 MG/1
2.5 TABLET ORAL EVERY 8 HOURS PRN
Qty: 10 TABLET | Refills: 0 | Status: SHIPPED | OUTPATIENT
Start: 2024-12-12

## 2024-12-12 NOTE — PROGRESS NOTES
Assessment & Plan     Migraine without aura and without status migrainosus, not intractable  We discussed the candesartan is an ARB that is FDA approved for the prevention of migraines.  We discussed that losartan is a very similar drug in the same class although it has not been studied for the prevention of migraine headaches.  We discussed that there is a potential possibility that the candesartan may help prevent headaches or reduce the severity of her headaches.  At this point, she does not want to make the change from losartan to candesartan.  She is hopeful that her new infusion that she gets next week will be helpful for her headaches.      Benign essential hypertension  She is concerned that her blood pressure may not be adequately controlled, but her blood pressure in clinic today is at goal of that less than 130/80.  I asked her to check a set of blood pressure readings at home as per patient instructions.  If the home blood pressure readings are above goal, we could consider adding amlodipine.  Since with amlodipine no lab follow-up would be necessary to titrate the dose, it would be relatively straightforward to do even if she is planning on leaving for [place] soon.  She will be in touch with me over [employer] regarding her home blood pressure readings    Vertigo  I refilled her diazepam  - diazepam (VALIUM) 5 MG tablet; Take 0.5 tablets (2.5 mg) by mouth every 8 hours as needed (vertigo/dizziness).      Total time 29 minutes    FUTURE APPOINTMENTS:       - Follow-up for annual visit or as needed    Subjective   Rosa is a 73 year old, presenting for the following health issues:  Recheck Medication        10/2/2024    10:50 AM   Additional Questions   Roomed by Lluvia     History of Present Illness       Hypertension: She presents for follow up of hypertension.  She does check blood pressure  regularly outside of the clinic. Outside blood pressures have been over 140/90. She follows a low salt diet.  "    She eats 2-3 servings of fruits and vegetables daily.She consumes 0 sweetened beverage(s) daily.She exercises with enough effort to increase her heart rate 20 to 29 minutes per day.  She exercises with enough effort to increase her heart rate 4 days per week.   She is taking medications regularly.             73-year-old female with hypertension, chronic kidney disease, anemia from chronic kidney disease, chronic headaches, hyperlipidemia      She is followed by a headache specialty clinic for migraine    She was on an infusion for migraine prophylaxis that elevated her blood pressure but since stopping that infusion her blood pressures have been improved    She had discussion with her headache specialist about switching her losartan to candesartan as candesartan is FDA approved for the prevention of migraines    She is struggling a lot with her migraines and is planning on going on a new infusion next Thursday that apparently does not affect her blood pressure    Her blood pressure was elevated at her eye doctor appointment this morning using a wrist cuff but today in clinic it is quite normal    She has some reluctance about switching her blood pressure medication to candesartan as she is planning a trip to [place] this winter and does not want to be titrating a new medication for blood pressure while traveling    Also, she gets vertigo and carsickness when traveling by car and she is planning to travel to [place] by car as such, she asked me for a refill of her diazepam which she has used in the past with success and without side effects for motion sickness and vertigo      Objective    /74 (BP Location: Left arm, Patient Position: Sitting, Cuff Size: Adult Regular)   Pulse 67   Temp 97.8  F (36.6  C) (Oral)   Resp 16   Ht 1.613 m (5' 3.5\")   Wt 78 kg (172 lb)   LMP  (LMP Unknown)   SpO2 100%   Breastfeeding No   BMI 29.99 kg/m    Body mass index is 29.99 kg/m .  Physical Exam   General: This " is a well-appearing female in no acute distress            Signed Electronically by: Josh Hollingsworth MD

## 2024-12-12 NOTE — PATIENT INSTRUCTIONS
Make sure your home blood pressure cuff checks your blood pressure on your arm not on your wrist.  Wrist blood pressure cuffs are NOT accurate.  Omron is a good blood pressure cuff brand.    Take your blood pressure after 5 minutes of rest in the AM and PM for 3 days and record the readings (6 total readings).  Make sure your feet are flat on the floor and your back is supported when you check your blood pressure.  If the initial blood pressure reading is too high, wait another 5 minutes and check again and record the better of the two readings.      Send me a [employer] message with the average of your top readings (Systolic blood pressures) and the average of your bottom readings (Diastolic blood pressures).  If you don't want to do the math, you can just send me the 6 readings.        Our goal is for this average to be less than 130/80, however, less than 140/90 is acceptable.

## 2024-12-17 ENCOUNTER — LAB (OUTPATIENT)
Dept: INFUSION THERAPY | Facility: CLINIC | Age: 74
End: 2024-12-17
Attending: INTERNAL MEDICINE
Payer: MEDICARE

## 2024-12-17 DIAGNOSIS — D64.9 NORMOCYTIC ANEMIA: ICD-10-CM

## 2024-12-17 DIAGNOSIS — D72.819 LEUKOPENIA, UNSPECIFIED TYPE: ICD-10-CM

## 2024-12-17 LAB
BASOPHILS # BLD AUTO: 0 10E3/UL (ref 0–0.2)
BASOPHILS NFR BLD AUTO: 1 %
EOSINOPHIL # BLD AUTO: 0.2 10E3/UL (ref 0–0.7)
EOSINOPHIL NFR BLD AUTO: 5 %
ERYTHROCYTE [DISTWIDTH] IN BLOOD BY AUTOMATED COUNT: 12.7 % (ref 10–15)
HCT VFR BLD AUTO: 31.5 % (ref 35–47)
HGB BLD-MCNC: 10.3 G/DL (ref 11.7–15.7)
IMM GRANULOCYTES # BLD: 0 10E3/UL
IMM GRANULOCYTES NFR BLD: 0 %
LYMPHOCYTES # BLD AUTO: 1.4 10E3/UL (ref 0.8–5.3)
LYMPHOCYTES NFR BLD AUTO: 37 %
MCH RBC QN AUTO: 32 PG (ref 26.5–33)
MCHC RBC AUTO-ENTMCNC: 32.7 G/DL (ref 31.5–36.5)
MCV RBC AUTO: 98 FL (ref 78–100)
MONOCYTES # BLD AUTO: 0.4 10E3/UL (ref 0–1.3)
MONOCYTES NFR BLD AUTO: 10 %
NEUTROPHILS # BLD AUTO: 1.8 10E3/UL (ref 1.6–8.3)
NEUTROPHILS NFR BLD AUTO: 47 %
NRBC # BLD AUTO: 0 10E3/UL
NRBC BLD AUTO-RTO: 0 /100
PLATELET # BLD AUTO: 183 10E3/UL (ref 150–450)
RBC # BLD AUTO: 3.22 10E6/UL (ref 3.8–5.2)
WBC # BLD AUTO: 3.8 10E3/UL (ref 4–11)

## 2024-12-17 PROCEDURE — 36415 COLL VENOUS BLD VENIPUNCTURE: CPT

## 2024-12-17 PROCEDURE — 85025 COMPLETE CBC W/AUTO DIFF WBC: CPT | Performed by: INTERNAL MEDICINE

## 2024-12-18 NOTE — RESULT ENCOUNTER NOTE
Dear Ms. Sotomayor,    Blood test is stable.    Please, call me with any questions.    Nico Calderon MD

## 2024-12-19 ENCOUNTER — INFUSION THERAPY VISIT (OUTPATIENT)
Dept: INFUSION THERAPY | Facility: CLINIC | Age: 74
End: 2024-12-19
Attending: INTERNAL MEDICINE
Payer: MEDICARE

## 2024-12-19 VITALS
OXYGEN SATURATION: 98 % | TEMPERATURE: 97.8 F | DIASTOLIC BLOOD PRESSURE: 78 MMHG | SYSTOLIC BLOOD PRESSURE: 145 MMHG | HEART RATE: 65 BPM | RESPIRATION RATE: 16 BRPM

## 2024-12-19 DIAGNOSIS — G43.E19 INTRACTABLE CHRONIC MIGRAINE WITH AURA AND WITHOUT STATUS MIGRAINOSUS: Primary | ICD-10-CM

## 2024-12-19 PROCEDURE — 258N000003 HC RX IP 258 OP 636

## 2024-12-19 RX ORDER — DIPHENHYDRAMINE HYDROCHLORIDE 50 MG/ML
50 INJECTION INTRAMUSCULAR; INTRAVENOUS
Start: 2025-03-19

## 2024-12-19 RX ORDER — DIPHENHYDRAMINE HYDROCHLORIDE 50 MG/ML
25 INJECTION INTRAMUSCULAR; INTRAVENOUS
Start: 2025-03-19

## 2024-12-19 RX ORDER — ALBUTEROL SULFATE 90 UG/1
1-2 INHALANT RESPIRATORY (INHALATION)
Start: 2025-03-19

## 2024-12-19 RX ORDER — ALBUTEROL SULFATE 0.83 MG/ML
2.5 SOLUTION RESPIRATORY (INHALATION)
OUTPATIENT
Start: 2025-03-19

## 2024-12-19 RX ORDER — HEPARIN SODIUM (PORCINE) LOCK FLUSH IV SOLN 100 UNIT/ML 100 UNIT/ML
5 SOLUTION INTRAVENOUS
OUTPATIENT
Start: 2025-03-19

## 2024-12-19 RX ORDER — HEPARIN SODIUM,PORCINE 10 UNIT/ML
5-20 VIAL (ML) INTRAVENOUS DAILY PRN
OUTPATIENT
Start: 2025-03-19

## 2024-12-19 RX ORDER — METHYLPREDNISOLONE SODIUM SUCCINATE 40 MG/ML
40 INJECTION INTRAMUSCULAR; INTRAVENOUS
Start: 2025-03-19

## 2024-12-19 RX ORDER — EPINEPHRINE 1 MG/ML
0.3 INJECTION, SOLUTION INTRAMUSCULAR; SUBCUTANEOUS EVERY 5 MIN PRN
OUTPATIENT
Start: 2025-03-19

## 2024-12-19 RX ORDER — MEPERIDINE HYDROCHLORIDE 25 MG/ML
25 INJECTION INTRAMUSCULAR; INTRAVENOUS; SUBCUTANEOUS
OUTPATIENT
Start: 2025-03-19

## 2024-12-19 RX ADMIN — SODIUM CHLORIDE 100 MG: 9 INJECTION, SOLUTION INTRAVENOUS at 12:04

## 2024-12-19 ASSESSMENT — PAIN SCALES - GENERAL: PAINLEVEL_OUTOF10: NO PAIN (0)

## 2024-12-19 NOTE — PROGRESS NOTES
"Infusion Nursing Note:  Rosa Sotomayor presents today for Vyepti infusion.    Patient seen by provider today: No   present during visit today: Not Applicable.    Note: pt here for first time dose Vyepti infusion for treatment of migraine headaches. Vyepti pt information printed and given to pt. Pt reports migraine headaches \"every couple days\" 7-8/10 pain and at times will start during night-currently treating with Zolmtiripan. At times pt will have visual aura preceding migraine and is able to treat before pain starts.       Intravenous Access:  Peripheral IV placed.    Treatment Conditions:  Not Applicable.      Post Infusion Assessment:  Patient tolerated infusion without incident.  Site patent and intact, free from redness, edema or discomfort.  No evidence of extravasations.  Access discontinued per protocol.       Discharge Plan:   Discharge instructions reviewed with: Patient.  Patient and/or family verbalized understanding of discharge instructions and all questions answered.  Patient discharged in stable condition accompanied by: self.  Departure Mode: Ambulatory.      Norma Jennings RN    "

## 2025-02-19 ENCOUNTER — LAB (OUTPATIENT)
Dept: LAB | Facility: CLINIC | Age: 75
End: 2025-02-19
Payer: MEDICARE

## 2025-02-19 DIAGNOSIS — D72.819 LEUKOPENIA, UNSPECIFIED TYPE: ICD-10-CM

## 2025-02-19 DIAGNOSIS — D64.9 NORMOCYTIC ANEMIA: ICD-10-CM

## 2025-02-19 LAB
BASOPHILS # BLD AUTO: 0 10E3/UL (ref 0–0.2)
BASOPHILS NFR BLD AUTO: 0 %
EOSINOPHIL # BLD AUTO: 0.2 10E3/UL (ref 0–0.7)
EOSINOPHIL NFR BLD AUTO: 4 %
ERYTHROCYTE [DISTWIDTH] IN BLOOD BY AUTOMATED COUNT: 12.5 % (ref 10–15)
HCT VFR BLD AUTO: 33.2 % (ref 35–47)
HGB BLD-MCNC: 10.3 G/DL (ref 11.7–15.7)
IMM GRANULOCYTES # BLD: 0 10E3/UL
IMM GRANULOCYTES NFR BLD: 0 %
LYMPHOCYTES # BLD AUTO: 1.4 10E3/UL (ref 0.8–5.3)
LYMPHOCYTES NFR BLD AUTO: 30 %
MCH RBC QN AUTO: 30.9 PG (ref 26.5–33)
MCHC RBC AUTO-ENTMCNC: 31 G/DL (ref 31.5–36.5)
MCV RBC AUTO: 100 FL (ref 78–100)
MONOCYTES # BLD AUTO: 0.4 10E3/UL (ref 0–1.3)
MONOCYTES NFR BLD AUTO: 9 %
NEUTROPHILS # BLD AUTO: 2.8 10E3/UL (ref 1.6–8.3)
NEUTROPHILS NFR BLD AUTO: 58 %
PLATELET # BLD AUTO: 188 10E3/UL (ref 150–450)
RBC # BLD AUTO: 3.33 10E6/UL (ref 3.8–5.2)
WBC # BLD AUTO: 4.8 10E3/UL (ref 4–11)

## 2025-02-19 PROCEDURE — 85025 COMPLETE CBC W/AUTO DIFF WBC: CPT

## 2025-02-19 PROCEDURE — 36415 COLL VENOUS BLD VENIPUNCTURE: CPT

## 2025-03-09 DIAGNOSIS — I10 BENIGN ESSENTIAL HYPERTENSION: ICD-10-CM

## 2025-03-10 RX ORDER — CARVEDILOL 6.25 MG/1
6.25 TABLET ORAL 2 TIMES DAILY WITH MEALS
Qty: 180 TABLET | Refills: 2 | Status: SHIPPED | OUTPATIENT
Start: 2025-03-10

## 2025-03-17 ENCOUNTER — TELEPHONE (OUTPATIENT)
Dept: NEUROLOGY | Facility: CLINIC | Age: 75
End: 2025-03-17
Payer: MEDICARE
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
## 2025-03-20 ENCOUNTER — INFUSION THERAPY VISIT (OUTPATIENT)
Dept: INFUSION THERAPY | Facility: CLINIC | Age: 75
End: 2025-03-20
Payer: MEDICARE

## 2025-03-20 VITALS
DIASTOLIC BLOOD PRESSURE: 73 MMHG | RESPIRATION RATE: 20 BRPM | OXYGEN SATURATION: 99 % | TEMPERATURE: 97.9 F | SYSTOLIC BLOOD PRESSURE: 144 MMHG | HEART RATE: 80 BPM

## 2025-03-20 DIAGNOSIS — G43.E19 INTRACTABLE CHRONIC MIGRAINE WITH AURA AND WITHOUT STATUS MIGRAINOSUS: Primary | ICD-10-CM

## 2025-03-20 PROCEDURE — 258N000003 HC RX IP 258 OP 636

## 2025-03-20 PROCEDURE — 250N000011 HC RX IP 250 OP 636: Mod: JZ

## 2025-03-20 RX ORDER — ALBUTEROL SULFATE 90 UG/1
1-2 INHALANT RESPIRATORY (INHALATION)
Start: 2025-06-17

## 2025-03-20 RX ORDER — DIPHENHYDRAMINE HYDROCHLORIDE 50 MG/ML
25 INJECTION, SOLUTION INTRAMUSCULAR; INTRAVENOUS
Start: 2025-06-17

## 2025-03-20 RX ORDER — MEPERIDINE HYDROCHLORIDE 25 MG/ML
25 INJECTION INTRAMUSCULAR; INTRAVENOUS; SUBCUTANEOUS
OUTPATIENT
Start: 2025-06-17

## 2025-03-20 RX ORDER — HEPARIN SODIUM (PORCINE) LOCK FLUSH IV SOLN 100 UNIT/ML 100 UNIT/ML
5 SOLUTION INTRAVENOUS
OUTPATIENT
Start: 2025-06-17

## 2025-03-20 RX ORDER — DIPHENHYDRAMINE HYDROCHLORIDE 50 MG/ML
50 INJECTION, SOLUTION INTRAMUSCULAR; INTRAVENOUS
Start: 2025-06-17

## 2025-03-20 RX ORDER — ALBUTEROL SULFATE 0.83 MG/ML
2.5 SOLUTION RESPIRATORY (INHALATION)
OUTPATIENT
Start: 2025-06-17

## 2025-03-20 RX ORDER — METHYLPREDNISOLONE SODIUM SUCCINATE 40 MG/ML
40 INJECTION INTRAMUSCULAR; INTRAVENOUS
Start: 2025-06-17

## 2025-03-20 RX ORDER — EPINEPHRINE 1 MG/ML
0.3 INJECTION, SOLUTION INTRAMUSCULAR; SUBCUTANEOUS EVERY 5 MIN PRN
OUTPATIENT
Start: 2025-06-17

## 2025-03-20 RX ORDER — HEPARIN SODIUM,PORCINE 10 UNIT/ML
5-20 VIAL (ML) INTRAVENOUS DAILY PRN
OUTPATIENT
Start: 2025-06-17

## 2025-03-20 RX ADMIN — SODIUM CHLORIDE 250 ML: 0.9 INJECTION, SOLUTION INTRAVENOUS at 10:37

## 2025-03-20 RX ADMIN — EPTINEZUMAB-JJMR 100 MG: 100 INJECTION INTRAVENOUS at 10:42

## 2025-03-20 NOTE — PROGRESS NOTES
Infusion Nursing Note:  Rosa Sotomayor presents today for Vyepti.    Patient seen by provider today: No   present during visit today: Not Applicable.    Note: N/A.      Intravenous Access:  Peripheral IV placed.    Treatment Conditions:  Not Applicable.      Post Infusion Assessment:  Patient tolerated infusion without incident.  Blood return noted pre and post infusion.  Site patent and intact, free from redness, edema or discomfort.  No evidence of extravasations.  Access discontinued per protocol.       Discharge Plan:   Patient declined prescription refills.  Discharge instructions reviewed with: Patient.  Patient verbalized understanding of discharge instructions and all questions answered.  AVS to patient via TAPPT.  Patient will return as scheduled for next appointment.   Patient discharged in stable condition accompanied by: self.  Departure Mode: Ambulatory.      Rose Mahajan RN

## 2025-04-21 ENCOUNTER — LAB (OUTPATIENT)
Dept: INFUSION THERAPY | Facility: CLINIC | Age: 75
End: 2025-04-21
Attending: INTERNAL MEDICINE
Payer: MEDICARE

## 2025-04-21 ENCOUNTER — ONCOLOGY VISIT (OUTPATIENT)
Dept: ONCOLOGY | Facility: CLINIC | Age: 75
End: 2025-04-21
Attending: INTERNAL MEDICINE
Payer: MEDICARE

## 2025-04-21 VITALS
SYSTOLIC BLOOD PRESSURE: 117 MMHG | BODY MASS INDEX: 29.82 KG/M2 | WEIGHT: 171 LBS | HEART RATE: 85 BPM | DIASTOLIC BLOOD PRESSURE: 72 MMHG | RESPIRATION RATE: 16 BRPM | OXYGEN SATURATION: 100 %

## 2025-04-21 DIAGNOSIS — D64.9 NORMOCYTIC ANEMIA: Primary | ICD-10-CM

## 2025-04-21 DIAGNOSIS — D64.9 NORMOCYTIC ANEMIA: ICD-10-CM

## 2025-04-21 DIAGNOSIS — D72.819 LEUKOPENIA, UNSPECIFIED TYPE: ICD-10-CM

## 2025-04-21 LAB
BASOPHILS # BLD AUTO: 0 10E3/UL (ref 0–0.2)
BASOPHILS NFR BLD AUTO: 0 %
EOSINOPHIL # BLD AUTO: 0.3 10E3/UL (ref 0–0.7)
EOSINOPHIL NFR BLD AUTO: 6 %
ERYTHROCYTE [DISTWIDTH] IN BLOOD BY AUTOMATED COUNT: 13.2 % (ref 10–15)
HCT VFR BLD AUTO: 32.8 % (ref 35–47)
HGB BLD-MCNC: 10.4 G/DL (ref 11.7–15.7)
IMM GRANULOCYTES # BLD: 0 10E3/UL
IMM GRANULOCYTES NFR BLD: 0 %
LYMPHOCYTES # BLD AUTO: 1.7 10E3/UL (ref 0.8–5.3)
LYMPHOCYTES NFR BLD AUTO: 34 %
MCH RBC QN AUTO: 31 PG (ref 26.5–33)
MCHC RBC AUTO-ENTMCNC: 31.7 G/DL (ref 31.5–36.5)
MCV RBC AUTO: 98 FL (ref 78–100)
MONOCYTES # BLD AUTO: 0.6 10E3/UL (ref 0–1.3)
MONOCYTES NFR BLD AUTO: 11 %
NEUTROPHILS # BLD AUTO: 2.5 10E3/UL (ref 1.6–8.3)
NEUTROPHILS NFR BLD AUTO: 49 %
NRBC # BLD AUTO: 0 10E3/UL
NRBC BLD AUTO-RTO: 0 /100
PLATELET # BLD AUTO: 255 10E3/UL (ref 150–450)
RBC # BLD AUTO: 3.35 10E6/UL (ref 3.8–5.2)
WBC # BLD AUTO: 5.2 10E3/UL (ref 4–11)

## 2025-04-21 PROCEDURE — 36415 COLL VENOUS BLD VENIPUNCTURE: CPT

## 2025-04-21 PROCEDURE — 99214 OFFICE O/P EST MOD 30 MIN: CPT | Performed by: INTERNAL MEDICINE

## 2025-04-21 PROCEDURE — G2211 COMPLEX E/M VISIT ADD ON: HCPCS | Performed by: INTERNAL MEDICINE

## 2025-04-21 PROCEDURE — G0463 HOSPITAL OUTPT CLINIC VISIT: HCPCS | Performed by: INTERNAL MEDICINE

## 2025-04-21 PROCEDURE — 85025 COMPLETE CBC W/AUTO DIFF WBC: CPT | Performed by: INTERNAL MEDICINE

## 2025-04-21 ASSESSMENT — PAIN SCALES - GENERAL: PAINLEVEL_OUTOF10: NO PAIN (0)

## 2025-04-21 NOTE — PROGRESS NOTES
"Oncology Rooming Note    April 21, 2025 9:08 AM   Rosa Sotomayor is a 74 year old female who presents for:    Chief Complaint   Patient presents with    Oncology Clinic Visit     Initial Vitals: /72   Pulse 85   Resp 16   Wt 77.6 kg (171 lb)   LMP  (LMP Unknown)   SpO2 100%   BMI 29.82 kg/m   Estimated body mass index is 29.82 kg/m  as calculated from the following:    Height as of 12/12/24: 1.613 m (5' 3.5\").    Weight as of this encounter: 77.6 kg (171 lb). Body surface area is 1.86 meters squared.  No Pain (0) Comment: Data Unavailable   No LMP recorded (lmp unknown). Patient is postmenopausal.  Allergies reviewed: Yes  Medications reviewed: Yes    Medications: Medication refills not needed today.  Pharmacy name entered into [facility]: [id number] - [place], [place] - [address] AT [facility] OF [place] [address]      Nadeen Farr CMA            "

## 2025-04-21 NOTE — PROGRESS NOTES
Medical Assistant Note:  Rosa Sotomayor presents today for blood draw.    Patient seen by provider today: Yes: oleksandr.   present during visit today: Not Applicable.    Concerns: No Concerns.    Procedure:  Lab draw site: lac, Needle type: bf, Gauge: 23.    Post Assessment:  Labs drawn without difficulty: Yes.    Discharge Plan:  Departure Mode: Ambulatory.    Face to Face Time: 5 min   José Miguel cbc.    Fany Mccarty, CMA

## 2025-04-21 NOTE — PROGRESS NOTES
HEMATOLOGY HISTORY: Ms. 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     1.  Multiple labs were done on 03/13/2019.   -WBC 3.5, hemoglobin 10.8 and platelets of 204.  MCV of 96.   -Creatinine of 1.26.   -Normal calcium.   -Normal LFT.   -Normal folate.   -Normal vitamin B12.   -Normal iron. Elevated ferritin.   -Erythropoietin mildly elevated at 38.   -Normal LDH.   -Soluable transferrin receptor is mildly elevated at 5.2.   -Normal haptoglobin.   2. On 11/29/2019, WBC of 4.3, hemoglobin 9.8 and platelets 230.  MCV of 103.   3. On 07/08/2019, CT scan did not reveal any splenomegaly or lymphadenopathy.   4. Bone marrow biopsy was done on 12/18/2019.  It reveals normal cellular bone marrow for age with 30% cellularity.  There is trilineage hematopoiesis.  No evidence of MDS.  Increased storage iron.  Cytogenetics is normal.  Flow cytometry is normal.  5. IV injectafer in 03/2020.  6. On 09/09/2021, hemoglobin of 9.5.  -On 10/04/2021, hemoglobin of 9.2.  7. Aranesp started on 10/15/2021.  She responds very well and requires it infrequently.       SUBJECTIVE:    Mrs. Sotomayor is an 74-year-old female with chronic anemia due to renal disease and anemia of chronic disease.       For anemia, she was started on Aranesp in 2021. She responds very well. She requires it very infrequently.     End of January 2025, patient had pneumonia while in [place].    Patient's main problem is generalized weakness.  Patient gets very tired especially in the afternoon and has to take a nap.    Patient gets migraine headache.  She has seen neurologist.    Patient has chronic kidney disease.  She follows up with nephrologist.  Creatinine has been remaining stable around 1.5.     Occasional dizziness.  No chest pain.  No shortness of breath at rest.  She gets short of breath on exertion. No abdominal pain.  No nausea or vomiting.  Appetite is good.  No urinary or bowel complaints.  No bleeding.  No fever  or night sweats.      She was seen in the ER on 03/15/2025 because of migraine, nausea, vomiting and diarrhea.    She had multiple labs done with nephrologist on 04/15/25:  - Hemoglobin of 10.1   -Normal iron and iron saturation index.  - Elevated ferritin of 448.  - Creatinine of 1.53.  -Normal vitamin D.     PHYSICAL EXAMINATION:    She is alert and oriented x 3.  Not in distress.  Vitals: Reviewed.  Rest of systems not examined.     LABORATORY: CBC done today reveals hemoglobin of 10.4.  Normal WBC and platelet.      ASSESSMENT:     1.  An 74-year-old female with chronic normocytic anemia secondary to renal disease and anemia of chronic disease.  2.  Chronic fatigue.  3.  Chronic kidney disease.     PLAN:     -CBC every 3 months.  -Aranesp if hemoglobin below 10.  -See me in 1 year.     DISCUSSION:  1.  Labs were reviewed with the patient.  Her anemia is stable.  Hemoglobin is 10.4.  Normal WBC and platelet.    Patient's anemia secondary to renal disease and anemia of chronic disease.      Patient was started on aranesp for anemia due to renal disease.  She responds very well to it.  She has not  required it for more than a year as hemoglobin has been remaining above 10.    She will have CBC checked every 3 months.  Aranesp will be given for hemoglobin below 10.    2.  Patient has generalized weakness.  This is multifactorial from anemia, renal disease, other medical problems and her age.  In last few months, multiple labs have been checked.  No deficiency of iron.  Vitamin D is normal.  Creatinine is stable.  I am hoping her fatigue does not get worse.    3.  She had multiple questions which were all answered.  I will see her in 1 year time.    Total visit time of 30 minutes.  Time spent in today's visit, review of chart/investigations today and documentation today.

## 2025-04-21 NOTE — LETTER
"4/21/2025      Rosa Sotomayor  [address]  [place] [address]      Dear Colleague,    Thank you for referring your patient, Rosa Sotomayor, to the [facility]. Please see a copy of my visit note below.    Oncology Rooming Note    April 21, 2025 9:08 AM   Rosa Sotomayor is a 74 year old female who presents for:    Chief Complaint   Patient presents with     Oncology Clinic Visit     Initial Vitals: /72   Pulse 85   Resp 16   Wt 77.6 kg (171 lb)   LMP  (LMP Unknown)   SpO2 100%   BMI 29.82 kg/m   Estimated body mass index is 29.82 kg/m  as calculated from the following:    Height as of 12/12/24: 1.613 m (5' 3.5\").    Weight as of this encounter: 77.6 kg (171 lb). Body surface area is 1.86 meters squared.  No Pain (0) Comment: Data Unavailable   No LMP recorded (lmp unknown). Patient is postmenopausal.  Allergies reviewed: Yes  Medications reviewed: Yes    Medications: Medication refills not needed today.  Pharmacy name entered into [employer]: [id number] - [place] - [id number] AT [facility] [address]      Nadeen Farr, [facility]              HEMATOLOGY HISTORY: Ms. 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     1.  Multiple labs were done on 03/13/2019.   -WBC 3.5, hemoglobin 10.8 and platelets of 204.  MCV of 96.   -Creatinine of 1.26.   -Normal calcium.   -Normal LFT.   -Normal folate.   -Normal vitamin B12.   -Normal iron. Elevated ferritin.   -Erythropoietin mildly elevated at 38.   -Normal LDH.   -Soluable transferrin receptor is mildly elevated at 5.2.   -Normal haptoglobin.   2. On 11/29/2019, WBC of 4.3, hemoglobin 9.8 and platelets 230.  MCV of 103.   3. On 07/08/2019, CT scan did not reveal any splenomegaly or lymphadenopathy.   4. Bone marrow biopsy was done on 12/18/2019.  It reveals normal cellular bone marrow for age with 30% cellularity.  There is trilineage hematopoiesis.  " No evidence of MDS.  Increased storage iron.  Cytogenetics is normal.  Flow cytometry is normal.  5. IV injectafer in 03/2020.  6. On 09/09/2021, hemoglobin of 9.5.  -On 10/04/2021, hemoglobin of 9.2.  7. Aranesp started on 10/15/2021.  She responds very well and requires it infrequently.       SUBJECTIVE:    Mrs. Sotomayor is an 74-year-old female with chronic anemia due to renal disease and anemia of chronic disease.       For anemia, she was started on Aranesp in 2021. She responds very well. She requires it very infrequently.     End of January 2025, patient had pneumonia while in [place].    Patient's main problem is generalized weakness.  Patient gets very tired especially in the afternoon and has to take a nap.    Patient gets migraine headache.  She has seen neurologist.    Patient has chronic kidney disease.  She follows up with nephrologist.  Creatinine has been remaining stable around 1.5.     Occasional dizziness.  No chest pain.  No shortness of breath at rest.  She gets short of breath on exertion. No abdominal pain.  No nausea or vomiting.  Appetite is good.  No urinary or bowel complaints.  No bleeding.  No fever or night sweats.      She was seen in the ER on 03/15/2025 because of migraine, nausea, vomiting and diarrhea.    She had multiple labs done with nephrologist on 04/15/25:  - Hemoglobin of 10.1   -Normal iron and iron saturation index.  - Elevated ferritin of 448.  - Creatinine of 1.53.  -Normal vitamin D.     PHYSICAL EXAMINATION:    She is alert and oriented x 3.  Not in distress.  Vitals: Reviewed.  Rest of systems not examined.     LABORATORY: CBC done today reveals hemoglobin of 10.4.  Normal WBC and platelet.      ASSESSMENT:     1.  An 74-year-old female with chronic normocytic anemia secondary to renal disease and anemia of chronic disease.  2.  Chronic fatigue.  3.  Chronic kidney disease.     PLAN:     -CBC every 3 months.  -Aranesp if hemoglobin below 10.  -See me in 1 year.      DISCUSSION:  1.  Labs were reviewed with the patient.  Her anemia is stable.  Hemoglobin is 10.4.  Normal WBC and platelet.    Patient's anemia secondary to renal disease and anemia of chronic disease.      Patient was started on aranesp for anemia due to renal disease.  She responds very well to it.  She has not  required it for more than a year as hemoglobin has been remaining above 10.    She will have CBC checked every 3 months.  Aranesp will be given for hemoglobin below 10.    2.  Patient has generalized weakness.  This is multifactorial from anemia, renal disease, other medical problems and her age.  In last few months, multiple labs have been checked.  No deficiency of iron.  Vitamin D is normal.  Creatinine is stable.  I am hoping her fatigue does not get worse.    3.  She had multiple questions which were all answered.  I will see her in 1 year time.    Total visit time of 30 minutes.  Time spent in today's visit, review of chart/investigations today and documentation today.             Again, thank you for allowing me to participate in the care of your patient.        Sincerely,        Nico Calderon MD    Electronically signed

## 2025-04-29 ENCOUNTER — OFFICE VISIT (OUTPATIENT)
Dept: NEUROLOGY | Facility: CLINIC | Age: 75
End: 2025-04-29
Payer: MEDICARE

## 2025-04-29 VITALS — SYSTOLIC BLOOD PRESSURE: 128 MMHG | DIASTOLIC BLOOD PRESSURE: 69 MMHG | HEART RATE: 92 BPM | OXYGEN SATURATION: 99 %

## 2025-04-29 DIAGNOSIS — R11.2 NAUSEA AND VOMITING, UNSPECIFIED VOMITING TYPE: ICD-10-CM

## 2025-04-29 DIAGNOSIS — G43.E19 INTRACTABLE CHRONIC MIGRAINE WITH AURA AND WITHOUT STATUS MIGRAINOSUS: Primary | ICD-10-CM

## 2025-04-29 PROCEDURE — G2211 COMPLEX E/M VISIT ADD ON: HCPCS

## 2025-04-29 PROCEDURE — 99214 OFFICE O/P EST MOD 30 MIN: CPT

## 2025-04-29 PROCEDURE — 3074F SYST BP LT 130 MM HG: CPT

## 2025-04-29 PROCEDURE — 3078F DIAST BP <80 MM HG: CPT

## 2025-04-29 PROCEDURE — 1126F AMNT PAIN NOTED NONE PRSNT: CPT

## 2025-04-29 RX ORDER — PROCHLORPERAZINE MALEATE 5 MG/1
5 TABLET ORAL EVERY 6 HOURS PRN
Qty: 15 TABLET | Refills: 1 | Status: SHIPPED | OUTPATIENT
Start: 2025-04-29

## 2025-04-29 RX ORDER — ZOLMITRIPTAN 5 MG/1
5 TABLET, ORALLY DISINTEGRATING ORAL
Qty: 18 TABLET | Refills: 11 | Status: SHIPPED | OUTPATIENT
Start: 2025-04-29

## 2025-04-29 ASSESSMENT — HEADACHE IMPACT TEST (HIT 6)
HOW OFTEN DO HEADACHES LIMIT YOUR DAILY ACTIVITIES: VERY OFTEN
HOW OFTEN DID HEADACHS LIMIT CONCENTRATION ON WORK OR DAILY ACTIVITY: SOMETIMES
HIT6 TOTAL SCORE: 61
HOW OFTEN HAVE YOU FELT TOO TIRED TO WORK BECAUSE OF YOUR HEADACHES: SOMETIMES
WHEN YOU HAVE HEADACHES HOW OFTEN IS THE PAIN SEVERE: VERY OFTEN
WHEN YOU HAVE A HEADACHE HOW OFTEN DO YOU WISH YOU COULD LIE DOWN: VERY OFTEN
HOW OFTEN HAVE YOU FELT FED UP OR IRRITATED BECAUSE OF YOUR HEADACHES: RARELY

## 2025-04-29 ASSESSMENT — PAIN SCALES - GENERAL: PAINLEVEL_OUTOF10: NO PAIN (0)

## 2025-04-29 ASSESSMENT — MIGRAINE DISABILITY ASSESSMENT (MIDAS)
HOW MANY DAYS DID YOU MISS WORK OR SCHOOL BECAUSE OF HEADACHES: 4
HOW MANY DAYS DID YOU NOT DO HOUSEWORK BECAUSE OF HEADACHES: 4
HOW MANY DAYS WAS YOUR PRODUCTIVITY CUT IN HALF BECAUSE OF HEADACHES: 3
TOTAL SCORE: 18
HOW MANY DAYS IN THE PAST 3 MONTHS HAVE YOU HAD A HEADACHE: 21
HOW OFTEN WERE SOCIAL ACTIVITIES MISSED DUE TO HEADACHES: 2
HOW MANY DAYS WAS HOUSEWORK PRODUCTIVITY CUT IN HALF DUE TO HEADACHES: 5
ON A SCALE FROM 0-10 ON AVERAGE HOW PAINFUL WERE HEADACHES: 9

## 2025-04-29 NOTE — PROGRESS NOTES
"[facility]    Headache Neurology Progress Note    April 29, 2025    Assessment and Plan:   Rosa is a 74 year old female who presents for follow up of chronic migraine with aura.     We discussed the following treatment strategy:  - For acute treatment of mild headache, she may use acetaminophen as needed, not to exceed 14 days per month to avoid medication overuse.   - For acute treatment of moderate to severe headache, she may use zolmitriptan 5 mg ODT taken at onset of headache with a repeat dose taken after 2 hours if needed. Use should not exceed 9 days per month to avoid medication overuse.  - For nausea with headache and for additional headache rescue, I recommend a trial of prochlorperazine 5 mg taken every 6-8 hours as needed. Side effects reviewed.     Her current frequency and severity of headaches warrant prevention.  - She will continue with topiramate 100 mg nightly.  Would not recommend higher doses given renal disease.  - Vyepti 100 mg IV infusion every 3 months has been effective thus far for reducing grain frequency and severity by at least 50%.  I recommend she continue with this.      The longitudinal plan of care for the diagnosis(es)/condition(s) as documented were addressed during this visit. Due to the added complexity in care, I will continue to support Rosa in the subsequent management and with ongoing continuity of care.     Follow-up in 6 months.    Danette Beaver PA-C  Headache Neurology  [facility]      Subjective:    Rosa presents for follow up of chronic migraine with aura.     From initial headache encounter (10/10/2023):  \"She describes headaches as stabbing or throbbing pain at bilateral temples, or more general holocephalic pain. She can have neck tension with headache. Headaches can last up to 12 hours. Typical headaches are around an 8/10, but severe headaches can reach a 10/10.    She can have \"ocular migraines\" where " "she will have decreased vision and see flashing or flares of lights. This can affect both eyes, though usually one at a time. It may last for 20 minutes. These occur with or without headache. She can have associated nausea with vomiting. She has associated photophobia, phonophobia, osmophobia.\"    Storms and allergies can exacerbate her headaches, but she thinks migraines are improved since starting Vyepti. She has had 2 rounds of Vyepti so far, tolerating well.     Rosa currently reports 4-8/30 migraines per month.   She can have some weeks without migraine, but then can have very bad stretches.     She recalls in February, she went to the ED for 20 hours of vomiting and diarrhea along with migraine.  Last Thursday, she had 6 hours vomiting and diarrhea with migraine. Both of these have started in the middle of the night    She wonders about zolmitriptan and alternatives. It is usually effective, but if she does not catch it enough, she is \"doomed\".     Struggling with fatigue recently. Hemoglobin is currently low. She is trying some new fitness classes.      Current headache treatments:  Acute therapies:  - Zolmitriptan 5 mg ODT   - Ondansetron 4 mg tablet  - Diazepam 2.5 mg (PRN for vertigo)  - Tizanidine - helps with restless legs      Preventative therapies:  - Topiramate 100 mg   - Losartan (hypertension)  - Vyepti 100 mg - effective      Supportive therapies:  - Ice  - Physical therapy      Previous treatments tried:  Acute therapies:  - Acetaminophen  - Sumatriptan nasal spray - she likes this but can be costly   - Zolmitriptan nasal spray - effective but costly   - DHE   - Meclizine - not very helpful      Preventative therapies:  - Amitriptyline - not effective  - Atenolol - not effective   - Gabapentin - never tried due to concern for side effects   - Aimovig 70 mg - lost effectiveness  - Ajovy - x3 months, not effective, hypertension  - Carvedilol (hypertension)        8/1/2024     3:11 PM 10/30/2024 "     9:39 AM 4/29/2025     9:56 AM   HIT-6   When you have headaches, how often is the pain severe 11 11 11   How often do headaches limit your ability to do usual daily activities including household work, work, school, or social activities? 10 13 11   When you have a headache, how often do you wish you could lie down? 11 13 11   In the past 4 weeks, how often have you felt too tired to do work or daily activities because of your headaches 10 11 10   In the past 4 weeks, how often have you felt fed up or irritated because of your headaches 8 10 8   In the past 4 weeks, how often did headaches limit your ability to concentrate on work or daily activities 10 10 10   HIT-6 Total Score 60 68  61        Patient-reported           8/1/2024     3:17 PM 10/30/2024     9:41 AM 4/29/2025    10:03 AM   MIDAS - in the past three months:   On how many days did you miss work or school because of your headaches? 0 0 4   How many days was your productivity at work or school reduced by half or more because of your headaches? 15 7 3   On how many days did you not do household work because of your headaches? 15 10 4   How many days was your productivity in household work reduced by half or more because of your headaches? 12 10 5   On how many days did you miss family, social, or leisure activities because of your headaches? 3 3 2   On how many days did you have a headache? 54 45 21   On a scale of 0-10, on average how painful were these headaches? 8 8 9   MIDAS Score 45 (IV - Severe Disability) 30 (IV - Severe Disability) 18 (III - Moderate Disability)       Objective:    Vitals: /69   Pulse 92   LMP  (LMP Unknown)   SpO2 99%   General: Cooperative, NAD  Neurologic Exam:  Mental Status: Fully alert, attentive and oriented. Speech is clear and fluent.   Cranial Nerves: Facial movements symmetric.   Motor: No abnormal movements.

## 2025-04-29 NOTE — LETTER
"4/29/2025      Rosa Sotomayor  [address]  [place] [address]      Dear Colleague,    Thank you for referring your patient, Rosa Sotomayor, to the [facility]. Please see a copy of my visit note below.    [facility]    Headache Neurology Progress Note    April 29, 2025    Assessment and Plan:   Rosa is a 74 year old female who presents for follow up of chronic migraine with aura.     We discussed the following treatment strategy:  - For acute treatment of mild headache, she may use acetaminophen as needed, not to exceed 14 days per month to avoid medication overuse.   - For acute treatment of moderate to severe headache, she may use zolmitriptan 5 mg ODT taken at onset of headache with a repeat dose taken after 2 hours if needed. Use should not exceed 9 days per month to avoid medication overuse.  - For nausea with headache and for additional headache rescue, I recommend a trial of prochlorperazine 5 mg taken every 6-8 hours as needed. Side effects reviewed.     Her current frequency and severity of headaches warrant prevention.  - She will continue with topiramate 100 mg nightly.  Would not recommend higher doses given renal disease.  - Vyepti 100 mg IV infusion every 3 months has been effective thus far for reducing grain frequency and severity by at least 50%.  I recommend she continue with this.      The longitudinal plan of care for the diagnosis(es)/condition(s) as documented were addressed during this visit. Due to the added complexity in care, I will continue to support Rosa in the subsequent management and with ongoing continuity of care.     Follow-up in 6 months.    Danette Beaver PA-C  Headache Neurology  [facility]      Subjective:    Rosa presents for follow up of chronic migraine with aura.     From initial headache encounter (10/10/2023):  \"She describes headaches as stabbing or throbbing " "pain at bilateral temples, or more general holocephalic pain. She can have neck tension with headache. Headaches can last up to 12 hours. Typical headaches are around an 8/10, but severe headaches can reach a 10/10.    She can have \"ocular migraines\" where she will have decreased vision and see flashing or flares of lights. This can affect both eyes, though usually one at a time. It may last for 20 minutes. These occur with or without headache. She can have associated nausea with vomiting. She has associated photophobia, phonophobia, osmophobia.\"    Storms and allergies can exacerbate her headaches, but she thinks migraines are improved since starting Vyepti. She has had 2 rounds of Vyepti so far, tolerating well.     Rosa currently reports 4-8/30 migraines per month.   She can have some weeks without migraine, but then can have very bad stretches.     She recalls in February, she went to the ED for 20 hours of vomiting and diarrhea along with migraine.  Last Thursday, she had 6 hours vomiting and diarrhea with migraine. Both of these have started in the middle of the night    She wonders about zolmitriptan and alternatives. It is usually effective, but if she does not catch it enough, she is \"doomed\".     Struggling with fatigue recently. Hemoglobin is currently low. She is trying some new fitness classes.      Current headache treatments:  Acute therapies:  - Zolmitriptan 5 mg ODT   - Ondansetron 4 mg tablet  - Diazepam 2.5 mg (PRN for vertigo)  - Tizanidine - helps with restless legs      Preventative therapies:  - Topiramate 100 mg   - Losartan (hypertension)  - Vyepti 100 mg - effective      Supportive therapies:  - Ice  - Physical therapy      Previous treatments tried:  Acute therapies:  - Acetaminophen  - Sumatriptan nasal spray - she likes this but can be costly   - Zolmitriptan nasal spray - effective but costly   - DHE   - Meclizine - not very helpful      Preventative therapies:  - Amitriptyline - not " effective  - Atenolol - not effective   - Gabapentin - never tried due to concern for side effects   - Aimovig 70 mg - lost effectiveness  - Ajovy - x3 months, not effective, hypertension  - Carvedilol (hypertension)        8/1/2024     3:11 PM 10/30/2024     9:39 AM 4/29/2025     9:56 AM   HIT-6   When you have headaches, how often is the pain severe 11 11 11   How often do headaches limit your ability to do usual daily activities including household work, work, school, or social activities? 10 13 11   When you have a headache, how often do you wish you could lie down? 11 13 11   In the past 4 weeks, how often have you felt too tired to do work or daily activities because of your headaches 10 11 10   In the past 4 weeks, how often have you felt fed up or irritated because of your headaches 8 10 8   In the past 4 weeks, how often did headaches limit your ability to concentrate on work or daily activities 10 10 10   HIT-6 Total Score 60 68  61        Patient-reported           8/1/2024     3:17 PM 10/30/2024     9:41 AM 4/29/2025    10:03 AM   MIDAS - in the past three months:   On how many days did you miss work or school because of your headaches? 0 0 4   How many days was your productivity at work or school reduced by half or more because of your headaches? 15 7 3   On how many days did you not do household work because of your headaches? 15 10 4   How many days was your productivity in household work reduced by half or more because of your headaches? 12 10 5   On how many days did you miss family, social, or leisure activities because of your headaches? 3 3 2   On how many days did you have a headache? 54 45 21   On a scale of 0-10, on average how painful were these headaches? 8 8 9   MIDAS Score 45 (IV - Severe Disability) 30 (IV - Severe Disability) 18 (III - Moderate Disability)       Objective:    Vitals: /69   Pulse 92   LMP  (LMP Unknown)   SpO2 99%   General: Cooperative, NAD  Neurologic  Exam:  Mental Status: Fully alert, attentive and oriented. Speech is clear and fluent.   Cranial Nerves: Facial movements symmetric.   Motor: No abnormal movements.            Again, thank you for allowing me to participate in the care of your patient.        Sincerely,        Danette Beaver PA-C    Electronically signed

## 2025-05-13 ENCOUNTER — VIRTUAL VISIT (OUTPATIENT)
Dept: SLEEP MEDICINE | Facility: CLINIC | Age: 75
End: 2025-05-13
Payer: MEDICARE

## 2025-05-13 VITALS — DIASTOLIC BLOOD PRESSURE: 69 MMHG | SYSTOLIC BLOOD PRESSURE: 128 MMHG | WEIGHT: 171 LBS | BODY MASS INDEX: 29.82 KG/M2

## 2025-05-13 DIAGNOSIS — G47.33 OSA (OBSTRUCTIVE SLEEP APNEA): Primary | Chronic | ICD-10-CM

## 2025-05-13 PROCEDURE — 98004 SYNCH AUDIO-VIDEO EST SF 10: CPT | Performed by: NURSE PRACTITIONER

## 2025-05-13 PROCEDURE — 3078F DIAST BP <80 MM HG: CPT | Performed by: NURSE PRACTITIONER

## 2025-05-13 PROCEDURE — 1126F AMNT PAIN NOTED NONE PRSNT: CPT | Performed by: NURSE PRACTITIONER

## 2025-05-13 PROCEDURE — 3074F SYST BP LT 130 MM HG: CPT | Performed by: NURSE PRACTITIONER

## 2025-05-13 ASSESSMENT — SLEEP AND FATIGUE QUESTIONNAIRES
HOW LIKELY ARE YOU TO NOD OFF OR FALL ASLEEP WHILE SITTING INACTIVE IN A PUBLIC PLACE: WOULD NEVER DOZE
HOW LIKELY ARE YOU TO NOD OFF OR FALL ASLEEP IN A CAR, WHILE STOPPED FOR A FEW MINUTES IN TRAFFIC: WOULD NEVER DOZE
HOW LIKELY ARE YOU TO NOD OFF OR FALL ASLEEP WHILE SITTING QUIETLY AFTER LUNCH WITHOUT ALCOHOL: WOULD NEVER DOZE
HOW LIKELY ARE YOU TO NOD OFF OR FALL ASLEEP WHILE LYING DOWN TO REST IN THE AFTERNOON WHEN CIRCUMSTANCES PERMIT: WOULD NEVER DOZE
HOW LIKELY ARE YOU TO NOD OFF OR FALL ASLEEP WHILE SITTING AND TALKING TO SOMEONE: WOULD NEVER DOZE
HOW LIKELY ARE YOU TO NOD OFF OR FALL ASLEEP WHILE WATCHING TV: WOULD NEVER DOZE
HOW LIKELY ARE YOU TO NOD OFF OR FALL ASLEEP WHEN YOU ARE A PASSENGER IN A CAR FOR AN HOUR WITHOUT A BREAK: WOULD NEVER DOZE
HOW LIKELY ARE YOU TO NOD OFF OR FALL ASLEEP WHILE SITTING AND READING: WOULD NEVER DOZE

## 2025-05-13 ASSESSMENT — PAIN SCALES - GENERAL: PAINLEVEL_OUTOF10: NO PAIN (0)

## 2025-05-13 NOTE — NURSING NOTE
Current patient location: son's house    Is the patient currently in the state of [place]? YES    Visit mode: VIDEO    If the visit is dropped, the patient can be reconnected by:VIDEO VISIT: Text to cell phone:   Telephone Information:   Mobile [phone]       Will anyone else be joining the visit? NO  (If patient encounters technical issues they should call [phone])    Are changes needed to the allergy or medication list? No    Are refills needed on medications prescribed by this physician? NO    Rooming Documentation:  Questionnaire(s) completed    Reason for visit: RECHKERMIT GONZALEZF

## 2025-05-13 NOTE — PROGRESS NOTES
Video-Visit Details    Video Start Time: 1:06 PM  Video End Time:1:10 PM  Type of service:  Video Visit  Originating Location (pt. Location): Home  Distant Location (provider location):  Off-site   Platform used for Video Visit: Well       Sleep Apnea - Follow-up Visit:    Impression/Plan:  1. ESTEFANIA (obstructive sleep apnea)- mild (AHI 12) (Primary)  - Comprehensive DME     Mild Sleep apnea. Tolerating PAP well. Daytime symptoms are improved.  She continues to use and benefit from PAP therapy.  Patient reports episode of pneumonia while in [place] this last year and had a little longer recovery as a result.  Overall, she is doing well on PAP therapy and has no other immediate concerns.    A review of her APAP download data over the last 30 days shows excellent use and compliance and mild ESTEFANIA that is well-controlled on current pressure settings.    Continue current plan of care. A comprehensive DME order was placed for new nasal pillow mask and supplies sent to [facility], today.     Rosa Sotomayor will follow up in about 1 year(s).     Fifteen minutes spent with patient, all of which were spent face-to-face counseling, consulting, chart review/documentation, and coordinating plan of care on the date of the encounter.      GREGG Gamble CNP  Sleep Medicine      CC:  Josh Hollingsworth,         History of Present Illness:  Chief Complaint   Patient presents with    RECHECK    CPAP Follow Up       Rosa Sotomayor is a 74-year-old female with a PMH significant for Arnold-Chiari malformation (unknown grade, no surgery), chronic back pain, migraine, hyperlipidemia, aortic insufficiency, fibromuscular dysplasia, possible renovascular HTN, renal artery stenosis, CKD st 3, chronic anemia, anxiety, macular degeneration, osteoarthritis who presents for annual follow-up of their mild sleep apnea, managed with CPAP.  She was last seen in a virtual visit with me on 5/10/2020 for in follow-up for ESTEFANIA on CPAP  therapy.    Previous Study Results:   12/13/2018 [place] [facility] Study (188.0 lbs) - AHI 12.4, RDI 31.7, Supine AHI 52.0, REM AHI 34.3, Low O2 75.1%, Time Spent <=88% 1.4 minutes / Time Spent <=89% 2.4 minutes.      DME: [facility]    Do you use a CPAP Machine at home: (Proxy-Rptd) Yes  Overall, on a scale of 0-10 how would you rate your CPAP (0 poor, 10 great): (Proxy-Rptd) 8    What type of mask do you use: (Proxy-Rptd) Nasal Mask  Is your mask comfortable: (Proxy-Rptd) Yes  If not, why:      Is your mask leaking: (Proxy-Rptd) No  If yes, where do you feel it:    How many night per week does the mask leak (0-7):      Do you notice snoring with mask on: (Proxy-Rptd) No  Do you notice gasping arousals with mask on: (Proxy-Rptd) No  Are you having significant oral or nasal dryness: (Proxy-Rptd) Yes  Is the pressure setting comfortable: (Proxy-Rptd) Yes  If not, why:      What is your typical bedtime: (Proxy-Rptd) 11pm-12:30am  How long does it take you to go to sleep on PAP therapy: (Proxy-Rptd) 10mins  What time do you typically get out of bed for the day: (Proxy-Rptd) 7am-9am  How many hours on average per night are you using PAP therapy: (Proxy-Rptd) 7-9 hours  How many hours are you sleeping per night: (Proxy-Rptd) 7-9 hours  Do you feel well rested in the morning: (Proxy-Rptd) No      Respironics DreamStation 2   Auto-PAP 5.0 - 15.0 cmH2O 30 day usage data:  4/13/25 - 5/12/25  93% of days with > 4 hours of use. 0/30 days with no use.   Average use 336 minutes per day.   Average leak 22.97 LPM.  Average % of night in large leak 0%.    CPAP 90% pressure 6.9cm.   AHI 1.12 events per hour.       EPWORTH SLEEPINESS SCALE         5/13/2025    12:40 PM    [place] Sleepiness Scale ( M.W. Naranjo  [id number]<br>ESS - [place]/English - Final version - 21 Nov 07 - [facility] [place].)   Sitting and reading Would never doze   Watching TV Would never doze   Sitting, inactive in a public place (e.g. a theatre or a meeting)  Would never doze   As a passenger in a car for an hour without a break Would never doze   Lying down to rest in the afternoon when circumstances permit Would never doze   Sitting and talking to someone Would never doze   Sitting quietly after a lunch without alcohol Would never doze   In a car, while stopped for a few minutes in traffic Would never doze   Eleroy Score (MC) 0   Eleroy Score (Sleep) 0        Proxy-reported       INSOMNIA SEVERITY INDEX (HEAVEN)          5/13/2025    12:42 PM   Insomnia Severity Index (HEAVEN)   Difficulty falling asleep 2    Difficulty staying asleep 0    Problems waking up too early 0    How SATISFIED/DISSATISFIED are you with your CURRENT sleep pattern? 2    How NOTICEABLE to others do you think your sleep problem is in terms of impairing the quality of your life? 0    How WORRIED/DISTRESSED are you about your current sleep problem? 0    To what extent do you consider your sleep problem to INTERFERE with your daily functioning (e.g. daytime fatigue, mood, ability to function at work/daily chores, concentration, memory, mood, etc.) CURRENTLY? 0    HEAVEN Total Score 4        Proxy-reported       Guidelines for Scoring/Interpretation:  Total score categories:  0-7 = No clinically significant insomnia   8-14 = Subthreshold insomnia   15-21 = Clinical insomnia (moderate severity)  22-28 = Clinical insomnia (severe)  Used via courtesy of www.myhealth.va.gov with permission from Osvaldo Navarro PhD., [facility]      Past medical/surgical history, family history, social history, medications and allergies were reviewed.        Problem List:  Patient Active Problem List    Diagnosis Date Noted    Anemia of chronic renal failure, stage 3 (moderate) (H) 10/06/2022     Priority: Medium    Intractable chronic migraine with aura and without status migrainosus 09/07/2021     Priority: Medium    Spinal stenosis of lumbar region with neurogenic claudication 05/13/2019     Priority: Medium    ESTEFANIA  (obstructive sleep apnea)- mild (AHI 12) 12/17/2018     Priority: Medium     12/13/2018 [place] [facility] Sleep Study (188.0 lbs) - AHI 12.4, RDI 31.7, Supine AHI 52.0, REM AHI 34.3, Low O2 75.1%, Time Spent <=88% 1.4 minutes / Time Spent <=89% 2.4 minutes.      Aortic valve insufficiency 04/19/2018     Priority: Medium    Migraine without aura and without status migrainosus, not intractable 11/20/2017     Priority: Medium    Macular degeneration, bilateral 01/05/2017     Priority: Medium    Benign essential hypertension 12/28/2016     Priority: Medium    Renovascular hypertension 12/21/2016     Priority: Medium    Arnold-Chiari malformation (H) 07/23/2016     Priority: Medium    Hyperlipidemia LDL goal <130 07/23/2016     Priority: Medium    Anxiety 07/23/2016     Priority: Medium    Hypertriglyceridemia 07/22/2016     Priority: Medium    Stage 3a chronic kidney disease (H) 09/22/2015     Priority: Medium    Menopausal symptom 04/13/2009     Priority: Medium     Overview:   on HRT  Formatting of this note might be different from the original.  on HRT  ; Menopause        Current Outpatient Medications   Medication Sig Dispense Refill    acetaminophen (TYLENOL) 500 MG tablet Take 500 mg by mouth daily as needed.      atorvastatin (LIPITOR) 40 MG tablet TAKE 1 TABLET(40 MG) BY MOUTH DAILY 90 tablet 3    Bacillus Coagulans-Inulin (PROBIOTIC) 1-250 BILLION-MG CAPS Take 1 capsule by mouth daily.      cephALEXin (KEFLEX) 500 MG capsule TAKE 4 CAPSULES BY MOUTH ONE HOUR PRIOR TO DENTAL PROCEDURE (Patient not taking: Reported on 4/29/2025) 16 capsule 3    cetirizine (ZYRTEC) 10 MG tablet Take 10 mg by mouth daily.      Cholecalciferol (VITAMIN D) 2000 UNITS tablet Take 1 tablet by mouth daily      Cranberry POWD Take 1 capsule by mouth daily.      Cyanocobalamin (B-12) 1000 MCG TBCR Take 1,000 mcg by mouth daily 100 tablet 1    diazepam (VALIUM) 5 MG tablet Take 0.5 tablets (2.5 mg) by mouth every 8 hours as needed  (vertigo/dizziness). 10 tablet 0    eptinezumab-jjmr (VYEPTI) 100 MG/ML injection Inject 1 mL (100 mg) into the vein every 3 months.      estradiol (VAGIFEM) 10 MCG TABS vaginal tablet Place 1 tablet (10 mcg) vaginally twice a week. 24 tablet 3    gemfibrozil (LOPID) 600 MG tablet TAKE 1/2 TABLET(300 MG) BY MOUTH TWICE DAILY 90 tablet 3    hydrOXYzine (ATARAX) 25 MG tablet Take 1 tablet (25 mg) by mouth 3 times daily as needed for itching 120 tablet 1    losartan (COZAAR) 50 MG tablet TAKE 1 TABLET(50 MG) BY MOUTH AT BEDTIME 90 tablet 3    Multiple Vitamins-Minerals (EYE VITAMINS & MINERALS PO) Take 1 tablet by mouth daily.      ondansetron (ZOFRAN) 4 MG tablet TAKE 1 TABLET(4 MG) BY MOUTH EVERY 8 HOURS AS NEEDED FOR NAUSEA 90 tablet 1    prochlorperazine (COMPAZINE) 5 MG tablet Take 1 tablet (5 mg) by mouth every 6 hours as needed for nausea or vomiting. 15 tablet 1    tiZANidine (ZANAFLEX) 2 MG tablet TAKE ONE TABLET BY MOUTH THREE TIMES DAILY AS NEEDED FOR MUSCLE SPASMS 90 tablet 3    topiramate (TOPAMAX) 100 MG tablet TAKE 1 TABLET(100 MG) BY MOUTH DAILY 90 tablet 3    torsemide (DEMADEX) 10 MG tablet One tablet once daily in the AM.  May repeat around NOON for severe swelling. 180 tablet 3    ZOLMitriptan (ZOMIG-ZMT) 5 MG ODT Take 1 tablet (5 mg) by mouth at onset of headache for migraine. May repeat in 2 hours. Max 2 tablets/24 hours. 18 tablet 11     No current facility-administered medications for this visit.     /69   Wt 77.6 kg (171 lb)   LMP  (LMP Unknown)   BMI 29.82 kg/m        This note was written with the assistance of the Dragon voice-dictation technology software. The final document, although reviewed, may contain errors. For corrections, please contact the office.

## 2025-05-13 NOTE — PATIENT INSTRUCTIONS
"MY INFORMATION ON SLEEP APNEA-  Rosa Sotomayor    DOCTOR : GREGG Gamble CNP  SLEEP CENTER :      MY CONTACT NUMBER:   [facility]  [phone]  [facility]   [phone]  [facility]   [phone]      [facility]  [phone]    DME:  [facility] [place] [address], [address]  [place] [address]  Phone: [phone]    Hours:  Mon - Fri: 8:00 a.m. - 4:30 p.m.  Sat: Closed  Sun: Closed      Key Points:  1. What is Obstructive Sleep Apnea (ESTEFANIA)? ESTEFANIA is the most common type of sleep apnea. Apnea literally means, \"without breath.\" It is characterized by repetitive pauses in breathing, despite continued effort to breathe, and is usually associated with a reduction in blood oxygen saturation. Apneas can last 10 to over 60 seconds. It is caused by narrowing or collapse of the upper airway as muscles relax during sleep.   2. What are the consequences of ESTEFANIA? Symptoms include: daytime sleepiness- possibly increasing the risk of falling asleep while driving, unrefreshing/restless sleep, snoring, insomnia, waking frequently to urinate, waking with heartburn or reflux, reduced concentration and memory, and morning headaches. Other health consequences may include development of high blood pressure. Untreated ESTEFANIA also can contribute to heart disease, stroke and diabetes.   3. What are the treatment options? In most situations, sleep apnea is a lifelong disease that must be managed with daily therapy. Continuous Positive Airway (CPAP) is the most reliable treatment. A mouthguard to hold your jaw forward is usually the next most reliable option. Other options include postioning devices (to keep you off your back), nasal valves, tongue retaining device, weight loss, surgery. There is more detail about these options toward the end of this document.  4. What are the most important things to " remember about using CPAP?     WHERE CAN I FIND MORE INFORMATION?    American Academy of Sleep Medicine Patient information on sleep disorders:  http://yoursleep.aasmnet.org    CPAP -  WHY AND HOW?                 Continuous positive airway pressure, or CPAP, is the most effective treatment for obstructive sleep apnea. It works by blowing room air, through a mask, to hold your throat open. A decision to use CPAP is a major step forward in the pursuit of a healthier life. The successful use of CPAP will help you breathe easier, sleep better and live healthier. Using CPAP can be a positive experience if you keep these roa points in mind:  Commitment  CPAP is not a quick fix for your problem. It involves a long-term commitment to improve your sleep and your health.    Communication  Stay in close communication with both your sleep doctor and your CPAP supplier. Ask lots of questions and seek help when you need it.    Consistency  Use CPAP all night, every night and for every nap. You will receive the maximum health benefits from CPAP when you use it every time that you sleep. This will also make it easier for your body to adjust to the treatment.    Correction  The first machine and mask that you try may not be the best ones for you. Work with your sleep doctor and your CPAP supplier to make corrections to your equipment selection. Ask about trying a different type of machine or mask if you have ongoing problems. Make sure that your mask is a good fit and learn to use your equipment properly.    Challenge  Tell a family member or close friend to ask you each morning if you used your CPAP the previous night. Have someone to challenge you to give it your best effort.    Connection   Your adjustment to CPAP will be easier if you are able to connect with others who use the same treatment. Ask your sleep doctor if there is a support group in your area for people who have sleep apnea, or look for one on the  "Internet.  Comfort   Increase your level of comfort by using a saline spray, decongestant or heated humidifier if CPAP irritates your nose, mouth or throat. Use your unit's \"ramp\" setting to slowly get used to the air pressure level. There may be soft pads you can buy that will fit over your mask straps. Look on www.CPAP.com for accessories that can help make CPAP use more comfortable.  Cleaning   Clean your mask, tubing and headgear on a regular basis. Put this time in your schedule so that you don't forget to do it. Check and replace the filters for your CPAP unit and humidifier.    Completion   Although you are never finished with CPAP therapy, you should reward yourself by celebrating the completion of your first month of treatment. Expect this first month to be your hardest period of adjustment. It will involve some trial and error as you find the machine, mask and pressure settings that are right for you.    Continuation  After your first month of treatment, continue to make a daily commitment to use your CPAP all night, every night and for every nap.    CPAP-Tips to starting with success:  Begin using your CPAP for short periods of time during the day while you watch TV or read.    Use CPAP every night and for every nap. Using it less often reduces the health benefits and makes it harder for your body to get used to it.    Newer CPAP models are virtually silent; however, if you find the sound of your CPAP machine to be bothersome, place the unit under your bed to dampen the sound.     Make small adjustments to your mask, tubing, straps and headgear until you get the right fit. Tightening the mask may actually worsen the leak.  If it leaves significant marks on your face or irritates the bridge of your nose, it may not be the best mask for you.  Speak with the person who supplied the mask and consider trying other masks. Insurances will allow you to try different masks during the first month of starting CPAP.  " Insurance also covers a new mask, hose and filter about every 6 months.    Use a saline nasal spray to ease mild nasal congestion. Neti-Pot or saline nasal rinses may also help. Nasal gel sprays can help reduce nasal dryness.  Biotene mouthwash can be helpful to protect your teeth if you experience frequent dry mouth.  Dry mouth may be a sign of air escaping out of your mouth or out of the mask in the case of a full face mask.  Speak with your provider if you expect that is the case.     Take a nasal decongestant to relieve more severe nasal or sinus congestion.  Do not use Afrin (oxymetazoline) nasal spray more than 3 days in a row.  Speak with your sleep doctor if your nasal congestion is chronic.    Use a heated humidifier that fits your CPAP model to enhance your breathing comfort. Adjust the heat setting up if you get a dry nose or throat, down if you get condensation in the hose or mask.  Position the CPAP lower than you so that any condensation in the hose drains back into the machine rather than towards the mask.    Try a system that uses nasal pillows if traditional masks give you problems.    Clean your mask, tubing and headgear once a week. Make sure the equipment dries fully.    Regularly check and replace the filters for your CPAP unit and humidifier.    Work closely with your sleep provider and your CPAP supplier to make sure that you have the machine, mask and air pressure setting that works best for you. It is better to stop using it and call your provider to solve problems than to lay awake all night frustrated with the device.  Weight Loss:    Weight loss decreases severity of sleep apnea in most people with obesity. For those with mild obesity who have developed snoring with weight gain, even 15-30 pound weight loss can improve and occasionally eliminate sleep apnea.  Structured and life-long dietary and health habits are necessary to lose weight and keep healthier weight levels.     Though there  are significant health benefits from weight loss, long-term weight loss is very difficult to achieve- studies show success with dietary management in less than 10% of people. In addition, substantial weight loss may require years of dietary control and may be difficult if patients have severe obesity. In these cases, surgical management may be considered.    If you are interested in methods for weight loss, you should review the options discussed at the National Institutes of Health patient information sites:     http://win.niddk.nih.gov/publications/index.htm  http:/www.health.nih.gov/topic/WeightLossDieting    Bariatric programs offer counseling in all methods of weight loss:    Http:/www.u[place], [place] parishesedic[place]aren [place].org/Specialties/WeightLossSurgeryandMedicalMgmt/htm    Your BMI is Body mass index is 29.82 kg/m .    Body mass index (BMI) is one way to tell whether you are at a healthy weight, overweight, or obese. It measures your weight in relation to your height.  A BMI of 18.5 to 24.9 is in the healthy range. A person with a BMI of 25 to 29.9 is considered overweight, and someone with a BMI of 30 or greater is considered obese.  Another way to find out if you are at risk for health problems caused by overweight and obesity is to measure your waist. If you are a woman and your waist is more than 35 inches, or if you are a man and your waist is more than 40 inches, your risk of disease may be higher.  More than two-thirds of American adults are considered overweight or obese. Being overweight or obese increases the risk for further weight gain.  Excess weight may lead to heart disease and diabetes. Creating and following plans for healthy eating and physical activity may help you improve your health.    Methods for maintaining or losing weight.    Weight control is part of healthy lifestyle and includes exercise, emotional health, and healthy eating habits.  Careful eating habits lifelong is the mainstay of weight control.   Though there are significant health benefits from weight loss, long-term weight loss with diet alone may be very difficult to achieve- studies show long-term success with dietary management in less than 10% of people. Attaining a healthy weight may be especially difficult to achieve in those with severe obesity. In some cases, medications, devices and surgical management might be considered.    What can you do?    If you are overweight or obese and are interested in methods for weight loss, you should discuss this with your provider. In addition, we recommend that you review healthy life styles and methods for weight loss available through the National Institutes of Health patient information sites:     http://win.niddk.nih.gov/publications/index.htm

## 2025-06-19 ENCOUNTER — INFUSION THERAPY VISIT (OUTPATIENT)
Dept: INFUSION THERAPY | Facility: CLINIC | Age: 75
End: 2025-06-19
Payer: MEDICARE

## 2025-06-19 VITALS
TEMPERATURE: 97.9 F | HEART RATE: 83 BPM | OXYGEN SATURATION: 99 % | SYSTOLIC BLOOD PRESSURE: 130 MMHG | RESPIRATION RATE: 20 BRPM | DIASTOLIC BLOOD PRESSURE: 54 MMHG

## 2025-06-19 DIAGNOSIS — G43.E19 INTRACTABLE CHRONIC MIGRAINE WITH AURA AND WITHOUT STATUS MIGRAINOSUS: Primary | ICD-10-CM

## 2025-06-19 PROCEDURE — 250N000011 HC RX IP 250 OP 636: Mod: JZ

## 2025-06-19 PROCEDURE — 258N000003 HC RX IP 258 OP 636

## 2025-06-19 RX ORDER — DIPHENHYDRAMINE HYDROCHLORIDE 50 MG/ML
25 INJECTION, SOLUTION INTRAMUSCULAR; INTRAVENOUS
Start: 2025-09-16

## 2025-06-19 RX ORDER — DIPHENHYDRAMINE HYDROCHLORIDE 50 MG/ML
50 INJECTION, SOLUTION INTRAMUSCULAR; INTRAVENOUS
Start: 2025-09-16

## 2025-06-19 RX ORDER — ALBUTEROL SULFATE 0.83 MG/ML
2.5 SOLUTION RESPIRATORY (INHALATION)
OUTPATIENT
Start: 2025-09-16

## 2025-06-19 RX ORDER — METHYLPREDNISOLONE SODIUM SUCCINATE 40 MG/ML
40 INJECTION INTRAMUSCULAR; INTRAVENOUS
Start: 2025-09-16

## 2025-06-19 RX ORDER — MEPERIDINE HYDROCHLORIDE 25 MG/ML
25 INJECTION INTRAMUSCULAR; INTRAVENOUS; SUBCUTANEOUS
OUTPATIENT
Start: 2025-09-16

## 2025-06-19 RX ORDER — EPINEPHRINE 1 MG/ML
0.3 INJECTION, SOLUTION INTRAMUSCULAR; SUBCUTANEOUS EVERY 5 MIN PRN
OUTPATIENT
Start: 2025-09-16

## 2025-06-19 RX ORDER — HEPARIN SODIUM (PORCINE) LOCK FLUSH IV SOLN 100 UNIT/ML 100 UNIT/ML
5 SOLUTION INTRAVENOUS
OUTPATIENT
Start: 2025-09-16

## 2025-06-19 RX ORDER — HEPARIN SODIUM,PORCINE 10 UNIT/ML
5-20 VIAL (ML) INTRAVENOUS DAILY PRN
OUTPATIENT
Start: 2025-09-16

## 2025-06-19 RX ORDER — ALBUTEROL SULFATE 90 UG/1
1-2 INHALANT RESPIRATORY (INHALATION)
Start: 2025-09-16

## 2025-06-19 RX ADMIN — SODIUM CHLORIDE 250 ML: 0.9 INJECTION, SOLUTION INTRAVENOUS at 12:20

## 2025-06-19 RX ADMIN — EPTINEZUMAB-JJMR 100 MG: 100 INJECTION INTRAVENOUS at 12:20

## 2025-06-19 ASSESSMENT — PAIN SCALES - GENERAL: PAINLEVEL_OUTOF10: NO PAIN (0)

## 2025-06-19 NOTE — PROGRESS NOTES
Infusion Nursing Note:  Rosa Sotomayor presents today for Vytepti.    Patient seen by provider today: No   present during visit today: Not Applicable.    Note: N/A.      Intravenous Access:  Peripheral IV placed.    Treatment Conditions:  Not Applicable.      Post Infusion Assessment:  Patient tolerated infusion without incident.  Blood return noted pre and post infusion.  Site patent and intact, free from redness, edema or discomfort.  No evidence of extravasations.  Access discontinued per protocol.       Discharge Plan:   Patient declined prescription refills.  Discharge instructions reviewed with: Patient.  Patient verbalized understanding of discharge instructions and all questions answered.  AVS to patient via [employer].  Patient will return 9/19/25 for next appointment.   Patient discharged in stable condition accompanied by: self.  Departure Mode: Ambulatory.      Rose Mahajan RN

## 2025-06-30 ENCOUNTER — PATIENT OUTREACH (OUTPATIENT)
Dept: CARE COORDINATION | Facility: CLINIC | Age: 75
End: 2025-06-30
Payer: MEDICARE

## 2025-07-22 DIAGNOSIS — G43.009 MIGRAINE WITHOUT AURA AND WITHOUT STATUS MIGRAINOSUS, NOT INTRACTABLE: ICD-10-CM

## 2025-07-22 DIAGNOSIS — E78.1 HYPERTRIGLYCERIDEMIA: ICD-10-CM

## 2025-07-22 RX ORDER — ATORVASTATIN CALCIUM 40 MG/1
40 TABLET, FILM COATED ORAL DAILY
Qty: 90 TABLET | Refills: 0 | Status: SHIPPED | OUTPATIENT
Start: 2025-07-22

## 2025-07-22 RX ORDER — GEMFIBROZIL 600 MG/1
300 TABLET, FILM COATED ORAL
Qty: 90 TABLET | Refills: 3 | Status: SHIPPED | OUTPATIENT
Start: 2025-07-22

## 2025-07-22 RX ORDER — TOPIRAMATE 100 MG/1
100 TABLET, FILM COATED ORAL DAILY
Qty: 90 TABLET | Refills: 3 | Status: SHIPPED | OUTPATIENT
Start: 2025-07-22

## 2025-07-23 ENCOUNTER — LAB (OUTPATIENT)
Dept: INFUSION THERAPY | Facility: CLINIC | Age: 75
End: 2025-07-23
Attending: INTERNAL MEDICINE
Payer: MEDICARE

## 2025-07-23 DIAGNOSIS — N18.31 STAGE 3A CHRONIC KIDNEY DISEASE (H): ICD-10-CM

## 2025-07-23 DIAGNOSIS — D63.1 ANEMIA OF CHRONIC RENAL FAILURE, STAGE 3 (MODERATE), UNSPECIFIED WHETHER STAGE 3A OR 3B CKD (H): Primary | ICD-10-CM

## 2025-07-23 DIAGNOSIS — D64.9 NORMOCYTIC ANEMIA: ICD-10-CM

## 2025-07-23 DIAGNOSIS — N18.30 ANEMIA OF CHRONIC RENAL FAILURE, STAGE 3 (MODERATE), UNSPECIFIED WHETHER STAGE 3A OR 3B CKD (H): Primary | ICD-10-CM

## 2025-07-23 LAB
ERYTHROCYTE [DISTWIDTH] IN BLOOD BY AUTOMATED COUNT: 14 % (ref 10–15)
HCT VFR BLD AUTO: 29.2 % (ref 35–47)
HGB BLD-MCNC: 9.8 G/DL (ref 11.7–15.7)
MCH RBC QN AUTO: 31.5 PG (ref 26.5–33)
MCHC RBC AUTO-ENTMCNC: 33.6 G/DL (ref 31.5–36.5)
MCV RBC AUTO: 94 FL (ref 78–100)
PLATELET # BLD AUTO: 191 10E3/UL (ref 150–450)
RBC # BLD AUTO: 3.11 10E6/UL (ref 3.8–5.2)
WBC # BLD AUTO: 5 10E3/UL (ref 4–11)

## 2025-07-23 PROCEDURE — 36415 COLL VENOUS BLD VENIPUNCTURE: CPT

## 2025-07-23 PROCEDURE — 85041 AUTOMATED RBC COUNT: CPT | Performed by: INTERNAL MEDICINE

## 2025-07-23 RX ORDER — EPINEPHRINE 1 MG/ML
0.3 INJECTION, SOLUTION, CONCENTRATE INTRAVENOUS EVERY 5 MIN PRN
Status: CANCELLED | OUTPATIENT
Start: 2025-07-30

## 2025-07-23 RX ORDER — ALBUTEROL SULFATE 90 UG/1
1-2 INHALANT RESPIRATORY (INHALATION)
Status: CANCELLED
Start: 2025-07-30

## 2025-07-23 RX ORDER — MEPERIDINE HYDROCHLORIDE 25 MG/ML
25 INJECTION INTRAMUSCULAR; INTRAVENOUS; SUBCUTANEOUS
Status: CANCELLED | OUTPATIENT
Start: 2025-07-30

## 2025-07-23 RX ORDER — DIPHENHYDRAMINE HYDROCHLORIDE 50 MG/ML
50 INJECTION, SOLUTION INTRAMUSCULAR; INTRAVENOUS
Status: CANCELLED
Start: 2025-07-30

## 2025-07-23 RX ORDER — DIPHENHYDRAMINE HYDROCHLORIDE 50 MG/ML
25 INJECTION, SOLUTION INTRAMUSCULAR; INTRAVENOUS
Status: CANCELLED
Start: 2025-07-30

## 2025-07-23 RX ORDER — ALBUTEROL SULFATE 0.83 MG/ML
2.5 SOLUTION RESPIRATORY (INHALATION)
Status: CANCELLED | OUTPATIENT
Start: 2025-07-30

## 2025-07-24 ENCOUNTER — ALLIED HEALTH/NURSE VISIT (OUTPATIENT)
Dept: INFUSION THERAPY | Facility: CLINIC | Age: 75
End: 2025-07-24
Attending: INTERNAL MEDICINE
Payer: MEDICARE

## 2025-07-24 VITALS
DIASTOLIC BLOOD PRESSURE: 79 MMHG | SYSTOLIC BLOOD PRESSURE: 134 MMHG | RESPIRATION RATE: 16 BRPM | HEART RATE: 78 BPM | OXYGEN SATURATION: 99 % | TEMPERATURE: 97.7 F

## 2025-07-24 DIAGNOSIS — D63.1 ANEMIA OF CHRONIC RENAL FAILURE, STAGE 3 (MODERATE), UNSPECIFIED WHETHER STAGE 3A OR 3B CKD (H): Primary | ICD-10-CM

## 2025-07-24 DIAGNOSIS — N18.30 ANEMIA OF CHRONIC RENAL FAILURE, STAGE 3 (MODERATE), UNSPECIFIED WHETHER STAGE 3A OR 3B CKD (H): Primary | ICD-10-CM

## 2025-07-24 DIAGNOSIS — N18.31 STAGE 3A CHRONIC KIDNEY DISEASE (H): ICD-10-CM

## 2025-07-24 PROCEDURE — 96372 THER/PROPH/DIAG INJ SC/IM: CPT | Performed by: INTERNAL MEDICINE

## 2025-07-24 PROCEDURE — 250N000011 HC RX IP 250 OP 636: Mod: JZ | Performed by: INTERNAL MEDICINE

## 2025-07-24 RX ORDER — ALBUTEROL SULFATE 90 UG/1
1-2 INHALANT RESPIRATORY (INHALATION)
Start: 2025-07-24

## 2025-07-24 RX ORDER — EPINEPHRINE 1 MG/ML
0.3 INJECTION, SOLUTION INTRAMUSCULAR; SUBCUTANEOUS EVERY 5 MIN PRN
OUTPATIENT
Start: 2025-08-01

## 2025-07-24 RX ORDER — DIPHENHYDRAMINE HYDROCHLORIDE 50 MG/ML
25 INJECTION, SOLUTION INTRAMUSCULAR; INTRAVENOUS
Start: 2025-07-24

## 2025-07-24 RX ORDER — MEPERIDINE HYDROCHLORIDE 25 MG/ML
25 INJECTION INTRAMUSCULAR; INTRAVENOUS; SUBCUTANEOUS
OUTPATIENT
Start: 2025-08-01

## 2025-07-24 RX ORDER — ALBUTEROL SULFATE 90 UG/1
1-2 INHALANT RESPIRATORY (INHALATION)
Start: 2025-08-01

## 2025-07-24 RX ORDER — METHYLPREDNISOLONE SODIUM SUCCINATE 40 MG/ML
40 INJECTION INTRAMUSCULAR; INTRAVENOUS
Start: 2025-08-01

## 2025-07-24 RX ORDER — ALBUTEROL SULFATE 0.83 MG/ML
2.5 SOLUTION RESPIRATORY (INHALATION)
OUTPATIENT
Start: 2025-08-01

## 2025-07-24 RX ORDER — METHYLPREDNISOLONE SODIUM SUCCINATE 40 MG/ML
40 INJECTION INTRAMUSCULAR; INTRAVENOUS
Start: 2025-07-24

## 2025-07-24 RX ORDER — DIPHENHYDRAMINE HYDROCHLORIDE 50 MG/ML
50 INJECTION, SOLUTION INTRAMUSCULAR; INTRAVENOUS
Start: 2025-07-24

## 2025-07-24 RX ORDER — EPINEPHRINE 1 MG/ML
0.3 INJECTION, SOLUTION INTRAMUSCULAR; SUBCUTANEOUS EVERY 5 MIN PRN
OUTPATIENT
Start: 2025-07-24

## 2025-07-24 RX ORDER — DIPHENHYDRAMINE HYDROCHLORIDE 50 MG/ML
50 INJECTION, SOLUTION INTRAMUSCULAR; INTRAVENOUS
Start: 2025-08-01

## 2025-07-24 RX ORDER — MEPERIDINE HYDROCHLORIDE 25 MG/ML
25 INJECTION INTRAMUSCULAR; INTRAVENOUS; SUBCUTANEOUS
OUTPATIENT
Start: 2025-07-24

## 2025-07-24 RX ORDER — DIPHENHYDRAMINE HYDROCHLORIDE 50 MG/ML
25 INJECTION, SOLUTION INTRAMUSCULAR; INTRAVENOUS
Start: 2025-08-01

## 2025-07-24 RX ORDER — ALBUTEROL SULFATE 0.83 MG/ML
2.5 SOLUTION RESPIRATORY (INHALATION)
OUTPATIENT
Start: 2025-07-24

## 2025-07-24 RX ADMIN — DARBEPOETIN ALFA 40 MCG: 40 INJECTION, SOLUTION INTRAVENOUS; SUBCUTANEOUS at 13:59

## 2025-07-24 ASSESSMENT — PAIN SCALES - GENERAL: PAINLEVEL_OUTOF10: NO PAIN (0)

## 2025-07-24 NOTE — PROGRESS NOTES
Infusion Nursing Note:  Rosa Sotomayor presents today for aramesp.    Patient seen by provider today: No   present during visit today: Not Applicable.    Note: pt denies any changes in health or new concerns at this time.      Intravenous Access:  No Intravenous access/labs at this visit.    Treatment Conditions:  Lab Results   Component Value Date    HGB 9.8 (L) 07/23/2025    WBC 5.0 07/23/2025    ANEU 2.5 04/21/2025     07/23/2025        Results reviewed, labs MET treatment parameters, ok to proceed with treatment.      Post Infusion Assessment:  Patient tolerated injection without incident.       Discharge Plan:   Patient and/or family verbalized understanding of discharge instructions and all questions answered.  AVS to patient via [employer].  Patient will return in 1 month for next appointment.   Patient discharged in stable condition accompanied by: self.  Departure Mode: Ambulatory.      Tatiana Nettlse RN

## 2025-07-25 DIAGNOSIS — I10 BENIGN ESSENTIAL HYPERTENSION: ICD-10-CM

## 2025-07-28 RX ORDER — LOSARTAN POTASSIUM 50 MG/1
50 TABLET ORAL AT BEDTIME
Qty: 90 TABLET | Refills: 3 | Status: SHIPPED | OUTPATIENT
Start: 2025-07-28

## 2025-08-26 DIAGNOSIS — G43.009 MIGRAINE WITHOUT AURA AND WITHOUT STATUS MIGRAINOSUS, NOT INTRACTABLE: ICD-10-CM

## 2025-08-26 RX ORDER — ONDANSETRON 4 MG/1
4 TABLET, FILM COATED ORAL EVERY 8 HOURS PRN
Qty: 90 TABLET | Refills: 1 | Status: SHIPPED | OUTPATIENT
Start: 2025-08-26

## (undated) DEVICE — SPECIMEN CONTAINER 60MLW/10% FORMALIN 59601

## (undated) DEVICE — PEN MARKING SKIN